# Patient Record
Sex: MALE | Race: OTHER | NOT HISPANIC OR LATINO
[De-identification: names, ages, dates, MRNs, and addresses within clinical notes are randomized per-mention and may not be internally consistent; named-entity substitution may affect disease eponyms.]

---

## 2017-11-20 ENCOUNTER — LABORATORY RESULT (OUTPATIENT)
Age: 62
End: 2017-11-20

## 2017-11-20 ENCOUNTER — APPOINTMENT (OUTPATIENT)
Dept: NEPHROLOGY | Facility: CLINIC | Age: 62
End: 2017-11-20
Payer: COMMERCIAL

## 2017-11-20 VITALS
HEIGHT: 64 IN | HEART RATE: 95 BPM | DIASTOLIC BLOOD PRESSURE: 85 MMHG | WEIGHT: 206.13 LBS | SYSTOLIC BLOOD PRESSURE: 137 MMHG | OXYGEN SATURATION: 96 % | BODY MASS INDEX: 35.19 KG/M2

## 2017-11-20 DIAGNOSIS — Z82.49 FAMILY HISTORY OF ISCHEMIC HEART DISEASE AND OTHER DISEASES OF THE CIRCULATORY SYSTEM: ICD-10-CM

## 2017-11-20 DIAGNOSIS — G47.30 SLEEP APNEA, UNSPECIFIED: ICD-10-CM

## 2017-11-20 DIAGNOSIS — Z83.3 FAMILY HISTORY OF DIABETES MELLITUS: ICD-10-CM

## 2017-11-20 DIAGNOSIS — Z82.2 FAMILY HISTORY OF DEAFNESS AND HEARING LOSS: ICD-10-CM

## 2017-11-20 DIAGNOSIS — Z86.19 PERSONAL HISTORY OF OTHER INFECTIOUS AND PARASITIC DISEASES: ICD-10-CM

## 2017-11-20 DIAGNOSIS — Z87.01 PERSONAL HISTORY OF PNEUMONIA (RECURRENT): ICD-10-CM

## 2017-11-20 DIAGNOSIS — R60.0 LOCALIZED EDEMA: ICD-10-CM

## 2017-11-20 PROCEDURE — 99203 OFFICE O/P NEW LOW 30 MIN: CPT

## 2017-12-13 ENCOUNTER — RESULT REVIEW (OUTPATIENT)
Age: 62
End: 2017-12-13

## 2017-12-13 ENCOUNTER — OUTPATIENT (OUTPATIENT)
Dept: OUTPATIENT SERVICES | Facility: HOSPITAL | Age: 62
LOS: 1 days | End: 2017-12-13
Payer: COMMERCIAL

## 2017-12-13 ENCOUNTER — APPOINTMENT (OUTPATIENT)
Dept: ULTRASOUND IMAGING | Facility: HOSPITAL | Age: 62
End: 2017-12-13
Payer: COMMERCIAL

## 2017-12-13 DIAGNOSIS — R80.9 PROTEINURIA, UNSPECIFIED: ICD-10-CM

## 2017-12-13 DIAGNOSIS — Z00.00 ENCOUNTER FOR GENERAL ADULT MEDICAL EXAMINATION WITHOUT ABNORMAL FINDINGS: ICD-10-CM

## 2017-12-13 DIAGNOSIS — D41.00 NEOPLASM OF UNCERTAIN BEHAVIOR OF UNSPECIFIED KIDNEY: ICD-10-CM

## 2017-12-13 PROCEDURE — 88313 SPECIAL STAINS GROUP 2: CPT | Mod: 26

## 2017-12-13 PROCEDURE — 50200 RENAL BIOPSY PERQ: CPT | Mod: LT

## 2017-12-13 PROCEDURE — 88305 TISSUE EXAM BY PATHOLOGIST: CPT

## 2017-12-13 PROCEDURE — 76942 ECHO GUIDE FOR BIOPSY: CPT

## 2017-12-13 PROCEDURE — 88350 IMFLUOR EA ADDL 1ANTB STN PX: CPT | Mod: 26

## 2017-12-13 PROCEDURE — 88305 TISSUE EXAM BY PATHOLOGIST: CPT | Mod: 26

## 2017-12-13 PROCEDURE — 88348 ELECTRON MICROSCOPY DX: CPT

## 2017-12-13 PROCEDURE — 88350 IMFLUOR EA ADDL 1ANTB STN PX: CPT

## 2017-12-13 PROCEDURE — 50200 RENAL BIOPSY PERQ: CPT

## 2017-12-13 PROCEDURE — 88348 ELECTRON MICROSCOPY DX: CPT | Mod: 26

## 2017-12-13 PROCEDURE — 76942 ECHO GUIDE FOR BIOPSY: CPT | Mod: 26

## 2017-12-13 PROCEDURE — 88312 SPECIAL STAINS GROUP 1: CPT

## 2017-12-13 PROCEDURE — 88312 SPECIAL STAINS GROUP 1: CPT | Mod: 26

## 2017-12-13 PROCEDURE — 88346 IMFLUOR 1ST 1ANTB STAIN PX: CPT | Mod: 26

## 2017-12-13 PROCEDURE — 88346 IMFLUOR 1ST 1ANTB STAIN PX: CPT

## 2017-12-13 PROCEDURE — 88313 SPECIAL STAINS GROUP 2: CPT

## 2017-12-15 LAB — SURGICAL PATHOLOGY STUDY: SIGNIFICANT CHANGE UP

## 2018-01-09 LAB
ALBUMIN SERPL ELPH-MCNC: 3.7 G/DL
ANA PAT FLD IF-IMP: NORMAL
ANA SER IF-ACNC: ABNORMAL
ANION GAP SERPL CALC-SCNC: 18 MMOL/L
APPEARANCE: CLEAR
BACTERIA: NEGATIVE
BASOPHILS # BLD AUTO: 0.04 K/UL
BASOPHILS # BLD AUTO: 0.05 K/UL
BASOPHILS NFR BLD AUTO: 0.5 %
BASOPHILS NFR BLD AUTO: 0.6 %
BILIRUBIN URINE: NEGATIVE
BLOOD URINE: ABNORMAL
BUN SERPL-MCNC: 17 MG/DL
C3 SERPL-MCNC: 160 MG/DL
CALCIUM SERPL-MCNC: 9.7 MG/DL
CHLORIDE SERPL-SCNC: 102 MMOL/L
CO2 SERPL-SCNC: 21 MMOL/L
COLOR: YELLOW
CREAT SERPL-MCNC: 0.99 MG/DL
CREAT SPEC-SCNC: 73 MG/DL
CREAT/PROT UR: 9.9 RATIO
DEPRECATED KAPPA LC FREE/LAMBDA SER: 1.44 RATIO
DSDNA AB SER-ACNC: 59 IU/ML
EOSINOPHIL # BLD AUTO: 0.21 K/UL
EOSINOPHIL # BLD AUTO: 0.26 K/UL
EOSINOPHIL NFR BLD AUTO: 2.5
EOSINOPHIL NFR BLD AUTO: 2.9 %
GLUCOSE QUALITATIVE U: NEGATIVE MG/DL
GLUCOSE SERPL-MCNC: 99 MG/DL
GRANULAR CASTS: 4 /LPF
HBV SURFACE AB SER QL: REACTIVE
HBV SURFACE AG SER QL: NONREACTIVE
HCT VFR BLD CALC: 37.4 %
HCT VFR BLD CALC: 39.6 %
HCV AB SER QL: NONREACTIVE
HCV S/CO RATIO: 0.21 S/CO
HGB BLD-MCNC: 12.8 G/DL
HGB BLD-MCNC: 13.9 G/DL
HYALINE CASTS: 13 /LPF
IGA 24H UR QL IFE: NORMAL
IGA SER QL IEP: 486 MG/DL
IGG SER QL IEP: 899 MG/DL
IGM SER QL IEP: 98 MG/DL
IMM GRANULOCYTES NFR BLD AUTO: 1.4 %
IMM GRANULOCYTES NFR BLD AUTO: 1.7 %
INR PPP: 0.94 RATIO
KAPPA LC CSF-MCNC: 2.49 MG/DL
KAPPA LC SERPL-MCNC: 3.58 MG/DL
KETONES URINE: NEGATIVE
LEUKOCYTE ESTERASE URINE: NEGATIVE
LYMPHOCYTES # BLD AUTO: 2.09 K/UL
LYMPHOCYTES # BLD AUTO: 2.49 K/UL
LYMPHOCYTES NFR BLD AUTO: 24.9 %
LYMPHOCYTES NFR BLD AUTO: 28.1 %
M PROTEIN SPEC IFE-MCNC: NORMAL
MAN DIFF?: NORMAL
MAN DIFF?: NORMAL
MCHC RBC-ENTMCNC: 32.1 PG
MCHC RBC-ENTMCNC: 32.4 PG
MCHC RBC-ENTMCNC: 34.2 GM/DL
MCHC RBC-ENTMCNC: 35.1 GM/DL
MCV RBC AUTO: 91.5 FL
MCV RBC AUTO: 94.7 FL
MICROSCOPIC-UA: NORMAL
MONOCYTES # BLD AUTO: 0.52 K/UL
MONOCYTES # BLD AUTO: 0.58 K/UL
MONOCYTES NFR BLD AUTO: 6.2 %
MONOCYTES NFR BLD AUTO: 6.5 %
MPO AB + PR3 PNL SER: NORMAL
NEUTROPHILS # BLD AUTO: 5.38 K/UL
NEUTROPHILS # BLD AUTO: 5.39 K/UL
NEUTROPHILS NFR BLD AUTO: 60.6 %
NEUTROPHILS NFR BLD AUTO: 64.1 %
NITRITE URINE: NEGATIVE
PH URINE: 5
PHOSPHATE SERPL-MCNC: 4.4 MG/DL
PHOSPHOLIPASE A2 RECEPTOR ELISA: 2 RU/ML
PHOSPHOLIPASE A2 RECEPTOR IFA: NEGATIVE
PLATELET # BLD AUTO: 254 K/UL
PLATELET # BLD AUTO: 275 K/UL
POTASSIUM SERPL-SCNC: 4.4 MMOL/L
PROT UR-MCNC: 725 MG/DL
PROTEIN URINE: 300 MG/DL
PT BLD: 10.6 SEC
RBC # BLD: 3.95 M/UL
RBC # BLD: 4.33 M/UL
RBC # FLD: 12.4 %
RBC # FLD: 12.7 %
RED BLOOD CELLS URINE: 11 /HPF
SODIUM SERPL-SCNC: 141 MMOL/L
SPECIFIC GRAVITY URINE: 1.02
SQUAMOUS EPITHELIAL CELLS: 3 /HPF
UROBILINOGEN URINE: NEGATIVE MG/DL
WBC # FLD AUTO: 8.4 K/UL
WBC # FLD AUTO: 8.87 K/UL
WHITE BLOOD CELLS URINE: 2 /HPF

## 2018-01-11 ENCOUNTER — APPOINTMENT (OUTPATIENT)
Dept: NEPHROLOGY | Facility: CLINIC | Age: 63
End: 2018-01-11
Payer: COMMERCIAL

## 2018-01-11 VITALS
BODY MASS INDEX: 35.53 KG/M2 | SYSTOLIC BLOOD PRESSURE: 126 MMHG | HEIGHT: 64 IN | DIASTOLIC BLOOD PRESSURE: 73 MMHG | HEART RATE: 88 BPM | OXYGEN SATURATION: 95 % | WEIGHT: 208.11 LBS

## 2018-01-11 PROCEDURE — 99214 OFFICE O/P EST MOD 30 MIN: CPT

## 2018-01-21 ENCOUNTER — EMERGENCY (EMERGENCY)
Facility: HOSPITAL | Age: 63
LOS: 1 days | Discharge: HOME | End: 2018-01-21
Attending: EMERGENCY MEDICINE | Admitting: EMERGENCY MEDICINE
Payer: COMMERCIAL

## 2018-01-21 VITALS
DIASTOLIC BLOOD PRESSURE: 82 MMHG | TEMPERATURE: 98 F | SYSTOLIC BLOOD PRESSURE: 128 MMHG | RESPIRATION RATE: 18 BRPM | HEART RATE: 64 BPM | OXYGEN SATURATION: 97 %

## 2018-01-21 VITALS
OXYGEN SATURATION: 97 % | RESPIRATION RATE: 18 BRPM | DIASTOLIC BLOOD PRESSURE: 89 MMHG | SYSTOLIC BLOOD PRESSURE: 141 MMHG | HEART RATE: 91 BPM

## 2018-01-21 PROCEDURE — 99284 EMERGENCY DEPT VISIT MOD MDM: CPT

## 2018-01-21 PROCEDURE — 99284 EMERGENCY DEPT VISIT MOD MDM: CPT | Mod: 25

## 2018-01-21 PROCEDURE — 93971 EXTREMITY STUDY: CPT

## 2018-01-21 PROCEDURE — 93971 EXTREMITY STUDY: CPT | Mod: 26

## 2018-01-21 NOTE — ED PROVIDER NOTE - OBJECTIVE STATEMENT
62M Nurse h/o HTN, HLD, DM, Membranous nephropathy, presents with left popliteal area leg pain which wakes him up at night. His father had a DVT so he is concerned he may have as well. Denies trauma to the area, fevers, chest pain, SOB, N/V, travel. 62M Nurse h/o HTN, HLD, DM, Membranous nephropathy, presents with left medial distal thigh pain which wakes him up at night. His father  2 months ago so he is concerned he may have as well. Denies trauma to the area, fevers, chest pain, SOB, N/V, travel. 62M Nurse h/o HTN, HLD, DM, Membranous nephropathy, presents with left medial distal thigh pain which wakes him up at night. The pain has been occurring for 6 months, worsening. Patient stopped his prednisone abruptly 1 month ago. His father  2 months ago so he is concerned he may have as well. Denies trauma to the area, fevers, chest pain, SOB, N/V, travel. Patient also admits to lateral thigh numbness, notes that has lumbar disc herniations.

## 2018-01-21 NOTE — ED PROVIDER NOTE - LOWER EXTREMITY EXAM, LEFT
Pain on full extension on knee at 170deg. No erythema, no swelling. Minor bruising around the area, tender to palpation./normal

## 2018-01-21 NOTE — ED PROVIDER NOTE - CARE PLAN
Principal Discharge DX:	Leg pain, medial, left  Assessment and plan of treatment:	Call your primary care doctor today or tomorrow to schedule follow up appointment for within the next 3-5 days.  Continue taking your medications as prescribed.  Return to this Emergency Department if you experience worsening condition or for any other emergency.

## 2018-01-21 NOTE — ED PROVIDER NOTE - MEDICAL DECISION MAKING DETAILS
62M medial leg pain. Possible tendonitis, muscle rupture. No trauma. Will get US to eval for DVT, unlikely, no risk factors.

## 2018-01-21 NOTE — ED PROVIDER NOTE - ATTENDING CONTRIBUTION TO CARE
****ATTENDING**** 63yo male DM, HTN, HLD, recent diagnosis of membranous nephropathy w steroids that was stopped 1 mo ago presents with L leg pain x 6 mos. States he has had pain in the area that is dull and achy radiates on the medial aspect of the leg but worse since last night. No trauma.  No chest pain, sob, palp. No recent travel. States he wanted to make sure its not a DVT. Denies any back pain, weakness.   On exam, Patient is awake,alert,oriented x 3. Patient is well appearing and in no acute distress. Patient's chest is clear to ausculation, +s1s2. Abdomen is soft nd/nt +BS. Extremity with no swelling or calf tenderness. L leg medial aspect tender to touch superior to the knee. no rash or erythema, nv intact.  Pt declined pain meds. Check US to eval for DVT. L knee full rom no swelling or erythema.

## 2018-01-21 NOTE — ED PROVIDER NOTE - PROGRESS NOTE DETAILS
Patient is reassessed, states he does not want anything for pain and just wanted to make sure its not a DVT. Pt to follow up with rheumatologist and orthopedics. RGUJINDIA

## 2018-01-21 NOTE — ED ADULT NURSE NOTE - OBJECTIVE STATEMENT
62 year olf male presents to ED via wheel chair through waiting room complaining of bilateral leg pain 62 year olf male presents to ED via wheel chair through waiting room complaining of bilateral leg pain, left worse than right. Has chronic back pain from lumbar and cervical disc herniations, leg pain started last night. legs soft, no swelling or redness noted. Localizes the worst pain at interior knee. mild numbness to posterior left thigh. Denies any recent illness, travel, trauma or falls. Denies HA, CP, SOB, abd pain, NVD. Patient undressed and placed into gown, call bell in hand and side rails up with bed in lowest position for safety. blanket provided. Comfort and safety provided.

## 2018-03-08 ENCOUNTER — EMERGENCY (EMERGENCY)
Facility: HOSPITAL | Age: 63
LOS: 0 days | Discharge: ANOTHER HOSPITAL | End: 2018-03-08
Attending: EMERGENCY MEDICINE
Payer: COMMERCIAL

## 2018-03-08 ENCOUNTER — EMERGENCY (EMERGENCY)
Facility: HOSPITAL | Age: 63
LOS: 1 days | Discharge: HOME | End: 2018-03-08
Admitting: EMERGENCY MEDICINE
Payer: COMMERCIAL

## 2018-03-08 VITALS
OXYGEN SATURATION: 96 % | HEART RATE: 100 BPM | SYSTOLIC BLOOD PRESSURE: 124 MMHG | TEMPERATURE: 98 F | DIASTOLIC BLOOD PRESSURE: 83 MMHG | RESPIRATION RATE: 16 BRPM

## 2018-03-08 VITALS
TEMPERATURE: 98 F | DIASTOLIC BLOOD PRESSURE: 82 MMHG | RESPIRATION RATE: 18 BRPM | OXYGEN SATURATION: 98 % | SYSTOLIC BLOOD PRESSURE: 138 MMHG | HEART RATE: 95 BPM

## 2018-03-08 VITALS
HEART RATE: 117 BPM | DIASTOLIC BLOOD PRESSURE: 82 MMHG | RESPIRATION RATE: 16 BRPM | SYSTOLIC BLOOD PRESSURE: 124 MMHG | OXYGEN SATURATION: 100 % | HEIGHT: 64 IN | TEMPERATURE: 100 F | WEIGHT: 205.03 LBS

## 2018-03-08 LAB
ALBUMIN SERPL ELPH-MCNC: 2.8 G/DL — LOW (ref 3.3–5)
ALP SERPL-CCNC: 54 U/L — SIGNIFICANT CHANGE UP (ref 40–120)
ALT FLD-CCNC: 36 U/L — SIGNIFICANT CHANGE UP (ref 12–78)
ANION GAP SERPL CALC-SCNC: 10 MMOL/L — SIGNIFICANT CHANGE UP (ref 5–17)
APPEARANCE UR: CLEAR — SIGNIFICANT CHANGE UP
APTT BLD: 28.9 SEC — SIGNIFICANT CHANGE UP (ref 27.5–37.4)
AST SERPL-CCNC: 34 U/L — SIGNIFICANT CHANGE UP (ref 15–37)
BACTERIA # UR AUTO: ABNORMAL
BASOPHILS # BLD AUTO: 0.05 K/UL — SIGNIFICANT CHANGE UP (ref 0–0.2)
BASOPHILS NFR BLD AUTO: 0.3 % — SIGNIFICANT CHANGE UP (ref 0–2)
BILIRUB SERPL-MCNC: 1 MG/DL — SIGNIFICANT CHANGE UP (ref 0.2–1.2)
BILIRUB UR-MCNC: NEGATIVE — SIGNIFICANT CHANGE UP
BUN SERPL-MCNC: 16 MG/DL — SIGNIFICANT CHANGE UP (ref 7–23)
CALCIUM SERPL-MCNC: 8.5 MG/DL — SIGNIFICANT CHANGE UP (ref 8.5–10.1)
CHLORIDE SERPL-SCNC: 104 MMOL/L — SIGNIFICANT CHANGE UP (ref 96–108)
CO2 SERPL-SCNC: 27 MMOL/L — SIGNIFICANT CHANGE UP (ref 22–31)
COLOR SPEC: YELLOW — SIGNIFICANT CHANGE UP
CREAT SERPL-MCNC: 1.19 MG/DL — SIGNIFICANT CHANGE UP (ref 0.5–1.3)
DIFF PNL FLD: ABNORMAL
EOSINOPHIL # BLD AUTO: 0.03 K/UL — SIGNIFICANT CHANGE UP (ref 0–0.5)
EOSINOPHIL NFR BLD AUTO: 0.2 % — SIGNIFICANT CHANGE UP (ref 0–6)
EPI CELLS # UR: SIGNIFICANT CHANGE UP
GLUCOSE SERPL-MCNC: 135 MG/DL — HIGH (ref 70–99)
GLUCOSE UR QL: NEGATIVE MG/DL — SIGNIFICANT CHANGE UP
HCT VFR BLD CALC: 37.5 % — LOW (ref 39–50)
HGB BLD-MCNC: 13.3 G/DL — SIGNIFICANT CHANGE UP (ref 13–17)
HYALINE CASTS # UR AUTO: ABNORMAL /LPF
IMM GRANULOCYTES NFR BLD AUTO: 0.5 % — SIGNIFICANT CHANGE UP (ref 0–1.5)
INR BLD: 1.08 RATIO — SIGNIFICANT CHANGE UP (ref 0.88–1.16)
KETONES UR-MCNC: NEGATIVE — SIGNIFICANT CHANGE UP
LACTATE SERPL-SCNC: 2.3 MMOL/L — HIGH (ref 0.7–2)
LEUKOCYTE ESTERASE UR-ACNC: NEGATIVE — SIGNIFICANT CHANGE UP
LYMPHOCYTES # BLD AUTO: 1.34 K/UL — SIGNIFICANT CHANGE UP (ref 1–3.3)
LYMPHOCYTES # BLD AUTO: 7.6 % — LOW (ref 13–44)
MCHC RBC-ENTMCNC: 32.1 PG — SIGNIFICANT CHANGE UP (ref 27–34)
MCHC RBC-ENTMCNC: 35.5 GM/DL — SIGNIFICANT CHANGE UP (ref 32–36)
MCV RBC AUTO: 90.6 FL — SIGNIFICANT CHANGE UP (ref 80–100)
MONOCYTES # BLD AUTO: 1.29 K/UL — HIGH (ref 0–0.9)
MONOCYTES NFR BLD AUTO: 7.3 % — SIGNIFICANT CHANGE UP (ref 2–14)
NEUTROPHILS # BLD AUTO: 14.79 K/UL — HIGH (ref 1.8–7.4)
NEUTROPHILS NFR BLD AUTO: 84.1 % — HIGH (ref 43–77)
NITRITE UR-MCNC: NEGATIVE — SIGNIFICANT CHANGE UP
NRBC # BLD: 0 /100 WBCS — SIGNIFICANT CHANGE UP (ref 0–0)
PH UR: 6 — SIGNIFICANT CHANGE UP (ref 5–8)
PLATELET # BLD AUTO: 211 K/UL — SIGNIFICANT CHANGE UP (ref 150–400)
POTASSIUM SERPL-MCNC: 3.3 MMOL/L — LOW (ref 3.5–5.3)
POTASSIUM SERPL-SCNC: 3.3 MMOL/L — LOW (ref 3.5–5.3)
PROT SERPL-MCNC: 6.9 GM/DL — SIGNIFICANT CHANGE UP (ref 6–8.3)
PROT UR-MCNC: 500 MG/DL
PROTHROM AB SERPL-ACNC: 11.8 SEC — SIGNIFICANT CHANGE UP (ref 9.8–12.7)
RBC # BLD: 4.14 M/UL — LOW (ref 4.2–5.8)
RBC # FLD: 12.1 % — SIGNIFICANT CHANGE UP (ref 10.3–14.5)
RBC CASTS # UR COMP ASSIST: ABNORMAL /HPF (ref 0–4)
SODIUM SERPL-SCNC: 141 MMOL/L — SIGNIFICANT CHANGE UP (ref 135–145)
SP GR SPEC: 1.01 — SIGNIFICANT CHANGE UP (ref 1.01–1.02)
UROBILINOGEN FLD QL: NEGATIVE MG/DL — SIGNIFICANT CHANGE UP
WBC # BLD: 17.58 K/UL — HIGH (ref 3.8–10.5)
WBC # FLD AUTO: 17.58 K/UL — HIGH (ref 3.8–10.5)
WBC UR QL: SIGNIFICANT CHANGE UP

## 2018-03-08 PROCEDURE — 99285 EMERGENCY DEPT VISIT HI MDM: CPT | Mod: 25

## 2018-03-08 PROCEDURE — 99284 EMERGENCY DEPT VISIT MOD MDM: CPT

## 2018-03-08 PROCEDURE — 70491 CT SOFT TISSUE NECK W/DYE: CPT | Mod: 26

## 2018-03-08 RX ORDER — DEXAMETHASONE 0.5 MG/5ML
10 ELIXIR ORAL ONCE
Qty: 0 | Refills: 0 | Status: COMPLETED | OUTPATIENT
Start: 2018-03-08 | End: 2018-03-08

## 2018-03-08 RX ORDER — SODIUM CHLORIDE 9 MG/ML
500 INJECTION INTRAMUSCULAR; INTRAVENOUS; SUBCUTANEOUS ONCE
Qty: 0 | Refills: 0 | Status: COMPLETED | OUTPATIENT
Start: 2018-03-08 | End: 2018-03-08

## 2018-03-08 RX ORDER — VANCOMYCIN HCL 1 G
1000 VIAL (EA) INTRAVENOUS ONCE
Qty: 0 | Refills: 0 | Status: COMPLETED | OUTPATIENT
Start: 2018-03-08 | End: 2018-03-08

## 2018-03-08 RX ORDER — KETOROLAC TROMETHAMINE 30 MG/ML
30 SYRINGE (ML) INJECTION ONCE
Qty: 0 | Refills: 0 | Status: DISCONTINUED | OUTPATIENT
Start: 2018-03-08 | End: 2018-03-08

## 2018-03-08 RX ORDER — PIPERACILLIN AND TAZOBACTAM 4; .5 G/20ML; G/20ML
3.38 INJECTION, POWDER, LYOPHILIZED, FOR SOLUTION INTRAVENOUS ONCE
Qty: 0 | Refills: 0 | Status: COMPLETED | OUTPATIENT
Start: 2018-03-08 | End: 2018-03-08

## 2018-03-08 RX ORDER — SODIUM CHLORIDE 9 MG/ML
3 INJECTION INTRAMUSCULAR; INTRAVENOUS; SUBCUTANEOUS ONCE
Qty: 0 | Refills: 0 | Status: COMPLETED | OUTPATIENT
Start: 2018-03-08 | End: 2018-03-08

## 2018-03-08 RX ADMIN — PIPERACILLIN AND TAZOBACTAM 200 GRAM(S): 4; .5 INJECTION, POWDER, LYOPHILIZED, FOR SOLUTION INTRAVENOUS at 07:00

## 2018-03-08 RX ADMIN — Medication 10 MILLIGRAM(S): at 06:56

## 2018-03-08 RX ADMIN — Medication 30 MILLIGRAM(S): at 06:55

## 2018-03-08 RX ADMIN — SODIUM CHLORIDE 500 MILLILITER(S): 9 INJECTION INTRAMUSCULAR; INTRAVENOUS; SUBCUTANEOUS at 07:00

## 2018-03-08 RX ADMIN — Medication 250 MILLIGRAM(S): at 07:00

## 2018-03-08 RX ADMIN — SODIUM CHLORIDE 3 MILLILITER(S): 9 INJECTION INTRAMUSCULAR; INTRAVENOUS; SUBCUTANEOUS at 07:00

## 2018-03-08 RX ADMIN — Medication 100 MILLIGRAM(S): at 18:58

## 2018-03-08 RX ADMIN — Medication 30 MILLIGRAM(S): at 07:24

## 2018-03-08 NOTE — CONSULT NOTE ADULT - ASSESSMENT
62M with left tonsillar abscess. No PTA on exam or on CT.  -no acute ORL intervention at this time  -clindamycin x 7 days  -call with questions

## 2018-03-08 NOTE — ED PROVIDER NOTE - OBJECTIVE STATEMENT
Pertinent PMH/PSH/FHx/SHx and Review of Systems contained within:  61 yo m with PMH of DM, SLE, Nephrotic syndrome and HTN presents in ED c/o 3 day history of fever associated with progressive sore throat now associated with muffled voice and unable to tolerate own secretions. Patient reports seeing pmd yesterday and was prescribed augmentin of which he's has taken one dose. No cough. No aggravating or relieving factors, No chills, No photophobia/eye pain/changes in vision, No ear pain, No chest pain/palpitations, no SOB/cough/wheeze/stridor, No abdominal pain, No N/V/D, no dysuria/frequency/discharge, No neck/back pain, no rash, no changes in neurological status/function.

## 2018-03-08 NOTE — CONSULT NOTE ADULT - SUBJECTIVE AND OBJECTIVE BOX
Patient is a 62M with PMH of HTN, DM, lupus, lupus nephritis who presents with 3 day history of sore throat, left worse than right. HAs dysphagia and odynophagia. Has muffled voice. Subjective fever. No nausea, vomiting, difficulty breathing, trismus, otalgia. Was seen by outside ENT yesterday for tonsillitis and started on augmentin and a medrol dose stephania. Reports symptoms worsened. Has remote history of tonsillitis. Underwent CT neck at outside ER that showed left tonsillar abscess, no peritonsillar abscess. Given clindamycin, decadron and toradol in the ER with great improvement in symptoms.    Exam  NAD, awake and alert  breathing comfortably on room air  no stridor/stertor  PERRLA, EOMI  NC: clear anteriorly  OC/OP: tongue WNL, FOM soft/flat, right tonsil 1+ erythematous, left tonsil 4+ erythematous, uvula midline, soft palate symmetric and flat, OP clear  Neck: soft/flat, no LAD    CT neck reviewed which showed left tonsillar abscess

## 2018-03-08 NOTE — ED PROVIDER NOTE - MEDICAL DECISION MAKING DETAILS
Pending labs and CT. Abx, ivfs, toradol and decadron ordered. Patient care transitioned to incoming team.  All decisions regarding the progression of care will be made at their discretion. Pending labs and CT. Abx, ivfs, toradol and decadron ordered. Patient care transitioned to incoming team.  All decisions regarding the progression of care will be made at their discretion. patient has tonsillar abscess. have discussed case with ent georgiana who will accept transfer to [facility] for drainage. airway stable and patient with no signs of sepsis upon trasnfer.

## 2018-03-08 NOTE — ED ADULT NURSE NOTE - CHPI ED SYMPTOMS NEG
no numbness/no weakness/no change in level of consciousness/no loss of consciousness/no nausea/no chills/no syncope/no blurred vision/no vomiting

## 2018-03-08 NOTE — ED PROVIDER NOTE - OBJECTIVE STATEMENT
63 y/o male pmh dm, lupus, lupus nephritis c/o sore throat x3 days. Pt was transferred from [place] [facility] for ENT for left tonsillar abscess. Pt admits to having a sore throat x3 days. Admits to feer to 100.2 at home. pt states that the sore throat became progressively worse over 3 days. pt admits to pain with swallowing solids at first and then liquids. Pt went to his ENT yesterday who started pt on augmentin. Pt admits to waking up today and his voice had changes and he was unable to tolerate his secretions. pt was seen at [place], given decadron, vanc and zosyn, had CT which showed + left palatine fluid collection. Pt admits to improvement of symptoms after meds. Pt states that his voice is normalizing and can now tolerate secretions. Denies chest pain, sob, abd pain, n/v/d, dizziness, weakness, syncope or chills.

## 2018-03-08 NOTE — ED PROVIDER NOTE - THROAT FINDINGS
NO DROOLING/NO TONGUE ELEVATION/no exudate/HOARSE/MUFFLED VOICE/TONSILLAR SWELLING/NO STRIDOR/THROAT RED/uvula midline/NO VESICLES/ULCERS

## 2018-03-08 NOTE — ED ADULT NURSE REASSESSMENT NOTE - NS ED NURSE REASSESS COMMENT FT1
pt being transfer to [facility] aware of transfer  EMS arrived , transfer paper given.
pt back from CT. states he only has pain when he swallows. pt completed ABX. Fluids running.
Pt received at the bedside. Pt is A&Ox4. Pt has a 20G right AC, ABX running. wife at bedside. Pt states that his pain has subsided a bit.  Now feels his pain is a t a 7. Bed in lowest postion, safety maintained.

## 2018-03-08 NOTE — ED ADULT NURSE NOTE - PMH
Hepatitis B Carrier    Hypertension, unspecified type    Nephritic syndrome    Obstructive Sleep Apnea    Other systemic lupus erythematosus with endocarditis    Pneumonia    Sleep apnea, unspecified type    Type 2 diabetes mellitus with other neurologic complication, without long-term current use of insulin

## 2018-03-08 NOTE — ED PROVIDER NOTE - ENMT, MLM
Airway patent, Nasal mucosa clear. Mouth with normal mucosa. bilaterally edematous tonsils, mallampati III with limited visualization, anterior cervical lymphadenopathy, muffled voice

## 2018-03-09 DIAGNOSIS — R49.0 DYSPHONIA: ICD-10-CM

## 2018-03-09 DIAGNOSIS — I10 ESSENTIAL (PRIMARY) HYPERTENSION: ICD-10-CM

## 2018-03-09 DIAGNOSIS — G47.33 OBSTRUCTIVE SLEEP APNEA (ADULT) (PEDIATRIC): ICD-10-CM

## 2018-03-09 DIAGNOSIS — B18.1 CHRONIC VIRAL HEPATITIS B WITHOUT DELTA-AGENT: ICD-10-CM

## 2018-03-09 DIAGNOSIS — N05.9 UNSPECIFIED NEPHRITIC SYNDROME WITH UNSPECIFIED MORPHOLOGIC CHANGES: ICD-10-CM

## 2018-03-09 DIAGNOSIS — R50.9 FEVER, UNSPECIFIED: ICD-10-CM

## 2018-03-09 DIAGNOSIS — J36 PERITONSILLAR ABSCESS: ICD-10-CM

## 2018-03-09 DIAGNOSIS — J02.9 ACUTE PHARYNGITIS, UNSPECIFIED: ICD-10-CM

## 2018-03-13 LAB
CULTURE RESULTS: SIGNIFICANT CHANGE UP
CULTURE RESULTS: SIGNIFICANT CHANGE UP
SPECIMEN SOURCE: SIGNIFICANT CHANGE UP
SPECIMEN SOURCE: SIGNIFICANT CHANGE UP

## 2019-05-14 PROBLEM — E11.49 TYPE 2 DIABETES MELLITUS WITH OTHER DIABETIC NEUROLOGICAL COMPLICATION: Chronic | Status: ACTIVE | Noted: 2018-03-08

## 2019-05-14 PROBLEM — M32.11: Chronic | Status: ACTIVE | Noted: 2018-03-08

## 2019-05-14 PROBLEM — N05.9 UNSPECIFIED NEPHRITIC SYNDROME WITH UNSPECIFIED MORPHOLOGIC CHANGES: Chronic | Status: ACTIVE | Noted: 2018-03-08

## 2019-05-14 PROBLEM — G47.30 SLEEP APNEA, UNSPECIFIED: Chronic | Status: ACTIVE | Noted: 2018-03-08

## 2019-05-14 PROBLEM — I10 ESSENTIAL (PRIMARY) HYPERTENSION: Chronic | Status: ACTIVE | Noted: 2018-03-08

## 2019-05-20 ENCOUNTER — APPOINTMENT (OUTPATIENT)
Dept: NEPHROLOGY | Facility: CLINIC | Age: 64
End: 2019-05-20
Payer: COMMERCIAL

## 2019-05-20 VITALS
BODY MASS INDEX: 35.85 KG/M2 | DIASTOLIC BLOOD PRESSURE: 92 MMHG | HEART RATE: 93 BPM | OXYGEN SATURATION: 97 % | WEIGHT: 210 LBS | HEIGHT: 64 IN | SYSTOLIC BLOOD PRESSURE: 143 MMHG

## 2019-05-20 PROCEDURE — 99214 OFFICE O/P EST MOD 30 MIN: CPT

## 2019-05-20 RX ORDER — GEMFIBROZIL 600 MG/1
600 TABLET, FILM COATED ORAL
Refills: 0 | Status: DISCONTINUED | COMMUNITY
End: 2019-05-20

## 2019-05-20 RX ORDER — PREDNISONE 10 MG/1
10 TABLET ORAL
Refills: 0 | Status: DISCONTINUED | COMMUNITY
End: 2019-05-20

## 2019-05-20 RX ORDER — HYDROCHLOROTHIAZIDE 25 MG/1
25 TABLET ORAL
Refills: 0 | Status: DISCONTINUED | COMMUNITY
End: 2019-05-20

## 2019-05-20 NOTE — PHYSICAL EXAM
[General Appearance - Alert] : alert [General Appearance - In No Acute Distress] : in no acute distress [General Appearance - Well Nourished] : well nourished [Sclera] : the sclera and conjunctiva were normal [Outer Ear] : the ears and nose were normal in appearance [Neck Appearance] : the appearance of the neck was normal [Neck Cervical Mass (___cm)] : no neck mass was observed [] : no respiratory distress [Exaggerated Use Of Accessory Muscles For Inspiration] : no accessory muscle use [Apical Impulse] : the apical impulse was normal [Heart Sounds] : normal S1 and S2 [Pitting Edema] : pitting edema present [___ +] : bilateral [unfilled]+ pitting edema to the ankles [Bowel Sounds] : normal bowel sounds [Abdomen Tenderness] : non-tender [Cervical Lymph Nodes Enlarged Posterior Bilaterally] : posterior cervical [Cervical Lymph Nodes Enlarged Anterior Bilaterally] : anterior cervical [Supraclavicular Lymph Nodes Enlarged Bilaterally] : supraclavicular [No CVA Tenderness] : no ~M costovertebral angle tenderness [No Spinal Tenderness] : no spinal tenderness [Abnormal Walk] : normal gait [Nail Clubbing] : no clubbing  or cyanosis of the fingernails [Musculoskeletal - Swelling] : no joint swelling seen [Skin Color & Pigmentation] : normal skin color and pigmentation [Skin Turgor] : normal skin turgor [Oriented To Time, Place, And Person] : oriented to person, place, and time

## 2019-05-24 LAB
ALBUMIN SERPL ELPH-MCNC: 3 G/DL
ANION GAP SERPL CALC-SCNC: 10 MMOL/L
APPEARANCE: CLEAR
BACTERIA: ABNORMAL
BILIRUBIN URINE: NEGATIVE
BLOOD URINE: ABNORMAL
BUN SERPL-MCNC: 14 MG/DL
CALCIUM SERPL-MCNC: 9 MG/DL
CHLORIDE SERPL-SCNC: 107 MMOL/L
CO2 SERPL-SCNC: 23 MMOL/L
COLOR: YELLOW
CREAT SERPL-MCNC: 1.5 MG/DL
CREAT SPEC-SCNC: 106 MG/DL
CREAT/PROT UR: 14.8 RATIO
GLUCOSE QUALITATIVE U: ABNORMAL
GLUCOSE SERPL-MCNC: 143 MG/DL
HYALINE CASTS: 15 /LPF
KETONES URINE: NEGATIVE
LEUKOCYTE ESTERASE URINE: NEGATIVE
MICROSCOPIC-UA: NORMAL
NITRITE URINE: NEGATIVE
PH URINE: 6.5
PHOSPHATE SERPL-MCNC: 2.9 MG/DL
POTASSIUM SERPL-SCNC: 3.8 MMOL/L
PROT UR-MCNC: 1569 MG/DL
PROTEIN URINE: ABNORMAL
RED BLOOD CELLS URINE: 9 /HPF
SODIUM SERPL-SCNC: 140 MMOL/L
SPECIFIC GRAVITY URINE: 1.02
SQUAMOUS EPITHELIAL CELLS: 7 /HPF
URINE COMMENTS: NORMAL
UROBILINOGEN URINE: NORMAL
WHITE BLOOD CELLS URINE: 3 /HPF

## 2019-07-05 LAB
ALBUMIN SERPL ELPH-MCNC: 2.8 G/DL
ANION GAP SERPL CALC-SCNC: 12 MMOL/L
APPEARANCE: CLEAR
BACTERIA: NEGATIVE
BILIRUBIN URINE: NEGATIVE
BLOOD URINE: ABNORMAL
BUN SERPL-MCNC: 15 MG/DL
CALCIUM SERPL-MCNC: 9.3 MG/DL
CHLORIDE SERPL-SCNC: 105 MMOL/L
CO2 SERPL-SCNC: 23 MMOL/L
COLOR: YELLOW
CREAT SERPL-MCNC: 1.57 MG/DL
CREAT SPEC-SCNC: 151 MG/DL
CREAT/PROT UR: 8.9 RATIO
GLUCOSE QUALITATIVE U: ABNORMAL
GLUCOSE SERPL-MCNC: 154 MG/DL
HYALINE CASTS: 3 /LPF
KETONES URINE: NEGATIVE
LEUKOCYTE ESTERASE URINE: NEGATIVE
MICROSCOPIC-UA: NORMAL
NITRITE URINE: NEGATIVE
PH URINE: 6.5
PHOSPHATE SERPL-MCNC: 3.1 MG/DL
POTASSIUM SERPL-SCNC: 3.6 MMOL/L
PROT UR-MCNC: 1340 MG/DL
PROTEIN URINE: ABNORMAL
RED BLOOD CELLS URINE: 5 /HPF
SODIUM SERPL-SCNC: 140 MMOL/L
SPECIFIC GRAVITY URINE: 1.02
SQUAMOUS EPITHELIAL CELLS: 2 /HPF
UROBILINOGEN URINE: NORMAL
WHITE BLOOD CELLS URINE: 1 /HPF

## 2019-07-05 NOTE — CONSULT LETTER
[FreeTextEntry1] : A case of obesity with nephrotic syndrome, hyperlipidemia, proteinuria (16 gm), pedal edema, sleep apnea, ED +ve has been started on steroid on losartan and HCTZ without any  change. Renal sonogram did not reveal any kidney abnormality except enlarged prostate. Despite significant proteinuria his serum albumin 3.7 gm and only mild elevation of serum cholesterol . Most likely patient has obesity related nephrotic syndrome. Advised w/u and kidney biopsy.

## 2019-07-05 NOTE — REVIEW OF SYSTEMS
[Feeling Poorly] : feeling poorly [Feeling Tired] : feeling tired [Recent Weight Gain (___ Lbs)] : recent [unfilled] ~Ulb weight gain [Eyesight Problems] : eyesight problems [Loss Of Hearing] : hearing loss [Lower Ext Edema] : lower extremity edema [SOB on Exertion] : shortness of breath during exertion [Arthralgias] : arthralgias [Joint Pain] : joint pain [Recent Weight Loss (___ Lbs)] : no recent weight loss [Chest Pain] : no chest pain [Palpitations] : no palpitations [Constipation] : no constipation [Diarrhea] : no diarrhea [Heartburn] : no heartburn [Hesitancy] : no urinary hesitancy [Nocturia] : no nocturia [Itching] : no itching [Dry Skin] : no dry skin [Dizziness] : no dizziness [Fainting] : no fainting [Anxiety] : no anxiety [Muscle Weakness] : no muscle weakness [Depression] : no depression [Easy Bleeding] : no tendency for easy bleeding [Easy Bruising] : no tendency for easy bruising

## 2019-07-05 NOTE — HISTORY OF PRESENT ILLNESS
[Stage 2] : stage 2 [Good Compliance] : good compliance  [FreeTextEntry1] : A case of nephrotic syndrome with  lupus nephritis with membranous nephropathy (biopsy proven) with diabetes mellitus and hypertriglyceridemia and massive proteinuria.

## 2019-07-05 NOTE — ASSESSMENT
[FreeTextEntry1] : A case of CKD stage III with diabetes, obesity with nephrotic syndrome, hyperlipidemia, proteinuria, pedal edema, sleep apnea, lupus nephritis (membranous) being treated with cellcept 1500 mg PO BID.  Patients has massive proteinuria because is taking high protein diet. Advised to decrease protein in diet. Increase losartan 100 mg PO BID. Add calcitriol 0. 25 microgram PO daily. Advised renal function tests. Lab tests discussed with the patietn. Urine protein 15. 8 gm. Advised to repeat proteinuria after two weeks. If it does not improve, patient need to be started on Rituximab. Lab tests discussed with the patient. He recently suffered a stroke and recovering from that. He mentioned that he has hepatitis B in the past and afraid of reactivation of it with Rituximab.

## 2019-08-12 ENCOUNTER — APPOINTMENT (OUTPATIENT)
Dept: NEPHROLOGY | Facility: CLINIC | Age: 64
End: 2019-08-12
Payer: COMMERCIAL

## 2019-08-12 VITALS
OXYGEN SATURATION: 96 % | BODY MASS INDEX: 34.49 KG/M2 | DIASTOLIC BLOOD PRESSURE: 85 MMHG | HEIGHT: 64 IN | SYSTOLIC BLOOD PRESSURE: 130 MMHG | WEIGHT: 202 LBS | HEART RATE: 107 BPM

## 2019-08-12 PROCEDURE — 99214 OFFICE O/P EST MOD 30 MIN: CPT

## 2019-08-12 NOTE — PHYSICAL EXAM
[General Appearance - Alert] : alert [General Appearance - In No Acute Distress] : in no acute distress [General Appearance - Well Nourished] : well nourished [Sclera] : the sclera and conjunctiva were normal [Neck Appearance] : the appearance of the neck was normal [Outer Ear] : the ears and nose were normal in appearance [Neck Cervical Mass (___cm)] : no neck mass was observed [] : no respiratory distress [Apical Impulse] : the apical impulse was normal [Exaggerated Use Of Accessory Muscles For Inspiration] : no accessory muscle use [Heart Sounds] : normal S1 and S2 [Pitting Edema] : pitting edema present [___ +] : bilateral [unfilled]+ pitting edema to the ankles [Bowel Sounds] : normal bowel sounds [Abdomen Tenderness] : non-tender [Cervical Lymph Nodes Enlarged Posterior Bilaterally] : posterior cervical [Cervical Lymph Nodes Enlarged Anterior Bilaterally] : anterior cervical [Supraclavicular Lymph Nodes Enlarged Bilaterally] : supraclavicular [No CVA Tenderness] : no ~M costovertebral angle tenderness [No Spinal Tenderness] : no spinal tenderness [Abnormal Walk] : normal gait [Nail Clubbing] : no clubbing  or cyanosis of the fingernails [Skin Color & Pigmentation] : normal skin color and pigmentation [Musculoskeletal - Swelling] : no joint swelling seen [Skin Turgor] : normal skin turgor [Oriented To Time, Place, And Person] : oriented to person, place, and time

## 2019-08-13 LAB
ALBUMIN SERPL ELPH-MCNC: 2.9 G/DL
ALP BLD-CCNC: 81 U/L
ALT SERPL-CCNC: 31 U/L
ANION GAP SERPL CALC-SCNC: 16 MMOL/L
AST SERPL-CCNC: 40 U/L
BASOPHILS # BLD AUTO: 0.04 K/UL
BASOPHILS NFR BLD AUTO: 0.5 %
BILIRUB SERPL-MCNC: 0.3 MG/DL
BUN SERPL-MCNC: 15 MG/DL
CALCIUM SERPL-MCNC: 9.2 MG/DL
CHLORIDE SERPL-SCNC: 105 MMOL/L
CO2 SERPL-SCNC: 21 MMOL/L
CREAT SERPL-MCNC: 1.34 MG/DL
EOSINOPHIL # BLD AUTO: 0.59 K/UL
EOSINOPHIL NFR BLD AUTO: 8 %
ESTIMATED AVERAGE GLUCOSE: 154 MG/DL
GLUCOSE SERPL-MCNC: 219 MG/DL
HBA1C MFR BLD HPLC: 7 %
HBV CORE IGG+IGM SER QL: REACTIVE
HBV CORE IGM SER QL: NONREACTIVE
HBV SURFACE AB SER QL: REACTIVE
HBV SURFACE AG SER QL: NONREACTIVE
HCT VFR BLD CALC: 37.2 %
HGB BLD-MCNC: 13 G/DL
IMM GRANULOCYTES NFR BLD AUTO: 1.1 %
LYMPHOCYTES # BLD AUTO: 1.79 K/UL
LYMPHOCYTES NFR BLD AUTO: 24.2 %
MAN DIFF?: NORMAL
MCHC RBC-ENTMCNC: 32.5 PG
MCHC RBC-ENTMCNC: 34.9 GM/DL
MCV RBC AUTO: 93 FL
MONOCYTES # BLD AUTO: 0.4 K/UL
MONOCYTES NFR BLD AUTO: 5.4 %
NEUTROPHILS # BLD AUTO: 4.51 K/UL
NEUTROPHILS NFR BLD AUTO: 60.8 %
PLATELET # BLD AUTO: 212 K/UL
POTASSIUM SERPL-SCNC: 3.3 MMOL/L
PROT SERPL-MCNC: 5.5 G/DL
RBC # BLD: 4 M/UL
RBC # FLD: 12.6 %
SODIUM SERPL-SCNC: 142 MMOL/L
WBC # FLD AUTO: 7.41 K/UL

## 2019-08-13 NOTE — ASSESSMENT
[FreeTextEntry1] : A case of CKD stage III with diabetes, obesity with nephrotic syndrome, hyperlipidemia, proteinuria, pedal edema, sleep apnea, lupus nephritis (membranous)  and recent h/o mini stroke being treated with cellcept 1500 mg PO BID. \par Patient is being considered for Rituximab.  Advised renal function tests, hepatitis, TB tests. Lab tests discussed with the patient. Renal function stable. Advised to start cyclosporin 150 m g PO BID. Advised to follow cyclosporin level after one week.

## 2019-08-13 NOTE — REVIEW OF SYSTEMS
[Feeling Poorly] : feeling poorly [Feeling Tired] : feeling tired [Recent Weight Loss (___ Lbs)] : recent [unfilled] ~Ulb weight loss [Eyesight Problems] : eyesight problems [Loss Of Hearing] : hearing loss [Lower Ext Edema] : lower extremity edema [SOB on Exertion] : shortness of breath during exertion [Arthralgias] : arthralgias [Joint Pain] : joint pain [Recent Weight Gain (___ Lbs)] : no recent weight gain [Chest Pain] : no chest pain [Palpitations] : no palpitations [Constipation] : no constipation [Diarrhea] : no diarrhea [Nocturia] : no nocturia [Heartburn] : no heartburn [Hesitancy] : no urinary hesitancy [Itching] : no itching [Dry Skin] : no dry skin [Dizziness] : no dizziness [Fainting] : no fainting [Depression] : no depression [Anxiety] : no anxiety [Muscle Weakness] : no muscle weakness [Easy Bruising] : no tendency for easy bruising [Easy Bleeding] : no tendency for easy bleeding

## 2019-08-13 NOTE — REVIEW OF SYSTEMS
[Feeling Poorly] : feeling poorly [Feeling Tired] : feeling tired [Recent Weight Loss (___ Lbs)] : recent [unfilled] ~Ulb weight loss [Eyesight Problems] : eyesight problems [Loss Of Hearing] : hearing loss [Lower Ext Edema] : lower extremity edema [SOB on Exertion] : shortness of breath during exertion [Arthralgias] : arthralgias [Joint Pain] : joint pain [Recent Weight Gain (___ Lbs)] : no recent weight gain [Chest Pain] : no chest pain [Palpitations] : no palpitations [Constipation] : no constipation [Diarrhea] : no diarrhea [Nocturia] : no nocturia [Heartburn] : no heartburn [Hesitancy] : no urinary hesitancy [Itching] : no itching [Dizziness] : no dizziness [Dry Skin] : no dry skin [Fainting] : no fainting [Depression] : no depression [Anxiety] : no anxiety [Muscle Weakness] : no muscle weakness [Easy Bruising] : no tendency for easy bruising [Easy Bleeding] : no tendency for easy bleeding

## 2019-08-13 NOTE — HISTORY OF PRESENT ILLNESS
[Stage 2] : stage 2 [Good Compliance] : good compliance  [FreeTextEntry1] : A case of nephrotic syndrome with  lupus nephritis with membranous nephropathy (biopsy proven) with diabetes mellitus and hypertriglyceridemia and massive proteinuria had mini stroke in June, 2019. Patient is thinking of starting Rituximab.

## 2019-08-14 LAB
CREAT SPEC-SCNC: 144 MG/DL
CREAT/PROT UR: 16.4 RATIO
PROT UR-MCNC: 2356 MG/DL

## 2019-08-20 LAB
M TB IFN-G BLD-IMP: NEGATIVE
QUANTIFERON TB PLUS MITOGEN MINUS NIL: 2.16 IU/ML
QUANTIFERON TB PLUS NIL: 0.02 IU/ML
QUANTIFERON TB PLUS TB1 MINUS NIL: 0 IU/ML
QUANTIFERON TB PLUS TB2 MINUS NIL: 0.01 IU/ML

## 2019-08-22 LAB
ANION GAP SERPL CALC-SCNC: 12 MMOL/L
BUN SERPL-MCNC: 21 MG/DL
CALCIUM SERPL-MCNC: 8.9 MG/DL
CHLORIDE SERPL-SCNC: 107 MMOL/L
CO2 SERPL-SCNC: 21 MMOL/L
CREAT SERPL-MCNC: 1.43 MG/DL
CREAT SPEC-SCNC: 124 MG/DL
CREAT/PROT UR: 11.4 RATIO
CYCLOSPORINE SER-MCNC: 195 NG/ML
GLUCOSE SERPL-MCNC: 186 MG/DL
POTASSIUM SERPL-SCNC: 3.8 MMOL/L
PROT UR-MCNC: 1416 MG/DL
SODIUM SERPL-SCNC: 140 MMOL/L

## 2019-09-17 LAB
ALBUMIN SERPL ELPH-MCNC: 2.9 G/DL
ALP BLD-CCNC: 80 U/L
ALT SERPL-CCNC: 27 U/L
ANION GAP SERPL CALC-SCNC: 12 MMOL/L
AST SERPL-CCNC: 41 U/L
BILIRUB SERPL-MCNC: 0.6 MG/DL
BUN SERPL-MCNC: 28 MG/DL
CALCIUM SERPL-MCNC: 8.9 MG/DL
CHLORIDE SERPL-SCNC: 107 MMOL/L
CK SERPL-CCNC: 310 U/L
CO2 SERPL-SCNC: 21 MMOL/L
CREAT SERPL-MCNC: 1.6 MG/DL
CREAT SPEC-SCNC: 123 MG/DL
CREAT/PROT UR: 10.9 RATIO
CYCLOSPORINE SER-MCNC: 217 NG/ML
GLUCOSE SERPL-MCNC: 150 MG/DL
POTASSIUM SERPL-SCNC: 3.8 MMOL/L
PROT SERPL-MCNC: 5.3 G/DL
PROT UR-MCNC: 1345 MG/DL
SODIUM SERPL-SCNC: 140 MMOL/L

## 2019-12-09 NOTE — ED ADULT NURSE NOTE - FALL HARM RISK TYPE OF ASSESSMENT
Problem: Communication  Goal: The ability to communicate needs accurately and effectively will improve  Outcome: PROGRESSING AS EXPECTED     Problem: Bowel/Gastric:  Goal: Normal bowel function is maintained or improved  Outcome: PROGRESSING AS EXPECTED  Note:   LBM was on 12/3 pt hesitant to take stool softeners for fear of developing diarrhea and pain w/ his rectal wound as he has had in past. Education provided. Pt willing to take once per day as opposed to two, Dr. Mckenzie aware.      Problem: Fluid Volume:  Goal: Will maintain balanced intake and output  Outcome: PROGRESSING SLOWER THAN EXPECTED  Note:   BP low throughout shift, lowest readings 70s/40s pt asymptomatic, pulse unchanged and BP uneffected by 2L IVF and 1 unit of plts. New orders for hydrocort and midodrine.      Alicja Crowell     Daily Assessment

## 2020-08-03 ENCOUNTER — APPOINTMENT (OUTPATIENT)
Dept: NEPHROLOGY | Facility: CLINIC | Age: 65
End: 2020-08-03
Payer: COMMERCIAL

## 2020-08-03 VITALS
WEIGHT: 192.9 LBS | DIASTOLIC BLOOD PRESSURE: 89 MMHG | OXYGEN SATURATION: 97 % | SYSTOLIC BLOOD PRESSURE: 140 MMHG | HEART RATE: 84 BPM | BODY MASS INDEX: 33.11 KG/M2

## 2020-08-03 DIAGNOSIS — E11.42 TYPE 2 DIABETES MELLITUS WITH DIABETIC POLYNEUROPATHY: ICD-10-CM

## 2020-08-03 PROCEDURE — 99214 OFFICE O/P EST MOD 30 MIN: CPT

## 2020-08-03 RX ORDER — CYCLOSPORINE 50 MG/1
50 CAPSULE, LIQUID FILLED ORAL TWICE DAILY
Qty: 540 | Refills: 3 | Status: DISCONTINUED | COMMUNITY
Start: 2019-08-13 | End: 2020-08-03

## 2020-08-03 RX ORDER — GABAPENTIN 300 MG/1
300 CAPSULE ORAL
Qty: 9 | Refills: 0 | Status: ACTIVE | COMMUNITY
Start: 2020-08-03

## 2020-08-03 NOTE — REVIEW OF SYSTEMS
[Feeling Poorly] : feeling poorly [Feeling Tired] : feeling tired [Recent Weight Loss (___ Lbs)] : recent [unfilled] ~Ulb weight loss [Eyesight Problems] : eyesight problems [Loss Of Hearing] : hearing loss [Lower Ext Edema] : lower extremity edema [SOB on Exertion] : shortness of breath during exertion [Arthralgias] : arthralgias [Joint Pain] : joint pain [Recent Weight Gain (___ Lbs)] : no recent weight gain [Chest Pain] : no chest pain [Palpitations] : no palpitations [Constipation] : no constipation [Diarrhea] : no diarrhea [Heartburn] : no heartburn [Hesitancy] : no urinary hesitancy [Itching] : no itching [Nocturia] : no nocturia [Dizziness] : no dizziness [Dry Skin] : no dry skin [Fainting] : no fainting [Anxiety] : no anxiety [Depression] : no depression [Easy Bleeding] : no tendency for easy bleeding [Muscle Weakness] : no muscle weakness [Easy Bruising] : no tendency for easy bruising

## 2020-08-03 NOTE — ASSESSMENT
[FreeTextEntry1] : A case of nephrotic syndrome with  lupus nephritis with membranous nephropathy (biopsy proven, PLA2R  negative) with diabetes mellitus and hypertriglyceridemia and massive proteinuria had mini stroke in June, 2019. Serum creatinine has risen to 2.02 mg (GFR 34 ml) with proteinuria to 11 gm/day. Patient complains of lag pain and back ache. Patient has lost 10 lbs. Advised to increase calcitriol dose to 0.5 microgram.

## 2020-08-03 NOTE — HISTORY OF PRESENT ILLNESS
[Stage 2] : stage 2 [Good Compliance] : good compliance  [FreeTextEntry1] : A case of nephrotic syndrome with  lupus nephritis with membranous nephropathy (biopsy proven, PLA2R  negative) with diabetes mellitus and hypertriglyceridemia and massive proteinuria had mini stroke in June, 2019. Serum creatinine has risen to 2.02 mg (GFR 34 ml) with proteinuria to 11 gm/day. Patient complains of lag pain and back ache. Patient has lost 10 lbs.

## 2020-08-03 NOTE — PHYSICAL EXAM
[General Appearance - Alert] : alert [General Appearance - In No Acute Distress] : in no acute distress [General Appearance - Well Nourished] : well nourished [Sclera] : the sclera and conjunctiva were normal [Outer Ear] : the ears and nose were normal in appearance [Neck Appearance] : the appearance of the neck was normal [Neck Cervical Mass (___cm)] : no neck mass was observed [] : no respiratory distress [Exaggerated Use Of Accessory Muscles For Inspiration] : no accessory muscle use [Apical Impulse] : the apical impulse was normal [Heart Sounds] : normal S1 and S2 [Pitting Edema] : pitting edema present [___ +] : bilateral [unfilled]+ pitting edema to the ankles [Bowel Sounds] : normal bowel sounds [Abdomen Tenderness] : non-tender [Cervical Lymph Nodes Enlarged Posterior Bilaterally] : posterior cervical [No CVA Tenderness] : no ~M costovertebral angle tenderness [Cervical Lymph Nodes Enlarged Anterior Bilaterally] : anterior cervical [Supraclavicular Lymph Nodes Enlarged Bilaterally] : supraclavicular [No Spinal Tenderness] : no spinal tenderness [Abnormal Walk] : normal gait [Musculoskeletal - Swelling] : no joint swelling seen [Nail Clubbing] : no clubbing  or cyanosis of the fingernails [Skin Turgor] : normal skin turgor [Skin Color & Pigmentation] : normal skin color and pigmentation [Oriented To Time, Place, And Person] : oriented to person, place, and time

## 2020-09-10 ENCOUNTER — APPOINTMENT (OUTPATIENT)
Dept: NEPHROLOGY | Facility: CLINIC | Age: 65
End: 2020-09-10
Payer: COMMERCIAL

## 2020-09-10 VITALS
WEIGHT: 194.44 LBS | OXYGEN SATURATION: 99 % | BODY MASS INDEX: 35.78 KG/M2 | HEIGHT: 62 IN | HEART RATE: 79 BPM | SYSTOLIC BLOOD PRESSURE: 129 MMHG | DIASTOLIC BLOOD PRESSURE: 84 MMHG

## 2020-09-10 PROCEDURE — 99214 OFFICE O/P EST MOD 30 MIN: CPT

## 2020-09-10 RX ORDER — METFORMIN HYDROCHLORIDE 500 MG/1
500 TABLET, COATED ORAL
Refills: 0 | Status: DISCONTINUED | COMMUNITY
End: 2020-09-10

## 2020-09-10 RX ORDER — FENOFIBRATE 145 MG/1
145 TABLET, COATED ORAL DAILY
Qty: 30 | Refills: 0 | Status: DISCONTINUED | COMMUNITY
Start: 2019-05-20 | End: 2020-09-10

## 2020-09-10 NOTE — REVIEW OF SYSTEMS
[Feeling Poorly] : feeling poorly [Feeling Tired] : feeling tired [Eyesight Problems] : eyesight problems [Recent Weight Loss (___ Lbs)] : recent [unfilled] ~Ulb weight loss [Loss Of Hearing] : hearing loss [SOB on Exertion] : shortness of breath during exertion [Lower Ext Edema] : lower extremity edema [Arthralgias] : arthralgias [Joint Pain] : joint pain [Palpitations] : no palpitations [Recent Weight Gain (___ Lbs)] : no recent weight gain [Chest Pain] : no chest pain [Constipation] : no constipation [Diarrhea] : no diarrhea [Heartburn] : no heartburn [Hesitancy] : no urinary hesitancy [Itching] : no itching [Nocturia] : no nocturia [Dizziness] : no dizziness [Dry Skin] : no dry skin [Fainting] : no fainting [Muscle Weakness] : no muscle weakness [Depression] : no depression [Anxiety] : no anxiety [Easy Bleeding] : no tendency for easy bleeding [Easy Bruising] : no tendency for easy bruising

## 2020-09-10 NOTE — HISTORY OF PRESENT ILLNESS
[Good Compliance] : good compliance  [Stage 2] : stage 2 [FreeTextEntry1] : A case of nephrotic syndrome with  lupus nephritis with membranous nephropathy (biopsy proven, PLA2R  negative) with diabetes mellitus and hypertriglyceridemia and massive proteinuria (more than 13 gm). Patient has been on Cellcept for long time but did not respond. Kidney biopsy suggested membranous  nephropathy but was missing immunofluorescence because of limited sample. Patient was tried cyclosporin but he did not tolerated and developed rhabdo. He wants to try cyclophosphamide but it will be less toxic with alternating steroids.

## 2020-09-10 NOTE — ASSESSMENT
[FreeTextEntry1] : A case of nephrotic syndrome with  lupus nephritis with membranous nephropathy (biopsy proven, PLA2R  negative) with diabetes mellitus and hypertriglyceridemia and massive proteinuria (more than 13 gm). Patient has been on Cellcept for long time but did not respond. Kidney biopsy suggested membranous  nephropathy but was missing immunofluorescence because of limited sample. Patient was tried cyclosporin but he did not tolerated and developed rhabdo. He wants to try cyclophosphamide but it will be less toxic with alternating steroids. Patient serum calcium has gone up. Advised the dose of calcitriol to 0.25 microgram PO daily.

## 2020-09-10 NOTE — PHYSICAL EXAM
[General Appearance - Alert] : alert [General Appearance - In No Acute Distress] : in no acute distress [General Appearance - Well Nourished] : well nourished [Sclera] : the sclera and conjunctiva were normal [Neck Appearance] : the appearance of the neck was normal [Outer Ear] : the ears and nose were normal in appearance [Neck Cervical Mass (___cm)] : no neck mass was observed [Exaggerated Use Of Accessory Muscles For Inspiration] : no accessory muscle use [] : no respiratory distress [Heart Sounds] : normal S1 and S2 [Apical Impulse] : the apical impulse was normal [___ +] : bilateral [unfilled]+ pitting edema to the ankles [Pitting Edema] : pitting edema present [Bowel Sounds] : normal bowel sounds [Abdomen Tenderness] : non-tender [Cervical Lymph Nodes Enlarged Anterior Bilaterally] : anterior cervical [Cervical Lymph Nodes Enlarged Posterior Bilaterally] : posterior cervical [No Spinal Tenderness] : no spinal tenderness [Supraclavicular Lymph Nodes Enlarged Bilaterally] : supraclavicular [No CVA Tenderness] : no ~M costovertebral angle tenderness [Abnormal Walk] : normal gait [Nail Clubbing] : no clubbing  or cyanosis of the fingernails [Musculoskeletal - Swelling] : no joint swelling seen [Skin Color & Pigmentation] : normal skin color and pigmentation [Skin Turgor] : normal skin turgor [Oriented To Time, Place, And Person] : oriented to person, place, and time

## 2020-09-11 LAB
CREAT SPEC-SCNC: 75 MG/DL
CREAT/PROT UR: 13.5 RATIO
HBV SURFACE AG SER QL: NONREACTIVE
M TB IFN-G BLD-IMP: NEGATIVE
PROT UR-MCNC: 1015 MG/DL
QUANTIFERON TB PLUS MITOGEN MINUS NIL: 4.06 IU/ML
QUANTIFERON TB PLUS NIL: 0.03 IU/ML
QUANTIFERON TB PLUS TB1 MINUS NIL: 0.02 IU/ML
QUANTIFERON TB PLUS TB2 MINUS NIL: 0.01 IU/ML

## 2020-09-28 ENCOUNTER — RX RENEWAL (OUTPATIENT)
Age: 65
End: 2020-09-28

## 2020-10-26 LAB
ALBUMIN SERPL ELPH-MCNC: 3.1 G/DL
ANION GAP SERPL CALC-SCNC: 14 MMOL/L
APPEARANCE: CLEAR
BACTERIA: NEGATIVE
BASOPHILS # BLD AUTO: 0.03 K/UL
BASOPHILS NFR BLD AUTO: 0.3 %
BILIRUBIN URINE: NEGATIVE
BLOOD URINE: ABNORMAL
BUN SERPL-MCNC: 40 MG/DL
CALCIUM SERPL-MCNC: 8.9 MG/DL
CHLORIDE SERPL-SCNC: 112 MMOL/L
CO2 SERPL-SCNC: 21 MMOL/L
COLOR: NORMAL
CREAT SERPL-MCNC: 2.32 MG/DL
CREAT SPEC-SCNC: 66 MG/DL
CREAT/PROT UR: 10.7 RATIO
EOSINOPHIL # BLD AUTO: 0.06 K/UL
EOSINOPHIL NFR BLD AUTO: 0.6 %
ESTIMATED AVERAGE GLUCOSE: 123 MG/DL
GLUCOSE QUALITATIVE U: ABNORMAL
GLUCOSE SERPL-MCNC: 118 MG/DL
HBA1C MFR BLD HPLC: 5.9 %
HCT VFR BLD CALC: 27.5 %
HGB BLD-MCNC: 8.5 G/DL
HYALINE CASTS: 0 /LPF
IMM GRANULOCYTES NFR BLD AUTO: 3.6 %
KETONES URINE: NEGATIVE
LEUKOCYTE ESTERASE URINE: NEGATIVE
LYMPHOCYTES # BLD AUTO: 2.28 K/UL
LYMPHOCYTES NFR BLD AUTO: 22.3 %
MAN DIFF?: NORMAL
MCHC RBC-ENTMCNC: 30.9 GM/DL
MCHC RBC-ENTMCNC: 31.5 PG
MCV RBC AUTO: 101.9 FL
MICROSCOPIC-UA: NORMAL
MONOCYTES # BLD AUTO: 0.78 K/UL
MONOCYTES NFR BLD AUTO: 7.6 %
NEUTROPHILS # BLD AUTO: 6.71 K/UL
NEUTROPHILS NFR BLD AUTO: 65.6 %
NITRITE URINE: NEGATIVE
PH URINE: 6
PHOSPHATE SERPL-MCNC: 3.8 MG/DL
PLATELET # BLD AUTO: 172 K/UL
POTASSIUM SERPL-SCNC: 3.7 MMOL/L
PROT UR-MCNC: 711 MG/DL
PROTEIN URINE: ABNORMAL
RBC # BLD: 2.7 M/UL
RBC # FLD: 14.5 %
RED BLOOD CELLS URINE: 6 /HPF
SODIUM SERPL-SCNC: 147 MMOL/L
SPECIFIC GRAVITY URINE: 1.02
SQUAMOUS EPITHELIAL CELLS: 2 /HPF
UROBILINOGEN URINE: NORMAL
WBC # FLD AUTO: 10.23 K/UL
WHITE BLOOD CELLS URINE: 4 /HPF

## 2020-10-29 ENCOUNTER — TRANSCRIPTION ENCOUNTER (OUTPATIENT)
Age: 65
End: 2020-10-29

## 2020-10-29 ENCOUNTER — APPOINTMENT (OUTPATIENT)
Dept: NEPHROLOGY | Facility: CLINIC | Age: 65
End: 2020-10-29
Payer: COMMERCIAL

## 2020-10-29 ENCOUNTER — LABORATORY RESULT (OUTPATIENT)
Age: 65
End: 2020-10-29

## 2020-10-29 DIAGNOSIS — K92.2 GASTROINTESTINAL HEMORRHAGE, UNSPECIFIED: ICD-10-CM

## 2020-10-29 PROCEDURE — 99214 OFFICE O/P EST MOD 30 MIN: CPT | Mod: 95

## 2020-11-02 ENCOUNTER — LABORATORY RESULT (OUTPATIENT)
Age: 65
End: 2020-11-02

## 2020-11-03 PROBLEM — K92.2 GI BLEED: Status: ACTIVE | Noted: 2020-11-03

## 2020-11-03 LAB
ALBUMIN SERPL ELPH-MCNC: 3.1 G/DL
ANION GAP SERPL CALC-SCNC: 10 MMOL/L
BASOPHILS # BLD AUTO: 0.2 K/UL
BASOPHILS NFR BLD AUTO: 3.5 %
BUN SERPL-MCNC: 28 MG/DL
CALCIUM SERPL-MCNC: 8 MG/DL
CHLORIDE SERPL-SCNC: 109 MMOL/L
CK SERPL-CCNC: 90 U/L
CO2 SERPL-SCNC: 20 MMOL/L
CREAT SERPL-MCNC: 2.62 MG/DL
EOSINOPHIL # BLD AUTO: 0.05 K/UL
EOSINOPHIL NFR BLD AUTO: 0.8 %
FERRITIN SERPL-MCNC: 310 NG/ML
FOLATE SERPL-MCNC: 19.8 NG/ML
GLUCOSE SERPL-MCNC: 207 MG/DL
HCT VFR BLD CALC: 24.4 %
HGB BLD-MCNC: 7.7 G/DL
IRON SATN MFR SERPL: 36 %
IRON SERPL-MCNC: 96 UG/DL
LYMPHOCYTES # BLD AUTO: 1.09 K/UL
LYMPHOCYTES NFR BLD AUTO: 19.1 %
MAN DIFF?: NORMAL
MCHC RBC-ENTMCNC: 31.6 GM/DL
MCHC RBC-ENTMCNC: 32.4 PG
MCV RBC AUTO: 102.5 FL
MONOCYTES # BLD AUTO: 0.3 K/UL
MONOCYTES NFR BLD AUTO: 5.2 %
NEUTROPHILS # BLD AUTO: 3.86 K/UL
NEUTROPHILS NFR BLD AUTO: 67.8 %
PHOSPHATE SERPL-MCNC: 3.5 MG/DL
PLATELET # BLD AUTO: 223 K/UL
POTASSIUM SERPL-SCNC: 4.4 MMOL/L
RBC # BLD: 2.38 M/UL
RBC # FLD: 14.3 %
SODIUM SERPL-SCNC: 138 MMOL/L
TIBC SERPL-MCNC: 264 UG/DL
UIBC SERPL-MCNC: 168 UG/DL
VIT B12 SERPL-MCNC: 563 PG/ML
WBC # FLD AUTO: 5.69 K/UL

## 2020-11-03 NOTE — HISTORY OF PRESENT ILLNESS
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  He has completed one month course of steroids and has been started on cyclophosphamide.

## 2020-11-03 NOTE — ASSESSMENT
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  He has completed one month course of steroids and has been started on cyclophosphamide. Recent blood tests showed mild increase in serum creatinine  which may be related to Jardiance and recent start of Fenofibrate. Advised serum creatinine kinase and weekly WBC count. Advised to start ferrous sulphate 325 mg PO TID. Lab tests reviewed (November 1, 2020). Serum creatinine continue to go up and Hb is down. Advised to D/C  cyclophosphamide, losartan, and Jardiance. Advised repeat BMP and stool guiac and CBC three days later.

## 2020-11-03 NOTE — REASON FOR VISIT
[Home] : at home, [unfilled] , at the time of the visit. [Verbal consent obtained from patient] : the patient, [unfilled] [Follow-Up] : a follow-up visit [FreeTextEntry1] : Lupus nephritis

## 2020-11-09 LAB
ANION GAP SERPL CALC-SCNC: 11 MMOL/L
BASOPHILS # BLD AUTO: 0.03 K/UL
BASOPHILS NFR BLD AUTO: 0.5 %
BUN SERPL-MCNC: 23 MG/DL
CALCIUM SERPL-MCNC: 8.3 MG/DL
CHLORIDE SERPL-SCNC: 107 MMOL/L
CO2 SERPL-SCNC: 19 MMOL/L
CREAT SERPL-MCNC: 2.54 MG/DL
EOSINOPHIL # BLD AUTO: 0.16 K/UL
EOSINOPHIL NFR BLD AUTO: 2.7 %
GLUCOSE SERPL-MCNC: 223 MG/DL
HCT VFR BLD CALC: 25.7 %
HEMOCCULT STL QL: NEGATIVE
HGB BLD-MCNC: 8.1 G/DL
IMM GRANULOCYTES NFR BLD AUTO: 3.7 %
LYMPHOCYTES # BLD AUTO: 1.74 K/UL
LYMPHOCYTES NFR BLD AUTO: 29.5 %
MAN DIFF?: NORMAL
MCHC RBC-ENTMCNC: 31.5 GM/DL
MCHC RBC-ENTMCNC: 32.1 PG
MCV RBC AUTO: 102 FL
MONOCYTES # BLD AUTO: 0.49 K/UL
MONOCYTES NFR BLD AUTO: 8.3 %
NEUTROPHILS # BLD AUTO: 3.25 K/UL
NEUTROPHILS NFR BLD AUTO: 55.3 %
PLATELET # BLD AUTO: 226 K/UL
POTASSIUM SERPL-SCNC: 4.7 MMOL/L
RBC # BLD: 2.52 M/UL
RBC # FLD: 14.3 %
SODIUM SERPL-SCNC: 137 MMOL/L
WBC # FLD AUTO: 5.89 K/UL

## 2020-11-11 ENCOUNTER — LABORATORY RESULT (OUTPATIENT)
Age: 65
End: 2020-11-11

## 2020-11-12 ENCOUNTER — APPOINTMENT (OUTPATIENT)
Dept: NEPHROLOGY | Facility: CLINIC | Age: 65
End: 2020-11-12
Payer: COMMERCIAL

## 2020-11-12 PROCEDURE — 99214 OFFICE O/P EST MOD 30 MIN: CPT | Mod: 95

## 2020-11-13 LAB
ALBUMIN SERPL ELPH-MCNC: 3.3 G/DL
ANION GAP SERPL CALC-SCNC: 12 MMOL/L
BASOPHILS # BLD AUTO: 0 K/UL
BASOPHILS NFR BLD AUTO: 0 %
BUN SERPL-MCNC: 21 MG/DL
CALCIUM SERPL-MCNC: 9.1 MG/DL
CHLORIDE SERPL-SCNC: 107 MMOL/L
CO2 SERPL-SCNC: 20 MMOL/L
CREAT SERPL-MCNC: 2.51 MG/DL
EOSINOPHIL # BLD AUTO: 0.09 K/UL
EOSINOPHIL NFR BLD AUTO: 1.7 %
GLUCOSE SERPL-MCNC: 159 MG/DL
HCT VFR BLD CALC: 25.5 %
HGB BLD-MCNC: 8.1 G/DL
LYMPHOCYTES # BLD AUTO: 1.7 K/UL
LYMPHOCYTES NFR BLD AUTO: 33.9 %
MAN DIFF?: NORMAL
MCHC RBC-ENTMCNC: 31.8 GM/DL
MCHC RBC-ENTMCNC: 33.5 PG
MCV RBC AUTO: 105.4 FL
MONOCYTES # BLD AUTO: 0.31 K/UL
MONOCYTES NFR BLD AUTO: 6.1 %
NEUTROPHILS # BLD AUTO: 2.87 K/UL
NEUTROPHILS NFR BLD AUTO: 57.4 %
PHOSPHATE SERPL-MCNC: 3.2 MG/DL
PLATELET # BLD AUTO: 235 K/UL
POTASSIUM SERPL-SCNC: 4.2 MMOL/L
RBC # BLD: 2.42 M/UL
RBC # FLD: 15 %
SODIUM SERPL-SCNC: 139 MMOL/L
WBC # FLD AUTO: 5 K/UL

## 2020-11-19 NOTE — HISTORY OF PRESENT ILLNESS
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 Patient has received two weeks of cyclophosphamide. Patient weight is stable.

## 2020-11-19 NOTE — ASSESSMENT
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 Patient has received two weeks of cyclophosphamide. Patient weight is stable. BP is controlled. Rencent blood tests showed lokesh serum creatinine. High blood sugar. Low Hb 8.1 g.WBC count 5000. Advised to continue Jardiance. Start Aranesp 100 microgram every two weeks. Repeat CBC after one week.

## 2020-11-19 NOTE — REASON FOR VISIT
[Home] : at home, [unfilled] , at the time of the visit. [Verbal consent obtained from patient] : the patient, [unfilled] [Follow-Up] : a follow-up visit [FreeTextEntry1] : Chronic kidney disease

## 2020-11-20 ENCOUNTER — LABORATORY RESULT (OUTPATIENT)
Age: 65
End: 2020-11-20

## 2020-11-24 LAB
ALBUMIN SERPL ELPH-MCNC: 3.6 G/DL
ANION GAP SERPL CALC-SCNC: 11 MMOL/L
BASOPHILS # BLD AUTO: 0.04 K/UL
BASOPHILS NFR BLD AUTO: 0.8 %
BUN SERPL-MCNC: 23 MG/DL
CALCIUM SERPL-MCNC: 9.3 MG/DL
CHLORIDE SERPL-SCNC: 105 MMOL/L
CO2 SERPL-SCNC: 24 MMOL/L
CREAT SERPL-MCNC: 2.49 MG/DL
CREAT SPEC-SCNC: 104 MG/DL
CREAT/PROT UR: 9.6 RATIO
EOSINOPHIL # BLD AUTO: 0.17 K/UL
EOSINOPHIL NFR BLD AUTO: 3.2 %
ESTIMATED AVERAGE GLUCOSE: 97 MG/DL
GLUCOSE SERPL-MCNC: 227 MG/DL
HBA1C MFR BLD HPLC: 5 %
HCT VFR BLD CALC: 26.5 %
HGB BLD-MCNC: 8.3 G/DL
LYMPHOCYTES # BLD AUTO: 1.26 K/UL
LYMPHOCYTES NFR BLD AUTO: 24 %
MAN DIFF?: NORMAL
MCHC RBC-ENTMCNC: 31.3 GM/DL
MCHC RBC-ENTMCNC: 33.3 PG
MCV RBC AUTO: 106.4 FL
MONOCYTES # BLD AUTO: 0.55 K/UL
MONOCYTES NFR BLD AUTO: 10.4 %
NEUTROPHILS # BLD AUTO: 3.16 K/UL
NEUTROPHILS NFR BLD AUTO: 60 %
PHOSPHATE SERPL-MCNC: 3.8 MG/DL
PLATELET # BLD AUTO: 178 K/UL
POTASSIUM SERPL-SCNC: 3.8 MMOL/L
PROT UR-MCNC: 1001 MG/DL
RBC # BLD: 2.49 M/UL
RBC # FLD: 15.1 %
SODIUM SERPL-SCNC: 141 MMOL/L
WBC # FLD AUTO: 5.26 K/UL

## 2020-12-02 ENCOUNTER — LABORATORY RESULT (OUTPATIENT)
Age: 65
End: 2020-12-02

## 2020-12-03 ENCOUNTER — APPOINTMENT (OUTPATIENT)
Dept: NEPHROLOGY | Facility: CLINIC | Age: 65
End: 2020-12-03
Payer: COMMERCIAL

## 2020-12-03 LAB
ANION GAP SERPL CALC-SCNC: 11 MMOL/L
BASOPHILS # BLD AUTO: 0.05 K/UL
BASOPHILS NFR BLD AUTO: 0.8 %
BUN SERPL-MCNC: 19 MG/DL
CALCIUM SERPL-MCNC: 9.3 MG/DL
CHLORIDE SERPL-SCNC: 109 MMOL/L
CO2 SERPL-SCNC: 22 MMOL/L
CREAT SERPL-MCNC: 2.34 MG/DL
EOSINOPHIL # BLD AUTO: 1.21 K/UL
EOSINOPHIL NFR BLD AUTO: 19.7 %
GLUCOSE SERPL-MCNC: 154 MG/DL
HCT VFR BLD CALC: 28.6 %
HGB BLD-MCNC: 9.1 G/DL
IMM GRANULOCYTES NFR BLD AUTO: 1 %
LYMPHOCYTES # BLD AUTO: 1.07 K/UL
LYMPHOCYTES NFR BLD AUTO: 17.5 %
MAN DIFF?: NORMAL
MCHC RBC-ENTMCNC: 31.8 GM/DL
MCHC RBC-ENTMCNC: 34.5 PG
MCV RBC AUTO: 108.3 FL
MONOCYTES # BLD AUTO: 0.65 K/UL
MONOCYTES NFR BLD AUTO: 10.6 %
NEUTROPHILS # BLD AUTO: 3.09 K/UL
NEUTROPHILS NFR BLD AUTO: 50.4 %
PLATELET # BLD AUTO: 163 K/UL
POTASSIUM SERPL-SCNC: 4 MMOL/L
RBC # BLD: 2.64 M/UL
RBC # FLD: 15.2 %
SODIUM SERPL-SCNC: 143 MMOL/L
WBC # FLD AUTO: 6.13 K/UL

## 2020-12-03 PROCEDURE — 99202 OFFICE O/P NEW SF 15 MIN: CPT | Mod: 95

## 2020-12-03 RX ORDER — AMLODIPINE BESYLATE 5 MG/1
5 TABLET ORAL DAILY
Qty: 30 | Refills: 5 | Status: DISCONTINUED | COMMUNITY
Start: 2020-08-03 | End: 2020-12-03

## 2021-01-01 ENCOUNTER — NON-APPOINTMENT (OUTPATIENT)
Age: 66
End: 2021-01-01

## 2021-01-01 ENCOUNTER — APPOINTMENT (OUTPATIENT)
Dept: NEPHROLOGY | Facility: CLINIC | Age: 66
End: 2021-01-01
Payer: COMMERCIAL

## 2021-01-01 ENCOUNTER — APPOINTMENT (OUTPATIENT)
Dept: RHEUMATOLOGY | Facility: CLINIC | Age: 66
End: 2021-01-01

## 2021-01-01 ENCOUNTER — RX RENEWAL (OUTPATIENT)
Age: 66
End: 2021-01-01

## 2021-01-01 ENCOUNTER — LABORATORY RESULT (OUTPATIENT)
Age: 66
End: 2021-01-01

## 2021-01-01 ENCOUNTER — TRANSCRIPTION ENCOUNTER (OUTPATIENT)
Age: 66
End: 2021-01-01

## 2021-01-01 ENCOUNTER — APPOINTMENT (OUTPATIENT)
Dept: INTERNAL MEDICINE | Facility: CLINIC | Age: 66
End: 2021-01-01
Payer: COMMERCIAL

## 2021-01-01 VITALS
HEART RATE: 65 BPM | OXYGEN SATURATION: 98 % | DIASTOLIC BLOOD PRESSURE: 77 MMHG | WEIGHT: 200 LBS | BODY MASS INDEX: 36.8 KG/M2 | TEMPERATURE: 97.3 F | HEIGHT: 62 IN | SYSTOLIC BLOOD PRESSURE: 125 MMHG

## 2021-01-01 VITALS
DIASTOLIC BLOOD PRESSURE: 78 MMHG | BODY MASS INDEX: 35.88 KG/M2 | SYSTOLIC BLOOD PRESSURE: 128 MMHG | WEIGHT: 195 LBS | RESPIRATION RATE: 16 BRPM | OXYGEN SATURATION: 96 % | TEMPERATURE: 98.7 F | HEIGHT: 62 IN | HEART RATE: 81 BPM

## 2021-01-01 VITALS
HEART RATE: 78 BPM | WEIGHT: 203 LBS | DIASTOLIC BLOOD PRESSURE: 70 MMHG | HEIGHT: 62 IN | OXYGEN SATURATION: 95 % | SYSTOLIC BLOOD PRESSURE: 124 MMHG | TEMPERATURE: 97.7 F | BODY MASS INDEX: 37.36 KG/M2

## 2021-01-01 DIAGNOSIS — R59.1 GENERALIZED ENLARGED LYMPH NODES: ICD-10-CM

## 2021-01-01 DIAGNOSIS — N18.30 CHRONIC KIDNEY DISEASE, STAGE 3 UNSPECIFIED: ICD-10-CM

## 2021-01-01 DIAGNOSIS — Z23 ENCOUNTER FOR IMMUNIZATION: ICD-10-CM

## 2021-01-01 DIAGNOSIS — N04.9 NEPHROTIC SYNDROME WITH UNSPECIFIED MORPHOLOGIC CHANGES: ICD-10-CM

## 2021-01-01 DIAGNOSIS — Z00.00 ENCOUNTER FOR GENERAL ADULT MEDICAL EXAMINATION W/OUT ABNORMAL FINDINGS: ICD-10-CM

## 2021-01-01 DIAGNOSIS — E66.01 MORBID (SEVERE) OBESITY DUE TO EXCESS CALORIES: ICD-10-CM

## 2021-01-01 LAB
25(OH)D3 SERPL-MCNC: 18.2 NG/ML
ALBUMIN SERPL ELPH-MCNC: 4 G/DL
ALBUMIN SERPL ELPH-MCNC: 4.3 G/DL
ALBUMIN SERPL ELPH-MCNC: 4.3 G/DL
ALBUMIN SERPL ELPH-MCNC: 4.7 G/DL
ALP BLD-CCNC: 49 U/L
ALP BLD-CCNC: 53 U/L
ALT SERPL-CCNC: 21 U/L
ALT SERPL-CCNC: 34 U/L
ANION GAP SERPL CALC-SCNC: 13 MMOL/L
ANION GAP SERPL CALC-SCNC: 14 MMOL/L
ANION GAP SERPL CALC-SCNC: 16 MMOL/L
ANION GAP SERPL CALC-SCNC: 16 MMOL/L
APPEARANCE: ABNORMAL
APPEARANCE: CLEAR
APPEARANCE: CLEAR
AST SERPL-CCNC: 39 U/L
AST SERPL-CCNC: 49 U/L
BACTERIA THROAT CULT: NORMAL
BACTERIA: NEGATIVE
BACTERIA: NEGATIVE
BASOPHILS # BLD AUTO: 0.02 K/UL
BASOPHILS # BLD AUTO: 0.04 K/UL
BASOPHILS # BLD AUTO: 0.04 K/UL
BASOPHILS NFR BLD AUTO: 0.4 %
BASOPHILS NFR BLD AUTO: 0.7 %
BASOPHILS NFR BLD AUTO: 0.7 %
BILIRUB SERPL-MCNC: 0.4 MG/DL
BILIRUB SERPL-MCNC: 0.4 MG/DL
BILIRUBIN URINE: NEGATIVE
BLOOD URINE: ABNORMAL
BLOOD URINE: NEGATIVE
BLOOD URINE: NORMAL
BUN SERPL-MCNC: 21 MG/DL
BUN SERPL-MCNC: 31 MG/DL
BUN SERPL-MCNC: 31 MG/DL
BUN SERPL-MCNC: 35 MG/DL
CALCIUM SERPL-MCNC: 10 MG/DL
CALCIUM SERPL-MCNC: 9.5 MG/DL
CALCIUM SERPL-MCNC: 9.6 MG/DL
CALCIUM SERPL-MCNC: 9.8 MG/DL
CHLORIDE SERPL-SCNC: 106 MMOL/L
CHLORIDE SERPL-SCNC: 107 MMOL/L
CHLORIDE SERPL-SCNC: 107 MMOL/L
CHLORIDE SERPL-SCNC: 110 MMOL/L
CHOLEST SERPL-MCNC: 136 MG/DL
CK SERPL-CCNC: 83 U/L
CO2 SERPL-SCNC: 17 MMOL/L
CO2 SERPL-SCNC: 18 MMOL/L
CO2 SERPL-SCNC: 19 MMOL/L
CO2 SERPL-SCNC: 21 MMOL/L
COLOR: NORMAL
COLOR: NORMAL
COLOR: YELLOW
COVID-19 SPIKE DOMAIN ANTIBODY INTERPRETATION: NEGATIVE
CREAT SERPL-MCNC: 2.14 MG/DL
CREAT SERPL-MCNC: 2.47 MG/DL
CREAT SERPL-MCNC: 2.48 MG/DL
CREAT SERPL-MCNC: 2.7 MG/DL
CREAT SPEC-SCNC: 70 MG/DL
CREAT/PROT UR: 6.1 RATIO
EOSINOPHIL # BLD AUTO: 0.1 K/UL
EOSINOPHIL # BLD AUTO: 0.15 K/UL
EOSINOPHIL # BLD AUTO: 0.31 K/UL
EOSINOPHIL NFR BLD AUTO: 1.9 %
EOSINOPHIL NFR BLD AUTO: 2.8 %
EOSINOPHIL NFR BLD AUTO: 5.6 %
ESTIMATED AVERAGE GLUCOSE: 154 MG/DL
ESTIMATED AVERAGE GLUCOSE: 157 MG/DL
FOLATE SERPL-MCNC: 9.8 NG/ML
GLUCOSE QUALITATIVE U: ABNORMAL
GLUCOSE SERPL-MCNC: 108 MG/DL
GLUCOSE SERPL-MCNC: 122 MG/DL
GLUCOSE SERPL-MCNC: 122 MG/DL
GLUCOSE SERPL-MCNC: 169 MG/DL
GRANULAR CASTS: 1 /LPF
HBA1C MFR BLD HPLC: 7 %
HBA1C MFR BLD HPLC: 7.1 %
HCT VFR BLD CALC: 34.5 %
HCT VFR BLD CALC: 35.1 %
HCT VFR BLD CALC: 37.7 %
HDLC SERPL-MCNC: 15 MG/DL
HGB BLD-MCNC: 11.2 G/DL
HGB BLD-MCNC: 11.4 G/DL
HGB BLD-MCNC: 12 G/DL
HYALINE CASTS: 2 /LPF
HYALINE CASTS: 3 /LPF
IMM GRANULOCYTES NFR BLD AUTO: 0.5 %
IMM GRANULOCYTES NFR BLD AUTO: 1.1 %
IMM GRANULOCYTES NFR BLD AUTO: 1.5 %
KETONES URINE: NEGATIVE
LDLC SERPL CALC-MCNC: 76 MG/DL
LEUKOCYTE ESTERASE URINE: NEGATIVE
LYMPHOCYTES # BLD AUTO: 0.92 K/UL
LYMPHOCYTES # BLD AUTO: 1.21 K/UL
LYMPHOCYTES # BLD AUTO: 1.32 K/UL
LYMPHOCYTES NFR BLD AUTO: 17.3 %
LYMPHOCYTES NFR BLD AUTO: 22.2 %
LYMPHOCYTES NFR BLD AUTO: 23.7 %
MAN DIFF?: NORMAL
MCHC RBC-ENTMCNC: 30.6 PG
MCHC RBC-ENTMCNC: 30.9 PG
MCHC RBC-ENTMCNC: 31.1 PG
MCHC RBC-ENTMCNC: 31.8 GM/DL
MCHC RBC-ENTMCNC: 31.9 GM/DL
MCHC RBC-ENTMCNC: 33 GM/DL
MCV RBC AUTO: 94 FL
MCV RBC AUTO: 95.9 FL
MCV RBC AUTO: 97.2 FL
MICROSCOPIC-UA: NORMAL
MICROSCOPIC-UA: NORMAL
MONOCYTES # BLD AUTO: 0.46 K/UL
MONOCYTES # BLD AUTO: 0.49 K/UL
MONOCYTES # BLD AUTO: 0.58 K/UL
MONOCYTES NFR BLD AUTO: 10.4 %
MONOCYTES NFR BLD AUTO: 8.5 %
MONOCYTES NFR BLD AUTO: 9.2 %
NEUTROPHILS # BLD AUTO: 3.28 K/UL
NEUTROPHILS # BLD AUTO: 3.52 K/UL
NEUTROPHILS # BLD AUTO: 3.7 K/UL
NEUTROPHILS NFR BLD AUTO: 59.1 %
NEUTROPHILS NFR BLD AUTO: 64.7 %
NEUTROPHILS NFR BLD AUTO: 69.7 %
NITRITE URINE: NEGATIVE
NONHDLC SERPL-MCNC: 120 MG/DL
PH URINE: 5.5
PH URINE: 6
PH URINE: 6.5
PHOSPHATE SERPL-MCNC: 3.2 MG/DL
PHOSPHATE SERPL-MCNC: 4.2 MG/DL
PHOSPHATE SERPL-MCNC: 4.4 MG/DL
PLATELET # BLD AUTO: 186 K/UL
PLATELET # BLD AUTO: 201 K/UL
PLATELET # BLD AUTO: 208 K/UL
POTASSIUM SERPL-SCNC: 3.4 MMOL/L
POTASSIUM SERPL-SCNC: 4.4 MMOL/L
POTASSIUM SERPL-SCNC: 4.8 MMOL/L
POTASSIUM SERPL-SCNC: 5 MMOL/L
PROT SERPL-MCNC: 7.3 G/DL
PROT SERPL-MCNC: 7.8 G/DL
PROT UR-MCNC: 425 MG/DL
PROTEIN URINE: ABNORMAL
PSA FREE FLD-MCNC: 29 %
PSA FREE SERPL-MCNC: 0.71 NG/ML
PSA SERPL-MCNC: 2.42 NG/ML
RAPID RVP RESULT: DETECTED
RBC # BLD: 3.66 M/UL
RBC # BLD: 3.67 M/UL
RBC # BLD: 3.88 M/UL
RBC # FLD: 13 %
RBC # FLD: 13.7 %
RBC # FLD: 14 %
RED BLOOD CELLS URINE: 1 /HPF
RED BLOOD CELLS URINE: 4 /HPF
RV+EV RNA SPEC QL NAA+PROBE: DETECTED
SARS-COV-2 AB SERPL IA-ACNC: 0.54 U/ML
SARS-COV-2 RNA PNL RESP NAA+PROBE: DETECTED
SODIUM SERPL-SCNC: 139 MMOL/L
SODIUM SERPL-SCNC: 140 MMOL/L
SODIUM SERPL-SCNC: 142 MMOL/L
SODIUM SERPL-SCNC: 142 MMOL/L
SPECIFIC GRAVITY URINE: 1.02
SQUAMOUS EPITHELIAL CELLS: 1 /HPF
SQUAMOUS EPITHELIAL CELLS: 2 /HPF
TRIGL SERPL-MCNC: 220 MG/DL
TSH SERPL-ACNC: 2.65 UIU/ML
URINE COMMENTS: NORMAL
UROBILINOGEN URINE: NORMAL
VIT B12 SERPL-MCNC: 519 PG/ML
WBC # FLD AUTO: 5.31 K/UL
WBC # FLD AUTO: 5.44 K/UL
WBC # FLD AUTO: 5.56 K/UL
WHITE BLOOD CELLS URINE: 1 /HPF
WHITE BLOOD CELLS URINE: 2 /HPF

## 2021-01-01 PROCEDURE — 99212 OFFICE O/P EST SF 10 MIN: CPT

## 2021-01-01 PROCEDURE — 83036 HEMOGLOBIN GLYCOSYLATED A1C: CPT | Mod: QW

## 2021-01-01 PROCEDURE — 99397 PER PM REEVAL EST PAT 65+ YR: CPT | Mod: 25

## 2021-01-01 PROCEDURE — 90662 IIV NO PRSV INCREASED AG IM: CPT

## 2021-01-01 PROCEDURE — G0444 DEPRESSION SCREEN ANNUAL: CPT | Mod: 59

## 2021-01-01 PROCEDURE — 36416 COLLJ CAPILLARY BLOOD SPEC: CPT

## 2021-01-01 PROCEDURE — 93000 ELECTROCARDIOGRAM COMPLETE: CPT | Mod: 59

## 2021-01-01 PROCEDURE — G0008: CPT

## 2021-01-01 PROCEDURE — 99214 OFFICE O/P EST MOD 30 MIN: CPT | Mod: 25

## 2021-01-01 PROCEDURE — 99072 ADDL SUPL MATRL&STAF TM PHE: CPT

## 2021-01-01 PROCEDURE — 36415 COLL VENOUS BLD VENIPUNCTURE: CPT

## 2021-01-01 PROCEDURE — G0442 ANNUAL ALCOHOL SCREEN 15 MIN: CPT

## 2021-01-01 PROCEDURE — 82962 GLUCOSE BLOOD TEST: CPT

## 2021-01-01 RX ORDER — EMPAGLIFLOZIN 10 MG/1
10 TABLET, FILM COATED ORAL DAILY
Qty: 90 | Refills: 3 | Status: ACTIVE | COMMUNITY
Start: 2020-09-29 | End: 1900-01-01

## 2021-01-01 RX ORDER — CHLORHEXIDINE GLUCONATE 4 %
325 (65 FE) LIQUID (ML) TOPICAL 3 TIMES DAILY
Qty: 90 | Refills: 5 | Status: DISCONTINUED | COMMUNITY
Start: 2020-10-29 | End: 2021-01-01

## 2021-01-01 RX ORDER — SODIUM BICARBONATE 650 MG/1
650 TABLET ORAL
Qty: 90 | Refills: 5 | Status: DISCONTINUED | COMMUNITY
Start: 2021-03-19 | End: 2021-01-01

## 2021-01-01 RX ORDER — SEVELAMER CARBONATE 800 MG/1
800 TABLET, FILM COATED ORAL
Qty: 270 | Refills: 3 | Status: DISCONTINUED | COMMUNITY
Start: 2021-04-05 | End: 2021-01-01

## 2021-01-01 RX ORDER — CARVEDILOL 25 MG/1
25 TABLET, FILM COATED ORAL TWICE DAILY
Qty: 180 | Refills: 3 | Status: ACTIVE | COMMUNITY
Start: 2021-03-18 | End: 1900-01-01

## 2021-01-01 RX ORDER — SEVELAMER CARBONATE 800 MG/1
0.8 POWDER, FOR SUSPENSION ORAL
Qty: 90 | Refills: 3 | Status: DISCONTINUED | COMMUNITY
Start: 2021-03-19 | End: 2021-01-01

## 2021-01-01 RX ORDER — TAMSULOSIN HYDROCHLORIDE 0.4 MG/1
0.4 CAPSULE ORAL DAILY
Qty: 30 | Refills: 0 | Status: DISCONTINUED | COMMUNITY
Start: 2020-08-03 | End: 2021-01-01

## 2021-01-01 RX ORDER — GLIMEPIRIDE 2 MG/1
2 TABLET ORAL DAILY
Qty: 90 | Refills: 3 | Status: ACTIVE | COMMUNITY
Start: 2020-12-03 | End: 1900-01-01

## 2021-01-01 RX ORDER — MYCOPHENOLATE MOFETIL 250 MG/1
250 CAPSULE ORAL TWICE DAILY
Qty: 540 | Refills: 3 | Status: ACTIVE | COMMUNITY
Start: 2021-05-05 | End: 1900-01-01

## 2021-01-01 RX ORDER — CLOPIDOGREL BISULFATE 75 MG/1
75 TABLET, FILM COATED ORAL
Qty: 90 | Refills: 0 | Status: ACTIVE | COMMUNITY
Start: 2019-08-12 | End: 1900-01-01

## 2021-01-01 RX ORDER — CANDESARTAN CILEXETIL 16 MG/1
16 TABLET ORAL DAILY
Qty: 30 | Refills: 5 | Status: ACTIVE | COMMUNITY
Start: 2021-01-01

## 2021-01-01 RX ORDER — GLIMEPIRIDE 1 MG/1
1 TABLET ORAL DAILY
Qty: 90 | Refills: 3 | Status: ACTIVE | COMMUNITY
Start: 2021-01-01 | End: 1900-01-01

## 2021-01-05 RX ORDER — LOSARTAN POTASSIUM 100 MG/1
100 TABLET, FILM COATED ORAL
Qty: 180 | Refills: 0 | Status: DISCONTINUED | COMMUNITY
Start: 2020-09-28 | End: 2021-01-05

## 2021-01-05 RX ORDER — TORSEMIDE 20 MG/1
20 TABLET ORAL DAILY
Qty: 30 | Refills: 4 | Status: ACTIVE | COMMUNITY
Start: 2020-10-23 | End: 1900-01-01

## 2021-01-07 RX ORDER — FAMOTIDINE 20 MG/1
20 TABLET, FILM COATED ORAL
Qty: 30 | Refills: 6 | Status: ACTIVE | COMMUNITY
Start: 2020-09-29 | End: 1900-01-01

## 2021-02-08 ENCOUNTER — LABORATORY RESULT (OUTPATIENT)
Age: 66
End: 2021-02-08

## 2021-02-12 ENCOUNTER — APPOINTMENT (OUTPATIENT)
Dept: NEPHROLOGY | Facility: CLINIC | Age: 66
End: 2021-02-12
Payer: COMMERCIAL

## 2021-02-12 PROCEDURE — 99442: CPT

## 2021-02-23 ENCOUNTER — APPOINTMENT (OUTPATIENT)
Dept: NEPHROLOGY | Facility: CLINIC | Age: 66
End: 2021-02-23
Payer: COMMERCIAL

## 2021-02-23 VITALS
OXYGEN SATURATION: 94 % | SYSTOLIC BLOOD PRESSURE: 114 MMHG | WEIGHT: 210 LBS | HEART RATE: 94 BPM | TEMPERATURE: 97.3 F | DIASTOLIC BLOOD PRESSURE: 72 MMHG | HEIGHT: 62 IN | BODY MASS INDEX: 38.64 KG/M2

## 2021-02-23 PROCEDURE — 99072 ADDL SUPL MATRL&STAF TM PHE: CPT

## 2021-02-23 PROCEDURE — 99213 OFFICE O/P EST LOW 20 MIN: CPT

## 2021-02-23 RX ORDER — ICOSAPENT ETHYL 1 G/1
1 CAPSULE ORAL DAILY
Qty: 60 | Refills: 0 | Status: ACTIVE | COMMUNITY
Start: 2021-02-23

## 2021-02-23 RX ORDER — CARVEDILOL 6.25 MG/1
6.25 TABLET, FILM COATED ORAL TWICE DAILY
Qty: 30 | Refills: 0 | Status: DISCONTINUED | COMMUNITY
Start: 2020-08-03 | End: 2021-02-23

## 2021-02-23 RX ORDER — FUROSEMIDE 20 MG/1
20 TABLET ORAL DAILY
Qty: 30 | Refills: 0 | Status: DISCONTINUED | COMMUNITY
Start: 2019-05-20 | End: 2021-02-23

## 2021-02-23 RX ORDER — METFORMIN HYDROCHLORIDE 500 MG/1
500 TABLET, COATED ORAL TWICE DAILY
Qty: 180 | Refills: 3 | Status: DISCONTINUED | COMMUNITY
Start: 2020-08-03 | End: 2021-02-23

## 2021-02-23 NOTE — HISTORY OF PRESENT ILLNESS
[FreeTextEntry1] : A case lupus nephritis with membranous nephropathy and CKD, hypertension, hyperlipidemia, anemia has been on  modified steroid-cyclophosphamide regimen (second cycle started on Feb 5 and to finish on March 4). Patient has gained 9 lbs.  and complains of weakness and tiredness.

## 2021-02-23 NOTE — REASON FOR VISIT
[Home] : at home, [unfilled] , at the time of the visit. [Verbal consent obtained from patient] : the patient, [unfilled] [Follow-Up] : a follow-up visit [Medical Office: ([facility])___] : at the medical office located in  [FreeTextEntry1] : Nephrotic  syndrome

## 2021-02-23 NOTE — ASSESSMENT
[FreeTextEntry1] : A case lupus nephritis with membranous nephropathy and CKD, hypertension, hyperlipidemia, anemia has been on  modified steroid-cyclophosphamide regimen (second cycle started on Feb 5 and to finish on March 4). Patient has gained 9 lbs.  and complains of weakness and tiredness. Patient had blood tests on Feb 17th which showed WBC 4.3CC, Hb 8.5 g. Serum creatinine 2.7 (it was 2.2 on Feb 8). Advised to start metolazone 2.5 mg PO until weight is down by 10 lbs. Advised to start Aranesp 100 microgram every two weeks. Advised to have follow-up every week.

## 2021-02-23 NOTE — REVIEW OF SYSTEMS
[Recent Weight Gain (___ Lbs)] : recent [unfilled] ~Ulb weight gain [Palpitations] : palpitations [Lower Ext Edema] : lower extremity edema [Shortness Of Breath] : shortness of breath [SOB on Exertion] : shortness of breath during exertion [Constipation] : no constipation [Diarrhea] : no diarrhea [Nocturia] : nocturia [Arthralgias] : arthralgias [Joint Pain] : joint pain [Itching] : itching [Dizziness] : dizziness [Anxiety] : no anxiety [Depression] : no depression [Muscle Weakness] : muscle weakness [Easy Bleeding] : no tendency for easy bleeding [Easy Bruising] : no tendency for easy bruising [Negative] : ENT

## 2021-02-23 NOTE — ASSESSMENT
[FreeTextEntry1] : A case of nephrotic  syndrome with CKD stage IV in the setting of lupus membranous nephropathy and diabetes  is being treated with steroid-cytoxan cyclical therapy.He is on cyclophosphamide 100 mg daily since Feb 5th. Patient complains of weight gain (10 lbs) and fatigue. Patient is being followed for renal function. Lab tests (Feb 8 evaluated. Renal function stable. Hb 102. g. Urine protein is less (4.2 gm). Advised CBC next week. Patient is taking torsemide 20 mg PO BID. Advised to decrease fluid intake if not loosing weight.

## 2021-02-23 NOTE — HISTORY OF PRESENT ILLNESS
[FreeTextEntry1] : A case of nephrotic  syndrome with CKD stage IV in the setting of lupus membranous nephropathy is being treated with steroid-cytoxan cyclical therapy.He is on cyclophosphamide 100 mg daily since Feb 5th. Patient complains of weight gain (10 lbs) and fatigue. Patient is being followed for renal function.

## 2021-02-23 NOTE — PHYSICAL EXAM
[General Appearance - Alert] : alert [General Appearance - In No Acute Distress] : in no acute distress [Sclera] : the sclera and conjunctiva were normal [Outer Ear] : the ears and nose were normal in appearance [Neck Appearance] : the appearance of the neck was normal [] : no respiratory distress [Respiration, Rhythm And Depth] : normal respiratory rhythm and effort [Apical Impulse] : the apical impulse was normal [Heart Rate And Rhythm] : heart rate was normal and rhythm regular [Pitting Edema] : pitting edema present [___ +] : bilateral [unfilled]+ pitting edema to the ankles [Bowel Sounds] : normal bowel sounds [Abdomen Soft] : soft [Abdomen Tenderness] : non-tender [Cervical Lymph Nodes Enlarged Posterior Bilaterally] : posterior cervical [Cervical Lymph Nodes Enlarged Anterior Bilaterally] : anterior cervical [Supraclavicular Lymph Nodes Enlarged Bilaterally] : supraclavicular [No CVA Tenderness] : no ~M costovertebral angle tenderness [No Spinal Tenderness] : no spinal tenderness [Abnormal Walk] : normal gait [Musculoskeletal - Swelling] : no joint swelling seen [Skin Color & Pigmentation] : normal skin color and pigmentation [Skin Turgor] : normal skin turgor [Oriented To Time, Place, And Person] : oriented to person, place, and time

## 2021-03-01 ENCOUNTER — APPOINTMENT (OUTPATIENT)
Dept: NEPHROLOGY | Facility: CLINIC | Age: 66
End: 2021-03-01
Payer: COMMERCIAL

## 2021-03-01 VITALS
DIASTOLIC BLOOD PRESSURE: 68 MMHG | WEIGHT: 201.72 LBS | HEART RATE: 93 BPM | OXYGEN SATURATION: 95 % | TEMPERATURE: 97.2 F | SYSTOLIC BLOOD PRESSURE: 116 MMHG | BODY MASS INDEX: 36.9 KG/M2

## 2021-03-01 PROCEDURE — 99213 OFFICE O/P EST LOW 20 MIN: CPT

## 2021-03-01 PROCEDURE — 99072 ADDL SUPL MATRL&STAF TM PHE: CPT

## 2021-03-01 NOTE — HISTORY OF PRESENT ILLNESS
[FreeTextEntry1] : A case lupus nephritis with membranous nephropathy and CKD, hypertension, hyperlipidemia, anemia has been on  modified steroid-cyclophosphamide regimen (second cycle started on Feb 5 and to finish on March 4). Patient has lost 9 lbs. Patient feels comfortable

## 2021-03-01 NOTE — REVIEW OF SYSTEMS
[Recent Weight Gain (___ Lbs)] : no recent weight gain [Recent Weight Loss (___ Lbs)] : recent [unfilled] ~Ulb weight loss [Palpitations] : palpitations [Lower Ext Edema] : lower extremity edema [Shortness Of Breath] : shortness of breath [SOB on Exertion] : shortness of breath during exertion [Constipation] : no constipation [Diarrhea] : no diarrhea [Nocturia] : nocturia [Arthralgias] : arthralgias [Joint Pain] : joint pain [Itching] : itching [Dizziness] : dizziness [Anxiety] : no anxiety [Depression] : no depression [Muscle Weakness] : muscle weakness [Easy Bleeding] : no tendency for easy bleeding [Easy Bruising] : no tendency for easy bruising [Negative] : ENT

## 2021-03-01 NOTE — ASSESSMENT
[FreeTextEntry1] : A case lupus nephritis with membranous nephropathy and CKD, hypertension, hyperlipidemia, anemia has been on  modified steroid-cyclophosphamide regimen (second cycle started on Feb 5 and to finish on March 4). Patient has  lost 9 lbs after starting of metalozone. Advised to make metolazone every other day. Patient went to urgicenter on Feb 24 to [facility] and serum creatinine was 2.0 mg/dl with normal potassium. WBC 4.3. Patient has to be started on steroid cycel from March 5th onwards.

## 2021-03-17 ENCOUNTER — LABORATORY RESULT (OUTPATIENT)
Age: 66
End: 2021-03-17

## 2021-03-18 ENCOUNTER — APPOINTMENT (OUTPATIENT)
Dept: NEPHROLOGY | Facility: CLINIC | Age: 66
End: 2021-03-18
Payer: COMMERCIAL

## 2021-03-18 VITALS
OXYGEN SATURATION: 96 % | BODY MASS INDEX: 36.29 KG/M2 | TEMPERATURE: 97.5 F | WEIGHT: 198.41 LBS | DIASTOLIC BLOOD PRESSURE: 65 MMHG | SYSTOLIC BLOOD PRESSURE: 105 MMHG | HEART RATE: 86 BPM

## 2021-03-18 DIAGNOSIS — N17.9 ACUTE KIDNEY FAILURE, UNSPECIFIED: ICD-10-CM

## 2021-03-18 PROCEDURE — 99072 ADDL SUPL MATRL&STAF TM PHE: CPT

## 2021-03-18 PROCEDURE — 99213 OFFICE O/P EST LOW 20 MIN: CPT

## 2021-03-18 RX ORDER — CANDESARTAN CILEXETIL 32 MG/1
32 TABLET ORAL DAILY
Qty: 90 | Refills: 3 | Status: DISCONTINUED | COMMUNITY
Start: 2021-01-05 | End: 2021-03-18

## 2021-03-18 RX ORDER — METOLAZONE 2.5 MG/1
2.5 TABLET ORAL
Qty: 30 | Refills: 1 | Status: DISCONTINUED | COMMUNITY
Start: 2021-02-23 | End: 2021-03-18

## 2021-03-18 RX ORDER — CARVEDILOL 12.5 MG/1
12.5 TABLET, FILM COATED ORAL TWICE DAILY
Qty: 60 | Refills: 5 | Status: DISCONTINUED | COMMUNITY
Start: 2020-12-02 | End: 2021-03-18

## 2021-03-18 RX ORDER — TORSEMIDE 20 MG/1
20 TABLET ORAL
Qty: 60 | Refills: 3 | Status: DISCONTINUED | COMMUNITY
Start: 2021-02-23 | End: 2021-03-18

## 2021-03-18 RX ORDER — POTASSIUM CHLORIDE 1500 MG/1
20 TABLET, FILM COATED, EXTENDED RELEASE ORAL DAILY
Qty: 90 | Refills: 3 | Status: DISCONTINUED | COMMUNITY
Start: 2020-08-03 | End: 2021-03-18

## 2021-03-18 RX ORDER — CALCITRIOL 0.25 UG/1
0.25 CAPSULE, LIQUID FILLED ORAL
Qty: 30 | Refills: 3 | Status: DISCONTINUED | COMMUNITY
Start: 2019-05-20 | End: 2021-03-18

## 2021-03-18 NOTE — PHYSICAL EXAM
[General Appearance - Alert] : alert [General Appearance - In No Acute Distress] : in no acute distress [Sclera] : the sclera and conjunctiva were normal [Outer Ear] : the ears and nose were normal in appearance [Neck Appearance] : the appearance of the neck was normal [] : no respiratory distress [Apical Impulse] : the apical impulse was normal [Bowel Sounds] : normal bowel sounds [Abdomen Soft] : soft [Cervical Lymph Nodes Enlarged Posterior Bilaterally] : posterior cervical [Cervical Lymph Nodes Enlarged Anterior Bilaterally] : anterior cervical [No CVA Tenderness] : no ~M costovertebral angle tenderness [Abnormal Walk] : normal gait [Skin Color & Pigmentation] : normal skin color and pigmentation [Oriented To Time, Place, And Person] : oriented to person, place, and time

## 2021-03-19 NOTE — REVIEW OF SYSTEMS
[Recent Weight Loss (___ Lbs)] : recent [unfilled] ~Ulb weight loss [Eyesight Problems] : eyesight problems [Shortness Of Breath] : shortness of breath [SOB on Exertion] : shortness of breath during exertion [Heartburn] : heartburn [Nocturia] : nocturia [Arthralgias] : arthralgias [Dizziness] : dizziness [Anxiety] : anxiety [Muscle Weakness] : muscle weakness [Earache] : no earache [Loss Of Hearing] : no hearing loss [Chest Pain] : no chest pain [Palpitations] : no palpitations [Lower Ext Edema] : no extremity edema [Constipation] : no constipation [Diarrhea] : no diarrhea [Itching] : no itching [Easy Bleeding] : no tendency for easy bleeding [Easy Bruising] : no tendency for easy bruising [FreeTextEntry9] : mild tenderness right gluteal region.

## 2021-03-19 NOTE — ASSESSMENT
[FreeTextEntry1] : A case of CKD with nephrotic syndrome secondary to lupus nephritis being treated with steroid and cyclophosphamide (now on steroids). Three weeks ago he has pain in both thigh. Pain in left thigh has gone better. Last week patient had an episode of hypotension (BP 60 mm Hg), He has cut down BP meds and diuretics. Patient underwent blood tests yesterday and found to have a jump in serum creatinine, an increased in serum phosphorus 6.4 mg/dl and increase in blood sugar (192 mg/dl). WBC 15.9. Urine does not show any increase in WBC and RBC. An increase in serum creatinine seems to be related to PEPE secondary to low BP. Advised to D/C Candersartan and take only half dose of  Torsemide when BP is more than 120 mm. Advised BMP today. Advised X-RAy chest. Advised muscle enzymes. \par Lab tests discussed. Serum creatinine has gone to to 3.89 mg/dl and BUN has gone down little bit to 99 mg/dl. There is a decrease in serum sodium bicarbonate and an increase in serum phosphate. It appears that PEPE is persisting. Advised to start sodium bicarbonate 650 mg PO TID and Sevelemer cabonate 0.8 g powder with each meal. Advised monitor BP. Advised renal panel after 3 days.

## 2021-03-19 NOTE — HISTORY OF PRESENT ILLNESS
[FreeTextEntry1] : A case of CKD with nephrotic syndrome secondary to lupus nephritis being treated with steroid and cyclophosphamide (now on steroids). Three weeks ago he has pain in both thigh. Pain in left thigh has gone better. Last week patient had an episode of hypotension (BP 60 mm Hg), He has cut down BP meds and diuretics. Patient underwent blood tests yesterday and found to have a jump in serum creatinine, an increased in serum phosphorus 6.4 mg/dl and increase in blood sugar (192 mg/dl). WBC 15.9.

## 2021-03-24 LAB
ALBUMIN SERPL ELPH-MCNC: 4.4 G/DL
ALBUMIN SERPL ELPH-MCNC: 4.5 G/DL
ANION GAP SERPL CALC-SCNC: 17 MMOL/L
ANION GAP SERPL CALC-SCNC: 18 MMOL/L
APPEARANCE: CLEAR
APPEARANCE: CLEAR
AST SERPL-CCNC: 31 U/L
BACTERIA: NEGATIVE
BACTERIA: NEGATIVE
BASOPHILS # BLD AUTO: 0 K/UL
BASOPHILS # BLD AUTO: 0 K/UL
BASOPHILS NFR BLD AUTO: 0 %
BASOPHILS NFR BLD AUTO: 0 %
BILIRUBIN URINE: NEGATIVE
BILIRUBIN URINE: NEGATIVE
BLOOD URINE: ABNORMAL
BLOOD URINE: NORMAL
BUN SERPL-MCNC: 106 MG/DL
BUN SERPL-MCNC: 99 MG/DL
CALCIUM SERPL-MCNC: 10.2 MG/DL
CALCIUM SERPL-MCNC: 10.3 MG/DL
CHLORIDE SERPL-SCNC: 100 MMOL/L
CHLORIDE SERPL-SCNC: 103 MMOL/L
CK SERPL-CCNC: 35 U/L
CO2 SERPL-SCNC: 16 MMOL/L
CO2 SERPL-SCNC: 18 MMOL/L
COLOR: NORMAL
COLOR: NORMAL
CREAT SERPL-MCNC: 3.59 MG/DL
CREAT SERPL-MCNC: 3.89 MG/DL
CREAT SPEC-SCNC: 70 MG/DL
CREAT/PROT UR: 2.5 RATIO
EOSINOPHIL # BLD AUTO: 0 K/UL
EOSINOPHIL # BLD AUTO: 0 K/UL
EOSINOPHIL NFR BLD AUTO: 0 %
EOSINOPHIL NFR BLD AUTO: 0 %
GLUCOSE QUALITATIVE U: ABNORMAL
GLUCOSE QUALITATIVE U: ABNORMAL
GLUCOSE SERPL-MCNC: 108 MG/DL
GLUCOSE SERPL-MCNC: 192 MG/DL
HCT VFR BLD CALC: 29.6 %
HCT VFR BLD CALC: 31.2 %
HGB BLD-MCNC: 10.4 G/DL
HGB BLD-MCNC: 9.6 G/DL
HYALINE CASTS: 2 /LPF
HYALINE CASTS: 5 /LPF
KETONES URINE: NEGATIVE
KETONES URINE: NEGATIVE
LDH SERPL-CCNC: 234 IU/L
LDH1 CFR SERPL ELPH: 28 %
LDH2 CFR SERPL ELPH: 35 %
LDH3 CFR SERPL ELPH: 16 %
LDH4 CFR SERPL ELPH: 7 %
LDH5 CFR SERPL ELPH: 14 %
LEUKOCYTE ESTERASE URINE: NEGATIVE
LEUKOCYTE ESTERASE URINE: NEGATIVE
LYMPHOCYTES # BLD AUTO: 0.61 K/UL
LYMPHOCYTES # BLD AUTO: 0.7 K/UL
LYMPHOCYTES NFR BLD AUTO: 4.4 %
LYMPHOCYTES NFR BLD AUTO: 5.2 %
MACROCYTES BLD QL SMEAR: SLIGHT
MAN DIFF?: NORMAL
MAN DIFF?: NORMAL
MCHC RBC-ENTMCNC: 32.4 GM/DL
MCHC RBC-ENTMCNC: 33.3 GM/DL
MCHC RBC-ENTMCNC: 36 PG
MCHC RBC-ENTMCNC: 36.4 PG
MCV RBC AUTO: 109.1 FL
MCV RBC AUTO: 110.9 FL
MICROSCOPIC-UA: NORMAL
MICROSCOPIC-UA: NORMAL
MONOCYTES # BLD AUTO: 0.7 K/UL
MONOCYTES # BLD AUTO: 1.02 K/UL
MONOCYTES NFR BLD AUTO: 4.4 %
MONOCYTES NFR BLD AUTO: 8.7 %
MSMEAR: NORMAL
NEUTROPHILS # BLD AUTO: 13.56 K/UL
NEUTROPHILS # BLD AUTO: 9.99 K/UL
NEUTROPHILS NFR BLD AUTO: 84.2 %
NEUTROPHILS NFR BLD AUTO: 85.2 %
NITRITE URINE: NEGATIVE
NITRITE URINE: NEGATIVE
PH URINE: 6
PH URINE: 6
PHOSPHATE SERPL-MCNC: 6.1 MG/DL
PHOSPHATE SERPL-MCNC: 6.9 MG/DL
PLAT MORPH BLD: NORMAL
PLATELET # BLD AUTO: 131 K/UL
PLATELET # BLD AUTO: 186 K/UL
POLYCHROMASIA BLD QL SMEAR: SLIGHT
POTASSIUM SERPL-SCNC: 4.6 MMOL/L
POTASSIUM SERPL-SCNC: 5.1 MMOL/L
PROT UR-MCNC: 171 MG/DL
PROTEIN URINE: ABNORMAL
PROTEIN URINE: ABNORMAL
RBC # BLD: 2.67 M/UL
RBC # BLD: 2.86 M/UL
RBC # FLD: 14.6 %
RBC # FLD: 16 %
RBC BLD AUTO: ABNORMAL
RED BLOOD CELLS URINE: 4 /HPF
RED BLOOD CELLS URINE: 7 /HPF
SODIUM SERPL-SCNC: 136 MMOL/L
SODIUM SERPL-SCNC: 136 MMOL/L
SPECIFIC GRAVITY URINE: 1.01
SPECIFIC GRAVITY URINE: 1.02
SQUAMOUS EPITHELIAL CELLS: 0 /HPF
SQUAMOUS EPITHELIAL CELLS: 1 /HPF
UROBILINOGEN URINE: NORMAL
UROBILINOGEN URINE: NORMAL
VARIANT LYMPHS # BLD MANUAL: 0.9 %
WBC # FLD AUTO: 11.72 K/UL
WBC # FLD AUTO: 15.95 K/UL
WHITE BLOOD CELLS URINE: 1 /HPF
WHITE BLOOD CELLS URINE: 2 /HPF

## 2021-04-02 ENCOUNTER — LABORATORY RESULT (OUTPATIENT)
Age: 66
End: 2021-04-02

## 2021-04-05 ENCOUNTER — APPOINTMENT (OUTPATIENT)
Dept: NEPHROLOGY | Facility: CLINIC | Age: 66
End: 2021-04-05
Payer: COMMERCIAL

## 2021-04-05 VITALS
OXYGEN SATURATION: 93 % | SYSTOLIC BLOOD PRESSURE: 131 MMHG | HEIGHT: 62 IN | WEIGHT: 208 LBS | DIASTOLIC BLOOD PRESSURE: 71 MMHG | TEMPERATURE: 97.6 F | BODY MASS INDEX: 38.28 KG/M2 | HEART RATE: 54 BPM

## 2021-04-05 DIAGNOSIS — E83.39 OTHER DISORDERS OF PHOSPHORUS METABOLISM: ICD-10-CM

## 2021-04-05 PROCEDURE — 99212 OFFICE O/P EST SF 10 MIN: CPT

## 2021-04-05 PROCEDURE — 99072 ADDL SUPL MATRL&STAF TM PHE: CPT

## 2021-04-05 NOTE — PHYSICAL EXAM
[General Appearance - Alert] : alert [General Appearance - In No Acute Distress] : in no acute distress [Sclera] : the sclera and conjunctiva were normal [Outer Ear] : the ears and nose were normal in appearance [Neck Appearance] : the appearance of the neck was normal [] : no respiratory distress [Apical Impulse] : the apical impulse was normal [Pitting Edema] : pitting edema present [___ +] : bilateral [unfilled]+ pitting edema to the ankles [Bowel Sounds] : normal bowel sounds [Abdomen Soft] : soft [Cervical Lymph Nodes Enlarged Posterior Bilaterally] : posterior cervical [Cervical Lymph Nodes Enlarged Anterior Bilaterally] : anterior cervical [No CVA Tenderness] : no ~M costovertebral angle tenderness [Abnormal Walk] : normal gait [Skin Color & Pigmentation] : normal skin color and pigmentation [Oriented To Time, Place, And Person] : oriented to person, place, and time

## 2021-04-05 NOTE — REVIEW OF SYSTEMS
[Recent Weight Gain (___ Lbs)] : recent [unfilled] ~Ulb weight gain [Recent Weight Loss (___ Lbs)] : recent [unfilled] ~Ulb weight loss [Eyesight Problems] : eyesight problems [Earache] : no earache [Loss Of Hearing] : no hearing loss [Chest Pain] : no chest pain [Palpitations] : no palpitations [Lower Ext Edema] : lower extremity edema [Shortness Of Breath] : shortness of breath [SOB on Exertion] : shortness of breath during exertion [Constipation] : no constipation [Diarrhea] : no diarrhea [Heartburn] : heartburn [Nocturia] : nocturia [Arthralgias] : arthralgias [Itching] : no itching [Dizziness] : dizziness [Anxiety] : anxiety [Muscle Weakness] : muscle weakness [Easy Bleeding] : no tendency for easy bleeding [Easy Bruising] : no tendency for easy bruising [FreeTextEntry9] : mild tenderness right gluteal region.

## 2021-04-05 NOTE — HISTORY OF PRESENT ILLNESS
[FreeTextEntry1] : A case of CKD with nephrotic syndrome secondary to lupus nephritis being treated with steroid and cyclophosphamide (now on steroids). This is the last day of steroids. He has to start cyclophosphamide 100 mg PO daily. Patient is being followed for kidney function.

## 2021-04-05 NOTE — ASSESSMENT
[FreeTextEntry1] : A case of CKD with nephrotic syndrome secondary to lupus nephritis being treated with steroid and cyclophosphamide (now on steroids). Steroid cycle is completed and he has to start cyclophosphamide 100 mg PO daily. Patient has gained 6 lbs, has pedal edema and BP on higher side. Advised to increase Torsemide to 20 mg PO daily. Start Candesartan 16 mg PO daily. Patient had blood test two days ago. Serum creatinine has improved to 2.2 mg/dl. Hb improved to 10.3 g/dl. Urine protein 3.6 gm. Advised repeat CBC and blood tests after one week including lupus profile.

## 2021-04-06 LAB
ALBUMIN SERPL ELPH-MCNC: 3.9 G/DL
ANION GAP SERPL CALC-SCNC: 15 MMOL/L
APPEARANCE: CLEAR
BACTERIA: NEGATIVE
BASOPHILS # BLD AUTO: 0.06 K/UL
BASOPHILS NFR BLD AUTO: 0.9 %
BILIRUBIN URINE: NEGATIVE
BLOOD URINE: NORMAL
BUN SERPL-MCNC: 42 MG/DL
CALCIUM SERPL-MCNC: 10.2 MG/DL
CHLORIDE SERPL-SCNC: 105 MMOL/L
CO2 SERPL-SCNC: 23 MMOL/L
COLOR: NORMAL
CREAT SERPL-MCNC: 2.22 MG/DL
CREAT SPEC-SCNC: 98 MG/DL
CREAT/PROT UR: 3.7 RATIO
EOSINOPHIL # BLD AUTO: 0.24 K/UL
EOSINOPHIL NFR BLD AUTO: 3.4 %
GLUCOSE QUALITATIVE U: ABNORMAL
GLUCOSE SERPL-MCNC: 114 MG/DL
HCT VFR BLD CALC: 31.5 %
HGB BLD-MCNC: 10.3 G/DL
HYALINE CASTS: 1 /LPF
KETONES URINE: NEGATIVE
LEUKOCYTE ESTERASE URINE: NEGATIVE
LYMPHOCYTES # BLD AUTO: 1.28 K/UL
LYMPHOCYTES NFR BLD AUTO: 17.8 %
MAN DIFF?: NORMAL
MCHC RBC-ENTMCNC: 32.7 GM/DL
MCHC RBC-ENTMCNC: 35.5 PG
MCV RBC AUTO: 108.6 FL
MICROSCOPIC-UA: NORMAL
MONOCYTES # BLD AUTO: 0.3 K/UL
MONOCYTES NFR BLD AUTO: 4.2 %
NEUTROPHILS # BLD AUTO: 5.23 K/UL
NEUTROPHILS NFR BLD AUTO: 72 %
NITRITE URINE: NEGATIVE
PH URINE: 6.5
PHOSPHATE SERPL-MCNC: 5.6 MG/DL
PLATELET # BLD AUTO: 144 K/UL
POTASSIUM SERPL-SCNC: 3.6 MMOL/L
PROT UR-MCNC: 361 MG/DL
PROTEIN URINE: ABNORMAL
RBC # BLD: 2.9 M/UL
RBC # FLD: 13.6 %
RED BLOOD CELLS URINE: 4 /HPF
SODIUM SERPL-SCNC: 143 MMOL/L
SPECIFIC GRAVITY URINE: 1.02
SQUAMOUS EPITHELIAL CELLS: 1 /HPF
UROBILINOGEN URINE: NORMAL
WBC # FLD AUTO: 7.17 K/UL
WHITE BLOOD CELLS URINE: 1 /HPF

## 2021-04-12 LAB
ALBUMIN SERPL ELPH-MCNC: 4 G/DL
ANA SER IF-ACNC: NEGATIVE
ANION GAP SERPL CALC-SCNC: 12 MMOL/L
BASOPHILS # BLD AUTO: 0.01 K/UL
BASOPHILS NFR BLD AUTO: 0.2 %
BUN SERPL-MCNC: 20 MG/DL
C3 SERPL-MCNC: 114 MG/DL
CALCIUM SERPL-MCNC: 9.2 MG/DL
CHLORIDE SERPL-SCNC: 104 MMOL/L
CO2 SERPL-SCNC: 25 MMOL/L
CREAT SERPL-MCNC: 2.04 MG/DL
EOSINOPHIL # BLD AUTO: 0.06 K/UL
EOSINOPHIL NFR BLD AUTO: 1.3 %
GLUCOSE SERPL-MCNC: 131 MG/DL
HCT VFR BLD CALC: 31.9 %
HGB BLD-MCNC: 10.8 G/DL
IMM GRANULOCYTES NFR BLD AUTO: 2.8 %
LYMPHOCYTES # BLD AUTO: 1.18 K/UL
LYMPHOCYTES NFR BLD AUTO: 25.2 %
MAN DIFF?: NORMAL
MCHC RBC-ENTMCNC: 33.9 GM/DL
MCHC RBC-ENTMCNC: 35.5 PG
MCV RBC AUTO: 104.9 FL
MONOCYTES # BLD AUTO: 0.57 K/UL
MONOCYTES NFR BLD AUTO: 12.2 %
NEUTROPHILS # BLD AUTO: 2.73 K/UL
NEUTROPHILS NFR BLD AUTO: 58.3 %
PHOSPHATE SERPL-MCNC: 2.8 MG/DL
PLATELET # BLD AUTO: 134 K/UL
POTASSIUM SERPL-SCNC: 3.3 MMOL/L
RBC # BLD: 3.04 M/UL
RBC # FLD: 12.9 %
SODIUM SERPL-SCNC: 141 MMOL/L
WBC # FLD AUTO: 4.68 K/UL

## 2021-05-03 ENCOUNTER — APPOINTMENT (OUTPATIENT)
Dept: NEPHROLOGY | Facility: CLINIC | Age: 66
End: 2021-05-03
Payer: COMMERCIAL

## 2021-05-03 VITALS
DIASTOLIC BLOOD PRESSURE: 81 MMHG | TEMPERATURE: 97.5 F | HEART RATE: 87 BPM | BODY MASS INDEX: 38.1 KG/M2 | SYSTOLIC BLOOD PRESSURE: 137 MMHG | WEIGHT: 208.34 LBS | OXYGEN SATURATION: 98 %

## 2021-05-03 PROCEDURE — 99072 ADDL SUPL MATRL&STAF TM PHE: CPT

## 2021-05-03 PROCEDURE — 99212 OFFICE O/P EST SF 10 MIN: CPT

## 2021-05-05 LAB
ALBUMIN SERPL ELPH-MCNC: 4.2 G/DL
ANION GAP SERPL CALC-SCNC: 15 MMOL/L
APPEARANCE: CLEAR
BACTERIA: NEGATIVE
BASOPHILS # BLD AUTO: 0.04 K/UL
BASOPHILS NFR BLD AUTO: 1.1 %
BILIRUBIN URINE: NEGATIVE
BLOOD URINE: NORMAL
BUN SERPL-MCNC: 26 MG/DL
CALCIUM SERPL-MCNC: 9.4 MG/DL
CHLORIDE SERPL-SCNC: 105 MMOL/L
CHOLEST SERPL-MCNC: 133 MG/DL
CO2 SERPL-SCNC: 21 MMOL/L
COLOR: NORMAL
CREAT SERPL-MCNC: 2.15 MG/DL
CREAT SPEC-SCNC: 40 MG/DL
CREAT/PROT UR: 4.4 RATIO
EOSINOPHIL # BLD AUTO: 0.11 K/UL
EOSINOPHIL NFR BLD AUTO: 3.1 %
ESTIMATED AVERAGE GLUCOSE: 103 MG/DL
GLUCOSE QUALITATIVE U: ABNORMAL
GLUCOSE SERPL-MCNC: 161 MG/DL
HBA1C MFR BLD HPLC: 5.2 %
HCT VFR BLD CALC: 32.3 %
HDLC SERPL-MCNC: 18 MG/DL
HGB BLD-MCNC: 10.7 G/DL
HYALINE CASTS: 0 /LPF
IMM GRANULOCYTES NFR BLD AUTO: 1.1 %
KETONES URINE: NEGATIVE
LDLC SERPL CALC-MCNC: NORMAL MG/DL
LEUKOCYTE ESTERASE URINE: NEGATIVE
LYMPHOCYTES # BLD AUTO: 0.98 K/UL
LYMPHOCYTES NFR BLD AUTO: 27.2 %
MAN DIFF?: NORMAL
MCHC RBC-ENTMCNC: 33.1 GM/DL
MCHC RBC-ENTMCNC: 33.9 PG
MCV RBC AUTO: 102.2 FL
MICROSCOPIC-UA: NORMAL
MONOCYTES # BLD AUTO: 0.64 K/UL
MONOCYTES NFR BLD AUTO: 17.8 %
NEUTROPHILS # BLD AUTO: 1.79 K/UL
NEUTROPHILS NFR BLD AUTO: 49.7 %
NITRITE URINE: NEGATIVE
NONHDLC SERPL-MCNC: 115 MG/DL
PH URINE: 6.5
PHOSPHATE SERPL-MCNC: 2.8 MG/DL
PLATELET # BLD AUTO: 182 K/UL
POTASSIUM SERPL-SCNC: 3.7 MMOL/L
PROT UR-MCNC: 176 MG/DL
PROTEIN URINE: ABNORMAL
RBC # BLD: 3.16 M/UL
RBC # FLD: 14.2 %
RED BLOOD CELLS URINE: 1 /HPF
SODIUM SERPL-SCNC: 141 MMOL/L
SPECIFIC GRAVITY URINE: 1.01
SQUAMOUS EPITHELIAL CELLS: 0 /HPF
TRIGL SERPL-MCNC: 580 MG/DL
UROBILINOGEN URINE: NORMAL
WBC # FLD AUTO: 3.6 K/UL
WHITE BLOOD CELLS URINE: 0 /HPF

## 2021-05-05 RX ORDER — MYCOPHENOLATE MOFETIL 500 MG/1
500 TABLET ORAL
Qty: 180 | Refills: 0 | Status: DISCONTINUED | COMMUNITY
Start: 2019-05-20 | End: 2021-05-05

## 2021-05-05 RX ORDER — LOSARTAN POTASSIUM 100 MG/1
100 TABLET, FILM COATED ORAL
Qty: 180 | Refills: 3 | Status: DISCONTINUED | COMMUNITY
Start: 2020-08-03 | End: 2021-05-05

## 2021-05-05 NOTE — HISTORY OF PRESENT ILLNESS
[FreeTextEntry1] : A case of CKD with nephrotic syndrome secondary to lupus nephritis being treated with steroid and cyclophosphamide (now on steroids). This is the last  cycle of cyclophosphamide. Patient has gained significant weight. Patient complains pain in thigh and both arms. Patient has little foam in the urine.

## 2021-05-05 NOTE — ASSESSMENT
[FreeTextEntry1] :  A case of CKD with nephrotic syndrome secondary to lupus nephritis being treated with steroid and cyclophosphamide (now on steroids). This is the last  cycle of cyclophosphamide. Patient has gained significant weight. Patient complains pain in thigh and both arms. Patient has little foam in the urine. Advised renal function tests, urinary protein and lipid profile. \par Lab tests discussed with the patient. Serum creatinine 2.15 mg/dl with  GFR 31 ml. Urine protein 4.2 g with srum albumin 4.2 gm/dl. Urine protein is high because of patient is not taking candesartan regularly. Advised to start mycophenalate 750 mg PO BID. Advised follow-up after one month.

## 2021-05-05 NOTE — REVIEW OF SYSTEMS
[Recent Weight Gain (___ Lbs)] : recent [unfilled] ~Ulb weight gain [Eyesight Problems] : eyesight problems [Lower Ext Edema] : lower extremity edema [Shortness Of Breath] : shortness of breath [SOB on Exertion] : shortness of breath during exertion [Heartburn] : heartburn [Nocturia] : nocturia [Arthralgias] : arthralgias [Dizziness] : dizziness [Anxiety] : anxiety [Muscle Weakness] : muscle weakness [Recent Weight Loss (___ Lbs)] : no recent weight loss [Earache] : no earache [Loss Of Hearing] : no hearing loss [Chest Pain] : no chest pain [Palpitations] : no palpitations [Constipation] : no constipation [Diarrhea] : no diarrhea [Itching] : no itching [Easy Bleeding] : no tendency for easy bleeding [Easy Bruising] : no tendency for easy bruising [FreeTextEntry9] : mild tenderness right gluteal region.

## 2021-05-06 ENCOUNTER — RX RENEWAL (OUTPATIENT)
Age: 66
End: 2021-05-06

## 2021-05-26 RX ORDER — PREDNISONE 20 MG/1
20 TABLET ORAL DAILY
Qty: 60 | Refills: 2 | Status: DISCONTINUED | COMMUNITY
Start: 2020-09-29 | End: 2021-05-26

## 2021-05-26 RX ORDER — PREDNISONE 20 MG/1
20 TABLET ORAL DAILY
Qty: 54 | Refills: 0 | Status: DISCONTINUED | COMMUNITY
Start: 2020-12-03 | End: 2021-05-26

## 2021-05-26 RX ORDER — DAPSONE 100 MG/1
100 TABLET ORAL DAILY
Qty: 30 | Refills: 6 | Status: DISCONTINUED | COMMUNITY
Start: 2020-09-29 | End: 2021-05-26

## 2021-05-26 RX ORDER — PREDNISONE 20 MG/1
20 TABLET ORAL DAILY
Qty: 15 | Refills: 0 | Status: DISCONTINUED | COMMUNITY
Start: 2021-03-01 | End: 2021-05-26

## 2021-05-26 RX ORDER — CYCLOPHOSPHAMIDE 50 MG/1
50 CAPSULE ORAL
Qty: 60 | Refills: 0 | Status: DISCONTINUED | COMMUNITY
Start: 2020-10-23 | End: 2021-05-26

## 2021-05-26 RX ORDER — DARBEPOETIN ALFA 100 UG/.5ML
100 INJECTION, SOLUTION INTRAVENOUS; SUBCUTANEOUS
Qty: 4 | Refills: 0 | Status: DISCONTINUED | COMMUNITY
Start: 2020-11-12 | End: 2021-05-26

## 2021-05-26 RX ORDER — PREDNISONE 20 MG/1
20 TABLET ORAL DAILY
Qty: 60 | Refills: 0 | Status: DISCONTINUED | COMMUNITY
Start: 2021-01-05 | End: 2021-05-26

## 2021-05-27 ENCOUNTER — APPOINTMENT (OUTPATIENT)
Dept: RHEUMATOLOGY | Facility: CLINIC | Age: 66
End: 2021-05-27
Payer: COMMERCIAL

## 2021-05-27 ENCOUNTER — LABORATORY RESULT (OUTPATIENT)
Age: 66
End: 2021-05-27

## 2021-05-27 VITALS
HEIGHT: 62 IN | TEMPERATURE: 97.6 F | HEART RATE: 94 BPM | SYSTOLIC BLOOD PRESSURE: 140 MMHG | DIASTOLIC BLOOD PRESSURE: 80 MMHG | WEIGHT: 203 LBS | RESPIRATION RATE: 17 BRPM | BODY MASS INDEX: 37.36 KG/M2 | OXYGEN SATURATION: 97 %

## 2021-05-27 DIAGNOSIS — M25.511 PAIN IN RIGHT SHOULDER: ICD-10-CM

## 2021-05-27 DIAGNOSIS — G89.29 PAIN IN RIGHT SHOULDER: ICD-10-CM

## 2021-05-27 PROCEDURE — 99205 OFFICE O/P NEW HI 60 MIN: CPT

## 2021-05-27 PROCEDURE — 99072 ADDL SUPL MATRL&STAF TM PHE: CPT

## 2021-05-28 LAB
ALBUMIN SERPL ELPH-MCNC: 4.1 G/DL
ALP BLD-CCNC: 56 U/L
ALT SERPL-CCNC: 27 U/L
ANION GAP SERPL CALC-SCNC: 14 MMOL/L
APPEARANCE: CLEAR
APTT BLD: 32 SEC
AST SERPL-CCNC: 48 U/L
BACTERIA: NEGATIVE
BASOPHILS # BLD AUTO: 0.04 K/UL
BASOPHILS NFR BLD AUTO: 0.7 %
BILIRUB SERPL-MCNC: 0.3 MG/DL
BILIRUBIN URINE: NEGATIVE
BLOOD URINE: ABNORMAL
BUN SERPL-MCNC: 20 MG/DL
C3 SERPL-MCNC: 149 MG/DL
C4 SERPL-MCNC: 34 MG/DL
CALCIUM SERPL-MCNC: 9.6 MG/DL
CHLORIDE SERPL-SCNC: 107 MMOL/L
CK SERPL-CCNC: 136 U/L
CO2 SERPL-SCNC: 19 MMOL/L
COLOR: NORMAL
CONFIRM: 31.8 SEC
COVID-19 SPIKE DOMAIN ANTIBODY INTERPRETATION: NEGATIVE
CREAT SERPL-MCNC: 2.21 MG/DL
CREAT SPEC-SCNC: 82 MG/DL
CREAT/PROT UR: 6.7 RATIO
DEPRECATED KAPPA LC FREE/LAMBDA SER: 1.22 RATIO
DIRECT COOMBS: NORMAL
DRVVT IMM 1:2 NP PPP: NORMAL
DRVVT SCREEN TO CONFIRM RATIO: 0.89 RATIO
ENA RNP AB SER IA-ACNC: <0.2 AL
ENA SM AB SER IA-ACNC: <0.2 AL
ENA SS-A AB SER IA-ACNC: <0.2 AL
ENA SS-B AB SER IA-ACNC: <0.2 AL
EOSINOPHIL # BLD AUTO: 0.46 K/UL
EOSINOPHIL NFR BLD AUTO: 8.5 %
GLUCOSE QUALITATIVE U: ABNORMAL
HCT VFR BLD CALC: 33.7 %
HGB BLD-MCNC: 11.1 G/DL
HYALINE CASTS: 1 /LPF
IGA SER QL IEP: 447 MG/DL
IGG SER QL IEP: 694 MG/DL
IGM SER QL IEP: 54 MG/DL
IMM GRANULOCYTES NFR BLD AUTO: 0.9 %
INR PPP: 0.95 RATIO
KAPPA LC CSF-MCNC: 6.38 MG/DL
KAPPA LC SERPL-MCNC: 7.81 MG/DL
KETONES URINE: NEGATIVE
LEUKOCYTE ESTERASE URINE: NEGATIVE
LYMPHOCYTES # BLD AUTO: 1.18 K/UL
LYMPHOCYTES NFR BLD AUTO: 21.7 %
MAN DIFF?: NORMAL
MCHC RBC-ENTMCNC: 32.9 GM/DL
MCHC RBC-ENTMCNC: 33.3 PG
MCV RBC AUTO: 101.2 FL
MICROSCOPIC-UA: NORMAL
MONOCYTES # BLD AUTO: 0.76 K/UL
MONOCYTES NFR BLD AUTO: 14 %
NEUTROPHILS # BLD AUTO: 2.95 K/UL
NEUTROPHILS NFR BLD AUTO: 54.2 %
NITRITE URINE: NEGATIVE
PH URINE: 6
PLATELET # BLD AUTO: 174 K/UL
POTASSIUM SERPL-SCNC: 4 MMOL/L
PROT SERPL-MCNC: 6.2 G/DL
PROT UR-MCNC: 546 MG/DL
PROTEIN URINE: ABNORMAL
PT BLD: 11.2 SEC
RBC # BLD: 3.33 M/UL
RBC # FLD: 13.6 %
RED BLOOD CELLS URINE: 10 /HPF
RHEUMATOID FACT SER QL: <10 IU/ML
SARS-COV-2 AB SERPL IA-ACNC: 0.4 U/ML
SCREEN DRVVT: 38 SEC
SILICA CLOTTING TIME INTERPRETATION: NORMAL
SILICA CLOTTING TIME S/C: 1.01 RATIO
SODIUM SERPL-SCNC: 140 MMOL/L
SPECIFIC GRAVITY URINE: 1.02
SQUAMOUS EPITHELIAL CELLS: 0 /HPF
UROBILINOGEN URINE: NORMAL
WBC # FLD AUTO: 5.44 K/UL
WHITE BLOOD CELLS URINE: 5 /HPF

## 2021-05-30 LAB
CARDIOLIPIN IGM SER-MCNC: 5 MPL
CARDIOLIPIN IGM SER-MCNC: <5 GPL
DEPRECATED CARDIOLIPIN IGA SER: 25.7 APL

## 2021-05-31 LAB
B2 GLYCOPROT1 IGA SERPL IA-ACNC: 47 SAU
B2 GLYCOPROT1 IGG SER-ACNC: <5 SGU
B2 GLYCOPROT1 IGM SER-ACNC: <5 SMU

## 2021-06-01 LAB
CRYOCRIT SER SPUN WESTERGREN: 0 MM
DSDNA AB SER-ACNC: 37 IU/ML

## 2021-06-02 LAB
MYELOPEROXIDASE AB SER QL IA: <5 UNITS
MYELOPEROXIDASE CELLS FLD QL: NEGATIVE
PROTEINASE3 AB SER IA-ACNC: <5 UNITS
PROTEINASE3 AB SER-ACNC: NEGATIVE

## 2021-06-07 ENCOUNTER — APPOINTMENT (OUTPATIENT)
Dept: NEPHROLOGY | Facility: CLINIC | Age: 66
End: 2021-06-07

## 2021-06-07 DIAGNOSIS — N39.0 URINARY TRACT INFECTION, SITE NOT SPECIFIED: ICD-10-CM

## 2021-06-07 DIAGNOSIS — A49.9 URINARY TRACT INFECTION, SITE NOT SPECIFIED: ICD-10-CM

## 2021-06-08 LAB
ALBUMIN SERPL ELPH-MCNC: 3.7 G/DL
ANION GAP SERPL CALC-SCNC: 14 MMOL/L
APPEARANCE: CLEAR
BACTERIA UR CULT: NORMAL
BACTERIA: NEGATIVE
BASOPHILS # BLD AUTO: 0.03 K/UL
BASOPHILS NFR BLD AUTO: 0.4 %
BILIRUBIN URINE: NEGATIVE
BLOOD URINE: ABNORMAL
BUN SERPL-MCNC: 17 MG/DL
CALCIUM SERPL-MCNC: 9.6 MG/DL
CHLORIDE SERPL-SCNC: 108 MMOL/L
CO2 SERPL-SCNC: 20 MMOL/L
COLOR: NORMAL
CREAT SERPL-MCNC: 2.07 MG/DL
EOSINOPHIL # BLD AUTO: 0.28 K/UL
EOSINOPHIL NFR BLD AUTO: 4.1 %
GLUCOSE QUALITATIVE U: ABNORMAL
GLUCOSE SERPL-MCNC: 175 MG/DL
HCT VFR BLD CALC: 32.7 %
HGB BLD-MCNC: 11 G/DL
HYALINE CASTS: 3 /LPF
IMM GRANULOCYTES NFR BLD AUTO: 0.7 %
KETONES URINE: NEGATIVE
LEUKOCYTE ESTERASE URINE: NEGATIVE
LYMPHOCYTES # BLD AUTO: 1.07 K/UL
LYMPHOCYTES NFR BLD AUTO: 15.6 %
MAN DIFF?: NORMAL
MCHC RBC-ENTMCNC: 32.3 PG
MCHC RBC-ENTMCNC: 33.6 GM/DL
MCV RBC AUTO: 95.9 FL
MICROSCOPIC-UA: NORMAL
MONOCYTES # BLD AUTO: 0.78 K/UL
MONOCYTES NFR BLD AUTO: 11.4 %
NEUTROPHILS # BLD AUTO: 4.65 K/UL
NEUTROPHILS NFR BLD AUTO: 67.8 %
NITRITE URINE: NEGATIVE
PH URINE: 6.5
PHOSPHATE SERPL-MCNC: 2.6 MG/DL
PLATELET # BLD AUTO: 196 K/UL
POTASSIUM SERPL-SCNC: 3.1 MMOL/L
PROTEIN URINE: ABNORMAL
RBC # BLD: 3.41 M/UL
RBC # FLD: 13.1 %
RED BLOOD CELLS URINE: 4 /HPF
SODIUM SERPL-SCNC: 141 MMOL/L
SPECIFIC GRAVITY URINE: 1.02
SQUAMOUS EPITHELIAL CELLS: 1 /HPF
UROBILINOGEN URINE: NORMAL
WBC # FLD AUTO: 6.86 K/UL
WHITE BLOOD CELLS URINE: 1 /HPF

## 2021-06-11 LAB
ANA PAT FLD IF-IMP: NORMAL
ANA SER IF-ACNC: ABNORMAL

## 2021-06-15 LAB
ANION GAP SERPL CALC-SCNC: 11 MMOL/L
BUN SERPL-MCNC: 16 MG/DL
CALCIUM SERPL-MCNC: 9 MG/DL
CHLORIDE SERPL-SCNC: 109 MMOL/L
CO2 SERPL-SCNC: 22 MMOL/L
CREAT SERPL-MCNC: 2.03 MG/DL
GLUCOSE SERPL-MCNC: 161 MG/DL
POTASSIUM SERPL-SCNC: 3.3 MMOL/L
SODIUM SERPL-SCNC: 142 MMOL/L

## 2021-07-15 NOTE — PHYSICAL EXAM
[General Appearance - Alert] : alert [General Appearance - In No Acute Distress] : in no acute distress [Sclera] : the sclera and conjunctiva were normal [Outer Ear] : the ears and nose were normal in appearance [Neck Appearance] : the appearance of the neck was normal [] : no respiratory distress [Apical Impulse] : the apical impulse was normal [Edema] : there was no peripheral edema [Bowel Sounds] : normal bowel sounds [Abdomen Soft] : soft [Cervical Lymph Nodes Enlarged Posterior Bilaterally] : posterior cervical [Cervical Lymph Nodes Enlarged Anterior Bilaterally] : anterior cervical [No CVA Tenderness] : no ~M costovertebral angle tenderness [Abnormal Walk] : normal gait [Skin Color & Pigmentation] : normal skin color and pigmentation [Oriented To Time, Place, And Person] : oriented to person, place, and time

## 2021-07-16 NOTE — HISTORY OF PRESENT ILLNESS
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 Patient has developed pain in right shoulder and undergoing rehab.

## 2021-07-16 NOTE — ASSESSMENT
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 Patient has developed pain in right shoulder and undergoing rehab. Patient has lost 3 lbs. BP is controlled. Blood sugar is controlled. Advised renal function tests. Urine analysis and A1C. \par Lab tests evaluated. Mild increase in serum creatinine to 2.14 mg/dl. BP is controlled. HbA1C is 7.0%. Advised better control of blood sugar.

## 2021-07-16 NOTE — REVIEW OF SYSTEMS
[Recent Weight Loss (___ Lbs)] : recent [unfilled] ~Ulb weight loss [Eyesight Problems] : eyesight problems [Lower Ext Edema] : lower extremity edema [Shortness Of Breath] : shortness of breath [SOB on Exertion] : shortness of breath during exertion [Heartburn] : heartburn [Nocturia] : nocturia [Arthralgias] : arthralgias [Dizziness] : dizziness [Anxiety] : anxiety [Muscle Weakness] : muscle weakness [Recent Weight Gain (___ Lbs)] : no recent weight gain [Earache] : no earache [Loss Of Hearing] : no hearing loss [Chest Pain] : no chest pain [Palpitations] : no palpitations [Constipation] : no constipation [Diarrhea] : no diarrhea [Itching] : no itching [Easy Bleeding] : no tendency for easy bleeding [Easy Bruising] : no tendency for easy bruising [FreeTextEntry9] : mild tenderness right gluteal region.

## 2021-08-25 NOTE — ED PROVIDER NOTE - RESPIRATORY NEGATIVE STATEMENT, MLM
Called to confirm pt is taking his plavix since we got multiple requests from the pharmacy. Pt states he has been taking them and got the refill.   no chest pain, no cough, and no shortness of breath.

## 2021-08-27 NOTE — ASSESSMENT
[FreeTextEntry1] : \par The patient is a 66 year old  male referred for screening for the ACV01 COVID -19 booster vaccine for autoimmune patients.\par \par # SLE, LN class V\par SLE Serologies ED 1:320, elevated dsDNA, -ve KRISTY, -ve Sjogren labs and -ve APLS serologies. \par SLE clinical manifestations: Arthritis and Nephritis. \par Renal bx was in 2017, showed membranous nephropathy. Maximum UPCR in the computer records is 16.4 on 8/12/19.  \par On today’s visit, aside from fatigue and mild ankle pain, no other signs or symptoms to suggest SLE flare on hx or exam.\par Currently on  mg BID. \par Last protein/creatinine ratio on 7/15/21was 6.1; range between 2/21 to 7/15/21 is 2.5 - 6.1.\par On Candesartan 16 mg daily. \par Following now with Dr. Malagon since 5/2021. \par \par # COVID-19 vaccine booster study. \par The patient presented for the screening visit today. The patient was given time to read the ICF. The purpose, procedures, potential risks and benefits of the study were thoroughly explained to the patient and discussed. All questions were answered prior to signing the ICF and the patient was given a copy of the signed ICF. No study-related procedures were done prior to signing. \par Screening labs were drawn, results are awaited.\par We will contact the patient with results and communicate with him regarding eligibility for the study. \par Potential targeted vaccine date will be 9/8/21.\par

## 2021-08-27 NOTE — HISTORY OF PRESENT ILLNESS
[FreeTextEntry1] : Reason for Visit:\par Isma Sol is 66 year old male being seen for ACV01 Screening. \par  \par HPI:\par The patient is a 66 year old  male referred for screening for the ACV01 COVID -19 booster vaccine for autoimmune patients. He has a history of:\par \par SLE and Lupus Nephritis: Diagnosed in 2014, had arthritis and proteinuria and positive serologies, was under the care of Dr. Swapnil Castro who started MMF. Persistent proteinuria prompted renal biopsy in 2017 that showed class V LN, CellCept was continued and Cyclosporine was added briefly but was stopped due to elevated CK. Therapy was switched to PO Cyclophosphamide as serum creatinine increased and urine protein/creatine ratio was as high as 13. Serum creatinine remained elevated (above 2) but proteinuria reduced. In 5/2021 was switched again to MMF (1500 mg daily), since conversion, his protein/creatinine ratio has remained within ranges of 4-6, serum creatinine has been stable around 2. In late May his care was switched to Dr. Shivam Malagon who is continuing the MMF. Serologies: ED 1:320, elevated dsDNA, -ve KRISTY, -ve Sjogren labs and -ve APLS serologies. \par \par Osteoarthritis: Mostly in DIP joints, symptoms well controlled, does not take any analgesics.  \par \par HTN and HLD: Diagnosed on 2014, currently on Coreg, Candesartan.\par \par DM: Diagnosed in 2014, currently on Jardiance and Glimeperide. \par \par Diabetic Neuropathy: Diagnosed on 2018 via EMG, on nightly Gabapentin. \par \par TIA: Episode on 2020, no residual neurological symptoms, taking Plavix, Fenofibrate and Coreg.\par \par BPH: Well controlled symptoms, sees Urology, on Flomax. \par \par Obstructive Sleep Apnea: On CPAP at night. \par \par Adhesive capsulitis of right shoulder: Getting PT, symptoms improving. \par \par Class 1 obesity: Since the last 10 years, currently trying to lose weight. \par \par COVID-19: no infections, s/p Pfizer vaccine 12/17/20, 1/7/21 (33 weeks from Screening Visit)\par \par Current Hx:\par Says he feels well overall. Has fatigue and mild joint pains in ankles which are chronic. No joint swellings or morning stiffness. No chest pain, shortness of breath, cough, abdominal pain, nausea, diarrhea, vomiting, oral ulcers, fevers, chills, rashes, swollen glands, easy bruising in the last 14 days. \par \par Past Medical Hx:\par SLE, LN\par BPH\par DM and diabetic neuropathy\par HTN, HLD\par HUNG\par OA\par CKD\par Past Surgical Hx:\par Renal biopsy in 2017\par \par Allergies: \par None\par \par Medications: \par  mg PO BID\par Jardiance 10 mg PO QD\par Glimeperide 1 mg PO QD\par Fenofibrate 200 mg PO QD\par Coreg 25 mg PO BID\par Candesartan 16 mg PO QD\par Plavix 75 mg PO QD\par Flomax 0.4 mg PO QD\par Neurontin 300 mg PO QD\par Famotidine 20 mg PO QD\par Sodium Bicarbonate 650 mg PO TID\par Vascepa 2 gms PO BID\par \par \par Social Hx: \par Non smoker, occasional alcohol use, no use illicit drugs.\par \par \par Exam: \par Vitals (will be in all scripts vitals tab)\par General: Awake, alert and comfortable, able to answer all questions. \par CVS: RRR, no murmurs, rubs or gallops.\par Respiratory: Clear to auscultation bilaterally.\par Abdomen: Soft, non tender. \par MSK: No signs of synovitis or deformity in any joint. Right shoulder ROM limited (active and passive) in all planes. B/l ankle tenderness but no synovitis, warmth or limited ROM. \par Skin: No rashes. \par

## 2021-09-16 NOTE — ASSESSMENT
[FreeTextEntry1] : \par The patient is a 66 year old  male referred initially for screening for the ACV01 COVID -19 booster vaccine for autoimmune patients but is ineligible due to Hep B serology. He would like to participate in the observational study of response to primary COVID vaccine or booster. He has signed the ICF (see below). Plan:\par \par - unfortunately no- one was available in the lab today to process the research bloods so they were not drawn\par - patient advised to schedule his booster shot and then to stop his Cellecept for 3 days prior to vaccination and re-start 10 days later\par - advised to schedule a follow up with Dr. Malagon approx 3 weeks after the booster (he is due for a visit end of September according to the patient). He should ask Dr. Malagon to check his anti-Clement titer at that time\par - if he still would like to participate in the observational study he may call us to schedule a time for the blood draw prior to the booster\par \par \par Consent Note: [facility] Consent\par The patient was given time to read the ICF. The purpose, procedures, potential risks and benefits of the study were explained to the patient and discussed. In particular, that the "Bank" ICF allows specimen collection for multiple studies but he will specifically be providing samples for the observational vaccine study. All questions were answered prior to signing the ICF and the patient was given a copy of the signed ICF. No study-related procedures were done prior to signing. \par \par RITESH Sb\par [phone]

## 2021-09-16 NOTE — HISTORY OF PRESENT ILLNESS
[FreeTextEntry1] : Reason for Visit:\par Isma Sol is 66 year old male seen for ACV01 Screening 8/27/21 and here today to discuss consent for the ACV02 observational study of response to COVID vaccination in autoimmune disease.. \par  \par HPI:\par The patient is a 66 year old  male referred for screening for the ACV01 COVID -19 booster vaccine for autoimmune patients. He has a history of:\par \par SLE and Lupus Nephritis: Diagnosed in 2014, had arthritis and proteinuria and positive serologies, was under the care of Dr. Swapnil Castro who started MMF. Persistent proteinuria prompted renal biopsy in 2017 that showed class V LN, CellCept was continued and Cyclosporine was added briefly but was stopped due to elevated CK. Therapy was switched to PO Cyclophosphamide as serum creatinine increased and urine protein/creatine ratio was as high as 13. Serum creatinine remained elevated (above 2) but proteinuria reduced. In 5/2021 was switched again to MMF (1500 mg daily), since conversion, his protein/creatinine ratio has remained within ranges of 4-6, serum creatinine has been stable around 2. In late May his care was switched to Dr. Shivam Malagon who is continuing the MMF. Serologies: ED 1:320, elevated dsDNA, -ve KRISTY, -ve Sjogren labs and -ve APLS serologies. \par \par Osteoarthritis: Mostly in DIP joints, symptoms well controlled, does not take any analgesics.  \par \par HTN and HLD: Diagnosed on 2014, currently on Coreg, Candesartan.\par \par DM: Diagnosed in 2014, currently on Jardiance and Glimeperide. \par \par Diabetic Neuropathy: Diagnosed on 2018 via EMG, on nightly Gabapentin. \par \par TIA: Episode on 2020, no residual neurological symptoms, taking Plavix, Fenofibrate and Coreg.\par \par BPH: Well controlled symptoms, sees Urology, on Flomax. \par \par Obstructive Sleep Apnea: On CPAP at night. \par \par Adhesive capsulitis of right shoulder: Getting PT, symptoms improving. \par \par Class 1 obesity: Since the last 10 years, currently trying to lose weight. \par \par COVID-19: no infections, s/p Pfizer vaccine 12/17/20, 1/7/21 (33 weeks from Screening Visit)\par \par Current Hx:\par Seen on 8/27/21 for ACV01 screening but is not able to continue in the trial due to a positive test for Hep B Core ab. He has a history of Hep B IgGtotal+ and Hep B IgM negative, consistent with past history of Hep B but this was not apparent at screening until labs were obtained. He plans to get the booster shot anyway and would like to be part of the observational study. Says he continues to feel well overall with fatigue and mild joint pains in ankles which are chronic. No changes from 8/27/21. No chest pain, shortness of breath, cough, abdominal pain, nausea, diarrhea, vomiting, oral ulcers, fevers, chills, rashes, swollen glands, easy bruising. \par \par Past Medical Hx:\par SLE, LN\par BPH\par DM and diabetic neuropathy\par HTN, HLD\par HUNG\par OA\par CKD\par Past Surgical Hx:\par Renal biopsy in 2017\par \par Allergies: \par None\par \par Medications: \par  mg PO BID\par Jardiance 10 mg PO QD\par Glimeperide 1 mg PO QD\par Fenofibrate 200 mg PO QD\par Coreg 25 mg PO BID\par Candesartan 16 mg PO QD\par Plavix 75 mg PO QD\par Flomax 0.4 mg PO QD\par Neurontin 300 mg PO QD\par Famotidine 20 mg PO QD\par Sodium Bicarbonate 650 mg PO TID\par Vascepa 2 gms PO BID\par \par \par Social Hx: \par Non smoker, occasional alcohol use, no use illicit drugs.\par \par \par Exam: \par General: Awake, alert and comfortable, able to answer all questions. \par CVS: RRR, no murmurs, rubs or gallops.\par Respiratory: Clear to auscultation bilaterally.\par Abdomen: Soft, non tender. \par MSK: No signs of synovitis or deformity in any joint. Right shoulder ROM limited (active and passive) in all planes. B/l ankle tenderness but no synovitis, warmth or limited ROM. \par Skin: No rashes. \par

## 2021-10-25 PROBLEM — N04.9 NEPHROTIC SYNDROME: Status: ACTIVE | Noted: 2017-11-20

## 2021-11-02 NOTE — ASSESSMENT
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 He has completed the course. Patient has developed pain in right shoulder and undergoing rehab. Patient has come for follow-up. Recent blood tests showed mild increase in serum creatinine. He has come for follow-up. Recent blood tests also showed a decrease in serum bicarbonate. Advised to increase the dose of sodium bicarbonate one tab PO TID. Advised COVID 19 spike antibody. \par Lab tests discussed with the patient. Renal function stable. A1C 7.1%. COVID antibody negative. Advised to consult ID for the fourth dose of different vaccine.

## 2021-11-02 NOTE — HISTORY OF PRESENT ILLNESS
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 He has completed the course. Patient has developed pain in right shoulder and undergoing rehab. Patient has come for follow-up. Recent blood tests showed mild increase in serum creatinine. He has come for follow-up.

## 2021-12-18 PROBLEM — E66.01 MORBID OBESITY: Status: ACTIVE | Noted: 2017-11-20

## 2021-12-18 PROBLEM — Z00.00 ENCOUNTER FOR PREVENTIVE HEALTH EXAMINATION: Status: ACTIVE | Noted: 2017-10-10

## 2021-12-18 PROBLEM — Z23 ENCOUNTER FOR IMMUNIZATION: Status: ACTIVE | Noted: 2021-01-01

## 2021-12-18 PROBLEM — N18.30 CHRONIC KIDNEY DISEASE (CKD), STAGE III (MODERATE): Status: ACTIVE | Noted: 2019-05-20

## 2021-12-18 PROBLEM — R59.1 LYMPHADENOPATHY: Status: ACTIVE | Noted: 2021-01-01

## 2021-12-18 NOTE — ASSESSMENT
[FreeTextEntry1] : Annual physical routine preventative care and immunizations discussed with the patient healthy lifestyle diet and exercises screening colonoscopy PSA.  EKG discussed with the patient blood work checked.  Chronic kidney disease stage III check labs follow-up nephrologist\par Hyperkalemia check labs low potassium diet.\par Hyperlipidemia continue current regiment check lipids.\par Hypertension well-controlled continue current regimen.\par Lupus nephritis follow-up rheumatology continue current medications.\par Lupus follow-up rheumatology.\par Morbid obesity weight loss discussed with patient.\par Diabetes hemoglobin A1c 6.4 well-controlled continue current regimen annual ophthalmology podiatry.  Low-carb diet and weight loss discussed with patient\par Throat pain rapid throat culture inconclusive check throat culture.  Rule out Covid.  For now gargle with salt and water monitor for symptoms advised to reach out to GI to discuss symptoms possibly related to recent endoscopy.  Patient verbalized understanding.\par Follow-up pending labs/3 months

## 2021-12-18 NOTE — HISTORY OF PRESENT ILLNESS
[de-identified] : Patient with complex medical history presents for annual physical.\par Patient history of lupus nephritis/CKD  patient is on suppressive therapy.  Sees rheumatologist and nephrologist regularly consults reviewed.\par Hypertension well controlled.\par Diabetes well controlled denies any hypoglycemia.\par Patient complaining of throat pain and jaw pain on both sides for 1 day radiation into ears.  Status post endoscopy yesterday.  Denies cough denies nasal congestion denies chest pain shortness of breath denies fever

## 2021-12-18 NOTE — HEALTH RISK ASSESSMENT
[Yes] : Yes [1 or 2 (0 pts)] : 1 or 2 (0 points) [Never (0 pts)] : Never (0 points) [0] : 2) Feeling down, depressed, or hopeless: Not at all (0) [PHQ-2 Negative - No further assessment needed] : PHQ-2 Negative - No further assessment needed [Audit-CScore] : 0 [EFD7Condd] : 0

## 2022-01-01 ENCOUNTER — LABORATORY RESULT (OUTPATIENT)
Age: 67
End: 2022-01-01

## 2022-01-01 ENCOUNTER — APPOINTMENT (OUTPATIENT)
Dept: NEPHROLOGY | Facility: CLINIC | Age: 67
End: 2022-01-01
Payer: COMMERCIAL

## 2022-01-01 ENCOUNTER — APPOINTMENT (OUTPATIENT)
Dept: NEPHROLOGY | Facility: CLINIC | Age: 67
End: 2022-01-01

## 2022-01-01 ENCOUNTER — APPOINTMENT (OUTPATIENT)
Dept: INTERNAL MEDICINE | Facility: CLINIC | Age: 67
End: 2022-01-01
Payer: COMMERCIAL

## 2022-01-01 ENCOUNTER — NON-APPOINTMENT (OUTPATIENT)
Age: 67
End: 2022-01-01

## 2022-01-01 ENCOUNTER — RESULT REVIEW (OUTPATIENT)
Age: 67
End: 2022-01-01

## 2022-01-01 ENCOUNTER — INPATIENT (INPATIENT)
Facility: HOSPITAL | Age: 67
LOS: 22 days | DRG: 853 | End: 2022-06-22
Attending: HOSPITALIST | Admitting: STUDENT IN AN ORGANIZED HEALTH CARE EDUCATION/TRAINING PROGRAM
Payer: COMMERCIAL

## 2022-01-01 VITALS — OXYGEN SATURATION: 81 %

## 2022-01-01 VITALS
HEART RATE: 116 BPM | RESPIRATION RATE: 33 BRPM | HEIGHT: 64 IN | DIASTOLIC BLOOD PRESSURE: 80 MMHG | SYSTOLIC BLOOD PRESSURE: 140 MMHG | WEIGHT: 199.74 LBS | OXYGEN SATURATION: 100 % | TEMPERATURE: 101 F

## 2022-01-01 VITALS
BODY MASS INDEX: 31.83 KG/M2 | OXYGEN SATURATION: 98 % | HEIGHT: 62 IN | DIASTOLIC BLOOD PRESSURE: 66 MMHG | TEMPERATURE: 96.9 F | RESPIRATION RATE: 17 BRPM | HEART RATE: 115 BPM | SYSTOLIC BLOOD PRESSURE: 122 MMHG | WEIGHT: 173 LBS

## 2022-01-01 VITALS
OXYGEN SATURATION: 96 % | WEIGHT: 186 LBS | BODY MASS INDEX: 34.23 KG/M2 | HEART RATE: 81 BPM | SYSTOLIC BLOOD PRESSURE: 133 MMHG | TEMPERATURE: 97.2 F | HEIGHT: 62 IN | DIASTOLIC BLOOD PRESSURE: 77 MMHG

## 2022-01-01 DIAGNOSIS — A04.72 ENTEROCOLITIS DUE TO CLOSTRIDIUM DIFFICILE, NOT SPECIFIED AS RECURRENT: ICD-10-CM

## 2022-01-01 DIAGNOSIS — E87.5 HYPERKALEMIA: ICD-10-CM

## 2022-01-01 DIAGNOSIS — M32.14 GLOMERULAR DISEASE IN SYSTEMIC LUPUS ERYTHEMATOSUS: ICD-10-CM

## 2022-01-01 DIAGNOSIS — E11.9 TYPE 2 DIABETES MELLITUS W/OUT COMPLICATIONS: ICD-10-CM

## 2022-01-01 DIAGNOSIS — D76.1 HEMOPHAGOCYTIC LYMPHOHISTIOCYTOSIS: ICD-10-CM

## 2022-01-01 DIAGNOSIS — R50.9 FEVER, UNSPECIFIED: ICD-10-CM

## 2022-01-01 DIAGNOSIS — M32.9 SYSTEMIC LUPUS ERYTHEMATOSUS, UNSPECIFIED: ICD-10-CM

## 2022-01-01 DIAGNOSIS — B99.9 UNSPECIFIED INFECTIOUS DISEASE: ICD-10-CM

## 2022-01-01 DIAGNOSIS — I10 ESSENTIAL (PRIMARY) HYPERTENSION: ICD-10-CM

## 2022-01-01 DIAGNOSIS — H05.20 UNSPECIFIED EXOPHTHALMOS: ICD-10-CM

## 2022-01-01 DIAGNOSIS — E87.2 ACIDOSIS: ICD-10-CM

## 2022-01-01 DIAGNOSIS — Z29.9 ENCOUNTER FOR PROPHYLACTIC MEASURES, UNSPECIFIED: ICD-10-CM

## 2022-01-01 DIAGNOSIS — R19.7 DIARRHEA, UNSPECIFIED: ICD-10-CM

## 2022-01-01 DIAGNOSIS — D47.9 NEOPLASM OF UNCERTAIN BEHAVIOR OF LYMPHOID, HEMATOPOIETIC AND RELATED TISSUE, UNSPECIFIED: ICD-10-CM

## 2022-01-01 DIAGNOSIS — K85.90 ACUTE PANCREATITIS WITHOUT NECROSIS OR INFECTION, UNSPECIFIED: ICD-10-CM

## 2022-01-01 DIAGNOSIS — I48.0 PAROXYSMAL ATRIAL FIBRILLATION: ICD-10-CM

## 2022-01-01 DIAGNOSIS — E87.70 FLUID OVERLOAD, UNSPECIFIED: ICD-10-CM

## 2022-01-01 DIAGNOSIS — D63.1 CHRONIC KIDNEY DISEASE, UNSPECIFIED: ICD-10-CM

## 2022-01-01 DIAGNOSIS — N17.9 ACUTE KIDNEY FAILURE, UNSPECIFIED: ICD-10-CM

## 2022-01-01 DIAGNOSIS — N18.4 CHRONIC KIDNEY DISEASE, STAGE 4 (SEVERE): ICD-10-CM

## 2022-01-01 DIAGNOSIS — I82.409 ACUTE EMBOLISM AND THROMBOSIS OF UNSPECIFIED DEEP VEINS OF UNSPECIFIED LOWER EXTREMITY: ICD-10-CM

## 2022-01-01 DIAGNOSIS — K74.60 UNSPECIFIED CIRRHOSIS OF LIVER: ICD-10-CM

## 2022-01-01 DIAGNOSIS — R06.1 STRIDOR: ICD-10-CM

## 2022-01-01 DIAGNOSIS — E78.5 HYPERLIPIDEMIA, UNSPECIFIED: ICD-10-CM

## 2022-01-01 DIAGNOSIS — R16.0 HEPATOMEGALY, NOT ELSEWHERE CLASSIFIED: ICD-10-CM

## 2022-01-01 DIAGNOSIS — I26.99 OTHER PULMONARY EMBOLISM WITHOUT ACUTE COR PULMONALE: ICD-10-CM

## 2022-01-01 DIAGNOSIS — R18.8 UNSPECIFIED CIRRHOSIS OF LIVER: ICD-10-CM

## 2022-01-01 DIAGNOSIS — N18.9 CHRONIC KIDNEY DISEASE, UNSPECIFIED: ICD-10-CM

## 2022-01-01 DIAGNOSIS — D63.8 ANEMIA IN OTHER CHRONIC DISEASES CLASSIFIED ELSEWHERE: ICD-10-CM

## 2022-01-01 LAB
% ALBUMIN: 39 % — SIGNIFICANT CHANGE UP
% ALPHA 1: 8.2 % — SIGNIFICANT CHANGE UP
% ALPHA 2: 10.3 % — SIGNIFICANT CHANGE UP
% BETA: 11.8 % — SIGNIFICANT CHANGE UP
% GAMMA: 30.7 % — SIGNIFICANT CHANGE UP
% M SPIKE: SIGNIFICANT CHANGE UP
25(OH)D3 SERPL-MCNC: 5.3 NG/ML
A1C WITH ESTIMATED AVERAGE GLUCOSE RESULT: 5 % — SIGNIFICANT CHANGE UP (ref 4–5.6)
A1C WITH ESTIMATED AVERAGE GLUCOSE RESULT: 5.2 % — SIGNIFICANT CHANGE UP (ref 4–5.6)
ADENOSINE DEAMINASE, PERIT FLUID RESULT: 11 U/L — SIGNIFICANT CHANGE UP (ref 0–40)
ALBUMIN FLD-MCNC: 0.5 G/DL — SIGNIFICANT CHANGE UP
ALBUMIN SERPL ELPH-MCNC: 1.8 G/DL — LOW (ref 3.3–5)
ALBUMIN SERPL ELPH-MCNC: 1.9 G/DL — LOW (ref 3.3–5)
ALBUMIN SERPL ELPH-MCNC: 1.9 G/DL — LOW (ref 3.3–5)
ALBUMIN SERPL ELPH-MCNC: 2 G/DL — LOW (ref 3.3–5)
ALBUMIN SERPL ELPH-MCNC: 2 G/DL — LOW (ref 3.3–5)
ALBUMIN SERPL ELPH-MCNC: 2.1 G/DL — LOW (ref 3.3–5)
ALBUMIN SERPL ELPH-MCNC: 2.3 G/DL — LOW (ref 3.3–5)
ALBUMIN SERPL ELPH-MCNC: 2.4 G/DL — LOW (ref 3.3–5)
ALBUMIN SERPL ELPH-MCNC: 2.4 G/DL — LOW (ref 3.6–5.5)
ALBUMIN SERPL ELPH-MCNC: 2.5 G/DL — LOW (ref 3.3–5)
ALBUMIN SERPL ELPH-MCNC: 2.6 G/DL — LOW (ref 3.3–5)
ALBUMIN SERPL ELPH-MCNC: 2.7 G/DL — LOW (ref 3.3–5)
ALBUMIN SERPL ELPH-MCNC: 2.8 G/DL
ALBUMIN SERPL ELPH-MCNC: 2.8 G/DL — LOW (ref 3.3–5)
ALBUMIN SERPL ELPH-MCNC: 2.9 G/DL — LOW (ref 3.3–5)
ALBUMIN SERPL ELPH-MCNC: 3 G/DL — LOW (ref 3.3–5)
ALBUMIN SERPL ELPH-MCNC: 3 G/DL — LOW (ref 3.3–5)
ALBUMIN SERPL ELPH-MCNC: 3.1 G/DL — LOW (ref 3.3–5)
ALBUMIN SERPL ELPH-MCNC: 3.2 G/DL — LOW (ref 3.3–5)
ALBUMIN SERPL ELPH-MCNC: 3.3 G/DL — SIGNIFICANT CHANGE UP (ref 3.3–5)
ALBUMIN SERPL ELPH-MCNC: 3.3 G/DL — SIGNIFICANT CHANGE UP (ref 3.3–5)
ALBUMIN SERPL ELPH-MCNC: 3.6 G/DL — SIGNIFICANT CHANGE UP (ref 3.3–5)
ALBUMIN SERPL ELPH-MCNC: 4.4 G/DL
ALBUMIN SERPL ELPH-MCNC: 4.5 G/DL
ALBUMIN SERPL ELPH-MCNC: 4.6 G/DL
ALBUMIN/GLOB SERPL ELPH: 0.6 RATIO — SIGNIFICANT CHANGE UP
ALP BLD-CCNC: 360 U/L
ALP SERPL-CCNC: 175 U/L — HIGH (ref 40–120)
ALP SERPL-CCNC: 182 U/L — HIGH (ref 40–120)
ALP SERPL-CCNC: 182 U/L — HIGH (ref 40–120)
ALP SERPL-CCNC: 190 U/L — HIGH (ref 40–120)
ALP SERPL-CCNC: 192 U/L — HIGH (ref 40–120)
ALP SERPL-CCNC: 202 U/L — HIGH (ref 40–120)
ALP SERPL-CCNC: 218 U/L — HIGH (ref 40–120)
ALP SERPL-CCNC: 223 U/L — HIGH (ref 40–120)
ALP SERPL-CCNC: 232 U/L — HIGH (ref 40–120)
ALP SERPL-CCNC: 244 U/L — HIGH (ref 40–120)
ALP SERPL-CCNC: 246 U/L — HIGH (ref 40–120)
ALP SERPL-CCNC: 249 U/L — HIGH (ref 40–120)
ALP SERPL-CCNC: 250 U/L — HIGH (ref 40–120)
ALP SERPL-CCNC: 251 U/L — HIGH (ref 40–120)
ALP SERPL-CCNC: 253 U/L — HIGH (ref 40–120)
ALP SERPL-CCNC: 258 U/L — HIGH (ref 40–120)
ALP SERPL-CCNC: 261 U/L — HIGH (ref 40–120)
ALP SERPL-CCNC: 262 U/L — HIGH (ref 40–120)
ALP SERPL-CCNC: 263 U/L — HIGH (ref 40–120)
ALP SERPL-CCNC: 288 U/L — HIGH (ref 40–120)
ALP SERPL-CCNC: 308 U/L — HIGH (ref 40–120)
ALP SERPL-CCNC: 314 U/L — HIGH (ref 40–120)
ALP SERPL-CCNC: 327 U/L — HIGH (ref 40–120)
ALP SERPL-CCNC: 340 U/L — HIGH (ref 40–120)
ALP SERPL-CCNC: 388 U/L — HIGH (ref 40–120)
ALP SERPL-CCNC: 395 U/L — HIGH (ref 40–120)
ALP SERPL-CCNC: 401 U/L — HIGH (ref 40–120)
ALP SERPL-CCNC: 412 U/L — HIGH (ref 40–120)
ALP SERPL-CCNC: 418 U/L — HIGH (ref 40–120)
ALP SERPL-CCNC: 425 U/L — HIGH (ref 40–120)
ALP SERPL-CCNC: 431 U/L — HIGH (ref 40–120)
ALP SERPL-CCNC: 455 U/L — HIGH (ref 40–120)
ALP SERPL-CCNC: 469 U/L — HIGH (ref 40–120)
ALP SERPL-CCNC: 473 U/L — HIGH (ref 40–120)
ALP SERPL-CCNC: 483 U/L — HIGH (ref 40–120)
ALP SERPL-CCNC: 485 U/L — HIGH (ref 40–120)
ALP SERPL-CCNC: 490 U/L — HIGH (ref 40–120)
ALP SERPL-CCNC: 498 U/L — HIGH (ref 40–120)
ALP SERPL-CCNC: 522 U/L — HIGH (ref 40–120)
ALP SERPL-CCNC: 525 U/L — HIGH (ref 40–120)
ALP SERPL-CCNC: 528 U/L — HIGH (ref 40–120)
ALPHA1 GLOB SERPL ELPH-MCNC: 0.5 G/DL — HIGH (ref 0.1–0.4)
ALPHA2 GLOB SERPL ELPH-MCNC: 0.6 G/DL — SIGNIFICANT CHANGE UP (ref 0.5–1)
ALT FLD-CCNC: 10 U/L — SIGNIFICANT CHANGE UP (ref 10–45)
ALT FLD-CCNC: 108 U/L — HIGH (ref 10–45)
ALT FLD-CCNC: 12 U/L — SIGNIFICANT CHANGE UP (ref 10–45)
ALT FLD-CCNC: 13 U/L — SIGNIFICANT CHANGE UP (ref 10–45)
ALT FLD-CCNC: 13 U/L — SIGNIFICANT CHANGE UP (ref 10–45)
ALT FLD-CCNC: 14 U/L — SIGNIFICANT CHANGE UP (ref 10–45)
ALT FLD-CCNC: 14 U/L — SIGNIFICANT CHANGE UP (ref 10–45)
ALT FLD-CCNC: 15 U/L — SIGNIFICANT CHANGE UP (ref 10–45)
ALT FLD-CCNC: 18 U/L — SIGNIFICANT CHANGE UP (ref 10–45)
ALT FLD-CCNC: 20 U/L — SIGNIFICANT CHANGE UP (ref 10–45)
ALT FLD-CCNC: 218 U/L — HIGH (ref 10–45)
ALT FLD-CCNC: 22 U/L — SIGNIFICANT CHANGE UP (ref 10–45)
ALT FLD-CCNC: 28 U/L — SIGNIFICANT CHANGE UP (ref 10–45)
ALT FLD-CCNC: 6 U/L — LOW (ref 10–45)
ALT FLD-CCNC: 7 U/L — LOW (ref 10–45)
ALT FLD-CCNC: 86 U/L — HIGH (ref 10–45)
ALT FLD-CCNC: 89 U/L — HIGH (ref 10–45)
ALT FLD-CCNC: 9 U/L — LOW (ref 10–45)
ALT SERPL-CCNC: 31 U/L
AMMONIA BLD-MCNC: 31 UMOL/L — SIGNIFICANT CHANGE UP (ref 11–55)
AMMONIA BLD-MCNC: 33 UMOL/L — SIGNIFICANT CHANGE UP (ref 11–55)
AMMONIA BLD-MCNC: 34 UMOL/L — SIGNIFICANT CHANGE UP (ref 11–55)
AMMONIA BLD-MCNC: 36 UMOL/L — SIGNIFICANT CHANGE UP (ref 11–55)
AMMONIA BLD-MCNC: 36 UMOL/L — SIGNIFICANT CHANGE UP (ref 11–55)
AMMONIA BLD-MCNC: 41 UMOL/L — SIGNIFICANT CHANGE UP (ref 11–55)
AMMONIA BLD-MCNC: 43 UMOL/L — SIGNIFICANT CHANGE UP (ref 11–55)
AMMONIA BLD-MCNC: 43 UMOL/L — SIGNIFICANT CHANGE UP (ref 11–55)
AMMONIA BLD-MCNC: 51 UMOL/L — SIGNIFICANT CHANGE UP (ref 11–55)
AMYLASE/CREAT SERPL: 774 U/L
ANA PAT FLD IF-IMP: ABNORMAL
ANA PAT FLD IF-IMP: NORMAL
ANA SER IF-ACNC: ABNORMAL
ANA TITR SER: ABNORMAL
ANACR T: ABNORMAL
ANION GAP SERPL CALC-SCNC: 11 MMOL/L
ANION GAP SERPL CALC-SCNC: 11 MMOL/L — SIGNIFICANT CHANGE UP (ref 5–17)
ANION GAP SERPL CALC-SCNC: 12 MMOL/L — SIGNIFICANT CHANGE UP (ref 5–17)
ANION GAP SERPL CALC-SCNC: 13 MMOL/L
ANION GAP SERPL CALC-SCNC: 13 MMOL/L — SIGNIFICANT CHANGE UP (ref 5–17)
ANION GAP SERPL CALC-SCNC: 14 MMOL/L
ANION GAP SERPL CALC-SCNC: 14 MMOL/L — SIGNIFICANT CHANGE UP (ref 5–17)
ANION GAP SERPL CALC-SCNC: 15 MMOL/L
ANION GAP SERPL CALC-SCNC: 15 MMOL/L — SIGNIFICANT CHANGE UP (ref 5–17)
ANION GAP SERPL CALC-SCNC: 16 MMOL/L — SIGNIFICANT CHANGE UP (ref 5–17)
ANION GAP SERPL CALC-SCNC: 17 MMOL/L — SIGNIFICANT CHANGE UP (ref 5–17)
ANION GAP SERPL CALC-SCNC: 19 MMOL/L — HIGH (ref 5–17)
ANION GAP SERPL CALC-SCNC: 19 MMOL/L — HIGH (ref 5–17)
ANION GAP SERPL CALC-SCNC: 24 MMOL/L — HIGH (ref 5–17)
ANION GAP SERPL CALC-SCNC: 25 MMOL/L — HIGH (ref 5–17)
ANION GAP SERPL CALC-SCNC: 29 MMOL/L — HIGH (ref 5–17)
ANION GAP SERPL CALC-SCNC: 36 MMOL/L — HIGH (ref 5–17)
ANISOCYTOSIS BLD QL: SIGNIFICANT CHANGE UP
ANISOCYTOSIS BLD QL: SLIGHT — SIGNIFICANT CHANGE UP
ANTI-RIBONUCLEAR PROTEIN: <0.2 AI — SIGNIFICANT CHANGE UP
APPEARANCE UR: ABNORMAL
APPEARANCE UR: CLEAR — SIGNIFICANT CHANGE UP
APPEARANCE: ABNORMAL
APTT BLD: 113.7 SEC — HIGH (ref 27.5–35.5)
APTT BLD: 122 SEC — CRITICAL HIGH (ref 27.5–35.5)
APTT BLD: 131.3 SEC — CRITICAL HIGH (ref 27.5–35.5)
APTT BLD: 149.8 SEC — CRITICAL HIGH (ref 27.5–35.5)
APTT BLD: 154.7 SEC — CRITICAL HIGH (ref 27.5–35.5)
APTT BLD: 169.1 SEC — CRITICAL HIGH (ref 27.5–35.5)
APTT BLD: 185.8 SEC — CRITICAL HIGH (ref 27.5–35.5)
APTT BLD: 30.4 SEC — SIGNIFICANT CHANGE UP (ref 27.5–35.5)
APTT BLD: 30.5 SEC — SIGNIFICANT CHANGE UP (ref 27.5–35.5)
APTT BLD: 30.6 SEC — SIGNIFICANT CHANGE UP (ref 27.5–35.5)
APTT BLD: 32.6 SEC — SIGNIFICANT CHANGE UP (ref 27.5–35.5)
APTT BLD: 32.8 SEC — SIGNIFICANT CHANGE UP (ref 27.5–35.5)
APTT BLD: 34.7 SEC — SIGNIFICANT CHANGE UP (ref 27.5–35.5)
APTT BLD: 35 SEC — SIGNIFICANT CHANGE UP (ref 27.5–35.5)
APTT BLD: 39.7 SEC — HIGH (ref 27.5–35.5)
APTT BLD: 49.4 SEC — HIGH (ref 27.5–35.5)
APTT BLD: 50 SEC — HIGH (ref 27.5–35.5)
APTT BLD: 56.4 SEC — HIGH (ref 27.5–35.5)
APTT BLD: 57.3 SEC — HIGH (ref 27.5–35.5)
APTT BLD: 65.6 SEC — HIGH (ref 27.5–35.5)
APTT BLD: 66.4 SEC — HIGH (ref 27.5–35.5)
APTT BLD: 67.8 SEC — HIGH (ref 27.5–35.5)
APTT BLD: 69.5 SEC — HIGH (ref 27.5–35.5)
APTT BLD: 71 SEC — HIGH (ref 27.5–35.5)
APTT BLD: 72 SEC — HIGH (ref 27.5–35.5)
APTT BLD: 72.4 SEC — HIGH (ref 27.5–35.5)
APTT BLD: 75.1 SEC — HIGH (ref 27.5–35.5)
APTT BLD: 76 SEC — HIGH (ref 27.5–35.5)
APTT BLD: 80.4 SEC — HIGH (ref 27.5–35.5)
APTT BLD: 86.4 SEC — HIGH (ref 27.5–35.5)
APTT BLD: 89.2 SEC — HIGH (ref 27.5–35.5)
APTT BLD: 91.7 SEC — HIGH (ref 27.5–35.5)
APTT BLD: 93.3 SEC — HIGH (ref 27.5–35.5)
APTT BLD: 93.9 SEC — HIGH (ref 27.5–35.5)
APTT BLD: 96.3 SEC — HIGH (ref 27.5–35.5)
APTT BLD: >200 SEC — CRITICAL HIGH (ref 27.5–35.5)
APTT BLD: >200 SEC — CRITICAL HIGH (ref 27.5–35.5)
ASO AB SER QL: <20 IU/ML — SIGNIFICANT CHANGE UP (ref 0–199)
AST SERPL-CCNC: 134 U/L — HIGH (ref 10–40)
AST SERPL-CCNC: 145 U/L — HIGH (ref 10–40)
AST SERPL-CCNC: 21 U/L — SIGNIFICANT CHANGE UP (ref 10–40)
AST SERPL-CCNC: 21 U/L — SIGNIFICANT CHANGE UP (ref 10–40)
AST SERPL-CCNC: 22 U/L — SIGNIFICANT CHANGE UP (ref 10–40)
AST SERPL-CCNC: 229 U/L — HIGH (ref 10–40)
AST SERPL-CCNC: 23 U/L — SIGNIFICANT CHANGE UP (ref 10–40)
AST SERPL-CCNC: 23 U/L — SIGNIFICANT CHANGE UP (ref 10–40)
AST SERPL-CCNC: 24 U/L — SIGNIFICANT CHANGE UP (ref 10–40)
AST SERPL-CCNC: 25 U/L — SIGNIFICANT CHANGE UP (ref 10–40)
AST SERPL-CCNC: 25 U/L — SIGNIFICANT CHANGE UP (ref 10–40)
AST SERPL-CCNC: 26 U/L — SIGNIFICANT CHANGE UP (ref 10–40)
AST SERPL-CCNC: 26 U/L — SIGNIFICANT CHANGE UP (ref 10–40)
AST SERPL-CCNC: 27 U/L — SIGNIFICANT CHANGE UP (ref 10–40)
AST SERPL-CCNC: 29 U/L — SIGNIFICANT CHANGE UP (ref 10–40)
AST SERPL-CCNC: 29 U/L — SIGNIFICANT CHANGE UP (ref 10–40)
AST SERPL-CCNC: 30 U/L — SIGNIFICANT CHANGE UP (ref 10–40)
AST SERPL-CCNC: 31 U/L — SIGNIFICANT CHANGE UP (ref 10–40)
AST SERPL-CCNC: 33 U/L — SIGNIFICANT CHANGE UP (ref 10–40)
AST SERPL-CCNC: 34 U/L — SIGNIFICANT CHANGE UP (ref 10–40)
AST SERPL-CCNC: 37 U/L — SIGNIFICANT CHANGE UP (ref 10–40)
AST SERPL-CCNC: 37 U/L — SIGNIFICANT CHANGE UP (ref 10–40)
AST SERPL-CCNC: 38 U/L — SIGNIFICANT CHANGE UP (ref 10–40)
AST SERPL-CCNC: 39 U/L — SIGNIFICANT CHANGE UP (ref 10–40)
AST SERPL-CCNC: 44 U/L
AST SERPL-CCNC: 45 U/L — HIGH (ref 10–40)
AST SERPL-CCNC: 53 U/L — HIGH (ref 10–40)
AST SERPL-CCNC: 539 U/L — HIGH (ref 10–40)
AT III ACT/NOR PPP CHRO: 42 % — LOW (ref 85–135)
AT III AG PPP IA-MCNC: 13 MG/DL — LOW (ref 19–31)
AUTO DIFF PNL BLD: ABNORMAL
AUTO DIFF PNL BLD: ABNORMAL
B BURGDOR C6 AB SER-ACNC: NEGATIVE — SIGNIFICANT CHANGE UP
B BURGDOR IGG+IGM SER-ACNC: 0.07 INDEX — SIGNIFICANT CHANGE UP (ref 0.01–0.89)
B PERT IGG+IGM PNL SER: ABNORMAL
B PERT IGG+IGM PNL SER: ABNORMAL
B-GLOBULIN SERPL ELPH-MCNC: 0.7 G/DL — SIGNIFICANT CHANGE UP (ref 0.5–1)
B2 GLYCOPROT1 AB SER QL: NEGATIVE — SIGNIFICANT CHANGE UP
BACTERIA # UR AUTO: ABNORMAL
BACTERIA # UR AUTO: ABNORMAL
BACTERIA # UR AUTO: NEGATIVE — SIGNIFICANT CHANGE UP
BACTERIA # UR AUTO: NEGATIVE — SIGNIFICANT CHANGE UP
BASE EXCESS BLDV CALC-SCNC: -1.9 MMOL/L — SIGNIFICANT CHANGE UP (ref -2–2)
BASE EXCESS BLDV CALC-SCNC: -15.7 MMOL/L — LOW (ref -2–2)
BASE EXCESS BLDV CALC-SCNC: -24.7 MMOL/L — LOW (ref -2–2)
BASE EXCESS BLDV CALC-SCNC: -4.4 MMOL/L — LOW (ref -2–2)
BASE EXCESS BLDV CALC-SCNC: -4.9 MMOL/L — LOW (ref -2–2)
BASE EXCESS BLDV CALC-SCNC: -5 MMOL/L — LOW (ref -2–2)
BASE EXCESS BLDV CALC-SCNC: -6.1 MMOL/L — LOW (ref -2–2)
BASE EXCESS BLDV CALC-SCNC: -9.1 MMOL/L — LOW (ref -2–2)
BASOPHILS # BLD AUTO: 0 K/UL — SIGNIFICANT CHANGE UP (ref 0–0.2)
BASOPHILS # BLD AUTO: 0.02 K/UL
BASOPHILS # BLD AUTO: 0.11 K/UL
BASOPHILS NFR BLD AUTO: 0 % — SIGNIFICANT CHANGE UP (ref 0–2)
BASOPHILS NFR BLD AUTO: 0.4 %
BASOPHILS NFR BLD AUTO: 1.1 %
BCR/ABL BY RT - PCR QUANTITATIVE: SIGNIFICANT CHANGE UP
BILIRUB SERPL-MCNC: 0.7 MG/DL — SIGNIFICANT CHANGE UP (ref 0.2–1.2)
BILIRUB SERPL-MCNC: 0.8 MG/DL
BILIRUB SERPL-MCNC: 0.8 MG/DL — SIGNIFICANT CHANGE UP (ref 0.2–1.2)
BILIRUB SERPL-MCNC: 0.9 MG/DL — SIGNIFICANT CHANGE UP (ref 0.2–1.2)
BILIRUB SERPL-MCNC: 1 MG/DL — SIGNIFICANT CHANGE UP (ref 0.2–1.2)
BILIRUB SERPL-MCNC: 1.1 MG/DL — SIGNIFICANT CHANGE UP (ref 0.2–1.2)
BILIRUB SERPL-MCNC: 1.2 MG/DL — SIGNIFICANT CHANGE UP (ref 0.2–1.2)
BILIRUB SERPL-MCNC: 1.3 MG/DL — HIGH (ref 0.2–1.2)
BILIRUB SERPL-MCNC: 1.4 MG/DL — HIGH (ref 0.2–1.2)
BILIRUB SERPL-MCNC: 1.5 MG/DL — HIGH (ref 0.2–1.2)
BILIRUB SERPL-MCNC: 1.6 MG/DL — HIGH (ref 0.2–1.2)
BILIRUB SERPL-MCNC: 2 MG/DL — HIGH (ref 0.2–1.2)
BILIRUB SERPL-MCNC: 2.3 MG/DL — HIGH (ref 0.2–1.2)
BILIRUB SERPL-MCNC: 2.3 MG/DL — HIGH (ref 0.2–1.2)
BILIRUB SERPL-MCNC: 2.7 MG/DL — HIGH (ref 0.2–1.2)
BILIRUB SERPL-MCNC: 2.8 MG/DL — HIGH (ref 0.2–1.2)
BILIRUB UR-MCNC: NEGATIVE — SIGNIFICANT CHANGE UP
BILIRUBIN URINE: ABNORMAL
BLD GP AB SCN SERPL QL: NEGATIVE — SIGNIFICANT CHANGE UP
BLOOD GAS VENOUS - CREATININE: SIGNIFICANT CHANGE UP MG/DL (ref 0.5–1.3)
BLOOD GAS VENOUS - CREATININE: SIGNIFICANT CHANGE UP MG/DL (ref 0.5–1.3)
BLOOD URINE: ABNORMAL
BUN SERPL-MCNC: 13 MG/DL
BUN SERPL-MCNC: 20 MG/DL — SIGNIFICANT CHANGE UP (ref 7–23)
BUN SERPL-MCNC: 21 MG/DL
BUN SERPL-MCNC: 21 MG/DL — SIGNIFICANT CHANGE UP (ref 7–23)
BUN SERPL-MCNC: 22 MG/DL — SIGNIFICANT CHANGE UP (ref 7–23)
BUN SERPL-MCNC: 23 MG/DL — SIGNIFICANT CHANGE UP (ref 7–23)
BUN SERPL-MCNC: 24 MG/DL
BUN SERPL-MCNC: 24 MG/DL — HIGH (ref 7–23)
BUN SERPL-MCNC: 25 MG/DL — HIGH (ref 7–23)
BUN SERPL-MCNC: 26 MG/DL — HIGH (ref 7–23)
BUN SERPL-MCNC: 27 MG/DL — HIGH (ref 7–23)
BUN SERPL-MCNC: 29 MG/DL — HIGH (ref 7–23)
BUN SERPL-MCNC: 30 MG/DL — HIGH (ref 7–23)
BUN SERPL-MCNC: 30 MG/DL — HIGH (ref 7–23)
BUN SERPL-MCNC: 32 MG/DL — HIGH (ref 7–23)
BUN SERPL-MCNC: 34 MG/DL — HIGH (ref 7–23)
BUN SERPL-MCNC: 36 MG/DL — HIGH (ref 7–23)
BUN SERPL-MCNC: 38 MG/DL — HIGH (ref 7–23)
BUN SERPL-MCNC: 40 MG/DL — HIGH (ref 7–23)
BUN SERPL-MCNC: 40 MG/DL — HIGH (ref 7–23)
BUN SERPL-MCNC: 43 MG/DL — HIGH (ref 7–23)
BUN SERPL-MCNC: 45 MG/DL
BUN SERPL-MCNC: 47 MG/DL — HIGH (ref 7–23)
BUN SERPL-MCNC: 48 MG/DL — HIGH (ref 7–23)
BUN SERPL-MCNC: 49 MG/DL — HIGH (ref 7–23)
BUN SERPL-MCNC: 50 MG/DL — HIGH (ref 7–23)
BUN SERPL-MCNC: 52 MG/DL — HIGH (ref 7–23)
BUN SERPL-MCNC: 53 MG/DL — HIGH (ref 7–23)
BUN SERPL-MCNC: 54 MG/DL — HIGH (ref 7–23)
BUN SERPL-MCNC: 55 MG/DL — HIGH (ref 7–23)
C DIFF GDH STL QL: NEGATIVE — SIGNIFICANT CHANGE UP
C DIFF GDH STL QL: NEGATIVE — SIGNIFICANT CHANGE UP
C DIFF GDH STL QL: SIGNIFICANT CHANGE UP
C DIFF GDH STL QL: SIGNIFICANT CHANGE UP
C-ANCA SER-ACNC: NEGATIVE — SIGNIFICANT CHANGE UP
C-ANCA SER-ACNC: NEGATIVE — SIGNIFICANT CHANGE UP
C3 SERPL-MCNC: 116 MG/DL — SIGNIFICANT CHANGE UP (ref 81–157)
C3 SERPL-MCNC: 125 MG/DL — SIGNIFICANT CHANGE UP (ref 81–157)
C3 SERPL-MCNC: 129 MG/DL
C4 SERPL-MCNC: 30 MG/DL — SIGNIFICANT CHANGE UP (ref 13–39)
C4 SERPL-MCNC: 36 MG/DL — SIGNIFICANT CHANGE UP (ref 13–39)
CA-I SERPL-SCNC: 1.04 MMOL/L — LOW (ref 1.15–1.33)
CA-I SERPL-SCNC: 1.09 MMOL/L — LOW (ref 1.15–1.33)
CA-I SERPL-SCNC: 1.17 MMOL/L — SIGNIFICANT CHANGE UP (ref 1.15–1.33)
CA-I SERPL-SCNC: 1.22 MMOL/L — SIGNIFICANT CHANGE UP (ref 1.15–1.33)
CA-I SERPL-SCNC: 1.22 MMOL/L — SIGNIFICANT CHANGE UP (ref 1.15–1.33)
CA-I SERPL-SCNC: 1.23 MMOL/L — SIGNIFICANT CHANGE UP (ref 1.15–1.33)
CA-I SERPL-SCNC: 1.25 MMOL/L — SIGNIFICANT CHANGE UP (ref 1.15–1.33)
CA-I SERPL-SCNC: 1.28 MMOL/L — SIGNIFICANT CHANGE UP (ref 1.15–1.33)
CALCIUM SERPL-MCNC: 10.1 MG/DL
CALCIUM SERPL-MCNC: 7.3 MG/DL — LOW (ref 8.4–10.5)
CALCIUM SERPL-MCNC: 7.4 MG/DL — LOW (ref 8.4–10.5)
CALCIUM SERPL-MCNC: 7.5 MG/DL — LOW (ref 8.4–10.5)
CALCIUM SERPL-MCNC: 7.6 MG/DL — LOW (ref 8.4–10.5)
CALCIUM SERPL-MCNC: 7.6 MG/DL — LOW (ref 8.4–10.5)
CALCIUM SERPL-MCNC: 7.7 MG/DL — LOW (ref 8.4–10.5)
CALCIUM SERPL-MCNC: 7.9 MG/DL — LOW (ref 8.4–10.5)
CALCIUM SERPL-MCNC: 8 MG/DL — LOW (ref 8.4–10.5)
CALCIUM SERPL-MCNC: 8.1 MG/DL — LOW (ref 8.4–10.5)
CALCIUM SERPL-MCNC: 8.2 MG/DL — LOW (ref 8.4–10.5)
CALCIUM SERPL-MCNC: 8.3 MG/DL — LOW (ref 8.4–10.5)
CALCIUM SERPL-MCNC: 8.4 MG/DL — SIGNIFICANT CHANGE UP (ref 8.4–10.5)
CALCIUM SERPL-MCNC: 8.5 MG/DL — SIGNIFICANT CHANGE UP (ref 8.4–10.5)
CALCIUM SERPL-MCNC: 8.6 MG/DL — SIGNIFICANT CHANGE UP (ref 8.4–10.5)
CALCIUM SERPL-MCNC: 9.2 MG/DL
CALCIUM SERPL-MCNC: 9.4 MG/DL
CALCIUM SERPL-MCNC: 9.8 MG/DL
CALRETICULIN INTERPRETATION: SIGNIFICANT CHANGE UP
CANCER AG19-9 SERPL-ACNC: 75 U/ML — HIGH
CANCER AG19-9 SERPL-ACNC: 97 U/ML
CARDIOLIPIN AB SER-ACNC: POSITIVE
CD3-/CD16+CD56+(%): 2 % — LOW (ref 4–25)
CD3-CD16+CD56+(ABS): 35 CELLS/UL — LOW (ref 70–760)
CEA SERPL-MCNC: 1.9 NG/ML
CHLORIDE BLDV-SCNC: 101 MMOL/L — SIGNIFICANT CHANGE UP (ref 96–108)
CHLORIDE BLDV-SCNC: 102 MMOL/L — SIGNIFICANT CHANGE UP (ref 96–108)
CHLORIDE BLDV-SCNC: 104 MMOL/L — SIGNIFICANT CHANGE UP (ref 96–108)
CHLORIDE BLDV-SCNC: 105 MMOL/L — SIGNIFICANT CHANGE UP (ref 96–108)
CHLORIDE BLDV-SCNC: 106 MMOL/L — SIGNIFICANT CHANGE UP (ref 96–108)
CHLORIDE BLDV-SCNC: 108 MMOL/L — SIGNIFICANT CHANGE UP (ref 96–108)
CHLORIDE BLDV-SCNC: 109 MMOL/L — HIGH (ref 96–108)
CHLORIDE BLDV-SCNC: 109 MMOL/L — HIGH (ref 96–108)
CHLORIDE SERPL-SCNC: 100 MMOL/L — SIGNIFICANT CHANGE UP (ref 96–108)
CHLORIDE SERPL-SCNC: 101 MMOL/L — SIGNIFICANT CHANGE UP (ref 96–108)
CHLORIDE SERPL-SCNC: 101 MMOL/L — SIGNIFICANT CHANGE UP (ref 96–108)
CHLORIDE SERPL-SCNC: 102 MMOL/L — SIGNIFICANT CHANGE UP (ref 96–108)
CHLORIDE SERPL-SCNC: 103 MMOL/L — SIGNIFICANT CHANGE UP (ref 96–108)
CHLORIDE SERPL-SCNC: 104 MMOL/L — SIGNIFICANT CHANGE UP (ref 96–108)
CHLORIDE SERPL-SCNC: 105 MMOL/L
CHLORIDE SERPL-SCNC: 105 MMOL/L — SIGNIFICANT CHANGE UP (ref 96–108)
CHLORIDE SERPL-SCNC: 106 MMOL/L — SIGNIFICANT CHANGE UP (ref 96–108)
CHLORIDE SERPL-SCNC: 107 MMOL/L
CHLORIDE SERPL-SCNC: 107 MMOL/L
CHLORIDE SERPL-SCNC: 107 MMOL/L — SIGNIFICANT CHANGE UP (ref 96–108)
CHLORIDE SERPL-SCNC: 108 MMOL/L
CHLORIDE SERPL-SCNC: 108 MMOL/L — SIGNIFICANT CHANGE UP (ref 96–108)
CHLORIDE SERPL-SCNC: 109 MMOL/L — HIGH (ref 96–108)
CHLORIDE SERPL-SCNC: 96 MMOL/L — SIGNIFICANT CHANGE UP (ref 96–108)
CHLORIDE SERPL-SCNC: 98 MMOL/L — SIGNIFICANT CHANGE UP (ref 96–108)
CHLORIDE SERPL-SCNC: 99 MMOL/L — SIGNIFICANT CHANGE UP (ref 96–108)
CHLORIDE UR-SCNC: 65 MMOL/L — SIGNIFICANT CHANGE UP
CHOLEST SERPL-MCNC: 103 MG/DL — SIGNIFICANT CHANGE UP
CHOLEST SERPL-MCNC: 154 MG/DL
CK MB CFR SERPL CALC: 1 NG/ML — SIGNIFICANT CHANGE UP (ref 0–6.7)
CK MB CFR SERPL CALC: 1 NG/ML — SIGNIFICANT CHANGE UP (ref 0–6.7)
CK SERPL-CCNC: 11 U/L — LOW (ref 30–200)
CK SERPL-CCNC: 13 U/L — LOW (ref 30–200)
CK SERPL-CCNC: 20 U/L — LOW (ref 30–200)
CK SERPL-CCNC: 23 U/L — LOW (ref 30–200)
CK SERPL-CCNC: 25 U/L — LOW (ref 30–200)
CMV DNA CSF QL NAA+PROBE: 50 — SIGNIFICANT CHANGE UP
CMV DNA SPEC NAA+PROBE-LOG#: 1.7 LOG10IU/ML — HIGH
CO2 BLDV-SCNC: 10 MMOL/L — LOW (ref 22–26)
CO2 BLDV-SCNC: 10 MMOL/L — LOW (ref 22–26)
CO2 BLDV-SCNC: 18 MMOL/L — LOW (ref 22–26)
CO2 BLDV-SCNC: 19 MMOL/L — LOW (ref 22–26)
CO2 BLDV-SCNC: 20 MMOL/L — LOW (ref 22–26)
CO2 BLDV-SCNC: 21 MMOL/L — LOW (ref 22–26)
CO2 BLDV-SCNC: 22 MMOL/L — SIGNIFICANT CHANGE UP (ref 22–26)
CO2 BLDV-SCNC: 25 MMOL/L — SIGNIFICANT CHANGE UP (ref 22–26)
CO2 SERPL-SCNC: 10 MMOL/L — CRITICAL LOW (ref 22–31)
CO2 SERPL-SCNC: 12 MMOL/L — LOW (ref 22–31)
CO2 SERPL-SCNC: 14 MMOL/L — LOW (ref 22–31)
CO2 SERPL-SCNC: 15 MMOL/L — LOW (ref 22–31)
CO2 SERPL-SCNC: 16 MMOL/L
CO2 SERPL-SCNC: 16 MMOL/L
CO2 SERPL-SCNC: 16 MMOL/L — LOW (ref 22–31)
CO2 SERPL-SCNC: 17 MMOL/L — LOW (ref 22–31)
CO2 SERPL-SCNC: 18 MMOL/L — LOW (ref 22–31)
CO2 SERPL-SCNC: 19 MMOL/L
CO2 SERPL-SCNC: 19 MMOL/L — LOW (ref 22–31)
CO2 SERPL-SCNC: 20 MMOL/L
CO2 SERPL-SCNC: 20 MMOL/L — LOW (ref 22–31)
CO2 SERPL-SCNC: 21 MMOL/L — LOW (ref 22–31)
CO2 SERPL-SCNC: 22 MMOL/L — SIGNIFICANT CHANGE UP (ref 22–31)
CO2 SERPL-SCNC: 22 MMOL/L — SIGNIFICANT CHANGE UP (ref 22–31)
CO2 SERPL-SCNC: 23 MMOL/L — SIGNIFICANT CHANGE UP (ref 22–31)
CO2 SERPL-SCNC: 24 MMOL/L — SIGNIFICANT CHANGE UP (ref 22–31)
CO2 SERPL-SCNC: <10 MMOL/L — CRITICAL LOW (ref 22–31)
CO2 SERPL-SCNC: <10 MMOL/L — CRITICAL LOW (ref 22–31)
COLOR FLD: SIGNIFICANT CHANGE UP
COLOR FLD: YELLOW — SIGNIFICANT CHANGE UP
COLOR SPEC: SIGNIFICANT CHANGE UP
COLOR SPEC: YELLOW — SIGNIFICANT CHANGE UP
COLOR: NORMAL
COMMENT - FLUIDS: SIGNIFICANT CHANGE UP
COMMENT - URINE: SIGNIFICANT CHANGE UP
CORTICOSTEROID BINDING GLOBULIN RESULT: 1.9 MG/DL — SIGNIFICANT CHANGE UP
CORTIS F/TOTAL MFR SERPL: 43 % — SIGNIFICANT CHANGE UP
CORTIS SERPL-MCNC: 22 UG/DL — HIGH
CORTISOL, FREE RESULT: 9.4 UG/DL — HIGH
COVID-19 SPIKE DOMAIN ANTIBODY INTERPRETATION: POSITIVE
CREAT ?TM UR-MCNC: 102 MG/DL — SIGNIFICANT CHANGE UP
CREAT ?TM UR-MCNC: 49 MG/DL — SIGNIFICANT CHANGE UP
CREAT ?TM UR-MCNC: 75 MG/DL — SIGNIFICANT CHANGE UP
CREAT SERPL-MCNC: 1.16 MG/DL — SIGNIFICANT CHANGE UP (ref 0.5–1.3)
CREAT SERPL-MCNC: 1.17 MG/DL — SIGNIFICANT CHANGE UP (ref 0.5–1.3)
CREAT SERPL-MCNC: 1.18 MG/DL — SIGNIFICANT CHANGE UP (ref 0.5–1.3)
CREAT SERPL-MCNC: 1.19 MG/DL — SIGNIFICANT CHANGE UP (ref 0.5–1.3)
CREAT SERPL-MCNC: 1.2 MG/DL — SIGNIFICANT CHANGE UP (ref 0.5–1.3)
CREAT SERPL-MCNC: 1.31 MG/DL — HIGH (ref 0.5–1.3)
CREAT SERPL-MCNC: 1.35 MG/DL — HIGH (ref 0.5–1.3)
CREAT SERPL-MCNC: 1.36 MG/DL — HIGH (ref 0.5–1.3)
CREAT SERPL-MCNC: 1.42 MG/DL — HIGH (ref 0.5–1.3)
CREAT SERPL-MCNC: 1.43 MG/DL — HIGH (ref 0.5–1.3)
CREAT SERPL-MCNC: 1.46 MG/DL — HIGH (ref 0.5–1.3)
CREAT SERPL-MCNC: 1.47 MG/DL
CREAT SERPL-MCNC: 1.48 MG/DL — HIGH (ref 0.5–1.3)
CREAT SERPL-MCNC: 1.51 MG/DL — HIGH (ref 0.5–1.3)
CREAT SERPL-MCNC: 1.59 MG/DL — HIGH (ref 0.5–1.3)
CREAT SERPL-MCNC: 1.6 MG/DL — HIGH (ref 0.5–1.3)
CREAT SERPL-MCNC: 1.62 MG/DL — HIGH (ref 0.5–1.3)
CREAT SERPL-MCNC: 1.62 MG/DL — HIGH (ref 0.5–1.3)
CREAT SERPL-MCNC: 1.65 MG/DL — HIGH (ref 0.5–1.3)
CREAT SERPL-MCNC: 1.66 MG/DL — HIGH (ref 0.5–1.3)
CREAT SERPL-MCNC: 1.8 MG/DL — HIGH (ref 0.5–1.3)
CREAT SERPL-MCNC: 1.81 MG/DL — HIGH (ref 0.5–1.3)
CREAT SERPL-MCNC: 1.81 MG/DL — HIGH (ref 0.5–1.3)
CREAT SERPL-MCNC: 1.82 MG/DL — HIGH (ref 0.5–1.3)
CREAT SERPL-MCNC: 1.88 MG/DL — HIGH (ref 0.5–1.3)
CREAT SERPL-MCNC: 1.88 MG/DL — HIGH (ref 0.5–1.3)
CREAT SERPL-MCNC: 1.89 MG/DL — HIGH (ref 0.5–1.3)
CREAT SERPL-MCNC: 1.9 MG/DL — HIGH (ref 0.5–1.3)
CREAT SERPL-MCNC: 1.9 MG/DL — HIGH (ref 0.5–1.3)
CREAT SERPL-MCNC: 1.91 MG/DL — HIGH (ref 0.5–1.3)
CREAT SERPL-MCNC: 1.92 MG/DL — HIGH (ref 0.5–1.3)
CREAT SERPL-MCNC: 1.92 MG/DL — HIGH (ref 0.5–1.3)
CREAT SERPL-MCNC: 1.95 MG/DL — HIGH (ref 0.5–1.3)
CREAT SERPL-MCNC: 1.95 MG/DL — HIGH (ref 0.5–1.3)
CREAT SERPL-MCNC: 1.96 MG/DL — HIGH (ref 0.5–1.3)
CREAT SERPL-MCNC: 1.98 MG/DL — HIGH (ref 0.5–1.3)
CREAT SERPL-MCNC: 1.98 MG/DL — HIGH (ref 0.5–1.3)
CREAT SERPL-MCNC: 2.23 MG/DL
CREAT SERPL-MCNC: 2.48 MG/DL
CREAT SERPL-MCNC: 3.24 MG/DL
CREAT SPEC-SCNC: 105 MG/DL
CREAT SPEC-SCNC: 110 MG/DL
CREAT SPEC-SCNC: 56 MG/DL
CREAT/PROT UR: 1.1 RATIO
CREAT/PROT UR: 1.7 RATIO
CREAT/PROT UR: 2.5 RATIO
CRP SERPL-MCNC: 120 MG/L — HIGH (ref 0–4)
CRP SERPL-MCNC: 18 MG/L
CRP SERPL-MCNC: 77 MG/L — HIGH (ref 0–4)
CRP SERPL-MCNC: 90 MG/L — HIGH (ref 0–4)
CULTURE RESULTS: NO GROWTH — SIGNIFICANT CHANGE UP
CULTURE RESULTS: SIGNIFICANT CHANGE UP
D DIMER BLD IA.RAPID-MCNC: 1125 NG/ML DDU — HIGH
DACRYOCYTES BLD QL SMEAR: SLIGHT — SIGNIFICANT CHANGE UP
DENV IGG+IGM PNL SER IA: 7.46 ISR — HIGH
DENV IGM SER-ACNC: 1 ISR — SIGNIFICANT CHANGE UP
DIFF PNL FLD: ABNORMAL
DNA PLOIDY SPEC FC-IMP: SIGNIFICANT CHANGE UP
DNA PLOIDY SPEC FC-IMP: SIGNIFICANT CHANGE UP
DRVVT SCREEN TO CONFIRM RATIO: SIGNIFICANT CHANGE UP
DRVVT SCREEN TO CONFIRM RATIO: SIGNIFICANT CHANGE UP
DSDNA AB SER-ACNC: 17 IU/ML — SIGNIFICANT CHANGE UP
DSDNA AB SER-ACNC: 21 IU/ML — SIGNIFICANT CHANGE UP
DSDNA AB SER-ACNC: 31 IU/ML — HIGH
DSDNA AB SER-ACNC: 34 IU/ML — HIGH
EBV DNA SERPL NAA+PROBE-ACNC: HIGH IU/ML
EBV DNA SERPL NAA+PROBE-ACNC: HIGH IU/ML
EBV EA AB SER IA-ACNC: 117 U/ML — HIGH
EBV EA AB TITR SER IF: POSITIVE
EBV EA IGG SER-ACNC: POSITIVE
EBV NA IGG SER IA-ACNC: >600 U/ML — HIGH
EBV PATRN SPEC IB-IMP: SIGNIFICANT CHANGE UP
EBV VCA IGG AVIDITY SER QL IA: POSITIVE
EBV VCA IGM SER IA-ACNC: <10 U/ML — SIGNIFICANT CHANGE UP
EBV VCA IGM SER IA-ACNC: >750 U/ML — HIGH
EBV VCA IGM TITR FLD: NEGATIVE — SIGNIFICANT CHANGE UP
EBVPCR LOG: 4.8 LOG10IU/ML — HIGH
EGFR: 37 ML/MIN/1.73M2 — LOW
EGFR: 38 ML/MIN/1.73M2 — LOW
EGFR: 39 ML/MIN/1.73M2 — LOW
EGFR: 41 ML/MIN/1.73M2 — LOW
EGFR: 45 ML/MIN/1.73M2 — LOW
EGFR: 46 ML/MIN/1.73M2 — LOW
EGFR: 47 ML/MIN/1.73M2 — LOW
EGFR: 47 ML/MIN/1.73M2 — LOW
EGFR: 48 ML/MIN/1.73M2 — LOW
EGFR: 51 ML/MIN/1.73M2 — LOW
EGFR: 52 ML/MIN/1.73M2 — LOW
EGFR: 53 ML/MIN/1.73M2
EGFR: 53 ML/MIN/1.73M2 — LOW
EGFR: 54 ML/MIN/1.73M2 — LOW
EGFR: 55 ML/MIN/1.73M2 — LOW
EGFR: 58 ML/MIN/1.73M2 — LOW
EGFR: 58 ML/MIN/1.73M2 — LOW
EGFR: 60 ML/MIN/1.73M2 — SIGNIFICANT CHANGE UP
EGFR: 67 ML/MIN/1.73M2 — SIGNIFICANT CHANGE UP
EGFR: 68 ML/MIN/1.73M2 — SIGNIFICANT CHANGE UP
EGFR: 68 ML/MIN/1.73M2 — SIGNIFICANT CHANGE UP
EGFR: 69 ML/MIN/1.73M2 — SIGNIFICANT CHANGE UP
EGFR: 70 ML/MIN/1.73M2 — SIGNIFICANT CHANGE UP
ENA SM AB FLD QL: <0.2 AI — SIGNIFICANT CHANGE UP
EOSINOPHIL # BLD AUTO: 0 K/UL — SIGNIFICANT CHANGE UP (ref 0–0.5)
EOSINOPHIL # BLD AUTO: 0.07 K/UL
EOSINOPHIL # BLD AUTO: 0.07 K/UL
EOSINOPHIL # BLD AUTO: 0.14 K/UL
EOSINOPHIL # BLD AUTO: 0.15 K/UL
EOSINOPHIL # BLD AUTO: 0.31 K/UL — SIGNIFICANT CHANGE UP (ref 0–0.5)
EOSINOPHIL # BLD AUTO: 0.37 K/UL — SIGNIFICANT CHANGE UP (ref 0–0.5)
EOSINOPHIL # FLD: 3 % — SIGNIFICANT CHANGE UP
EOSINOPHIL NFR BLD AUTO: 0 % — SIGNIFICANT CHANGE UP (ref 0–6)
EOSINOPHIL NFR BLD AUTO: 0.9 % — SIGNIFICANT CHANGE UP (ref 0–6)
EOSINOPHIL NFR BLD AUTO: 0.9 % — SIGNIFICANT CHANGE UP (ref 0–6)
EOSINOPHIL NFR BLD AUTO: 1.4 %
EOSINOPHIL NFR BLD AUTO: 1.4 %
EOSINOPHIL NFR BLD AUTO: 1.5 %
EOSINOPHIL NFR BLD AUTO: 2.7 %
EPI CELLS # UR: 1 /HPF — SIGNIFICANT CHANGE UP
EPI CELLS # UR: 3 /HPF — SIGNIFICANT CHANGE UP
EPI CELLS # UR: 3 — SIGNIFICANT CHANGE UP
EPI CELLS # UR: 8 /HPF — HIGH
ERYTHROCYTE [SEDIMENTATION RATE] IN BLOOD BY WESTERGREN METHOD: 95 MM/HR
ERYTHROCYTE [SEDIMENTATION RATE] IN BLOOD: 22 MM/HR — HIGH (ref 0–20)
ERYTHROCYTE [SEDIMENTATION RATE] IN BLOOD: 44 MM/HR — HIGH (ref 0–20)
ERYTHROCYTE [SEDIMENTATION RATE] IN BLOOD: 56 MM/HR — HIGH (ref 0–20)
ESTIMATED AVERAGE GLUCOSE: 103 MG/DL — SIGNIFICANT CHANGE UP (ref 68–114)
ESTIMATED AVERAGE GLUCOSE: 128 MG/DL
ESTIMATED AVERAGE GLUCOSE: 134 MG/DL
ESTIMATED AVERAGE GLUCOSE: 140 MG/DL
ESTIMATED AVERAGE GLUCOSE: 97 MG/DL — SIGNIFICANT CHANGE UP (ref 68–114)
FACT V ACT/NOR PPP: 150 % — SIGNIFICANT CHANGE UP (ref 50–150)
FERRITIN SERPL-MCNC: 1195 NG/ML — HIGH (ref 30–400)
FERRITIN SERPL-MCNC: 511 NG/ML — HIGH (ref 30–400)
FERRITIN SERPL-MCNC: 6279 NG/ML — HIGH (ref 30–400)
FERRITIN SERPL-MCNC: 841 NG/ML — HIGH (ref 30–400)
FERRITIN SERPL-MCNC: 909 NG/ML — HIGH (ref 30–400)
FERRITIN SERPL-MCNC: 972 NG/ML — HIGH (ref 30–400)
FIBRINOGEN PPP-MCNC: 158 MG/DL — LOW (ref 330–520)
FIBRINOGEN PPP-MCNC: 191 MG/DL — LOW (ref 330–520)
FIBRINOGEN PPP-MCNC: 218 MG/DL — LOW (ref 330–520)
FIBRINOGEN PPP-MCNC: 225 MG/DL — LOW (ref 330–520)
FIBRINOGEN PPP-MCNC: 235 MG/DL — LOW (ref 330–520)
FIBRINOGEN PPP-MCNC: 246 MG/DL — LOW (ref 330–520)
FIBRINOGEN PPP-MCNC: 274 MG/DL — LOW (ref 330–520)
FIBRINOGEN PPP-MCNC: 338 MG/DL — SIGNIFICANT CHANGE UP (ref 330–520)
FIBRINOGEN PPP-MCNC: 384 MG/DL — SIGNIFICANT CHANGE UP (ref 330–520)
FIBRINOGEN PPP-MCNC: 444 MG/DL — SIGNIFICANT CHANGE UP (ref 330–520)
FIBRINOGEN PPP-MCNC: 463 MG/DL — SIGNIFICANT CHANGE UP (ref 330–520)
FIBRINOGEN PPP-MCNC: 597 MG/DL — HIGH (ref 330–520)
FLUID INTAKE SUBSTANCE CLASS: SIGNIFICANT CHANGE UP
FLUID INTAKE SUBSTANCE CLASS: SIGNIFICANT CHANGE UP
FOLATE SERPL-MCNC: >20 NG/ML — SIGNIFICANT CHANGE UP
FUNGITELL: <31 PG/ML — SIGNIFICANT CHANGE UP
FUNGITELL: SIGNIFICANT CHANGE UP PG/ML
GALACTOMANNAN AG SERPL-ACNC: 0.07 INDEX — SIGNIFICANT CHANGE UP (ref 0–0.49)
GAMMA GLOBULIN: 1.9 G/DL — HIGH (ref 0.6–1.6)
GAMMA INTERFERON BACKGROUND BLD IA-ACNC: 0.05 IU/ML — SIGNIFICANT CHANGE UP
GAMMA INTERFERON BACKGROUND BLD IA-ACNC: 0.06 IU/ML — SIGNIFICANT CHANGE UP
GAMMA INTERFERON BACKGROUND BLD IA-ACNC: 0.08 IU/ML — SIGNIFICANT CHANGE UP
GAS PNL BLDA: SIGNIFICANT CHANGE UP
GAS PNL BLDV: 132 MMOL/L — LOW (ref 136–145)
GAS PNL BLDV: 133 MMOL/L — LOW (ref 136–145)
GAS PNL BLDV: 134 MMOL/L — LOW (ref 136–145)
GAS PNL BLDV: 135 MMOL/L — LOW (ref 136–145)
GAS PNL BLDV: 135 MMOL/L — LOW (ref 136–145)
GAS PNL BLDV: SIGNIFICANT CHANGE UP
GGT SERPL-CCNC: 508 U/L
GIANT PLATELETS BLD QL SMEAR: PRESENT — SIGNIFICANT CHANGE UP
GIANT PLATELETS BLD QL SMEAR: PRESENT — SIGNIFICANT CHANGE UP
GLUCOSE BLDC GLUCOMTR-MCNC: 100 MG/DL — HIGH (ref 70–99)
GLUCOSE BLDC GLUCOMTR-MCNC: 102 MG/DL — HIGH (ref 70–99)
GLUCOSE BLDC GLUCOMTR-MCNC: 102 MG/DL — HIGH (ref 70–99)
GLUCOSE BLDC GLUCOMTR-MCNC: 103 MG/DL — HIGH (ref 70–99)
GLUCOSE BLDC GLUCOMTR-MCNC: 104 MG/DL — HIGH (ref 70–99)
GLUCOSE BLDC GLUCOMTR-MCNC: 106 MG/DL — HIGH (ref 70–99)
GLUCOSE BLDC GLUCOMTR-MCNC: 106 MG/DL — HIGH (ref 70–99)
GLUCOSE BLDC GLUCOMTR-MCNC: 107 MG/DL — HIGH (ref 70–99)
GLUCOSE BLDC GLUCOMTR-MCNC: 108 MG/DL — HIGH (ref 70–99)
GLUCOSE BLDC GLUCOMTR-MCNC: 108 MG/DL — HIGH (ref 70–99)
GLUCOSE BLDC GLUCOMTR-MCNC: 109 MG/DL — HIGH (ref 70–99)
GLUCOSE BLDC GLUCOMTR-MCNC: 110 MG/DL — HIGH (ref 70–99)
GLUCOSE BLDC GLUCOMTR-MCNC: 111 MG/DL — HIGH (ref 70–99)
GLUCOSE BLDC GLUCOMTR-MCNC: 112 MG/DL — HIGH (ref 70–99)
GLUCOSE BLDC GLUCOMTR-MCNC: 113 MG/DL — HIGH (ref 70–99)
GLUCOSE BLDC GLUCOMTR-MCNC: 114 MG/DL — HIGH (ref 70–99)
GLUCOSE BLDC GLUCOMTR-MCNC: 115 MG/DL — HIGH (ref 70–99)
GLUCOSE BLDC GLUCOMTR-MCNC: 116 MG/DL — HIGH (ref 70–99)
GLUCOSE BLDC GLUCOMTR-MCNC: 117 MG/DL — HIGH (ref 70–99)
GLUCOSE BLDC GLUCOMTR-MCNC: 118 MG/DL — HIGH (ref 70–99)
GLUCOSE BLDC GLUCOMTR-MCNC: 121 MG/DL — HIGH (ref 70–99)
GLUCOSE BLDC GLUCOMTR-MCNC: 122 MG/DL — HIGH (ref 70–99)
GLUCOSE BLDC GLUCOMTR-MCNC: 122 MG/DL — HIGH (ref 70–99)
GLUCOSE BLDC GLUCOMTR-MCNC: 123 MG/DL — HIGH (ref 70–99)
GLUCOSE BLDC GLUCOMTR-MCNC: 124 MG/DL — HIGH (ref 70–99)
GLUCOSE BLDC GLUCOMTR-MCNC: 125 MG/DL — HIGH (ref 70–99)
GLUCOSE BLDC GLUCOMTR-MCNC: 126 MG/DL — HIGH (ref 70–99)
GLUCOSE BLDC GLUCOMTR-MCNC: 128 MG/DL — HIGH (ref 70–99)
GLUCOSE BLDC GLUCOMTR-MCNC: 129 MG/DL — HIGH (ref 70–99)
GLUCOSE BLDC GLUCOMTR-MCNC: 129 MG/DL — HIGH (ref 70–99)
GLUCOSE BLDC GLUCOMTR-MCNC: 130 MG/DL — HIGH (ref 70–99)
GLUCOSE BLDC GLUCOMTR-MCNC: 132 MG/DL — HIGH (ref 70–99)
GLUCOSE BLDC GLUCOMTR-MCNC: 132 MG/DL — HIGH (ref 70–99)
GLUCOSE BLDC GLUCOMTR-MCNC: 133 MG/DL — HIGH (ref 70–99)
GLUCOSE BLDC GLUCOMTR-MCNC: 134 MG/DL — HIGH (ref 70–99)
GLUCOSE BLDC GLUCOMTR-MCNC: 135 MG/DL — HIGH (ref 70–99)
GLUCOSE BLDC GLUCOMTR-MCNC: 136 MG/DL — HIGH (ref 70–99)
GLUCOSE BLDC GLUCOMTR-MCNC: 136 MG/DL — HIGH (ref 70–99)
GLUCOSE BLDC GLUCOMTR-MCNC: 141 MG/DL — HIGH (ref 70–99)
GLUCOSE BLDC GLUCOMTR-MCNC: 142 MG/DL — HIGH (ref 70–99)
GLUCOSE BLDC GLUCOMTR-MCNC: 143 MG/DL — HIGH (ref 70–99)
GLUCOSE BLDC GLUCOMTR-MCNC: 146 MG/DL — HIGH (ref 70–99)
GLUCOSE BLDC GLUCOMTR-MCNC: 146 MG/DL — HIGH (ref 70–99)
GLUCOSE BLDC GLUCOMTR-MCNC: 147 MG/DL — HIGH (ref 70–99)
GLUCOSE BLDC GLUCOMTR-MCNC: 150 MG/DL — HIGH (ref 70–99)
GLUCOSE BLDC GLUCOMTR-MCNC: 155 MG/DL — HIGH (ref 70–99)
GLUCOSE BLDC GLUCOMTR-MCNC: 157 MG/DL — HIGH (ref 70–99)
GLUCOSE BLDC GLUCOMTR-MCNC: 159 MG/DL — HIGH (ref 70–99)
GLUCOSE BLDC GLUCOMTR-MCNC: 164 MG/DL — HIGH (ref 70–99)
GLUCOSE BLDC GLUCOMTR-MCNC: 180 MG/DL — HIGH (ref 70–99)
GLUCOSE BLDC GLUCOMTR-MCNC: 201 MG/DL — HIGH (ref 70–99)
GLUCOSE BLDC GLUCOMTR-MCNC: 60 MG/DL — LOW (ref 70–99)
GLUCOSE BLDC GLUCOMTR-MCNC: 64 MG/DL — LOW (ref 70–99)
GLUCOSE BLDC GLUCOMTR-MCNC: 67 MG/DL — LOW (ref 70–99)
GLUCOSE BLDC GLUCOMTR-MCNC: 71 MG/DL — SIGNIFICANT CHANGE UP (ref 70–99)
GLUCOSE BLDC GLUCOMTR-MCNC: 71 MG/DL — SIGNIFICANT CHANGE UP (ref 70–99)
GLUCOSE BLDC GLUCOMTR-MCNC: 72 MG/DL — SIGNIFICANT CHANGE UP (ref 70–99)
GLUCOSE BLDC GLUCOMTR-MCNC: 75 MG/DL — SIGNIFICANT CHANGE UP (ref 70–99)
GLUCOSE BLDC GLUCOMTR-MCNC: 75 MG/DL — SIGNIFICANT CHANGE UP (ref 70–99)
GLUCOSE BLDC GLUCOMTR-MCNC: 76 MG/DL — SIGNIFICANT CHANGE UP (ref 70–99)
GLUCOSE BLDC GLUCOMTR-MCNC: 76 MG/DL — SIGNIFICANT CHANGE UP (ref 70–99)
GLUCOSE BLDC GLUCOMTR-MCNC: 77 MG/DL — SIGNIFICANT CHANGE UP (ref 70–99)
GLUCOSE BLDC GLUCOMTR-MCNC: 79 MG/DL — SIGNIFICANT CHANGE UP (ref 70–99)
GLUCOSE BLDC GLUCOMTR-MCNC: 82 MG/DL — SIGNIFICANT CHANGE UP (ref 70–99)
GLUCOSE BLDC GLUCOMTR-MCNC: 84 MG/DL — SIGNIFICANT CHANGE UP (ref 70–99)
GLUCOSE BLDC GLUCOMTR-MCNC: 85 MG/DL — SIGNIFICANT CHANGE UP (ref 70–99)
GLUCOSE BLDC GLUCOMTR-MCNC: 86 MG/DL — SIGNIFICANT CHANGE UP (ref 70–99)
GLUCOSE BLDC GLUCOMTR-MCNC: 88 MG/DL — SIGNIFICANT CHANGE UP (ref 70–99)
GLUCOSE BLDC GLUCOMTR-MCNC: 89 MG/DL — SIGNIFICANT CHANGE UP (ref 70–99)
GLUCOSE BLDC GLUCOMTR-MCNC: 91 MG/DL — SIGNIFICANT CHANGE UP (ref 70–99)
GLUCOSE BLDC GLUCOMTR-MCNC: 94 MG/DL — SIGNIFICANT CHANGE UP (ref 70–99)
GLUCOSE BLDC GLUCOMTR-MCNC: 95 MG/DL — SIGNIFICANT CHANGE UP (ref 70–99)
GLUCOSE BLDC GLUCOMTR-MCNC: 96 MG/DL — SIGNIFICANT CHANGE UP (ref 70–99)
GLUCOSE BLDC GLUCOMTR-MCNC: 98 MG/DL — SIGNIFICANT CHANGE UP (ref 70–99)
GLUCOSE BLDC GLUCOMTR-MCNC: 99 MG/DL — SIGNIFICANT CHANGE UP (ref 70–99)
GLUCOSE BLDV-MCNC: 47 MG/DL — CRITICAL LOW (ref 70–99)
GLUCOSE BLDV-MCNC: 68 MG/DL — LOW (ref 70–99)
GLUCOSE BLDV-MCNC: 74 MG/DL — SIGNIFICANT CHANGE UP (ref 70–99)
GLUCOSE BLDV-MCNC: 81 MG/DL — SIGNIFICANT CHANGE UP (ref 70–99)
GLUCOSE BLDV-MCNC: 83 MG/DL — SIGNIFICANT CHANGE UP (ref 70–99)
GLUCOSE BLDV-MCNC: 84 MG/DL — SIGNIFICANT CHANGE UP (ref 70–99)
GLUCOSE BLDV-MCNC: 91 MG/DL — SIGNIFICANT CHANGE UP (ref 70–99)
GLUCOSE BLDV-MCNC: 92 MG/DL — SIGNIFICANT CHANGE UP (ref 70–99)
GLUCOSE FLD-MCNC: 72 MG/DL — SIGNIFICANT CHANGE UP
GLUCOSE QUALITATIVE U: ABNORMAL
GLUCOSE SERPL-MCNC: 100 MG/DL — HIGH (ref 70–99)
GLUCOSE SERPL-MCNC: 107 MG/DL — HIGH (ref 70–99)
GLUCOSE SERPL-MCNC: 111 MG/DL — HIGH (ref 70–99)
GLUCOSE SERPL-MCNC: 116 MG/DL — HIGH (ref 70–99)
GLUCOSE SERPL-MCNC: 119 MG/DL
GLUCOSE SERPL-MCNC: 119 MG/DL — HIGH (ref 70–99)
GLUCOSE SERPL-MCNC: 120 MG/DL
GLUCOSE SERPL-MCNC: 123 MG/DL — HIGH (ref 70–99)
GLUCOSE SERPL-MCNC: 125 MG/DL — HIGH (ref 70–99)
GLUCOSE SERPL-MCNC: 126 MG/DL — HIGH (ref 70–99)
GLUCOSE SERPL-MCNC: 127 MG/DL
GLUCOSE SERPL-MCNC: 128 MG/DL — HIGH (ref 70–99)
GLUCOSE SERPL-MCNC: 129 MG/DL — HIGH (ref 70–99)
GLUCOSE SERPL-MCNC: 129 MG/DL — HIGH (ref 70–99)
GLUCOSE SERPL-MCNC: 136 MG/DL — HIGH (ref 70–99)
GLUCOSE SERPL-MCNC: 138 MG/DL — HIGH (ref 70–99)
GLUCOSE SERPL-MCNC: 141 MG/DL — HIGH (ref 70–99)
GLUCOSE SERPL-MCNC: 147 MG/DL — HIGH (ref 70–99)
GLUCOSE SERPL-MCNC: 148 MG/DL — HIGH (ref 70–99)
GLUCOSE SERPL-MCNC: 154 MG/DL — HIGH (ref 70–99)
GLUCOSE SERPL-MCNC: 159 MG/DL — HIGH (ref 70–99)
GLUCOSE SERPL-MCNC: 175 MG/DL — HIGH (ref 70–99)
GLUCOSE SERPL-MCNC: 185 MG/DL
GLUCOSE SERPL-MCNC: 309 MG/DL — HIGH (ref 70–99)
GLUCOSE SERPL-MCNC: 56 MG/DL — LOW (ref 70–99)
GLUCOSE SERPL-MCNC: 70 MG/DL — SIGNIFICANT CHANGE UP (ref 70–99)
GLUCOSE SERPL-MCNC: 72 MG/DL — SIGNIFICANT CHANGE UP (ref 70–99)
GLUCOSE SERPL-MCNC: 76 MG/DL — SIGNIFICANT CHANGE UP (ref 70–99)
GLUCOSE SERPL-MCNC: 80 MG/DL — SIGNIFICANT CHANGE UP (ref 70–99)
GLUCOSE SERPL-MCNC: 80 MG/DL — SIGNIFICANT CHANGE UP (ref 70–99)
GLUCOSE SERPL-MCNC: 82 MG/DL — SIGNIFICANT CHANGE UP (ref 70–99)
GLUCOSE SERPL-MCNC: 82 MG/DL — SIGNIFICANT CHANGE UP (ref 70–99)
GLUCOSE SERPL-MCNC: 83 MG/DL — SIGNIFICANT CHANGE UP (ref 70–99)
GLUCOSE SERPL-MCNC: 84 MG/DL — SIGNIFICANT CHANGE UP (ref 70–99)
GLUCOSE SERPL-MCNC: 85 MG/DL — SIGNIFICANT CHANGE UP (ref 70–99)
GLUCOSE SERPL-MCNC: 86 MG/DL — SIGNIFICANT CHANGE UP (ref 70–99)
GLUCOSE SERPL-MCNC: 88 MG/DL — SIGNIFICANT CHANGE UP (ref 70–99)
GLUCOSE SERPL-MCNC: 89 MG/DL — SIGNIFICANT CHANGE UP (ref 70–99)
GLUCOSE SERPL-MCNC: 90 MG/DL — SIGNIFICANT CHANGE UP (ref 70–99)
GLUCOSE SERPL-MCNC: 90 MG/DL — SIGNIFICANT CHANGE UP (ref 70–99)
GLUCOSE SERPL-MCNC: 91 MG/DL — SIGNIFICANT CHANGE UP (ref 70–99)
GLUCOSE SERPL-MCNC: 92 MG/DL — SIGNIFICANT CHANGE UP (ref 70–99)
GLUCOSE SERPL-MCNC: 93 MG/DL — SIGNIFICANT CHANGE UP (ref 70–99)
GLUCOSE SERPL-MCNC: 95 MG/DL — SIGNIFICANT CHANGE UP (ref 70–99)
GLUCOSE SERPL-MCNC: 99 MG/DL — SIGNIFICANT CHANGE UP (ref 70–99)
GLUCOSE UR QL: NEGATIVE — SIGNIFICANT CHANGE UP
GRAM STN FLD: SIGNIFICANT CHANGE UP
GRAN CASTS # UR COMP ASSIST: SIGNIFICANT CHANGE UP /LPF
HAPTOGLOB SERPL-MCNC: 118 MG/DL — SIGNIFICANT CHANGE UP (ref 34–200)
HAPTOGLOB SERPL-MCNC: 61 MG/DL — SIGNIFICANT CHANGE UP (ref 34–200)
HAPTOGLOB SERPL-MCNC: 70 MG/DL — SIGNIFICANT CHANGE UP (ref 34–200)
HAPTOGLOB SERPL-MCNC: 77 MG/DL — SIGNIFICANT CHANGE UP (ref 34–200)
HAPTOGLOB SERPL-MCNC: 88 MG/DL — SIGNIFICANT CHANGE UP (ref 34–200)
HAPTOGLOB SERPL-MCNC: <20 MG/DL — LOW (ref 34–200)
HAPTOGLOB SERPL-MCNC: <20 MG/DL — LOW (ref 34–200)
HAV IGM SER-ACNC: SIGNIFICANT CHANGE UP
HBA1C MFR BLD HPLC: 6.1 %
HBA1C MFR BLD HPLC: 6.3 %
HBA1C MFR BLD HPLC: 6.5 %
HBV CORE IGM SER-ACNC: SIGNIFICANT CHANGE UP
HBV SURFACE AB SER QL: REACTIVE
HBV SURFACE AG SER-ACNC: SIGNIFICANT CHANGE UP
HCO3 BLDV-SCNC: 10 MMOL/L — CRITICAL LOW (ref 22–29)
HCO3 BLDV-SCNC: 17 MMOL/L — LOW (ref 22–29)
HCO3 BLDV-SCNC: 18 MMOL/L — LOW (ref 22–29)
HCO3 BLDV-SCNC: 19 MMOL/L — LOW (ref 22–29)
HCO3 BLDV-SCNC: 20 MMOL/L — LOW (ref 22–29)
HCO3 BLDV-SCNC: 21 MMOL/L — LOW (ref 22–29)
HCO3 BLDV-SCNC: 24 MMOL/L — SIGNIFICANT CHANGE UP (ref 22–29)
HCO3 BLDV-SCNC: 8 MMOL/L — CRITICAL LOW (ref 22–29)
HCT VFR BLD CALC: 16.3 % — CRITICAL LOW (ref 39–50)
HCT VFR BLD CALC: 17.7 % — CRITICAL LOW (ref 39–50)
HCT VFR BLD CALC: 20.4 % — CRITICAL LOW (ref 39–50)
HCT VFR BLD CALC: 20.4 % — CRITICAL LOW (ref 39–50)
HCT VFR BLD CALC: 21.4 % — LOW (ref 39–50)
HCT VFR BLD CALC: 21.8 % — LOW (ref 39–50)
HCT VFR BLD CALC: 21.9 % — LOW (ref 39–50)
HCT VFR BLD CALC: 22.1 % — LOW (ref 39–50)
HCT VFR BLD CALC: 22.2 % — LOW (ref 39–50)
HCT VFR BLD CALC: 22.4 % — LOW (ref 39–50)
HCT VFR BLD CALC: 22.6 % — LOW (ref 39–50)
HCT VFR BLD CALC: 22.9 % — LOW (ref 39–50)
HCT VFR BLD CALC: 22.9 % — LOW (ref 39–50)
HCT VFR BLD CALC: 23.4 % — LOW (ref 39–50)
HCT VFR BLD CALC: 23.8 % — LOW (ref 39–50)
HCT VFR BLD CALC: 24 % — LOW (ref 39–50)
HCT VFR BLD CALC: 24.3 % — LOW (ref 39–50)
HCT VFR BLD CALC: 24.3 % — LOW (ref 39–50)
HCT VFR BLD CALC: 24.6 % — LOW (ref 39–50)
HCT VFR BLD CALC: 24.7 % — LOW (ref 39–50)
HCT VFR BLD CALC: 25.1 % — LOW (ref 39–50)
HCT VFR BLD CALC: 25.3 % — LOW (ref 39–50)
HCT VFR BLD CALC: 25.4 % — LOW (ref 39–50)
HCT VFR BLD CALC: 25.5 % — LOW (ref 39–50)
HCT VFR BLD CALC: 25.9 % — LOW (ref 39–50)
HCT VFR BLD CALC: 26.3 % — LOW (ref 39–50)
HCT VFR BLD CALC: 26.5 % — LOW (ref 39–50)
HCT VFR BLD CALC: 26.8 % — LOW (ref 39–50)
HCT VFR BLD CALC: 27.1 % — LOW (ref 39–50)
HCT VFR BLD CALC: 27.1 % — LOW (ref 39–50)
HCT VFR BLD CALC: 27.5 % — LOW (ref 39–50)
HCT VFR BLD CALC: 27.8 % — LOW (ref 39–50)
HCT VFR BLD CALC: 27.8 % — LOW (ref 39–50)
HCT VFR BLD CALC: 28.2 %
HCT VFR BLD CALC: 28.3 % — LOW (ref 39–50)
HCT VFR BLD CALC: 28.3 % — LOW (ref 39–50)
HCT VFR BLD CALC: 28.6 % — LOW (ref 39–50)
HCT VFR BLD CALC: 28.8 % — LOW (ref 39–50)
HCT VFR BLD CALC: 29.4 % — LOW (ref 39–50)
HCT VFR BLD CALC: 29.6 % — LOW (ref 39–50)
HCT VFR BLD CALC: 29.9 % — LOW (ref 39–50)
HCT VFR BLD CALC: 32.6 %
HCT VFR BLD CALC: 35.3 %
HCT VFR BLD CALC: 36.4 %
HCT VFR BLDA CALC: 23 % — LOW (ref 39–51)
HCT VFR BLDA CALC: 23 % — LOW (ref 39–51)
HCT VFR BLDA CALC: 26 % — LOW (ref 39–51)
HCT VFR BLDA CALC: 27 % — LOW (ref 39–51)
HCT VFR BLDA CALC: 30 % — LOW (ref 39–51)
HCV AB S/CO SERPL IA: 0.31 S/CO — SIGNIFICANT CHANGE UP (ref 0–0.99)
HCV AB S/CO SERPL IA: 0.32 S/CO — SIGNIFICANT CHANGE UP (ref 0–0.99)
HCV AB SER QL: NONREACTIVE
HCV AB SERPL-IMP: SIGNIFICANT CHANGE UP
HCV AB SERPL-IMP: SIGNIFICANT CHANGE UP
HCV S/CO RATIO: 0.19 S/CO
HCYS SERPL-MCNC: 5.7 UMOL/L — SIGNIFICANT CHANGE UP
HDLC SERPL-MCNC: 10 MG/DL
HDLC SERPL-MCNC: 12 MG/DL — LOW
HEPARIN-PF4 AB RESULT: SIGNIFICANT CHANGE UP (ref 0–0.9)
HGB BLD CALC-MCNC: 10 G/DL — LOW (ref 12.6–17.4)
HGB BLD CALC-MCNC: 7.6 G/DL — LOW (ref 12.6–17.4)
HGB BLD CALC-MCNC: 7.8 G/DL — LOW (ref 12.6–17.4)
HGB BLD CALC-MCNC: 8.6 G/DL — LOW (ref 12.6–17.4)
HGB BLD CALC-MCNC: 8.6 G/DL — LOW (ref 12.6–17.4)
HGB BLD CALC-MCNC: 8.7 G/DL — LOW (ref 12.6–17.4)
HGB BLD CALC-MCNC: 8.7 G/DL — LOW (ref 12.6–17.4)
HGB BLD CALC-MCNC: 9.1 G/DL — LOW (ref 12.6–17.4)
HGB BLD-MCNC: 10.7 G/DL
HGB BLD-MCNC: 11.5 G/DL
HGB BLD-MCNC: 11.6 G/DL
HGB BLD-MCNC: 5 G/DL — CRITICAL LOW (ref 13–17)
HGB BLD-MCNC: 5.4 G/DL — CRITICAL LOW (ref 13–17)
HGB BLD-MCNC: 6.5 G/DL — CRITICAL LOW (ref 13–17)
HGB BLD-MCNC: 6.7 G/DL — CRITICAL LOW (ref 13–17)
HGB BLD-MCNC: 6.7 G/DL — CRITICAL LOW (ref 13–17)
HGB BLD-MCNC: 6.8 G/DL — CRITICAL LOW (ref 13–17)
HGB BLD-MCNC: 7.1 G/DL — LOW (ref 13–17)
HGB BLD-MCNC: 7.3 G/DL — LOW (ref 13–17)
HGB BLD-MCNC: 7.3 G/DL — LOW (ref 13–17)
HGB BLD-MCNC: 7.4 G/DL — LOW (ref 13–17)
HGB BLD-MCNC: 7.5 G/DL — LOW (ref 13–17)
HGB BLD-MCNC: 7.5 G/DL — LOW (ref 13–17)
HGB BLD-MCNC: 7.6 G/DL — LOW (ref 13–17)
HGB BLD-MCNC: 7.6 G/DL — LOW (ref 13–17)
HGB BLD-MCNC: 7.7 G/DL — LOW (ref 13–17)
HGB BLD-MCNC: 7.8 G/DL — LOW (ref 13–17)
HGB BLD-MCNC: 7.8 G/DL — LOW (ref 13–17)
HGB BLD-MCNC: 7.9 G/DL — LOW (ref 13–17)
HGB BLD-MCNC: 8 G/DL — LOW (ref 13–17)
HGB BLD-MCNC: 8.2 G/DL — LOW (ref 13–17)
HGB BLD-MCNC: 8.3 G/DL — LOW (ref 13–17)
HGB BLD-MCNC: 8.4 G/DL — LOW (ref 13–17)
HGB BLD-MCNC: 8.4 G/DL — LOW (ref 13–17)
HGB BLD-MCNC: 8.5 G/DL — LOW (ref 13–17)
HGB BLD-MCNC: 8.6 G/DL — LOW (ref 13–17)
HGB BLD-MCNC: 8.7 G/DL — LOW (ref 13–17)
HGB BLD-MCNC: 8.8 G/DL — LOW (ref 13–17)
HGB BLD-MCNC: 8.9 G/DL — LOW (ref 13–17)
HGB BLD-MCNC: 8.9 G/DL — LOW (ref 13–17)
HGB BLD-MCNC: 9.1 G/DL
HGB BLD-MCNC: 9.1 G/DL — LOW (ref 13–17)
HGB BLD-MCNC: 9.1 G/DL — LOW (ref 13–17)
HGB BLD-MCNC: 9.3 G/DL — LOW (ref 13–17)
HGB BLD-MCNC: 9.6 G/DL — LOW (ref 13–17)
HIV 1 & 2 AB SERPL IA.RAPID: SIGNIFICANT CHANGE UP
HIV 1+2 AB+HIV1 P24 AG SERPL QL IA: SIGNIFICANT CHANGE UP
HOROWITZ INDEX BLDV+IHG-RTO: 28 — SIGNIFICANT CHANGE UP
HOROWITZ INDEX BLDV+IHG-RTO: 28 — SIGNIFICANT CHANGE UP
HSV DNA1: SIGNIFICANT CHANGE UP
HSV DNA2: SIGNIFICANT CHANGE UP
HSV+VZV DNA SPEC QL NAA+PROBE: ABNORMAL
HSV1 DNA BLD QL NAA+PROBE: SIGNIFICANT CHANGE UP
HSV1 IGG SER-ACNC: 9.26 INDEX — HIGH
HSV1 IGG SERPL QL IA: POSITIVE
HSV2 DNA BLD QL NAA+PROBE: SIGNIFICANT CHANGE UP
HSV2 IGG FLD-ACNC: 0.06 INDEX — SIGNIFICANT CHANGE UP
HSV2 IGG SERPL QL IA: NEGATIVE — SIGNIFICANT CHANGE UP
HYALINE CASTS # UR AUTO: 2 /LPF — SIGNIFICANT CHANGE UP (ref 0–7)
HYALINE CASTS # UR AUTO: 32 /LPF — HIGH (ref 0–2)
HYALINE CASTS # UR AUTO: 4 /LPF — SIGNIFICANT CHANGE UP (ref 0–7)
HYALINE CASTS # UR AUTO: 5 /LPF — HIGH (ref 0–2)
IGA FLD-MCNC: 899 MG/DL — HIGH (ref 84–499)
IGG FLD-MCNC: 1394 MG/DL — SIGNIFICANT CHANGE UP (ref 610–1660)
IGG SERPL-MCNC: 1319 MG/DL — SIGNIFICANT CHANGE UP (ref 603–1613)
IGG1 SER-MCNC: 850 MG/DL — HIGH (ref 248–810)
IGG2 SER-MCNC: 248 MG/DL — SIGNIFICANT CHANGE UP (ref 130–555)
IGG3 SER-MCNC: 90 MG/DL — SIGNIFICANT CHANGE UP (ref 15–102)
IGG4 SER-MCNC: 23 MG/DL — SIGNIFICANT CHANGE UP (ref 2–96)
IGG4 SER-MCNC: 23 MG/DL — SIGNIFICANT CHANGE UP (ref 2–96)
IGG4 SER-MCNC: 27 MG/DL — SIGNIFICANT CHANGE UP (ref 2–96)
IGM SERPL-MCNC: 56 MG/DL — SIGNIFICANT CHANGE UP (ref 35–242)
IL2 SERPL-MCNC: HIGH PG/ML (ref 175.3–858.2)
IL2 SERPL-MCNC: HIGH PG/ML (ref 175.3–858.2)
IMM GRANULOCYTES NFR BLD AUTO: 0.9 %
IMM GRANULOCYTES NFR BLD AUTO: 1.1 %
IMM GRANULOCYTES NFR BLD AUTO: 1.8 %
IMM GRANULOCYTES NFR BLD AUTO: 1.8 %
INR BLD: 1.26 RATIO — HIGH (ref 0.88–1.16)
INR BLD: 1.26 RATIO — HIGH (ref 0.88–1.16)
INR BLD: 1.3 RATIO — HIGH (ref 0.88–1.16)
INR BLD: 1.36 RATIO — HIGH (ref 0.88–1.16)
INR BLD: 1.38 RATIO — HIGH (ref 0.88–1.16)
INR BLD: 1.42 RATIO — HIGH (ref 0.88–1.16)
INR BLD: 1.45 RATIO — HIGH (ref 0.88–1.16)
INR BLD: 1.53 RATIO — HIGH (ref 0.88–1.16)
INR BLD: 1.76 RATIO — HIGH (ref 0.88–1.16)
INR BLD: 1.8 RATIO — HIGH (ref 0.88–1.16)
INR BLD: ABNORMAL RATIO (ref 0.88–1.16)
INTERPRETATION SERPL IFE-IMP: SIGNIFICANT CHANGE UP
IRON SATN MFR SERPL: 34 % — SIGNIFICANT CHANGE UP (ref 16–55)
IRON SATN MFR SERPL: 43 UG/DL — LOW (ref 45–165)
JAK2 P.V617F BLD/T QL: SIGNIFICANT CHANGE UP
KAPPA LC SER QL IFE: 14.57 MG/DL — HIGH (ref 0.33–1.94)
KAPPA/LAMBDA FREE LIGHT CHAIN RATIO, SERUM: 0.63 RATIO — SIGNIFICANT CHANGE UP (ref 0.26–1.65)
KETONES UR-MCNC: NEGATIVE — SIGNIFICANT CHANGE UP
KETONES URINE: ABNORMAL
LA NT DPL PPP QL: 31.5 SEC — SIGNIFICANT CHANGE UP
LA NT DPL PPP QL: 36.3 SEC — SIGNIFICANT CHANGE UP
LACTATE BLDV-MCNC: 1.3 MMOL/L — SIGNIFICANT CHANGE UP (ref 0.7–2)
LACTATE BLDV-MCNC: 1.5 MMOL/L — SIGNIFICANT CHANGE UP (ref 0.7–2)
LACTATE BLDV-MCNC: 1.7 MMOL/L — SIGNIFICANT CHANGE UP (ref 0.7–2)
LACTATE BLDV-MCNC: 12 MMOL/L — CRITICAL HIGH (ref 0.7–2)
LACTATE BLDV-MCNC: 2.2 MMOL/L — HIGH (ref 0.7–2)
LACTATE BLDV-MCNC: 2.4 MMOL/L — HIGH (ref 0.7–2)
LACTATE BLDV-MCNC: 2.9 MMOL/L — HIGH (ref 0.7–2)
LACTATE BLDV-MCNC: >16 MMOL/L — CRITICAL HIGH (ref 0.7–2)
LACTATE SERPL-SCNC: 13.1 MMOL/L — CRITICAL HIGH (ref 0.7–2)
LAMBDA LC SER QL IFE: 23.09 MG/DL — HIGH (ref 0.57–2.63)
LDH SERPL L TO P-CCNC: 103 U/L — SIGNIFICANT CHANGE UP
LDH SERPL L TO P-CCNC: 1034 U/L — HIGH (ref 50–242)
LDH SERPL L TO P-CCNC: 1976 U/L — HIGH (ref 50–242)
LDH SERPL L TO P-CCNC: 253 U/L — HIGH (ref 50–242)
LDH SERPL L TO P-CCNC: 311 U/L — HIGH (ref 50–242)
LDH SERPL L TO P-CCNC: 318 U/L — HIGH (ref 50–242)
LDH SERPL L TO P-CCNC: 347 U/L — HIGH (ref 50–242)
LDH SERPL L TO P-CCNC: 359 U/L — HIGH (ref 50–242)
LDH SERPL L TO P-CCNC: 395 U/L — HIGH (ref 50–242)
LDH SERPL L TO P-CCNC: 455 U/L — HIGH (ref 50–242)
LDH SERPL L TO P-CCNC: 577 U/L — HIGH (ref 50–242)
LDH SERPL L TO P-CCNC: 817 U/L — HIGH (ref 50–242)
LDLC SERPL CALC-MCNC: 64 MG/DL
LEGIONELLA AG UR QL: NEGATIVE — SIGNIFICANT CHANGE UP
LEUKOCYTE ESTERASE UR-ACNC: NEGATIVE — SIGNIFICANT CHANGE UP
LEUKOCYTE ESTERASE URINE: ABNORMAL
LIDOCAIN IGE QN: 149 U/L — HIGH (ref 7–60)
LIDOCAIN IGE QN: 158 U/L — HIGH (ref 7–60)
LIDOCAIN IGE QN: 740 U/L — HIGH (ref 7–60)
LIPID PNL WITH DIRECT LDL SERPL: SIGNIFICANT CHANGE UP MG/DL
LPL SERPL-CCNC: 468 U/L
LYMPHOCYTES # BLD AUTO: 0.28 K/UL — LOW (ref 1–3.3)
LYMPHOCYTES # BLD AUTO: 0.45 K/UL — LOW (ref 1–3.3)
LYMPHOCYTES # BLD AUTO: 0.52 K/UL — LOW (ref 1–3.3)
LYMPHOCYTES # BLD AUTO: 0.9 K/UL
LYMPHOCYTES # BLD AUTO: 0.92 K/UL — LOW (ref 1–3.3)
LYMPHOCYTES # BLD AUTO: 1.02 K/UL
LYMPHOCYTES # BLD AUTO: 1.08 K/UL
LYMPHOCYTES # BLD AUTO: 1.21 K/UL — SIGNIFICANT CHANGE UP (ref 1–3.3)
LYMPHOCYTES # BLD AUTO: 1.54 K/UL
LYMPHOCYTES # BLD AUTO: 1.59 K/UL — SIGNIFICANT CHANGE UP (ref 1–3.3)
LYMPHOCYTES # BLD AUTO: 1.79 K/UL — SIGNIFICANT CHANGE UP (ref 1–3.3)
LYMPHOCYTES # BLD AUTO: 11 % — LOW (ref 13–44)
LYMPHOCYTES # BLD AUTO: 11.5 % — LOW (ref 13–44)
LYMPHOCYTES # BLD AUTO: 12 % — LOW (ref 13–44)
LYMPHOCYTES # BLD AUTO: 15.6 % — SIGNIFICANT CHANGE UP (ref 13–44)
LYMPHOCYTES # BLD AUTO: 2.6 % — LOW (ref 13–44)
LYMPHOCYTES # BLD AUTO: 3.67 K/UL — HIGH (ref 1–3.3)
LYMPHOCYTES # BLD AUTO: 4.4 % — LOW (ref 13–44)
LYMPHOCYTES # BLD AUTO: 4.6 % — LOW (ref 13–44)
LYMPHOCYTES # BLD AUTO: 5.3 % — LOW (ref 13–44)
LYMPHOCYTES # BLD AUTO: 5.4 % — LOW (ref 13–44)
LYMPHOCYTES # BLD AUTO: 7.23 K/UL — HIGH (ref 1–3.3)
LYMPHOCYTES # FLD: 21 % — SIGNIFICANT CHANGE UP
LYMPHOCYTES # FLD: 8 % — SIGNIFICANT CHANGE UP
LYMPHOCYTES # SPEC AUTO: 0.9 % — HIGH (ref 0–0)
LYMPHOCYTES NFR BLD AUTO: 15.7 %
LYMPHOCYTES NFR BLD AUTO: 18.3 %
LYMPHOCYTES NFR BLD AUTO: 19.8 %
LYMPHOCYTES NFR BLD AUTO: 21.2 %
LYMPHOCYTES, ABSOLUTE: 1613 CELLS/UL — SIGNIFICANT CHANGE UP (ref 850–3900)
M TB IFN-G BLD-IMP: ABNORMAL
M TB IFN-G CD4+ BCKGRND COR BLD-ACNC: 0 IU/ML — SIGNIFICANT CHANGE UP
M TB IFN-G CD4+ BCKGRND COR BLD-ACNC: 0 IU/ML — SIGNIFICANT CHANGE UP
M TB IFN-G CD4+ BCKGRND COR BLD-ACNC: 0.02 IU/ML — SIGNIFICANT CHANGE UP
M TB IFN-G CD4+CD8+ BCKGRND COR BLD-ACNC: -0.02 IU/ML — SIGNIFICANT CHANGE UP
M TB IFN-G CD4+CD8+ BCKGRND COR BLD-ACNC: 0 IU/ML — SIGNIFICANT CHANGE UP
M TB IFN-G CD4+CD8+ BCKGRND COR BLD-ACNC: 0.02 IU/ML — SIGNIFICANT CHANGE UP
M-SPIKE: SIGNIFICANT CHANGE UP (ref 0–0)
MACROCYTES BLD QL: SLIGHT — SIGNIFICANT CHANGE UP
MACROCYTES BLD QL: SLIGHT — SIGNIFICANT CHANGE UP
MAGNESIUM SERPL-MCNC: 1.6 MG/DL — SIGNIFICANT CHANGE UP (ref 1.6–2.6)
MAGNESIUM SERPL-MCNC: 1.6 MG/DL — SIGNIFICANT CHANGE UP (ref 1.6–2.6)
MAGNESIUM SERPL-MCNC: 1.7 MG/DL — SIGNIFICANT CHANGE UP (ref 1.6–2.6)
MAGNESIUM SERPL-MCNC: 1.8 MG/DL — SIGNIFICANT CHANGE UP (ref 1.6–2.6)
MAGNESIUM SERPL-MCNC: 1.9 MG/DL — SIGNIFICANT CHANGE UP (ref 1.6–2.6)
MAGNESIUM SERPL-MCNC: 2 MG/DL — SIGNIFICANT CHANGE UP (ref 1.6–2.6)
MAGNESIUM SERPL-MCNC: 2.1 MG/DL — SIGNIFICANT CHANGE UP (ref 1.6–2.6)
MAGNESIUM SERPL-MCNC: 2.2 MG/DL — SIGNIFICANT CHANGE UP (ref 1.6–2.6)
MAGNESIUM SERPL-MCNC: 2.2 MG/DL — SIGNIFICANT CHANGE UP (ref 1.6–2.6)
MAGNESIUM SERPL-MCNC: 2.3 MG/DL — SIGNIFICANT CHANGE UP (ref 1.6–2.6)
MAGNESIUM SERPL-MCNC: 2.4 MG/DL — SIGNIFICANT CHANGE UP (ref 1.6–2.6)
MAGNESIUM SERPL-MCNC: 2.4 MG/DL — SIGNIFICANT CHANGE UP (ref 1.6–2.6)
MAGNESIUM SERPL-MCNC: 2.5 MG/DL — SIGNIFICANT CHANGE UP (ref 1.6–2.6)
MAN DIFF?: NORMAL
MANUAL SMEAR VERIFICATION: SIGNIFICANT CHANGE UP
MCHC RBC-ENTMCNC: 29.5 PG — SIGNIFICANT CHANGE UP (ref 27–34)
MCHC RBC-ENTMCNC: 29.7 PG — SIGNIFICANT CHANGE UP (ref 27–34)
MCHC RBC-ENTMCNC: 29.8 GM/DL — LOW (ref 32–36)
MCHC RBC-ENTMCNC: 29.8 GM/DL — LOW (ref 32–36)
MCHC RBC-ENTMCNC: 29.8 PG
MCHC RBC-ENTMCNC: 29.8 PG — SIGNIFICANT CHANGE UP (ref 27–34)
MCHC RBC-ENTMCNC: 29.9 PG — SIGNIFICANT CHANGE UP (ref 27–34)
MCHC RBC-ENTMCNC: 30 GM/DL — LOW (ref 32–36)
MCHC RBC-ENTMCNC: 30 PG — SIGNIFICANT CHANGE UP (ref 27–34)
MCHC RBC-ENTMCNC: 30 PG — SIGNIFICANT CHANGE UP (ref 27–34)
MCHC RBC-ENTMCNC: 30.1 PG — SIGNIFICANT CHANGE UP (ref 27–34)
MCHC RBC-ENTMCNC: 30.1 PG — SIGNIFICANT CHANGE UP (ref 27–34)
MCHC RBC-ENTMCNC: 30.2 GM/DL — LOW (ref 32–36)
MCHC RBC-ENTMCNC: 30.2 PG — SIGNIFICANT CHANGE UP (ref 27–34)
MCHC RBC-ENTMCNC: 30.2 PG — SIGNIFICANT CHANGE UP (ref 27–34)
MCHC RBC-ENTMCNC: 30.3 GM/DL — LOW (ref 32–36)
MCHC RBC-ENTMCNC: 30.3 PG — SIGNIFICANT CHANGE UP (ref 27–34)
MCHC RBC-ENTMCNC: 30.3 PG — SIGNIFICANT CHANGE UP (ref 27–34)
MCHC RBC-ENTMCNC: 30.4 GM/DL — LOW (ref 32–36)
MCHC RBC-ENTMCNC: 30.4 GM/DL — LOW (ref 32–36)
MCHC RBC-ENTMCNC: 30.4 PG — SIGNIFICANT CHANGE UP (ref 27–34)
MCHC RBC-ENTMCNC: 30.5 GM/DL — LOW (ref 32–36)
MCHC RBC-ENTMCNC: 30.5 GM/DL — LOW (ref 32–36)
MCHC RBC-ENTMCNC: 30.6 GM/DL — LOW (ref 32–36)
MCHC RBC-ENTMCNC: 30.6 PG — SIGNIFICANT CHANGE UP (ref 27–34)
MCHC RBC-ENTMCNC: 30.7 GM/DL — LOW (ref 32–36)
MCHC RBC-ENTMCNC: 30.7 PG — SIGNIFICANT CHANGE UP (ref 27–34)
MCHC RBC-ENTMCNC: 30.8 GM/DL — LOW (ref 32–36)
MCHC RBC-ENTMCNC: 30.8 PG
MCHC RBC-ENTMCNC: 30.8 PG — SIGNIFICANT CHANGE UP (ref 27–34)
MCHC RBC-ENTMCNC: 30.8 PG — SIGNIFICANT CHANGE UP (ref 27–34)
MCHC RBC-ENTMCNC: 30.9 PG — SIGNIFICANT CHANGE UP (ref 27–34)
MCHC RBC-ENTMCNC: 30.9 PG — SIGNIFICANT CHANGE UP (ref 27–34)
MCHC RBC-ENTMCNC: 31 PG
MCHC RBC-ENTMCNC: 31 PG
MCHC RBC-ENTMCNC: 31 PG — SIGNIFICANT CHANGE UP (ref 27–34)
MCHC RBC-ENTMCNC: 31.1 GM/DL — LOW (ref 32–36)
MCHC RBC-ENTMCNC: 31.1 PG — SIGNIFICANT CHANGE UP (ref 27–34)
MCHC RBC-ENTMCNC: 31.2 PG — SIGNIFICANT CHANGE UP (ref 27–34)
MCHC RBC-ENTMCNC: 31.3 GM/DL — LOW (ref 32–36)
MCHC RBC-ENTMCNC: 31.3 GM/DL — LOW (ref 32–36)
MCHC RBC-ENTMCNC: 31.3 PG — SIGNIFICANT CHANGE UP (ref 27–34)
MCHC RBC-ENTMCNC: 31.4 GM/DL — LOW (ref 32–36)
MCHC RBC-ENTMCNC: 31.4 GM/DL — LOW (ref 32–36)
MCHC RBC-ENTMCNC: 31.4 PG — SIGNIFICANT CHANGE UP (ref 27–34)
MCHC RBC-ENTMCNC: 31.5 PG — SIGNIFICANT CHANGE UP (ref 27–34)
MCHC RBC-ENTMCNC: 31.5 PG — SIGNIFICANT CHANGE UP (ref 27–34)
MCHC RBC-ENTMCNC: 31.6 GM/DL — LOW (ref 32–36)
MCHC RBC-ENTMCNC: 31.7 GM/DL — LOW (ref 32–36)
MCHC RBC-ENTMCNC: 31.9 GM/DL
MCHC RBC-ENTMCNC: 32 GM/DL — SIGNIFICANT CHANGE UP (ref 32–36)
MCHC RBC-ENTMCNC: 32.1 GM/DL — SIGNIFICANT CHANGE UP (ref 32–36)
MCHC RBC-ENTMCNC: 32.3 GM/DL
MCHC RBC-ENTMCNC: 32.3 GM/DL — SIGNIFICANT CHANGE UP (ref 32–36)
MCHC RBC-ENTMCNC: 32.3 GM/DL — SIGNIFICANT CHANGE UP (ref 32–36)
MCHC RBC-ENTMCNC: 32.4 GM/DL — SIGNIFICANT CHANGE UP (ref 32–36)
MCHC RBC-ENTMCNC: 32.4 GM/DL — SIGNIFICANT CHANGE UP (ref 32–36)
MCHC RBC-ENTMCNC: 32.5 GM/DL — SIGNIFICANT CHANGE UP (ref 32–36)
MCHC RBC-ENTMCNC: 32.5 GM/DL — SIGNIFICANT CHANGE UP (ref 32–36)
MCHC RBC-ENTMCNC: 32.6 GM/DL
MCHC RBC-ENTMCNC: 32.7 GM/DL — SIGNIFICANT CHANGE UP (ref 32–36)
MCHC RBC-ENTMCNC: 32.8 GM/DL
MCHC RBC-ENTMCNC: 32.8 GM/DL — SIGNIFICANT CHANGE UP (ref 32–36)
MCHC RBC-ENTMCNC: 33.3 GM/DL — SIGNIFICANT CHANGE UP (ref 32–36)
MCHC RBC-ENTMCNC: 33.3 GM/DL — SIGNIFICANT CHANGE UP (ref 32–36)
MCV RBC AUTO: 100 FL — SIGNIFICANT CHANGE UP (ref 80–100)
MCV RBC AUTO: 100.4 FL — HIGH (ref 80–100)
MCV RBC AUTO: 101.4 FL — HIGH (ref 80–100)
MCV RBC AUTO: 101.8 FL — HIGH (ref 80–100)
MCV RBC AUTO: 102.5 FL — HIGH (ref 80–100)
MCV RBC AUTO: 105.7 FL — HIGH (ref 80–100)
MCV RBC AUTO: 92.5 FL
MCV RBC AUTO: 93.7 FL — SIGNIFICANT CHANGE UP (ref 80–100)
MCV RBC AUTO: 93.9 FL
MCV RBC AUTO: 94 FL — SIGNIFICANT CHANGE UP (ref 80–100)
MCV RBC AUTO: 94.1 FL — SIGNIFICANT CHANGE UP (ref 80–100)
MCV RBC AUTO: 94.2 FL — SIGNIFICANT CHANGE UP (ref 80–100)
MCV RBC AUTO: 94.4 FL — SIGNIFICANT CHANGE UP (ref 80–100)
MCV RBC AUTO: 94.6 FL — SIGNIFICANT CHANGE UP (ref 80–100)
MCV RBC AUTO: 94.7 FL — SIGNIFICANT CHANGE UP (ref 80–100)
MCV RBC AUTO: 94.8 FL — SIGNIFICANT CHANGE UP (ref 80–100)
MCV RBC AUTO: 94.8 FL — SIGNIFICANT CHANGE UP (ref 80–100)
MCV RBC AUTO: 95 FL — SIGNIFICANT CHANGE UP (ref 80–100)
MCV RBC AUTO: 95.1 FL
MCV RBC AUTO: 95.2 FL — SIGNIFICANT CHANGE UP (ref 80–100)
MCV RBC AUTO: 95.2 FL — SIGNIFICANT CHANGE UP (ref 80–100)
MCV RBC AUTO: 95.6 FL — SIGNIFICANT CHANGE UP (ref 80–100)
MCV RBC AUTO: 95.7 FL — SIGNIFICANT CHANGE UP (ref 80–100)
MCV RBC AUTO: 95.8 FL — SIGNIFICANT CHANGE UP (ref 80–100)
MCV RBC AUTO: 95.8 FL — SIGNIFICANT CHANGE UP (ref 80–100)
MCV RBC AUTO: 96.1 FL — SIGNIFICANT CHANGE UP (ref 80–100)
MCV RBC AUTO: 96.1 FL — SIGNIFICANT CHANGE UP (ref 80–100)
MCV RBC AUTO: 96.2 FL — SIGNIFICANT CHANGE UP (ref 80–100)
MCV RBC AUTO: 96.2 FL — SIGNIFICANT CHANGE UP (ref 80–100)
MCV RBC AUTO: 96.6 FL — SIGNIFICANT CHANGE UP (ref 80–100)
MCV RBC AUTO: 97 FL — SIGNIFICANT CHANGE UP (ref 80–100)
MCV RBC AUTO: 97.3 FL
MCV RBC AUTO: 97.3 FL — SIGNIFICANT CHANGE UP (ref 80–100)
MCV RBC AUTO: 97.6 FL — SIGNIFICANT CHANGE UP (ref 80–100)
MCV RBC AUTO: 97.8 FL — SIGNIFICANT CHANGE UP (ref 80–100)
MCV RBC AUTO: 98.3 FL — SIGNIFICANT CHANGE UP (ref 80–100)
MCV RBC AUTO: 99 FL — SIGNIFICANT CHANGE UP (ref 80–100)
MCV RBC AUTO: 99.1 FL — SIGNIFICANT CHANGE UP (ref 80–100)
MCV RBC AUTO: 99.2 FL — SIGNIFICANT CHANGE UP (ref 80–100)
MCV RBC AUTO: 99.3 FL — SIGNIFICANT CHANGE UP (ref 80–100)
MCV RBC AUTO: 99.6 FL — SIGNIFICANT CHANGE UP (ref 80–100)
MESOTHL CELL # FLD: 2 % — SIGNIFICANT CHANGE UP
METAMYELOCYTES # FLD: 0.9 % — HIGH (ref 0–0)
METAMYELOCYTES # FLD: 1.8 % — HIGH (ref 0–0)
METAMYELOCYTES # FLD: 1.8 % — HIGH (ref 0–0)
METAMYELOCYTES # FLD: 2.8 % — HIGH (ref 0–0)
METAMYELOCYTES # FLD: 3.5 % — HIGH (ref 0–0)
METAMYELOCYTES # FLD: 3.5 % — HIGH (ref 0–0)
METAMYELOCYTES # FLD: 4.3 % — HIGH (ref 0–0)
MICROCYTES BLD QL: SLIGHT — SIGNIFICANT CHANGE UP
MONOCYTES # BLD AUTO: 0.05 K/UL — SIGNIFICANT CHANGE UP (ref 0–0.9)
MONOCYTES # BLD AUTO: 0.19 K/UL — SIGNIFICANT CHANGE UP (ref 0–0.9)
MONOCYTES # BLD AUTO: 0.23 K/UL — SIGNIFICANT CHANGE UP (ref 0–0.9)
MONOCYTES # BLD AUTO: 0.26 K/UL — SIGNIFICANT CHANGE UP (ref 0–0.9)
MONOCYTES # BLD AUTO: 0.33 K/UL — SIGNIFICANT CHANGE UP (ref 0–0.9)
MONOCYTES # BLD AUTO: 0.41 K/UL
MONOCYTES # BLD AUTO: 0.42 K/UL
MONOCYTES # BLD AUTO: 0.45 K/UL — SIGNIFICANT CHANGE UP (ref 0–0.9)
MONOCYTES # BLD AUTO: 0.69 K/UL
MONOCYTES # BLD AUTO: 1.12 K/UL
MONOCYTES # BLD AUTO: 1.79 K/UL — HIGH (ref 0–0.9)
MONOCYTES # BLD AUTO: 1.93 K/UL — HIGH (ref 0–0.9)
MONOCYTES # BLD AUTO: 2.83 K/UL — HIGH (ref 0–0.9)
MONOCYTES NFR BLD AUTO: 0.9 % — LOW (ref 2–14)
MONOCYTES NFR BLD AUTO: 1 % — LOW (ref 2–14)
MONOCYTES NFR BLD AUTO: 1.8 % — LOW (ref 2–14)
MONOCYTES NFR BLD AUTO: 11.5 %
MONOCYTES NFR BLD AUTO: 12.4 %
MONOCYTES NFR BLD AUTO: 2.6 % — SIGNIFICANT CHANGE UP (ref 2–14)
MONOCYTES NFR BLD AUTO: 2.7 % — SIGNIFICANT CHANGE UP (ref 2–14)
MONOCYTES NFR BLD AUTO: 3 % — SIGNIFICANT CHANGE UP (ref 2–14)
MONOCYTES NFR BLD AUTO: 4.4 % — SIGNIFICANT CHANGE UP (ref 2–14)
MONOCYTES NFR BLD AUTO: 5.6 % — SIGNIFICANT CHANGE UP (ref 2–14)
MONOCYTES NFR BLD AUTO: 6.1 % — SIGNIFICANT CHANGE UP (ref 2–14)
MONOCYTES NFR BLD AUTO: 8 %
MONOCYTES NFR BLD AUTO: 9.2 %
MONOS+MACROS # FLD: 5 % — SIGNIFICANT CHANGE UP
MONOS+MACROS # FLD: 9 % — SIGNIFICANT CHANGE UP
MPO AB + PR3 PNL SER: SIGNIFICANT CHANGE UP
MPO AB + PR3 PNL SER: SIGNIFICANT CHANGE UP
MRSA PCR RESULT.: SIGNIFICANT CHANGE UP
MRSA PCR RESULT.: SIGNIFICANT CHANGE UP
MYELOCYTES NFR BLD: 1.7 % — HIGH (ref 0–0)
MYELOCYTES NFR BLD: 12.2 % — HIGH (ref 0–0)
MYELOCYTES NFR BLD: 2.8 % — HIGH (ref 0–0)
MYELOCYTES NFR BLD: 3 % — HIGH (ref 0–0)
MYELOCYTES NFR BLD: 6 % — HIGH (ref 0–0)
NEUTROPHILS # BLD AUTO: 12.9 K/UL — HIGH (ref 1.8–7.4)
NEUTROPHILS # BLD AUTO: 26.36 K/UL — HIGH (ref 1.8–7.4)
NEUTROPHILS # BLD AUTO: 27.83 K/UL — HIGH (ref 1.8–7.4)
NEUTROPHILS # BLD AUTO: 3.1 K/UL
NEUTROPHILS # BLD AUTO: 3.43 K/UL
NEUTROPHILS # BLD AUTO: 3.58 K/UL
NEUTROPHILS # BLD AUTO: 34.67 K/UL — HIGH (ref 1.8–7.4)
NEUTROPHILS # BLD AUTO: 34.68 K/UL — HIGH (ref 1.8–7.4)
NEUTROPHILS # BLD AUTO: 4.86 K/UL — SIGNIFICANT CHANGE UP (ref 1.8–7.4)
NEUTROPHILS # BLD AUTO: 6.29 K/UL — SIGNIFICANT CHANGE UP (ref 1.8–7.4)
NEUTROPHILS # BLD AUTO: 6.76 K/UL
NEUTROPHILS # BLD AUTO: 8.34 K/UL — HIGH (ref 1.8–7.4)
NEUTROPHILS # BLD AUTO: 8.69 K/UL — HIGH (ref 1.8–7.4)
NEUTROPHILS NFR BLD AUTO: 64.4 %
NEUTROPHILS NFR BLD AUTO: 67.2 %
NEUTROPHILS NFR BLD AUTO: 67.8 % — SIGNIFICANT CHANGE UP (ref 43–77)
NEUTROPHILS NFR BLD AUTO: 68.2 %
NEUTROPHILS NFR BLD AUTO: 68.7 % — SIGNIFICANT CHANGE UP (ref 43–77)
NEUTROPHILS NFR BLD AUTO: 69.2 %
NEUTROPHILS NFR BLD AUTO: 71 % — SIGNIFICANT CHANGE UP (ref 43–77)
NEUTROPHILS NFR BLD AUTO: 71.3 % — SIGNIFICANT CHANGE UP (ref 43–77)
NEUTROPHILS NFR BLD AUTO: 73.7 % — SIGNIFICANT CHANGE UP (ref 43–77)
NEUTROPHILS NFR BLD AUTO: 75.2 % — SIGNIFICANT CHANGE UP (ref 43–77)
NEUTROPHILS NFR BLD AUTO: 79 % — HIGH (ref 43–77)
NEUTROPHILS NFR BLD AUTO: 80.2 % — HIGH (ref 43–77)
NEUTROPHILS NFR BLD AUTO: 89.5 % — HIGH (ref 43–77)
NEUTROPHILS-BODY FLUID: 64 % — SIGNIFICANT CHANGE UP
NEUTROPHILS-BODY FLUID: 82 % — SIGNIFICANT CHANGE UP
NEUTS BAND # BLD: 1.7 % — SIGNIFICANT CHANGE UP (ref 0–8)
NEUTS BAND # BLD: 11.4 % — HIGH (ref 0–8)
NEUTS BAND # BLD: 3 % — SIGNIFICANT CHANGE UP (ref 0–8)
NEUTS BAND # BLD: 4.4 % — SIGNIFICANT CHANGE UP (ref 0–8)
NEUTS BAND # BLD: 5.2 % — SIGNIFICANT CHANGE UP (ref 0–8)
NEUTS BAND # BLD: 7 % — SIGNIFICANT CHANGE UP (ref 0–8)
NEUTS BAND # BLD: 8 % — SIGNIFICANT CHANGE UP (ref 0–8)
NEUTS BAND # BLD: 9.3 % — HIGH (ref 0–8)
NEUTS BAND # BLD: 9.9 % — HIGH (ref 0–8)
NIGHT BLUE STAIN TISS: SIGNIFICANT CHANGE UP
NITRITE UR-MCNC: NEGATIVE — SIGNIFICANT CHANGE UP
NITRITE URINE: ABNORMAL
NON HDL CHOLESTEROL: 91 MG/DL — SIGNIFICANT CHANGE UP
NON-GYNECOLOGICAL CYTOLOGY STUDY: SIGNIFICANT CHANGE UP
NONHDLC SERPL-MCNC: 143 MG/DL
NORMALIZED SCT PPP-RTO: 0.59 RATIO — SIGNIFICANT CHANGE UP (ref 0–1.16)
NORMALIZED SCT PPP-RTO: 0.69 RATIO — SIGNIFICANT CHANGE UP (ref 0–1.16)
NORMALIZED SCT PPP-RTO: SIGNIFICANT CHANGE UP
NORMALIZED SCT PPP-RTO: SIGNIFICANT CHANGE UP
NRBC # BLD: 0 /100 WBCS — SIGNIFICANT CHANGE UP (ref 0–0)
NRBC # BLD: 0 /100 — SIGNIFICANT CHANGE UP (ref 0–0)
NRBC # BLD: 1 /100 — HIGH (ref 0–0)
NRBC # BLD: 10 /100 WBCS — HIGH (ref 0–0)
NRBC # BLD: 2 /100 — HIGH (ref 0–0)
NRBC # BLD: 5 /100 — HIGH (ref 0–0)
NRBC # BLD: 7 /100 WBCS — HIGH (ref 0–0)
OSMOLALITY SERPL: 283 MOSMOL/KG — SIGNIFICANT CHANGE UP (ref 280–301)
OSMOLALITY UR: 240 MOS/KG — LOW (ref 300–900)
OSMOLALITY UR: 415 MOS/KG — SIGNIFICANT CHANGE UP (ref 300–900)
OTHER CELLS FLD MANUAL: 2 % — SIGNIFICANT CHANGE UP
OTHER CELLS FLD MANUAL: 4 % — SIGNIFICANT CHANGE UP
OVALOCYTES BLD QL SMEAR: SLIGHT — SIGNIFICANT CHANGE UP
P-ANCA SER-ACNC: NEGATIVE — SIGNIFICANT CHANGE UP
P-ANCA SER-ACNC: NEGATIVE — SIGNIFICANT CHANGE UP
PCO2 BLDV: 21 MMHG — LOW (ref 42–55)
PCO2 BLDV: 31 MMHG — LOW (ref 42–55)
PCO2 BLDV: 31 MMHG — LOW (ref 42–55)
PCO2 BLDV: 33 MMHG — LOW (ref 42–55)
PCO2 BLDV: 36 MMHG — LOW (ref 42–55)
PCO2 BLDV: 39 MMHG — LOW (ref 42–55)
PCO2 BLDV: 43 MMHG — SIGNIFICANT CHANGE UP (ref 42–55)
PCO2 BLDV: 49 MMHG — SIGNIFICANT CHANGE UP (ref 42–55)
PF4 HEPARIN CMPLX AB SER-ACNC: NEGATIVE — SIGNIFICANT CHANGE UP
PH BLDV: 7.27 — LOW (ref 7.32–7.43)
PH BLDV: 7.28 — LOW (ref 7.32–7.43)
PH BLDV: 7.33 — SIGNIFICANT CHANGE UP (ref 7.32–7.43)
PH BLDV: 7.35 — SIGNIFICANT CHANGE UP (ref 7.32–7.43)
PH BLDV: 7.38 — SIGNIFICANT CHANGE UP (ref 7.32–7.43)
PH BLDV: 7.39 — SIGNIFICANT CHANGE UP (ref 7.32–7.43)
PH BLDV: 7.4 — SIGNIFICANT CHANGE UP (ref 7.32–7.43)
PH BLDV: <6.9 — CRITICAL LOW (ref 7.32–7.43)
PH UR: 5.5 — SIGNIFICANT CHANGE UP (ref 5–8)
PH UR: 6 — SIGNIFICANT CHANGE UP (ref 5–8)
PH URINE: ABNORMAL
PHOSPHATE 24H UR-MCNC: 41.7 MG/DL — SIGNIFICANT CHANGE UP
PHOSPHATE SERPL-MCNC: 2.8 MG/DL — SIGNIFICANT CHANGE UP (ref 2.5–4.5)
PHOSPHATE SERPL-MCNC: 2.9 MG/DL — SIGNIFICANT CHANGE UP (ref 2.5–4.5)
PHOSPHATE SERPL-MCNC: 3 MG/DL — SIGNIFICANT CHANGE UP (ref 2.5–4.5)
PHOSPHATE SERPL-MCNC: 3.3 MG/DL
PHOSPHATE SERPL-MCNC: 3.4 MG/DL — SIGNIFICANT CHANGE UP (ref 2.5–4.5)
PHOSPHATE SERPL-MCNC: 3.5 MG/DL — SIGNIFICANT CHANGE UP (ref 2.5–4.5)
PHOSPHATE SERPL-MCNC: 3.5 MG/DL — SIGNIFICANT CHANGE UP (ref 2.5–4.5)
PHOSPHATE SERPL-MCNC: 3.6 MG/DL — SIGNIFICANT CHANGE UP (ref 2.5–4.5)
PHOSPHATE SERPL-MCNC: 3.7 MG/DL — SIGNIFICANT CHANGE UP (ref 2.5–4.5)
PHOSPHATE SERPL-MCNC: 3.8 MG/DL
PHOSPHATE SERPL-MCNC: 3.8 MG/DL — SIGNIFICANT CHANGE UP (ref 2.5–4.5)
PHOSPHATE SERPL-MCNC: 3.9 MG/DL — SIGNIFICANT CHANGE UP (ref 2.5–4.5)
PHOSPHATE SERPL-MCNC: 4 MG/DL — SIGNIFICANT CHANGE UP (ref 2.5–4.5)
PHOSPHATE SERPL-MCNC: 4.1 MG/DL — SIGNIFICANT CHANGE UP (ref 2.5–4.5)
PHOSPHATE SERPL-MCNC: 4.2 MG/DL
PHOSPHATE SERPL-MCNC: 4.2 MG/DL — SIGNIFICANT CHANGE UP (ref 2.5–4.5)
PHOSPHATE SERPL-MCNC: 4.3 MG/DL — SIGNIFICANT CHANGE UP (ref 2.5–4.5)
PHOSPHATE SERPL-MCNC: 4.4 MG/DL — SIGNIFICANT CHANGE UP (ref 2.5–4.5)
PHOSPHATE SERPL-MCNC: 4.5 MG/DL — SIGNIFICANT CHANGE UP (ref 2.5–4.5)
PHOSPHATE SERPL-MCNC: 4.6 MG/DL — HIGH (ref 2.5–4.5)
PHOSPHATE SERPL-MCNC: 4.9 MG/DL — HIGH (ref 2.5–4.5)
PHOSPHATE SERPL-MCNC: 5 MG/DL — HIGH (ref 2.5–4.5)
PHOSPHATE SERPL-MCNC: 5.1 MG/DL — HIGH (ref 2.5–4.5)
PHOSPHATE SERPL-MCNC: 5.1 MG/DL — HIGH (ref 2.5–4.5)
PHOSPHATE SERPL-MCNC: 5.3 MG/DL — HIGH (ref 2.5–4.5)
PHOSPHATE SERPL-MCNC: 5.4 MG/DL — HIGH (ref 2.5–4.5)
PHOSPHATE SERPL-MCNC: 5.6 MG/DL — HIGH (ref 2.5–4.5)
PHOSPHATE SERPL-MCNC: 5.7 MG/DL — HIGH (ref 2.5–4.5)
PHOSPHATE SERPL-MCNC: 6.3 MG/DL — HIGH (ref 2.5–4.5)
PHOSPHATE SERPL-MCNC: 6.4 MG/DL — HIGH (ref 2.5–4.5)
PHOSPHATE SERPL-MCNC: 6.6 MG/DL — HIGH (ref 2.5–4.5)
PHOSPHATE SERPL-MCNC: 6.8 MG/DL — HIGH (ref 2.5–4.5)
PHOSPHATE SERPL-MCNC: 7.4 MG/DL — HIGH (ref 2.5–4.5)
PHOSPHATE SERPL-MCNC: 9.8 MG/DL — HIGH (ref 2.5–4.5)
PLAT MORPH BLD: NORMAL — SIGNIFICANT CHANGE UP
PLATELET # BLD AUTO: 102 K/UL — LOW (ref 150–400)
PLATELET # BLD AUTO: 107 K/UL — LOW (ref 150–400)
PLATELET # BLD AUTO: 108 K/UL — LOW (ref 150–400)
PLATELET # BLD AUTO: 109 K/UL — LOW (ref 150–400)
PLATELET # BLD AUTO: 111 K/UL — LOW (ref 150–400)
PLATELET # BLD AUTO: 114 K/UL — LOW (ref 150–400)
PLATELET # BLD AUTO: 115 K/UL — LOW (ref 150–400)
PLATELET # BLD AUTO: 117 K/UL — LOW (ref 150–400)
PLATELET # BLD AUTO: 118 K/UL — LOW (ref 150–400)
PLATELET # BLD AUTO: 119 K/UL — LOW (ref 150–400)
PLATELET # BLD AUTO: 120 K/UL — LOW (ref 150–400)
PLATELET # BLD AUTO: 124 K/UL — LOW (ref 150–400)
PLATELET # BLD AUTO: 126 K/UL — LOW (ref 150–400)
PLATELET # BLD AUTO: 127 K/UL — LOW (ref 150–400)
PLATELET # BLD AUTO: 130 K/UL — LOW (ref 150–400)
PLATELET # BLD AUTO: 135 K/UL — LOW (ref 150–400)
PLATELET # BLD AUTO: 136 K/UL — LOW (ref 150–400)
PLATELET # BLD AUTO: 137 K/UL — LOW (ref 150–400)
PLATELET # BLD AUTO: 138 K/UL — LOW (ref 150–400)
PLATELET # BLD AUTO: 138 K/UL — LOW (ref 150–400)
PLATELET # BLD AUTO: 143 K/UL — LOW (ref 150–400)
PLATELET # BLD AUTO: 148 K/UL
PLATELET # BLD AUTO: 152 K/UL — SIGNIFICANT CHANGE UP (ref 150–400)
PLATELET # BLD AUTO: 171 K/UL
PLATELET # BLD AUTO: 173 K/UL
PLATELET # BLD AUTO: 34 K/UL — LOW (ref 150–400)
PLATELET # BLD AUTO: 45 K/UL — LOW (ref 150–400)
PLATELET # BLD AUTO: 49 K/UL — LOW (ref 150–400)
PLATELET # BLD AUTO: 50 K/UL — LOW (ref 150–400)
PLATELET # BLD AUTO: 51 K/UL — LOW (ref 150–400)
PLATELET # BLD AUTO: 51 K/UL — LOW (ref 150–400)
PLATELET # BLD AUTO: 52 K/UL — LOW (ref 150–400)
PLATELET # BLD AUTO: 54 K/UL — LOW (ref 150–400)
PLATELET # BLD AUTO: 55 K/UL — LOW (ref 150–400)
PLATELET # BLD AUTO: 58 K/UL — LOW (ref 150–400)
PLATELET # BLD AUTO: 61 K/UL — LOW (ref 150–400)
PLATELET # BLD AUTO: 65 K/UL — LOW (ref 150–400)
PLATELET # BLD AUTO: 70 K/UL — LOW (ref 150–400)
PLATELET # BLD AUTO: 75 K/UL — LOW (ref 150–400)
PLATELET # BLD AUTO: 78 K/UL
PLATELET # BLD AUTO: 78 K/UL — LOW (ref 150–400)
PLATELET # BLD AUTO: 81 K/UL — LOW (ref 150–400)
PLATELET # BLD AUTO: 87 K/UL — LOW (ref 150–400)
PLATELET # BLD AUTO: 92 K/UL — LOW (ref 150–400)
PLATELET # BLD AUTO: 93 K/UL — LOW (ref 150–400)
PLATELET COUNT - ESTIMATE: ABNORMAL
PO2 BLDV: 42 MMHG — SIGNIFICANT CHANGE UP (ref 25–45)
PO2 BLDV: 42 MMHG — SIGNIFICANT CHANGE UP (ref 25–45)
PO2 BLDV: 44 MMHG — SIGNIFICANT CHANGE UP (ref 25–45)
PO2 BLDV: 45 MMHG — SIGNIFICANT CHANGE UP (ref 25–45)
PO2 BLDV: 49 MMHG — HIGH (ref 25–45)
PO2 BLDV: 50 MMHG — HIGH (ref 25–45)
PO2 BLDV: 61 MMHG — HIGH (ref 25–45)
PO2 BLDV: 68 MMHG — HIGH (ref 25–45)
POIKILOCYTOSIS BLD QL AUTO: SLIGHT — SIGNIFICANT CHANGE UP
POLYCHROMASIA BLD QL SMEAR: SLIGHT — SIGNIFICANT CHANGE UP
POTASSIUM BLDV-SCNC: 3.8 MMOL/L — SIGNIFICANT CHANGE UP (ref 3.5–5.1)
POTASSIUM BLDV-SCNC: 3.8 MMOL/L — SIGNIFICANT CHANGE UP (ref 3.5–5.1)
POTASSIUM BLDV-SCNC: 3.9 MMOL/L — SIGNIFICANT CHANGE UP (ref 3.5–5.1)
POTASSIUM BLDV-SCNC: 4.1 MMOL/L — SIGNIFICANT CHANGE UP (ref 3.5–5.1)
POTASSIUM BLDV-SCNC: 4.1 MMOL/L — SIGNIFICANT CHANGE UP (ref 3.5–5.1)
POTASSIUM BLDV-SCNC: 4.2 MMOL/L — SIGNIFICANT CHANGE UP (ref 3.5–5.1)
POTASSIUM BLDV-SCNC: 4.4 MMOL/L — SIGNIFICANT CHANGE UP (ref 3.5–5.1)
POTASSIUM BLDV-SCNC: 4.8 MMOL/L — SIGNIFICANT CHANGE UP (ref 3.5–5.1)
POTASSIUM SERPL-MCNC: 3.3 MMOL/L — LOW (ref 3.5–5.3)
POTASSIUM SERPL-MCNC: 3.4 MMOL/L — LOW (ref 3.5–5.3)
POTASSIUM SERPL-MCNC: 3.5 MMOL/L — SIGNIFICANT CHANGE UP (ref 3.5–5.3)
POTASSIUM SERPL-MCNC: 3.5 MMOL/L — SIGNIFICANT CHANGE UP (ref 3.5–5.3)
POTASSIUM SERPL-MCNC: 3.6 MMOL/L — SIGNIFICANT CHANGE UP (ref 3.5–5.3)
POTASSIUM SERPL-MCNC: 3.7 MMOL/L — SIGNIFICANT CHANGE UP (ref 3.5–5.3)
POTASSIUM SERPL-MCNC: 3.8 MMOL/L — SIGNIFICANT CHANGE UP (ref 3.5–5.3)
POTASSIUM SERPL-MCNC: 3.9 MMOL/L — SIGNIFICANT CHANGE UP (ref 3.5–5.3)
POTASSIUM SERPL-MCNC: 4 MMOL/L — SIGNIFICANT CHANGE UP (ref 3.5–5.3)
POTASSIUM SERPL-MCNC: 4.1 MMOL/L — SIGNIFICANT CHANGE UP (ref 3.5–5.3)
POTASSIUM SERPL-MCNC: 4.2 MMOL/L — SIGNIFICANT CHANGE UP (ref 3.5–5.3)
POTASSIUM SERPL-MCNC: 4.3 MMOL/L — SIGNIFICANT CHANGE UP (ref 3.5–5.3)
POTASSIUM SERPL-MCNC: 4.4 MMOL/L — SIGNIFICANT CHANGE UP (ref 3.5–5.3)
POTASSIUM SERPL-MCNC: 4.4 MMOL/L — SIGNIFICANT CHANGE UP (ref 3.5–5.3)
POTASSIUM SERPL-MCNC: 4.5 MMOL/L — SIGNIFICANT CHANGE UP (ref 3.5–5.3)
POTASSIUM SERPL-MCNC: 4.8 MMOL/L — SIGNIFICANT CHANGE UP (ref 3.5–5.3)
POTASSIUM SERPL-MCNC: 5.6 MMOL/L — HIGH (ref 3.5–5.3)
POTASSIUM SERPL-SCNC: 3.3 MMOL/L — LOW (ref 3.5–5.3)
POTASSIUM SERPL-SCNC: 3.4 MMOL/L — LOW (ref 3.5–5.3)
POTASSIUM SERPL-SCNC: 3.5 MMOL/L — SIGNIFICANT CHANGE UP (ref 3.5–5.3)
POTASSIUM SERPL-SCNC: 3.5 MMOL/L — SIGNIFICANT CHANGE UP (ref 3.5–5.3)
POTASSIUM SERPL-SCNC: 3.6 MMOL/L — SIGNIFICANT CHANGE UP (ref 3.5–5.3)
POTASSIUM SERPL-SCNC: 3.7 MMOL/L — SIGNIFICANT CHANGE UP (ref 3.5–5.3)
POTASSIUM SERPL-SCNC: 3.8 MMOL/L — SIGNIFICANT CHANGE UP (ref 3.5–5.3)
POTASSIUM SERPL-SCNC: 3.9 MMOL/L — SIGNIFICANT CHANGE UP (ref 3.5–5.3)
POTASSIUM SERPL-SCNC: 4 MMOL/L
POTASSIUM SERPL-SCNC: 4 MMOL/L — SIGNIFICANT CHANGE UP (ref 3.5–5.3)
POTASSIUM SERPL-SCNC: 4.1 MMOL/L — SIGNIFICANT CHANGE UP (ref 3.5–5.3)
POTASSIUM SERPL-SCNC: 4.2 MMOL/L — SIGNIFICANT CHANGE UP (ref 3.5–5.3)
POTASSIUM SERPL-SCNC: 4.3 MMOL/L
POTASSIUM SERPL-SCNC: 4.3 MMOL/L — SIGNIFICANT CHANGE UP (ref 3.5–5.3)
POTASSIUM SERPL-SCNC: 4.4 MMOL/L
POTASSIUM SERPL-SCNC: 4.4 MMOL/L — SIGNIFICANT CHANGE UP (ref 3.5–5.3)
POTASSIUM SERPL-SCNC: 4.4 MMOL/L — SIGNIFICANT CHANGE UP (ref 3.5–5.3)
POTASSIUM SERPL-SCNC: 4.5 MMOL/L — SIGNIFICANT CHANGE UP (ref 3.5–5.3)
POTASSIUM SERPL-SCNC: 4.8 MMOL/L — SIGNIFICANT CHANGE UP (ref 3.5–5.3)
POTASSIUM SERPL-SCNC: 5.6 MMOL/L
POTASSIUM SERPL-SCNC: 5.6 MMOL/L — HIGH (ref 3.5–5.3)
POTASSIUM UR-SCNC: 36 MMOL/L — SIGNIFICANT CHANGE UP
POTASSIUM UR-SCNC: 40 MMOL/L — SIGNIFICANT CHANGE UP
PROCALCITONIN SERPL-MCNC: 2.02 NG/ML — HIGH (ref 0.02–0.1)
PROCALCITONIN SERPL-MCNC: 2.16 NG/ML — HIGH (ref 0.02–0.1)
PROMYELOCYTES # FLD: 0.9 % — HIGH (ref 0–0)
PROMYELOCYTES # FLD: 0.9 % — HIGH (ref 0–0)
PROT ?TM UR-MCNC: 146 MG/DL — HIGH (ref 0–12)
PROT ?TM UR-MCNC: 88 MG/DL — HIGH (ref 0–12)
PROT C ACT/NOR PPP: 57 % — LOW (ref 74–150)
PROT C AG PPP-MCNC: 40 % — LOW (ref 80–184)
PROT FLD-MCNC: 1.9 G/DL — SIGNIFICANT CHANGE UP
PROT PATTERN SERPL ELPH-IMP: SIGNIFICANT CHANGE UP
PROT S FREE AG PPP IA-ACNC: 48 % — LOW (ref 67–141)
PROT SERPL-MCNC: 4.5 G/DL — LOW (ref 6–8.3)
PROT SERPL-MCNC: 4.9 G/DL — LOW (ref 6–8.3)
PROT SERPL-MCNC: 5 G/DL — LOW (ref 6–8.3)
PROT SERPL-MCNC: 5.2 G/DL — LOW (ref 6–8.3)
PROT SERPL-MCNC: 5.4 G/DL — LOW (ref 6–8.3)
PROT SERPL-MCNC: 5.5 G/DL — LOW (ref 6–8.3)
PROT SERPL-MCNC: 5.5 G/DL — LOW (ref 6–8.3)
PROT SERPL-MCNC: 5.6 G/DL — LOW (ref 6–8.3)
PROT SERPL-MCNC: 5.7 G/DL — LOW (ref 6–8.3)
PROT SERPL-MCNC: 5.8 G/DL — LOW (ref 6–8.3)
PROT SERPL-MCNC: 5.9 G/DL — LOW (ref 6–8.3)
PROT SERPL-MCNC: 6 G/DL — SIGNIFICANT CHANGE UP (ref 6–8.3)
PROT SERPL-MCNC: 6.1 G/DL — SIGNIFICANT CHANGE UP (ref 6–8.3)
PROT SERPL-MCNC: 6.2 G/DL — SIGNIFICANT CHANGE UP (ref 6–8.3)
PROT SERPL-MCNC: 6.3 G/DL
PROT SERPL-MCNC: 6.3 G/DL — SIGNIFICANT CHANGE UP (ref 6–8.3)
PROT SERPL-MCNC: 6.6 G/DL — SIGNIFICANT CHANGE UP (ref 6–8.3)
PROT UR-MCNC: 100 — SIGNIFICANT CHANGE UP
PROT UR-MCNC: 111 MG/DL
PROT UR-MCNC: 142 MG/DL
PROT UR-MCNC: 191 MG/DL
PROT UR-MCNC: ABNORMAL
PROT/CREAT UR-RTO: 1.8 RATIO — HIGH (ref 0–0.2)
PROTEIN URINE: ABNORMAL
PROTHROM AB SERPL-ACNC: 14.5 SEC — HIGH (ref 10.5–13.4)
PROTHROM AB SERPL-ACNC: 14.6 SEC — HIGH (ref 10.5–13.4)
PROTHROM AB SERPL-ACNC: 15.1 SEC — HIGH (ref 10.5–13.4)
PROTHROM AB SERPL-ACNC: 15.7 SEC — HIGH (ref 10.5–13.4)
PROTHROM AB SERPL-ACNC: 16.1 SEC — HIGH (ref 10.5–13.4)
PROTHROM AB SERPL-ACNC: 16.5 SEC — HIGH (ref 10.5–13.4)
PROTHROM AB SERPL-ACNC: 16.7 SEC — HIGH (ref 10.5–13.4)
PROTHROM AB SERPL-ACNC: 17.7 SEC — HIGH (ref 10.5–13.4)
PROTHROM AB SERPL-ACNC: 20.5 SEC — HIGH (ref 10.5–13.4)
PROTHROM AB SERPL-ACNC: 20.8 SEC — HIGH (ref 10.5–13.4)
PROTHROM AB SERPL-ACNC: 89.7 SEC — HIGH (ref 10.5–13.4)
PS IGA SER-ACNC: 11 — SIGNIFICANT CHANGE UP (ref 0–19)
PS IGG SER-ACNC: 9 UNITS — SIGNIFICANT CHANGE UP (ref 0–30)
PS IGM SER-ACNC: <10 UNITS — SIGNIFICANT CHANGE UP (ref 0–30)
PSA SERPL-MCNC: 1.64 NG/ML
QUANT TB PLUS MITOGEN MINUS NIL: 0.07 IU/ML — SIGNIFICANT CHANGE UP
QUANT TB PLUS MITOGEN MINUS NIL: 0.21 IU/ML — SIGNIFICANT CHANGE UP
QUANT TB PLUS MITOGEN MINUS NIL: 0.3 IU/ML — SIGNIFICANT CHANGE UP
RAPID RVP RESULT: SIGNIFICANT CHANGE UP
RAPID RVP RESULT: SIGNIFICANT CHANGE UP
RBC # BLD: 1.59 M/UL — LOW (ref 4.2–5.8)
RBC # BLD: 1.81 M/UL — LOW (ref 4.2–5.8)
RBC # BLD: 2.13 M/UL — LOW (ref 4.2–5.8)
RBC # BLD: 2.15 M/UL — LOW (ref 4.2–5.8)
RBC # BLD: 2.16 M/UL — LOW (ref 4.2–5.8)
RBC # BLD: 2.17 M/UL — LOW (ref 4.2–5.8)
RBC # BLD: 2.29 M/UL — LOW (ref 4.2–5.8)
RBC # BLD: 2.34 M/UL — LOW (ref 4.2–5.8)
RBC # BLD: 2.34 M/UL — LOW (ref 4.2–5.8)
RBC # BLD: 2.35 M/UL — LOW (ref 4.2–5.8)
RBC # BLD: 2.36 M/UL — LOW (ref 4.2–5.8)
RBC # BLD: 2.38 M/UL — LOW (ref 4.2–5.8)
RBC # BLD: 2.42 M/UL — LOW (ref 4.2–5.8)
RBC # BLD: 2.42 M/UL — LOW (ref 4.2–5.8)
RBC # BLD: 2.43 M/UL — LOW (ref 4.2–5.8)
RBC # BLD: 2.45 M/UL — LOW (ref 4.2–5.8)
RBC # BLD: 2.46 M/UL — LOW (ref 4.2–5.8)
RBC # BLD: 2.47 M/UL — LOW (ref 4.2–5.8)
RBC # BLD: 2.47 M/UL — LOW (ref 4.2–5.8)
RBC # BLD: 2.53 M/UL — LOW (ref 4.2–5.8)
RBC # BLD: 2.54 M/UL — LOW (ref 4.2–5.8)
RBC # BLD: 2.62 M/UL — LOW (ref 4.2–5.8)
RBC # BLD: 2.63 M/UL — LOW (ref 4.2–5.8)
RBC # BLD: 2.63 M/UL — LOW (ref 4.2–5.8)
RBC # BLD: 2.67 M/UL — LOW (ref 4.2–5.8)
RBC # BLD: 2.7 M/UL — LOW (ref 4.2–5.8)
RBC # BLD: 2.73 M/UL — LOW (ref 4.2–5.8)
RBC # BLD: 2.74 M/UL — LOW (ref 4.2–5.8)
RBC # BLD: 2.74 M/UL — LOW (ref 4.2–5.8)
RBC # BLD: 2.78 M/UL — LOW (ref 4.2–5.8)
RBC # BLD: 2.79 M/UL — LOW (ref 4.2–5.8)
RBC # BLD: 2.83 M/UL — LOW (ref 4.2–5.8)
RBC # BLD: 2.83 M/UL — LOW (ref 4.2–5.8)
RBC # BLD: 2.86 M/UL — LOW (ref 4.2–5.8)
RBC # BLD: 2.9 M/UL — LOW (ref 4.2–5.8)
RBC # BLD: 2.92 M/UL — LOW (ref 4.2–5.8)
RBC # BLD: 2.94 M/UL — LOW (ref 4.2–5.8)
RBC # BLD: 2.95 M/UL — LOW (ref 4.2–5.8)
RBC # BLD: 2.99 M/UL — LOW (ref 4.2–5.8)
RBC # BLD: 3.05 M/UL
RBC # BLD: 3.1 M/UL — LOW (ref 4.2–5.8)
RBC # BLD: 3.11 M/UL — LOW (ref 4.2–5.8)
RBC # BLD: 3.47 M/UL
RBC # BLD: 3.71 M/UL
RBC # BLD: 3.74 M/UL
RBC # FLD: 13.1 %
RBC # FLD: 13.6 %
RBC # FLD: 14.1 %
RBC # FLD: 15.5 %
RBC # FLD: 17.2 % — HIGH (ref 10.3–14.5)
RBC # FLD: 17.3 % — HIGH (ref 10.3–14.5)
RBC # FLD: 17.5 % — HIGH (ref 10.3–14.5)
RBC # FLD: 17.7 % — HIGH (ref 10.3–14.5)
RBC # FLD: 17.8 % — HIGH (ref 10.3–14.5)
RBC # FLD: 17.9 % — HIGH (ref 10.3–14.5)
RBC # FLD: 18.1 % — HIGH (ref 10.3–14.5)
RBC # FLD: 18.2 % — HIGH (ref 10.3–14.5)
RBC # FLD: 18.2 % — HIGH (ref 10.3–14.5)
RBC # FLD: 18.3 % — HIGH (ref 10.3–14.5)
RBC # FLD: 18.3 % — HIGH (ref 10.3–14.5)
RBC # FLD: 18.4 % — HIGH (ref 10.3–14.5)
RBC # FLD: 18.5 % — HIGH (ref 10.3–14.5)
RBC # FLD: 18.5 % — HIGH (ref 10.3–14.5)
RBC # FLD: 18.6 % — HIGH (ref 10.3–14.5)
RBC # FLD: 18.7 % — HIGH (ref 10.3–14.5)
RBC # FLD: 18.8 % — HIGH (ref 10.3–14.5)
RBC # FLD: 18.9 % — HIGH (ref 10.3–14.5)
RBC # FLD: 19 % — HIGH (ref 10.3–14.5)
RBC # FLD: 19 % — HIGH (ref 10.3–14.5)
RBC # FLD: 19.2 % — HIGH (ref 10.3–14.5)
RBC BLD AUTO: ABNORMAL
RBC BLD AUTO: SIGNIFICANT CHANGE UP
RBC CASTS # UR COMP ASSIST: 4 /HPF — SIGNIFICANT CHANGE UP (ref 0–4)
RBC CASTS # UR COMP ASSIST: 67 /HPF — HIGH (ref 0–4)
RBC CASTS # UR COMP ASSIST: 8 /HPF — HIGH (ref 0–4)
RBC CASTS # UR COMP ASSIST: SIGNIFICANT CHANGE UP /HPF (ref 0–4)
RCV VOL RI: 230 /UL — HIGH (ref 0–0)
RCV VOL RI: 8000 /UL — HIGH (ref 0–0)
RH IG SCN BLD-IMP: POSITIVE — SIGNIFICANT CHANGE UP
RHEUMATOID FACT SERPL-ACNC: <10 IU/ML — SIGNIFICANT CHANGE UP (ref 0–13)
S AUREUS DNA NOSE QL NAA+PROBE: SIGNIFICANT CHANGE UP
S AUREUS DNA NOSE QL NAA+PROBE: SIGNIFICANT CHANGE UP
SAO2 % BLDV: 55.4 % — LOW (ref 67–88)
SAO2 % BLDV: 63.1 % — LOW (ref 67–88)
SAO2 % BLDV: 66 % — LOW (ref 67–88)
SAO2 % BLDV: 73.6 % — SIGNIFICANT CHANGE UP (ref 67–88)
SAO2 % BLDV: 77.6 % — SIGNIFICANT CHANGE UP (ref 67–88)
SAO2 % BLDV: 78 % — SIGNIFICANT CHANGE UP (ref 67–88)
SAO2 % BLDV: 91.4 % — HIGH (ref 67–88)
SAO2 % BLDV: 95.8 % — HIGH (ref 67–88)
SARS-COV-2 AB SERPL IA-ACNC: >250 U/ML
SARS-COV-2 RNA SPEC QL NAA+PROBE: SIGNIFICANT CHANGE UP
SCHISTOCYTES BLD QL AUTO: SLIGHT — SIGNIFICANT CHANGE UP
SICKLE CELLS BLD QL SMEAR: SLIGHT — SIGNIFICANT CHANGE UP
SMOOTH MUSCLE AB SER-ACNC: SIGNIFICANT CHANGE UP
SMUDGE CELLS # BLD: PRESENT — SIGNIFICANT CHANGE UP
SODIUM SERPL-SCNC: 132 MMOL/L
SODIUM SERPL-SCNC: 132 MMOL/L — LOW (ref 135–145)
SODIUM SERPL-SCNC: 133 MMOL/L — LOW (ref 135–145)
SODIUM SERPL-SCNC: 134 MMOL/L — LOW (ref 135–145)
SODIUM SERPL-SCNC: 135 MMOL/L — SIGNIFICANT CHANGE UP (ref 135–145)
SODIUM SERPL-SCNC: 136 MMOL/L — SIGNIFICANT CHANGE UP (ref 135–145)
SODIUM SERPL-SCNC: 137 MMOL/L
SODIUM SERPL-SCNC: 137 MMOL/L — SIGNIFICANT CHANGE UP (ref 135–145)
SODIUM SERPL-SCNC: 138 MMOL/L — SIGNIFICANT CHANGE UP (ref 135–145)
SODIUM SERPL-SCNC: 138 MMOL/L — SIGNIFICANT CHANGE UP (ref 135–145)
SODIUM SERPL-SCNC: 139 MMOL/L — SIGNIFICANT CHANGE UP (ref 135–145)
SODIUM SERPL-SCNC: 140 MMOL/L — SIGNIFICANT CHANGE UP (ref 135–145)
SODIUM SERPL-SCNC: 140 MMOL/L — SIGNIFICANT CHANGE UP (ref 135–145)
SODIUM SERPL-SCNC: 141 MMOL/L
SODIUM SERPL-SCNC: 141 MMOL/L — SIGNIFICANT CHANGE UP (ref 135–145)
SODIUM SERPL-SCNC: 142 MMOL/L
SODIUM SERPL-SCNC: 142 MMOL/L — SIGNIFICANT CHANGE UP (ref 135–145)
SODIUM SERPL-SCNC: 144 MMOL/L — SIGNIFICANT CHANGE UP (ref 135–145)
SODIUM SERPL-SCNC: 145 MMOL/L — SIGNIFICANT CHANGE UP (ref 135–145)
SODIUM SERPL-SCNC: 145 MMOL/L — SIGNIFICANT CHANGE UP (ref 135–145)
SODIUM UR-SCNC: 21 MMOL/L — SIGNIFICANT CHANGE UP
SODIUM UR-SCNC: 66 MMOL/L — SIGNIFICANT CHANGE UP
SP GR SPEC: 1.01 — SIGNIFICANT CHANGE UP (ref 1.01–1.02)
SP GR SPEC: 1.02 — SIGNIFICANT CHANGE UP (ref 1.01–1.02)
SPECIFIC GRAVITY URINE: ABNORMAL
SPECIMEN SOURCE: SIGNIFICANT CHANGE UP
T4 FREE SERPL-MCNC: 0.8 NG/DL — LOW (ref 0.9–1.8)
TIBC SERPL-MCNC: 128 UG/DL — LOW (ref 220–430)
TM INTERPRETATION: SIGNIFICANT CHANGE UP
TM INTERPRETATION: SIGNIFICANT CHANGE UP
TOTAL NUCLEATED CELL COUNT, BODY FLUID: 1162 /UL — SIGNIFICANT CHANGE UP
TOTAL NUCLEATED CELL COUNT, BODY FLUID: 3518 /UL — SIGNIFICANT CHANGE UP
TRIGL SERPL-MCNC: 394 MG/DL
TRIGL SERPL-MCNC: 412 MG/DL — HIGH
TRIGL SERPL-MCNC: 430 MG/DL — HIGH
TRIGL SERPL-MCNC: 543 MG/DL — HIGH
TRIGL SERPL-MCNC: 585 MG/DL — HIGH
TRIGL SERPL-MCNC: 597 MG/DL — HIGH
TROPONIN T, HIGH SENSITIVITY RESULT: 14 NG/L — SIGNIFICANT CHANGE UP (ref 0–51)
TROPONIN T, HIGH SENSITIVITY RESULT: 15 NG/L — SIGNIFICANT CHANGE UP (ref 0–51)
TSH SERPL-ACNC: 2.1 UIU/ML
TSH SERPL-MCNC: 3.52 UIU/ML — SIGNIFICANT CHANGE UP (ref 0.27–4.2)
TUBE TYPE: SIGNIFICANT CHANGE UP
TUBE TYPE: SIGNIFICANT CHANGE UP
UIBC SERPL-MCNC: 85 UG/DL — LOW (ref 110–370)
URATE CRY FLD QL MICRO: ABNORMAL
URATE CRY FLD QL MICRO: ABNORMAL
URATE SERPL-MCNC: 11.9 MG/DL — HIGH (ref 3.4–8.8)
URATE SERPL-MCNC: 12 MG/DL — HIGH (ref 3.4–8.8)
URATE UR-MCNC: 59.5 MG/DL — SIGNIFICANT CHANGE UP
UROBILINOGEN FLD QL: NEGATIVE — SIGNIFICANT CHANGE UP
UROBILINOGEN URINE: ABNORMAL
UUN UR-MCNC: 181 MG/DL — SIGNIFICANT CHANGE UP
VANCOMYCIN FLD-MCNC: 19 UG/ML — SIGNIFICANT CHANGE UP
VANCOMYCIN FLD-MCNC: 28.4 UG/ML
VANCOMYCIN TROUGH SERPL-MCNC: 13.6 UG/ML — SIGNIFICANT CHANGE UP (ref 10–20)
VARIANT LYMPHS # BLD: 0.9 % — SIGNIFICANT CHANGE UP (ref 0–6)
VARIANT LYMPHS # BLD: 1.7 % — SIGNIFICANT CHANGE UP (ref 0–6)
VARIANT LYMPHS # BLD: 3 % — SIGNIFICANT CHANGE UP (ref 0–6)
VARIANT LYMPHS # BLD: 3.5 % — SIGNIFICANT CHANGE UP (ref 0–6)
VARIANT LYMPHS # BLD: 5.3 % — SIGNIFICANT CHANGE UP (ref 0–6)
VIT B12 SERPL-MCNC: >2000 PG/ML — HIGH (ref 232–1245)
VZV DNA, PCR RESULT: NEGATIVE — SIGNIFICANT CHANGE UP
VZV IGG FLD QL IA: 1413 INDEX — SIGNIFICANT CHANGE UP
VZV IGG FLD QL IA: POSITIVE — SIGNIFICANT CHANGE UP
VZV IGM SER-ACNC: <0.91 INDEX — SIGNIFICANT CHANGE UP (ref 0–0.9)
WBC # BLD: 10.48 K/UL — SIGNIFICANT CHANGE UP (ref 3.8–10.5)
WBC # BLD: 11.28 K/UL — HIGH (ref 3.8–10.5)
WBC # BLD: 14.16 K/UL — HIGH (ref 3.8–10.5)
WBC # BLD: 14.5 K/UL — HIGH (ref 3.8–10.5)
WBC # BLD: 15.21 K/UL — HIGH (ref 3.8–10.5)
WBC # BLD: 17.46 K/UL — HIGH (ref 3.8–10.5)
WBC # BLD: 17.7 K/UL — HIGH (ref 3.8–10.5)
WBC # BLD: 18.03 K/UL — HIGH (ref 3.8–10.5)
WBC # BLD: 20.52 K/UL — HIGH (ref 3.8–10.5)
WBC # BLD: 21.02 K/UL — HIGH (ref 3.8–10.5)
WBC # BLD: 24.53 K/UL — HIGH (ref 3.8–10.5)
WBC # BLD: 26.92 K/UL — HIGH (ref 3.8–10.5)
WBC # BLD: 29.83 K/UL — HIGH (ref 3.8–10.5)
WBC # BLD: 30.01 K/UL — HIGH (ref 3.8–10.5)
WBC # BLD: 30.55 K/UL — HIGH (ref 3.8–10.5)
WBC # BLD: 31.71 K/UL — HIGH (ref 3.8–10.5)
WBC # BLD: 31.77 K/UL — HIGH (ref 3.8–10.5)
WBC # BLD: 33.11 K/UL — HIGH (ref 3.8–10.5)
WBC # BLD: 33.27 K/UL — HIGH (ref 3.8–10.5)
WBC # BLD: 33.37 K/UL — HIGH (ref 3.8–10.5)
WBC # BLD: 33.9 K/UL — HIGH (ref 3.8–10.5)
WBC # BLD: 34.36 K/UL — HIGH (ref 3.8–10.5)
WBC # BLD: 34.53 K/UL — HIGH (ref 3.8–10.5)
WBC # BLD: 34.83 K/UL — HIGH (ref 3.8–10.5)
WBC # BLD: 35.19 K/UL — HIGH (ref 3.8–10.5)
WBC # BLD: 35.2 K/UL — HIGH (ref 3.8–10.5)
WBC # BLD: 36.71 K/UL — HIGH (ref 3.8–10.5)
WBC # BLD: 37.09 K/UL — HIGH (ref 3.8–10.5)
WBC # BLD: 37.54 K/UL — HIGH (ref 3.8–10.5)
WBC # BLD: 38.72 K/UL — HIGH (ref 3.8–10.5)
WBC # BLD: 40.15 K/UL — CRITICAL HIGH (ref 3.8–10.5)
WBC # BLD: 40.25 K/UL — CRITICAL HIGH (ref 3.8–10.5)
WBC # BLD: 40.74 K/UL — CRITICAL HIGH (ref 3.8–10.5)
WBC # BLD: 43.38 K/UL — CRITICAL HIGH (ref 3.8–10.5)
WBC # BLD: 46.37 K/UL — CRITICAL HIGH (ref 3.8–10.5)
WBC # BLD: 5.33 K/UL — SIGNIFICANT CHANGE UP (ref 3.8–10.5)
WBC # BLD: 7.25 K/UL — SIGNIFICANT CHANGE UP (ref 3.8–10.5)
WBC # BLD: 7.43 K/UL — SIGNIFICANT CHANGE UP (ref 3.8–10.5)
WBC # BLD: 7.67 K/UL — SIGNIFICANT CHANGE UP (ref 3.8–10.5)
WBC # BLD: 7.84 K/UL — SIGNIFICANT CHANGE UP (ref 3.8–10.5)
WBC # BLD: 8.17 K/UL — SIGNIFICANT CHANGE UP (ref 3.8–10.5)
WBC # BLD: 8.6 K/UL — SIGNIFICANT CHANGE UP (ref 3.8–10.5)
WBC # BLD: 9.65 K/UL — SIGNIFICANT CHANGE UP (ref 3.8–10.5)
WBC # FLD AUTO: 10.48 K/UL — SIGNIFICANT CHANGE UP (ref 3.8–10.5)
WBC # FLD AUTO: 11.28 K/UL — HIGH (ref 3.8–10.5)
WBC # FLD AUTO: 14.16 K/UL — HIGH (ref 3.8–10.5)
WBC # FLD AUTO: 14.5 K/UL — HIGH (ref 3.8–10.5)
WBC # FLD AUTO: 15.21 K/UL — HIGH (ref 3.8–10.5)
WBC # FLD AUTO: 17.46 K/UL — HIGH (ref 3.8–10.5)
WBC # FLD AUTO: 17.7 K/UL — HIGH (ref 3.8–10.5)
WBC # FLD AUTO: 18.03 K/UL — HIGH (ref 3.8–10.5)
WBC # FLD AUTO: 20.52 K/UL — HIGH (ref 3.8–10.5)
WBC # FLD AUTO: 21.02 K/UL — HIGH (ref 3.8–10.5)
WBC # FLD AUTO: 24.53 K/UL — HIGH (ref 3.8–10.5)
WBC # FLD AUTO: 26.92 K/UL — HIGH (ref 3.8–10.5)
WBC # FLD AUTO: 29.83 K/UL — HIGH (ref 3.8–10.5)
WBC # FLD AUTO: 30.01 K/UL — HIGH (ref 3.8–10.5)
WBC # FLD AUTO: 30.55 K/UL — HIGH (ref 3.8–10.5)
WBC # FLD AUTO: 31.71 K/UL — HIGH (ref 3.8–10.5)
WBC # FLD AUTO: 31.77 K/UL — HIGH (ref 3.8–10.5)
WBC # FLD AUTO: 33.11 K/UL — HIGH (ref 3.8–10.5)
WBC # FLD AUTO: 33.27 K/UL — HIGH (ref 3.8–10.5)
WBC # FLD AUTO: 33.37 K/UL — HIGH (ref 3.8–10.5)
WBC # FLD AUTO: 33.9 K/UL — HIGH (ref 3.8–10.5)
WBC # FLD AUTO: 34.36 K/UL — HIGH (ref 3.8–10.5)
WBC # FLD AUTO: 34.53 K/UL — HIGH (ref 3.8–10.5)
WBC # FLD AUTO: 34.83 K/UL — HIGH (ref 3.8–10.5)
WBC # FLD AUTO: 35.19 K/UL — HIGH (ref 3.8–10.5)
WBC # FLD AUTO: 35.2 K/UL — HIGH (ref 3.8–10.5)
WBC # FLD AUTO: 36.71 K/UL — HIGH (ref 3.8–10.5)
WBC # FLD AUTO: 37.09 K/UL — HIGH (ref 3.8–10.5)
WBC # FLD AUTO: 37.54 K/UL — HIGH (ref 3.8–10.5)
WBC # FLD AUTO: 38.72 K/UL — HIGH (ref 3.8–10.5)
WBC # FLD AUTO: 4.55 K/UL
WBC # FLD AUTO: 40.15 K/UL — CRITICAL HIGH (ref 3.8–10.5)
WBC # FLD AUTO: 40.25 K/UL — CRITICAL HIGH (ref 3.8–10.5)
WBC # FLD AUTO: 40.74 K/UL — CRITICAL HIGH (ref 3.8–10.5)
WBC # FLD AUTO: 43.38 K/UL — CRITICAL HIGH (ref 3.8–10.5)
WBC # FLD AUTO: 46.37 K/UL — CRITICAL HIGH (ref 3.8–10.5)
WBC # FLD AUTO: 5.1 K/UL
WBC # FLD AUTO: 5.33 K/UL — SIGNIFICANT CHANGE UP (ref 3.8–10.5)
WBC # FLD AUTO: 5.56 K/UL
WBC # FLD AUTO: 7.25 K/UL — SIGNIFICANT CHANGE UP (ref 3.8–10.5)
WBC # FLD AUTO: 7.43 K/UL — SIGNIFICANT CHANGE UP (ref 3.8–10.5)
WBC # FLD AUTO: 7.67 K/UL — SIGNIFICANT CHANGE UP (ref 3.8–10.5)
WBC # FLD AUTO: 7.84 K/UL — SIGNIFICANT CHANGE UP (ref 3.8–10.5)
WBC # FLD AUTO: 8.17 K/UL — SIGNIFICANT CHANGE UP (ref 3.8–10.5)
WBC # FLD AUTO: 8.6 K/UL — SIGNIFICANT CHANGE UP (ref 3.8–10.5)
WBC # FLD AUTO: 9.65 K/UL — SIGNIFICANT CHANGE UP (ref 3.8–10.5)
WBC # FLD AUTO: 9.78 K/UL
WBC UR QL: 3 /HPF — SIGNIFICANT CHANGE UP (ref 0–5)
WBC UR QL: 4 /HPF — SIGNIFICANT CHANGE UP (ref 0–5)
WBC UR QL: 5 /HPF — SIGNIFICANT CHANGE UP (ref 0–5)
WBC UR QL: 6 /HPF — HIGH (ref 0–5)

## 2022-01-01 PROCEDURE — 71260 CT THORAX DX C+: CPT

## 2022-01-01 PROCEDURE — 97166 OT EVAL MOD COMPLEX 45 MIN: CPT

## 2022-01-01 PROCEDURE — 87798 DETECT AGENT NOS DNA AMP: CPT

## 2022-01-01 PROCEDURE — 99233 SBSQ HOSP IP/OBS HIGH 50: CPT | Mod: GC

## 2022-01-01 PROCEDURE — 86664 EPSTEIN-BARR NUCLEAR ANTIGEN: CPT

## 2022-01-01 PROCEDURE — 99292 CRITICAL CARE ADDL 30 MIN: CPT | Mod: GC

## 2022-01-01 PROCEDURE — 99232 SBSQ HOSP IP/OBS MODERATE 35: CPT

## 2022-01-01 PROCEDURE — 83036 HEMOGLOBIN GLYCOSYLATED A1C: CPT

## 2022-01-01 PROCEDURE — 99232 SBSQ HOSP IP/OBS MODERATE 35: CPT | Mod: GC

## 2022-01-01 PROCEDURE — 70450 CT HEAD/BRAIN W/O DYE: CPT | Mod: 26

## 2022-01-01 PROCEDURE — 99291 CRITICAL CARE FIRST HOUR: CPT | Mod: GC,25

## 2022-01-01 PROCEDURE — 86900 BLOOD TYPING SEROLOGIC ABO: CPT

## 2022-01-01 PROCEDURE — 88185 FLOWCYTOMETRY/TC ADD-ON: CPT

## 2022-01-01 PROCEDURE — 74174 CTA ABD&PLVS W/CONTRAST: CPT

## 2022-01-01 PROCEDURE — 84484 ASSAY OF TROPONIN QUANT: CPT

## 2022-01-01 PROCEDURE — 85027 COMPLETE CBC AUTOMATED: CPT

## 2022-01-01 PROCEDURE — 87206 SMEAR FLUORESCENT/ACID STAI: CPT

## 2022-01-01 PROCEDURE — 83935 ASSAY OF URINE OSMOLALITY: CPT

## 2022-01-01 PROCEDURE — 83540 ASSAY OF IRON: CPT

## 2022-01-01 PROCEDURE — 82042 OTHER SOURCE ALBUMIN QUAN EA: CPT

## 2022-01-01 PROCEDURE — 85018 HEMOGLOBIN: CPT

## 2022-01-01 PROCEDURE — 70491 CT SOFT TISSUE NECK W/DYE: CPT

## 2022-01-01 PROCEDURE — 85379 FIBRIN DEGRADATION QUANT: CPT

## 2022-01-01 PROCEDURE — 84300 ASSAY OF URINE SODIUM: CPT

## 2022-01-01 PROCEDURE — 93306 TTE W/DOPPLER COMPLETE: CPT

## 2022-01-01 PROCEDURE — 84550 ASSAY OF BLOOD/URIC ACID: CPT

## 2022-01-01 PROCEDURE — 83605 ASSAY OF LACTIC ACID: CPT

## 2022-01-01 PROCEDURE — G0452: CPT | Mod: 26

## 2022-01-01 PROCEDURE — 84105 ASSAY OF URINE PHOSPHORUS: CPT

## 2022-01-01 PROCEDURE — 92610 EVALUATE SWALLOWING FUNCTION: CPT

## 2022-01-01 PROCEDURE — 85300 ANTITHROMBIN III ACTIVITY: CPT

## 2022-01-01 PROCEDURE — 74177 CT ABD & PELVIS W/CONTRAST: CPT

## 2022-01-01 PROCEDURE — 87070 CULTURE OTHR SPECIMN AEROBIC: CPT

## 2022-01-01 PROCEDURE — 85014 HEMATOCRIT: CPT

## 2022-01-01 PROCEDURE — 99233 SBSQ HOSP IP/OBS HIGH 50: CPT

## 2022-01-01 PROCEDURE — 87324 CLOSTRIDIUM AG IA: CPT

## 2022-01-01 PROCEDURE — 71045 X-RAY EXAM CHEST 1 VIEW: CPT | Mod: 26

## 2022-01-01 PROCEDURE — C1729: CPT

## 2022-01-01 PROCEDURE — 80202 ASSAY OF VANCOMYCIN: CPT

## 2022-01-01 PROCEDURE — 86036 ANCA SCREEN EACH ANTIBODY: CPT

## 2022-01-01 PROCEDURE — 93970 EXTREMITY STUDY: CPT | Mod: 26

## 2022-01-01 PROCEDURE — 99291 CRITICAL CARE FIRST HOUR: CPT

## 2022-01-01 PROCEDURE — 86140 C-REACTIVE PROTEIN: CPT

## 2022-01-01 PROCEDURE — 0225U NFCT DS DNA&RNA 21 SARSCOV2: CPT

## 2022-01-01 PROCEDURE — 86147 CARDIOLIPIN ANTIBODY EA IG: CPT

## 2022-01-01 PROCEDURE — 86618 LYME DISEASE ANTIBODY: CPT

## 2022-01-01 PROCEDURE — 88342 IMHCHEM/IMCYTCHM 1ST ANTB: CPT

## 2022-01-01 PROCEDURE — 36569 INSJ PICC 5 YR+ W/O IMAGING: CPT

## 2022-01-01 PROCEDURE — 82746 ASSAY OF FOLIC ACID SERUM: CPT

## 2022-01-01 PROCEDURE — P9016: CPT

## 2022-01-01 PROCEDURE — 86334 IMMUNOFIX E-PHORESIS SERUM: CPT

## 2022-01-01 PROCEDURE — 81207 BCR/ABL1 GENE MINOR BP: CPT

## 2022-01-01 PROCEDURE — 86160 COMPLEMENT ANTIGEN: CPT

## 2022-01-01 PROCEDURE — 82784 ASSAY IGA/IGD/IGG/IGM EACH: CPT

## 2022-01-01 PROCEDURE — 85302 CLOT INHIBIT PROT C ANTIGEN: CPT

## 2022-01-01 PROCEDURE — 84311 SPECTROPHOTOMETRY: CPT

## 2022-01-01 PROCEDURE — 87641 MR-STAPH DNA AMP PROBE: CPT

## 2022-01-01 PROCEDURE — 88305 TISSUE EXAM BY PATHOLOGIST: CPT

## 2022-01-01 PROCEDURE — 93005 ELECTROCARDIOGRAM TRACING: CPT

## 2022-01-01 PROCEDURE — 87529 HSV DNA AMP PROBE: CPT

## 2022-01-01 PROCEDURE — 93010 ELECTROCARDIOGRAM REPORT: CPT

## 2022-01-01 PROCEDURE — 84439 ASSAY OF FREE THYROXINE: CPT

## 2022-01-01 PROCEDURE — 82947 ASSAY GLUCOSE BLOOD QUANT: CPT

## 2022-01-01 PROCEDURE — 99223 1ST HOSP IP/OBS HIGH 75: CPT | Mod: GC

## 2022-01-01 PROCEDURE — 49083 ABD PARACENTESIS W/IMAGING: CPT

## 2022-01-01 PROCEDURE — 86225 DNA ANTIBODY NATIVE: CPT

## 2022-01-01 PROCEDURE — 84478 ASSAY OF TRIGLYCERIDES: CPT

## 2022-01-01 PROCEDURE — 99496 TRANSJ CARE MGMT HIGH F2F 7D: CPT

## 2022-01-01 PROCEDURE — 99291 CRITICAL CARE FIRST HOUR: CPT | Mod: GC

## 2022-01-01 PROCEDURE — 81270 JAK2 GENE: CPT

## 2022-01-01 PROCEDURE — 88305 TISSUE EXAM BY PATHOLOGIST: CPT | Mod: 26

## 2022-01-01 PROCEDURE — 76942 ECHO GUIDE FOR BIOPSY: CPT | Mod: 26,59

## 2022-01-01 PROCEDURE — 86060 ANTISTREPTOLYSIN O TITER: CPT

## 2022-01-01 PROCEDURE — 94664 DEMO&/EVAL PT USE INHALER: CPT

## 2022-01-01 PROCEDURE — 87799 DETECT AGENT NOS DNA QUANT: CPT

## 2022-01-01 PROCEDURE — 87640 STAPH A DNA AMP PROBE: CPT

## 2022-01-01 PROCEDURE — 94003 VENT MGMT INPAT SUBQ DAY: CPT

## 2022-01-01 PROCEDURE — 82962 GLUCOSE BLOOD TEST: CPT

## 2022-01-01 PROCEDURE — 85730 THROMBOPLASTIN TIME PARTIAL: CPT

## 2022-01-01 PROCEDURE — 88112 CYTOPATH CELL ENHANCE TECH: CPT | Mod: 26

## 2022-01-01 PROCEDURE — 81001 URINALYSIS AUTO W/SCOPE: CPT

## 2022-01-01 PROCEDURE — 88108 CYTOPATH CONCENTRATE TECH: CPT | Mod: 26

## 2022-01-01 PROCEDURE — 80074 ACUTE HEPATITIS PANEL: CPT

## 2022-01-01 PROCEDURE — 87205 SMEAR GRAM STAIN: CPT

## 2022-01-01 PROCEDURE — 71250 CT THORAX DX C-: CPT | Mod: 26

## 2022-01-01 PROCEDURE — 74176 CT ABD & PELVIS W/O CONTRAST: CPT

## 2022-01-01 PROCEDURE — 84560 ASSAY OF URINE/URIC ACID: CPT

## 2022-01-01 PROCEDURE — 83520 IMMUNOASSAY QUANT NOS NONAB: CPT

## 2022-01-01 PROCEDURE — 76604 US EXAM CHEST: CPT | Mod: 26

## 2022-01-01 PROCEDURE — C1751: CPT

## 2022-01-01 PROCEDURE — 70450 CT HEAD/BRAIN W/O DYE: CPT

## 2022-01-01 PROCEDURE — 88112 CYTOPATH CELL ENHANCE TECH: CPT | Mod: 26,59

## 2022-01-01 PROCEDURE — 84145 PROCALCITONIN (PCT): CPT

## 2022-01-01 PROCEDURE — 86703 HIV-1/HIV-2 1 RESULT ANTBDY: CPT

## 2022-01-01 PROCEDURE — 82435 ASSAY OF BLOOD CHLORIDE: CPT

## 2022-01-01 PROCEDURE — 31500 INSERT EMERGENCY AIRWAY: CPT | Mod: GC

## 2022-01-01 PROCEDURE — 83615 LACTATE (LD) (LDH) ENZYME: CPT

## 2022-01-01 PROCEDURE — 86038 ANTINUCLEAR ANTIBODIES: CPT

## 2022-01-01 PROCEDURE — 94002 VENT MGMT INPAT INIT DAY: CPT

## 2022-01-01 PROCEDURE — 82787 IGG 1 2 3 OR 4 EACH: CPT

## 2022-01-01 PROCEDURE — 86665 EPSTEIN-BARR CAPSID VCA: CPT

## 2022-01-01 PROCEDURE — 82607 VITAMIN B-12: CPT

## 2022-01-01 PROCEDURE — 38505 NEEDLE BIOPSY LYMPH NODES: CPT

## 2022-01-01 PROCEDURE — 71045 X-RAY EXAM CHEST 1 VIEW: CPT

## 2022-01-01 PROCEDURE — 83930 ASSAY OF BLOOD OSMOLALITY: CPT

## 2022-01-01 PROCEDURE — 82595 ASSAY OF CRYOGLOBULIN: CPT

## 2022-01-01 PROCEDURE — 74177 CT ABD & PELVIS W/CONTRAST: CPT | Mod: 26

## 2022-01-01 PROCEDURE — 88341 IMHCHEM/IMCYTCHM EA ADD ANTB: CPT | Mod: 26,59

## 2022-01-01 PROCEDURE — 82436 ASSAY OF URINE CHLORIDE: CPT

## 2022-01-01 PROCEDURE — 87177 OVA AND PARASITES SMEARS: CPT

## 2022-01-01 PROCEDURE — 83010 ASSAY OF HAPTOGLOBIN QUANT: CPT

## 2022-01-01 PROCEDURE — 84133 ASSAY OF URINE POTASSIUM: CPT

## 2022-01-01 PROCEDURE — 85652 RBC SED RATE AUTOMATED: CPT

## 2022-01-01 PROCEDURE — 76775 US EXAM ABDO BACK WALL LIM: CPT

## 2022-01-01 PROCEDURE — 31624 DX BRONCHOSCOPE/LAVAGE: CPT

## 2022-01-01 PROCEDURE — 88365 INSITU HYBRIDIZATION (FISH): CPT

## 2022-01-01 PROCEDURE — 82550 ASSAY OF CK (CPK): CPT

## 2022-01-01 PROCEDURE — 82140 ASSAY OF AMMONIA: CPT

## 2022-01-01 PROCEDURE — 87075 CULTR BACTERIA EXCEPT BLOOD: CPT

## 2022-01-01 PROCEDURE — 99222 1ST HOSP IP/OBS MODERATE 55: CPT

## 2022-01-01 PROCEDURE — 86480 TB TEST CELL IMMUN MEASURE: CPT

## 2022-01-01 PROCEDURE — 86255 FLUORESCENT ANTIBODY SCREEN: CPT

## 2022-01-01 PROCEDURE — 86803 HEPATITIS C AB TEST: CPT

## 2022-01-01 PROCEDURE — 85301 ANTITHROMBIN III ANTIGEN: CPT

## 2022-01-01 PROCEDURE — 97110 THERAPEUTIC EXERCISES: CPT

## 2022-01-01 PROCEDURE — 99255 IP/OBS CONSLTJ NEW/EST HI 80: CPT | Mod: GC

## 2022-01-01 PROCEDURE — 86022 PLATELET ANTIBODIES: CPT

## 2022-01-01 PROCEDURE — 87015 SPECIMEN INFECT AGNT CONCNTJ: CPT

## 2022-01-01 PROCEDURE — 36600 WITHDRAWAL OF ARTERIAL BLOOD: CPT

## 2022-01-01 PROCEDURE — 81206 BCR/ABL1 GENE MAJOR BP: CPT

## 2022-01-01 PROCEDURE — 87102 FUNGUS ISOLATION CULTURE: CPT

## 2022-01-01 PROCEDURE — 86923 COMPATIBILITY TEST ELECTRIC: CPT

## 2022-01-01 PROCEDURE — 81261 IGH GENE REARRANGE AMP METH: CPT

## 2022-01-01 PROCEDURE — 85220 BLOOC CLOT FACTOR V TEST: CPT

## 2022-01-01 PROCEDURE — 83690 ASSAY OF LIPASE: CPT

## 2022-01-01 PROCEDURE — 83090 ASSAY OF HOMOCYSTEINE: CPT

## 2022-01-01 PROCEDURE — 87389 HIV-1 AG W/HIV-1&-2 AB AG IA: CPT

## 2022-01-01 PROCEDURE — 87045 FECES CULTURE AEROBIC BACT: CPT

## 2022-01-01 PROCEDURE — 84165 PROTEIN E-PHORESIS SERUM: CPT

## 2022-01-01 PROCEDURE — 84100 ASSAY OF PHOSPHORUS: CPT

## 2022-01-01 PROCEDURE — 85384 FIBRINOGEN ACTIVITY: CPT

## 2022-01-01 PROCEDURE — 86235 NUCLEAR ANTIGEN ANTIBODY: CPT

## 2022-01-01 PROCEDURE — 36415 COLL VENOUS BLD VENIPUNCTURE: CPT

## 2022-01-01 PROCEDURE — 86301 IMMUNOASSAY TUMOR CA 19-9: CPT

## 2022-01-01 PROCEDURE — 84132 ASSAY OF SERUM POTASSIUM: CPT

## 2022-01-01 PROCEDURE — 99255 IP/OBS CONSLTJ NEW/EST HI 80: CPT

## 2022-01-01 PROCEDURE — 76882 US LMTD JT/FCL EVL NVASC XTR: CPT | Mod: 26,RT

## 2022-01-01 PROCEDURE — 82570 ASSAY OF URINE CREATININE: CPT

## 2022-01-01 PROCEDURE — 82533 TOTAL CORTISOL: CPT

## 2022-01-01 PROCEDURE — 88189 FLOWCYTOMETRY/READ 16 & >: CPT

## 2022-01-01 PROCEDURE — 76942 ECHO GUIDE FOR BIOPSY: CPT | Mod: 59

## 2022-01-01 PROCEDURE — 87040 BLOOD CULTURE FOR BACTERIA: CPT

## 2022-01-01 PROCEDURE — 93970 EXTREMITY STUDY: CPT

## 2022-01-01 PROCEDURE — 88360 TUMOR IMMUNOHISTOCHEM/MANUAL: CPT

## 2022-01-01 PROCEDURE — 83550 IRON BINDING TEST: CPT

## 2022-01-01 PROCEDURE — U0005: CPT

## 2022-01-01 PROCEDURE — 85610 PROTHROMBIN TIME: CPT

## 2022-01-01 PROCEDURE — 36430 TRANSFUSION BLD/BLD COMPNT: CPT

## 2022-01-01 PROCEDURE — 86695 HERPES SIMPLEX TYPE 1 TEST: CPT

## 2022-01-01 PROCEDURE — 84156 ASSAY OF PROTEIN URINE: CPT

## 2022-01-01 PROCEDURE — 74174 CTA ABD&PLVS W/CONTRAST: CPT | Mod: 26

## 2022-01-01 PROCEDURE — 87116 MYCOBACTERIA CULTURE: CPT

## 2022-01-01 PROCEDURE — 99231 SBSQ HOSP IP/OBS SF/LOW 25: CPT

## 2022-01-01 PROCEDURE — 97163 PT EVAL HIGH COMPLEX 45 MIN: CPT

## 2022-01-01 PROCEDURE — 89051 BODY FLUID CELL COUNT: CPT

## 2022-01-01 PROCEDURE — 86431 RHEUMATOID FACTOR QUANT: CPT

## 2022-01-01 PROCEDURE — 87207 SMEAR SPECIAL STAIN: CPT

## 2022-01-01 PROCEDURE — 88360 TUMOR IMMUNOHISTOCHEM/MANUAL: CPT | Mod: 26

## 2022-01-01 PROCEDURE — 87305 ASPERGILLUS AG IA: CPT

## 2022-01-01 PROCEDURE — 86663 EPSTEIN-BARR ANTIBODY: CPT

## 2022-01-01 PROCEDURE — 88341 IMHCHEM/IMCYTCHM EA ADD ANTB: CPT

## 2022-01-01 PROCEDURE — 36620 INSERTION CATHETER ARTERY: CPT

## 2022-01-01 PROCEDURE — 99212 OFFICE O/P EST SF 10 MIN: CPT

## 2022-01-01 PROCEDURE — 85025 COMPLETE CBC W/AUTO DIFF WBC: CPT

## 2022-01-01 PROCEDURE — 86146 BETA-2 GLYCOPROTEIN ANTIBODY: CPT

## 2022-01-01 PROCEDURE — 71260 CT THORAX DX C+: CPT | Mod: 26

## 2022-01-01 PROCEDURE — 76882 US LMTD JT/FCL EVL NVASC XTR: CPT

## 2022-01-01 PROCEDURE — 87046 STOOL CULTR AEROBIC BACT EA: CPT

## 2022-01-01 PROCEDURE — 80061 LIPID PANEL: CPT

## 2022-01-01 PROCEDURE — 88172 CYTP DX EVAL FNA 1ST EA SITE: CPT

## 2022-01-01 PROCEDURE — 99215 OFFICE O/P EST HI 40 MIN: CPT | Mod: 25

## 2022-01-01 PROCEDURE — 87103 BLOOD FUNGUS CULTURE: CPT

## 2022-01-01 PROCEDURE — 94640 AIRWAY INHALATION TREATMENT: CPT

## 2022-01-01 PROCEDURE — U0003: CPT

## 2022-01-01 PROCEDURE — 82330 ASSAY OF CALCIUM: CPT

## 2022-01-01 PROCEDURE — 84443 ASSAY THYROID STIM HORMONE: CPT

## 2022-01-01 PROCEDURE — 88364 INSITU HYBRIDIZATION (FISH): CPT | Mod: 26

## 2022-01-01 PROCEDURE — 74176 CT ABD & PELVIS W/O CONTRAST: CPT | Mod: 26

## 2022-01-01 PROCEDURE — 81342 TRG GENE REARRANGEMENT ANAL: CPT

## 2022-01-01 PROCEDURE — 86787 VARICELLA-ZOSTER ANTIBODY: CPT

## 2022-01-01 PROCEDURE — 88112 CYTOPATH CELL ENHANCE TECH: CPT

## 2022-01-01 PROCEDURE — 88173 CYTOPATH EVAL FNA REPORT: CPT | Mod: 26

## 2022-01-01 PROCEDURE — 84157 ASSAY OF PROTEIN OTHER: CPT

## 2022-01-01 PROCEDURE — 85303 CLOT INHIBIT PROT C ACTIVITY: CPT

## 2022-01-01 PROCEDURE — 86850 RBC ANTIBODY SCREEN: CPT

## 2022-01-01 PROCEDURE — 82803 BLOOD GASES ANY COMBINATION: CPT

## 2022-01-01 PROCEDURE — P9047: CPT

## 2022-01-01 PROCEDURE — 86790 VIRUS ANTIBODY NOS: CPT

## 2022-01-01 PROCEDURE — 81264 IGK REARRANGEABN CLONAL POP: CPT

## 2022-01-01 PROCEDURE — 86357 NK CELLS TOTAL COUNT: CPT

## 2022-01-01 PROCEDURE — 82945 GLUCOSE OTHER FLUID: CPT

## 2022-01-01 PROCEDURE — 82565 ASSAY OF CREATININE: CPT

## 2022-01-01 PROCEDURE — 81340 TRB@ GENE REARRANGE AMPLIFY: CPT

## 2022-01-01 PROCEDURE — 86696 HERPES SIMPLEX TYPE 2 TEST: CPT

## 2022-01-01 PROCEDURE — 94660 CPAP INITIATION&MGMT: CPT

## 2022-01-01 PROCEDURE — 31624 DX BRONCHOSCOPE/LAVAGE: CPT | Mod: GC

## 2022-01-01 PROCEDURE — 99222 1ST HOSP IP/OBS MODERATE 55: CPT | Mod: GC

## 2022-01-01 PROCEDURE — 76775 US EXAM ABDO BACK WALL LIM: CPT | Mod: 26

## 2022-01-01 PROCEDURE — 93306 TTE W/DOPPLER COMPLETE: CPT | Mod: 26

## 2022-01-01 PROCEDURE — 84295 ASSAY OF SERUM SODIUM: CPT

## 2022-01-01 PROCEDURE — 82955 ASSAY OF G6PD ENZYME: CPT

## 2022-01-01 PROCEDURE — 36556 INSERT NON-TUNNEL CV CATH: CPT

## 2022-01-01 PROCEDURE — 82728 ASSAY OF FERRITIN: CPT

## 2022-01-01 PROCEDURE — 84155 ASSAY OF PROTEIN SERUM: CPT

## 2022-01-01 PROCEDURE — 88342 IMHCHEM/IMCYTCHM 1ST ANTB: CPT | Mod: 26,59

## 2022-01-01 PROCEDURE — 88305 TISSUE EXAM BY PATHOLOGIST: CPT | Mod: 26,59

## 2022-01-01 PROCEDURE — 87507 IADNA-DNA/RNA PROBE TQ 12-25: CPT

## 2022-01-01 PROCEDURE — 70491 CT SOFT TISSUE NECK W/DYE: CPT | Mod: 26

## 2022-01-01 PROCEDURE — 85306 CLOT INHIBIT PROT S FREE: CPT

## 2022-01-01 PROCEDURE — 88365 INSITU HYBRIDIZATION (FISH): CPT | Mod: 26

## 2022-01-01 PROCEDURE — 36556 INSERT NON-TUNNEL CV CATH: CPT | Mod: GC

## 2022-01-01 PROCEDURE — 87449 NOS EACH ORGANISM AG IA: CPT

## 2022-01-01 PROCEDURE — 84540 ASSAY OF URINE/UREA-N: CPT

## 2022-01-01 PROCEDURE — 97530 THERAPEUTIC ACTIVITIES: CPT

## 2022-01-01 PROCEDURE — 82553 CREATINE MB FRACTION: CPT

## 2022-01-01 PROCEDURE — 81219 CALR GENE COM VARIANTS: CPT

## 2022-01-01 PROCEDURE — 83735 ASSAY OF MAGNESIUM: CPT

## 2022-01-01 PROCEDURE — 92950 HEART/LUNG RESUSCITATION CPR: CPT

## 2022-01-01 PROCEDURE — P9040: CPT

## 2022-01-01 PROCEDURE — 80053 COMPREHEN METABOLIC PANEL: CPT

## 2022-01-01 PROCEDURE — 88173 CYTOPATH EVAL FNA REPORT: CPT

## 2022-01-01 PROCEDURE — 71250 CT THORAX DX C-: CPT

## 2022-01-01 PROCEDURE — 86148 ANTI-PHOSPHOLIPID ANTIBODY: CPT

## 2022-01-01 PROCEDURE — 86901 BLOOD TYPING SEROLOGIC RH(D): CPT

## 2022-01-01 RX ORDER — INSULIN LISPRO 100/ML
VIAL (ML) SUBCUTANEOUS
Refills: 0 | Status: DISCONTINUED | OUTPATIENT
Start: 2022-01-01 | End: 2022-01-01

## 2022-01-01 RX ORDER — TRAMADOL HYDROCHLORIDE 50 MG/1
25 TABLET ORAL ONCE
Refills: 0 | Status: DISCONTINUED | OUTPATIENT
Start: 2022-01-01 | End: 2022-01-01

## 2022-01-01 RX ORDER — SODIUM CHLORIDE 9 MG/ML
1000 INJECTION, SOLUTION INTRAVENOUS
Refills: 0 | Status: DISCONTINUED | OUTPATIENT
Start: 2022-01-01 | End: 2022-01-01

## 2022-01-01 RX ORDER — FENTANYL CITRATE 50 UG/ML
25 INJECTION INTRAVENOUS
Refills: 0 | Status: DISCONTINUED | OUTPATIENT
Start: 2022-01-01 | End: 2022-01-01

## 2022-01-01 RX ORDER — HEPARIN SODIUM 5000 [USP'U]/ML
1900 INJECTION INTRAVENOUS; SUBCUTANEOUS
Qty: 25000 | Refills: 0 | Status: DISCONTINUED | OUTPATIENT
Start: 2022-01-01 | End: 2022-01-01

## 2022-01-01 RX ORDER — DEXMEDETOMIDINE HYDROCHLORIDE IN 0.9% SODIUM CHLORIDE 4 UG/ML
0.2 INJECTION INTRAVENOUS
Qty: 200 | Refills: 0 | Status: DISCONTINUED | OUTPATIENT
Start: 2022-01-01 | End: 2022-01-01

## 2022-01-01 RX ORDER — DEXTROSE 50 % IN WATER 50 %
12.5 SYRINGE (ML) INTRAVENOUS ONCE
Refills: 0 | Status: DISCONTINUED | OUTPATIENT
Start: 2022-01-01 | End: 2022-01-01

## 2022-01-01 RX ORDER — FLUCONAZOLE 150 MG/1
400 TABLET ORAL EVERY 24 HOURS
Refills: 0 | Status: DISCONTINUED | OUTPATIENT
Start: 2022-01-01 | End: 2022-01-01

## 2022-01-01 RX ORDER — VANCOMYCIN HCL 1 G
1250 VIAL (EA) INTRAVENOUS ONCE
Refills: 0 | Status: COMPLETED | OUTPATIENT
Start: 2022-01-01 | End: 2022-01-01

## 2022-01-01 RX ORDER — ACETAMINOPHEN 500 MG
1000 TABLET ORAL ONCE
Refills: 0 | Status: DISCONTINUED | OUTPATIENT
Start: 2022-01-01 | End: 2022-01-01

## 2022-01-01 RX ORDER — METOPROLOL TARTRATE 50 MG
12.5 TABLET ORAL EVERY 6 HOURS
Refills: 0 | Status: DISCONTINUED | OUTPATIENT
Start: 2022-01-01 | End: 2022-01-01

## 2022-01-01 RX ORDER — HEPARIN SODIUM 5000 [USP'U]/ML
2100 INJECTION INTRAVENOUS; SUBCUTANEOUS
Qty: 25000 | Refills: 0 | Status: DISCONTINUED | OUTPATIENT
Start: 2022-01-01 | End: 2022-01-01

## 2022-01-01 RX ORDER — FENTANYL CITRATE 50 UG/ML
1 INJECTION INTRAVENOUS
Qty: 5000 | Refills: 0 | Status: DISCONTINUED | OUTPATIENT
Start: 2022-01-01 | End: 2022-01-01

## 2022-01-01 RX ORDER — FUROSEMIDE 40 MG
40 TABLET ORAL ONCE
Refills: 0 | Status: COMPLETED | OUTPATIENT
Start: 2022-01-01 | End: 2022-01-01

## 2022-01-01 RX ORDER — DEXAMETHASONE 0.5 MG/5ML
20 ELIXIR ORAL DAILY
Refills: 0 | Status: DISCONTINUED | OUTPATIENT
Start: 2022-01-01 | End: 2022-01-01

## 2022-01-01 RX ORDER — VANCOMYCIN HYDROCHLORIDE 250 MG/1
250 CAPSULE ORAL TWICE DAILY
Qty: 30 | Refills: 1 | Status: ACTIVE | COMMUNITY
Start: 2022-01-01 | End: 1900-01-01

## 2022-01-01 RX ORDER — HYDROMORPHONE HYDROCHLORIDE 2 MG/ML
0.5 INJECTION INTRAMUSCULAR; INTRAVENOUS; SUBCUTANEOUS ONCE
Refills: 0 | Status: DISCONTINUED | OUTPATIENT
Start: 2022-01-01 | End: 2022-01-01

## 2022-01-01 RX ORDER — FENTANYL CITRATE 50 UG/ML
3 INJECTION INTRAVENOUS
Qty: 5000 | Refills: 0 | Status: DISCONTINUED | OUTPATIENT
Start: 2022-01-01 | End: 2022-01-01

## 2022-01-01 RX ORDER — ONDANSETRON 8 MG/1
4 TABLET, FILM COATED ORAL ONCE
Refills: 0 | Status: COMPLETED | OUTPATIENT
Start: 2022-01-01 | End: 2022-01-01

## 2022-01-01 RX ORDER — MIDAZOLAM HYDROCHLORIDE 1 MG/ML
2 INJECTION, SOLUTION INTRAMUSCULAR; INTRAVENOUS ONCE
Refills: 0 | Status: DISCONTINUED | OUTPATIENT
Start: 2022-01-01 | End: 2022-01-01

## 2022-01-01 RX ORDER — GABAPENTIN 400 MG/1
1 CAPSULE ORAL
Qty: 0 | Refills: 0 | DISCHARGE

## 2022-01-01 RX ORDER — CISATRACURIUM BESYLATE 2 MG/ML
20 INJECTION INTRAVENOUS ONCE
Refills: 0 | Status: COMPLETED | OUTPATIENT
Start: 2022-01-01 | End: 2022-01-01

## 2022-01-01 RX ORDER — ACETAMINOPHEN 500 MG
650 TABLET ORAL ONCE
Refills: 0 | Status: COMPLETED | OUTPATIENT
Start: 2022-01-01 | End: 2022-01-01

## 2022-01-01 RX ORDER — PROPOFOL 10 MG/ML
39.92 INJECTION, EMULSION INTRAVENOUS
Qty: 1000 | Refills: 0 | Status: DISCONTINUED | OUTPATIENT
Start: 2022-01-01 | End: 2022-01-01

## 2022-01-01 RX ORDER — ACETAMINOPHEN 500 MG
650 TABLET ORAL EVERY 6 HOURS
Refills: 0 | Status: DISCONTINUED | OUTPATIENT
Start: 2022-01-01 | End: 2022-01-01

## 2022-01-01 RX ORDER — FUROSEMIDE 40 MG
40 TABLET ORAL
Refills: 0 | Status: DISCONTINUED | OUTPATIENT
Start: 2022-01-01 | End: 2022-01-01

## 2022-01-01 RX ORDER — CHLORHEXIDINE GLUCONATE 213 G/1000ML
1 SOLUTION TOPICAL
Refills: 0 | Status: DISCONTINUED | OUTPATIENT
Start: 2022-01-01 | End: 2022-01-01

## 2022-01-01 RX ORDER — METOPROLOL TARTRATE 50 MG
12.5 TABLET ORAL
Refills: 0 | Status: DISCONTINUED | OUTPATIENT
Start: 2022-01-01 | End: 2022-01-01

## 2022-01-01 RX ORDER — LIDOCAINE 4 G/100G
1 CREAM TOPICAL EVERY 24 HOURS
Refills: 0 | Status: COMPLETED | OUTPATIENT
Start: 2022-01-01 | End: 2022-01-01

## 2022-01-01 RX ORDER — DEXTROSE 50 % IN WATER 50 %
25 SYRINGE (ML) INTRAVENOUS ONCE
Refills: 0 | Status: DISCONTINUED | OUTPATIENT
Start: 2022-01-01 | End: 2022-01-01

## 2022-01-01 RX ORDER — DIPHENHYDRAMINE HYDROCHLORIDE AND LIDOCAINE HYDROCHLORIDE AND ALUMINUM HYDROXIDE AND MAGNESIUM HYDRO
15 KIT
Refills: 0 | Status: COMPLETED | OUTPATIENT
Start: 2022-01-01 | End: 2022-01-01

## 2022-01-01 RX ORDER — HYDROMORPHONE HYDROCHLORIDE 2 MG/ML
0.5 INJECTION INTRAMUSCULAR; INTRAVENOUS; SUBCUTANEOUS ONCE
Refills: 0 | Status: COMPLETED | OUTPATIENT
Start: 2022-01-01 | End: 2022-01-01

## 2022-01-01 RX ORDER — HEPARIN SODIUM 5000 [USP'U]/ML
7500 INJECTION INTRAVENOUS; SUBCUTANEOUS ONCE
Refills: 0 | Status: DISCONTINUED | OUTPATIENT
Start: 2022-01-01 | End: 2022-01-01

## 2022-01-01 RX ORDER — LIDOCAINE 4 G/100G
1 CREAM TOPICAL
Refills: 0 | Status: DISCONTINUED | OUTPATIENT
Start: 2022-01-01 | End: 2022-01-01

## 2022-01-01 RX ORDER — HYDROMORPHONE HYDROCHLORIDE 2 MG/ML
0.25 INJECTION INTRAMUSCULAR; INTRAVENOUS; SUBCUTANEOUS ONCE
Refills: 0 | Status: DISCONTINUED | OUTPATIENT
Start: 2022-01-01 | End: 2022-01-01

## 2022-01-01 RX ORDER — SODIUM CHLORIDE 9 MG/ML
1000 INJECTION, SOLUTION INTRAVENOUS ONCE
Refills: 0 | Status: COMPLETED | OUTPATIENT
Start: 2022-01-01 | End: 2022-01-01

## 2022-01-01 RX ORDER — SODIUM BICARBONATE 1 MEQ/ML
50 SYRINGE (ML) INTRAVENOUS
Refills: 0 | Status: COMPLETED | OUTPATIENT
Start: 2022-01-01 | End: 2022-01-01

## 2022-01-01 RX ORDER — DEXTROSE 10 % IN WATER 10 %
1000 INTRAVENOUS SOLUTION INTRAVENOUS
Refills: 0 | Status: DISCONTINUED | OUTPATIENT
Start: 2022-01-01 | End: 2022-01-01

## 2022-01-01 RX ORDER — MYCOPHENOLATE MOFETIL 250 MG/1
3 CAPSULE ORAL
Qty: 0 | Refills: 0 | DISCHARGE

## 2022-01-01 RX ORDER — METOPROLOL TARTRATE 50 MG
2.5 TABLET ORAL EVERY 8 HOURS
Refills: 0 | Status: DISCONTINUED | OUTPATIENT
Start: 2022-01-01 | End: 2022-01-01

## 2022-01-01 RX ORDER — TAMSULOSIN HYDROCHLORIDE 0.4 MG/1
0.4 CAPSULE ORAL AT BEDTIME
Refills: 0 | Status: DISCONTINUED | OUTPATIENT
Start: 2022-01-01 | End: 2022-01-01

## 2022-01-01 RX ORDER — FUROSEMIDE 40 MG
20 TABLET ORAL ONCE
Refills: 0 | Status: COMPLETED | OUTPATIENT
Start: 2022-01-01 | End: 2022-01-01

## 2022-01-01 RX ORDER — PROPOFOL 10 MG/ML
40 INJECTION, EMULSION INTRAVENOUS
Qty: 1000 | Refills: 0 | Status: DISCONTINUED | OUTPATIENT
Start: 2022-01-01 | End: 2022-01-01

## 2022-01-01 RX ORDER — SODIUM CHLORIDE 9 MG/ML
500 INJECTION, SOLUTION INTRAVENOUS ONCE
Refills: 0 | Status: COMPLETED | OUTPATIENT
Start: 2022-01-01 | End: 2022-01-01

## 2022-01-01 RX ORDER — LANOLIN ALCOHOL/MO/W.PET/CERES
5 CREAM (GRAM) TOPICAL AT BEDTIME
Refills: 0 | Status: DISCONTINUED | OUTPATIENT
Start: 2022-01-01 | End: 2022-01-01

## 2022-01-01 RX ORDER — FAMOTIDINE 10 MG/ML
20 INJECTION INTRAVENOUS
Refills: 0 | Status: DISCONTINUED | OUTPATIENT
Start: 2022-01-01 | End: 2022-01-01

## 2022-01-01 RX ORDER — PHENYLEPHRINE HYDROCHLORIDE 10 MG/ML
0.5 INJECTION INTRAVENOUS
Qty: 40 | Refills: 0 | Status: DISCONTINUED | OUTPATIENT
Start: 2022-01-01 | End: 2022-01-01

## 2022-01-01 RX ORDER — DEXTROSE 50 % IN WATER 50 %
25 SYRINGE (ML) INTRAVENOUS ONCE
Refills: 0 | Status: COMPLETED | OUTPATIENT
Start: 2022-01-01 | End: 2022-01-01

## 2022-01-01 RX ORDER — CEFEPIME 1 G/1
1000 INJECTION, POWDER, FOR SOLUTION INTRAMUSCULAR; INTRAVENOUS ONCE
Refills: 0 | Status: COMPLETED | OUTPATIENT
Start: 2022-01-01 | End: 2022-01-01

## 2022-01-01 RX ORDER — THIAMINE MONONITRATE (VIT B1) 100 MG
100 TABLET ORAL ONCE
Refills: 0 | Status: COMPLETED | OUTPATIENT
Start: 2022-01-01 | End: 2022-01-01

## 2022-01-01 RX ORDER — PROPOFOL 10 MG/ML
10 INJECTION, EMULSION INTRAVENOUS
Qty: 1000 | Refills: 0 | Status: DISCONTINUED | OUTPATIENT
Start: 2022-01-01 | End: 2022-01-01

## 2022-01-01 RX ORDER — PHENYLEPHRINE HYDROCHLORIDE 10 MG/ML
4 INJECTION INTRAVENOUS
Qty: 40 | Refills: 0 | Status: DISCONTINUED | OUTPATIENT
Start: 2022-01-01 | End: 2022-01-01

## 2022-01-01 RX ORDER — LANOLIN ALCOHOL/MO/W.PET/CERES
5 CREAM (GRAM) TOPICAL ONCE
Refills: 0 | Status: COMPLETED | OUTPATIENT
Start: 2022-01-01 | End: 2022-01-01

## 2022-01-01 RX ORDER — ICOSAPENT ETHYL 500 MG/1
2 CAPSULE, LIQUID FILLED ORAL
Qty: 0 | Refills: 0 | DISCHARGE

## 2022-01-01 RX ORDER — CANDESARTAN CILEXETIL 8 MG/1
1 TABLET ORAL
Qty: 0 | Refills: 0 | DISCHARGE

## 2022-01-01 RX ORDER — ALBUMIN HUMAN 25 %
100 VIAL (ML) INTRAVENOUS EVERY 6 HOURS
Refills: 0 | Status: DISCONTINUED | OUTPATIENT
Start: 2022-01-01 | End: 2022-01-01

## 2022-01-01 RX ORDER — DEXAMETHASONE 0.5 MG/5ML
15 ELIXIR ORAL DAILY
Refills: 0 | Status: DISCONTINUED | OUTPATIENT
Start: 2022-01-01 | End: 2022-01-01

## 2022-01-01 RX ORDER — PANTOPRAZOLE SODIUM 20 MG/1
40 TABLET, DELAYED RELEASE ORAL
Refills: 0 | Status: DISCONTINUED | OUTPATIENT
Start: 2022-01-01 | End: 2022-01-01

## 2022-01-01 RX ORDER — DEXTROSE 50 % IN WATER 50 %
15 SYRINGE (ML) INTRAVENOUS ONCE
Refills: 0 | Status: DISCONTINUED | OUTPATIENT
Start: 2022-01-01 | End: 2022-01-01

## 2022-01-01 RX ORDER — HEPARIN SODIUM 5000 [USP'U]/ML
2300 INJECTION INTRAVENOUS; SUBCUTANEOUS
Qty: 25000 | Refills: 0 | Status: DISCONTINUED | OUTPATIENT
Start: 2022-01-01 | End: 2022-01-01

## 2022-01-01 RX ORDER — CASPOFUNGIN ACETATE 7 MG/ML
50 INJECTION, POWDER, LYOPHILIZED, FOR SOLUTION INTRAVENOUS EVERY 24 HOURS
Refills: 0 | Status: DISCONTINUED | OUTPATIENT
Start: 2022-01-01 | End: 2022-01-01

## 2022-01-01 RX ORDER — MIDAZOLAM HYDROCHLORIDE 1 MG/ML
4 INJECTION, SOLUTION INTRAMUSCULAR; INTRAVENOUS ONCE
Refills: 0 | Status: DISCONTINUED | OUTPATIENT
Start: 2022-01-01 | End: 2022-01-01

## 2022-01-01 RX ORDER — MEROPENEM 1 G/30ML
INJECTION INTRAVENOUS
Refills: 0 | Status: DISCONTINUED | OUTPATIENT
Start: 2022-01-01 | End: 2022-01-01

## 2022-01-01 RX ORDER — MIDAZOLAM HYDROCHLORIDE 1 MG/ML
6 INJECTION, SOLUTION INTRAMUSCULAR; INTRAVENOUS ONCE
Refills: 0 | Status: DISCONTINUED | OUTPATIENT
Start: 2022-01-01 | End: 2022-01-01

## 2022-01-01 RX ORDER — HEPARIN SODIUM 5000 [USP'U]/ML
3500 INJECTION INTRAVENOUS; SUBCUTANEOUS EVERY 6 HOURS
Refills: 0 | Status: DISCONTINUED | OUTPATIENT
Start: 2022-01-01 | End: 2022-01-01

## 2022-01-01 RX ORDER — ALBUMIN HUMAN 25 %
50 VIAL (ML) INTRAVENOUS EVERY 6 HOURS
Refills: 0 | Status: COMPLETED | OUTPATIENT
Start: 2022-01-01 | End: 2022-01-01

## 2022-01-01 RX ORDER — FOLIC ACID 0.8 MG
1 TABLET ORAL DAILY
Refills: 0 | Status: DISCONTINUED | OUTPATIENT
Start: 2022-01-01 | End: 2022-01-01

## 2022-01-01 RX ORDER — CEFEPIME 1 G/1
1000 INJECTION, POWDER, FOR SOLUTION INTRAMUSCULAR; INTRAVENOUS EVERY 8 HOURS
Refills: 0 | Status: DISCONTINUED | OUTPATIENT
Start: 2022-01-01 | End: 2022-01-01

## 2022-01-01 RX ORDER — BENZOCAINE AND MENTHOL 5; 1 G/100ML; G/100ML
1 LIQUID ORAL
Refills: 0 | Status: COMPLETED | OUTPATIENT
Start: 2022-01-01 | End: 2022-01-01

## 2022-01-01 RX ORDER — HEPARIN SODIUM 5000 [USP'U]/ML
INJECTION INTRAVENOUS; SUBCUTANEOUS
Qty: 25000 | Refills: 0 | Status: DISCONTINUED | OUTPATIENT
Start: 2022-01-01 | End: 2022-01-01

## 2022-01-01 RX ORDER — VANCOMYCIN HCL 1 G
1250 VIAL (EA) INTRAVENOUS EVERY 24 HOURS
Refills: 0 | Status: COMPLETED | OUTPATIENT
Start: 2022-01-01 | End: 2022-01-01

## 2022-01-01 RX ORDER — CARVEDILOL PHOSPHATE 80 MG/1
1 CAPSULE, EXTENDED RELEASE ORAL
Qty: 0 | Refills: 0 | DISCHARGE

## 2022-01-01 RX ORDER — PANTOPRAZOLE SODIUM 20 MG/1
40 TABLET, DELAYED RELEASE ORAL DAILY
Refills: 0 | Status: DISCONTINUED | OUTPATIENT
Start: 2022-01-01 | End: 2022-01-01

## 2022-01-01 RX ORDER — GLUCAGON INJECTION, SOLUTION 0.5 MG/.1ML
1 INJECTION, SOLUTION SUBCUTANEOUS ONCE
Refills: 0 | Status: DISCONTINUED | OUTPATIENT
Start: 2022-01-01 | End: 2022-01-01

## 2022-01-01 RX ORDER — VALACYCLOVIR 500 MG/1
1000 TABLET, FILM COATED ORAL
Refills: 0 | Status: DISCONTINUED | OUTPATIENT
Start: 2022-01-01 | End: 2022-01-01

## 2022-01-01 RX ORDER — POTASSIUM CHLORIDE 20 MEQ
10 PACKET (EA) ORAL
Refills: 0 | Status: COMPLETED | OUTPATIENT
Start: 2022-01-01 | End: 2022-01-01

## 2022-01-01 RX ORDER — HEPARIN SODIUM 5000 [USP'U]/ML
7500 INJECTION INTRAVENOUS; SUBCUTANEOUS EVERY 6 HOURS
Refills: 0 | Status: DISCONTINUED | OUTPATIENT
Start: 2022-01-01 | End: 2022-01-01

## 2022-01-01 RX ORDER — ACETAMINOPHEN 500 MG
500 TABLET ORAL ONCE
Refills: 0 | Status: COMPLETED | OUTPATIENT
Start: 2022-01-01 | End: 2022-01-01

## 2022-01-01 RX ORDER — LOPERAMIDE HCL 2 MG
2 TABLET ORAL ONCE
Refills: 0 | Status: COMPLETED | OUTPATIENT
Start: 2022-01-01 | End: 2022-01-01

## 2022-01-01 RX ORDER — CARVEDILOL PHOSPHATE 80 MG/1
25 CAPSULE, EXTENDED RELEASE ORAL EVERY 12 HOURS
Refills: 0 | Status: DISCONTINUED | OUTPATIENT
Start: 2022-01-01 | End: 2022-01-01

## 2022-01-01 RX ORDER — EPINEPHRINE 11.25MG/ML
0.5 SOLUTION, NON-ORAL INHALATION ONCE
Refills: 0 | Status: COMPLETED | OUTPATIENT
Start: 2022-01-01 | End: 2022-01-01

## 2022-01-01 RX ORDER — METOPROLOL TARTRATE 50 MG
5 TABLET ORAL ONCE
Refills: 0 | Status: COMPLETED | OUTPATIENT
Start: 2022-01-01 | End: 2022-01-01

## 2022-01-01 RX ORDER — DEXAMETHASONE 0.5 MG/5ML
10 ELIXIR ORAL DAILY
Refills: 0 | Status: DISCONTINUED | OUTPATIENT
Start: 2022-01-01 | End: 2022-01-01

## 2022-01-01 RX ORDER — FENTANYL CITRATE 50 UG/ML
50 INJECTION INTRAVENOUS ONCE
Refills: 0 | Status: DISCONTINUED | OUTPATIENT
Start: 2022-01-01 | End: 2022-01-01

## 2022-01-01 RX ORDER — MAGNESIUM SULFATE 500 MG/ML
1 VIAL (ML) INJECTION ONCE
Refills: 0 | Status: COMPLETED | OUTPATIENT
Start: 2022-01-01 | End: 2022-01-01

## 2022-01-01 RX ORDER — LEVALBUTEROL 1.25 MG/.5ML
0.63 SOLUTION, CONCENTRATE RESPIRATORY (INHALATION) EVERY 8 HOURS
Refills: 0 | Status: DISCONTINUED | OUTPATIENT
Start: 2022-01-01 | End: 2022-01-01

## 2022-01-01 RX ORDER — MAGNESIUM SULFATE 500 MG/ML
2 VIAL (ML) INJECTION ONCE
Refills: 0 | Status: COMPLETED | OUTPATIENT
Start: 2022-01-01 | End: 2022-01-01

## 2022-01-01 RX ORDER — GABAPENTIN 400 MG/1
300 CAPSULE ORAL DAILY
Refills: 0 | Status: DISCONTINUED | OUTPATIENT
Start: 2022-01-01 | End: 2022-01-01

## 2022-01-01 RX ORDER — FAMOTIDINE 10 MG/ML
20 INJECTION INTRAVENOUS DAILY
Refills: 0 | Status: DISCONTINUED | OUTPATIENT
Start: 2022-01-01 | End: 2022-01-01

## 2022-01-01 RX ORDER — SODIUM BICARBONATE 1 MEQ/ML
0.17 SYRINGE (ML) INTRAVENOUS
Qty: 150 | Refills: 0 | Status: DISCONTINUED | OUTPATIENT
Start: 2022-01-01 | End: 2022-01-01

## 2022-01-01 RX ORDER — SODIUM CHLORIDE 9 MG/ML
1000 INJECTION INTRAMUSCULAR; INTRAVENOUS; SUBCUTANEOUS
Refills: 0 | Status: DISCONTINUED | OUTPATIENT
Start: 2022-01-01 | End: 2022-01-01

## 2022-01-01 RX ORDER — ACETAMINOPHEN 500 MG
500 TABLET ORAL EVERY 6 HOURS
Refills: 0 | Status: DISCONTINUED | OUTPATIENT
Start: 2022-01-01 | End: 2022-01-01

## 2022-01-01 RX ORDER — MIDODRINE HYDROCHLORIDE 2.5 MG/1
5 TABLET ORAL EVERY 8 HOURS
Refills: 0 | Status: DISCONTINUED | OUTPATIENT
Start: 2022-01-01 | End: 2022-01-01

## 2022-01-01 RX ORDER — ACETAMINOPHEN 500 MG
1000 TABLET ORAL ONCE
Refills: 0 | Status: COMPLETED | OUTPATIENT
Start: 2022-01-01 | End: 2022-01-01

## 2022-01-01 RX ORDER — CHLORHEXIDINE GLUCONATE 213 G/1000ML
15 SOLUTION TOPICAL EVERY 12 HOURS
Refills: 0 | Status: DISCONTINUED | OUTPATIENT
Start: 2022-01-01 | End: 2022-01-01

## 2022-01-01 RX ORDER — ALBUMIN HUMAN 25 %
100 VIAL (ML) INTRAVENOUS EVERY 6 HOURS
Refills: 0 | Status: COMPLETED | OUTPATIENT
Start: 2022-01-01 | End: 2022-01-01

## 2022-01-01 RX ORDER — INSULIN LISPRO 100/ML
VIAL (ML) SUBCUTANEOUS EVERY 6 HOURS
Refills: 0 | Status: DISCONTINUED | OUTPATIENT
Start: 2022-01-01 | End: 2022-01-01

## 2022-01-01 RX ORDER — METOPROLOL TARTRATE 50 MG
2.5 TABLET ORAL EVERY 6 HOURS
Refills: 0 | Status: DISCONTINUED | OUTPATIENT
Start: 2022-01-01 | End: 2022-01-01

## 2022-01-01 RX ORDER — SODIUM BICARBONATE 1 MEQ/ML
0.25 SYRINGE (ML) INTRAVENOUS
Qty: 150 | Refills: 0 | Status: DISCONTINUED | OUTPATIENT
Start: 2022-01-01 | End: 2022-01-01

## 2022-01-01 RX ORDER — METOPROLOL TARTRATE 50 MG
5 TABLET ORAL EVERY 6 HOURS
Refills: 0 | Status: DISCONTINUED | OUTPATIENT
Start: 2022-01-01 | End: 2022-01-01

## 2022-01-01 RX ORDER — ASPIRIN/CALCIUM CARB/MAGNESIUM 324 MG
1 TABLET ORAL
Qty: 0 | Refills: 0 | DISCHARGE

## 2022-01-01 RX ORDER — LOSARTAN POTASSIUM 100 MG/1
1 TABLET, FILM COATED ORAL
Qty: 0 | Refills: 0 | DISCHARGE

## 2022-01-01 RX ORDER — IPRATROPIUM/ALBUTEROL SULFATE 18-103MCG
3 AEROSOL WITH ADAPTER (GRAM) INHALATION EVERY 6 HOURS
Refills: 0 | Status: ACTIVE | OUTPATIENT
Start: 2022-01-01 | End: 2023-04-29

## 2022-01-01 RX ORDER — NOREPINEPHRINE BITARTRATE/D5W 8 MG/250ML
0.4 PLASTIC BAG, INJECTION (ML) INTRAVENOUS
Qty: 8 | Refills: 0 | Status: DISCONTINUED | OUTPATIENT
Start: 2022-01-01 | End: 2022-01-01

## 2022-01-01 RX ORDER — VANCOMYCIN HCL 1 G
1000 VIAL (EA) INTRAVENOUS EVERY 24 HOURS
Refills: 0 | Status: COMPLETED | OUTPATIENT
Start: 2022-01-01 | End: 2022-01-01

## 2022-01-01 RX ORDER — RASBURICASE 7.5 MG
6 KIT INTRAVENOUS ONCE
Refills: 0 | Status: DISCONTINUED | OUTPATIENT
Start: 2022-01-01 | End: 2022-01-01

## 2022-01-01 RX ORDER — GLIMEPIRIDE 1 MG
1 TABLET ORAL
Qty: 0 | Refills: 0 | DISCHARGE

## 2022-01-01 RX ORDER — INSULIN LISPRO 100/ML
VIAL (ML) SUBCUTANEOUS AT BEDTIME
Refills: 0 | Status: DISCONTINUED | OUTPATIENT
Start: 2022-01-01 | End: 2022-01-01

## 2022-01-01 RX ORDER — MULTIVIT-MIN/FERROUS GLUCONATE 9 MG/15 ML
1 LIQUID (ML) ORAL DAILY
Refills: 0 | Status: DISCONTINUED | OUTPATIENT
Start: 2022-01-01 | End: 2022-01-01

## 2022-01-01 RX ORDER — TAMSULOSIN HYDROCHLORIDE 0.4 MG/1
1 CAPSULE ORAL
Qty: 0 | Refills: 0 | DISCHARGE

## 2022-01-01 RX ORDER — HYDRALAZINE HCL 50 MG
5 TABLET ORAL ONCE
Refills: 0 | Status: COMPLETED | OUTPATIENT
Start: 2022-01-01 | End: 2022-01-01

## 2022-01-01 RX ORDER — MEROPENEM 1 G/30ML
1000 INJECTION INTRAVENOUS EVERY 12 HOURS
Refills: 0 | Status: DISCONTINUED | OUTPATIENT
Start: 2022-01-01 | End: 2022-01-01

## 2022-01-01 RX ORDER — METOCLOPRAMIDE HCL 10 MG
5 TABLET ORAL EVERY 12 HOURS
Refills: 0 | Status: DISCONTINUED | OUTPATIENT
Start: 2022-01-01 | End: 2022-01-01

## 2022-01-01 RX ORDER — FLUCONAZOLE 150 MG/1
100 TABLET ORAL DAILY
Refills: 0 | Status: DISCONTINUED | OUTPATIENT
Start: 2022-01-01 | End: 2022-01-01

## 2022-01-01 RX ORDER — EMPAGLIFLOZIN 10 MG/1
1 TABLET, FILM COATED ORAL
Qty: 0 | Refills: 0 | DISCHARGE

## 2022-01-01 RX ORDER — FENTANYL CITRATE 50 UG/ML
100 INJECTION INTRAVENOUS ONCE
Refills: 0 | Status: DISCONTINUED | OUTPATIENT
Start: 2022-01-01 | End: 2022-01-01

## 2022-01-01 RX ORDER — SODIUM CHLORIDE 9 MG/ML
10 INJECTION INTRAMUSCULAR; INTRAVENOUS; SUBCUTANEOUS
Refills: 0 | Status: DISCONTINUED | OUTPATIENT
Start: 2022-01-01 | End: 2022-01-01

## 2022-01-01 RX ORDER — MYCOPHENOLATE MOFETIL 250 MG/1
1 CAPSULE ORAL
Qty: 0 | Refills: 0 | DISCHARGE

## 2022-01-01 RX ORDER — MEROPENEM 1 G/30ML
1000 INJECTION INTRAVENOUS ONCE
Refills: 0 | Status: COMPLETED | OUTPATIENT
Start: 2022-01-01 | End: 2022-01-01

## 2022-01-01 RX ORDER — METOPROLOL TARTRATE 50 MG
5 TABLET ORAL EVERY 12 HOURS
Refills: 0 | Status: DISCONTINUED | OUTPATIENT
Start: 2022-01-01 | End: 2022-01-01

## 2022-01-01 RX ORDER — METFORMIN HYDROCHLORIDE 850 MG/1
1 TABLET ORAL
Qty: 0 | Refills: 0 | DISCHARGE

## 2022-01-01 RX ORDER — LOPERAMIDE HCL 2 MG
2 TABLET ORAL DAILY
Refills: 0 | Status: DISCONTINUED | OUTPATIENT
Start: 2022-01-01 | End: 2022-01-01

## 2022-01-01 RX ORDER — IPRATROPIUM BROMIDE 0.2 MG/ML
500 SOLUTION, NON-ORAL INHALATION ONCE
Refills: 0 | Status: COMPLETED | OUTPATIENT
Start: 2022-01-01 | End: 2022-01-01

## 2022-01-01 RX ORDER — LIDOCAINE HCL 20 MG/ML
10 VIAL (ML) INJECTION ONCE
Refills: 0 | Status: COMPLETED | OUTPATIENT
Start: 2022-01-01 | End: 2022-01-01

## 2022-01-01 RX ORDER — MIDODRINE HYDROCHLORIDE 2.5 MG/1
5 TABLET ORAL ONCE
Refills: 0 | Status: COMPLETED | OUTPATIENT
Start: 2022-01-01 | End: 2022-01-01

## 2022-01-01 RX ORDER — POTASSIUM CHLORIDE 20 MEQ
40 PACKET (EA) ORAL ONCE
Refills: 0 | Status: COMPLETED | OUTPATIENT
Start: 2022-01-01 | End: 2022-01-01

## 2022-01-01 RX ORDER — IPRATROPIUM/ALBUTEROL SULFATE 18-103MCG
3 AEROSOL WITH ADAPTER (GRAM) INHALATION EVERY 6 HOURS
Refills: 0 | Status: DISCONTINUED | OUTPATIENT
Start: 2022-01-01 | End: 2022-01-01

## 2022-01-01 RX ORDER — LIDOCAINE 4 G/100G
1 CREAM TOPICAL DAILY
Refills: 0 | Status: DISCONTINUED | OUTPATIENT
Start: 2022-01-01 | End: 2022-01-01

## 2022-01-01 RX ORDER — HEPARIN SODIUM 5000 [USP'U]/ML
5000 INJECTION INTRAVENOUS; SUBCUTANEOUS EVERY 8 HOURS
Refills: 0 | Status: DISCONTINUED | OUTPATIENT
Start: 2022-01-01 | End: 2022-01-01

## 2022-01-01 RX ORDER — FUROSEMIDE 40 MG
40 TABLET ORAL DAILY
Refills: 0 | Status: DISCONTINUED | OUTPATIENT
Start: 2022-01-01 | End: 2022-01-01

## 2022-01-01 RX ORDER — CLOPIDOGREL BISULFATE 75 MG/1
1 TABLET, FILM COATED ORAL
Qty: 0 | Refills: 0 | DISCHARGE

## 2022-01-01 RX ORDER — HYDRALAZINE HCL 50 MG
10 TABLET ORAL EVERY 8 HOURS
Refills: 0 | Status: DISCONTINUED | OUTPATIENT
Start: 2022-01-01 | End: 2022-01-01

## 2022-01-01 RX ORDER — SODIUM CHLORIDE 9 MG/ML
500 INJECTION, SOLUTION INTRAVENOUS
Refills: 0 | Status: DISCONTINUED | OUTPATIENT
Start: 2022-01-01 | End: 2022-01-01

## 2022-01-01 RX ORDER — SODIUM CHLORIDE 9 MG/ML
500 INJECTION INTRAMUSCULAR; INTRAVENOUS; SUBCUTANEOUS ONCE
Refills: 0 | Status: COMPLETED | OUTPATIENT
Start: 2022-01-01 | End: 2022-01-01

## 2022-01-01 RX ORDER — FENOFIBRATE,MICRONIZED 130 MG
1 CAPSULE ORAL
Qty: 0 | Refills: 0 | DISCHARGE

## 2022-01-01 RX ORDER — CITRIC ACID/SODIUM CITRATE 300-500 MG
30 SOLUTION, ORAL ORAL EVERY 6 HOURS
Refills: 0 | Status: DISCONTINUED | OUTPATIENT
Start: 2022-01-01 | End: 2022-01-01

## 2022-01-01 RX ORDER — FAMOTIDINE 10 MG/ML
1 INJECTION INTRAVENOUS
Qty: 0 | Refills: 0 | DISCHARGE

## 2022-01-01 RX ORDER — IBUPROFEN 200 MG
600 TABLET ORAL ONCE
Refills: 0 | Status: COMPLETED | OUTPATIENT
Start: 2022-01-01 | End: 2022-01-01

## 2022-01-01 RX ORDER — POTASSIUM CHLORIDE 20 MEQ
20 PACKET (EA) ORAL ONCE
Refills: 0 | Status: COMPLETED | OUTPATIENT
Start: 2022-01-01 | End: 2022-01-01

## 2022-01-01 RX ORDER — CEFEPIME 1 G/1
INJECTION, POWDER, FOR SOLUTION INTRAMUSCULAR; INTRAVENOUS
Refills: 0 | Status: DISCONTINUED | OUTPATIENT
Start: 2022-01-01 | End: 2022-01-01

## 2022-01-01 RX ORDER — ACETAMINOPHEN 500 MG
650 TABLET ORAL ONCE
Refills: 0 | Status: DISCONTINUED | OUTPATIENT
Start: 2022-01-01 | End: 2022-01-01

## 2022-01-01 RX ORDER — VASOPRESSIN 20 [USP'U]/ML
0.04 INJECTION INTRAVENOUS
Qty: 50 | Refills: 0 | Status: DISCONTINUED | OUTPATIENT
Start: 2022-01-01 | End: 2022-01-01

## 2022-01-01 RX ADMIN — Medication 104 MILLIGRAM(S): at 21:46

## 2022-01-01 RX ADMIN — MIDAZOLAM HYDROCHLORIDE 6 MILLIGRAM(S): 1 INJECTION, SOLUTION INTRAMUSCULAR; INTRAVENOUS at 13:01

## 2022-01-01 RX ADMIN — PHENYLEPHRINE HYDROCHLORIDE 17 MICROGRAM(S)/KG/MIN: 10 INJECTION INTRAVENOUS at 19:50

## 2022-01-01 RX ADMIN — MEROPENEM 100 MILLIGRAM(S): 1 INJECTION INTRAVENOUS at 05:01

## 2022-01-01 RX ADMIN — HEPARIN SODIUM 2300 UNIT(S)/HR: 5000 INJECTION INTRAVENOUS; SUBCUTANEOUS at 21:20

## 2022-01-01 RX ADMIN — LIDOCAINE 1 PATCH: 4 CREAM TOPICAL at 12:14

## 2022-01-01 RX ADMIN — Medication 50 MILLILITER(S): at 19:49

## 2022-01-01 RX ADMIN — Medication 25 MILLILITER(S): at 11:29

## 2022-01-01 RX ADMIN — Medication 107.5 MILLIGRAM(S): at 05:58

## 2022-01-01 RX ADMIN — Medication 10 MILLIGRAM(S): at 23:43

## 2022-01-01 RX ADMIN — CARVEDILOL PHOSPHATE 25 MILLIGRAM(S): 80 CAPSULE, EXTENDED RELEASE ORAL at 06:44

## 2022-01-01 RX ADMIN — Medication 104 MILLIGRAM(S): at 21:21

## 2022-01-01 RX ADMIN — Medication 30 MILLILITER(S): at 23:14

## 2022-01-01 RX ADMIN — Medication 600 MILLIGRAM(S): at 10:12

## 2022-01-01 RX ADMIN — Medication 500 MILLIGRAM(S): at 17:24

## 2022-01-01 RX ADMIN — Medication 500 MILLIGRAM(S): at 04:45

## 2022-01-01 RX ADMIN — Medication 166.67 MILLIGRAM(S): at 10:17

## 2022-01-01 RX ADMIN — LIDOCAINE 1 PATCH: 4 CREAM TOPICAL at 00:00

## 2022-01-01 RX ADMIN — LIDOCAINE 1 PATCH: 4 CREAM TOPICAL at 18:59

## 2022-01-01 RX ADMIN — Medication 20 MILLIGRAM(S): at 09:55

## 2022-01-01 RX ADMIN — HEPARIN SODIUM 1700 UNIT(S)/HR: 5000 INJECTION INTRAVENOUS; SUBCUTANEOUS at 12:11

## 2022-01-01 RX ADMIN — Medication 40 MILLIGRAM(S): at 17:33

## 2022-01-01 RX ADMIN — Medication 2.5 MILLIGRAM(S): at 11:40

## 2022-01-01 RX ADMIN — Medication 2.5 MILLIGRAM(S): at 18:08

## 2022-01-01 RX ADMIN — HEPARIN SODIUM 2000 UNIT(S)/HR: 5000 INJECTION INTRAVENOUS; SUBCUTANEOUS at 07:42

## 2022-01-01 RX ADMIN — Medication 10 MILLIGRAM(S): at 13:51

## 2022-01-01 RX ADMIN — FLUCONAZOLE 100 MILLIGRAM(S): 150 TABLET ORAL at 13:15

## 2022-01-01 RX ADMIN — LIDOCAINE 1 PATCH: 4 CREAM TOPICAL at 11:25

## 2022-01-01 RX ADMIN — FENTANYL CITRATE 13.6 MICROGRAM(S)/KG/HR: 50 INJECTION INTRAVENOUS at 19:50

## 2022-01-01 RX ADMIN — Medication 200 MILLIGRAM(S): at 13:41

## 2022-01-01 RX ADMIN — TAMSULOSIN HYDROCHLORIDE 0.4 MILLIGRAM(S): 0.4 CAPSULE ORAL at 21:12

## 2022-01-01 RX ADMIN — Medication 107.5 MILLIGRAM(S): at 14:04

## 2022-01-01 RX ADMIN — Medication 100 MEQ/KG/HR: at 20:23

## 2022-01-01 RX ADMIN — Medication 30 MILLILITER(S): at 13:02

## 2022-01-01 RX ADMIN — LIDOCAINE 1 PATCH: 4 CREAM TOPICAL at 13:44

## 2022-01-01 RX ADMIN — Medication 50 MILLILITER(S): at 02:31

## 2022-01-01 RX ADMIN — Medication 3 MILLILITER(S): at 23:14

## 2022-01-01 RX ADMIN — GABAPENTIN 300 MILLIGRAM(S): 400 CAPSULE ORAL at 13:02

## 2022-01-01 RX ADMIN — Medication 10 MILLIGRAM(S): at 21:21

## 2022-01-01 RX ADMIN — MEROPENEM 100 MILLIGRAM(S): 1 INJECTION INTRAVENOUS at 05:56

## 2022-01-01 RX ADMIN — FLUCONAZOLE 100 MILLIGRAM(S): 150 TABLET ORAL at 13:51

## 2022-01-01 RX ADMIN — Medication 50 MILLILITER(S): at 05:00

## 2022-01-01 RX ADMIN — PANTOPRAZOLE SODIUM 40 MILLIGRAM(S): 20 TABLET, DELAYED RELEASE ORAL at 06:44

## 2022-01-01 RX ADMIN — Medication 40 MILLILITER(S): at 04:45

## 2022-01-01 RX ADMIN — Medication 25 MILLILITER(S): at 17:05

## 2022-01-01 RX ADMIN — CHLORHEXIDINE GLUCONATE 1 APPLICATION(S): 213 SOLUTION TOPICAL at 05:31

## 2022-01-01 RX ADMIN — DEXMEDETOMIDINE HYDROCHLORIDE IN 0.9% SODIUM CHLORIDE 4.53 MICROGRAM(S)/KG/HR: 4 INJECTION INTRAVENOUS at 12:41

## 2022-01-01 RX ADMIN — LIDOCAINE 1 PATCH: 4 CREAM TOPICAL at 02:00

## 2022-01-01 RX ADMIN — MEROPENEM 100 MILLIGRAM(S): 1 INJECTION INTRAVENOUS at 17:04

## 2022-01-01 RX ADMIN — Medication 40 MILLIGRAM(S): at 05:54

## 2022-01-01 RX ADMIN — LIDOCAINE 1 PATCH: 4 CREAM TOPICAL at 19:00

## 2022-01-01 RX ADMIN — LIDOCAINE 1 PATCH: 4 CREAM TOPICAL at 11:17

## 2022-01-01 RX ADMIN — CARVEDILOL PHOSPHATE 25 MILLIGRAM(S): 80 CAPSULE, EXTENDED RELEASE ORAL at 17:08

## 2022-01-01 RX ADMIN — Medication 5 MILLIGRAM(S): at 23:43

## 2022-01-01 RX ADMIN — CARVEDILOL PHOSPHATE 25 MILLIGRAM(S): 80 CAPSULE, EXTENDED RELEASE ORAL at 06:16

## 2022-01-01 RX ADMIN — CHLORHEXIDINE GLUCONATE 1 APPLICATION(S): 213 SOLUTION TOPICAL at 17:17

## 2022-01-01 RX ADMIN — HEPARIN SODIUM 1900 UNIT(S)/HR: 5000 INJECTION INTRAVENOUS; SUBCUTANEOUS at 01:43

## 2022-01-01 RX ADMIN — Medication 100 MILLIEQUIVALENT(S): at 20:41

## 2022-01-01 RX ADMIN — FLUCONAZOLE 100 MILLIGRAM(S): 150 TABLET ORAL at 18:22

## 2022-01-01 RX ADMIN — Medication 100 MILLIGRAM(S): at 21:02

## 2022-01-01 RX ADMIN — LIDOCAINE 1 PATCH: 4 CREAM TOPICAL at 12:06

## 2022-01-01 RX ADMIN — Medication 1 TABLET(S): at 11:27

## 2022-01-01 RX ADMIN — Medication 12.5 MILLIGRAM(S): at 17:16

## 2022-01-01 RX ADMIN — Medication 500 MILLIGRAM(S): at 10:13

## 2022-01-01 RX ADMIN — Medication 2.5 MILLIGRAM(S): at 00:17

## 2022-01-01 RX ADMIN — LIDOCAINE 1 PATCH: 4 CREAM TOPICAL at 01:02

## 2022-01-01 RX ADMIN — Medication 1 MILLIGRAM(S): at 11:28

## 2022-01-01 RX ADMIN — MEROPENEM 100 MILLIGRAM(S): 1 INJECTION INTRAVENOUS at 08:58

## 2022-01-01 RX ADMIN — Medication 50 MILLILITER(S): at 18:21

## 2022-01-01 RX ADMIN — CHLORHEXIDINE GLUCONATE 1 APPLICATION(S): 213 SOLUTION TOPICAL at 06:53

## 2022-01-01 RX ADMIN — CHLORHEXIDINE GLUCONATE 1 APPLICATION(S): 213 SOLUTION TOPICAL at 05:30

## 2022-01-01 RX ADMIN — TAMSULOSIN HYDROCHLORIDE 0.4 MILLIGRAM(S): 0.4 CAPSULE ORAL at 21:08

## 2022-01-01 RX ADMIN — Medication 12.5 MILLIGRAM(S): at 18:38

## 2022-01-01 RX ADMIN — Medication 20 MILLIGRAM(S): at 17:37

## 2022-01-01 RX ADMIN — MEROPENEM 100 MILLIGRAM(S): 1 INJECTION INTRAVENOUS at 17:08

## 2022-01-01 RX ADMIN — Medication 30 MILLILITER(S): at 23:59

## 2022-01-01 RX ADMIN — Medication 1 TABLET(S): at 13:15

## 2022-01-01 RX ADMIN — Medication 50 MILLILITER(S): at 11:29

## 2022-01-01 RX ADMIN — GABAPENTIN 300 MILLIGRAM(S): 400 CAPSULE ORAL at 13:51

## 2022-01-01 RX ADMIN — Medication 40 MILLIGRAM(S): at 06:15

## 2022-01-01 RX ADMIN — Medication 1 TABLET(S): at 21:02

## 2022-01-01 RX ADMIN — Medication 30 MILLILITER(S): at 00:36

## 2022-01-01 RX ADMIN — HEPARIN SODIUM 1700 UNIT(S)/HR: 5000 INJECTION INTRAVENOUS; SUBCUTANEOUS at 19:23

## 2022-01-01 RX ADMIN — MEROPENEM 100 MILLIGRAM(S): 1 INJECTION INTRAVENOUS at 05:24

## 2022-01-01 RX ADMIN — MIDODRINE HYDROCHLORIDE 5 MILLIGRAM(S): 2.5 TABLET ORAL at 18:22

## 2022-01-01 RX ADMIN — VALACYCLOVIR 1000 MILLIGRAM(S): 500 TABLET, FILM COATED ORAL at 21:15

## 2022-01-01 RX ADMIN — LIDOCAINE 1 PATCH: 4 CREAM TOPICAL at 19:42

## 2022-01-01 RX ADMIN — TAMSULOSIN HYDROCHLORIDE 0.4 MILLIGRAM(S): 0.4 CAPSULE ORAL at 22:22

## 2022-01-01 RX ADMIN — Medication 104 MILLIGRAM(S): at 21:18

## 2022-01-01 RX ADMIN — CARVEDILOL PHOSPHATE 25 MILLIGRAM(S): 80 CAPSULE, EXTENDED RELEASE ORAL at 06:06

## 2022-01-01 RX ADMIN — Medication 1 MILLIGRAM(S): at 13:15

## 2022-01-01 RX ADMIN — Medication 30 MILLILITER(S): at 05:04

## 2022-01-01 RX ADMIN — Medication 10 MILLIGRAM(S): at 05:24

## 2022-01-01 RX ADMIN — HEPARIN SODIUM 1700 UNIT(S)/HR: 5000 INJECTION INTRAVENOUS; SUBCUTANEOUS at 15:58

## 2022-01-01 RX ADMIN — BENZOCAINE AND MENTHOL 1 LOZENGE: 5; 1 LIQUID ORAL at 06:15

## 2022-01-01 RX ADMIN — Medication 500 MILLIGRAM(S): at 11:00

## 2022-01-01 RX ADMIN — SODIUM CHLORIDE 75 MILLILITER(S): 9 INJECTION INTRAMUSCULAR; INTRAVENOUS; SUBCUTANEOUS at 02:31

## 2022-01-01 RX ADMIN — CEFEPIME 100 MILLIGRAM(S): 1 INJECTION, POWDER, FOR SOLUTION INTRAMUSCULAR; INTRAVENOUS at 21:01

## 2022-01-01 RX ADMIN — Medication 40 MILLIGRAM(S): at 05:39

## 2022-01-01 RX ADMIN — Medication 50 MILLILITER(S): at 08:06

## 2022-01-01 RX ADMIN — Medication 30 MILLILITER(S): at 02:59

## 2022-01-01 RX ADMIN — MIDODRINE HYDROCHLORIDE 5 MILLIGRAM(S): 2.5 TABLET ORAL at 05:02

## 2022-01-01 RX ADMIN — Medication 50 MILLIEQUIVALENT(S): at 04:15

## 2022-01-01 RX ADMIN — CHLORHEXIDINE GLUCONATE 1 APPLICATION(S): 213 SOLUTION TOPICAL at 06:12

## 2022-01-01 RX ADMIN — Medication 12.5 MILLIGRAM(S): at 06:16

## 2022-01-01 RX ADMIN — MEROPENEM 100 MILLIGRAM(S): 1 INJECTION INTRAVENOUS at 18:09

## 2022-01-01 RX ADMIN — SODIUM CHLORIDE 125 MILLILITER(S): 9 INJECTION, SOLUTION INTRAVENOUS at 05:00

## 2022-01-01 RX ADMIN — HEPARIN SODIUM 2000 UNIT(S)/HR: 5000 INJECTION INTRAVENOUS; SUBCUTANEOUS at 03:27

## 2022-01-01 RX ADMIN — Medication 3 MILLILITER(S): at 11:36

## 2022-01-01 RX ADMIN — CHLORHEXIDINE GLUCONATE 1 APPLICATION(S): 213 SOLUTION TOPICAL at 05:04

## 2022-01-01 RX ADMIN — LIDOCAINE 1 PATCH: 4 CREAM TOPICAL at 19:55

## 2022-01-01 RX ADMIN — Medication 25 MILLILITER(S): at 23:12

## 2022-01-01 RX ADMIN — Medication 500 MILLIGRAM(S): at 10:43

## 2022-01-01 RX ADMIN — FENTANYL CITRATE 13.6 MICROGRAM(S)/KG/HR: 50 INJECTION INTRAVENOUS at 20:21

## 2022-01-01 RX ADMIN — Medication 12.5 MILLIGRAM(S): at 05:28

## 2022-01-01 RX ADMIN — CARVEDILOL PHOSPHATE 25 MILLIGRAM(S): 80 CAPSULE, EXTENDED RELEASE ORAL at 06:37

## 2022-01-01 RX ADMIN — HEPARIN SODIUM 2100 UNIT(S)/HR: 5000 INJECTION INTRAVENOUS; SUBCUTANEOUS at 06:07

## 2022-01-01 RX ADMIN — CISATRACURIUM BESYLATE 20 MILLIGRAM(S): 2 INJECTION INTRAVENOUS at 16:25

## 2022-01-01 RX ADMIN — Medication 1000 MILLIGRAM(S): at 06:59

## 2022-01-01 RX ADMIN — TAMSULOSIN HYDROCHLORIDE 0.4 MILLIGRAM(S): 0.4 CAPSULE ORAL at 22:50

## 2022-01-01 RX ADMIN — GABAPENTIN 300 MILLIGRAM(S): 400 CAPSULE ORAL at 13:25

## 2022-01-01 RX ADMIN — Medication 650 MILLIGRAM(S): at 15:18

## 2022-01-01 RX ADMIN — HEPARIN SODIUM 0 UNIT(S)/HR: 5000 INJECTION INTRAVENOUS; SUBCUTANEOUS at 01:08

## 2022-01-01 RX ADMIN — Medication 1 TABLET(S): at 11:16

## 2022-01-01 RX ADMIN — Medication 1 MILLIGRAM(S): at 14:47

## 2022-01-01 RX ADMIN — HEPARIN SODIUM 1900 UNIT(S)/HR: 5000 INJECTION INTRAVENOUS; SUBCUTANEOUS at 14:37

## 2022-01-01 RX ADMIN — LIDOCAINE 1 PATCH: 4 CREAM TOPICAL at 17:26

## 2022-01-01 RX ADMIN — Medication 107.5 MILLIGRAM(S): at 08:57

## 2022-01-01 RX ADMIN — FENTANYL CITRATE 13.6 MICROGRAM(S)/KG/HR: 50 INJECTION INTRAVENOUS at 20:57

## 2022-01-01 RX ADMIN — Medication 30 MILLILITER(S): at 18:37

## 2022-01-01 RX ADMIN — Medication 1 TABLET(S): at 13:52

## 2022-01-01 RX ADMIN — FENTANYL CITRATE 100 MICROGRAM(S): 50 INJECTION INTRAVENOUS at 16:20

## 2022-01-01 RX ADMIN — FENTANYL CITRATE 50 MICROGRAM(S): 50 INJECTION INTRAVENOUS at 20:23

## 2022-01-01 RX ADMIN — Medication 25 MILLILITER(S): at 19:17

## 2022-01-01 RX ADMIN — PANTOPRAZOLE SODIUM 40 MILLIGRAM(S): 20 TABLET, DELAYED RELEASE ORAL at 12:05

## 2022-01-01 RX ADMIN — LIDOCAINE 1 PATCH: 4 CREAM TOPICAL at 00:44

## 2022-01-01 RX ADMIN — Medication 3 MILLILITER(S): at 14:19

## 2022-01-01 RX ADMIN — Medication 50 MILLILITER(S): at 03:10

## 2022-01-01 RX ADMIN — HYDROMORPHONE HYDROCHLORIDE 0.5 MILLIGRAM(S): 2 INJECTION INTRAMUSCULAR; INTRAVENOUS; SUBCUTANEOUS at 04:53

## 2022-01-01 RX ADMIN — CHLORHEXIDINE GLUCONATE 15 MILLILITER(S): 213 SOLUTION TOPICAL at 05:55

## 2022-01-01 RX ADMIN — MEROPENEM 100 MILLIGRAM(S): 1 INJECTION INTRAVENOUS at 17:18

## 2022-01-01 RX ADMIN — PANTOPRAZOLE SODIUM 40 MILLIGRAM(S): 20 TABLET, DELAYED RELEASE ORAL at 11:51

## 2022-01-01 RX ADMIN — HEPARIN SODIUM 1900 UNIT(S)/HR: 5000 INJECTION INTRAVENOUS; SUBCUTANEOUS at 19:21

## 2022-01-01 RX ADMIN — Medication 10 MILLIGRAM(S): at 13:49

## 2022-01-01 RX ADMIN — Medication 1 MILLIGRAM(S): at 12:51

## 2022-01-01 RX ADMIN — Medication 1 TABLET(S): at 13:49

## 2022-01-01 RX ADMIN — FENTANYL CITRATE 25 MICROGRAM(S): 50 INJECTION INTRAVENOUS at 15:33

## 2022-01-01 RX ADMIN — Medication 25 MILLILITER(S): at 13:05

## 2022-01-01 RX ADMIN — GABAPENTIN 300 MILLIGRAM(S): 400 CAPSULE ORAL at 12:11

## 2022-01-01 RX ADMIN — Medication 5 MILLIGRAM(S): at 17:17

## 2022-01-01 RX ADMIN — Medication 20 MILLIGRAM(S): at 18:38

## 2022-01-01 RX ADMIN — LIDOCAINE 1 PATCH: 4 CREAM TOPICAL at 14:48

## 2022-01-01 RX ADMIN — PANTOPRAZOLE SODIUM 40 MILLIGRAM(S): 20 TABLET, DELAYED RELEASE ORAL at 12:37

## 2022-01-01 RX ADMIN — CHLORHEXIDINE GLUCONATE 1 APPLICATION(S): 213 SOLUTION TOPICAL at 05:02

## 2022-01-01 RX ADMIN — PROPOFOL 21.7 MICROGRAM(S)/KG/MIN: 10 INJECTION, EMULSION INTRAVENOUS at 11:29

## 2022-01-01 RX ADMIN — LIDOCAINE 1 PATCH: 4 CREAM TOPICAL at 18:05

## 2022-01-01 RX ADMIN — LIDOCAINE 1 PATCH: 4 CREAM TOPICAL at 13:54

## 2022-01-01 RX ADMIN — CHLORHEXIDINE GLUCONATE 15 MILLILITER(S): 213 SOLUTION TOPICAL at 05:05

## 2022-01-01 RX ADMIN — Medication 30 MILLILITER(S): at 05:02

## 2022-01-01 RX ADMIN — Medication 650 MILLIGRAM(S): at 21:46

## 2022-01-01 RX ADMIN — LIDOCAINE 1 PATCH: 4 CREAM TOPICAL at 01:00

## 2022-01-01 RX ADMIN — SODIUM CHLORIDE 100 MILLILITER(S): 9 INJECTION, SOLUTION INTRAVENOUS at 19:50

## 2022-01-01 RX ADMIN — Medication 10 MILLIGRAM(S): at 05:20

## 2022-01-01 RX ADMIN — Medication 30 MILLILITER(S): at 17:54

## 2022-01-01 RX ADMIN — Medication 40 MILLIGRAM(S): at 08:57

## 2022-01-01 RX ADMIN — PANTOPRAZOLE SODIUM 40 MILLIGRAM(S): 20 TABLET, DELAYED RELEASE ORAL at 13:44

## 2022-01-01 RX ADMIN — Medication 1 TABLET(S): at 12:51

## 2022-01-01 RX ADMIN — SODIUM CHLORIDE 2000 MILLILITER(S): 9 INJECTION, SOLUTION INTRAVENOUS at 15:55

## 2022-01-01 RX ADMIN — FENTANYL CITRATE 100 MICROGRAM(S): 50 INJECTION INTRAVENOUS at 15:34

## 2022-01-01 RX ADMIN — Medication 400 MILLIGRAM(S): at 11:27

## 2022-01-01 RX ADMIN — TAMSULOSIN HYDROCHLORIDE 0.4 MILLIGRAM(S): 0.4 CAPSULE ORAL at 21:16

## 2022-01-01 RX ADMIN — Medication 500 MILLIGRAM(S): at 14:00

## 2022-01-01 RX ADMIN — Medication 650 MILLIGRAM(S): at 18:38

## 2022-01-01 RX ADMIN — MEROPENEM 100 MILLIGRAM(S): 1 INJECTION INTRAVENOUS at 22:01

## 2022-01-01 RX ADMIN — Medication 50 MILLILITER(S): at 18:05

## 2022-01-01 RX ADMIN — FAMOTIDINE 20 MILLIGRAM(S): 10 INJECTION INTRAVENOUS at 12:19

## 2022-01-01 RX ADMIN — Medication 25 MILLILITER(S): at 03:38

## 2022-01-01 RX ADMIN — Medication 400 MILLIGRAM(S): at 20:42

## 2022-01-01 RX ADMIN — Medication 25 MILLILITER(S): at 00:35

## 2022-01-01 RX ADMIN — Medication 40 MILLIEQUIVALENT(S): at 02:27

## 2022-01-01 RX ADMIN — HEPARIN SODIUM 2100 UNIT(S)/HR: 5000 INJECTION INTRAVENOUS; SUBCUTANEOUS at 03:37

## 2022-01-01 RX ADMIN — FLUCONAZOLE 100 MILLIGRAM(S): 150 TABLET ORAL at 09:45

## 2022-01-01 RX ADMIN — GABAPENTIN 300 MILLIGRAM(S): 400 CAPSULE ORAL at 14:48

## 2022-01-01 RX ADMIN — Medication 50 MILLILITER(S): at 23:43

## 2022-01-01 RX ADMIN — MEROPENEM 100 MILLIGRAM(S): 1 INJECTION INTRAVENOUS at 05:06

## 2022-01-01 RX ADMIN — Medication 1000 MILLIGRAM(S): at 12:03

## 2022-01-01 RX ADMIN — PROPOFOL 21.7 MICROGRAM(S)/KG/MIN: 10 INJECTION, EMULSION INTRAVENOUS at 14:10

## 2022-01-01 RX ADMIN — LIDOCAINE 1 PATCH: 4 CREAM TOPICAL at 13:21

## 2022-01-01 RX ADMIN — CHLORHEXIDINE GLUCONATE 1 APPLICATION(S): 213 SOLUTION TOPICAL at 06:11

## 2022-01-01 RX ADMIN — HYDROMORPHONE HYDROCHLORIDE 0.5 MILLIGRAM(S): 2 INJECTION INTRAMUSCULAR; INTRAVENOUS; SUBCUTANEOUS at 09:23

## 2022-01-01 RX ADMIN — Medication 5 MILLIGRAM(S): at 21:21

## 2022-01-01 RX ADMIN — DIPHENHYDRAMINE HYDROCHLORIDE AND LIDOCAINE HYDROCHLORIDE AND ALUMINUM HYDROXIDE AND MAGNESIUM HYDRO 15 MILLILITER(S): KIT at 18:08

## 2022-01-01 RX ADMIN — PANTOPRAZOLE SODIUM 40 MILLIGRAM(S): 20 TABLET, DELAYED RELEASE ORAL at 06:06

## 2022-01-01 RX ADMIN — Medication 400 MILLIGRAM(S): at 20:57

## 2022-01-01 RX ADMIN — Medication 650 MILLIGRAM(S): at 15:13

## 2022-01-01 RX ADMIN — HEPARIN SODIUM 7500 UNIT(S): 5000 INJECTION INTRAVENOUS; SUBCUTANEOUS at 03:30

## 2022-01-01 RX ADMIN — Medication 50 MILLILITER(S): at 23:57

## 2022-01-01 RX ADMIN — TAMSULOSIN HYDROCHLORIDE 0.4 MILLIGRAM(S): 0.4 CAPSULE ORAL at 21:27

## 2022-01-01 RX ADMIN — VALACYCLOVIR 1000 MILLIGRAM(S): 500 TABLET, FILM COATED ORAL at 06:44

## 2022-01-01 RX ADMIN — Medication 250 MILLIGRAM(S): at 22:36

## 2022-01-01 RX ADMIN — Medication 104 MILLIGRAM(S): at 21:01

## 2022-01-01 RX ADMIN — FAMOTIDINE 20 MILLIGRAM(S): 10 INJECTION INTRAVENOUS at 09:21

## 2022-01-01 RX ADMIN — HEPARIN SODIUM 0 UNIT(S)/HR: 5000 INJECTION INTRAVENOUS; SUBCUTANEOUS at 07:30

## 2022-01-01 RX ADMIN — Medication 500 MILLIGRAM(S): at 23:26

## 2022-01-01 RX ADMIN — VALACYCLOVIR 1000 MILLIGRAM(S): 500 TABLET, FILM COATED ORAL at 13:37

## 2022-01-01 RX ADMIN — CEFEPIME 100 MILLIGRAM(S): 1 INJECTION, POWDER, FOR SOLUTION INTRAMUSCULAR; INTRAVENOUS at 16:17

## 2022-01-01 RX ADMIN — Medication 5 MILLIGRAM(S): at 21:08

## 2022-01-01 RX ADMIN — CHLORHEXIDINE GLUCONATE 1 APPLICATION(S): 213 SOLUTION TOPICAL at 05:55

## 2022-01-01 RX ADMIN — LIDOCAINE 1 PATCH: 4 CREAM TOPICAL at 19:29

## 2022-01-01 RX ADMIN — FENTANYL CITRATE 100 MICROGRAM(S): 50 INJECTION INTRAVENOUS at 18:06

## 2022-01-01 RX ADMIN — Medication 2.5 MILLIGRAM(S): at 12:18

## 2022-01-01 RX ADMIN — HEPARIN SODIUM 2300 UNIT(S)/HR: 5000 INJECTION INTRAVENOUS; SUBCUTANEOUS at 01:34

## 2022-01-01 RX ADMIN — FENTANYL CITRATE 100 MICROGRAM(S): 50 INJECTION INTRAVENOUS at 23:15

## 2022-01-01 RX ADMIN — GABAPENTIN 300 MILLIGRAM(S): 400 CAPSULE ORAL at 13:44

## 2022-01-01 RX ADMIN — Medication 500 MILLIGRAM(S): at 11:24

## 2022-01-01 RX ADMIN — Medication 1 TABLET(S): at 11:50

## 2022-01-01 RX ADMIN — FENTANYL CITRATE 25 MICROGRAM(S): 50 INJECTION INTRAVENOUS at 14:09

## 2022-01-01 RX ADMIN — Medication 1000 MILLIGRAM(S): at 23:50

## 2022-01-01 RX ADMIN — TAMSULOSIN HYDROCHLORIDE 0.4 MILLIGRAM(S): 0.4 CAPSULE ORAL at 21:45

## 2022-01-01 RX ADMIN — SODIUM CHLORIDE 75 MILLILITER(S): 9 INJECTION, SOLUTION INTRAVENOUS at 04:45

## 2022-01-01 RX ADMIN — LIDOCAINE 1 PATCH: 4 CREAM TOPICAL at 03:34

## 2022-01-01 RX ADMIN — LIDOCAINE 1 PATCH: 4 CREAM TOPICAL at 19:28

## 2022-01-01 RX ADMIN — Medication 50 MILLILITER(S): at 05:02

## 2022-01-01 RX ADMIN — Medication 1 TABLET(S): at 12:39

## 2022-01-01 RX ADMIN — LIDOCAINE 1 PATCH: 4 CREAM TOPICAL at 19:25

## 2022-01-01 RX ADMIN — Medication 30 MILLILITER(S): at 17:17

## 2022-01-01 RX ADMIN — Medication 500 MILLIGRAM(S): at 00:00

## 2022-01-01 RX ADMIN — BENZOCAINE AND MENTHOL 1 LOZENGE: 5; 1 LIQUID ORAL at 17:30

## 2022-01-01 RX ADMIN — Medication 10 MILLIGRAM(S): at 13:43

## 2022-01-01 RX ADMIN — Medication 1 MILLIGRAM(S): at 12:39

## 2022-01-01 RX ADMIN — VALACYCLOVIR 1000 MILLIGRAM(S): 500 TABLET, FILM COATED ORAL at 18:17

## 2022-01-01 RX ADMIN — Medication 100 MILLIEQUIVALENT(S): at 23:19

## 2022-01-01 RX ADMIN — MIDAZOLAM HYDROCHLORIDE 4 MILLIGRAM(S): 1 INJECTION, SOLUTION INTRAMUSCULAR; INTRAVENOUS at 15:57

## 2022-01-01 RX ADMIN — Medication 0: at 05:24

## 2022-01-01 RX ADMIN — Medication 1 MILLIGRAM(S): at 12:04

## 2022-01-01 RX ADMIN — TAMSULOSIN HYDROCHLORIDE 0.4 MILLIGRAM(S): 0.4 CAPSULE ORAL at 21:48

## 2022-01-01 RX ADMIN — Medication 12.5 MILLIGRAM(S): at 05:24

## 2022-01-01 RX ADMIN — Medication 650 MILLIGRAM(S): at 13:52

## 2022-01-01 RX ADMIN — Medication 1 MILLIGRAM(S): at 17:30

## 2022-01-01 RX ADMIN — HEPARIN SODIUM 0 UNIT(S)/HR: 5000 INJECTION INTRAVENOUS; SUBCUTANEOUS at 09:55

## 2022-01-01 RX ADMIN — TAMSULOSIN HYDROCHLORIDE 0.4 MILLIGRAM(S): 0.4 CAPSULE ORAL at 21:02

## 2022-01-01 RX ADMIN — LIDOCAINE 1 PATCH: 4 CREAM TOPICAL at 23:27

## 2022-01-01 RX ADMIN — Medication 166.67 MILLIGRAM(S): at 18:19

## 2022-01-01 RX ADMIN — CARVEDILOL PHOSPHATE 25 MILLIGRAM(S): 80 CAPSULE, EXTENDED RELEASE ORAL at 17:34

## 2022-01-01 RX ADMIN — HEPARIN SODIUM 1200 UNIT(S)/HR: 5000 INJECTION INTRAVENOUS; SUBCUTANEOUS at 12:10

## 2022-01-01 RX ADMIN — Medication 1 MILLIGRAM(S): at 13:03

## 2022-01-01 RX ADMIN — HEPARIN SODIUM 1700 UNIT(S)/HR: 5000 INJECTION INTRAVENOUS; SUBCUTANEOUS at 19:22

## 2022-01-01 RX ADMIN — PANTOPRAZOLE SODIUM 40 MILLIGRAM(S): 20 TABLET, DELAYED RELEASE ORAL at 17:33

## 2022-01-01 RX ADMIN — Medication 650 MILLIGRAM(S): at 16:00

## 2022-01-01 RX ADMIN — Medication 200 MILLIGRAM(S): at 16:30

## 2022-01-01 RX ADMIN — CHLORHEXIDINE GLUCONATE 1 APPLICATION(S): 213 SOLUTION TOPICAL at 06:43

## 2022-01-01 RX ADMIN — CHLORHEXIDINE GLUCONATE 1 APPLICATION(S): 213 SOLUTION TOPICAL at 05:28

## 2022-01-01 RX ADMIN — TRAMADOL HYDROCHLORIDE 25 MILLIGRAM(S): 50 TABLET ORAL at 23:44

## 2022-01-01 RX ADMIN — Medication 1 MILLIGRAM(S): at 13:21

## 2022-01-01 RX ADMIN — Medication 500 MILLIGRAM(S): at 04:15

## 2022-01-01 RX ADMIN — GABAPENTIN 300 MILLIGRAM(S): 400 CAPSULE ORAL at 11:50

## 2022-01-01 RX ADMIN — Medication 500 MILLIGRAM(S): at 10:27

## 2022-01-01 RX ADMIN — LIDOCAINE 1 PATCH: 4 CREAM TOPICAL at 06:00

## 2022-01-01 RX ADMIN — LIDOCAINE 1 PATCH: 4 CREAM TOPICAL at 00:02

## 2022-01-01 RX ADMIN — Medication 30 MILLILITER(S): at 08:52

## 2022-01-01 RX ADMIN — HEPARIN SODIUM 2100 UNIT(S)/HR: 5000 INJECTION INTRAVENOUS; SUBCUTANEOUS at 20:56

## 2022-01-01 RX ADMIN — Medication 200 MILLIGRAM(S): at 05:02

## 2022-01-01 RX ADMIN — Medication 200 MILLIGRAM(S): at 16:46

## 2022-01-01 RX ADMIN — Medication 5 MILLIGRAM(S): at 18:30

## 2022-01-01 RX ADMIN — BENZOCAINE AND MENTHOL 1 LOZENGE: 5; 1 LIQUID ORAL at 12:38

## 2022-01-01 RX ADMIN — Medication 50 MILLILITER(S): at 19:53

## 2022-01-01 RX ADMIN — PANTOPRAZOLE SODIUM 40 MILLIGRAM(S): 20 TABLET, DELAYED RELEASE ORAL at 05:24

## 2022-01-01 RX ADMIN — SODIUM CHLORIDE 3000 MILLILITER(S): 9 INJECTION INTRAMUSCULAR; INTRAVENOUS; SUBCUTANEOUS at 03:10

## 2022-01-01 RX ADMIN — Medication 12.5 MILLIGRAM(S): at 05:19

## 2022-01-01 RX ADMIN — Medication 5 MILLIGRAM(S): at 12:31

## 2022-01-01 RX ADMIN — PANTOPRAZOLE SODIUM 40 MILLIGRAM(S): 20 TABLET, DELAYED RELEASE ORAL at 06:36

## 2022-01-01 RX ADMIN — GABAPENTIN 300 MILLIGRAM(S): 400 CAPSULE ORAL at 13:50

## 2022-01-01 RX ADMIN — HEPARIN SODIUM 1600 UNIT(S)/HR: 5000 INJECTION INTRAVENOUS; SUBCUTANEOUS at 19:57

## 2022-01-01 RX ADMIN — Medication 25 GRAM(S): at 20:45

## 2022-01-01 RX ADMIN — Medication 1 MILLIGRAM(S): at 12:49

## 2022-01-01 RX ADMIN — SODIUM CHLORIDE 1000 MILLILITER(S): 9 INJECTION, SOLUTION INTRAVENOUS at 20:58

## 2022-01-01 RX ADMIN — PANTOPRAZOLE SODIUM 40 MILLIGRAM(S): 20 TABLET, DELAYED RELEASE ORAL at 05:48

## 2022-01-01 RX ADMIN — LIDOCAINE 1 PATCH: 4 CREAM TOPICAL at 13:03

## 2022-01-01 RX ADMIN — HEPARIN SODIUM 0 UNIT(S)/HR: 5000 INJECTION INTRAVENOUS; SUBCUTANEOUS at 16:33

## 2022-01-01 RX ADMIN — Medication 50 MILLILITER(S): at 13:29

## 2022-01-01 RX ADMIN — VALACYCLOVIR 1000 MILLIGRAM(S): 500 TABLET, FILM COATED ORAL at 05:53

## 2022-01-01 RX ADMIN — Medication 104 MILLIGRAM(S): at 11:07

## 2022-01-01 RX ADMIN — FAMOTIDINE 20 MILLIGRAM(S): 10 INJECTION INTRAVENOUS at 11:24

## 2022-01-01 RX ADMIN — MIDODRINE HYDROCHLORIDE 5 MILLIGRAM(S): 2.5 TABLET ORAL at 21:12

## 2022-01-01 RX ADMIN — HEPARIN SODIUM 1900 UNIT(S)/HR: 5000 INJECTION INTRAVENOUS; SUBCUTANEOUS at 01:36

## 2022-01-01 RX ADMIN — PANTOPRAZOLE SODIUM 40 MILLIGRAM(S): 20 TABLET, DELAYED RELEASE ORAL at 06:16

## 2022-01-01 RX ADMIN — Medication 12.5 MILLIGRAM(S): at 05:06

## 2022-01-01 RX ADMIN — CARVEDILOL PHOSPHATE 25 MILLIGRAM(S): 80 CAPSULE, EXTENDED RELEASE ORAL at 18:17

## 2022-01-01 RX ADMIN — CARVEDILOL PHOSPHATE 25 MILLIGRAM(S): 80 CAPSULE, EXTENDED RELEASE ORAL at 05:48

## 2022-01-01 RX ADMIN — PANTOPRAZOLE SODIUM 40 MILLIGRAM(S): 20 TABLET, DELAYED RELEASE ORAL at 12:49

## 2022-01-01 RX ADMIN — Medication 10 MILLIGRAM(S): at 21:09

## 2022-01-01 RX ADMIN — HYDROMORPHONE HYDROCHLORIDE 0.5 MILLIGRAM(S): 2 INJECTION INTRAMUSCULAR; INTRAVENOUS; SUBCUTANEOUS at 05:10

## 2022-01-01 RX ADMIN — Medication 30 MILLILITER(S): at 05:56

## 2022-01-01 RX ADMIN — Medication 1 TABLET(S): at 12:15

## 2022-01-01 RX ADMIN — ONDANSETRON 4 MILLIGRAM(S): 8 TABLET, FILM COATED ORAL at 14:43

## 2022-01-01 RX ADMIN — Medication 102 MILLIGRAM(S): at 06:37

## 2022-01-01 RX ADMIN — DIPHENHYDRAMINE HYDROCHLORIDE AND LIDOCAINE HYDROCHLORIDE AND ALUMINUM HYDROXIDE AND MAGNESIUM HYDRO 15 MILLILITER(S): KIT at 21:15

## 2022-01-01 RX ADMIN — HEPARIN SODIUM 0 UNIT(S)/HR: 5000 INJECTION INTRAVENOUS; SUBCUTANEOUS at 00:32

## 2022-01-01 RX ADMIN — Medication 500 MILLIGRAM(S): at 16:27

## 2022-01-01 RX ADMIN — LIDOCAINE 1 PATCH: 4 CREAM TOPICAL at 18:18

## 2022-01-01 RX ADMIN — Medication 500 MILLIGRAM(S): at 20:54

## 2022-01-01 RX ADMIN — SODIUM CHLORIDE 2000 MILLILITER(S): 9 INJECTION, SOLUTION INTRAVENOUS at 10:42

## 2022-01-01 RX ADMIN — Medication 50 MILLIEQUIVALENT(S): at 06:35

## 2022-01-01 RX ADMIN — Medication 10 MILLIGRAM(S): at 14:51

## 2022-01-01 RX ADMIN — FENTANYL CITRATE 50 MICROGRAM(S): 50 INJECTION INTRAVENOUS at 13:53

## 2022-01-01 RX ADMIN — HEPARIN SODIUM 1900 UNIT(S)/HR: 5000 INJECTION INTRAVENOUS; SUBCUTANEOUS at 07:33

## 2022-01-01 RX ADMIN — Medication 30 MILLILITER(S): at 11:15

## 2022-01-01 RX ADMIN — HEPARIN SODIUM 2300 UNIT(S)/HR: 5000 INJECTION INTRAVENOUS; SUBCUTANEOUS at 07:23

## 2022-01-01 RX ADMIN — Medication 100 GRAM(S): at 00:34

## 2022-01-01 RX ADMIN — Medication 107.5 MILLIGRAM(S): at 05:49

## 2022-01-01 RX ADMIN — LIDOCAINE 1 PATCH: 4 CREAM TOPICAL at 19:30

## 2022-01-01 RX ADMIN — Medication 200 MILLIGRAM(S): at 22:41

## 2022-01-01 RX ADMIN — CHLORHEXIDINE GLUCONATE 1 APPLICATION(S): 213 SOLUTION TOPICAL at 05:49

## 2022-01-01 RX ADMIN — CHLORHEXIDINE GLUCONATE 15 MILLILITER(S): 213 SOLUTION TOPICAL at 05:08

## 2022-01-01 RX ADMIN — Medication 104 MILLIGRAM(S): at 21:15

## 2022-01-01 RX ADMIN — MIDAZOLAM HYDROCHLORIDE 2 MILLIGRAM(S): 1 INJECTION, SOLUTION INTRAMUSCULAR; INTRAVENOUS at 14:45

## 2022-01-01 RX ADMIN — LIDOCAINE 1 PATCH: 4 CREAM TOPICAL at 12:27

## 2022-01-01 RX ADMIN — Medication 1 TABLET(S): at 12:04

## 2022-01-01 RX ADMIN — FENTANYL CITRATE 100 MICROGRAM(S): 50 INJECTION INTRAVENOUS at 14:30

## 2022-01-01 RX ADMIN — VALACYCLOVIR 1000 MILLIGRAM(S): 500 TABLET, FILM COATED ORAL at 17:08

## 2022-01-01 RX ADMIN — Medication 0.5 MILLILITER(S): at 12:29

## 2022-01-01 RX ADMIN — HEPARIN SODIUM 2500 UNIT(S)/HR: 5000 INJECTION INTRAVENOUS; SUBCUTANEOUS at 17:06

## 2022-01-01 RX ADMIN — Medication 1 TABLET(S): at 12:35

## 2022-01-01 RX ADMIN — Medication 1 MILLIGRAM(S): at 13:27

## 2022-01-01 RX ADMIN — BENZOCAINE AND MENTHOL 1 LOZENGE: 5; 1 LIQUID ORAL at 18:08

## 2022-01-01 RX ADMIN — Medication 30 MILLILITER(S): at 05:01

## 2022-01-01 RX ADMIN — Medication 1 MILLIGRAM(S): at 17:14

## 2022-01-01 RX ADMIN — CHLORHEXIDINE GLUCONATE 15 MILLILITER(S): 213 SOLUTION TOPICAL at 17:41

## 2022-01-01 RX ADMIN — Medication 10 MILLIGRAM(S): at 21:45

## 2022-01-01 RX ADMIN — Medication 30 MILLILITER(S): at 23:30

## 2022-01-01 RX ADMIN — CHLORHEXIDINE GLUCONATE 1 APPLICATION(S): 213 SOLUTION TOPICAL at 05:10

## 2022-01-01 RX ADMIN — PANTOPRAZOLE SODIUM 40 MILLIGRAM(S): 20 TABLET, DELAYED RELEASE ORAL at 05:20

## 2022-01-01 RX ADMIN — CHLORHEXIDINE GLUCONATE 15 MILLILITER(S): 213 SOLUTION TOPICAL at 05:04

## 2022-01-01 RX ADMIN — LIDOCAINE 1 PATCH: 4 CREAM TOPICAL at 19:56

## 2022-01-01 RX ADMIN — Medication 30 MILLILITER(S): at 12:05

## 2022-01-01 RX ADMIN — PHENYLEPHRINE HYDROCHLORIDE 17 MICROGRAM(S)/KG/MIN: 10 INJECTION INTRAVENOUS at 03:38

## 2022-01-01 RX ADMIN — Medication 1 MILLIGRAM(S): at 12:35

## 2022-01-01 RX ADMIN — Medication 500 MILLIGRAM(S): at 16:45

## 2022-01-01 RX ADMIN — Medication 650 MILLIGRAM(S): at 20:19

## 2022-01-01 RX ADMIN — LIDOCAINE 1 APPLICATION(S): 4 CREAM TOPICAL at 09:56

## 2022-01-01 RX ADMIN — HEPARIN SODIUM 2100 UNIT(S)/HR: 5000 INJECTION INTRAVENOUS; SUBCUTANEOUS at 03:50

## 2022-01-01 RX ADMIN — Medication 104 MILLIGRAM(S): at 22:05

## 2022-01-01 RX ADMIN — CEFEPIME 100 MILLIGRAM(S): 1 INJECTION, POWDER, FOR SOLUTION INTRAMUSCULAR; INTRAVENOUS at 05:00

## 2022-01-01 RX ADMIN — SODIUM CHLORIDE 1000 MILLILITER(S): 9 INJECTION, SOLUTION INTRAVENOUS at 19:33

## 2022-01-01 RX ADMIN — CARVEDILOL PHOSPHATE 25 MILLIGRAM(S): 80 CAPSULE, EXTENDED RELEASE ORAL at 05:53

## 2022-01-01 RX ADMIN — Medication 10 MILLIGRAM(S): at 14:32

## 2022-01-01 RX ADMIN — PANTOPRAZOLE SODIUM 40 MILLIGRAM(S): 20 TABLET, DELAYED RELEASE ORAL at 11:16

## 2022-01-01 RX ADMIN — Medication 10 MILLIGRAM(S): at 05:55

## 2022-01-01 RX ADMIN — HEPARIN SODIUM 1900 UNIT(S)/HR: 5000 INJECTION INTRAVENOUS; SUBCUTANEOUS at 15:58

## 2022-01-01 RX ADMIN — Medication 100 MILLIEQUIVALENT(S): at 01:53

## 2022-01-01 RX ADMIN — CHLORHEXIDINE GLUCONATE 1 APPLICATION(S): 213 SOLUTION TOPICAL at 05:05

## 2022-01-01 RX ADMIN — BENZOCAINE AND MENTHOL 1 LOZENGE: 5; 1 LIQUID ORAL at 17:34

## 2022-01-01 RX ADMIN — CHLORHEXIDINE GLUCONATE 15 MILLILITER(S): 213 SOLUTION TOPICAL at 18:09

## 2022-01-01 RX ADMIN — Medication 2 MILLIGRAM(S): at 13:11

## 2022-01-01 RX ADMIN — Medication 30 MILLILITER(S): at 13:44

## 2022-01-01 RX ADMIN — Medication 1 MILLIGRAM(S): at 13:49

## 2022-01-01 RX ADMIN — CHLORHEXIDINE GLUCONATE 1 APPLICATION(S): 213 SOLUTION TOPICAL at 05:00

## 2022-01-01 RX ADMIN — Medication 40 MILLIGRAM(S): at 12:38

## 2022-01-01 RX ADMIN — Medication 25 MILLILITER(S): at 06:09

## 2022-01-01 RX ADMIN — LIDOCAINE 1 PATCH: 4 CREAM TOPICAL at 12:33

## 2022-01-01 RX ADMIN — Medication 104 MILLIGRAM(S): at 21:07

## 2022-01-01 RX ADMIN — Medication 12.5 MILLIGRAM(S): at 17:53

## 2022-01-01 RX ADMIN — LIDOCAINE 1 PATCH: 4 CREAM TOPICAL at 00:46

## 2022-01-01 RX ADMIN — Medication 30 MILLILITER(S): at 11:27

## 2022-01-01 RX ADMIN — MEROPENEM 100 MILLIGRAM(S): 1 INJECTION INTRAVENOUS at 05:02

## 2022-01-01 RX ADMIN — Medication 650 MILLIGRAM(S): at 01:05

## 2022-01-01 RX ADMIN — Medication 500 MILLIGRAM(S): at 17:50

## 2022-01-01 RX ADMIN — GABAPENTIN 300 MILLIGRAM(S): 400 CAPSULE ORAL at 11:13

## 2022-01-01 RX ADMIN — FENTANYL CITRATE 13.6 MICROGRAM(S)/KG/HR: 50 INJECTION INTRAVENOUS at 06:10

## 2022-01-01 RX ADMIN — Medication 500 MILLIGRAM(S): at 05:00

## 2022-01-01 RX ADMIN — Medication 10 MILLIGRAM(S): at 06:27

## 2022-01-01 RX ADMIN — Medication 500 MILLIGRAM(S): at 06:00

## 2022-01-01 RX ADMIN — CEFEPIME 100 MILLIGRAM(S): 1 INJECTION, POWDER, FOR SOLUTION INTRAMUSCULAR; INTRAVENOUS at 05:37

## 2022-01-01 RX ADMIN — GABAPENTIN 300 MILLIGRAM(S): 400 CAPSULE ORAL at 11:25

## 2022-01-01 RX ADMIN — SODIUM CHLORIDE 100 MILLILITER(S): 9 INJECTION, SOLUTION INTRAVENOUS at 23:36

## 2022-01-01 RX ADMIN — HEPARIN SODIUM 1900 UNIT(S)/HR: 5000 INJECTION INTRAVENOUS; SUBCUTANEOUS at 09:57

## 2022-01-01 RX ADMIN — Medication 650 MILLIGRAM(S): at 02:34

## 2022-01-01 RX ADMIN — Medication 5 MILLIGRAM(S): at 06:14

## 2022-01-01 RX ADMIN — HEPARIN SODIUM 1900 UNIT(S)/HR: 5000 INJECTION INTRAVENOUS; SUBCUTANEOUS at 01:28

## 2022-01-01 RX ADMIN — Medication 1000 MILLIGRAM(S): at 00:28

## 2022-01-01 RX ADMIN — Medication 500 MILLIGRAM(S): at 16:57

## 2022-01-01 RX ADMIN — Medication 650 MILLIGRAM(S): at 13:50

## 2022-01-01 RX ADMIN — Medication 500 MILLIGRAM(S): at 22:08

## 2022-01-01 RX ADMIN — Medication 40 MILLIGRAM(S): at 12:54

## 2022-01-01 RX ADMIN — PANTOPRAZOLE SODIUM 40 MILLIGRAM(S): 20 TABLET, DELAYED RELEASE ORAL at 18:39

## 2022-01-01 RX ADMIN — Medication 650 MILLIGRAM(S): at 16:15

## 2022-01-01 RX ADMIN — Medication 1 MILLIGRAM(S): at 12:15

## 2022-01-01 RX ADMIN — CHLORHEXIDINE GLUCONATE 1 APPLICATION(S): 213 SOLUTION TOPICAL at 06:15

## 2022-01-01 RX ADMIN — TAMSULOSIN HYDROCHLORIDE 0.4 MILLIGRAM(S): 0.4 CAPSULE ORAL at 21:18

## 2022-01-01 RX ADMIN — Medication 0.5 MILLIGRAM(S): at 09:30

## 2022-01-01 RX ADMIN — FAMOTIDINE 20 MILLIGRAM(S): 10 INJECTION INTRAVENOUS at 11:30

## 2022-01-01 RX ADMIN — CARVEDILOL PHOSPHATE 25 MILLIGRAM(S): 80 CAPSULE, EXTENDED RELEASE ORAL at 18:24

## 2022-01-01 RX ADMIN — HEPARIN SODIUM 1900 UNIT(S)/HR: 5000 INJECTION INTRAVENOUS; SUBCUTANEOUS at 19:49

## 2022-01-01 RX ADMIN — HEPARIN SODIUM 2100 UNIT(S)/HR: 5000 INJECTION INTRAVENOUS; SUBCUTANEOUS at 13:53

## 2022-01-01 RX ADMIN — LIDOCAINE 1 PATCH: 4 CREAM TOPICAL at 00:18

## 2022-01-01 RX ADMIN — LIDOCAINE 1 PATCH: 4 CREAM TOPICAL at 21:18

## 2022-01-01 RX ADMIN — CARVEDILOL PHOSPHATE 25 MILLIGRAM(S): 80 CAPSULE, EXTENDED RELEASE ORAL at 05:40

## 2022-01-01 RX ADMIN — CHLORHEXIDINE GLUCONATE 1 APPLICATION(S): 213 SOLUTION TOPICAL at 17:54

## 2022-01-01 RX ADMIN — CARVEDILOL PHOSPHATE 25 MILLIGRAM(S): 80 CAPSULE, EXTENDED RELEASE ORAL at 18:08

## 2022-01-01 RX ADMIN — Medication 1 TABLET(S): at 12:26

## 2022-01-01 RX ADMIN — PROPOFOL 21.7 MICROGRAM(S)/KG/MIN: 10 INJECTION, EMULSION INTRAVENOUS at 19:16

## 2022-01-01 RX ADMIN — Medication 400 MILLIGRAM(S): at 07:39

## 2022-01-01 RX ADMIN — BENZOCAINE AND MENTHOL 1 LOZENGE: 5; 1 LIQUID ORAL at 06:36

## 2022-01-01 RX ADMIN — FENTANYL CITRATE 4.53 MICROGRAM(S)/KG/HR: 50 INJECTION INTRAVENOUS at 14:10

## 2022-01-01 RX ADMIN — Medication 2 MILLIGRAM(S): at 11:27

## 2022-01-01 RX ADMIN — FLUCONAZOLE 100 MILLIGRAM(S): 150 TABLET ORAL at 12:16

## 2022-01-01 RX ADMIN — PHENYLEPHRINE HYDROCHLORIDE 17 MICROGRAM(S)/KG/MIN: 10 INJECTION INTRAVENOUS at 11:29

## 2022-01-01 RX ADMIN — HEPARIN SODIUM 1400 UNIT(S)/HR: 5000 INJECTION INTRAVENOUS; SUBCUTANEOUS at 03:43

## 2022-01-01 RX ADMIN — VALACYCLOVIR 1000 MILLIGRAM(S): 500 TABLET, FILM COATED ORAL at 18:07

## 2022-01-01 RX ADMIN — HYDROMORPHONE HYDROCHLORIDE 0.25 MILLIGRAM(S): 2 INJECTION INTRAMUSCULAR; INTRAVENOUS; SUBCUTANEOUS at 12:32

## 2022-01-01 RX ADMIN — CHLORHEXIDINE GLUCONATE 1 APPLICATION(S): 213 SOLUTION TOPICAL at 08:00

## 2022-01-01 RX ADMIN — Medication 104 MILLIGRAM(S): at 23:34

## 2022-01-01 RX ADMIN — TAMSULOSIN HYDROCHLORIDE 0.4 MILLIGRAM(S): 0.4 CAPSULE ORAL at 22:07

## 2022-01-01 RX ADMIN — Medication 30 MILLILITER(S): at 18:09

## 2022-01-01 RX ADMIN — Medication 650 MILLIGRAM(S): at 22:07

## 2022-01-01 RX ADMIN — CASPOFUNGIN ACETATE 260 MILLIGRAM(S): 7 INJECTION, POWDER, LYOPHILIZED, FOR SOLUTION INTRAVENOUS at 05:50

## 2022-01-01 RX ADMIN — GABAPENTIN 300 MILLIGRAM(S): 400 CAPSULE ORAL at 12:37

## 2022-01-01 RX ADMIN — LIDOCAINE 1 PATCH: 4 CREAM TOPICAL at 19:22

## 2022-01-01 RX ADMIN — Medication 1 MILLIGRAM(S): at 11:25

## 2022-01-01 RX ADMIN — Medication 5 MILLIGRAM(S): at 05:06

## 2022-01-01 RX ADMIN — Medication 107.5 MILLIGRAM(S): at 04:15

## 2022-01-01 RX ADMIN — Medication 0.5 MILLIGRAM(S): at 14:05

## 2022-01-01 RX ADMIN — Medication 10 MILLIGRAM(S): at 22:50

## 2022-01-01 RX ADMIN — Medication 1 TABLET(S): at 13:43

## 2022-01-01 RX ADMIN — Medication 1 MILLIGRAM(S): at 13:43

## 2022-01-01 RX ADMIN — Medication 650 MILLIGRAM(S): at 01:01

## 2022-01-01 RX ADMIN — Medication 1 TABLET(S): at 13:11

## 2022-01-01 RX ADMIN — CHLORHEXIDINE GLUCONATE 1 APPLICATION(S): 213 SOLUTION TOPICAL at 04:08

## 2022-01-01 RX ADMIN — Medication 200 MILLIGRAM(S): at 05:47

## 2022-01-01 RX ADMIN — Medication 104 MILLIGRAM(S): at 00:41

## 2022-01-01 RX ADMIN — Medication 5 MILLIGRAM(S): at 00:45

## 2022-01-01 RX ADMIN — Medication 500 MILLIGRAM(S): at 07:48

## 2022-01-01 RX ADMIN — Medication 650 MILLIGRAM(S): at 12:24

## 2022-01-01 RX ADMIN — Medication 50 MILLILITER(S): at 09:10

## 2022-01-01 RX ADMIN — PANTOPRAZOLE SODIUM 40 MILLIGRAM(S): 20 TABLET, DELAYED RELEASE ORAL at 05:53

## 2022-01-01 RX ADMIN — Medication 500 MICROGRAM(S): at 12:27

## 2022-01-01 RX ADMIN — FENTANYL CITRATE 100 MICROGRAM(S): 50 INJECTION INTRAVENOUS at 23:30

## 2022-01-01 RX ADMIN — HEPARIN SODIUM 1600 UNIT(S)/HR: 5000 INJECTION INTRAVENOUS; SUBCUTANEOUS at 12:56

## 2022-01-01 RX ADMIN — Medication 500 MILLIGRAM(S): at 12:20

## 2022-01-01 RX ADMIN — Medication 40 MILLIGRAM(S): at 12:17

## 2022-01-01 RX ADMIN — Medication 650 MILLIGRAM(S): at 13:26

## 2022-01-01 RX ADMIN — TAMSULOSIN HYDROCHLORIDE 0.4 MILLIGRAM(S): 0.4 CAPSULE ORAL at 22:12

## 2022-01-01 RX ADMIN — Medication 2.5 MILLIGRAM(S): at 06:08

## 2022-01-01 RX ADMIN — HYDROMORPHONE HYDROCHLORIDE 0.25 MILLIGRAM(S): 2 INJECTION INTRAMUSCULAR; INTRAVENOUS; SUBCUTANEOUS at 12:07

## 2022-01-01 RX ADMIN — HEPARIN SODIUM 1600 UNIT(S)/HR: 5000 INJECTION INTRAVENOUS; SUBCUTANEOUS at 18:37

## 2022-01-01 RX ADMIN — DIPHENHYDRAMINE HYDROCHLORIDE AND LIDOCAINE HYDROCHLORIDE AND ALUMINUM HYDROXIDE AND MAGNESIUM HYDRO 15 MILLILITER(S): KIT at 09:46

## 2022-01-01 RX ADMIN — Medication 500 MILLIGRAM(S): at 09:07

## 2022-01-01 RX ADMIN — FENTANYL CITRATE 25 MICROGRAM(S): 50 INJECTION INTRAVENOUS at 15:00

## 2022-01-01 RX ADMIN — Medication 12.5 MILLIGRAM(S): at 18:24

## 2022-01-01 RX ADMIN — HYDROMORPHONE HYDROCHLORIDE 0.5 MILLIGRAM(S): 2 INJECTION INTRAMUSCULAR; INTRAVENOUS; SUBCUTANEOUS at 20:49

## 2022-01-01 RX ADMIN — LIDOCAINE 1 PATCH: 4 CREAM TOPICAL at 19:27

## 2022-01-01 RX ADMIN — LEVALBUTEROL 0.63 MILLIGRAM(S): 1.25 SOLUTION, CONCENTRATE RESPIRATORY (INHALATION) at 11:54

## 2022-01-01 RX ADMIN — Medication 500 MILLIGRAM(S): at 17:44

## 2022-01-01 RX ADMIN — PROPOFOL 21.7 MICROGRAM(S)/KG/MIN: 10 INJECTION, EMULSION INTRAVENOUS at 20:57

## 2022-01-01 RX ADMIN — VALACYCLOVIR 1000 MILLIGRAM(S): 500 TABLET, FILM COATED ORAL at 17:23

## 2022-01-01 RX ADMIN — Medication 650 MILLIGRAM(S): at 12:56

## 2022-01-01 RX ADMIN — PHENYLEPHRINE HYDROCHLORIDE 17 MICROGRAM(S)/KG/MIN: 10 INJECTION INTRAVENOUS at 23:12

## 2022-01-01 RX ADMIN — Medication 12.5 MILLIGRAM(S): at 18:37

## 2022-01-01 RX ADMIN — Medication 1 TABLET(S): at 14:48

## 2022-01-01 RX ADMIN — PROPOFOL 21.7 MICROGRAM(S)/KG/MIN: 10 INJECTION, EMULSION INTRAVENOUS at 02:32

## 2022-01-01 RX ADMIN — Medication 1 TABLET(S): at 14:47

## 2022-01-01 RX ADMIN — Medication 600 MILLIGRAM(S): at 11:00

## 2022-01-01 RX ADMIN — VALACYCLOVIR 1000 MILLIGRAM(S): 500 TABLET, FILM COATED ORAL at 05:48

## 2022-01-01 RX ADMIN — DIPHENHYDRAMINE HYDROCHLORIDE AND LIDOCAINE HYDROCHLORIDE AND ALUMINUM HYDROXIDE AND MAGNESIUM HYDRO 15 MILLILITER(S): KIT at 13:38

## 2022-01-01 RX ADMIN — CHLORHEXIDINE GLUCONATE 1 APPLICATION(S): 213 SOLUTION TOPICAL at 05:09

## 2022-01-01 RX ADMIN — PROPOFOL 21.7 MICROGRAM(S)/KG/MIN: 10 INJECTION, EMULSION INTRAVENOUS at 03:38

## 2022-01-01 RX ADMIN — CHLORHEXIDINE GLUCONATE 1 APPLICATION(S): 213 SOLUTION TOPICAL at 05:25

## 2022-01-01 RX ADMIN — Medication 40 MILLIGRAM(S): at 15:18

## 2022-01-01 RX ADMIN — Medication 1 MILLIGRAM(S): at 12:26

## 2022-01-01 RX ADMIN — Medication 1 TABLET(S): at 12:27

## 2022-01-01 RX ADMIN — Medication 400 MILLIGRAM(S): at 23:26

## 2022-01-01 RX ADMIN — ONDANSETRON 4 MILLIGRAM(S): 8 TABLET, FILM COATED ORAL at 21:54

## 2022-01-01 RX ADMIN — HEPARIN SODIUM 1900 UNIT(S)/HR: 5000 INJECTION INTRAVENOUS; SUBCUTANEOUS at 06:34

## 2022-01-01 RX ADMIN — CHLORHEXIDINE GLUCONATE 15 MILLILITER(S): 213 SOLUTION TOPICAL at 17:35

## 2022-01-01 RX ADMIN — LIDOCAINE 1 PATCH: 4 CREAM TOPICAL at 12:38

## 2022-01-01 RX ADMIN — TAMSULOSIN HYDROCHLORIDE 0.4 MILLIGRAM(S): 0.4 CAPSULE ORAL at 21:32

## 2022-01-01 RX ADMIN — Medication 5 MILLIGRAM(S): at 22:50

## 2022-01-01 RX ADMIN — FENTANYL CITRATE 50 MICROGRAM(S): 50 INJECTION INTRAVENOUS at 00:27

## 2022-01-01 RX ADMIN — GABAPENTIN 300 MILLIGRAM(S): 400 CAPSULE ORAL at 12:04

## 2022-01-01 RX ADMIN — HYDROMORPHONE HYDROCHLORIDE 0.5 MILLIGRAM(S): 2 INJECTION INTRAMUSCULAR; INTRAVENOUS; SUBCUTANEOUS at 10:00

## 2022-01-01 RX ADMIN — Medication 400 MILLIGRAM(S): at 05:36

## 2022-01-01 RX ADMIN — Medication 25 MILLILITER(S): at 04:35

## 2022-01-01 RX ADMIN — Medication 500 MILLIGRAM(S): at 05:30

## 2022-01-01 RX ADMIN — Medication 650 MILLIGRAM(S): at 20:26

## 2022-01-01 RX ADMIN — Medication 30 MILLILITER(S): at 05:27

## 2022-01-01 RX ADMIN — Medication 650 MILLIGRAM(S): at 17:29

## 2022-01-01 RX ADMIN — PANTOPRAZOLE SODIUM 40 MILLIGRAM(S): 20 TABLET, DELAYED RELEASE ORAL at 05:40

## 2022-01-01 RX ADMIN — Medication 40 MILLIGRAM(S): at 06:06

## 2022-01-01 RX ADMIN — Medication 40 MILLIGRAM(S): at 05:48

## 2022-01-01 RX ADMIN — GABAPENTIN 300 MILLIGRAM(S): 400 CAPSULE ORAL at 12:16

## 2022-01-01 RX ADMIN — HEPARIN SODIUM 2100 UNIT(S)/HR: 5000 INJECTION INTRAVENOUS; SUBCUTANEOUS at 19:16

## 2022-01-01 RX ADMIN — LIDOCAINE 1 PATCH: 4 CREAM TOPICAL at 12:39

## 2022-01-01 RX ADMIN — GABAPENTIN 300 MILLIGRAM(S): 400 CAPSULE ORAL at 13:20

## 2022-01-01 RX ADMIN — Medication 3 MILLILITER(S): at 06:08

## 2022-01-01 RX ADMIN — Medication 1 MILLIGRAM(S): at 11:50

## 2022-01-01 RX ADMIN — Medication 12.5 MILLIGRAM(S): at 17:38

## 2022-01-01 RX ADMIN — LIDOCAINE 1 PATCH: 4 CREAM TOPICAL at 18:45

## 2022-01-01 RX ADMIN — Medication 5 MILLIGRAM(S): at 15:00

## 2022-01-01 RX ADMIN — Medication 650 MILLIGRAM(S): at 23:22

## 2022-01-01 RX ADMIN — FENTANYL CITRATE 25 MICROGRAM(S): 50 INJECTION INTRAVENOUS at 14:45

## 2022-01-01 RX ADMIN — TAMSULOSIN HYDROCHLORIDE 0.4 MILLIGRAM(S): 0.4 CAPSULE ORAL at 21:01

## 2022-01-01 RX ADMIN — CHLORHEXIDINE GLUCONATE 1 APPLICATION(S): 213 SOLUTION TOPICAL at 11:51

## 2022-01-01 RX ADMIN — PROPOFOL 21.7 MICROGRAM(S)/KG/MIN: 10 INJECTION, EMULSION INTRAVENOUS at 19:52

## 2022-01-01 RX ADMIN — Medication 2 MILLIGRAM(S): at 20:42

## 2022-01-01 RX ADMIN — Medication 1 TABLET(S): at 13:21

## 2022-01-01 RX ADMIN — Medication 1 TABLET(S): at 13:33

## 2022-01-01 RX ADMIN — VALACYCLOVIR 1000 MILLIGRAM(S): 500 TABLET, FILM COATED ORAL at 18:22

## 2022-01-01 RX ADMIN — Medication 1 TABLET(S): at 13:50

## 2022-01-01 RX ADMIN — VALACYCLOVIR 1000 MILLIGRAM(S): 500 TABLET, FILM COATED ORAL at 05:40

## 2022-01-01 RX ADMIN — Medication 650 MILLIGRAM(S): at 14:04

## 2022-01-01 RX ADMIN — LIDOCAINE 1 PATCH: 4 CREAM TOPICAL at 16:18

## 2022-01-01 RX ADMIN — GABAPENTIN 300 MILLIGRAM(S): 400 CAPSULE ORAL at 12:35

## 2022-01-01 RX ADMIN — Medication 2.5 MILLIGRAM(S): at 00:28

## 2022-01-01 RX ADMIN — LIDOCAINE 1 PATCH: 4 CREAM TOPICAL at 00:52

## 2022-01-01 RX ADMIN — SODIUM CHLORIDE 250 MILLILITER(S): 9 INJECTION, SOLUTION INTRAVENOUS at 13:15

## 2022-01-01 RX ADMIN — GABAPENTIN 300 MILLIGRAM(S): 400 CAPSULE ORAL at 12:19

## 2022-01-01 RX ADMIN — SODIUM CHLORIDE 100 MILLILITER(S): 9 INJECTION, SOLUTION INTRAVENOUS at 12:07

## 2022-01-01 RX ADMIN — LIDOCAINE 1 PATCH: 4 CREAM TOPICAL at 11:28

## 2022-01-01 RX ADMIN — TAMSULOSIN HYDROCHLORIDE 0.4 MILLIGRAM(S): 0.4 CAPSULE ORAL at 21:07

## 2022-01-01 RX ADMIN — Medication 30 MILLILITER(S): at 17:38

## 2022-01-01 RX ADMIN — BENZOCAINE AND MENTHOL 1 LOZENGE: 5; 1 LIQUID ORAL at 05:40

## 2022-01-01 RX ADMIN — GABAPENTIN 300 MILLIGRAM(S): 400 CAPSULE ORAL at 12:39

## 2022-01-01 RX ADMIN — HEPARIN SODIUM 1900 UNIT(S)/HR: 5000 INJECTION INTRAVENOUS; SUBCUTANEOUS at 01:58

## 2022-01-01 RX ADMIN — Medication 12.5 MILLIGRAM(S): at 17:33

## 2022-01-01 RX ADMIN — FLUCONAZOLE 100 MILLIGRAM(S): 150 TABLET ORAL at 09:26

## 2022-01-01 RX ADMIN — Medication 3 MILLILITER(S): at 17:26

## 2022-01-01 RX ADMIN — CARVEDILOL PHOSPHATE 25 MILLIGRAM(S): 80 CAPSULE, EXTENDED RELEASE ORAL at 18:07

## 2022-01-01 RX ADMIN — VALACYCLOVIR 1000 MILLIGRAM(S): 500 TABLET, FILM COATED ORAL at 06:36

## 2022-01-01 RX ADMIN — HEPARIN SODIUM 0 UNIT(S)/HR: 5000 INJECTION INTRAVENOUS; SUBCUTANEOUS at 14:44

## 2022-01-01 RX ADMIN — CARVEDILOL PHOSPHATE 25 MILLIGRAM(S): 80 CAPSULE, EXTENDED RELEASE ORAL at 17:29

## 2022-01-01 RX ADMIN — GABAPENTIN 300 MILLIGRAM(S): 400 CAPSULE ORAL at 12:26

## 2022-01-01 RX ADMIN — Medication 2.5 MILLIGRAM(S): at 05:02

## 2022-01-01 RX ADMIN — Medication 12.5 MILLIGRAM(S): at 17:51

## 2022-01-01 RX ADMIN — GABAPENTIN 300 MILLIGRAM(S): 400 CAPSULE ORAL at 11:30

## 2022-01-01 RX ADMIN — CHLORHEXIDINE GLUCONATE 1 APPLICATION(S): 213 SOLUTION TOPICAL at 06:35

## 2022-01-01 RX ADMIN — PANTOPRAZOLE SODIUM 40 MILLIGRAM(S): 20 TABLET, DELAYED RELEASE ORAL at 13:04

## 2022-01-01 RX ADMIN — MEROPENEM 100 MILLIGRAM(S): 1 INJECTION INTRAVENOUS at 17:53

## 2022-01-01 RX ADMIN — Medication 5 MILLILITER(S): at 16:18

## 2022-01-01 RX ADMIN — Medication 500 MILLIGRAM(S): at 10:57

## 2022-01-01 RX ADMIN — HEPARIN SODIUM 2300 UNIT(S)/HR: 5000 INJECTION INTRAVENOUS; SUBCUTANEOUS at 05:00

## 2022-01-01 RX ADMIN — Medication 1 MILLIGRAM(S): at 13:51

## 2022-01-01 RX ADMIN — HEPARIN SODIUM 1900 UNIT(S)/HR: 5000 INJECTION INTRAVENOUS; SUBCUTANEOUS at 00:33

## 2022-01-01 RX ADMIN — DIPHENHYDRAMINE HYDROCHLORIDE AND LIDOCAINE HYDROCHLORIDE AND ALUMINUM HYDROXIDE AND MAGNESIUM HYDRO 15 MILLILITER(S): KIT at 06:45

## 2022-01-01 RX ADMIN — HYDROMORPHONE HYDROCHLORIDE 0.5 MILLIGRAM(S): 2 INJECTION INTRAMUSCULAR; INTRAVENOUS; SUBCUTANEOUS at 21:10

## 2022-01-01 RX ADMIN — TAMSULOSIN HYDROCHLORIDE 0.4 MILLIGRAM(S): 0.4 CAPSULE ORAL at 21:20

## 2022-01-01 RX ADMIN — HEPARIN SODIUM 2300 UNIT(S)/HR: 5000 INJECTION INTRAVENOUS; SUBCUTANEOUS at 14:42

## 2022-01-01 RX ADMIN — MEROPENEM 100 MILLIGRAM(S): 1 INJECTION INTRAVENOUS at 17:19

## 2022-01-01 RX ADMIN — LIDOCAINE 1 PATCH: 4 CREAM TOPICAL at 01:01

## 2022-01-01 RX ADMIN — PROPOFOL 21.7 MICROGRAM(S)/KG/MIN: 10 INJECTION, EMULSION INTRAVENOUS at 19:50

## 2022-01-01 RX ADMIN — Medication 500 MILLIGRAM(S): at 21:24

## 2022-01-01 RX ADMIN — HEPARIN SODIUM 1900 UNIT(S)/HR: 5000 INJECTION INTRAVENOUS; SUBCUTANEOUS at 17:12

## 2022-01-01 RX ADMIN — Medication 2 MILLIGRAM(S): at 22:30

## 2022-01-01 RX ADMIN — LIDOCAINE 1 PATCH: 4 CREAM TOPICAL at 12:57

## 2022-01-01 RX ADMIN — Medication 12.5 MILLIGRAM(S): at 05:55

## 2022-01-01 RX ADMIN — LIDOCAINE 1 PATCH: 4 CREAM TOPICAL at 00:30

## 2022-01-01 RX ADMIN — HEPARIN SODIUM 0 UNIT(S)/HR: 5000 INJECTION INTRAVENOUS; SUBCUTANEOUS at 02:41

## 2022-01-01 RX ADMIN — CHLORHEXIDINE GLUCONATE 1 APPLICATION(S): 213 SOLUTION TOPICAL at 06:46

## 2022-01-01 RX ADMIN — PROPOFOL 21.7 MICROGRAM(S)/KG/MIN: 10 INJECTION, EMULSION INTRAVENOUS at 06:10

## 2022-01-01 RX ADMIN — CHLORHEXIDINE GLUCONATE 15 MILLILITER(S): 213 SOLUTION TOPICAL at 17:55

## 2022-01-01 RX ADMIN — GABAPENTIN 300 MILLIGRAM(S): 400 CAPSULE ORAL at 12:51

## 2022-01-01 RX ADMIN — CASPOFUNGIN ACETATE 260 MILLIGRAM(S): 7 INJECTION, POWDER, LYOPHILIZED, FOR SOLUTION INTRAVENOUS at 06:45

## 2022-01-01 RX ADMIN — LIDOCAINE 1 PATCH: 4 CREAM TOPICAL at 00:07

## 2022-01-01 RX ADMIN — Medication 10 MILLIGRAM(S): at 06:16

## 2022-01-01 RX ADMIN — LIDOCAINE 1 PATCH: 4 CREAM TOPICAL at 12:19

## 2022-01-01 RX ADMIN — HEPARIN SODIUM 2100 UNIT(S)/HR: 5000 INJECTION INTRAVENOUS; SUBCUTANEOUS at 11:30

## 2022-01-01 RX ADMIN — Medication 650 MILLIGRAM(S): at 00:03

## 2022-01-01 RX ADMIN — LIDOCAINE 1 PATCH: 4 CREAM TOPICAL at 13:25

## 2022-01-01 RX ADMIN — GABAPENTIN 300 MILLIGRAM(S): 400 CAPSULE ORAL at 13:16

## 2022-01-01 RX ADMIN — Medication 100 MILLIEQUIVALENT(S): at 21:47

## 2022-01-01 RX ADMIN — LIDOCAINE 1 PATCH: 4 CREAM TOPICAL at 11:12

## 2022-01-01 RX ADMIN — SODIUM CHLORIDE 100 MILLILITER(S): 9 INJECTION, SOLUTION INTRAVENOUS at 11:47

## 2022-01-01 RX ADMIN — LIDOCAINE 1 PATCH: 4 CREAM TOPICAL at 18:16

## 2022-01-01 RX ADMIN — TAMSULOSIN HYDROCHLORIDE 0.4 MILLIGRAM(S): 0.4 CAPSULE ORAL at 23:44

## 2022-01-01 RX ADMIN — Medication 30 MILLILITER(S): at 23:16

## 2022-01-01 RX ADMIN — Medication 50 MILLILITER(S): at 13:26

## 2022-01-01 RX ADMIN — Medication 30 MILLILITER(S): at 00:32

## 2022-01-01 RX ADMIN — SODIUM CHLORIDE 75 MILLILITER(S): 9 INJECTION, SOLUTION INTRAVENOUS at 09:43

## 2022-01-01 RX ADMIN — PANTOPRAZOLE SODIUM 40 MILLIGRAM(S): 20 TABLET, DELAYED RELEASE ORAL at 13:50

## 2022-01-01 RX ADMIN — CISATRACURIUM BESYLATE 20 MILLIGRAM(S): 2 INJECTION INTRAVENOUS at 15:57

## 2022-01-01 RX ADMIN — FAMOTIDINE 20 MILLIGRAM(S): 10 INJECTION INTRAVENOUS at 13:15

## 2022-01-01 RX ADMIN — Medication 12.5 MILLIGRAM(S): at 06:27

## 2022-01-01 RX ADMIN — Medication 650 MILLIGRAM(S): at 01:06

## 2022-01-01 RX ADMIN — Medication 104 MILLIGRAM(S): at 21:26

## 2022-01-01 RX ADMIN — GABAPENTIN 300 MILLIGRAM(S): 400 CAPSULE ORAL at 11:16

## 2022-01-01 RX ADMIN — Medication 102 MILLIGRAM(S): at 18:18

## 2022-02-07 PROBLEM — E87.2 ACIDOSIS, METABOLIC: Status: ACTIVE | Noted: 2021-03-19

## 2022-02-07 PROBLEM — E87.5 HYPERKALEMIA: Status: ACTIVE | Noted: 2021-06-08

## 2022-02-08 NOTE — HISTORY OF PRESENT ILLNESS
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 He developed COVID infection recently and was give monoclonal antibodies. He lost more than 10 lbs and serum creatinine went high. Patient also developed H Pyelori and was treated with antibiotics. Patient has come for follow-up.

## 2022-02-08 NOTE — ASSESSMENT
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 He developed COVID infection recently and was give monoclonal antibodies. He lost more than 10 lbs and serum creatinine went high. Patient also developed H Pyelori and was treated with antibiotics. Patient has come for follow-up. \par Patient feels better now. BP is controlled. Blood tests carried out three weeks ago showed a mild decrease i serum creatinine and serum potassium within acceptable range. Advised repeat BMP, ED, COVID antiboy, and C3.\par Lab tests evaluated. There is a mild decrease in serum creatinine to 2.24 mg/dl. Hb is improved 11.1 gm/dl. Covid antibody within acceptable range. Advised RTC 3 months.

## 2022-04-01 NOTE — ED ADULT NURSE NOTE - RESPIRATORY WDL
Requested Prescriptions     Name from pharmacy: TRIAMCINOLONE 0.1% CREAM         Will file in chart as: triamcinolone (ARISTOCORT) 0.1 % cream    Sig: APPLY TOPICALLY TWO TIMES A DAY, USE FOR 10 DAYS AS NEEDED WITH RASH FLARE    Disp:  30 g    Refills:  1         Last seen: 2/9/22  Next appointment: 8/9/22  Last fill sent: 2/9/22 #30 g, 1 refill       Breathing spontaneous and unlabored. Breath sounds clear and equal bilaterally with regular rhythm.

## 2022-04-29 PROBLEM — E78.5 HYPERLIPEMIA: Status: ACTIVE | Noted: 2017-11-20

## 2022-04-29 PROBLEM — M32.14 LUPUS NEPHRITIS: Status: ACTIVE | Noted: 2019-05-20

## 2022-04-29 PROBLEM — I10 HYPERTENSION, ESSENTIAL: Status: ACTIVE | Noted: 2017-11-20

## 2022-04-29 PROBLEM — N18.4 CHRONIC KIDNEY DISEASE (CKD), STAGE IV (SEVERE): Status: ACTIVE | Noted: 2020-11-19

## 2022-05-01 PROBLEM — E11.9 DIABETES MELLITUS: Status: ACTIVE | Noted: 2017-11-20

## 2022-05-01 PROBLEM — M32.9 SLE (SYSTEMIC LUPUS ERYTHEMATOSUS): Status: ACTIVE | Noted: 2021-05-27

## 2022-05-01 PROBLEM — R16.0 HEPATOMEGALY: Status: ACTIVE | Noted: 2022-01-01

## 2022-05-01 PROBLEM — A04.72 PSEUDOMEMBRANOUS COLITIS: Status: ACTIVE | Noted: 2022-01-01

## 2022-05-01 PROBLEM — K74.60 CIRRHOSIS OF LIVER WITH ASCITES, UNSPECIFIED HEPATIC CIRRHOSIS TYPE: Status: ACTIVE | Noted: 2022-01-01

## 2022-05-01 PROBLEM — K85.90 SUBACUTE PANCREATITIS: Status: ACTIVE | Noted: 2022-01-01

## 2022-05-01 PROBLEM — H05.20 EXOPHTHALMOS: Status: ACTIVE | Noted: 2022-01-01

## 2022-05-01 PROBLEM — N18.9 ANEMIA ASSOCIATED WITH CHRONIC RENAL FAILURE: Status: ACTIVE | Noted: 2020-10-24

## 2022-05-01 NOTE — HISTORY OF PRESENT ILLNESS
[Admitted on: ___] : The patient was admitted on [unfilled] [Discharged on ___] : discharged on [unfilled] [Pertinent Labs] : pertinent labs [Radiology Findings] : radiology findings [Post-hospitalization from ___ Hospital] : Post-hospitalization from [unfilled] Hospital [Discharge Summary] : discharge summary [FreeTextEntry2] : Pt was in [facility] March 27-April 20th\par Arrived, was noting diarrhea on day 2- green in color, went to hospital in [place]--> amylase/lipase was noted as being high, Dx with Pancreatitis, pt was NPO--> Ampicillin Abx (IV then PO) which caused C Diff\par Came home started on Vanco- for CDiff s/p Pancreatitis. Had fever 102.6 (went to [place])\par Dx with Cirrhosis of the liver - hx of Lupus Nephritis- CKD, hx of Hep B 1985, hx of diabetes\par \par \par \par

## 2022-05-01 NOTE — PHYSICAL EXAM
[No Acute Distress] : no acute distress [Well Nourished] : well nourished [Well Developed] : well developed [Normal Sclera/Conjunctiva] : normal sclera/conjunctiva [PERRL] : pupils equal round and reactive to light [EOMI] : extraocular movements intact [Normal Outer Ear/Nose] : the outer ears and nose were normal in appearance [Normal Oropharynx] : the oropharynx was normal [No JVD] : no jugular venous distention [No Lymphadenopathy] : no lymphadenopathy [Supple] : supple [Thyroid Normal, No Nodules] : the thyroid was normal and there were no nodules present [No Respiratory Distress] : no respiratory distress  [No Accessory Muscle Use] : no accessory muscle use [Clear to Auscultation] : lungs were clear to auscultation bilaterally [Normal Rate] : normal rate  [Regular Rhythm] : with a regular rhythm [Normal S1, S2] : normal S1 and S2 [No Murmur] : no murmur heard [No Carotid Bruits] : no carotid bruits [No Abdominal Bruit] : a ~M bruit was not heard ~T in the abdomen [No Varicosities] : no varicosities [Pedal Pulses Present] : the pedal pulses are present [No Edema] : there was no peripheral edema [No Palpable Aorta] : no palpable aorta [No Extremity Clubbing/Cyanosis] : no extremity clubbing/cyanosis [Soft] : abdomen soft [Non Tender] : non-tender [No Masses] : no abdominal mass palpated [Normal Bowel Sounds] : normal bowel sounds [Normal Posterior Cervical Nodes] : no posterior cervical lymphadenopathy [Normal Anterior Cervical Nodes] : no anterior cervical lymphadenopathy [No CVA Tenderness] : no CVA  tenderness [No Spinal Tenderness] : no spinal tenderness [No Joint Swelling] : no joint swelling [Grossly Normal Strength/Tone] : grossly normal strength/tone [No Rash] : no rash [Coordination Grossly Intact] : coordination grossly intact [No Focal Deficits] : no focal deficits [Normal Gait] : normal gait [Deep Tendon Reflexes (DTR)] : deep tendon reflexes were 2+ and symmetric [Normal Affect] : the affect was normal [Normal Insight/Judgement] : insight and judgment were intact [Declined Rectal Exam] : declined rectal exam [Normal] : affect was normal and insight and judgment were intact [86779 - High Complexity requires an extensive number of possible diagnoses and/or the management options, extensive complexity of the medical data (tests, etc.) to be reviewed, and a high risk of significant complications, morbidity, and/or mortality as w] : High Complexity  [de-identified] : Appears weak and pale [de-identified] : distended, hepatomegaly noted.  Ascites [de-identified] : exophthalmos  [de-identified] : Weakness [de-identified] : Pale

## 2022-05-01 NOTE — HEALTH RISK ASSESSMENT
[Never] : Never [No] : No [No falls in past year] : Patient reported no falls in the past year [0] : 2) Feeling down, depressed, or hopeless: Not at all (0) [PHQ-2 Negative - No further assessment needed] : PHQ-2 Negative - No further assessment needed [None] : None [Employed] : employed [With Significant Other] : lives with significant other [College] : College [] :  [# Of Children ___] : has [unfilled] children [Feels Safe at Home] : Feels safe at home [Fully functional (bathing, dressing, toileting, transferring, walking, feeding)] : Fully functional (bathing, dressing, toileting, transferring, walking, feeding) [Fully functional (using the telephone, shopping, preparing meals, housekeeping, doing laundry, using] : Fully functional and needs no help or supervision to perform IADLs (using the telephone, shopping, preparing meals, housekeeping, doing laundry, using transportation, managing medications and managing finances) [Reports normal functional visual acuity (ie: able to read med bottle)] : Reports normal functional visual acuity [Smoke Detector] : smoke detector [Carbon Monoxide Detector] : carbon monoxide detector [Safety elements used in home] : safety elements used in home [Seat Belt] :  uses seat belt [Sunscreen] : uses sunscreen [Reviewed no changes] : Reviewed, no changes [Relationship: ___] : Relationship: [unfilled] [Aggressive treatment] : aggressive treatment [I will adhere to the patient's wishes.] : I will adhere to the patient's wishes. [Change in mental status noted] : No change in mental status noted [Language] : denies difficulty with language [Behavior] : denies difficulty with behavior [Learning/Retaining New Information] : denies difficulty learning/retaining new information [Handling Complex Tasks] : denies difficulty handling complex tasks [Reasoning] : denies difficulty with reasoning [Sexually Active] : not sexually active [Spatial Ability and Orientation] : denies difficulty with spatial ability and orientation [Reports changes in hearing] : Reports no changes in hearing [Reports changes in vision] : Reports no changes in vision [Reports changes in dental health] : Reports no changes in dental health [Travel to Developing Areas] : does not  travel to developing areas [TB Exposure] : is not being exposed to tuberculosis [Caregiver Concerns] : does not have caregiver concerns [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

## 2022-05-01 NOTE — COUNSELING
[Fall prevention counseling provided] : Fall prevention counseling provided [Adequate lighting] : Adequate lighting [No throw rugs] : No throw rugs [Use proper foot wear] : Use proper foot wear [Use recommended devices] : Use recommended devices [Behavioral health counseling provided] : Behavioral health counseling provided [Sleep ___ hours/day] : Sleep [unfilled] hours/day [Engage in a relaxing activity] : Engage in a relaxing activity [Plan in advance] : Plan in advance [Potential consequences of obesity discussed] : Potential consequences of obesity discussed [Benefits of weight loss discussed] : Benefits of weight loss discussed [Structured Weight Management Program suggested:] : Structured weight management program suggested [Encouraged to maintain food diary] : Encouraged to maintain food diary [Encouraged to increase physical activity] : Encouraged to increase physical activity [Encouraged to use exercise tracking device] : Encouraged to use exercise tracking device [Target Wt Loss Goal ___] : Weight Loss Goals: Target weight loss goal [unfilled] lbs [Weigh Self Weekly] : weigh self weekly [Decrease Portions] : decrease portions [Keep Food Diary] : keep food diary [None] : None [Good understanding] : Patient has a good understanding of lifestyle changes and steps needed to achieve self management goal [de-identified] : Medical Annual wellness visit completed:\par HRA completed and reviewed with patient\par Medical, family, surgical history reviewed with patient and updated\par List of current providers r/w patient and updated\par Vitals, BMI reviewed and discussed along with healthy BMI goals. Dietary counseling x 15 minutes provided\par Depression PHQ 9 completed and reviewed \par Annual safety assessment reviewed\par discussed advanced directives\par smoking cessation counseling provided\par Established routine screening and immunization schedules\par Medical Annual wellness visit completed:\par HRA completed and reviewed with patient\par Medical, family, surgical history reviewed with patient and updated\par List of current providers r/w patient and updated\par Vitals, BMI reviewed and discussed along with healthy BMI goals. Dietary counseling x 15 minutes provided\par Depression PHQ 9 completed and reviewed \par Annual safety assessment reviewed\par discussed advanced directives\par smoking cessation counseling provided\par Established routine screening and immunization schedules\par \par VACCINATION & OTHER TX RECOMMENDATIONS\par \par ASA preventative therapy\par Calcium/Vitamin D supplementation\par  \par Dietary counseling, nutrition referral\par risks vs. benefits d/w patient. routine vaccination and vaccination schedules and recommendation d/w patient\par \par Vaccines recommended: \par * pneumovax (once after 65) & prevnar\par * annual Influenza vaccine\par * Hep B vaccines\par * zostavax\par * Tdap\par \par Colorectal screening recommended; screening colonoscopy q10yr, flex sig q5yr, annual fecal occult testing\par BMD recommended biennially for osteoporosis screening\par Glaucoma screening recommended, annual optho evals\par Cardiovascular screening and blood tests recommended and discussed w/ patient, cholesterol screening and dietary counseling\par AAA recommended x 1\par DIABETIC recommendations:\par med nutrition counseling, nutrition referral \par annual podiatry evaluations and follow up\par annual optho eval/retinal exams\par routine blood tets, including FBS, a1c% and cholesterol panels\par \par Symptomatic patients : Test for influenza, if positive, treat for influenza and do not continue below. \par 1. Fever plus cough or shortness of breath : Test for RVP and COVID-19.\par 2.Indirect, circumstantial or unclear exposure to COVID-19, or other concerning cases not meeting above criteria: Please call [facility] to discuss testing. \par +++ All above cases must be reported to the [facility] registry. +++\par \par Asymptomatic patients: \par 1. Known first-degree direct-contact exposure to positive COVID-19 patient but asymptomatic: No testing PLUS 14 day self-quarantine. Pt to call if symptoms develop. Report to [facility] Registry.\par 2. No known exposure and asymptomatic, referred from outside healthcare organization: Please call [facility] to discuss testing. \par 3.All other asymptomatic patients with no known exposures: no testing, no exceptions.\par \par

## 2022-05-01 NOTE — REVIEW OF SYSTEMS
[Negative] : Heme/Lymph [Fatigue] : fatigue [Diarrhea] : diarrhea [Muscle Weakness] : muscle weakness [FreeTextEntry3] : Pale [FreeTextEntry7] : Greenish diarrhea, poor appetite  [de-identified] : Pale

## 2022-05-01 NOTE — ADDENDUM
[FreeTextEntry1] : Blood work includes= very low vitamin D at 5.3 CA 19-9 elevated at 97, amylase 774, lipase 468 triglycerides elevated at 394 4, sodium 132, au=743,co2-16, glucose 127 creatinine 1.47, gamma , hemoglobin 9.1 hematocrit 28.2 sed rate 95, A1c 6.5=== patient was called on 5/1/2022 given results.\par \par Transitional care= patient was examined medications reviewed hospital records including blood tests medications and CAT scan reviewed

## 2022-05-04 PROBLEM — A04.72 C. DIFFICILE COLITIS: Status: ACTIVE | Noted: 2022-01-01

## 2022-05-30 NOTE — SWALLOW BEDSIDE ASSESSMENT ADULT - SWALLOW EVAL: PATIENT/FAMILY GOALS STATEMENT
Pt wants to get better; denies history of difficulty chewing/ swallowing, though does report poor appetite/ PO intake at OSH PTA 2/2 dislike of hospital food; pt expressed preference for food from home.

## 2022-05-30 NOTE — SWALLOW BEDSIDE ASSESSMENT ADULT - SWALLOW EVAL: DIAGNOSIS
Pt presents with an overtly functional oropharyngeal swallow sequence at the bedside. No signs of laryngeal penetration/ aspiration. No indication for diet modification at this time.

## 2022-05-30 NOTE — SWALLOW BEDSIDE ASSESSMENT ADULT - ASR SWALLOW RECOMMEND DIAG
Instrumental exam not indicated at this time; would only pursue MBS if MD/team concerned for silent aspiration at this cannot be ruled out at bedside.

## 2022-05-30 NOTE — PROGRESS NOTE ADULT - ASSESSMENT
65 yo M with a PMH HTN, HLD, SLE on Cellcept (MMF), class V lupus nephritis, CKD stage 2, DM2, diabetic neuropathy, TIA (2019) on plavix, BPH, HUNG, hospitalization in [facility] 3/27-4/20 tx for pancreatitis and C diff?, recent hospitalization at [place] 4/22-4/26 for acute pancreatitis and C diff colitis and another hospitalization 5/5 at [place] for septic shock (source buttock abscess) treated with vanc, cefepime (5/5-5/10), meropenem (5/11-5/18) and micafungin. Zosyn added 5/26. Course complicated by recurrent fevers despite being on antibiotics, and leukocytosis up to 30k. Blood cx neg, UA unremarkable. MRI abdomen w/ contrast performed showing extensive abd lymphadenopathy (reactive to c diff vs lymphoma?). IR stated no window for bx. Course also complicated by a L brachial vein DVT and superficial DVT in arms. Patient noted to have had a positive PPD but has possibly gotten the BCG vaccine. Quant gold performed at [place] still pending.     Neuro  - AAOx3 but slow  - check ammonia level given hepatosplenomegaly with possible cirrhosis  #diabetic neuropathy  -start on home gabapentin    Pulm  #large left posterior consolidation seen on ultrasoud, possible PNA?  -on 2L NC, satting 98%  -cover with empiric abx for HAP  #upper airway stridor, wheezing  -AM consult ENT eval for upper airway stridor  -f/u CT neck/chest, looking for lymphadenopathy and possibly options for lymph node bx  -check RVP, urine legionella  -sputum culture  -isabell mota      Cardio  #HTN  - hold off on antihypertensive meds for now as patient could be septic  #HLD, hypertriglyceridemia  -f/u lipid profile  -check official TTE      Renal  #CKD stage 2, hx of lupus nephritis  -cont to trend Cr  -monitor I/Os  -cont to monitor electrolytes, replete as necessary    GI  #chronic inflammatory diarrhea  #possible Crohn's vs colitis vs infectious  -calprotectin elevated at [place] 532  -check stool culture, stool PCR, ova and parasites, c diff  -consult ID and GI  #Diet  -NPO, patient has been receiving PPN at [facility] since 5/15  -S&S eval  -TPN consult, start on D10 for now  #hx of pancreatitis, elevated lipase  -recheck lipase  #hepatosplenomegaly shown on MRI at [place]  -check hepatitis panel      #hx of BPH  -start on flomax    Endocrine  #DM2  -last HbA1c 4/29 6.5%  -start on ISS    ID/rheum  #hx of SLE  #recurrent fevers and leukocytosis of unknown etiology  diff dx: infectious, malignancy, IgG4 disease, HLH  -elevated WBC, elevated IgA at [place]  -check quant gold  -resend cultures (blood, stool), UA, check procal, fungitell, galactomannan, MRSA swab  -check HIV, EBV, CMV  -possible yeast? unknown source at [place]  -start on caspofungin  -consider doxycycline?  #r/o HLH, IgG4  -has fevers, splenomegaly, cytopenia  -check HLH labs: soluble IL-2 levels, ferritin, triglyceride  -check IgG4    Heme  #Anemia  -outside hospital reported to be consistent with AOCD  -CT ab pending, r/o bleed  -transfuse if Hb<7, maintain active type and screen  -DVT ppx: full AC    Lines: RITITA placed 5/27 65 yo M with a PMH HTN, HLD, SLE on Cellcept (MMF), class V lupus nephritis, CKD stage 2, DM2, diabetic neuropathy, TIA (2019) on plavix, BPH, HUNG, hospitalization in [facility] 3/27-4/20 tx for pancreatitis and C diff?, recent hospitalization at [place] 4/22-4/26 for acute pancreatitis and C diff colitis and another hospitalization 5/5 at [place] for septic shock (source buttock abscess) treated with vanc, cefepime (5/5-5/10), meropenem (5/11-5/18) and micafungin. Zosyn added 5/26. Course complicated by recurrent fevers despite being on antibiotics, and leukocytosis up to 30k. Blood cx neg, UA unremarkable. MRI abdomen w/ contrast performed showing extensive abd lymphadenopathy (reactive to c diff vs lymphoma?). IR stated no window for bx. Course also complicated by a L brachial vein DVT and superficial DVT in arms. Patient noted to have had a positive PPD but has possibly gotten the BCG vaccine. Quant gold performed at [place] still pending.     Neuro  - AAOx3 but slow  - check ammonia level given hepatosplenomegaly with possible cirrhosis  #diabetic neuropathy  -start on home gabapentin    Pulm  #large left posterior consolidation seen on ultrasoud, possible PNA?  -on 2L NC, satting 98%  -cover with empiric abx for HAP  #upper airway stridor, wheezing  -AM consult ENT eval for upper airway stridor  -f/u CT neck/chest, looking for lymphadenopathy and possibly options for lymph node bx  -check RVP, urine legionella  -sputum culture  -accapelabrunoonebs    #RLL PE  -heparin gtt  -no further eliquis pending possible LN biopsy       Cardio  #HTN  - hold off on antihypertensive meds for now as patient could be septic  #HLD, hypertriglyceridemia  -f/u lipid profile  -check official TTE      Renal  #CKD stage 2, hx of lupus nephritis  -cont to trend Cr  -monitor I/Os  -cont to monitor electrolytes, replete as necessary    GI  #chronic inflammatory diarrhea  #possible Crohn's vs colitis vs infectious  -calprotectin elevated at [place] 532  -check stool culture, stool PCR, ova and parasites, c diff  -consult ID and GI  #Diet  -NPO, patient has been receiving PPN at [facility] since 5/15  -S&S eval  -TPN consult, start on D10 for now  #hx of pancreatitis, elevated lipase  -recheck lipase  #hepatosplenomegaly shown on MRI at [place]  -check hepatitis panel      #hx of BPH  -start on flomax    Endocrine  #DM2  -last HbA1c 4/29 6.5%  -start on ISS    ID/rheum  #hx of SLE  #recurrent fevers and leukocytosis of unknown etiology  diff dx: infectious, malignancy, IgG4 disease, HLH  -elevated WBC, elevated IgA at [place]  -check quant gold  -resend cultures (blood, stool), UA, check procal, fungitell, galactomannan, MRSA swab  -check HIV, EBV, CMV  -possible yeast? unknown source at [place]  -start on caspofungin  -consider doxycycline?  #r/o HLH, IgG4  -has fevers, splenomegaly, cytopenia  -check HLH labs: soluble IL-2 levels, ferritin, triglyceride  -check IgG4    Heme  #Anemia  -outside hospital reported to be consistent with AOCD  -CT ab pending, r/o bleed  -transfuse if Hb<7, maintain active type and screen  -DVT ppx: full AC    Lines: RITITA placed 5/27

## 2022-05-30 NOTE — H&P ADULT - NSHPLABSRESULTS_GEN_ALL_CORE
Hemoglobin: 9.6 (05-30-22 @ 03:48)  Creatinine, Serum: 1.16 (05-30-22 @ 03:47)  Creatinine, Serum: 1.19 (03-08-18 @ 06:23)  Hemoglobin: 13.3 (03-08-18 @ 06:23)  Creatinine, Serum: 0.92 (08-04-09 @ 07:09)  Creatinine, Serum: 0.82 (08-03-09 @ 12:24)  Creatinine, Serum: 0.80 (08-03-09 @ 03:26)  Creatinine, Serum: 0.84 (08-02-09 @ 05:04)  Creatinine, Serum: 0.78 (08-01-09 @ 07:15)  Creatinine, Serum: 0.72 (07-31-09 @ 07:15)      Personally reviewed labs, imaging, EKG                        9.6    34.53 )-----------( 115      ( 30 May 2022 03:48 )             29.4     05-30    135  |  99  |  30<H>  ----------------------------<  82  3.9   |  22  |  1.16    Ca    8.5      30 May 2022 03:47  Phos  3.9     05-30  Mg     1.9     05-30    TPro  6.3  /  Alb  1.9<L>  /  TBili  1.4<H>  /  DBili  x   /  AST  38  /  ALT  20  /  AlkPhos  498<H>  05-30    PTT - ( 30 May 2022 03:46 )  PTT:32.8 sec  CARDIAC MARKERS ( 30 May 2022 03:47 )  x     / x     / 11 U/L / x     / 1.0 ng/mL      Troponin T, High Sensitivity Result: 15 ng/L (05-30 @ 03:47)        03:39 - VBG - pH: 7.35  | pCO2: 43    | pO2: 42    | Lactate: 2.4            Chest X Ray 05-30-22 @ 04:39 (Personal Read):  ECG 05-30-22 @ 04:39 (Personal Read):    12 Lead ECG:   Ventricular Rate 104 BPM    Atrial Rate 104 BPM    P-R Interval 148 ms    QRS Duration 86 ms     ms    QTc 410 ms    P Axis 46 degrees    R Axis -3 degrees    T Axis 34 degrees    Diagnosis Line *** Age and gender specific ECG analysis ***  Sinus tachycardia  Otherwise normal ECG (07-29-09 @ 23:05)

## 2022-05-30 NOTE — CONSULT NOTE ADULT - ASSESSMENT
James Hodge MD   Infectious Disease   Available on TEAMS. After 5PM and on weekends please page fellow on call or call [phone] DM, Obesity, Lupus nephritis.   Diarrhea for about two months followed by fevers, diffuse adenopathy, leukocytosis.   Hospitalized in the [facility], then [facility], transferred 5/30.   Suspect malignancy - bone marrow biopsy at [facility], pending.   Doubt C diff - no prior response to PO Vanc and EIA here is negative.   Doubt typical bacterial infection. Had a small gluteal skin abscess, now drained and clean. No response to broad spectrum antibiotics.   Asked about MTB. Possible in the setting of SLE and reportedly positive IGRA but lower on my list. No clear concerning lung lesions.     Suggest  -f/u bone marrow biopsy   -stop Cefepime and Caspofungin   -f/u blood, sputum and stool cultures, GI PCR   -check three sputum AFB   -check stool AFB   -check TTE      66M DM, Obesity, Lupus nephritis.   Diarrhea for about two months followed by fevers, diffuse adenopathy, leukocytosis.   Hospitalized in the [facility], then [facility], transferred 5/30.   Suspect malignancy - bone marrow biopsy at [facility], pending.   Doubt C diff - no prior response to PO Vanc and EIA here is negative.   Doubt typical bacterial infection. Had a small gluteal skin abscess, now drained and clean. No response to broad spectrum antibiotics.   Asked about MTB. Possible, somewhat immunocompromised with SLE, reportedly positive IGRA, but lower on my list. No obvious lung lesions.   HIV negative.     Suggest  -f/u bone marrow biopsy from [facility]   -stop Cefepime and Caspofungin   -f/u blood, sputum and stool cultures, GI PCR  -f/u Quantiferon   -check three sputum AFB   -check three stool AFB - unclear yield but can try, PCR is probably more sensitive but I don't think it's approved  -check TTE (splenic infarct)   -if no answer, the cervical lymph nodes may be another site to biopsy     James Hodge MD   Infectious Disease   Available on TEAMS. After 5PM and on weekends please page fellow on call or call [phone]

## 2022-05-30 NOTE — SWALLOW BEDSIDE ASSESSMENT ADULT - SLP GENERAL OBSERVATIONS
Pt encountered in bed, awake, on 2L NC. Airborne precautions maintained. HR ~105, intermittently to 120s (RN aware). Pt A&Ox3. Fully cooperative with assessment. Vocal quality WFL.

## 2022-05-30 NOTE — PROGRESS NOTE ADULT - SUBJECTIVE AND OBJECTIVE BOX
Venkat Steel MD  Internal Medicine  Pager #[phone]    CHIEF COMPLAINT:Patient is a 66y old  Male who presents with a chief complaint of recurrent fevers, unknown source (30 May 2022 04:01)        Interval Events:    REVIEW OF SYSTEMS:      OBJECTIVE:  ICU Vital Signs Last 24 Hrs  T(C): 38.9 (30 May 2022 06:00), Max: 38.9 (30 May 2022 06:00)  T(F): 102 (30 May 2022 06:00), Max: 102 (30 May 2022 06:00)  HR: 107 (30 May 2022 06:16) (107 - 124)  BP: 140/81 (30 May 2022 06:00) (140/80 - 156/101)  BP(mean): 103 (30 May 2022 06:00) (98 - 118)  ABP: --  ABP(mean): --  RR: 25 (30 May 2022 06:00) (25 - 33)  SpO2: 99% (30 May 2022 06:16) (97% - 100%)        -29 @ 07:01  -  30 @ 07:00  --------------------------------------------------------  IN: 150 mL / OUT: 480 mL / NET: -330 mL      CAPILLARY BLOOD GLUCOSE          PHYSICAL EXAM:      LINES:    HOSPITAL MEDICATIONS:  Standing Meds:  albuterol/ipratropium for Nebulization 3 milliLiter(s) Nebulizer every 6 hours  caspofungin IVPB 50 milliGRAM(s) IV Intermittent every 24 hours  cefepime   IVPB      cefepime   IVPB 1000 milliGRAM(s) IV Intermittent every 8 hours  chlorhexidine 4% Liquid 1 Application(s) Topical <User Schedule>  chlorhexidine 4% Liquid 1 Application(s) Topical <User Schedule>  dextrose 5%. 1000 milliLiter(s) IV Continuous <Continuous>  dextrose 5%. 1000 milliLiter(s) IV Continuous <Continuous>  dextrose 50% Injectable 25 Gram(s) IV Push once  dextrose 50% Injectable 12.5 Gram(s) IV Push once  dextrose 50% Injectable 25 Gram(s) IV Push once  gabapentin 300 milliGRAM(s) Oral daily  glucagon  Injectable 1 milliGRAM(s) IntraMuscular once  insulin lispro (ADMELOG) corrective regimen sliding scale   SubCutaneous every 6 hours  tamsulosin 0.4 milliGRAM(s) Oral at bedtime      PRN Meds:  dextrose Oral Gel 15 Gram(s) Oral once PRN  sodium chloride 0.9% lock flush 10 milliLiter(s) IV Push every 1 hour PRN      LABS:                        8.7    31.77 )-----------( 102      ( 30 May 2022 06:05 )             27.5     Hgb Trend: 8.7<--, 9.6<--  05    135  |  99  |  30<H>  ----------------------------<  82  3.9   |  22  |  1.16    Ca    8.5      30 May 2022 03:47  Phos  3.9       Mg     1.9         TPro  6.3  /  Alb  1.9<L>  /  TBili  1.4<H>  /  DBili  x   /  AST  38  /  ALT  20  /  AlkPhos  498<H>      Creatinine Trend: 1.16<--  PTT - ( 30 May 2022 03:46 )  PTT:32.8 sec  Urinalysis Basic - ( 30 May 2022 03:44 )    Color: Yellow / Appearance: Clear / S.016 / pH: x  Gluc: x / Ketone: Negative  / Bili: Negative / Urobili: Negative   Blood: x / Protein: 100 / Nitrite: Negative   Leuk Esterase: Negative / RBC: 8 /hpf / WBC 3 /HPF   Sq Epi: x / Non Sq Epi: 1 /hpf / Bacteria: Negative      Arterial Blood Gas:   @ 04:55  7.40/34/146/21/99.3/-3.5  ABG lactate: --    Venous Blood Gas:   @ 03:39  7.35/43/42/24/66.0  VBG Lactate: 2.4      MICROBIOLOGY:     RADIOLOGY:  [ ] Reviewed and interpreted by me    EKG:     Venkat Steel MD  Internal Medicine  Pager #[phone]    CHIEF COMPLAINT:Patient is a 66y old  Male who presents with a chief complaint of recurrent fevers, unknown source (30 May 2022 04:01)        Interval Events:    Found to have CT findings concerning for RLL PE initiated on heparin gtt    States placed on eliquis last dose likely before arrival and DC'd while at [facility]. Previously diagnosed Hep B has been told has cirrhosis on imaging early findings.     REVIEW OF SYSTEMS:    Const Fevers   CV no chest pain or palpitation  Pulm +SOB +VINCENT chronic past month   Abd no abdominal pain, ongoing diarrhea decreasing in quantity      OBJECTIVE:  ICU Vital Signs Last 24 Hrs  T(C): 38.9 (30 May 2022 06:00), Max: 38.9 (30 May 2022 06:00)  T(F): 102 (30 May 2022 06:00), Max: 102 (30 May 2022 06:00)  HR: 107 (30 May 2022 06:16) (107 - 124)  BP: 140/81 (30 May 2022 06:00) (140/80 - 156/101)  BP(mean): 103 (30 May 2022 06:00) (98 - 118)  ABP: --  ABP(mean): --  RR: 25 (30 May 2022 06:00) (25 - 33)  SpO2: 99% (30 May 2022 06:16) (97% - 100%)        - @ 07:01  -   @ 07:00  --------------------------------------------------------  IN: 150 mL / OUT: 480 mL / NET: -330 mL      CAPILLARY BLOOD GLUCOSE          PHYSICAL EXAM:    GENERAL: appears fatigued, weak  HEAD:  Atraumatic, Normocephalic  EYES: EOMI, PERRLA, conjunctiva and sclera clear  ENT: Moist mucous membranes  NECK: Supple, No JVD  CHEST/LUNG: no wheezing or increased WOB  HEART: tachycardic, reg rhythm; No murmurs, rubs, or gallops  ABDOMEN: Abdomen feels full, Bowel sounds present; Soft, Nontender  EXTREMITIES:  2+ Peripheral Pulses, brisk capillary refill. No clubbing, cyanosis, or edema  NERVOUS SYSTEM:  Alert & Oriented X3, speech clear. No deficits   MSK: FROM all 4 extremities, full and equal strength  SKIN: No rashes or lesions      LINES:    HOSPITAL MEDICATIONS:  Standing Meds:  albuterol/ipratropium for Nebulization 3 milliLiter(s) Nebulizer every 6 hours  caspofungin IVPB 50 milliGRAM(s) IV Intermittent every 24 hours  cefepime   IVPB      cefepime   IVPB 1000 milliGRAM(s) IV Intermittent every 8 hours  chlorhexidine 4% Liquid 1 Application(s) Topical <User Schedule>  chlorhexidine 4% Liquid 1 Application(s) Topical <User Schedule>  dextrose 5%. 1000 milliLiter(s) IV Continuous <Continuous>  dextrose 5%. 1000 milliLiter(s) IV Continuous <Continuous>  dextrose 50% Injectable 25 Gram(s) IV Push once  dextrose 50% Injectable 12.5 Gram(s) IV Push once  dextrose 50% Injectable 25 Gram(s) IV Push once  gabapentin 300 milliGRAM(s) Oral daily  glucagon  Injectable 1 milliGRAM(s) IntraMuscular once  insulin lispro (ADMELOG) corrective regimen sliding scale   SubCutaneous every 6 hours  tamsulosin 0.4 milliGRAM(s) Oral at bedtime      PRN Meds:  dextrose Oral Gel 15 Gram(s) Oral once PRN  sodium chloride 0.9% lock flush 10 milliLiter(s) IV Push every 1 hour PRN      LABS:                        8.7    31.77 )-----------( 102      ( 30 May 2022 06:05 )             27.5     Hgb Trend: 8.7<--, 9.6<--  0530    135  |  99  |  30<H>  ----------------------------<  82  3.9   |  22  |  1.16    Ca    8.5      30 May 2022 03:47  Phos  3.9     -  Mg     1.9         TPro  6.3  /  Alb  1.9<L>  /  TBili  1.4<H>  /  DBili  x   /  AST  38  /  ALT  20  /  AlkPhos  498<H>  30    Creatinine Trend: 1.16<--  PTT - ( 30 May 2022 03:46 )  PTT:32.8 sec  Urinalysis Basic - ( 30 May 2022 03:44 )    Color: Yellow / Appearance: Clear / S.016 / pH: x  Gluc: x / Ketone: Negative  / Bili: Negative / Urobili: Negative   Blood: x / Protein: 100 / Nitrite: Negative   Leuk Esterase: Negative / RBC: 8 /hpf / WBC 3 /HPF   Sq Epi: x / Non Sq Epi: 1 /hpf / Bacteria: Negative      Arterial Blood Gas:   @ 04:55  7.40/34/146/21/99.3/-3.5  ABG lactate: --    Venous Blood Gas:   @ 03:39  7.35/43/42/24/66.0  VBG Lactate: 2.4      MICROBIOLOGY:     RADIOLOGY:  [ ] Reviewed and interpreted by me    EKG:

## 2022-05-30 NOTE — H&P ADULT - ATTENDING COMMENTS
Otc Regimen: Strict sun precautions\\nTinted moisturizer such as EltaMD
Initiate Treatment: TriLuma QHS to dark spots x 8 weeks only\\nAfter stopping TriLuma, start Tretinoin 0.05% cream QHS as tolerated
Discontinue Regimen: Finacea due to reported reaction
Render In Strict Bullet Format?: No
Plan: Discussed benefits vs risks of laser, IPL, peels again (risk of hyperpigmentation) - suggested VI peel
Detail Level: Simple
65 yo M with a PMH HTN, HLD, SLE on Cellcept (MMF), class V lupus nephritis, CKD stage 2, DM2, diabetic neuropathy, TIA (2019) on plavix, BPH, HUNG, hospitalization in [facility] 3/27-4/20 tx for pancreatitis and C diff?, recent hospitalization at [place] 4/22-4/26 for acute pancreatitis and C diff colitis(per report) and another hospitalization 5/5 at [place] for septic shock (source buttock abscess) treated with vanc, cefepime (5/5-5/10), meropenem (5/11-5/18) and micafungin. Zosyn added 5/26. Course complicated by recurrent fevers despite being on antibiotics, and leukocytosis up to 30k. He had a culture somewhere source not cited with yeast? otherwise negative.  Per family and face sheet positive quant?!?!?! but not sent to us, his last one was in 2020 and negative.  This would be concerning for GI involvemtn of TB especially in this immunocompromised patient.  He can also theoretically have a tremendous amount of infectious subacute diarrheal illnesses, including parasites fungui and atypical bacteria, at this point he almos certainly does not have C-diff any more with 8 neagtive tests, and likely matos s not have typical bacterial/toxin diarreha.  His calpactin was quite elevated confirming this is inflammatory . And with his History of autoimmune diease we are looking a very broad differential, with elevated lipases, and inflammatory bowel dieases included as well as systemic Illness such as HLH..  However first things first a complete r/o of infectious etiology    - Will consult ID to ensure we obtain appropriate studies, would deescalate to just caspo and cefepime given 3 weeks of vanc, and neg c-diff, and ??? source of yeast cultures  - NEED to Clarify TB status, AFBS negative from what I see reported but new positive would be concerning.   - CT neck, chest and Abdomen with contrast (stridor on exam with LAD)  - GI consult for possible sigmoid for inspection/diagnosis, and input regarding further evaluation.   - Consider para for diagnosis though window is poor.   - DVT with hx of APLs antibodies now with clot would treat with full a/c if h/h is stable.   - Rheumatology evaluation  - Full code.   - Will continue TPN for now though unclear why it was started? unclear why he is NPO...

## 2022-05-30 NOTE — SWALLOW BEDSIDE ASSESSMENT ADULT - COMMENTS
Course also complicated by a L brachial vein DVT and superficial DVT in arms. Patient noted to have had a positive PPD but has possibly gotten the BCG vaccine. Quant gold performed at [facility] still pending. Admitted to [facility]U for further management.   Pulm  #large left posterior consolidation seen on ultrasoud, possible PNA?  -on 2L NC, satting 98%  -cover with empiric abx for HAP  #upper airway stridor, wheezing  -AM consult ENT eval for upper airway stridor  -f/u CT neck/chest, looking for lymphadenopathy and possibly options for lymph node bx  -check RVP, urine legionella  -sputum culture  -accapebruno ptarickoneomi  Diet  -NPO, patient has been receiving PPN at [facility] since 5/15  -S&S eval  -TPN consult, start on D10 for now    Swallow history: Pt is new to this service

## 2022-05-30 NOTE — H&P ADULT - ASSESSMENT
Neuro  - AAOx3 but slow  - check ammonia level given hepatosplenomegaly with possible cirrhosis    Pulm  #large left posterior consolidation seen on ultrasoud, possible PNA?  -on 2L NC, satting 98%  -cover with empiric abx for HAP  #upper airway stridor  -consider ENT eval for upper airway stridor  -f/u CT neck/chest  -check RVP  -accapela, duonebs    Cardio  -no active issues  -check official TTE    Renal  #CKD stage 3, hx of lupus nephritis  -cont to trend Cr  -monitor I/Os  -cont to monitor electrolytes, replete as necessary    GI  #chronic inflammatory diarrhea  #possible Crohn's vs colitis vs infectious  -calprotectin elevated at [place] [address]  -check stool culture, stool PCR, ova and parasites, c diff  -consult ID and GI  #TPN  #hx of pancreatitis, elevated lipase  -recheck lipase      -no active issues    Endocrine  #DM2  -last HbA1c 4/29 6.5%  -start on ISS    ID/rheum  #recurrent fevers and leukocytosis of unknown etiology  diff dx: infectious, malignancy, IgG4 disease, HLH  -elevated WBC, elevated IgA at [place]  -check quant gold  -resend cultures, check procal, fungitell, galactomannan  -check HIV, EBV, CMV  -possible yeast? unknown source at [place]  -start on caspofungin  -check HLH labs: soluble IL-2 levels, ferritin, triglyceride  -check IgG subsets    Heme  -DVT ppx: full AC Neuro  - AAOx3 but slow  - check ammonia level given hepatosplenomegaly with possible cirrhosis    Pulm  #large left posterior consolidation seen on ultrasoud, possible PNA?  -on 2L NC, satting 98%  -cover with empiric abx for HAP  #upper airway stridor, wheezing  -consider ENT eval for upper airway stridor  -f/u CT neck/chest  -check RVP, urine legionella  -accapela, duonebs    Cardio  -no active issues  -check official TTE    Renal  #CKD stage 3, hx of lupus nephritis  -cont to trend Cr  -monitor I/Os  -cont to monitor electrolytes, replete as necessary    GI  #chronic inflammatory diarrhea  #possible Crohn's vs colitis vs infectious  -calprotectin elevated at [place] [address]  -check stool culture, stool PCR, ova and parasites, c diff  -consult ID and GI  #TPN  -NPO  #hx of pancreatitis, elevated lipase  -recheck lipase  #hepatosplenomegaly  -check hepatitis panel      -no active issues    Endocrine  #DM2  -last HbA1c 4/29 6.5%  -start on ISS    ID/rheum  #hx of SLE  #recurrent fevers and leukocytosis of unknown etiology  diff dx: infectious, malignancy, IgG4 disease, HLH  -elevated WBC, elevated IgA at [place]  -check quant gold  -resend cultures (blood, stool), UA, check procal, fungitell, galactomannan, MRSA swab  -check HIV, EBV, CMV  -possible yeast? unknown source at [place]  -start on caspofungin  -check HLH labs: soluble IL-2 levels, ferritin, triglyceride  -check IgG4    Heme  -DVT ppx: full AC    Lines: RIJ placed 5/27 Neuro  - AAOx3 but slow  - check ammonia level given hepatosplenomegaly with possible cirrhosis    Pulm  #large left posterior consolidation seen on ultrasoud, possible PNA?  -on 2L NC, satting 98%  -cover with empiric abx for HAP  #upper airway stridor, wheezing  -consider ENT eval for upper airway stridor  -f/u CT neck/chest  -check RVP, urine legionella  -sputum culture  -accapela, duonebs    Cardio  -no active issues  -check official TTE    Renal  #CKD stage 3, hx of lupus nephritis  -cont to trend Cr  -monitor I/Os  -cont to monitor electrolytes, replete as necessary    GI  #chronic inflammatory diarrhea  #possible Crohn's vs colitis vs infectious  -calprotectin elevated at [place] [address]  -check stool culture, stool PCR, ova and parasites, c diff  -consult ID and GI  #TPN  -NPO  #hx of pancreatitis, elevated lipase  -recheck lipase  #hepatosplenomegaly  -check hepatitis panel      -no active issues    Endocrine  #DM2  -last HbA1c 4/29 6.5%  -start on ISS    ID/rheum  #hx of SLE  #recurrent fevers and leukocytosis of unknown etiology  diff dx: infectious, malignancy, IgG4 disease, HLH  -elevated WBC, elevated IgA at [place]  -check quant gold  -resend cultures (blood, stool), UA, check procal, fungitell, galactomannan, MRSA swab  -check HIV, EBV, CMV  -possible yeast? unknown source at [place]  -start on caspofungin  -check HLH labs: soluble IL-2 levels, ferritin, triglyceride  -check IgG4    Heme  -DVT ppx: full AC    Lines: TOBY placed 5/27 Neuro  - AAOx3 but slow  - check ammonia level given hepatosplenomegaly with possible cirrhosis    Pulm  #large left posterior consolidation seen on ultrasoud, possible PNA?  -on 2L NC, satting 98%  -cover with empiric abx for HAP  #upper airway stridor, wheezing  -consider ENT eval for upper airway stridor  -f/u CT neck/chest  -check RVP, urine legionella  -sputum culture  -accapela, duonebs    Cardio  -no active issues  -check official TTE    Renal  #CKD stage 3, hx of lupus nephritis  -cont to trend Cr  -monitor I/Os  -cont to monitor electrolytes, replete as necessary    GI  #chronic inflammatory diarrhea  #possible Crohn's vs colitis vs infectious  -calprotectin elevated at [place] [address]  -check stool culture, stool PCR, ova and parasites, c diff  -consult ID and GI  #TPN  -NPO  #hx of pancreatitis, elevated lipase  -recheck lipase  #hepatosplenomegaly shown on MRI at [place]  -check hepatitis panel      -no active issues    Endocrine  #DM2  -last HbA1c 4/29 6.5%  -start on ISS    ID/rheum  #hx of SLE  #recurrent fevers and leukocytosis of unknown etiology  diff dx: infectious, malignancy, IgG4 disease, HLH  -elevated WBC, elevated IgA at [place]  -check quant gold  -resend cultures (blood, stool), UA, check procal, fungitell, galactomannan, MRSA swab  -check HIV, EBV, CMV  -possible yeast? unknown source at [place]  -start on caspofungin  #r/o HLH, IgG4  -has fevers, splenomegaly, cytopenia  -check HLH labs: soluble IL-2 levels, ferritin, triglyceride  -check IgG4    Heme  -DVT ppx: full AC    Lines: RIJ placed 5/27 Neuro  - AAOx3 but slow  - check ammonia level given hepatosplenomegaly with possible cirrhosis    Pulm  #large left posterior consolidation seen on ultrasoud, possible PNA?  -on 2L NC, satting 98%  -cover with empiric abx for HAP  #upper airway stridor, wheezing  -consider ENT eval for upper airway stridor  -f/u CT neck/chest  -check RVP, urine legionella  -sputum culture  -accapela, duonebs    Cardio  -no active issues  -check official TTE    Renal  #CKD stage 2, hx of lupus nephritis  -cont to trend Cr  -monitor I/Os  -cont to monitor electrolytes, replete as necessary    GI  #chronic inflammatory diarrhea  #possible Crohn's vs colitis vs infectious  -calprotectin elevated at [place] [address]  -check stool culture, stool PCR, ova and parasites, c diff  -consult ID and GI  #TPN  -NPO  #hx of pancreatitis, elevated lipase  -recheck lipase  #hepatosplenomegaly shown on MRI at [place]  -check hepatitis panel      -no active issues    Endocrine  #DM2  -last HbA1c 4/29 6.5%  -start on ISS    ID/rheum  #hx of SLE  #recurrent fevers and leukocytosis of unknown etiology  diff dx: infectious, malignancy, IgG4 disease, HLH  -elevated WBC, elevated IgA at [place]  -check quant gold  -resend cultures (blood, stool), UA, check procal, fungitell, galactomannan, MRSA swab  -check HIV, EBV, CMV  -possible yeast? unknown source at [place]  -start on caspofungin  #r/o HLH, IgG4  -has fevers, splenomegaly, cytopenia  -check HLH labs: soluble IL-2 levels, ferritin, triglyceride  -check IgG4    Heme  -DVT ppx: full AC    Lines: RIJ placed 5/27 67 yo M with a PMH HTN, HLD, SLE on Cellcept (MMF), class V lupus nephritis, CKD stage 2, DM2, diabetic neuropathy, TIA (2019) on plavix, BPH, HUNG, hospitalization in [facility] 3/27-4/20 tx for pancreatitis and C diff?, recent hospitalization at [place] 4/22-4/26 for acute pancreatitis and C diff colitis and another hospitalization 5/5 at [place] for septic shock (source buttock abscess) treated with vanc, cefepime (5/5-5/10), meropenem (5/11-5/18) and micafungin. Zosyn added 5/26. Course complicated by recurrent fevers despite being on antibiotics, and leukocytosis up to 30k. Blood cx neg, UA unremarkable. MRI abdomen w/ contrast performed showing extensive abd lymphadenopathy (reactive to c diff vs lymphoma?). IR stated no window for bx. Course also complicated by a L brachial vein DVT and superficial DVT in arms. Patient noted to have had a positive PPD but has possibly gotten the BCG vaccine. Quant gold performed at [place] still pending.     Neuro  - AAOx3 but slow  - check ammonia level given hepatosplenomegaly with possible cirrhosis  #diabetic neuropathy  -start on home gabapentin    Pulm  #large left posterior consolidation seen on ultrasoud, possible PNA?  -on 2L NC, satting 98%  -cover with empiric abx for HAP  #upper airway stridor, wheezing  -consider ENT eval for upper airway stridor  -f/u CT neck/chest  -check RVP, urine legionella  -sputum culture  -isabell mota      Cardio  #HTN  - hold off on antihypertensive meds for now as patient could be septic  #HLD, hypertriglyceridemia  -f/u lipid profile  -check official TTE      Renal  #CKD stage 2, hx of lupus nephritis  -cont to trend Cr  -monitor I/Os  -cont to monitor electrolytes, replete as necessary    GI  #chronic inflammatory diarrhea  #possible Crohn's vs colitis vs infectious  -calprotectin elevated at [place] [address]  -check stool culture, stool PCR, ova and parasites, c diff  -consult ID and GI  #TPN  -NPO  -TPN consult, start on D10 for now  #hx of pancreatitis, elevated lipase  -recheck lipase  #hepatosplenomegaly shown on MRI at [place]  -check hepatitis panel      #hx of BPH  -start on flomax    Endocrine  #DM2  -last HbA1c 4/29 6.5%  -start on ISS    ID/rheum  #hx of SLE  #recurrent fevers and leukocytosis of unknown etiology  diff dx: infectious, malignancy, IgG4 disease, HLH  -elevated WBC, elevated IgA at [place]  -check quant gold  -resend cultures (blood, stool), UA, check procal, fungitell, galactomannan, MRSA swab  -check HIV, EBV, CMV  -possible yeast? unknown source at [place]  -[place] on caspofungin  #r/o HLH, IgG4  -has fevers, splenomegaly, cytopenia  -check HLH labs: soluble IL-2 levels, ferritin, triglyceride  -check IgG4    Heme  -DVT ppx: full AC    Lines: TOBY placed 5/27 67 yo M with a PMH HTN, HLD, SLE on Cellcept (MMF), class V lupus nephritis, CKD stage 2, DM2, diabetic neuropathy, TIA (2019) on plavix, BPH, HUNG, hospitalization in [facility] 3/27-4/20 tx for pancreatitis and C diff?, recent hospitalization at [place] 4/22-4/26 for acute pancreatitis and C diff colitis and another hospitalization 5/5 at [place] for septic shock (source buttock abscess) treated with vanc, cefepime (5/5-5/10), meropenem (5/11-5/18) and micafungin. Zosyn added 5/26. Course complicated by recurrent fevers despite being on antibiotics, and leukocytosis up to 30k. Blood cx neg, UA unremarkable. MRI abdomen w/ contrast performed showing extensive abd lymphadenopathy (reactive to c diff vs lymphoma?). IR stated no window for bx. Course also complicated by a L brachial vein DVT and superficial DVT in arms. Patient noted to have had a positive PPD but has possibly gotten the BCG vaccine. Quant gold performed at [place] still pending.     Neuro  - AAOx3 but slow  - check ammonia level given hepatosplenomegaly with possible cirrhosis  #diabetic neuropathy  -start on home gabapentin    Pulm  #large left posterior consolidation seen on ultrasoud, possible PNA?  -on 2L NC, satting 98%  -cover with empiric abx for HAP  #upper airway stridor, wheezing  -consider ENT eval for upper airway stridor  -f/u CT neck/chest  -check RVP, urine legionella  -sputum culture  -isabell mota      Cardio  #HTN  - hold off on antihypertensive meds for now as patient could be septic  #HLD, hypertriglyceridemia  -f/u lipid profile  -check official TTE      Renal  #CKD stage 2, hx of lupus nephritis  -cont to trend Cr  -monitor I/Os  -cont to monitor electrolytes, replete as necessary    GI  #chronic inflammatory diarrhea  #possible Crohn's vs colitis vs infectious  -calprotectin elevated at [place] [address]  -check stool culture, stool PCR, ova and parasites, c diff  -consult ID and GI  #Diet  -NPO, patient has been receiving PPN at [facility] since 5/15  -S&S eval  -TPN consult, start on D10 for now  #hx of pancreatitis, elevated lipase  -recheck lipase  #hepatosplenomegaly shown on MRI at [place]  -check hepatitis panel      #hx of BPH  -start on flomax    Endocrine  #DM2  -last HbA1c 4/29 6.5%  -start on ISS    ID/rheum  #hx of SLE  #recurrent fevers and leukocytosis of unknown etiology  diff dx: infectious, malignancy, IgG4 disease, HLH  -elevated WBC, elevated IgA at [place]  -check quant gold  -resend cultures (blood, stool), UA, check procal, fungitell, galactomannan, MRSA swab  -check HIV, EBV, CMV  -possible yeast? unknown source at [place]  -start on caspofungin  #r/o HLH, IgG4  -has fevers, splenomegaly, cytopenia  -check HLH labs: soluble IL-2 levels, ferritin, triglyceride  -check IgG4    Heme  -DVT ppx: full AC    Lines: TOYB placed 5/27 67 yo M with a PMH HTN, HLD, SLE on Cellcept (MMF), class V lupus nephritis, CKD stage 2, DM2, diabetic neuropathy, TIA (2019) on plavix, BPH, HUNG, hospitalization in [facility] 3/27-4/20 tx for pancreatitis and C diff?, recent hospitalization at [place] 4/22-4/26 for acute pancreatitis and C diff colitis and another hospitalization 5/5 at [place] for septic shock (source buttock abscess) treated with vanc, cefepime (5/5-5/10), meropenem (5/11-5/18) and micafungin. Zosyn added 5/26. Course complicated by recurrent fevers despite being on antibiotics, and leukocytosis up to 30k. Blood cx neg, UA unremarkable. MRI abdomen w/ contrast performed showing extensive abd lymphadenopathy (reactive to c diff vs lymphoma?). IR stated no window for bx. Course also complicated by a L brachial vein DVT and superficial DVT in arms. Patient noted to have had a positive PPD but has possibly gotten the BCG vaccine. Quant gold performed at [place] still pending.     Neuro  - AAOx3 but slow  - check ammonia level given hepatosplenomegaly with possible cirrhosis  #diabetic neuropathy  -start on home gabapentin    Pulm  #large left posterior consolidation seen on ultrasoud, possible PNA?  -on 2L NC, satting 98%  -cover with empiric abx for HAP  #upper airway stridor, wheezing  -consider ENT eval for upper airway stridor  -f/u CT neck/chest, looking for lymphadenopathy and possibly options for lymph node bx  -check RVP, urine legionella  -sputum culture  -isabell mota      Cardio  #HTN  - hold off on antihypertensive meds for now as patient could be septic  #HLD, hypertriglyceridemia  -f/u lipid profile  -check official TTE      Renal  #CKD stage 2, hx of lupus nephritis  -cont to trend Cr  -monitor I/Os  -cont to monitor electrolytes, replete as necessary    GI  #chronic inflammatory diarrhea  #possible Crohn's vs colitis vs infectious  -calprotectin elevated at [place] [address]  -check stool culture, stool PCR, ova and parasites, c diff  -consult ID and GI  #Diet  -NPO, patient has been receiving PPN at [facility] since 5/15  -S&S eval  -TPN consult, start on D10 for now  #hx of pancreatitis, elevated lipase  -recheck lipase  #hepatosplenomegaly shown on MRI at [place]  -check hepatitis panel      #hx of BPH  -start on flomax    Endocrine  #DM2  -last HbA1c 4/29 6.5%  -start on ISS    ID/rheum  #hx of SLE  #recurrent fevers and leukocytosis of unknown etiology  diff dx: infectious, malignancy, IgG4 disease, HLH  -elevated WBC, elevated IgA at [place]  -check quant gold  -resend cultures (blood, stool), UA, check procal, fungitell, galactomannan, MRSA swab  -check HIV, EBV, CMV  -possible yeast? unknown source at [place]  -start on caspofungin  #r/o HLH, IgG4  -has fevers, splenomegaly, cytopenia  -check HLH labs: soluble IL-2 levels, ferritin, triglyceride  -check IgG4    Heme  #Anemia  -outside hospital reported to be consistent with AOCD  -CT ab pending, r/o bleed  -transfuse if Hb<7, maintain active type and screen  -DVT ppx: full AC    Lines: RIJ placed 5/27 67 yo M with a PMH HTN, HLD, SLE on Cellcept (MMF), class V lupus nephritis, CKD stage 2, DM2, diabetic neuropathy, TIA (2019) on plavix, BPH, HUNG, hospitalization in [facility] 3/27-4/20 tx for pancreatitis and C diff?, recent hospitalization at [place] 4/22-4/26 for acute pancreatitis and C diff colitis and another hospitalization 5/5 at [place] for septic shock (source buttock abscess) treated with vanc, cefepime (5/5-5/10), meropenem (5/11-5/18) and micafungin. Zosyn added 5/26. Course complicated by recurrent fevers despite being on antibiotics, and leukocytosis up to 30k. Blood cx neg, UA unremarkable. MRI abdomen w/ contrast performed showing extensive abd lymphadenopathy (reactive to c diff vs lymphoma?). IR stated no window for bx. Course also complicated by a L brachial vein DVT and superficial DVT in arms. Patient noted to have had a positive PPD but has possibly gotten the BCG vaccine. Quant gold performed at [place] still pending.     Neuro  - AAOx3 but slow  - check ammonia level given hepatosplenomegaly with possible cirrhosis  #diabetic neuropathy  -start on home gabapentin    Pulm  #large left posterior consolidation seen on ultrasoud, possible PNA?  -on 2L NC, satting 98%  -cover with empiric abx for HAP  #upper airway stridor, wheezing  -consider ENT eval for upper airway stridor  -f/u CT neck/chest, looking for lymphadenopathy and possibly options for lymph node bx  -check RVP, urine legionella  -sputum culture  -isabell mota      Cardio  #HTN  - hold off on antihypertensive meds for now as patient could be septic  #HLD, hypertriglyceridemia  -f/u lipid profile  -check official TTE      Renal  #CKD stage 2, hx of lupus nephritis  -cont to trend Cr  -monitor I/Os  -cont to monitor electrolytes, replete as necessary    GI  #chronic inflammatory diarrhea  #possible Crohn's vs colitis vs infectious  -calprotectin elevated at [place] [address]  -check stool culture, stool PCR, ova and parasites, c diff  -consult ID and GI  #Diet  -NPO, patient has been receiving PPN at [facility] since 5/15  -S&S eval  -TPN consult, start on D10 for now  #hx of pancreatitis, elevated lipase  -recheck lipase  #hepatosplenomegaly shown on MRI at [place]  -check hepatitis panel      #hx of BPH  -start on flomax    Endocrine  #DM2  -last HbA1c 4/29 6.5%  -start on ISS    ID/rheum  #hx of SLE  #recurrent fevers and leukocytosis of unknown etiology  diff dx: infectious, malignancy, IgG4 disease, HLH  -elevated WBC, elevated IgA at [place]  -check quant gold  -resend cultures (blood, stool), UA, check procal, fungitell, galactomannan, MRSA swab  -check HIV, EBV, CMV  -possible yeast? unknown source at [place]  -start on caspofungin  -consider doxycycline?  #r/o HLH, IgG4  -has fevers, splenomegaly, cytopenia  -check HLH labs: soluble IL-2 levels, ferritin, triglyceride  -check IgG4    Heme  #Anemia  -outside hospital reported to be consistent with AOCD  -CT ab pending, r/o bleed  -transfuse if Hb<7, maintain active type and screen  -DVT ppx: full AC    Lines: RITITA placed 5/27 67 yo M with a PMH HTN, HLD, SLE on Cellcept (MMF), class V lupus nephritis, CKD stage 2, DM2, diabetic neuropathy, TIA (2019) on plavix, BPH, HUNG, hospitalization in [facility] 3/27-4/20 tx for pancreatitis and C diff?, recent hospitalization at [place] 4/22-4/26 for acute pancreatitis and C diff colitis and another hospitalization 5/5 at [place] for septic shock (source buttock abscess) treated with vanc, cefepime (5/5-5/10), meropenem (5/11-5/18) and micafungin. Zosyn added 5/26. Course complicated by recurrent fevers despite being on antibiotics, and leukocytosis up to 30k. Blood cx neg, UA unremarkable. MRI abdomen w/ contrast performed showing extensive abd lymphadenopathy (reactive to c diff vs lymphoma?). IR stated no window for bx. Course also complicated by a L brachial vein DVT and superficial DVT in arms. Patient noted to have had a positive PPD but has possibly gotten the BCG vaccine. Quant gold performed at [place] still pending.     Neuro  - AAOx3 but slow  - check ammonia level given hepatosplenomegaly with possible cirrhosis  #diabetic neuropathy  -start on home gabapentin    Pulm  #large left posterior consolidation seen on ultrasoud, possible PNA?  -on 2L NC, satting 98%  -cover with empiric abx for HAP  #upper airway stridor, wheezing  -AM consult ENT eval for upper airway stridor  -f/u CT neck/chest, looking for lymphadenopathy and possibly options for lymph node bx  -check RVP, urine legionella  -sputum culture  -isabell mota      Cardio  #HTN  - hold off on antihypertensive meds for now as patient could be septic  #HLD, hypertriglyceridemia  -f/u lipid profile  -check official TTE      Renal  #CKD stage 2, hx of lupus nephritis  -cont to trend Cr  -monitor I/Os  -cont to monitor electrolytes, replete as necessary    GI  #chronic inflammatory diarrhea  #possible Crohn's vs colitis vs infectious  -calprotectin elevated at [place] [address]  -check stool culture, stool PCR, ova and parasites, c diff  -consult ID and GI  #Diet  -NPO, patient has been receiving PPN at [facility] since 5/15  -S&S eval  -TPN consult, start on D10 for now  #hx of pancreatitis, elevated lipase  -recheck lipase  #hepatosplenomegaly shown on MRI at [place]  -check hepatitis panel      #hx of BPH  -start on flomax    Endocrine  #DM2  -last HbA1c 4/29 6.5%  -start on ISS    ID/rheum  #hx of SLE  #recurrent fevers and leukocytosis of unknown etiology  diff dx: infectious, malignancy, IgG4 disease, HLH  -elevated WBC, elevated IgA at [place]  -check quant gold  -resend cultures (blood, stool), UA, check procal, fungitell, galactomannan, MRSA swab  -check HIV, EBV, CMV  -possible yeast? unknown source at [place]  -start on caspofungin  -consider doxycycline?  #r/o HLH, IgG4  -has fevers, splenomegaly, cytopenia  -check HLH labs: soluble IL-2 levels, ferritin, triglyceride  -check IgG4    Heme  #Anemia  -outside hospital reported to be consistent with AOCD  -CT ab pending, r/o bleed  -transfuse if Hb<7, maintain active type and screen  -DVT ppx: full AC    Lines: RITITA placed 5/27

## 2022-05-30 NOTE — SWALLOW BEDSIDE ASSESSMENT ADULT - ADDITIONAL RECOMMENDATIONS
Maintain good oral care.    GOAL: Pt will tolerate diet without overt signs of laryngeal penetration/aspiration.

## 2022-05-30 NOTE — H&P ADULT - HISTORY OF PRESENT ILLNESS
67 yo M with a PMH HTN, HLD, SLE on Cellcept (MMF), class V lupus nephritis, CKD stage 3, DM2, diabetic neuropathy, TIA (2019) on plavix, BPH, HUNG, hospitalization in [facility] 3/27-4/20 tx for pancreatitis and C diff?, recent hospitalization at [place] 4/22-4/26 for acute pancreatitis and C diff colitis and another hospitalization 5/5 at [place] for septic shock (source buttock abscess) treated with vanc, cefepime (5/5-5/10), meropenem (5/11-5/18) and micafungin. Zosyn added 5/26. Course complicated by recurrent fevers despite being on antibiotics, and leukocytosis up to 30k. Blood cx neg, UA unremarkable. MRI abdomen w/ contrast performed showing extensive abd lymphadenopathy (reactive to c diff vs lymphoma?). IR stated no window for bx. Course also complicated by a L brachial vein DVT and superficial DVT in arms. 65 yo M with a PMH HTN, HLD, SLE on Cellcept (MMF), class V lupus nephritis, CKD stage 3, DM2, diabetic neuropathy, TIA (2019) on plavix, BPH, HUNG, hospitalization in [facility] 3/27-4/20 tx for pancreatitis and C diff?, recent hospitalization at [place] 4/22-4/26 for acute pancreatitis and C diff colitis and another hospitalization 5/5 at [place] for septic shock (source buttock abscess) treated with vanc, cefepime (5/5-5/10), meropenem (5/11-5/18) and micafungin. Zosyn added 5/26. Course complicated by recurrent fevers despite being on antibiotics, and leukocytosis up to 30k. Blood cx neg, UA unremarkable. MRI abdomen w/ contrast performed showing extensive abd lymphadenopathy (reactive to c diff vs lymphoma?). IR stated no window for bx. Course also complicated by a L brachial vein DVT and superficial DVT in arms. Patient noted to have had a positive PPD but has possibly gotten the BCG vaccine. Quant gold performed at [place] still pending 65 yo M with a PMH HTN, HLD, SLE on Cellcept (MMF), class V lupus nephritis, CKD stage 3, DM2, diabetic neuropathy, TIA (2019) on plavix, BPH, HUNG, hospitalization in [facility] 3/27-4/20 tx for pancreatitis and C diff?, recent hospitalization at [place] 4/22-4/26 for acute pancreatitis and C diff colitis and another hospitalization 5/5 at [place] for septic shock (source buttock abscess) treated with vanc, cefepime (5/5-5/10), meropenem (5/11-5/18) and micafungin. Zosyn added 5/26. Course complicated by recurrent fevers despite being on antibiotics, and leukocytosis up to 30k. Blood cx neg, UA unremarkable. MRI abdomen w/ contrast performed showing extensive abd lymphadenopathy (reactive to c diff vs lymphoma?). IR stated no window for bx. Course also complicated by a L brachial vein DVT and superficial DVT in arms. Patient noted to have had a positive PPD but has possibly gotten the BCG vaccine. Quant gold performed at [place] still pending.  65 yo M with a PMH HTN, HLD, SLE on Cellcept (MMF), class V lupus nephritis, CKD stage 2, DM2, diabetic neuropathy, TIA (2019) on plavix, BPH, HUNG, hospitalization in [facility] 3/27-4/20 tx for pancreatitis and C diff?, recent hospitalization at [place] 4/22-4/26 for acute pancreatitis and C diff colitis and another hospitalization 5/5 at [place] for septic shock (source buttock abscess) treated with vanc, cefepime (5/5-5/10), meropenem (5/11-5/18) and micafungin. Zosyn added 5/26. Course complicated by recurrent fevers despite being on antibiotics, and leukocytosis up to 30k. Blood cx neg, UA unremarkable. MRI abdomen w/ contrast performed showing extensive abd lymphadenopathy (reactive to c diff vs lymphoma?). IR stated no window for bx. Course also complicated by a L brachial vein DVT and superficial DVT in arms. Patient noted to have had a positive PPD but has possibly gotten the BCG vaccine. Quant gold performed at [place] still pending.     Admitted to [facility]U for further management. Vital signs on arrival: temp 38.6, /77, , RR 28, O2 sat 97% on 2L NC.  Labs: WBC 34.53, Hb 9.6, lipase 740, procal 2.02, tBili 1.4, alk phos 498, lactate 2.4.

## 2022-05-30 NOTE — CONSULT NOTE ADULT - SUBJECTIVE AND OBJECTIVE BOX
Vascular & Interventional Radiology Brief Consult Note    Evaluate for Procedure: LN biopsy    HPI: 66y Male with PMH HTN, HLD, SLE on Cellcept (MMF), tx from OSH for septic shock but persistent fever and diarrhea despite abx and antifungal tx. W/u showed significant abd LAD. IR consulted for LN biopsy.      Allergies:   Medications (Abx/Cardiac/Anticoagulation/Blood Products)  caspofungin IVPB: 260 mL/Hr IV Intermittent (05-30 @ 06:45)  cefepime   IVPB: 100 mL/Hr IV Intermittent (05-30 @ 05:37)  cefepime   IVPB: 100 mL/Hr IV Intermittent (05-30 @ 16:17)  heparin  Infusion.: 1700 Unit(s)/Hr IV Continuous (05-30 @ 11:13)    Data:  162.6  90.6  T(C): 38.3  HR: 123  BP: 110/69  RR: 25  SpO2: 97%    -WBC 30.01 / HgB 8.9 / Hct 28.6 / Plt 93  -Na 134 / Cl 101 / BUN 27 / Glucose 86  -K 3.9 / CO2 21 / Cr 1.17  -ALT 20 / Alk Phos 490 / T.Bili 1.3    Imaging: Reviewed

## 2022-05-30 NOTE — H&P ADULT - NSHPREVIEWOFSYSTEMS_GEN_ALL_CORE
REVIEW OF SYSTEMS:    CONSTITUTIONAL: fevers,   EYES/ENT: No visual changes;  No vertigo or throat pain   NECK: No pain or stiffness  RESPIRATORY: No cough, wheezing, hemoptysis; No shortness of breath  CARDIOVASCULAR: No chest pain or palpitations  GASTROINTESTINAL: No abdominal or epigastric pain. No nausea, vomiting, or hematemesis; No diarrhea or constipation. No melena or hematochezia.  GENITOURINARY: No dysuria, frequency or hematuria  NEUROLOGICAL: No numbness or weakness  SKIN: No itching, rashes REVIEW OF SYSTEMS:    CONSTITUTIONAL: fevers, weakness, chills, sweats  EYES/ENT: No visual changes  NECK: No pain or stiffness  RESPIRATORY: Cough and wheezing. +SOB, No hemoptysis  CARDIOVASCULAR: tachycardic, No chest pain  GASTROINTESTINAL: (+) green diarrhea, No abdominal or epigastric pain. No nausea, vomiting, or hematemesis. No melena or hematochezia.  GENITOURINARY: No dysuria, frequency or hematuria  NEUROLOGICAL: No numbness or weakness  SKIN: No itching, rashes

## 2022-05-30 NOTE — SWALLOW BEDSIDE ASSESSMENT ADULT - SLP PERTINENT HISTORY OF CURRENT PROBLEM
65 yo M with a PMH HTN, HLD, SLE on Cellcept (MMF), class V lupus nephritis, CKD stage 2, DM2, diabetic neuropathy, TIA (2019) on plavix, BPH, HUNG, hospitalization in [facility] 3/27-4/20 tx for pancreatitis and C diff?, recent hospitalization at [place] 4/22-4/26 for acute pancreatitis and C diff colitis and another hospitalization 5/5 at [place] for septic shock (source buttock abscess) treated with vanc, cefepime (5/5-5/10), meropenem (5/11-5/18) and micafungin. Zosyn added 5/26. Course complicated by recurrent fevers despite being on antibiotics, and leukocytosis up to 30k. Blood cx neg, UA unremarkable. MRI abdomen w/ contrast performed showing extensive abd lymphadenopathy (reactive to c diff vs lymphoma?). IR stated no window for bx.

## 2022-05-30 NOTE — PROGRESS NOTE ADULT - ATTENDING COMMENTS
65 yo M with a PMH HTN, HLD, SLE on Cellcept (MMF), tx from OSH for septic shock (source buttock abscess)  but persistent fever and diarrhea despite abx and antifungal tx. W/u showed significant abd LAD unable to bx at OSH.  Cont to monitor neuro status  unclear cause of diarrhea- f/u stool cx, afb stool cx, O+P but if infectious w/u negative would also be concerned for lymphoma as well  also f/u w/u HLH   f/u GI reccs  IR biopsy of LAD may be most feasible next step r/o TB, lymphoma  will cont monitor map, maintain map>65  cont monitor volume status  comfortable on ra  check sputum AFB and f/u quant gold  monitor UO and lytes  attempt po diet and hold ppn  f/u ID reccs 65 yo M with a PMH HTN, HLD, SLE on Cellcept (MMF), tx from OSH for septic shock (source buttock abscess)  but persistent fever and diarrhea despite abx and antifungal tx. W/u showed significant abd LAD unable to bx at OSH.  Cont to monitor neuro status  unclear cause of diarrhea- f/u stool cx, afb stool cx, O+P but if infectious w/u negative would also be concerned for lymphoma as well  also f/u w/u HLH   f/u GI reccs  IR biopsy of LAD may be most feasible next step r/o TB, lymphoma  will cont monitor map, maintain map>65  cont monitor volume status  comfortable on ra  check sputum AFB and f/u quant gold  monitor UO and lytes  attempt po diet and hold ppn  f/u ID reccs, cont abx  hx of positive reported APS serologies and dvt from OSH- cont hep gtt  PE on CT today appears artifactual but when stable and to avoid contrast load can get dedicated CTA

## 2022-05-30 NOTE — H&P ADULT - NSHPPHYSICALEXAM_GEN_ALL_CORE
VITALS:     GENERAL: NAD, lying in bed comfortably  HEAD:  Atraumatic, Normocephalic  EYES: EOMI, PERRLA, conjunctiva and sclera clear  ENT: Moist mucous membranes  NECK: Supple, No JVD  CHEST/LUNG: expiratory wheezing, Clear to auscultation bilaterally; No rales, rhonchi, wheezing, or rubs. Unlabored respirations  HEART: Regular rate and rhythm; No murmurs, rubs, or gallops  ABDOMEN: Bowel sounds present; Soft, Nontender, Nondistended. No hepatomegally  EXTREMITIES:  2+ Peripheral Pulses, brisk capillary refill. No clubbing, cyanosis, or edema  NERVOUS SYSTEM:  Alert & Oriented X3, speech clear. No deficits   MSK: FROM all 4 extremities, full and equal strength  SKIN: No rashes or lesions VITALS:     GENERAL: appears extremely fatigued, weak  HEAD:  Atraumatic, Normocephalic  EYES: EOMI, PERRLA, conjunctiva and sclera clear  ENT: Moist mucous membranes  NECK: Supple, No JVD  CHEST/LUNG: expiratory wheezing, No rales, rhonchi, or rubs. Increased work of breathing  HEART: tachycardic, reg rhythm; No murmurs, rubs, or gallops  ABDOMEN: Abdomen feels full, Bowel sounds present; Soft, Nontender  EXTREMITIES:  2+ Peripheral Pulses, brisk capillary refill. No clubbing, cyanosis, or edema  NERVOUS SYSTEM:  Alert & Oriented X3, speech clear. No deficits   MSK: FROM all 4 extremities, full and equal strength  SKIN: No rashes or lesions

## 2022-05-30 NOTE — CHART NOTE - NSCHARTNOTEFT_GEN_A_CORE
: Janine VAZQUEZ NP     INDICATION: Admission to ICU / SOB PNA     PROCEDURE:  [ x] LIMITED ECHO  [ x] LIMITED CHEST  [ ] LIMITED RETROPERITONEAL  [x ] LIMITED ABDOMINAL  [ ] LIMITED DVT  [ ] NEEDLE GUIDANCE VASCULAR  [ ] NEEDLE GUIDANCE THORACENTESIS  [ ] NEEDLE GUIDANCE PARACENTESIS  [ ] NEEDLE GUIDANCE PERICARDIOCENTESIS  [ ] OTHER    FINDINGS:  Echo   RV smaller than LV   LV appears to have normal systolic function   IVC unable to be seen due to abd distention     Chest   Bilateral anterior chest wall with A line predominance  Right Pleural base with small consolidation and focal b lines   Left Pleural Base with consolidation large and dense with associated pleural effusion     Abdomen       INTERPRETATION:  Normal Cardiac size and Function   Unable to assess IVC Right Pleural base with small consolidation and focal b lines,  Left Pleural Base with consolidation large and dense with associated pleural effusion   small pockets of ascites   Bladder is distended with anechoic fluid : Janine VAZQUEZ NP     INDICATION: Admission to ICU / SOB PNA     PROCEDURE:  [ x] LIMITED ECHO  [ x] LIMITED CHEST  [ ] LIMITED RETROPERITONEAL  [x ] LIMITED ABDOMINAL  [ ] LIMITED DVT  [ ] NEEDLE GUIDANCE VASCULAR  [ ] NEEDLE GUIDANCE THORACENTESIS  [ ] NEEDLE GUIDANCE PARACENTESIS  [ ] NEEDLE GUIDANCE PERICARDIOCENTESIS  [ ] OTHER    FINDINGS:  Echo   RV smaller than LV   LV appears to have normal systolic function   IVC unable to be seen due to abd distention     Chest   Bilateral anterior chest wall with A line predominance  Right Pleural base with small consolidation and focal b lines   Left Pleural Base with consolidation large and dense with associated pleural effusion     Abdomen   Limited eval of the spleen noted incudental splenomegaly with wedge shaped hypoechoic regions.     INTERPRETATION:  Normal Cardiac size and Function   Unable to assess IVC Right Pleural base with small consolidation and focal b lines,  Left Pleural Base with consolidation large and dense with associated pleural effusion   small pockets of ascites   Bladder is distended with anechoic fluid  Screening US of spleen concerning for Splenomegaly and ? infarcts?

## 2022-05-30 NOTE — H&P ADULT - NSICDXPASTMEDICALHX_GEN_ALL_CORE_FT
PAST MEDICAL HISTORY:  Hepatitis B Carrier     Hypertension, unspecified type     Nephritic syndrome     Obstructive Sleep Apnea     Other systemic lupus erythematosus with endocarditis     Pneumonia     Sleep apnea, unspecified type     Type 2 diabetes mellitus with other neurologic complication, without long-term current use of insulin

## 2022-05-30 NOTE — CONSULT NOTE ADULT - ASSESSMENT
Assessment/Plan:   66y Male with PMH HTN, HLD, SLE on Cellcept (MMF), tx from OSH for septic shock but persistent fever and diarrhea despite abx and antifungal tx. W/u showed significant abd LAD. IR consulted for LN biopsy. Imaging reviewed, RP LNs are small and mesenteric LNs with no safe window for percutaneous biopsy. There are left supraclavicular and cervical LNs which may be amenable to biopsy.  - Can plan for supraclavicular/cervical LN biopsy Thursday 6/2  - Place IR procedure order under Dr. Jimenez  - No need to NPO, local anesthesia only  - Repeat AM labs chem, cbc, coags Thurs  - COVID pcr within 5 day of procedure     Please call IR with any questions or concerns.

## 2022-05-30 NOTE — CONSULT NOTE ADULT - SUBJECTIVE AND OBJECTIVE BOX
HPI:  66M DM, Lupus nephritis, BPH,   hospitalized in [facility] 3/27- for pancreatitis and C diff?,   [place] - for acute pancreatitis and C diff colitis    at [place] for septic shock (source buttock abscess) treated with vanc, cefepime (-5/10), meropenem (-) and micafungin. Zosyn added .   recurrent fevers despite being on antibiotics, and leukocytosis up to 30k.   Blood cx neg, UA unremarkable. MRI abdomen w/ contrast performed showing extensive abd lymphadenopathy (reactive to c diff vs lymphoma?).   IR stated no window for bx.   Course also complicated by a L brachial vein DVT and superficial DVT in arms.   positive PPD but has possibly gotten the BCG vaccine.   Quant gold performed at [place] still pending.   Admitted to [facility]U for further management.       PAST MEDICAL & SURGICAL HISTORY:  Obstructive Sleep Apnea      Hepatitis B Carrier      Pneumonia      Other systemic lupus erythematosus with endocarditis      Type 2 diabetes mellitus with other neurologic complication, without long-term current use of insulin      Sleep apnea, unspecified type      Hypertension, unspecified type      Nephritic syndrome      No significant past surgical history          Allergies    No Known Allergies    Intolerances        ANTIMICROBIALS:  caspofungin IVPB 50 every 24 hours  cefepime   IVPB    cefepime   IVPB 1000 every 8 hours      OTHER MEDS:  albuterol/ipratropium for Nebulization 3 milliLiter(s) Nebulizer every 6 hours  chlorhexidine 4% Liquid 1 Application(s) Topical <User Schedule>  chlorhexidine 4% Liquid 1 Application(s) Topical <User Schedule>  dextrose 5%. 1000 milliLiter(s) IV Continuous <Continuous>  dextrose 5%. 1000 milliLiter(s) IV Continuous <Continuous>  dextrose 50% Injectable 25 Gram(s) IV Push once  dextrose 50% Injectable 12.5 Gram(s) IV Push once  dextrose 50% Injectable 25 Gram(s) IV Push once  dextrose Oral Gel 15 Gram(s) Oral once PRN  gabapentin 300 milliGRAM(s) Oral daily  glucagon  Injectable 1 milliGRAM(s) IntraMuscular once  heparin  Infusion.  Unit(s)/Hr IV Continuous <Continuous>  insulin lispro (ADMELOG) corrective regimen sliding scale   SubCutaneous every 6 hours  lidocaine   4% Patch 1 Patch Transdermal daily  lidocaine 2% Jelly 10 milliLiter(s) IntraUrethral once  sodium chloride 0.9% lock flush 10 milliLiter(s) IV Push every 1 hour PRN  tamsulosin 0.4 milliGRAM(s) Oral at bedtime      SOCIAL HISTORY:    FAMILY HISTORY:  FH: type 2 diabetes        ROS:  Unobtainable because:   All other systems negative   Constitutional: no fever, no chills  Eye: no vision changes  ENT: no sore throat, no rhinorrhea  Cardiovascular: no chest pain, no palpitation  Respiratory: no SOB, no cough  GI:  no abd pain, no vomiting, no diarrhea  urinary: no dysuria, no hematuria, no flank pain  musculoskeletal: no joint pain, no joint swelling  skin: no rash  neurology: no headache, no change in mental status  psych: no anxiety    Physical Exam:  General: awake, alert, non toxic  Head: atraumatic, normocephalic  Eyes: normal sclera and conjunctiva  ENT: no oropharyngeal lesions, no LAD, neck supple  Cardio: regular rate and rhythm   Respiratory: nonlabored on room air, clear bilaterally, no wheezing  abd: soft, bowel sounds present, not tender  : no suprapubic tenderness, no canales  Musculoskeletal: no joint swelling, no edema  Skin: no rash  vascular: no phlebitis  Neurologic: no focal deficits  psych: normal affect       Drug Dosing Weight  Height (cm): 162.6 (30 May 2022 02:30)  Weight (kg): 90.6 (30 May 2022 02:30)  BMI (kg/m2): 34.3 (30 May 2022 02:30)  BSA (m2): 1.96 (30 May 2022 02:30)    Vital Signs Last 24 Hrs  T(F): 100.9 (22 @ 15:00), Max: 102 (22 @ 06:00)    Vital Signs Last 24 Hrs  HR: 115 (22 @ 15:00) (104 - 124)  BP: 115/65 (22 @ 15:00) (97/55 - 156/101)  RR: 22 (22 @ 15:00)  SpO2: 97% (22 @ 15:00) (96% - 100%)  Wt(kg): --                          8.9    30.01 )-----------( 93       ( 30 May 2022 10:58 )             28.6           134<L>  |  101  |  27<H>  ----------------------------<  86  3.9   |  21<L>  |  1.17    Ca    8.2<L>      30 May 2022 10:58  Phos  4.0       Mg     1.9         TPro  6.3  /  Alb  1.8<L>  /  TBili  1.3<H>  /  DBili  x   /  AST  37  /  ALT  20  /  AlkPhos  490<H>        Urinalysis Basic - ( 30 May 2022 03:44 )    Color: Yellow / Appearance: Clear / S.016 / pH: x  Gluc: x / Ketone: Negative  / Bili: Negative / Urobili: Negative   Blood: x / Protein: 100 / Nitrite: Negative   Leuk Esterase: Negative / RBC: 8 /hpf / WBC 3 /HPF   Sq Epi: x / Non Sq Epi: 1 /hpf / Bacteria: Negative        MICROBIOLOGY:  Vancomycin Level, Random: 28.4 ug/mL (22 @ 03:46)  v    CMVPCR Lo.70 Tmr30MX/mL ( @ 04:25)  Rapid RVP Result: NotDetec ( @ 04:14)    Clostridium difficile GDH Toxins A&amp;B, EIA:   Negative (22 @ 11:09)  Clostridium difficile GDH Interpretation: Negative for toxigenic C. Difficile.  This specimen is negative for C.  Difficile glutamate dehydrogenase (GDH) antigen and negative for C.  Difficile Toxins A & B, by EIA.  GDH is a highly sensitive screening  marker for C. Difficile that is produced in large amounts by all C.  Difficile strains, both toxigenic and nontoxigenic.  This assay has not  been validated as a test of cure.  Repeat testing during the same episode  of diarrhea is of limited value and is discouraged.  The results of this  assay should always be interpreted in conjunction with pateint's clinical  history. (22 @ 11:09)      RADIOLOGY:  Images below reviewed personally HPI:   66M DM, Obesity, Lupus nephritis.   Hospitalized in his home country [facility] for diarrhea in April.   Reportedly tested positive for C diff but was nonresponsive to PO Vancomycin.   Also had elevated lipase in the thousands for unclear reasons.   Returned to the [place], was hospitalized at [facility] for continued diarrhea.   While there developed recurring fevers and extensive adenopathy with marked leukocytosis.   Due to the amount of diarrhea, he developed a gluteal boil that became an abscess. Aside from this, no clear infection.   Per HPI, multiple courses of antibiotics had no effect: Vanc, Cefepime (-5/10), Meropenem (-) and Micafungin. Zosyn added .  Bone marrow biopsy was done at [facility], results pending.   Course complicated by a L brachial DVT.   Transferred to [facility]  for further care.   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 Has a cough.   Son and brother at bedside.     PAST MEDICAL & SURGICAL HISTORY:  Obstructive Sleep Apnea      Hepatitis B Carrier      Pneumonia      Other systemic lupus erythematosus with endocarditis      Type 2 diabetes mellitus with other neurologic complication, without long-term current use of insulin      Sleep apnea, unspecified type      Hypertension, unspecified type      Nephritic syndrome      No significant past surgical history          Allergies    No Known Allergies    Intolerances        ANTIMICROBIALS:  caspofungin IVPB 50 every 24 hours  cefepime   IVPB    cefepime   IVPB 1000 every 8 hours      OTHER MEDS:  albuterol/ipratropium for Nebulization 3 milliLiter(s) Nebulizer every 6 hours  chlorhexidine 4% Liquid 1 Application(s) Topical <User Schedule>  chlorhexidine 4% Liquid 1 Application(s) Topical <User Schedule>  dextrose 5%. 1000 milliLiter(s) IV Continuous <Continuous>  dextrose 5%. 1000 milliLiter(s) IV Continuous <Continuous>  dextrose 50% Injectable 25 Gram(s) IV Push once  dextrose 50% Injectable 12.5 Gram(s) IV Push once  dextrose 50% Injectable 25 Gram(s) IV Push once  dextrose Oral Gel 15 Gram(s) Oral once PRN  gabapentin 300 milliGRAM(s) Oral daily  glucagon  Injectable 1 milliGRAM(s) IntraMuscular once  heparin  Infusion.  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     FAMILY HISTORY:  FH: type 2 diabetes        ROS:  All other systems negative   Constitutional: fever, chills, malaise   Eye: no vision changes  ENT: no sore throat, no rhinorrhea  Cardiovascular: no chest pain, no palpitation  Respiratory: intermittent cough, causes him to wheeze   GI:  abd distension. diarrhea  urinary: canales is uncomfortable   musculoskeletal: no joint pain  skin: no rash  neurology: no headache  psych: no anxiety    Physical Exam:  General: awake, alert, non toxic, deconditioned   Head: atraumatic, normocephalic  Eyes: normal sclera and conjunctiva  ENT: no gross oropharyngeal lesions, no LAD, neck supple  Cardio: tachycardic   Respiratory: nonlabored on nasal cannula, grossly clear bilaterally, no wheezing  abd: very distended, soft, bowel sounds present, not tender  : canales  Musculoskeletal: anasarca   Skin: left gluteal fold open ulcer/wound about few cm in length, no pus, not inflamed, nontender, clean base and edges  vascular: no phlebitis  Neurologic: no focal deficits  psych: normal affect       Drug Dosing Weight  Height (cm): 162.6 (30 May 2022 02:30)  Weight (kg): 90.6 (30 May 2022 02:30)  BMI (kg/m2): 34.3 (30 May 2022 02:30)  BSA (m2): 1.96 (30 May 2022 02:30)    Vital Signs Last 24 Hrs  T(F): 100.9 (22 @ 15:00), Max: 102 (22 @ 06:00)    Vital Signs Last 24 Hrs  HR: 115 (22 @ 15:00) (104 - 124)  BP: 115/65 (22 @ 15:00) (97/55 - 156/101)  RR: 22 (22 @ 15:00)  SpO2: 97% (22 @ 15:00) (96% - 100%)  Wt(kg): --                          8.9    30.01 )-----------( 93       ( 30 May 2022 10:58 )             28.6           134<L>  |  101  |  27<H>  ----------------------------<  86  3.9   |  21<L>  |  1.17    Ca    8.2<L>      30 May 2022 10:58  Phos  4.0       Mg     1.9         TPro  6.3  /  Alb  1.8<L>  /  TBili  1.3<H>  /  DBili  x   /  AST  37  /  ALT  20  /  AlkPhos  490<H>        Urinalysis Basic - ( 30 May 2022 03:44 )    Color: Yellow / Appearance: Clear / S.016 / pH: x  Gluc: x / Ketone: Negative  / Bili: Negative / Urobili: Negative   Blood: x / Protein: 100 / Nitrite: Negative   Leuk Esterase: Negative / RBC: 8 /hpf / WBC 3 /HPF   Sq Epi: x / Non Sq Epi: 1 /hpf / Bacteria: Negative        MICROBIOLOGY:  Vancomycin Level, Random: 28.4 ug/mL (22 @ 03:46)    CMVPCR Lo.70 Sbr02UF/mL ( @ 04:25)    Rapid RVP Result: NotDetec ( @ 04:14)    Clostridium difficile GDH Toxins A&amp;B, EIA:   Negative (22 @ 11:09)  Clostridium difficile GDH Interpretation: Negative for toxigenic C. Difficile.  This specimen is negative for C.  Difficile glutamate dehydrogenase (GDH) antigen and negative for C.  Difficile Toxins A & B, by EIA.  GDH is a highly sensitive screening  marker for C. Difficile that is produced in large amounts by all C.  Difficile strains, both toxigenic and nontoxigenic.  This assay has not  been validated as a test of cure.  Repeat testing during the same episode  of diarrhea is of limited value and is discouraged.  The results of this  assay should always be interpreted in conjunction with pateint's clinical  history. (22 @ 11:09)    RADIOLOGY:  Images below reviewed personally  CT Neck Soft Tissue w/ IV Cont (22 @ 09:03)   Right level 1 lymph nodeand left level 5 lymph node mass   suspicious for neoplasm, new since 3/8/2018.    CT Chest Abdomen and Pelvis w/ IV Cont (22 @ 08:59)   1.  Questionable filling defect in RIGHT lower lobe medial pulmonary   artery branches may be artifactual. If there is a question regarding   possible pulmonary embolus. Dedicated CTPA is recommended.  2.  Bilateral lower lobe atelectasis with bilateral pleural effusions.  3.  Enlarged spleen with splenic infarct.  4.  Markedly enlarged mesenteric lymph nodes/lymphadenopathy out of   proportion of retroperitoneal lymphadenopathy. Although lymphoma/leukemia   can have this appearance, consider possibility of gastrointestinal   tuberculosis as well as other infectious etiologies.  5.  Hepatosplenomegaly with portal hypertension and possible underlying   cirrhosis.  6.  Moderate ascites.  7.  Small bilateral pleural effusions.  8.  Anasarca.

## 2022-05-30 NOTE — PATIENT PROFILE ADULT - FALL HARM RISK - RISK INTERVENTIONS
Other:/Bed in lowest position, wheels locked, appropriate side rails in place/Call bell, personal items and telephone in reach/Instruct patient to call for assistance before getting out of bed or chair/Non-slip footwear when patient is out of bed/[place] to call system/Physically safe environment - no spills, clutter or unnecessary equipment/Purposeful Proactive Rounding

## 2022-05-31 NOTE — DIETITIAN INITIAL EVALUATION ADULT - ORAL INTAKE PTA/DIET HISTORY
Unable to assess diet history PTA at this time. Per chart, pt received PPN at OSH since 5/15.  Per S&S Evaluation (5/30) pt "denies history of difficulty chewing/ swallowing, though does report poor appetite/ PO intake at OSH PTA secondary to dislike of hospital food; pt expressed preference for food from home."  Allergies: NKFA

## 2022-05-31 NOTE — CONSULT NOTE ADULT - SUBJECTIVE AND OBJECTIVE BOX
CC: Patient is a 66y old  Male who presents with a chief complaint of recurrent fevers, unknown source    HPI:  66M with a PMH HTN, HLD, SLE on Cellcept (MMF), class V lupus nephritis, CKD stage 2, DM2, diabetic neuropathy, TIA (2019) on plavix, BPH, HUNG, hospitalization in [facility] 3/27- tx for pancreatitis and possible C diff.  He was recently hospitalized at [place] - for acute pancreatitis and C diff colitis and another hospitalization  at [place] for septic shock (source buttock abscess) treated with vanc, cefepime (-5/10), meropenem (-) and micafungin as well as Incision and drainage. Zosyn added . Course complicated by recurrent fevers despite being on antibiotics, and leukocytosis up to 30k. Blood cx neg, UA unremarkable. MRI abdomen w/ contrast performed showing extensive abd lymphadenopathy (reactive to c diff vs lymphoma?).  He was transfered to MICU at [facility] from [facility] for further management as well as IR eval for lymph node bx.  Currently infectious w/u has been negative however further work up is still pending.  Heme/Onc on board for malignancy w/u.  He has had recurring fevers as well as persistent leukocytosis and wound to RIGHT buttock after his I&D at [facility] and Surgery consulted to r/o necrotizing soft tissue infection.  Patient denies buttock pain, admits to fevers/chills.      PMH  Obstructive Sleep Apnea    Hepatitis B Carrier    Pneumonia    Other systemic lupus erythematosus with endocarditis    Type 2 diabetes mellitus with other neurologic complication, without long-term current use of insulin    Sleep apnea, unspecified type    Hypertension, unspecified type    Nephritic syndrome      PSH  No significant past surgical history      MEDS    Allergies    No Known Allergies    Intolerances        Social        Physical Exam  T(C): 25 (22 @ 14:00), Max: 39.7 (22 @ 22:00)  HR: 116 (22 @ 14:00) (104 - 157)  BP: 154/80 (22 @ 14:00) (104/71 - 154/80)  RR: 22 (22 @ 14:00) (14 - 29)  SpO2: 98% (22 @ 14:00) (91% - 100%)  Wt(kg): --  Tmax: T(C): , Max: 39.7 (22 @ 22:00)  Wt(kg): --    Gen: NAD, resting in bed  CV: Tachycardic, BP WNL off pressors  Pulm: B/L chest rise, satting well on rm air  Abd: Soft, ND, NT  Buttock: Approx 3cm linear open incision present to posterior RIGHT buttock, no purulent drainage, no surrounding erythema or crepitus.  Area is not tender.  No tracking identified.      22  -  22  --------------------------------------------------------  IN:    Albumin 25%  -  50 mL: 100 mL    Heparin Infusion: 228 mL    Heparin Infusion: 136 mL    IV PiggyBack: 400 mL    Oral Fluid: 660 mL  Total IN: 1524 mL    OUT:    Incontinent per Condom Catheter (mL): 725 mL    Indwelling Catheter - Urethral (mL): 805 mL  Total OUT: 1530 mL    Total NET: -6 mL      22  -  22  --------------------------------------------------------  IN:    Albumin 25%  -  50 mL: 50 mL    Heparin Infusion: 57 mL    Oral Fluid: 480 mL  Total IN: 587 mL    OUT:    Incontinent per Condom Catheter (mL): 425 mL  Total OUT: 425 mL    Total NET: 162 mL          Labs:                        8.4    31.71 )-----------( 109      ( 31 May 2022 00:32 )             26.5         136  |  102  |  23  ----------------------------<  90  3.7   |  21<L>  |  1.18    Ca    8.2<L>      31 May 2022 13:50  Phos  4.1       Mg     1.9         TPro  6.2  /  Alb  2.4<L>  /  TBili  1.3<H>  /  DBili  x   /  AST  24  /  ALT  15  /  AlkPhos  485<H>      PT/INR - ( 31 May 2022 01:55 )   PT: 16.7 sec;   INR: 1.45 ratio         PTT - ( 31 May 2022 13:50 )  PTT:30.5 sec  Urinalysis Basic - ( 30 May 2022 03:44 )    Color: Yellow / Appearance: Clear / S.016 / pH: x  Gluc: x / Ketone: Negative  / Bili: Negative / Urobili: Negative   Blood: x / Protein: 100 / Nitrite: Negative   Leuk Esterase: Negative / RBC: 8 /hpf / WBC 3 /HPF   Sq Epi: x / Non Sq Epi: 1 /hpf / Bacteria: Negative      ABG - ( 31 May 2022 00:00 )  pH, Arterial: 7.47  pH, Blood: x     /  pCO2: 33    /  pO2: 91    / HCO3: 24    / Base Excess: 0.6   /  SaO2: 98.2        Imaging:  < from: CT Abdomen and Pelvis w/ IV Cont (22 @ 08:59) >  FINDINGS:  CHEST:  LUNGS AND LARGE AIRWAYS: Patent central airways. Bilateral lower lobe   dependent atelectasis.  PLEURA: Small bilateral pleural effusions with adjacent passive   atelectasis.  VESSELS: Right IJ central venous catheter terminates in the SVC.   Examination not tailored for detection of pulmonary emboli. Questionable   low density focus within the medial RIGHT lower lobe pulmonary artery   blank branches may be artifactual. Aortic calcifications.  HEART: Heart size is normal. Trace pericardial effusion. Coronary artery   calcifications.  MEDIASTINUM AND DAVID: No lymphadenopathy.  CHEST WALL AND LOWER NECK: Mild bilateral gynecomastia.    ABDOMEN AND PELVIS:  LIVER: Hypertrophied LEFT lobe of the liver. Question nodular contour   representing cirrhosis.  BILE DUCTS: Normal caliber.  GALLBLADDER: Sludge and/or small stones dependently.  SPLEEN: Large geographic wedge-shaped area of hypoattenuation in lower   pole, compatible with splenic infarct. Additional upper pole hypodensity  PANCREAS: Within normal limits. No peripancreatic collection.  ADRENALS: Within normal limits.  KIDNEYS/URETERS: Symmetric enhancement. No hydronephrosis. Bilateral   cortical atrophy. Right hepatic cyst and additional bilateral   hypodensities too small to characterize.    BLADDER: Completely collapsed. Thick wall. Jackson catheter in place.  REPRODUCTIVE ORGANS: Prostate within normal limits.    BOWEL: No bowel obstruction. Mild thickening of the colon may represent   residual colitis. Diverticulosis without acute diverticulitis.  PERITONEUM: Moderate ascites  VESSELS: Enlarged portal vein consistent with portal hypertension.   Numerous varices. Enlarged splenic vein.  RETROPERITONEUM/LYMPH NODES: Extensive enlarged mesenteric   lymphadenopathy. Multiple borderline-enlarged retroperitoneal lymph nodes   which are abnormal in number.  ABDOMINAL WALL: Anasarca. Large bilateral fat and fluid containing   inguinal hernias.  BONES: Degenerative changes of the spine.    IMPRESSION:  1.  Questionable filling defect in RIGHT lower lobe medial pulmonary   artery branches may be artifactual. If there is a question regarding   possible pulmonary embolus. Dedicated CTPA is recommended.  2.  Bilateral lower lobe atelectasis with bilateral pleural effusions.  3.  Enlarged spleen with splenic infarct.  4.  Markedly enlarged mesenteric lymph nodes/lymphadenopathy out of   proportion of retroperitoneal lymphadenopathy. Although lymphoma/leukemia   can have this appearance, consider possibility of gastrointestinal   tuberculosis as well as other infectious etiologies.  5.  Hepatosplenomegaly with portal hypertension and possible underlying   cirrhosis.  6.  Moderate ascites.  7.  Small bilateral pleural effusions.  8.  Anasarca.       CC: Patient is a 66y old  Male who presents with a chief complaint of recurrent fevers, unknown source    HPI:  66M with a PMH HTN, HLD, SLE on Cellcept (MMF), class V lupus nephritis, CKD stage 2, DM2, diabetic neuropathy, TIA (2019) on plavix, BPH, HUNG, hospitalization in [facility] 3/27- tx for pancreatitis and possible C diff.  He was recently hospitalized at [place] - for acute pancreatitis and C diff colitis and another hospitalization  at [place] for septic shock (source buttock abscess) treated with vanc, cefepime (-5/10), meropenem (-) and micafungin as well as Incision and drainage. Zosyn added . Course complicated by recurrent fevers despite being on antibiotics, and leukocytosis up to 30k. Blood cx neg, UA unremarkable. MRI abdomen w/ contrast performed showing extensive abd lymphadenopathy (reactive to c diff vs lymphoma?).  He was transfered to MICU at [facility] from [facility] for further management as well as IR eval for lymph node bx.  Currently infectious w/u has been negative however further work up is still pending.  Heme/Onc on board for malignancy w/u.  He has had recurring fevers as well as persistent leukocytosis and wound to LEFT buttock after his I&D at [facility] and Surgery consulted to r/o necrotizing soft tissue infection.  Patient denies buttock pain, admits to fevers/chills.      PMH  Obstructive Sleep Apnea    Hepatitis B Carrier    Pneumonia    Other systemic lupus erythematosus with endocarditis    Type 2 diabetes mellitus with other neurologic complication, without long-term current use of insulin    Sleep apnea, unspecified type    Hypertension, unspecified type    Nephritic syndrome      PSH  No significant past surgical history      MEDS    Allergies    No Known Allergies    Intolerances        Social        Physical Exam  T(C): 25 (22 @ 14:00), Max: 39.7 (22 @ 22:00)  HR: 116 (22 @ 14:00) (104 - 157)  BP: 154/80 (22 @ 14:00) (104/71 - 154/80)  RR: 22 (22 @ 14:00) (14 - 29)  SpO2: 98% (22 @ 14:00) (91% - 100%)  Wt(kg): --  Tmax: T(C): , Max: 39.7 (22 @ 22:00)  Wt(kg): --    Gen: NAD, resting in bed  CV: Tachycardic, BP WNL off pressors  Pulm: B/L chest rise, satting well on rm air  Abd: Soft, ND, NT  Buttock: Approx 3cm linear open incision present to posterior LEFT buttock, no purulent drainage, no surrounding erythema or crepitus.  Area is not tender.  No tracking identified.      22  -  22  --------------------------------------------------------  IN:    Albumin 25%  -  50 mL: 100 mL    Heparin Infusion: 228 mL    Heparin Infusion: 136 mL    IV PiggyBack: 400 mL    Oral Fluid: 660 mL  Total IN: 1524 mL    OUT:    Incontinent per Condom Catheter (mL): 725 mL    Indwelling Catheter - Urethral (mL): 805 mL  Total OUT: 1530 mL    Total NET: -6 mL      22  -  22  --------------------------------------------------------  IN:    Albumin 25%  -  50 mL: 50 mL    Heparin Infusion: 57 mL    Oral Fluid: 480 mL  Total IN: 587 mL    OUT:    Incontinent per Condom Catheter (mL): 425 mL  Total OUT: 425 mL    Total NET: 162 mL          Labs:                        8.4    31.71 )-----------( 109      ( 31 May 2022 00:32 )             26.5         136  |  102  |  23  ----------------------------<  90  3.7   |  21<L>  |  1.18    Ca    8.2<L>      31 May 2022 13:50  Phos  4.1       Mg     1.9         TPro  6.2  /  Alb  2.4<L>  /  TBili  1.3<H>  /  DBili  x   /  AST  24  /  ALT  15  /  AlkPhos  485<H>      PT/INR - ( 31 May 2022 01:55 )   PT: 16.7 sec;   INR: 1.45 ratio         PTT - ( 31 May 2022 13:50 )  PTT:30.5 sec  Urinalysis Basic - ( 30 May 2022 03:44 )    Color: Yellow / Appearance: Clear / S.016 / pH: x  Gluc: x / Ketone: Negative  / Bili: Negative / Urobili: Negative   Blood: x / Protein: 100 / Nitrite: Negative   Leuk Esterase: Negative / RBC: 8 /hpf / WBC 3 /HPF   Sq Epi: x / Non Sq Epi: 1 /hpf / Bacteria: Negative      ABG - ( 31 May 2022 00:00 )  pH, Arterial: 7.47  pH, Blood: x     /  pCO2: 33    /  pO2: 91    / HCO3: 24    / Base Excess: 0.6   /  SaO2: 98.2        Imaging:  < from: CT Abdomen and Pelvis w/ IV Cont (22 @ 08:59) >  FINDINGS:  CHEST:  LUNGS AND LARGE AIRWAYS: Patent central airways. Bilateral lower lobe   dependent atelectasis.  PLEURA: Small bilateral pleural effusions with adjacent passive   atelectasis.  VESSELS: Right IJ central venous catheter terminates in the SVC.   Examination not tailored for detection of pulmonary emboli. Questionable   low density focus within the medial RIGHT lower lobe pulmonary artery   blank branches may be artifactual. Aortic calcifications.  HEART: Heart size is normal. Trace pericardial effusion. Coronary artery   calcifications.  MEDIASTINUM AND DAVID: No lymphadenopathy.  CHEST WALL AND LOWER NECK: Mild bilateral gynecomastia.    ABDOMEN AND PELVIS:  LIVER: Hypertrophied LEFT lobe of the liver. Question nodular contour   representing cirrhosis.  BILE DUCTS: Normal caliber.  GALLBLADDER: Sludge and/or small stones dependently.  SPLEEN: Large geographic wedge-shaped area of hypoattenuation in lower   pole, compatible with splenic infarct. Additional upper pole hypodensity  PANCREAS: Within normal limits. No peripancreatic collection.  ADRENALS: Within normal limits.  KIDNEYS/URETERS: Symmetric enhancement. No hydronephrosis. Bilateral   cortical atrophy. Right hepatic cyst and additional bilateral   hypodensities too small to characterize.    BLADDER: Completely collapsed. Thick wall. Jackson catheter in place.  REPRODUCTIVE ORGANS: Prostate within normal limits.    BOWEL: No bowel obstruction. Mild thickening of the colon may represent   residual colitis. Diverticulosis without acute diverticulitis.  PERITONEUM: Moderate ascites  VESSELS: Enlarged portal vein consistent with portal hypertension.   Numerous varices. Enlarged splenic vein.  RETROPERITONEUM/LYMPH NODES: Extensive enlarged mesenteric   lymphadenopathy. Multiple borderline-enlarged retroperitoneal lymph nodes   which are abnormal in number.  ABDOMINAL WALL: Anasarca. Large bilateral fat and fluid containing   inguinal hernias.  BONES: Degenerative changes of the spine.    IMPRESSION:  1.  Questionable filling defect in RIGHT lower lobe medial pulmonary   artery branches may be artifactual. If there is a question regarding   possible pulmonary embolus. Dedicated CTPA is recommended.  2.  Bilateral lower lobe atelectasis with bilateral pleural effusions.  3.  Enlarged spleen with splenic infarct.  4.  Markedly enlarged mesenteric lymph nodes/lymphadenopathy out of   proportion of retroperitoneal lymphadenopathy. Although lymphoma/leukemia   can have this appearance, consider possibility of gastrointestinal   tuberculosis as well as other infectious etiologies.  5.  Hepatosplenomegaly with portal hypertension and possible underlying   cirrhosis.  6.  Moderate ascites.  7.  Small bilateral pleural effusions.  8.  Anasarca.

## 2022-05-31 NOTE — DIETITIAN INITIAL EVALUATION ADULT - REASON INDICATOR FOR ASSESSMENT
Nutrition Consult for Nutrition Support Team/TPN received and appreciated.   Information obtained from: medical record, communication with team.   Per chart, pt was on PPN at OSH since 5/15/22 in setting of poor intake; no current plan to resume PN in-house in setting of diet advancement and recurrent fevers.  Nutrition Consult for Nutrition Support Team/TPN received and appreciated.   Information obtained from: medical record, communication with team. Pt asleep at time of visit.  Per chart, pt was on PPN at OSH since 5/15/22 in setting of poor intake; no current plan to resume PN in-house in setting of diet advancement and recurrent fevers.

## 2022-05-31 NOTE — DIETITIAN INITIAL EVALUATION ADULT - PERTINENT MEDS FT
MEDICATIONS  (STANDING):  albumin human 25% IVPB 50 milliLiter(s) IV Intermittent every 6 hours  albuterol/ipratropium for Nebulization 3 milliLiter(s) Nebulizer every 6 hours  chlorhexidine 4% Liquid 1 Application(s) Topical <User Schedule>  chlorhexidine 4% Liquid 1 Application(s) Topical <User Schedule>  dextrose 5%. 1000 milliLiter(s) (100 mL/Hr) IV Continuous <Continuous>  dextrose 5%. 1000 milliLiter(s) (50 mL/Hr) IV Continuous <Continuous>  dextrose 50% Injectable 25 Gram(s) IV Push once  dextrose 50% Injectable 12.5 Gram(s) IV Push once  dextrose 50% Injectable 25 Gram(s) IV Push once  folic acid Injectable 1 milliGRAM(s) IV Push daily  gabapentin 300 milliGRAM(s) Oral daily  glucagon  Injectable 1 milliGRAM(s) IntraMuscular once  heparin  Infusion. 1900 Unit(s)/Hr (19 mL/Hr) IV Continuous <Continuous>  insulin lispro (ADMELOG) corrective regimen sliding scale   SubCutaneous every 6 hours  lidocaine   4% Patch 1 Patch Transdermal daily  multivitamin/minerals 1 Tablet(s) Oral daily  tamsulosin 0.4 milliGRAM(s) Oral at bedtime    MEDICATIONS  (PRN):  dextrose Oral Gel 15 Gram(s) Oral once PRN Blood Glucose LESS THAN 70 milliGRAM(s)/deciliter  sodium chloride 0.9% lock flush 10 milliLiter(s) IV Push every 1 hour PRN Pre/post blood products, medications, blood draw, and to maintain line patency

## 2022-05-31 NOTE — CONSULT NOTE ADULT - ASSESSMENT
65 yo M with a PMH HTN, HLD, SLE on Cellcept (MMF), class V lupus nephritis, CKD stage 2, DM2, diabetic neuropathy, TIA (2019) on plavix, BPH, HUNG, hospitalization in [facility] 3/27-4/20 tx for pancreatitis and C diff?, recent hospitalization at [place] 4/22-4/26 for acute pancreatitis and C diff colitis and another hospitalization 5/5 at [place] for septic shock (source buttock abscess) treated with vanc, cefepime (5/5-5/10), meropenem (5/11-5/18) and micafungin. Zosyn added 5/26. Course complicated by recurrent fevers despite being on antibiotics, and leukocytosis up to 30k. Blood cx neg, UA unremarkable. MRI abdomen w/ contrast performed showing extensive abd lymphadenopathy (reactive to c diff vs lymphoma?). IR stated no window for bx. Course also complicated by a L brachial vein DVT and superficial DVT in arms. Patient noted to have had a positive PPD but has possibly gotten the BCG vaccine. Quant gold performed at [place] still pending.     Admitted to [facility] MICU for further management. Vital signs on arrival: temp 38.6, /77, , RR 28, O2 sat 97% on 2L NC.  Labs: WBC 34.53, Hb 9.6, lipase 740, procal 2.02, tBili 1.4, alk phos 498, lactate 2.4. CT obtain with cirrhotic appearing liver w/ portal HTN and varices, colitis, and ascites   Hepatology called for further recs.    #Cirrhotic morphology  #Portal HTN  #Ascites  #Varices on CT scan  Patient reporting never having any previous liver issues. Used to drink but last drink many years ago. Does reported having Hep B in the past, Hep B S ag and Hep B C IgM negative. Hep C negative. EBV negative    #Chronic diarrhea  #Hx of C diff  Stool PCR and C diff negative.     #Lymphadenopathy  Pending IR biopsy on 6/2    Recommendations:  -Obtain Strongyloides ab  - 67 yo M with a PMH HTN, HLD, SLE on Cellcept (MMF), class V lupus nephritis, CKD stage 2, DM2, diabetic neuropathy, TIA (2019) on plavix, BPH, HUNG, hospitalization in [facility] 3/27-4/20 tx for pancreatitis and C diff?, recent hospitalization at [place] 4/22-4/26 for acute pancreatitis and C diff colitis and another hospitalization 5/5 at [place] for septic shock (source buttock abscess) treated with vanc, cefepime (5/5-5/10), meropenem (5/11-5/18) and micafungin. Zosyn added 5/26. Course complicated by recurrent fevers despite being on antibiotics, and leukocytosis up to 30k. Blood cx neg, UA unremarkable. MRI abdomen w/ contrast performed showing extensive abd lymphadenopathy (reactive to c diff vs lymphoma?). IR stated no window for bx. Course also complicated by a L brachial vein DVT and superficial DVT in arms. Patient noted to have had a positive PPD but has possibly gotten the BCG vaccine. Quant gold performed at [place] still pending.     Admitted to [facility] MICU for further management. Vital signs on arrival: temp 38.6, /77, , RR 28, O2 sat 97% on 2L NC.  Labs: WBC 34.53, Hb 9.6, lipase 740, procal 2.02, tBili 1.4, alk phos 498, lactate 2.4. CT obtain with cirrhotic appearing liver w/ portal HTN and varices, colitis, and ascites   Hepatology called for further recs.    #Cirrhotic morphology  #Portal HTN  #Ascites  #Varices on CT scan  Patient reporting never having any previous liver issues. Used to drink but last drink many years ago. Does reported having Hep B in the past, Hep B S ag and Hep B C IgM negative. Hep C negative. EBV negative. Currently being rule out for Tb and Lymphoma, both which could present as noted above. Alternative dx includes IgG4 disease.     #Chronic diarrhea  #Hx of C diff  Stool PCR and C diff negative.     #Lymphadenopathy  Pending IR biopsy on 6/2    Recommendations:  -Agree with lymph node biopsy  -Obtain ED, anti-smooth,  IgG level and subtype (IgG4)  -Obtain diagnostic paracentesis (glucose, LDH, Cytology, albumin, protein, and cell diff)  -Consider imodium + fiber for diarrhea given infectious work up negative  -IV abx per primary team    Hepatology will continue to follow    Recommendations preliminary until signed by attending.     Rojas Ratliff MD  Gastroenterology/Hepatology Fellow  1st option: [phone] (text or call), ONLY available from 7:00 am to 5:00 pm.   **Contact on-call GI fellow via answering service ([phone]) from 5pm-7am AND on weekends/holidays**  2nd option: Available via [employer] Teams  3rd option: Pager: [phone]

## 2022-05-31 NOTE — DIETITIAN INITIAL EVALUATION ADULT - REASON FOR ADMISSION
Infectious disease    Per chart: "65 yo M with a PMH HTN, HLD, SLE on Cellcept (MMF), class V lupus nephritis, CKD stage 2, DM2, diabetic neuropathy, TIA (2019) on plavix, BPH, HUNG, hospitalization in [facility] 3/27-4/20 tx for pancreatitis and C diff?, recent hospitalization at [place] 4/22-4/26 for acute pancreatitis and C diff colitis and another hospitalization 5/5 at [place] for septic shock (source buttock abscess) treated with vanc, cefepime (5/5-5/10), meropenem (5/11-5/18) and micafungin. Zosyn added 5/26 course complicated by recurrent fever now with suspected R PE and imaging with peritoneal lymphadenopathy lymphoma versus TB pending IR biopsy monitoring off abx."

## 2022-05-31 NOTE — CONSULT NOTE ADULT - SUBJECTIVE AND OBJECTIVE BOX
Reason for consult:    HPI:  65 yo M with a PMH HTN, HLD, SLE on Cellcept (MMF), class V lupus nephritis, CKD stage 2, DM2, diabetic neuropathy, TIA (2019) on plavix, BPH, HUNG, hospitalization in [facility] 3/27-4/20 tx for pancreatitis and C diff?, recent hospitalization at [place] 4/22-4/26 for acute pancreatitis and C diff colitis and another hospitalization 5/5 at [place] for septic shock (source buttock abscess) treated with vanc, cefepime (5/5-5/10), meropenem (5/11-5/18) and micafungin. Zosyn added 5/26. Course complicated by recurrent fevers despite being on antibiotics, and leukocytosis up to 30k. Blood cx neg, UA unremarkable. MRI abdomen w/ contrast performed showing extensive abd lymphadenopathy (reactive to c diff vs lymphoma?). IR stated no window for bx. Course also complicated by a L brachial vein DVT and superficial DVT in arms. Patient noted to have had a positive PPD but has possibly gotten the BCG vaccine. Quant gold performed at [place] still pending.     Admitted to [facility] MICU for further management. Vital signs on arrival: temp 38.6, /77, , RR 28, O2 sat 97% on 2L NC.  Labs: WBC 34.53, Hb 9.6, lipase 740, procal 2.02, tBili 1.4, alk phos 498, lactate 2.4. (30 May 2022 04:01)    Hematology/Oncology consulted d/t patient's history of anemia. [facility].      PAST MEDICAL & SURGICAL HISTORY:  Obstructive Sleep Apnea      Hepatitis B Carrier      Pneumonia      Other systemic lupus erythematosus with endocarditis      Type 2 diabetes mellitus with other neurologic complication, without long-term current use of insulin      Sleep apnea, unspecified type      Hypertension, unspecified type      Nephritic syndrome      No significant past surgical history          FAMILY HISTORY:  FH: type 2 diabetes        Alochol: Denied  Smoking: Nonsmoker  Drug Use: Denied  Marital Status:         Allergies    No Known Allergies    Intolerances        MEDICATIONS  (STANDING):  albumin human 25% IVPB 50 milliLiter(s) IV Intermittent every 6 hours  albuterol/ipratropium for Nebulization 3 milliLiter(s) Nebulizer every 6 hours  chlorhexidine 4% Liquid 1 Application(s) Topical <User Schedule>  chlorhexidine 4% Liquid 1 Application(s) Topical <User Schedule>  dextrose 5%. 1000 milliLiter(s) (100 mL/Hr) IV Continuous <Continuous>  dextrose 5%. 1000 milliLiter(s) (50 mL/Hr) IV Continuous <Continuous>  dextrose 50% Injectable 25 Gram(s) IV Push once  dextrose 50% Injectable 12.5 Gram(s) IV Push once  dextrose 50% Injectable 25 Gram(s) IV Push once  folic acid Injectable 1 milliGRAM(s) IV Push daily  gabapentin 300 milliGRAM(s) Oral daily  glucagon  Injectable 1 milliGRAM(s) IntraMuscular once  heparin  Infusion. 1900 Unit(s)/Hr (19 mL/Hr) IV Continuous <Continuous>  insulin lispro (ADMELOG) corrective regimen sliding scale   SubCutaneous every 6 hours  lidocaine   4% Patch 1 Patch Transdermal daily  multivitamin/minerals 1 Tablet(s) Oral daily  tamsulosin 0.4 milliGRAM(s) Oral at bedtime    MEDICATIONS  (PRN):  dextrose Oral Gel 15 Gram(s) Oral once PRN Blood Glucose LESS THAN 70 milliGRAM(s)/deciliter  sodium chloride 0.9% lock flush 10 milliLiter(s) IV Push every 1 hour PRN Pre/post blood products, medications, blood draw, and to maintain line patency      ROS  No fever, sweats, chills  No epistaxis, HA, sore throat  No CP, SOB, cough, sputum  No n/v/d, abd pain, melena, hematochezia  No edema  No rash  No anxiety  No back pain, joint pain  No bleeding, bruising  No dysuria, hematuria    T(C): 38.4 (05-31-22 @ 09:00), Max: 39.7 (05-30-22 @ 22:00)  HR: 108 (05-31-22 @ 09:00) (104 - 123)  BP: 137/77 (05-31-22 @ 09:00) (104/71 - 144/67)  RR: 14 (05-31-22 @ 09:00) (14 - 25)  SpO2: 100% (05-31-22 @ 09:00) (93% - 100%)  Wt(kg): --    PE  NAD  Awake, alert  Anicteric, MMM  RRR  CTAB  Abd soft, NT, ND  No c/c/e  No rash grossly  FROM                          8.4    31.71 )-----------( 109      ( 31 May 2022 00:32 )             26.5       05-31    136  |  103  |  26<H>  ----------------------------<  88  4.1   |  20<L>  |  1.19    Ca    8.2<L>      31 May 2022 00:32  Phos  4.2     05-31  Mg     1.9     05-31    TPro  6.2  /  Alb  1.9<L>  /  TBili  1.1  /  DBili  x   /  AST  38  /  ALT  18  /  AlkPhos  528<H>  05-31   Reason for consult:    HPI:  65 yo M with a PMH HTN, HLD, SLE on Cellcept (MMF), class V lupus nephritis, CKD stage 2, DM2, diabetic neuropathy, TIA (2019) on plavix, BPH, HUNG, hospitalization in [facility] 3/27-4/20 tx for pancreatitis and C diff?, recent hospitalization at [place] 4/22-4/26 for acute pancreatitis and C diff colitis and another hospitalization 5/5 at [place] for septic shock (source buttock abscess) treated with vanc, cefepime (5/5-5/10), meropenem (5/11-5/18) and micafungin. Zosyn added 5/26. Course complicated by recurrent fevers despite being on antibiotics, and leukocytosis up to 30k. Blood cx neg, UA unremarkable. MRI abdomen w/ contrast performed showing extensive abd lymphadenopathy (reactive to c diff vs lymphoma?). IR stated no window for bx. Course also complicated by a L brachial vein DVT and superficial DVT in arms. Patient noted to have had a positive PPD but has possibly gotten the BCG vaccine. Quant gold performed at [place] still pending.     Admitted to [facility] MICU for further management. Vital signs on arrival: temp 38.6, /77, , RR 28, O2 sat 97% on 2L NC.  Labs: WBC 34.53, Hb 9.6, lipase 740, procal 2.02, tBili 1.4, alk phos 498, lactate 2.4. (30 May 2022 04:01)    Hematology/Oncology consulted d/t patient's history of anemia. [facility]      PAST MEDICAL & SURGICAL HISTORY:  Obstructive Sleep Apnea      Hepatitis B Carrier      Pneumonia      Other systemic lupus erythematosus with endocarditis      Type 2 diabetes mellitus with other neurologic complication, without long-term current use of insulin      Sleep apnea, unspecified type      Hypertension, unspecified type      Nephritic syndrome      No significant past surgical history          FAMILY HISTORY:  FH: type 2 diabetes        Alochol: Denied  Smoking: Nonsmoker  Drug Use: Denied  Marital Status:         Allergies    No Known Allergies    Intolerances        MEDICATIONS  (STANDING):  albumin human 25% IVPB 50 milliLiter(s) IV Intermittent every 6 hours  albuterol/ipratropium for Nebulization 3 milliLiter(s) Nebulizer every 6 hours  chlorhexidine 4% Liquid 1 Application(s) Topical <User Schedule>  chlorhexidine 4% Liquid 1 Application(s) Topical <User Schedule>  dextrose 5%. 1000 milliLiter(s) (100 mL/Hr) IV Continuous <Continuous>  dextrose 5%. 1000 milliLiter(s) (50 mL/Hr) IV Continuous <Continuous>  dextrose 50% Injectable 25 Gram(s) IV Push once  dextrose 50% Injectable 12.5 Gram(s) IV Push once  dextrose 50% Injectable 25 Gram(s) IV Push once  folic acid Injectable 1 milliGRAM(s) IV Push daily  gabapentin 300 milliGRAM(s) Oral daily  glucagon  Injectable 1 milliGRAM(s) IntraMuscular once  heparin  Infusion. 1900 Unit(s)/Hr (19 mL/Hr) IV Continuous <Continuous>  insulin lispro (ADMELOG) corrective regimen sliding scale   SubCutaneous every 6 hours  lidocaine   4% Patch 1 Patch Transdermal daily  multivitamin/minerals 1 Tablet(s) Oral daily  tamsulosin 0.4 milliGRAM(s) Oral at bedtime    MEDICATIONS  (PRN):  dextrose Oral Gel 15 Gram(s) Oral once PRN Blood Glucose LESS THAN 70 milliGRAM(s)/deciliter  sodium chloride 0.9% lock flush 10 milliLiter(s) IV Push every 1 hour PRN Pre/post blood products, medications, blood draw, and to maintain line patency      ROS  No fever, sweats, chills  No epistaxis, HA, sore throat  No CP, SOB, cough, sputum  No n/v/d, abd pain, melena, hematochezia  No edema  No rash  No anxiety  No back pain, joint pain  No bleeding, bruising  No dysuria, hematuria    T(C): 38.4 (05-31-22 @ 09:00), Max: 39.7 (05-30-22 @ 22:00)  HR: 108 (05-31-22 @ 09:00) (104 - 123)  BP: 137/77 (05-31-22 @ 09:00) (104/71 - 144/67)  RR: 14 (05-31-22 @ 09:00) (14 - 25)  SpO2: 100% (05-31-22 @ 09:00) (93% - 100%)  Wt(kg): --    PE  NAD  Awake, alert  Anicteric, MMM  RRR  CTAB  Abd soft, NT, ND  No c/c/e  No rash grossly  FROM                          8.4    31.71 )-----------( 109      ( 31 May 2022 00:32 )             26.5       05-31    136  |  103  |  26<H>  ----------------------------<  88  4.1   |  20<L>  |  1.19    Ca    8.2<L>      31 May 2022 00:32  Phos  4.2     05-31  Mg     1.9     05-31    TPro  6.2  /  Alb  1.9<L>  /  TBili  1.1  /  DBili  x   /  AST  38  /  ALT  18  /  AlkPhos  528<H>  05-31    CC: [id number] EXAM:  CT ABDOMEN AND PELVIS IC                        ACC: [id number] EXAM:  CT CHEST IC                          PROCEDURE DATE:  05/30/2022          INTERPRETATION:  CLINICAL INFORMATION: Fever, dyspnea. Rule out   infection. Recent hospitalization at [facility] for acute pancreatitis, C.   Difficile colitis    COMPARISON: CT chest from 9/23/2009    CONTRAST/COMPLICATIONS:  IV Contrast: IV contrast documented in associated exam (accession   [id number]), Omnipaque 350 (accession [id number])  85 cc administered   5 cc   discarded  Oral Contrast: NONE  Complications: None reported at time of study completion    PROCEDURE:  CT of the Chest, Abdomen and Pelvis was performed.  Sagittal and coronal reformats were performed.    FINDINGS:  CHEST:  LUNGS AND LARGE AIRWAYS: Patent central airways. Bilateral lower lobe   dependent atelectasis.  PLEURA: Small bilateral pleural effusions with adjacent passive   atelectasis.  VESSELS: Right IJ central venous catheter terminates in the SVC.   Examination not tailored for detection of pulmonary emboli. Questionable   low density focus within the medial RIGHT lower lobe pulmonary artery   blank branches may be artifactual. Aortic calcifications.  HEART: Heart size is normal. Trace pericardial effusion. Coronary artery   calcifications.  MEDIASTINUM AND DAVID: No lymphadenopathy.  CHEST WALL AND LOWER NECK: Mild bilateral gynecomastia.    ABDOMEN AND PELVIS:  LIVER: Hypertrophied LEFT lobe of the liver. Question nodular contour   representing cirrhosis.  BILE DUCTS: Normal caliber.  GALLBLADDER: Sludge and/or small stones dependently.  SPLEEN: Large geographic wedge-shaped area of hypoattenuation in lower   pole, compatible with splenic infarct. Additional upper pole hypodensity  PANCREAS: Within normal limits. No peripancreatic collection.  ADRENALS: Within normal limits.  KIDNEYS/URETERS: Symmetric enhancement. No hydronephrosis. Bilateral   cortical atrophy. Right hepatic cyst and additional bilateral   hypodensities too small to characterize.    BLADDER: Completely collapsed. Thick wall. Jackson catheter in place.  REPRODUCTIVE ORGANS: Prostate within normal limits.    BOWEL: No bowel obstruction. Mild thickening of the colon may represent   residual colitis. Diverticulosis without acute diverticulitis.  PERITONEUM: Moderate ascites  VESSELS: Enlarged portal vein consistent with portal hypertension.   Numerous varices. Enlarged splenic vein.  RETROPERITONEUM/LYMPH NODES: Extensive enlarged mesenteric   lymphadenopathy. Multiple borderline-enlarged retroperitoneal lymph nodes   which are abnormal in number.  ABDOMINAL WALL: Anasarca. Large bilateral fat and fluid containing   inguinal hernias.  BONES: Degenerative changes of the spine.    IMPRESSION:  1.  Questionable filling defect in RIGHT lower lobe medial pulmonary   artery branches may be artifactual. If there is a question regarding   possible pulmonary embolus. Dedicated CTPA is recommended.  2.  Bilateral lower lobe atelectasis with bilateral pleural effusions.  3.  Enlarged spleen with splenic infarct.  4.  Markedly enlarged mesenteric lymph nodes/lymphadenopathy out of   proportion of retroperitoneal lymphadenopathy. Although lymphoma/leukemia   can have this appearance, consider possibility of gastrointestinal   tuberculosis as well as other infectious etiologies.  5.  Hepatosplenomegaly with portal hypertension and possible underlying   cirrhosis.  6.  Moderate ascites.  7.  Small bilateral pleural effusions.  8.  Anasarca.    These findings were discussed with JOSE C Nichols by Dr. Moyer at 9:44 AM   on 5/30/2022, with read back confirmation.    Slight change in findings from original resident report were discussed   with Dr. Steel by Dr. Brenner at 1:00 PM on 5/30/2022, with read back   confirmation.    --- End of Report ---

## 2022-05-31 NOTE — DIETITIAN INITIAL EVALUATION ADULT - PERTINENT LABORATORY DATA
05-31    136  |  103  |  26<H>  ----------------------------<  88  4.1   |  20<L>  |  1.19    Ca    8.2<L>      31 May 2022 00:32  Phos  4.2     05-31  Mg     1.9     05-31    TPro  6.2  /  Alb  1.9<L>  /  TBili  1.1  /  DBili  x   /  AST  38  /  ALT  18  /  AlkPhos  528<H>  05-31  POCT Blood Glucose.: 94 mg/dL (05-31-22 @ 05:44)  A1C with Estimated Average Glucose Result: 5.0 % (05-30-22 @ 04:25)

## 2022-05-31 NOTE — PROGRESS NOTE ADULT - ASSESSMENT
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   Diarrhea for two months followed by fevers, adenopathy, leukocytosis.   Hospitalized in the [facility], then [facility], transferred to [facility] 5/30.   Suspect malignancy. Bone marrow biopsy at [facility], result pending. Lymph node planned for 6/2.   Diarrhea looks noninfectious - C diff EIA, GI PCR negative.   No response to broad spectrum antibiotics and only had a small gluteal skin abscess, clean s/p I&D.   MTB is possible but less likely, no obvious lung lesions.   HIV negative.     Suggest  -monitor off antibiotics   -f/u bone marrow biopsy from [facility]   -f/u blood and sputum cultures   -f/u stool O/P   -f/u AFB sputum and stool   -f/u Quantiferon   -please send for AFB, bacterial and fungal cultures when he undergoes lymph node biopsy and paracentesis   -check TTE (splenic infarct)     Discussed with MICU  I will be rotating to [facility]. ID service will follow.     James Hodge MD   Infectious Disease   Available on TEAMS. After 5PM and on weekends please page fellow on call or call [phone] 03-Nov-2021 13:38

## 2022-05-31 NOTE — DIETITIAN INITIAL EVALUATION ADULT - ADD RECOMMEND
Continue Supplementation: folic acid, multivitamin/minerals. Consider adding 500mg vit C daily to promote wound healing. 1) Continue Supplementation: folic acid, multivitamin/minerals. Consider adding 500mg vit C daily to promote wound healing.  2) Obtain pt food preferences, and oral supplement preferences as able.

## 2022-05-31 NOTE — CONSULT NOTE ADULT - ASSESSMENT
HPI:  67 yo M with a PMH HTN, HLD, SLE on Cellcept (MMF), class V lupus nephritis, CKD stage 2, DM2, diabetic neuropathy, TIA (2019) on plavix, BPH, HUNG, hospitalization in [facility] 3/27-4/20 tx for pancreatitis and C diff?, recent hospitalization at [place] 4/22-4/26 for acute pancreatitis and C diff colitis and another hospitalization 5/5 at [place] for septic shock (source buttock abscess) treated with vanc, cefepime (5/5-5/10), meropenem (5/11-5/18) and micafungin. Zosyn added 5/26. Course complicated by recurrent fevers despite being on antibiotics, and leukocytosis up to 30k. Blood cx neg, UA unremarkable. MRI abdomen w/ contrast performed showing extensive abd lymphadenopathy (reactive to c diff vs lymphoma?). IR stated no window for bx. Course also complicated by a L brachial vein DVT and superficial DVT in arms. Patient noted to have had a positive PPD but has possibly gotten the BCG vaccine. Quant gold performed at [place] still pending.     Admitted to [facility] MICU for further management. Vital signs on arrival: temp 38.6, /77, , RR 28, O2 sat 97% on 2L NC.  Labs: WBC 34.53, Hb 9.6, lipase 740, procal 2.02, tBili 1.4, alk phos 498, lactate 2.4. (30 May 2022 04:01)    Hematology/Oncology consulted d/t patient's history of anemia. [facility]    Anemia  --multifactoral  --will check: ferritin, folate, B12, LDH, haptoglobin  --continue with folic acid  --please transfuse for Hgb <7    Lymphadenopathy   -- infectious vs malignancy  --supraclavicular/cervical IR biopsy scheduled 6/2  --will check flow cytometry, SPEP, immunoglobulins, immunofixation   HPI:  65 yo M with a PMH HTN, HLD, SLE on Cellcept (MMF), class V lupus nephritis, CKD stage 2, DM2, diabetic neuropathy, TIA (2019) on plavix, BPH, HUNG, hospitalization in [facility] 3/27-4/20 tx for pancreatitis and C diff?, recent hospitalization at [place] 4/22-4/26 for acute pancreatitis and C diff colitis and another hospitalization 5/5 at [place] for septic shock (source buttock abscess) treated with vanc, cefepime (5/5-5/10), meropenem (5/11-5/18) and micafungin. Zosyn added 5/26. Course complicated by recurrent fevers despite being on antibiotics, and leukocytosis up to 30k. Blood cx neg, UA unremarkable. MRI abdomen w/ contrast performed showing extensive abd lymphadenopathy (reactive to c diff vs lymphoma?). IR stated no window for bx. Course also complicated by a L brachial vein DVT and superficial DVT in arms. Patient noted to have had a positive PPD but has possibly gotten the BCG vaccine. Quant gold performed at [place] still pending.     Admitted to [facility] MICU for further management. Vital signs on arrival: temp 38.6, /77, , RR 28, O2 sat 97% on 2L NC.  Labs: WBC 34.53, Hb 9.6, lipase 740, procal 2.02, tBili 1.4, alk phos 498, lactate 2.4. (30 May 2022 04:01)    Hematology/Oncology consulted d/t patient's history of anemia. [facility]    Anemia  --multifactoral  --Ferritin (511)  --will check: folate, B12, LDH, haptoglobin, TIBC  --continue with folic acid  --please transfuse for Hgb <7    Lymphadenopathy   -- infectious vs malignancy  --supraclavicular/cervical IR biopsy scheduled 6/2  --will check flow cytometry, SPEP, immunoglobulins, immunofixation   HPI:  65 yo M with a PMH HTN, HLD, SLE on Cellcept (MMF), class V lupus nephritis, CKD stage 2, DM2, diabetic neuropathy, TIA (2019) on plavix, BPH, HUNG, hospitalization in [facility] 3/27-4/20 tx for pancreatitis and C diff?, recent hospitalization at [place] 4/22-4/26 for acute pancreatitis and C diff colitis and another hospitalization 5/5 at [place] for septic shock (source buttock abscess) treated with vanc, cefepime (5/5-5/10), meropenem (5/11-5/18) and micafungin. Zosyn added 5/26. Course complicated by recurrent fevers despite being on antibiotics, and leukocytosis up to 30k. Blood cx neg, UA unremarkable. MRI abdomen w/ contrast performed showing extensive abd lymphadenopathy (reactive to c diff vs lymphoma?). IR stated no window for bx. Course also complicated by a L brachial vein DVT and superficial DVT in arms. Patient noted to have had a positive PPD but has possibly gotten the BCG vaccine. Quant gold performed at [place] still pending.     Admitted to [facility] MICU for further management. Vital signs on arrival: temp 38.6, /77, , RR 28, O2 sat 97% on 2L NC.  Labs: WBC 34.53, Hb 9.6, lipase 740, procal 2.02, tBili 1.4, alk phos 498, lactate 2.4. (30 May 2022 04:01)    Hematology/Oncology consulted d/t patient's history of anemia. [facility]    Anemia  --multifactorial  --Ferritin (511)  --will check: folate, B12, LDH, haptoglobin, TIBC  --continue with folic acid  --please transfuse for Hgb <7    Lymphadenopathy   --infectious vs malignancy  --CT C/A/P done 5/31  ·	Markedly enlarged mesenteric lymph nodes/lymphadenopathy out of proportion of retroperitoneal lymphadenopathy. Although lymphoma/leukemia can have this appearance, consider possibility of gastrointestinal tuberculosis as well as other infectious etiologies  --supraclavicular/cervical IR biopsy scheduled 6/2  --will check flow cytometry, SPEP, immunoglobulins, immunofixation      Thank you for the opportunity to participate in Mr Sol's care  Mecca Oliveira NP  Hematology/ Oncology  [facility]  [phone] (office)  [phone] (alt office)  Evenings and weekends please call MD on call or office    HPI:  67 yo M with a PMH HTN, HLD, SLE on Cellcept (MMF), class V lupus nephritis, CKD stage 2, DM2, diabetic neuropathy, TIA (2019) on plavix, BPH, HUNG, hospitalization in [facility] 3/27-4/20 tx for pancreatitis and C diff?, recent hospitalization at [place] 4/22-4/26 for acute pancreatitis and C diff colitis and another hospitalization 5/5 at [place] for septic shock (source buttock abscess) treated with vanc, cefepime (5/5-5/10), meropenem (5/11-5/18) and micafungin. Zosyn added 5/26. Course complicated by recurrent fevers despite being on antibiotics, and leukocytosis up to 30k. Blood cx neg, UA unremarkable. MRI abdomen w/ contrast performed showing extensive abd lymphadenopathy (reactive to c diff vs lymphoma?). IR stated no window for bx. Course also complicated by a L brachial vein DVT and superficial DVT in arms. Patient noted to have had a positive PPD but has possibly gotten the BCG vaccine. Quant gold performed at [place] still pending.     Admitted to [facility] MICU for further management. Vital signs on arrival: temp 38.6, /77, , RR 28, O2 sat 97% on 2L NC.  Labs: WBC 34.53, Hb 9.6, lipase 740, procal 2.02, tBili 1.4, alk phos 498, lactate 2.4. (30 May 2022 04:01)    Hematology/Oncology consulted d/t patient's history of anemia. [facility]    Anemia  --multifactorial  --Ferritin (511)  --will check: folate, B12, LDH, haptoglobin, TIBC  --continue with folic acid  --keep iron % >20  --transfuse for Hgb <7    Lymphadenopathy   --infectious vs malignancy  --CT C/A/P done 5/31  ·	Markedly enlarged mesenteric lymph nodes/lymphadenopathy out of proportion of retroperitoneal lymphadenopathy. Although lymphoma/leukemia can have this appearance, consider possibility of gastrointestinal tuberculosis as well as other infectious etiologies  --supraclavicular/cervical IR biopsy scheduled 6/2  --will check flow cytometry, SPEP, immunoglobulins, immunofixation    --Jak2, CALR, BCR/ABL ordered      Thank you for the opportunity to participate in Mr Sol's care  Mecca Oliveira NP  Hematology/ Oncology  [facility]  [phone] (office)  [phone] (alt office)  Evenings and weekends please call MD on call or office

## 2022-05-31 NOTE — PROGRESS NOTE ADULT - SUBJECTIVE AND OBJECTIVE BOX
Venkat Steel MD  Internal Medicine  Pager #[phone]    CHIEF COMPLAINT:Patient is a 66y old  Male who presents with a chief complaint of recurrent fevers, unknown source (30 May 2022 17:28)        Interval Events:    Continuing to fever 38.5 at bedside with gel cooling overlay in placed. With known cirrhosis limited role for tylenol.     REVIEW OF SYSTEMS:  CONSTITUTIONAL: +chills  RESPIRATORY: No cough, wheezing, hemoptysis; No shortness of breath  CARDIOVASCULAR: No chest pain or palpitations  GASTROINTESTINAL: No abdominal or epigastric pain. No nausea, vomiting, or hematemesis, diarrhea   GENITOURINARY: No dysuria, frequency or hematuria  NEUROLOGICAL: No numbness or weakness    OBJECTIVE:  ICU Vital Signs Last 24 Hrs  T(C): 38.5 (31 May 2022 07:00), Max: 39.7 (30 May 2022 22:00)  T(F): 101.3 (31 May 2022 07:00), Max: 103.5 (30 May 2022 22:00)  HR: 104 (31 May 2022 07:00) (104 - 123)  BP: 133/71 (31 May 2022 07:00) (104/71 - 144/67)  BP(mean): 94 (31 May 2022 07:00) (81 - 105)  ABP: --  ABP(mean): --  RR: 22 (31 May 2022 07:00) (15 - 25)  SpO2: 100% (31 May 2022 07:00) (93% - 100%)         @ : @ 07:00  --------------------------------------------------------  IN: 1524 mL / OUT: 1530 mL / NET: -6 mL     @ 07:  -   @ 08:06  --------------------------------------------------------  IN: 0 mL / OUT: 125 mL / NET: -125 mL      CAPILLARY BLOOD GLUCOSE      POCT Blood Glucose.: 94 mg/dL (31 May 2022 05:44)      PHYSICAL EXAM:    GENERAL: appears fatigued, weak  ENT: Moist mucous membranes  CHEST/LUNG: no wheezing or increased WOB  HEART: tachycardic, reg rhythm; No murmurs, rubs, or gallops  ABDOMEN: Abdomen feels full, Bowel sounds present; Soft, Nontender  EXTREMITIES:  2+ Peripheral Pulses, brisk capillary refill. No clubbing, cyanosis, or edema  NERVOUS SYSTEM:  Alert & Oriented X3, speech clear. No deficits   MSK: FROM all 4 extremities, full and equal strength  SKIN: No rashes or lesions    LINES:    HOSPITAL MEDICATIONS:  Standing Meds:  albumin human 25% IVPB 50 milliLiter(s) IV Intermittent every 6 hours  albuterol/ipratropium for Nebulization 3 milliLiter(s) Nebulizer every 6 hours  chlorhexidine 4% Liquid 1 Application(s) Topical <User Schedule>  chlorhexidine 4% Liquid 1 Application(s) Topical <User Schedule>  dextrose 5%. 1000 milliLiter(s) IV Continuous <Continuous>  dextrose 5%. 1000 milliLiter(s) IV Continuous <Continuous>  dextrose 50% Injectable 25 Gram(s) IV Push once  dextrose 50% Injectable 12.5 Gram(s) IV Push once  dextrose 50% Injectable 25 Gram(s) IV Push once  folic acid Injectable 1 milliGRAM(s) IV Push daily  gabapentin 300 milliGRAM(s) Oral daily  glucagon  Injectable 1 milliGRAM(s) IntraMuscular once  heparin  Infusion. 1900 Unit(s)/Hr IV Continuous <Continuous>  insulin lispro (ADMELOG) corrective regimen sliding scale   SubCutaneous every 6 hours  lidocaine   4% Patch 1 Patch Transdermal daily  multivitamin/minerals 1 Tablet(s) Oral daily  tamsulosin 0.4 milliGRAM(s) Oral at bedtime      PRN Meds:  dextrose Oral Gel 15 Gram(s) Oral once PRN  sodium chloride 0.9% lock flush 10 milliLiter(s) IV Push every 1 hour PRN      LABS:                        8.4    31.71 )-----------( 109      ( 31 May 2022 00:32 )             26.5     Hgb Trend: 8.4<--, 8.7<--, 8.9<--, 8.7<--, 9.6<--      136  |  103  |  26<H>  ----------------------------<  88  4.1   |  20<L>  |  1.19    Ca    8.2<L>      31 May 2022 00:32  Phos  4.2       Mg     1.9         TPro  6.2  /  Alb  1.9<L>  /  TBili  1.1  /  DBili  x   /  AST  38  /  ALT  18  /  AlkPhos  528<H>      Creatinine Trend: 1.19<--, 1.17<--, 1.16<--  PT/INR - ( 31 May 2022 01:55 )   PT: 16.7 sec;   INR: 1.45 ratio         PTT - ( 31 May 2022 01:55 )  PTT:65.6 sec  Urinalysis Basic - ( 30 May 2022 03:44 )    Color: Yellow / Appearance: Clear / S.016 / pH: x  Gluc: x / Ketone: Negative  / Bili: Negative / Urobili: Negative   Blood: x / Protein: 100 / Nitrite: Negative   Leuk Esterase: Negative / RBC: 8 /hpf / WBC 3 /HPF   Sq Epi: x / Non Sq Epi: 1 /hpf / Bacteria: Negative      Arterial Blood Gas:   @ 00:00  7.47/33/91/24/98.2/0.6  ABG lactate: --  Arterial Blood Gas:   @ 04:55  7.40/34/146/21/99.3/-3.5  ABG lactate: --    Venous Blood Gas:   @ 03:39  7.35/43/42/24/66.0  VBG Lactate: 2.4      MICROBIOLOGY:     Culture - Sputum (collected 30 May 2022 13:12)  Source: .Sputum Sputum  Gram Stain (30 May 2022 21:07):    No polymorphonuclear leukocytes per low power field    No Squamous epithelial cells per low power field    No organisms seen per oil power field    GI PCR Panel, Stool (collected 30 May 2022 09:55)  Source: .Stool Feces sarthak cisse  Final Report (30 May 2022 19:15):    GI PCR Results: NOT detected    *******Please Note:*******    GI panel PCR evaluates for:    Campylobacter, Plesiomonas shigelloides, Salmonella,    Vibrio, Yersinia enterocolitica, Enteroaggregative    Escherichia coli (EAEC), Enteropathogenic E.coli (EPEC),    Enterotoxigenic E. coli (ETEC) lt/st, Shiga-like    toxin-producing E. coli (STEC) stx1/stx2,    Shigella/ Enteroinvasive E. coli (EIEC), Cryptosporidium,    Cyclospora cayetanensis, Entamoeba histolytica,    Giardia lamblia, Adenovirus F 40/41, Astrovirus,    Norovirus GI/GII, Rotavirus A, Sapovirus      RADIOLOGY:  [ ] Reviewed and interpreted by me    EKG:     Venkat Steel MD  Internal Medicine  Pager #[phone]    CHIEF COMPLAINT:Patient is a 66y old  Male who presents with a chief complaint of recurrent fevers, unknown source (30 May 2022 17:28)        Interval Events:    Continuing to fever 38.5 at bedside with gel cooling overlay in placed. With known cirrhosis limited role for tylenol.     remains on heparin gtt    ON added folic acid, multivitamin, ensure. Albumin 25% q6h for 24h total ordered.     REVIEW OF SYSTEMS:  CONSTITUTIONAL: +chills  RESPIRATORY: No cough, wheezing, hemoptysis; No shortness of breath  CARDIOVASCULAR: No chest pain or palpitations  GASTROINTESTINAL: No abdominal or epigastric pain. No nausea, vomiting, or hematemesis, diarrhea   GENITOURINARY: No dysuria, frequency or hematuria  NEUROLOGICAL: No numbness or weakness    OBJECTIVE:  ICU Vital Signs Last 24 Hrs  T(C): 38.5 (31 May 2022 07:00), Max: 39.7 (30 May 2022 22:00)  T(F): 101.3 (31 May 2022 07:00), Max: 103.5 (30 May 2022 22:00)  HR: 104 (31 May 2022 07:00) (104 - 123)  BP: 133/71 (31 May 2022 07:00) (104/71 - 144/67)  BP(mean): 94 (31 May 2022 07:00) (81 - 105)  ABP: --  ABP(mean): --  RR: 22 (31 May 2022 07:00) (15 - 25)  SpO2: 100% (31 May 2022 07:00) (93% - 100%)         @ :  -   @ 07:00  --------------------------------------------------------  IN: 1524 mL / OUT: 1530 mL / NET: -6 mL     @ 07:  -   @ 08:06  --------------------------------------------------------  IN: 0 mL / OUT: 125 mL / NET: -125 mL      CAPILLARY BLOOD GLUCOSE      POCT Blood Glucose.: 94 mg/dL (31 May 2022 05:44)      PHYSICAL EXAM:    GENERAL: appears fatigued, weak  ENT: Moist mucous membranes  CHEST/LUNG: no wheezing or increased WOB  HEART: tachycardic, reg rhythm; No murmurs, rubs, or gallops  ABDOMEN: Abdomen feels full, Bowel sounds present; Soft, Nontender  EXTREMITIES:  2+ Peripheral Pulses, brisk capillary refill. No clubbing, cyanosis, or edema  NERVOUS SYSTEM:  Alert & Oriented X3, speech clear. No deficits   MSK: FROM all 4 extremities, full and equal strength  SKIN: No rashes or lesions    LINES:    HOSPITAL MEDICATIONS:  Standing Meds:  albumin human 25% IVPB 50 milliLiter(s) IV Intermittent every 6 hours  albuterol/ipratropium for Nebulization 3 milliLiter(s) Nebulizer every 6 hours  chlorhexidine 4% Liquid 1 Application(s) Topical <User Schedule>  chlorhexidine 4% Liquid 1 Application(s) Topical <User Schedule>  dextrose 5%. 1000 milliLiter(s) IV Continuous <Continuous>  dextrose 5%. 1000 milliLiter(s) IV Continuous <Continuous>  dextrose 50% Injectable 25 Gram(s) IV Push once  dextrose 50% Injectable 12.5 Gram(s) IV Push once  dextrose 50% Injectable 25 Gram(s) IV Push once  folic acid Injectable 1 milliGRAM(s) IV Push daily  gabapentin 300 milliGRAM(s) Oral daily  glucagon  Injectable 1 milliGRAM(s) IntraMuscular once  heparin  Infusion. 1900 Unit(s)/Hr IV Continuous <Continuous>  insulin lispro (ADMELOG) corrective regimen sliding scale   SubCutaneous every 6 hours  lidocaine   4% Patch 1 Patch Transdermal daily  multivitamin/minerals 1 Tablet(s) Oral daily  tamsulosin 0.4 milliGRAM(s) Oral at bedtime      PRN Meds:  dextrose Oral Gel 15 Gram(s) Oral once PRN  sodium chloride 0.9% lock flush 10 milliLiter(s) IV Push every 1 hour PRN      LABS:                        8.4    31.71 )-----------( 109      ( 31 May 2022 00:32 )             26.5     Hgb Trend: 8.4<--, 8.7<--, 8.9<--, 8.7<--, 9.6<--      136  |  103  |  26<H>  ----------------------------<  88  4.1   |  20<L>  |  1.19    Ca    8.2<L>      31 May 2022 00:32  Phos  4.2       Mg     1.9         TPro  6.2  /  Alb  1.9<L>  /  TBili  1.1  /  DBili  x   /  AST  38  /  ALT  18  /  AlkPhos  528<H>      Creatinine Trend: 1.19<--, 1.17<--, 1.16<--  PT/INR - ( 31 May 2022 01:55 )   PT: 16.7 sec;   INR: 1.45 ratio         PTT - ( 31 May 2022 01:55 )  PTT:65.6 sec  Urinalysis Basic - ( 30 May 2022 03:44 )    Color: Yellow / Appearance: Clear / S.016 / pH: x  Gluc: x / Ketone: Negative  / Bili: Negative / Urobili: Negative   Blood: x / Protein: 100 / Nitrite: Negative   Leuk Esterase: Negative / RBC: 8 /hpf / WBC 3 /HPF   Sq Epi: x / Non Sq Epi: 1 /hpf / Bacteria: Negative      Arterial Blood Gas:   @ 00:00  7.47/33/91/24/98.2/0.6  ABG lactate: --  Arterial Blood Gas:   @ 04:55  7.40/34/146/21/99.3/-3.5  ABG lactate: --    Venous Blood Gas:   @ 03:39  7.35/43/42/24/66.0  VBG Lactate: 2.4      MICROBIOLOGY:     Culture - Sputum (collected 30 May 2022 13:12)  Source: .Sputum Sputum  Gram Stain (30 May 2022 21:07):    No polymorphonuclear leukocytes per low power field    No Squamous epithelial cells per low power field    No organisms seen per oil power field    GI PCR Panel, Stool (collected 30 May 2022 09:55)  Source: .Stool Feces sarthak cisse  Final Report (30 May 2022 19:15):    GI PCR Results: NOT detected    *******Please Note:*******    GI panel PCR evaluates for:    Campylobacter, Plesiomonas shigelloides, Salmonella,    Vibrio, Yersinia enterocolitica, Enteroaggregative    Escherichia coli (EAEC), Enteropathogenic E.coli (EPEC),    Enterotoxigenic E. coli (ETEC) lt/st, Shiga-like    toxin-producing E. coli (STEC) stx1/stx2,    Shigella/ Enteroinvasive E. coli (EIEC), Cryptosporidium,    Cyclospora cayetanensis, Entamoeba histolytica,    Giardia lamblia, Adenovirus F 40/41, Astrovirus,    Norovirus GI/GII, Rotavirus A, Sapovirus      RADIOLOGY:  [ ] Reviewed and interpreted by me    EKG:

## 2022-05-31 NOTE — CONSULT NOTE ADULT - SUBJECTIVE AND OBJECTIVE BOX
HPI:  65 yo M with a PMH HTN, HLD, SLE on Cellcept (MMF), class V lupus nephritis, CKD stage 2, DM2, diabetic neuropathy, TIA (2019) on plavix, BPH, HUNG, hospitalization in [facility] 3/27- tx for pancreatitis and C diff?, recent hospitalization at [facility] - for acute pancreatitis and C diff colitis and another hospitalization  at [facility] for septic shock (source buttock abscess) treated with vanc, cefepime (-5/10), meropenem (-) and micafungin. Zosyn added . Course complicated by recurrent fevers despite being on antibiotics, and leukocytosis up to 30k. Blood cx neg, UA unremarkable. MRI abdomen w/ contrast performed showing extensive abd lymphadenopathy (reactive to c diff vs lymphoma?). IR stated no window for bx. Course also complicated by a L brachial vein DVT and superficial DVT in arms. Patient noted to have had a positive PPD but has possibly gotten the BCG vaccine. Quant gold performed at [facility] still pending.     Admitted to [facility] for further management. Vital signs on arrival: temp 38.6, /77, , RR 28, O2 sat 97% on 2L NC.  Labs: WBC 34.53, Hb 9.6, lipase 740, procal 2.02, tBili 1.4, alk phos 498, lactate 2.4. CT obtain with cirrhotic appearing liver w/ portal HTN and varices, colitis, and ascites   Hepatology called for further recs.    Allergies:  No Known Allergies        Hospital Medications:  acetaminophen    Suspension .. 500 milliGRAM(s) Oral every 6 hours PRN  albumin human 25% IVPB 50 milliLiter(s) IV Intermittent every 6 hours  albuterol/ipratropium for Nebulization 3 milliLiter(s) Nebulizer every 6 hours  chlorhexidine 4% Liquid 1 Application(s) Topical <User Schedule>  chlorhexidine 4% Liquid 1 Application(s) Topical <User Schedule>  dextrose 5%. 1000 milliLiter(s) IV Continuous <Continuous>  dextrose 5%. 1000 milliLiter(s) IV Continuous <Continuous>  dextrose 50% Injectable 25 Gram(s) IV Push once  dextrose 50% Injectable 12.5 Gram(s) IV Push once  dextrose 50% Injectable 25 Gram(s) IV Push once  dextrose Oral Gel 15 Gram(s) Oral once PRN  folic acid Injectable 1 milliGRAM(s) IV Push daily  gabapentin 300 milliGRAM(s) Oral daily  glucagon  Injectable 1 milliGRAM(s) IntraMuscular once  heparin  Infusion. 1900 Unit(s)/Hr IV Continuous <Continuous>  insulin lispro (ADMELOG) corrective regimen sliding scale   SubCutaneous every 6 hours  lidocaine   4% Patch 1 Patch Transdermal daily  multivitamin/minerals 1 Tablet(s) Oral daily  sodium chloride 0.9% lock flush 10 milliLiter(s) IV Push every 1 hour PRN  tamsulosin 0.4 milliGRAM(s) Oral at bedtime      PMHX/PSHX:  Obstructive Sleep Apnea    Hepatitis B Carrier    Pneumonia    Other systemic lupus erythematosus with endocarditis    Type 2 diabetes mellitus with other neurologic complication, without long-term current use of insulin    Sleep apnea, unspecified type    Hypertension, unspecified type    Nephritic syndrome    No significant past surgical history        Family history:  No pertinent family history in first degree relatives    FH: type 2 diabetes        Social History: no smoking    ROS:   all negative except as mentioned above      PHYSICAL EXAM:   GENERAL:  NAD, Appears stated age  HEENT:  NC/AT,  conjunctivae clear and pink, sclera -anicteric  CHEST:  CTA B/L, Normal effort  HEART:  RRR S1/S2,  ABDOMEN:  Soft, distended  EXTREMITIES:  No cyanosis or Edema  SKIN:  Warm & Dry. No rash or erythema  NEURO:  Alert, oriented, no focal deficit    Vital Signs:  Vital Signs Last 24 Hrs  T(C): 38.4 (31 May 2022 09:00), Max: 39.7 (30 May 2022 22:00)  T(F): 101.1 (31 May 2022 09:00), Max: 103.5 (30 May 2022 22:00)  HR: 114 (31 May 2022 10:00) (104 - 123)  BP: 133/79 (31 May 2022 10:00) (104/71 - 144/67)  BP(mean): 98 (31 May 2022 10:00) (81 - 103)  RR: 28 (31 May 2022 10:00) (14 - 28)  SpO2: 95% (31 May 2022 10:00) (93% - 100%)  Daily Height in cm: 162.56 (31 May 2022 08:04)    Daily     LABS:                        8.4    31.71 )-----------( 109      ( 31 May 2022 00:32 )             26.5     Mean Cell Volume: 95.0 fl (- @ 00:32)        136  |  103  |  26<H>  ----------------------------<  88  4.1   |  20<L>  |  1.19    Ca    8.2<L>      31 May 2022 00:32  Phos  4.2       Mg     1.9         TPro  6.2  /  Alb  1.9<L>  /  TBili  1.1  /  DBili  x   /  AST  38  /  ALT  18  /  AlkPhos  528<H>      LIVER FUNCTIONS - ( 31 May 2022 00:32 )  Alb: 1.9 g/dL / Pro: 6.2 g/dL / ALK PHOS: 528 U/L / ALT: 18 U/L / AST: 38 U/L / GGT: x           PT/INR - ( 31 May 2022 01:55 )   PT: 16.7 sec;   INR: 1.45 ratio         PTT - ( 31 May 2022 08:39 )  PTT:49.4 sec  Urinalysis Basic - ( 30 May 2022 03:44 )    Color: Yellow / Appearance: Clear / S.016 / pH: x  Gluc: x / Ketone: Negative  / Bili: Negative / Urobili: Negative   Blood: x / Protein: 100 / Nitrite: Negative   Leuk Esterase: Negative / RBC: 8 /hpf / WBC 3 /HPF   Sq Epi: x / Non Sq Epi: 1 /hpf / Bacteria: Negative      Amylase Serum--      Lipase serum--       [id number]                          8.4    31.71 )-----------( 109      ( 31 May 2022 00:32 )             26.5                         8.7    33.27 )-----------( 108      ( 30 May 2022 18:32 )             28.8                         8.9    30.01 )-----------( 93       ( 30 May 2022 10:58 )             28.6                         8.7    31.77 )-----------( 102      ( 30 May 2022 06:05 )             27.5                         9.6    34.53 )-----------( 115      ( 30 May 2022 03:48 )             29.4     Imaging:  There is a right-sided level level 1 lymph node in the right   submandibular region measuring 1.6 cm in AP diameter by 1.3 cm   transversely by 1.7 cm in craniocaudal diameter. A left level 5 node is   identified which measures 2.2 cm in AP diameter by 2.3 cm transversely by   2.7 cm in craniocaudal diameter which appears to represent a   conglomeration of nodes. No other lymphadenopathy is identified.     The uvula is normal. The epiglottis is normal. The trueand false cords   are normal. The vascular structures enhance normally. The osseous   structures demonstrate moderate spinal stenosis C4-5 through C6-7 due to   ossification posterior longitudinal ligament.      < from: CT Neck Soft Tissue w/ IV Cont (22 @ 09:03) >  IMPRESSION: Right level 1 lymph nodeand left level 5 lymph node mass   suspicious for neoplasm, new since 3/8/2018.    < end of copied text >    < from: CT Abdomen and Pelvis w/ IV Cont (22 @ 08:59) >  LIVER: Hypertrophied LEFT lobe of the liver. Question nodular contour   representing cirrhosis.  BILE DUCTS: Normal caliber.  GALLBLADDER: Sludge and/or small stones dependently.  SPLEEN: Large geographic wedge-shaped area of hypoattenuation in lower   pole, compatible with splenic infarct. Additional upper pole hypodensity  PANCREAS: Within normal limits. No peripancreatic collection.  ADRENALS: Within normal limits.  KIDNEYS/URETERS: Symmetric enhancement. No hydronephrosis. Bilateral   cortical atrophy. Right hepatic cyst and additional bilateral   hypodensities too small to characterize.    BLADDER: Completely collapsed. Thick wall. Jackson catheter in place.  REPRODUCTIVE ORGANS: Prostate within normal limits.    BOWEL: No bowel obstruction. Mild thickening of the colon may represent   residual colitis. Diverticulosis without acute diverticulitis.  PERITONEUM: Moderate ascites  VESSELS: Enlarged portal vein consistent with portal hypertension.   Numerous varices. Enlarged splenic vein.  RETROPERITONEUM/LYMPH NODES: Extensive enlarged mesenteric   lymphadenopathy. Multiple borderline-enlarged retroperitoneal lymph nodes   which are abnormal in number.  ABDOMINAL WALL: Anasarca. Large bilateral fat and fluid containing   inguinal hernias.  BONES: Degenerative changes of the spine.    IMPRESSION:  1.  Questionable filling defect in RIGHT lower lobe medial pulmonary   artery branches may be artifactual. If there is a question regarding   possible pulmonary embolus. Dedicated CTPA is recommended.  2.  Bilateral lower lobe atelectasis with bilateral pleural effusions.  3.  Enlarged spleen with splenic infarct.  4.  Markedly enlarged mesenteric lymph nodes/lymphadenopathy out of   proportion of retroperitoneal lymphadenopathy. Although lymphoma/leukemia   can have this appearance, consider possibility of gastrointestinal   tuberculosis as well as other infectious etiologies.  5.  Hepatosplenomegaly with portal hypertension and possible underlying   cirrhosis.  6.  Moderate ascites.  7.  Small bilateral pleural effusions.  8.  Anasarca.    < end of copied text >

## 2022-05-31 NOTE — DIETITIAN INITIAL EVALUATION ADULT - OTHER INFO
Nutrition Status:  - Per chart, PN on hold. Pt noted with diet advancement and recurrent fevers; TPN not appropriate at this time.  - Per chart, "check ammonia level given hepatosplenomegaly with possible cirrhosis"  - Per chart, CKD stage 2, hx of lupus nephritis  - Per chart: chronic inflammatory diarrhea, possible Crohn's vs colitis vs infectious; "check stool culture, stool PCR, ova and parasites, c diff"  - Per chart: hx of pancreatitis, elevated lipase  - Hyperglycemia addressed with SSI q6 hrs  - Supplementation: folic acid, multivitamin/minerals  - Wound Care consulted for suspected DTI buttocks

## 2022-05-31 NOTE — DIETITIAN INITIAL EVALUATION ADULT - NSFNSGIIOFT_GEN_A_CORE
Jacob Johnson is a 30year old female. Patient presents with: Tonsil Problem: per pt she had 2 courses of antibiotic      HISTORY OF PRESENT ILLNESS  12/16/2019  Patient prevents for evaluation of sore throats. This is recurrent.   She started Frequent skin infections, pigment change and rash. Hema/Lymph Negative Easy bleeding and easy bruising.            PHYSICAL EXAM    BP (!) 158/114   Temp 97.8 °F (36.6 °C) (Tympanic)   Ht 5' 3\" (1.6 m)   Wt 164 lb (74.4 kg)   LMP 12/08/2019 (Exact Date) (Patient not taking: Reported on 12/16/2019 ), Disp: 100 mL, Rfl: 0  •  azithromycin (ZITHROMAX Z-AURELIANO) 250 MG Oral Tab, Take 1 tablet (250 mg total) by mouth daily for 9 days. Take 1 tablet daily for 9 more days.  (Patient not taking: Reported on 12/16/2019 Last BM: 5/30 (x4), 5/31 (x1 thus far)  Chronic inflammatory diarrhea noted  Bowel Regimen: none

## 2022-05-31 NOTE — PROGRESS NOTE ADULT - ASSESSMENT
67 yo M with a PMH HTN, HLD, SLE on Cellcept (MMF), class V lupus nephritis, CKD stage 2, DM2, diabetic neuropathy, TIA (2019) on plavix, BPH, HUNG, hospitalization in [facility] 3/27-4/20 tx for pancreatitis and C diff?, recent hospitalization at [place] 4/22-4/26 for acute pancreatitis and C diff colitis and another hospitalization 5/5 at [place] [facility] for septic shock (source buttock abscess) treated with vanc, cefepime (5/5-5/10), meropenem (5/11-5/18) and micafungin. Zosyn added 5/26 course complicated by recurrent fever now with suspected R PE and imaging with peritoneal lymphadenopathy lymphoma versus TB pending IR biopsy monitoring off abx.     Neuro  - AAOx3 but slow  - check ammonia level given hepatosplenomegaly with possible cirrhosis  #diabetic neuropathy  -start on home gabapentin    Pulm  #large left posterior consolidation seen on ultrasoud, possible PNA?  -on 2L NC, satting 98%  -cover with empiric abx for HAP  #upper airway stridor, wheezing  -AM consult ENT eval for upper airway stridor  -f/u CT neck/chest, looking for lymphadenopathy and possibly options for lymph node bx  -check RVP, urine legionella  -sputum culture  -gladys motabs    #RLL PE  -heparin gtt  -no further eliquis pending possible LN biopsy       Cardio  #HTN  - hold off on antihypertensive meds for now as patient could be septic  #HLD, hypertriglyceridemia  -f/u lipid profile  -check official TTE      Renal  #CKD stage 2, hx of lupus nephritis  -cont to trend Cr  -monitor I/Os  -cont to monitor electrolytes, replete as necessary    GI  #chronic inflammatory diarrhea  #possible Crohn's vs colitis vs infectious  -calprotectin elevated at [facility] [address]  -check stool culture, stool PCR, ova and parasites, c diff  -consult ID and GI  #Diet  -NPO, patient has been receiving PPN at [facility] since 5/15  -S&S eval  -TPN consult, start on D10 for now  #hx of pancreatitis, elevated lipase  -recheck lipase  #hepatosplenomegaly shown on MRI at [facility]  -check hepatitis panel      #hx of BPH  -start on flomax    Endocrine  #DM2  -last HbA1c 4/29 6.5%  -start on ISS    ID/rheum  #hx of SLE  #recurrent fevers and leukocytosis of unknown etiology  diff dx: infectious, malignancy, IgG4 disease, HLH  -elevated WBC, elevated IgA at [facility]  -check quant gold  -resend cultures (blood, stool), UA, check procal, fungitell, galactomannan, MRSA swab  -check HIV, EBV, CMV  -possible yeast? unknown source at [facility]  -start on caspofungin  -consider doxycycline?  #r/o HLH, IgG4  -has fevers, splenomegaly, cytopenia  -check HLH labs: soluble IL-2 levels, ferritin, triglyceride  -check IgG4    Heme  #Anemia  -outside hospital reported to be consistent with AOCD  -CT ab pending, r/o bleed  -transfuse if Hb<7, maintain active type and screen  -DVT ppx: full AC    Lines: TOBY placed 5/27

## 2022-05-31 NOTE — DIETITIAN INITIAL EVALUATION ADULT - NS FNS DIET ORDER
Diet, Regular:   Supplement Feeding Modality:  Oral  Ensure Clear Cans or Servings Per Day:  2       Frequency:  Three Times a day (05-30-22 @ 19:14)

## 2022-05-31 NOTE — CONSULT NOTE ADULT - ASSESSMENT
66M with persistent leukocytosis and recurring fevers with RIGHT buttock wound s/p I&D at outside hospital.      Exam not consistent with NSTI  Leukocytosis and fevers unlikely related to buttock wound  Continue infectious and malignancy w/u per ID and Heme/Onc  No acute surgical intervention currently indicated  Discussed with Dr. Jones    Acute Care Surgery  [id number]

## 2022-05-31 NOTE — PROGRESS NOTE ADULT - SUBJECTIVE AND OBJECTIVE BOX
Follow Up: Fevers, adenopathy     Interval History/ROS: Fevers continue. Wife at bedside. Feels the same. Continued diarrhea. Some cough and dyspnea. No pain.     Allergies  No Known Allergies        ANTIMICROBIALS:      OTHER MEDS:  acetaminophen    Suspension .. 500 milliGRAM(s) Oral every 6 hours PRN  acetaminophen   IVPB .. 500 milliGRAM(s) IV Intermittent once  albumin human 25% IVPB 50 milliLiter(s) IV Intermittent every 6 hours  albuterol/ipratropium for Nebulization 3 milliLiter(s) Nebulizer every 6 hours PRN  chlorhexidine 4% Liquid 1 Application(s) Topical <User Schedule>  chlorhexidine 4% Liquid 1 Application(s) Topical <User Schedule>  dextrose 5%. 1000 milliLiter(s) IV Continuous <Continuous>  dextrose 5%. 1000 milliLiter(s) IV Continuous <Continuous>  dextrose 50% Injectable 25 Gram(s) IV Push once  dextrose 50% Injectable 12.5 Gram(s) IV Push once  dextrose 50% Injectable 25 Gram(s) IV Push once  dextrose Oral Gel 15 Gram(s) Oral once PRN  folic acid Injectable 1 milliGRAM(s) IV Push daily  gabapentin 300 milliGRAM(s) Oral daily  glucagon  Injectable 1 milliGRAM(s) IntraMuscular once  heparin  Infusion. 1900 Unit(s)/Hr IV Continuous <Continuous>  insulin lispro (ADMELOG) corrective regimen sliding scale   SubCutaneous every 6 hours  lidocaine   4% Patch 1 Patch Transdermal daily  metoprolol tartrate 12.5 milliGRAM(s) Oral two times a day  multivitamin/minerals 1 Tablet(s) Oral daily  sodium chloride 0.9% lock flush 10 milliLiter(s) IV Push every 1 hour PRN  tamsulosin 0.4 milliGRAM(s) Oral at bedtime      Vital Signs Last 24 Hrs  T(C): 19.7 (31 May 2022 15:00), Max: 39.7 (30 May 2022 22:00)  T(F): 101.8 (31 May 2022 15:00), Max: 103.5 (30 May 2022 22:00)  HR: 126 (31 May 2022 15:00) (104 - 157)  BP: 160/84 (31 May 2022 15:00) (107/66 - 160/84)  BP(mean): 113 (31 May 2022 15:00) (79 - 113)  RR: 25 (31 May 2022 15:00) (14 - 29)  SpO2: 99% (31 May 2022 15:00) (91% - 100%)    Physical Exam:  General: deconditioned but non toxic  Cardio: tachycardic   Respiratory: nonlabored, grossly clear   abd: distended, nontender   Musculoskeletal: anasarca   vascular: right IJ CVC, no phlebitis   Skin: no rash                          8.4    31.71 )-----------( 109      ( 31 May 2022 00:32 )             26.5           136  |  102  |  23  ----------------------------<  90  3.7   |  21<L>  |  1.18    Ca    8.2<L>      31 May 2022 13:50  Phos  4.1       Mg     1.9         TPro  6.2  /  Alb  2.4<L>  /  TBili  1.3<H>  /  DBili  x   /  AST  24  /  ALT  15  /  AlkPhos  485<H>        Urinalysis Basic - ( 30 May 2022 03:44 )    Color: Yellow / Appearance: Clear / S.016 / pH: x  Gluc: x / Ketone: Negative  / Bili: Negative / Urobili: Negative   Blood: x / Protein: 100 / Nitrite: Negative   Leuk Esterase: Negative / RBC: 8 /hpf / WBC 3 /HPF   Sq Epi: x / Non Sq Epi: 1 /hpf / Bacteria: Negative        MICROBIOLOGY:  Culture - Sputum (collected 22 @ 13:12)  Source: .Sputum Sputum  Gram Stain (22 @ 21:07):    No polymorphonuclear leukocytes per low power field    No Squamous epithelial cells per low power field    No organisms seen per oil power field    Culture - Stool (collected 22 @ 11:49)  Source: .Stool sarthak cisse  Preliminary Report (22 @ 15:22):    No enteric pathogens to date: Final culture pending    GI PCR Panel, Stool (collected 22 @ 09:55)  Source: .Stool Feces sarthak cisse  Final Report (22 @ 19:15):    GI PCR Results: NOT detected    *******Please Note:*******    GI panel PCR evaluates for:    Campylobacter, Plesiomonas shigelloides, Salmonella,    Vibrio, Yersinia enterocolitica, Enteroaggregative    Escherichia coli (EAEC), Enteropathogenic E.coli (EPEC),    Enterotoxigenic E. coli (ETEC) lt/st, Shiga-like    toxin-producing E. coli (STEC) stx1/stx2,    Shigella/ Enteroinvasive E. coli (EIEC), Cryptosporidium,    Cyclospora cayetanensis, Entamoeba histolytica,    Giardia lamblia, Adenovirus F 40/41, Astrovirus,    Norovirus GI/GII, Rotavirus A, Sapovirus    Culture - Blood (collected 22 @ 05:08)  Source: .Blood Blood  Preliminary Report (22 @ 12:01):    No growth to date.    Culture - Fungal, Blood (collected 22 @ 05:06)  Source: .Blood Blood  Preliminary Report (22 @ 10:59):    Testing in progress    Culture - Blood (collected 22 @ 04:55)  Source: .Blood Blood  Preliminary Report (22 @ 12:01):    No growth to date.    CMVPCR Lo.70 Feo46QL/mL ( @ 04:25)  Rapid RVP Result: NotDetec ( @ 04:14)    Clostridium difficile GDH Toxins A&amp;B, EIA:   Negative (22 @ 11:09)  Clostridium difficile GDH Interpretation: Negative for toxigenic C. Difficile.  This specimen is negative for C.  Difficile glutamate dehydrogenase (GDH) antigen and negative for C.  Difficile Toxins A & B, by EIA.  GDH is a highly sensitive screening  marker for C. Difficile that is produced in large amounts by all C.  Difficile strains, both toxigenic and nontoxigenic.  This assay has not  been validated as a test of cure.  Repeat testing during the same episode  of diarrhea is of limited value and is discouraged.  The results of this  assay should always be interpreted in conjunction with pateint's clinical  history. (22 @ 11:09)    RADIOLOGY:  Images below reviewed personally  CT Neck Soft Tissue w/ IV Cont (22 @ 09:03)   Right level 1 lymph nodeand left level 5 lymph node mass   suspicious for neoplasm, new since 3/8/2018.    CT Chest Abdomen and Pelvis w/ IV Cont (22 @ 08:59)   1.  Questionable filling defect in RIGHT lower lobe medial pulmonary   artery branches may be artifactual. If there is a question regarding   possible pulmonary embolus. Dedicated CTPA is recommended.  2.  Bilateral lower lobe atelectasis with bilateral pleural effusions.  3.  Enlarged spleen with splenic infarct.  4.  Markedly enlarged mesenteric lymph nodes/lymphadenopathy out of   proportion of retroperitoneal lymphadenopathy. Although lymphoma/leukemia   can have this appearance, consider possibility of gastrointestinal   tuberculosis as well as other infectious etiologies.  5.  Hepatosplenomegaly with portal hypertension and possible underlying   cirrhosis.  6.  Moderate ascites.  7.  Small bilateral pleural effusions.  8.  Anasarca.

## 2022-05-31 NOTE — ADVANCED PRACTICE NURSE CONSULT - RECOMMEDATIONS
Will recommend the followin. Left buttocks: Cavilon to periwound skin, Aquacel rope to the wound- cover with foam- change daily and prn for drainage/soiling  2. B/l buttocks: continue to monitor for changes, routine pericare with Tyesha  3. Continue with T&P  4, Complete CAir boots  5. Seat cushion when OOB to chair  6. Consider a Surgical consult for further evaluation of the b/l buttocks  7. Nutrition support as pt condition allows  Tx plan discussed with RN

## 2022-05-31 NOTE — DIETITIAN INITIAL EVALUATION ADULT - NS FNS WEIGHT CHANGE REASON
Weight history per HIE:  2017: 90.7kg  2018: 93kg  2019: 91.6kg  2020: 93.4--> 88 kg  2021: 92.1 (5/27/21), 88.5 kg (12/18/21)  2022: 84.4kg (2/7/22), 78.5kg (4/29/22)  Current wt 90.6kg is within 5 year range/unintentional

## 2022-05-31 NOTE — PROGRESS NOTE ADULT - ATTENDING COMMENTS
Patient seen and examined. Patient critically ill requiring frequent bedside visits with therapy change. Patient is a 66M with an extensive history of SLE and lupus nephritis, DM2, HTN, HLD, CKD2-3, TIA (2019) on Plavix, and HUNG who recently traveled to the [facility]. While there, he was hospitalized for nearly 1 month (3/27-04/20) with pancreatitis and c. diff colitis.     He then returned to [place] where he was hospitalized at [facility] from 4/22-4/26 and then again on 5/5 until present. He was reportedly in septic shock (from a sacral abscess) treated with extended antibiotics. He was found to have extensive adenopathy on imaging and transferred to [facility] for further evaluation. He also had a L brachial DVT while at [place]. 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 He was transferred to [facility] under airborne isolation.    - He may have had a bone marrow biopsy at [place] - these results are not yet available.  - More history and background is needed.  - Patient is a retired nurse from [employer]  - He has had unremitting fevers over the last 24 hours. He was seen by ID and taken off antibiotics.    1. Infectious Disease - patient has had multiple courses of antibiotics. Followup cultures. Currently off antibiotics as per d/w ID.  - Wound care evaluation - sacral wound was thought to be his initial source  - Patient is being arranged for a supraclavicular LN biopsy with IR  - Plan for diagnostic paracentesis today to evaluate for SBP and check cyto/balta  - low threshold to restart antibiotics  - Followup Quant Gold and sputum and stool AFB's  - continue airborne isolation for now  2. Pulmonary - patient saturating well on minimal supplemental O2  - mild tachypnea related to abdominal distension and fevers  - check ABG if change in clinical status  3. Cardiovascular - currently not in shock and not requiring vasopressors  - given splenic infarct will continue tele monitoring to see if any Afib  - 2D echo with bubble study  4. Hem/Onc   - L brachial DVT and possible RLL PE on CT chest (not CTPA) - continue Heparin drip  - Patient seen by Dr. Umanzor of [facility] as outpatient - will need followup pending LN biopsy  - Will need BMBx results - if done either as outpatient or at [place]'s  5. Gastro   - patient with evidence of cirrhosis and splenic infarct  - paracentesis if able  - ? Hepatitis as a possible cause - will need to ensure all infectious serologies negative  - c. diff negative - diarrhea persists  - Hepatology followup  6. DVT proph - on Hep gtt (to be held today for paracentesis).  7. Neuro - patient awake and nonfocal  - more encephalopathic this AM but reportedly in setting of fevers - was not encephalopathic when afebrile per nurse  - if persists will consider repeat CTH  - Ammonia    Prognosis guarded.  Need more history to better understand what has been going on. patient was reportedly normal and healthy prior this his trip to the [facility]. Patient seen and examined. Patient critically ill requiring frequent bedside visits with therapy change. Patient is a 66M with an extensive history of SLE and lupus nephritis, DM2, HTN, HLD, CKD2-3, TIA (2019) on Plavix, and HUNG who recently traveled to the [facility]. While there, he was hospitalized for nearly 1 month (3/27-04/20) with pancreatitis and c. diff colitis.     He then returned to [place] where he was hospitalized at [facility] from 4/22-4/26 and then again on 5/5 until present. He was reportedly in septic shock (from a sacral abscess) treated with extended antibiotics. He was found to have extensive adenopathy on imaging and transferred to [facility] for further evaluation. He also had a L brachial DVT while at [place]. 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 He was transferred to [facility] under airborne isolation.    - He may have had a bone marrow biopsy at [place] - these results are not yet available.  - More history and background is needed.  - Patient is a retired nurse from [employer]  - He has had unremitting fevers over the last 24 hours. He was seen by ID and taken off antibiotics.    1. Infectious Disease - patient has had multiple courses of antibiotics. Followup cultures. Currently off antibiotics as per d/w ID.  - Wound care evaluation - sacral wound was thought to be his initial source  - Patient is being arranged for a supraclavicular LN biopsy with IR  - Plan for diagnostic paracentesis today to evaluate for SBP and check cyto/balta if able to find a safe pocket for para  - low threshold to restart antibiotics  - Followup Quant Gold and sputum and stool AFB's  - continue airborne isolation for now  - HIV negative - but if no other cause may need to consider checking HIV viral load  - HLH workup being sent  2. Pulmonary - patient saturating well on minimal supplemental O2  - mild tachypnea related to abdominal distension and fevers  - check ABG if change in clinical status  3. Cardiovascular - currently not in shock and not requiring vasopressors  - given splenic infarct will continue tele monitoring to see if any Afib  - 2D echo with bubble study  4. Hem/Onc   - L brachial DVT and possible RLL PE on CT chest (not CTPA) - continue Heparin drip  - Patient seen by Dr. Umanzor of [facility] as outpatient - will need followup pending LN biopsy though may benefit from a surgical excision biopsy if lymphoma is thought to be more likely than infectious etiology. Hem/Onc followup  - Will need BMBx results - if done either as outpatient or at [facility]  5. Gastro   - patient with evidence of cirrhosis and splenic infarct as well as significant hepatosplenomegaly  - paracentesis if able  - ? Hepatitis as a possible cause - will need to ensure all infectious serologies negative  - c. diff negative - diarrhea persists  - Hepatology followup  6. DVT proph - on Hep gtt (to be held today for paracentesis).  7. Neuro - patient awake and nonfocal  - more encephalopathic this AM but reportedly in setting of fevers - was not encephalopathic when afebrile per nurse  - if persists will consider repeat CTH  - Ammonia    Prognosis guarded.  Need more history to better understand what has been going on. patient was reportedly normal and healthy prior this his trip to the [facility].

## 2022-06-01 NOTE — OCCUPATIONAL THERAPY INITIAL EVALUATION ADULT - BALANCE TRAINING, PT EVAL
GOAL: Patient will increase static/dynamic sitting/standing balance by 1/2 grade to facilitate increased ability to perform ADLs and functional mobility seizures/Mononeuritis

## 2022-06-01 NOTE — CHART NOTE - NSCHARTNOTEFT_GEN_A_CORE
Nutrition Follow Up Note  Patient seen for: Calorie Count posted 22    Chart reviewed, events noted. "65 yo M with a PMH HTN, HLD, SLE on Cellcept (MMF), class V lupus nephritis, CKD stage 2, DM2, diabetic neuropathy, TIA (2019) on plavix, BPH, HUNG, hospitalization in [facility] 3/27- tx for pancreatitis and C diff?, recent hospitalization at [place] - for acute pancreatitis and C diff colitis and another hospitalization  at [place] for septic shock (source buttock abscess) treated with vanc, cefepime (-5/10), meropenem (-) and micafungin. Zosyn added  course complicated by recurrent fever now with suspected R PE and imaging with peritoneal lymphadenopathy lymphoma versus TB pending IR biopsy monitoring off abx."    Source: [] Patient       [x] EMR        [] RN        [] Family at bedside       [] Other:    -If unable to interview patient: [] Trach/Vent/BiPAP  [] Disoriented/confused/inappropriate to interview    Diet Order:   Diet, Regular:   Supplement Feeding Modality:  Oral  Ensure Clear Cans or Servings Per Day:  2       Frequency:  Three Times a day (22)    Is current diet order appropriate/adequate? [X] Yes  []  No:     PO intake :   [] >75%  Adequate    [] 50-75%  Fair       [] <50%  Poor    Nutrition-related concerns:  - Pt with h/o PPN at OSH in setting of poor po intake. Pt passed S&S in-house. Pt noted with diet advancement and recurrent fevers; TPN not appropriate at this time. Calorie Count ordered .  - Hyperglycemia addressed with SSI q6 hrs  - Supplementation: folic acid, multivitamin/minerals    GI:  Last BM ___.   Bowel Regimen? [] Yes   [] No    Weights:   Daily Weight in k.3 ()  DOSING Weight (kg): 90.6  IBW: 58.9 kg    MEDICATIONS  (STANDING):  dextrose 5%.  dextrose 5%.  dextrose 50% Injectable  dextrose 50% Injectable  dextrose 50% Injectable  folic acid Injectable  glucagon  Injectable  insulin lispro (ADMELOG) corrective regimen sliding scale  metoprolol tartrate Injectable  multivitamin/minerals  tamsulosin    Pertinent Labs:  @ 01:16: Na 136, BUN 21, Cr 1.20, BG 90, K+ 3.4<L>, Phos 3.6, Mg 2.0, Alk Phos 469<H>, ALT/SGPT 13, AST/SGOT 30,    @ 13:50: Na 136, BUN 23, Cr 1.18, BG 90, K+ 3.7, Phos 4.1, Mg 1.9, Alk Phos 485<H>, ALT/SGPT 15, AST/SGOT 24,    A1C with Estimated Average Glucose Result: 5.2 % (22 @ 00:32)  A1C with Estimated Average Glucose Result: 5.0 % (22 @ 04:25)    Finger Sticks:  POCT Blood Glucose.: 98 mg/dL ( @ 06:07)  POCT Blood Glucose.: 108 mg/dL ( @ 23:23)  POCT Blood Glucose.: 115 mg/dL ( @ 18:04)  POCT Blood Glucose.: 100 mg/dL ( @ 13:24)    Triglycerides, Serum: 412 mg/dL (22 @ 03:48)    Skin per nursing documentation:   Edema:     Estimated Nutrition Needs: based on dosing wt xx kg, with consideration for   Energy Needs:  Protein Needs:  Fluid Needs:   Paul State Equation:    Previous Nutrition Diagnosis:   Nutrition Diagnosis is: [] ongoing  [] resolved [] not applicable     New Nutrition Diagnosis: [] Not applicable    Nutrition Care Plan:  [] In Progress  [] Achieved  [] Not applicable    Nutrition Interventions:     Education Provided:       [] Yes:  [] No:        Recommendations:         [] Continue current diet order            [] Add oral nutrition supplement:     [] Discontinue current diet order. Recommend:      [] Add micronutrient supplementation:      [] Continue current micronutrient supplementation:      [] Other:     Monitoring and Evaluation:   Continue to monitor nutritional intake, tolerance to diet prescription, weights, labs, skin integrity      RD remains available upon request and will follow up per protocol  Suyapa Argueta, MS RD CDN [facility] (Pager #[phone]) Nutrition Follow Up Note  Patient seen for: Calorie Count posted 22    Chart reviewed, events noted. "67 yo M with a PMH HTN, HLD, SLE on Cellcept (MMF), class V lupus nephritis, CKD stage 2, DM2, diabetic neuropathy, TIA (2019) on plavix, BPH, HUNG, hospitalization in [facility] 3/27- tx for pancreatitis and C diff?, recent hospitalization at [place] - for acute pancreatitis and C diff colitis and another hospitalization  at [place] for septic shock (source buttock abscess) treated with vanc, cefepime (-5/10), meropenem (-) and micafungin. Zosyn added  course complicated by recurrent fever now with suspected R PE and imaging with peritoneal lymphadenopathy lymphoma versus TB pending IR biopsy monitoring off abx."    Source: [] Patient       [x] EMR        [X] RN        [] Family at bedside       [X] Other: Pt noted with high fever, lethargy      Diet Order:   Diet, Regular:   Supplement Feeding Modality:  Oral  Ensure Clear Cans or Servings Per Day:  2       Frequency:  Three Times a day (22)    Is current diet order appropriate/adequate? [X] Yes  []  No:     PO intake :   [] >75%  Adequate    [] 50-75%  Fair       [X] <50%  Poor    Per RN, pt consumed soup and water yesterday (), and a few bites of yogurt for dinner. Patient's wife is providing feeding assistance.    Nutrition-related concerns:  - Pt with h/o PPN at OSH in setting of poor po intake. Pt passed S&S in-house. Pt noted with diet advancement and recurrent fevers; TPN not appropriate at this time. Calorie Count ordered .  - Hyperglycemia addressed with SSI q6 hrs  - Supplementation: folic acid, multivitamin/minerals    GI:  Last BM  (x2).   Bowel Regimen? [] Yes   [X] No    Weights:   Daily Weight in k.3 (-); weight discrepancy noted; RD will continue to monitor  DOSING Weight (kg): 90.6 (-30)  IBW: 58.9 kg    MEDICATIONS  (STANDING):  dextrose 5%.  dextrose 5%.  dextrose 50% Injectable  dextrose 50% Injectable  dextrose 50% Injectable  folic acid Injectable  glucagon  Injectable  insulin lispro (ADMELOG) corrective regimen sliding scale  metoprolol tartrate Injectable  multivitamin/minerals  tamsulosin    Pertinent Labs:  @ 01:16: Na 136, BUN 21, Cr 1.20, BG 90, K+ 3.4<L>, Phos 3.6, Mg 2.0, Alk Phos 469<H>, ALT/SGPT 13, AST/SGOT 30,    @ 13:50: Na 136, BUN 23, Cr 1.18, BG 90, K+ 3.7, Phos 4.1, Mg 1.9, Alk Phos 485<H>, ALT/SGPT 15, AST/SGOT 24,    A1C with Estimated Average Glucose Result: 5.2 % (22 @ 00:32)  A1C with Estimated Average Glucose Result: 5.0 % (22 @ 04:25)    Finger Sticks:  POCT Blood Glucose.: 98 mg/dL ( @ 06:07)  POCT Blood Glucose.: 108 mg/dL ( @ 23:23)  POCT Blood Glucose.: 115 mg/dL ( @ 18:04)  POCT Blood Glucose.: 100 mg/dL ( @ 13:24)    Triglycerides, Serum: 412 mg/dL (22 @ 03:48)    Skin per nursing documentation: suspected DTI buttocks; Wound Care consulted  Edema: 2+ left/right leg, arm    Estimated Nutrition Needs: with consideration for BMI>30  Energy Needs: 2457-5212 kcal (20-25 kcal/kg, based on dosing wt 90.6kg)  Protein Needs: 82-94 grams (1.4-1.6 g/kg, based on IBW 58.9kg)  Fluid Needs: defer to team    Previous Nutrition Diagnosis: Increased Nutrient Needs  Nutrition Diagnosis is: [X] ongoing; addressed with diet advancement, oral supplements, Calorie Count    New Nutrition Diagnosis: [X] Not applicable    Nutrition Care Plan:  [X] In Progress  [] Achieved  [] Not applicable    Nutrition Interventions:     Education Provided:       [] Yes:  [X] No: n/a       Recommendations:      1. Continue current diet order: Regular diet with Ensure Clear tid  2. Continue current micronutrient supplementation: multivitamin, folic acid  3. Obtain pt food preferences, and oral supplement preferences as able.  4. Monitor results of Calorie Count    Monitoring and Evaluation:   Continue to monitor nutritional intake, tolerance to diet prescription, weights, labs, skin integrity      RD remains available upon request and will follow up per protocol  Suyapa Argueta MS RD CDN [facility] (Pager #[phone])

## 2022-06-01 NOTE — PHYSICAL THERAPY INITIAL EVALUATION ADULT - GAIT DEVIATIONS NOTED, PT EVAL
decreased irma/decreased step length Methotrexate Pregnancy And Lactation Text: This medication is Pregnancy Category X and is known to cause fetal harm. This medication is excreted in breast milk.

## 2022-06-01 NOTE — PROGRESS NOTE ADULT - ASSESSMENT
HPI:  67 yo M with a PMH HTN, HLD, SLE on Cellcept (MMF), class V lupus nephritis, CKD stage 2, DM2, diabetic neuropathy, TIA (2019) on plavix, BPH, HUNG, hospitalization in [facility] 3/27-4/20 tx for pancreatitis and C diff?, recent hospitalization at [place] 4/22-4/26 for acute pancreatitis and C diff colitis and another hospitalization 5/5 at [place] for septic shock (source buttock abscess) treated with vanc, cefepime (5/5-5/10), meropenem (5/11-5/18) and micafungin. Zosyn added 5/26. Course complicated by recurrent fevers despite being on antibiotics, and leukocytosis up to 30k. Blood cx neg, UA unremarkable. MRI abdomen w/ contrast performed showing extensive abd lymphadenopathy (reactive to c diff vs lymphoma?). IR stated no window for bx. Course also complicated by a L brachial vein DVT and superficial DVT in arms. Patient noted to have had a positive PPD but has possibly gotten the BCG vaccine. Quant gold performed at [place] still pending.     Admitted to [facility] MICU for further management. Vital signs on arrival: temp 38.6, /77, , RR 28, O2 sat 97% on 2L NC.  Labs: WBC 34.53, Hb 9.6, lipase 740, procal 2.02, tBili 1.4, alk phos 498, lactate 2.4. (30 May 2022 04:01)    Hematology/Oncology consulted d/t patient's history of anemia. [facility]    Anemia  --multifactorial  --Ferritin (511), folate (>20), B12 (>2000), LDH (311), haptoglobin (118), iron sat %(34)   --continue with folic acid  --keep iron % >20  --transfuse for Hgb <7    Lymphadenopathy   --infectious vs malignancy  --CT C/A/P done 5/31  ·	Markedly enlarged mesenteric lymph nodes/lymphadenopathy out of proportion of retroperitoneal lymphadenopathy. Although lymphoma/leukemia can have this appearance, consider possibility of gastrointestinal tuberculosis as well as other infectious etiologies  --supraclavicular/cervical IR biopsy scheduled 6/2  -- flow cytometry, SPEP, immunoglobulins, immunofixation pending  --Jak2, CALR, BCR/ABL pending      Thank you for the opportunity to participate in Mr Sol's care  Mecca Oliveira NP  Hematology/ Oncology  [facility]  [phone] (office)  [phone] (alt office)  Evenings and weekends please call MD on call or office    HPI:  67 yo M with a PMH HTN, HLD, SLE on Cellcept (MMF), class V lupus nephritis, CKD stage 2, DM2, diabetic neuropathy, TIA (2019) on plavix, BPH, HUNG, hospitalization in [facility] 3/27-4/20 tx for pancreatitis and C diff?, recent hospitalization at [place] 4/22-4/26 for acute pancreatitis and C diff colitis and another hospitalization 5/5 at [place] for septic shock (source buttock abscess) treated with vanc, cefepime (5/5-5/10), meropenem (5/11-5/18) and micafungin. Zosyn added 5/26. Course complicated by recurrent fevers despite being on antibiotics, and leukocytosis up to 30k. Blood cx neg, UA unremarkable. MRI abdomen w/ contrast performed showing extensive abd lymphadenopathy (reactive to c diff vs lymphoma?). IR stated no window for bx. Course also complicated by a L brachial vein DVT and superficial DVT in arms. Patient noted to have had a positive PPD but has possibly gotten the BCG vaccine. Quant gold performed at [place] still pending.     Admitted to [facility] MICU for further management. Vital signs on arrival: temp 38.6, /77, , RR 28, O2 sat 97% on 2L NC.  Labs: WBC 34.53, Hb 9.6, lipase 740, procal 2.02, tBili 1.4, alk phos 498, lactate 2.4. (30 May 2022 04:01)    Hematology/Oncology consulted d/t patient's history of anemia. [facility]    Anemia  --multifactorial  --Ferritin (511), folate (>20), B12 (>2000), LDH (311), haptoglobin (118), iron sat %(34)   --continue with folic acid  --keep iron % >20  --transfuse for Hgb <7    Lymphadenopathy   --infectious vs malignancy  --CT C/A/P done 5/31  ·	Markedly enlarged mesenteric lymph nodes/lymphadenopathy out of proportion of retroperitoneal lymphadenopathy. Although lymphoma/leukemia can have this appearance, consider possibility of gastrointestinal tuberculosis as well as other infectious etiologies  --supraclavicular/cervical IR biopsy scheduled 6/2  -- flow cytometry, SPEP, immunoglobulins, immunofixation pending  --Jak2, CALR, BCR/ABL pending      Questionable PE with splenic infarct  - on heparin GGT   - consider JORGE       Thank you for the opportunity to participate in Mr Sol's care  Mecca Oliveira NP  Hematology/ Oncology  [facility]  [phone] (office)  [phone] (alt office)  Evenings and weekends please call MD on call or office

## 2022-06-01 NOTE — PHYSICAL THERAPY INITIAL EVALUATION ADULT - ADDITIONAL COMMENTS
Prior to initial hospitalization pt lived with his spouse and children in a ph, 12 fadi +HR, additional 5 steps to bedroom +hand rail  +tub in bathroom +hand rail. Pt reports that he was independent in all adl's and functional mobility without DME.  Prior to transfer here from OSH pt was ambulating with a rolling walker.

## 2022-06-01 NOTE — PROGRESS NOTE ADULT - SUBJECTIVE AND OBJECTIVE BOX
CHIEF COMPLAINT:Patient is a 66y old  Male who presents with a chief complaint of recurrent fevers, unknown source (31 May 2022 17:01)        Interval Events:    REVIEW OF SYSTEMS:    OBJECTIVE:  ICU Vital Signs Last 24 Hrs  T(C): 20 (01 Jun 2022 07:00), Max: 39.7 (31 May 2022 16:00)  T(F): 100 (01 Jun 2022 07:00), Max: 103.5 (31 May 2022 16:00)  HR: 95 (01 Jun 2022 07:00) (94 - 157)  BP: 104/61 (01 Jun 2022 07:00) (104/61 - 160/84)  BP(mean): 76 (01 Jun 2022 07:00) (76 - 113)  ABP: --  ABP(mean): --  RR: 21 (01 Jun 2022 07:00) (14 - 30)  SpO2: 97% (01 Jun 2022 07:00) (91% - 100%)        05-31 @ 07:01  -  06-01 @ 07:00  --------------------------------------------------------  IN: 1391 mL / OUT: 1190 mL / NET: 201 mL      CAPILLARY BLOOD GLUCOSE      POCT Blood Glucose.: 98 mg/dL (01 Jun 2022 06:07)      PHYSICAL EXAM:      LINES:    HOSPITAL MEDICATIONS:  Standing Meds:  chlorhexidine 4% Liquid 1 Application(s) Topical <User Schedule>  chlorhexidine 4% Liquid 1 Application(s) Topical <User Schedule>  dextrose 5%. 1000 milliLiter(s) IV Continuous <Continuous>  dextrose 5%. 1000 milliLiter(s) IV Continuous <Continuous>  dextrose 50% Injectable 25 Gram(s) IV Push once  dextrose 50% Injectable 12.5 Gram(s) IV Push once  dextrose 50% Injectable 25 Gram(s) IV Push once  folic acid Injectable 1 milliGRAM(s) IV Push daily  gabapentin 300 milliGRAM(s) Oral daily  glucagon  Injectable 1 milliGRAM(s) IntraMuscular once  heparin  Infusion. 1900 Unit(s)/Hr IV Continuous <Continuous>  insulin lispro (ADMELOG) corrective regimen sliding scale   SubCutaneous every 6 hours  lidocaine   4% Patch 1 Patch Transdermal daily  metoprolol tartrate Injectable 2.5 milliGRAM(s) IV Push every 8 hours  multivitamin/minerals 1 Tablet(s) Oral daily  tamsulosin 0.4 milliGRAM(s) Oral at bedtime      PRN Meds:  acetaminophen    Suspension .. 500 milliGRAM(s) Oral every 6 hours PRN  albuterol/ipratropium for Nebulization 3 milliLiter(s) Nebulizer every 6 hours PRN  dextrose Oral Gel 15 Gram(s) Oral once PRN  sodium chloride 0.9% lock flush 10 milliLiter(s) IV Push every 1 hour PRN      LABS:                        7.8    33.90 )-----------( 120      ( 01 Jun 2022 01:16 )             25.5     Hgb Trend: 7.8<--, 8.4<--, 8.7<--, 8.9<--, 8.7<--  06-01    136  |  102  |  21  ----------------------------<  90  3.4<L>   |  23  |  1.20    Ca    8.3<L>      01 Jun 2022 01:16  Phos  3.6     06-01  Mg     2.0     06-01    TPro  6.0  /  Alb  2.3<L>  /  TBili  1.1  /  DBili  x   /  AST  30  /  ALT  13  /  AlkPhos  469<H>  06-01    Creatinine Trend: 1.20<--, 1.18<--, 1.19<--, 1.17<--, 1.16<--  PT/INR - ( 01 Jun 2022 01:16 )   PT: 16.1 sec;   INR: 1.38 ratio         PTT - ( 01 Jun 2022 04:47 )  PTT:76.0 sec    Arterial Blood Gas:  05-31 @ 00:00  7.47/33/91/24/98.2/0.6  ABG lactate: --        MICROBIOLOGY:     Culture - Acid Fast - Stool w/Smear (collected 31 May 2022 14:22)  Source: .Stool Stool    Culture - Acid Fast - Sputum w/Smear (collected 31 May 2022 08:54)  Source: .Sputum Sputum    Culture - Sputum (collected 30 May 2022 13:12)  Source: .Sputum Sputum  Gram Stain (30 May 2022 21:07):    No polymorphonuclear leukocytes per low power field    No Squamous epithelial cells per low power field    No organisms seen per oil power field  Preliminary Report (31 May 2022 17:54):    Normal Respiratory Wen present    Culture - Stool (collected 30 May 2022 11:49)  Source: .Stool sarthak betito  Preliminary Report (31 May 2022 15:22):    No enteric pathogens to date: Final culture pending    GI PCR Panel, Stool (collected 30 May 2022 09:55)  Source: .Stool Feces sarthak betito  Final Report (30 May 2022 19:15):    GI PCR Results: NOT detected    *******Please Note:*******    GI panel PCR evaluates for:    Campylobacter, Plesiomonas shigelloides, Salmonella,    Vibrio, Yersinia enterocolitica, Enteroaggregative    Escherichia coli (EAEC), Enteropathogenic E.coli (EPEC),    Enterotoxigenic E. coli (ETEC) lt/st, Shiga-like    toxin-producing E. coli (STEC) stx1/stx2,    Shigella/ Enteroinvasive E. coli (EIEC), Cryptosporidium,    Cyclospora cayetanensis, Entamoeba histolytica,    Giardia lamblia, Adenovirus F 40/41, Astrovirus,    Norovirus GI/GII, Rotavirus A, Sapovirus    Culture - Blood (collected 30 May 2022 05:08)  Source: .Blood Blood  Preliminary Report (31 May 2022 12:01):    No growth to date.    Culture - Fungal, Blood (collected 30 May 2022 05:06)  Source: .Blood Blood  Preliminary Report (31 May 2022 10:59):    Testing in progress    Culture - Blood (collected 30 May 2022 04:55)  Source: .Blood Blood  Preliminary Report (31 May 2022 12:01):    No growth to date.      RADIOLOGY:  [ ] Reviewed and interpreted by me    EKG:       CHIEF COMPLAINT:Patient is a 66y old  Male who presents with a chief complaint of recurrent fevers, unknown source (31 May 2022 17:01)        Interval Events:  intermittent fevers requiring tylenol    persistent tachycardia being managed with lopressor    pending LN biopsy     REVIEW OF SYSTEMS:    Const fevers  Resp no sob  CV no chest pain or palpitation  Abd abdominal distension no pain     OBJECTIVE:  ICU Vital Signs Last 24 Hrs  T(C): 20 (01 Jun 2022 07:00), Max: 39.7 (31 May 2022 16:00)  T(F): 100 (01 Jun 2022 07:00), Max: 103.5 (31 May 2022 16:00)  HR: 95 (01 Jun 2022 07:00) (94 - 157)  BP: 104/61 (01 Jun 2022 07:00) (104/61 - 160/84)  BP(mean): 76 (01 Jun 2022 07:00) (76 - 113)  ABP: --  ABP(mean): --  RR: 21 (01 Jun 2022 07:00) (14 - 30)  SpO2: 97% (01 Jun 2022 07:00) (91% - 100%)        05-31 @ 07:01  -  06-01 @ 07:00  --------------------------------------------------------  IN: 1391 mL / OUT: 1190 mL / NET: 201 mL      CAPILLARY BLOOD GLUCOSE      POCT Blood Glucose.: 98 mg/dL (01 Jun 2022 06:07)      PHYSICAL EXAM:  GENERAL: appears fatigued, weak  ENT: Moist mucous membranes  CHEST/LUNG: no wheezing or increased WOB  HEART: tachycardic, reg rhythm; No murmurs, rubs, or gallops  ABDOMEN: Abdomen feels full, Bowel sounds present; Soft, Nontender  EXTREMITIES:  2+ Peripheral Pulses, brisk capillary refill. No clubbing, cyanosis, or edema  NERVOUS SYSTEM:  Alert & Oriented X3  MSK: FROM all 4 extremities, full and equal strength  SKIN: No rashes or lesions      LINES:    HOSPITAL MEDICATIONS:  Standing Meds:  chlorhexidine 4% Liquid 1 Application(s) Topical <User Schedule>  chlorhexidine 4% Liquid 1 Application(s) Topical <User Schedule>  dextrose 5%. 1000 milliLiter(s) IV Continuous <Continuous>  dextrose 5%. 1000 milliLiter(s) IV Continuous <Continuous>  dextrose 50% Injectable 25 Gram(s) IV Push once  dextrose 50% Injectable 12.5 Gram(s) IV Push once  dextrose 50% Injectable 25 Gram(s) IV Push once  folic acid Injectable 1 milliGRAM(s) IV Push daily  gabapentin 300 milliGRAM(s) Oral daily  glucagon  Injectable 1 milliGRAM(s) IntraMuscular once  heparin  Infusion. 1900 Unit(s)/Hr IV Continuous <Continuous>  insulin lispro (ADMELOG) corrective regimen sliding scale   SubCutaneous every 6 hours  lidocaine   4% Patch 1 Patch Transdermal daily  metoprolol tartrate Injectable 2.5 milliGRAM(s) IV Push every 8 hours  multivitamin/minerals 1 Tablet(s) Oral daily  tamsulosin 0.4 milliGRAM(s) Oral at bedtime      PRN Meds:  acetaminophen    Suspension .. 500 milliGRAM(s) Oral every 6 hours PRN  albuterol/ipratropium for Nebulization 3 milliLiter(s) Nebulizer every 6 hours PRN  dextrose Oral Gel 15 Gram(s) Oral once PRN  sodium chloride 0.9% lock flush 10 milliLiter(s) IV Push every 1 hour PRN      LABS:                        7.8    33.90 )-----------( 120      ( 01 Jun 2022 01:16 )             25.5     Hgb Trend: 7.8<--, 8.4<--, 8.7<--, 8.9<--, 8.7<--  06-01    136  |  102  |  21  ----------------------------<  90  3.4<L>   |  23  |  1.20    Ca    8.3<L>      01 Jun 2022 01:16  Phos  3.6     06-01  Mg     2.0     06-01    TPro  6.0  /  Alb  2.3<L>  /  TBili  1.1  /  DBili  x   /  AST  30  /  ALT  13  /  AlkPhos  469<H>  06-01    Creatinine Trend: 1.20<--, 1.18<--, 1.19<--, 1.17<--, 1.16<--  PT/INR - ( 01 Jun 2022 01:16 )   PT: 16.1 sec;   INR: 1.38 ratio         PTT - ( 01 Jun 2022 04:47 )  PTT:76.0 sec    Arterial Blood Gas:  05-31 @ 00:00  7.47/33/91/24/98.2/0.6  ABG lactate: --        MICROBIOLOGY:     Culture - Acid Fast - Stool w/Smear (collected 31 May 2022 14:22)  Source: .Stool Stool    Culture - Acid Fast - Sputum w/Smear (collected 31 May 2022 08:54)  Source: .Sputum Sputum    Culture - Sputum (collected 30 May 2022 13:12)  Source: .Sputum Sputum  Gram Stain (30 May 2022 21:07):    No polymorphonuclear leukocytes per low power field    No Squamous epithelial cells per low power field    No organisms seen per oil power field  Preliminary Report (31 May 2022 17:54):    Normal Respiratory Wen present    Culture - Stool (collected 30 May 2022 11:49)  Source: .Stool sarthak betito  Preliminary Report (31 May 2022 15:22):    No enteric pathogens to date: Final culture pending    GI PCR Panel, Stool (collected 30 May 2022 09:55)  Source: .Stool Feces sarthak betito  Final Report (30 May 2022 19:15):    GI PCR Results: NOT detected    *******Please Note:*******    GI panel PCR evaluates for:    Campylobacter, Plesiomonas shigelloides, Salmonella,    Vibrio, Yersinia enterocolitica, Enteroaggregative    Escherichia coli (EAEC), Enteropathogenic E.coli (EPEC),    Enterotoxigenic E. coli (ETEC) lt/st, Shiga-like    toxin-producing E. coli (STEC) stx1/stx2,    Shigella/ Enteroinvasive E. coli (EIEC), Cryptosporidium,    Cyclospora cayetanensis, Entamoeba histolytica,    Giardia lamblia, Adenovirus F 40/41, Astrovirus,    Norovirus GI/GII, Rotavirus A, Sapovirus    Culture - Blood (collected 30 May 2022 05:08)  Source: .Blood Blood  Preliminary Report (31 May 2022 12:01):    No growth to date.    Culture - Fungal, Blood (collected 30 May 2022 05:06)  Source: .Blood Blood  Preliminary Report (31 May 2022 10:59):    Testing in progress    Culture - Blood (collected 30 May 2022 04:55)  Source: .Blood Blood  Preliminary Report (31 May 2022 12:01):    No growth to date.      RADIOLOGY:  [ ] Reviewed and interpreted by me    EKG:

## 2022-06-01 NOTE — PROGRESS NOTE ADULT - ASSESSMENT
67 yo M with a PMH HTN, HLD, SLE on Cellcept (MMF), class V lupus nephritis, CKD stage 2, DM2, diabetic neuropathy, TIA (2019) on plavix, BPH, HUNG, hospitalization in [facility] 3/27-4/20 tx for pancreatitis and C diff?, recent hospitalization at [place] 4/22-4/26 for acute pancreatitis and C diff colitis and another hospitalization 5/5 at [place] for septic shock (source buttock abscess) treated with vanc, cefepime (5/5-5/10), meropenem (5/11-5/18) and micafungin. Zosyn added 5/26 course complicated by recurrent fever now with suspected R PE and imaging with peritoneal lymphadenopathy lymphoma versus TB pending IR biopsy monitoring off abx.     Neuro  - AAOx3 but slow  - check ammonia level given hepatosplenomegaly with possible cirrhosis  #diabetic neuropathy  -start on home gabapentin    Pulm  #large left posterior consolidation seen on ultrasoud, possible PNA?  -on 2L NC, satting 98%  -cover with empiric abx for HAP  #upper airway stridor, wheezing  -AM consult ENT eval for upper airway stridor  -f/u CT neck/chest, looking for lymphadenopathy and possibly options for lymph node bx  -check RVP, urine legionella  -sputum culture  -gladys motabs    #RLL PE  -heparin gtt  -no further eliquis pending possible LN biopsy       Cardio  #HTN  - hold off on antihypertensive meds for now as patient could be septic  #HLD, hypertriglyceridemia  -f/u lipid profile  -check official TTE      Renal  #CKD stage 2, hx of lupus nephritis  -cont to trend Cr  -monitor I/Os  -cont to monitor electrolytes, replete as necessary    GI  #chronic inflammatory diarrhea  #possible Crohn's vs colitis vs infectious  -calprotectin elevated at [facility]  -check stool culture, stool PCR, ova and parasites, c diff  -consult ID and GI  #Diet  -NPO, patient has been receiving PPN at [facility] since 5/15  -S&S eval  -TPN consult, start on D10 for now  #hx of pancreatitis, elevated lipase  -recheck lipase  #hepatosplenomegaly shown on MRI at [facility]  -check hepatitis panel      #hx of BPH  -start on flomax    Endocrine  #DM2  -last HbA1c 4/29 6.5%  -start on ISS    ID/rheum  #hx of SLE  #recurrent fevers and leukocytosis of unknown etiology  diff dx: infectious, malignancy, IgG4 disease, HLH  -elevated WBC, elevated IgA at [facility]  -check quant gold  -resend cultures (blood, stool), UA, check procal, fungitell, galactomannan, MRSA swab  -check HIV, EBV, CMV  -possible yeast? unknown source at [facility]  -start on caspofungin  -consider doxycycline?  #r/o HLH, IgG4  -has fevers, splenomegaly, cytopenia  -check HLH labs: soluble IL-2 levels, ferritin, triglyceride  -check IgG4    Heme  #Anemia  -outside hospital reported to be consistent with AOCD  -CT ab pending, r/o bleed  -transfuse if Hb<7, maintain active type and screen  -DVT ppx: full AC    Lines: TOBY placed 5/27

## 2022-06-01 NOTE — CONSULT NOTE ADULT - SUBJECTIVE AND OBJECTIVE BOX
CHIEF COMPLAINT:      PAST MEDICAL & SURGICAL HISTORY:  Obstructive Sleep Apnea      Hepatitis B Carrier      Pneumonia      Other systemic lupus erythematosus with endocarditis      Type 2 diabetes mellitus with other neurologic complication, without long-term current use of insulin      Sleep apnea, unspecified type      Hypertension, unspecified type      Nephritic syndrome      No significant past surgical history          HPI:67 yo M with a PMH HTN, HLD, SLE on Cellcept (MMF), class V lupus nephritis, CKD stage 2, DM2, diabetic neuropathy, TIA () on plavix, BPH, HUNG, hospitalization in [facility] 3/27- tx for pancreatitis and C diff?, recent hospitalization at [facility] - for acute pancreatitis and C diff colitis and another hospitalization  at [facility] for septic shock (source buttock abscess) treated with vanc, cefepime (-5/10), meropenem (-) and micafungin. Zosyn added . Course complicated by recurrent fevers despite being on antibiotics, and leukocytosis up to 30k. Blood cx neg, UA unremarkable. MRI abdomen w/ contrast performed showing extensive abd lymphadenopathy (reactive to c diff vs lymphoma?). IR stated no window for bx. Course also complicated by a L brachial vein DVT and superficial DVT in arms. Had AFIB up to 3 minutes at [place].  Patient noted to have had a positive PPD but has possibly gotten the BCG vaccine. Quant gold performed at [facility] still pending.  Found to have PE, recurrent fevers, neeeds raissa biopsy. Noted to have short bursts of PAF yesterday, started on lopressor 5 IV q 12    Patient had a prior evaluation by me at the time of the TIA in . At that time, TIA occurred on ASA, and echo and stress test were normal                    PREVIOUS DIAGNOSTIC TESTING:      ECHO  FINDINGS:    STRESS  FINDINGS:    CATHETERIZATION  FINDINGS:    ELECTROPHYSIOLOGY STUDY  FINDINGS:    CAROTID ULTRASOUND:  FINDINGS    VENOUS DUPLEX SCAN:  FINDINGS:    CHEST CT PULMONARY ANGIO with IV Contrast:  FINDINGS:  MEDICATIONS  (STANDING):  chlorhexidine 4% Liquid 1 Application(s) Topical <User Schedule>  chlorhexidine 4% Liquid 1 Application(s) Topical <User Schedule>  dextrose 5%. 1000 milliLiter(s) (100 mL/Hr) IV Continuous <Continuous>  dextrose 5%. 1000 milliLiter(s) (50 mL/Hr) IV Continuous <Continuous>  dextrose 50% Injectable 25 Gram(s) IV Push once  dextrose 50% Injectable 12.5 Gram(s) IV Push once  dextrose 50% Injectable 25 Gram(s) IV Push once  folic acid Injectable 1 milliGRAM(s) IV Push daily  gabapentin 300 milliGRAM(s) Oral daily  glucagon  Injectable 1 milliGRAM(s) IntraMuscular once  heparin  Infusion. 1900 Unit(s)/Hr (19 mL/Hr) IV Continuous <Continuous>  insulin lispro (ADMELOG) corrective regimen sliding scale   SubCutaneous every 6 hours  lidocaine   4% Patch 1 Patch Transdermal daily  metoprolol tartrate Injectable 2.5 milliGRAM(s) IV Push every 8 hours  multivitamin/minerals 1 Tablet(s) Oral daily  tamsulosin 0.4 milliGRAM(s) Oral at bedtime    MEDICATIONS  (PRN):  acetaminophen    Suspension .. 500 milliGRAM(s) Oral every 6 hours PRN Temp greater or equal to 38C (100.4F), Mild Pain (1 - 3)  albuterol/ipratropium for Nebulization 3 milliLiter(s) Nebulizer every 6 hours PRN Shortness of Breath and/or Wheezing  dextrose Oral Gel 15 Gram(s) Oral once PRN Blood Glucose LESS THAN 70 milliGRAM(s)/deciliter  sodium chloride 0.9% lock flush 10 milliLiter(s) IV Push every 1 hour PRN Pre/post blood products, medications, blood draw, and to maintain line patency      FAMILY HISTORY:  FH: type 2 diabetes        SOCIAL HISTORY:    CIGARETTES:    ALCOHOL:    REVIEW OF SYSTEMS:    CONSTITUTIONAL: No fever, weight loss, chills, shakes, or fatigue  EYES: No eye pain, visual disturbances, or discharge  ENMT:  No difficulty hearing, tinnitus, vertigo; No sinus or throat pain  NECK: No pain or stiffness  BREASTS: No pain, masses, or nipple discharge  RESPIRATORY: No cough, wheezing, hemoptysis, or shortness of breath  CARDIOVASCULAR: No chest pain, dyspnea, palpitations, dizziness, syncope, paroxysmal nocturnal dyspnea, orthopnea, or arm or leg swelling  GASTROINTESTINAL: No abdominal  or epigastric pain, nausea, vomiting, hematemesis, diarrhea, constipation, melena or bright red blood.  GENITOURINARY: No dysuria, nocturia, hematuria, or urinary incontinence  NEUROLOGICAL: No headaches, memory loss, slurred speech, limb weakness, loss of strength, numbness, or tremors  SKIN: No itching, burning, rashes, or lesions   LYMPH NODES: No enlarged glands  ENDOCRINE: No heat or cold intolerance, or hair loss  MUSCULOSKELETAL: No joint pain or swelling, muscle, back, or extremity pain  PSYCHIATRIC: No depression, anxiety, or difficulty sleeping  HEME/LYMPH: No easy bruising or bleeding gums  ALLERY AND IMMUNOLOGIC: No hives or rash.      Vital Signs Last 24 Hrs  T(C): 20 (2022 07:00), Max: 39.7 (31 May 2022 16:00)  T(F): 100 (2022 07:00), Max: 103.5 (31 May 2022 16:00)  HR: 95 (:) (94 - 157)  BP: 104/61 (2022 07:00) (104/61 - 160/84)  BP(mean): 76 (:) (76 - 113)  RR: 21 (:) (14 - 30)  SpO2: 97% (:) (91% - 100%)  Daily     Daily Weight in k.3 (2022 01:00)    05-31 @ 07:01  -  06- @ 07:00  --------------------------------------------------------  IN: 1391 mL / OUT: 1190 mL / NET: 201 mL          PHYSICAL EXAM:    GENERAL: In no apparent distress, well nourished, and hydrated.  HEAD:  Atraumatic, Normocephalic  EYES: EOMI, PERRLA, conjunctiva and sclera clear  ENMT: No tonsillar erythema, exudates, or enlargement; Moist mucous membranes, Good dentition, No lesions  NECK: Supple and normal thyroid.  No JVD or carotid bruit.  Carotid pulse is 2+ bilaterally.  HEART: Regular rate and rhythm; No murmurs, rubs, or gallops.  PULMONARY: Clear to auscultation and perfusion.  No rales, wheezing, or rhonchi bilaterally.  ABDOMEN: Soft, Nontender, Nondistended; Bowel sounds present  EXTREMITIES:  2+ Peripheral Pulses, No clubbing, cyanosis, or edema  LYMPH: No lymphadenopathy noted  NEUROLOGICAL: Grossly nonfocal          INTERPRETATION OF TELEMETRY:    ECG:    I&O's Detail    31 May 2022 07:01  -  2022 07:00  --------------------------------------------------------  IN:    Albumin 25%  -  50 mL: 100 mL    Heparin Infusion: 471 mL    Oral Fluid: 820 mL  Total IN: 1391 mL    OUT:    Incontinent per Condom Catheter (mL): 1190 mL  Total OUT: 1190 mL    Total NET: 201 mL          LABS:                        7.8    33.90 )-----------( 120      ( 2022 01:16 )             25.5     06-01    136  |  102  |  21  ----------------------------<  90  3.4<L>   |  23  |  1.20    Ca    8.3<L>      2022 01:16  Phos  3.6     06-  Mg     2.0     06-    TPro  6.0  /  Alb  2.3<L>  /  TBili  1.1  /  DBili  x   /  AST  30  /  ALT  13  /  AlkPhos  469<H>  06-01        PT/INR - ( 2022 01:16 )   PT: 16.1 sec;   INR: 1.38 ratio         PTT - ( 2022 04:47 )  PTT:76.0 sec    BNP  I&O's Detail    31 May 2022 07:01  -  2022 07:00  --------------------------------------------------------  IN:    Albumin 25%  -  50 mL: 100 mL    Heparin Infusion: 471 mL    Oral Fluid: 820 mL  Total IN: 1391 mL    OUT:    Incontinent per Condom Catheter (mL): 1190 mL  Total OUT: 1190 mL    Total NET: 201 mL        Daily     Daily Weight in k.3 (2022 01:00)    RADIOLOGY & ADDITIONAL STUDIES:

## 2022-06-01 NOTE — PROGRESS NOTE ADULT - SUBJECTIVE AND OBJECTIVE BOX
Patient is a 66y old  Male who presents with a chief complaint of recurrent fevers, unknown source (01 Jun 2022 08:39)    Patient seen and examined this morning.    MEDICATIONS  (STANDING):  chlorhexidine 4% Liquid 1 Application(s) Topical <User Schedule>  chlorhexidine 4% Liquid 1 Application(s) Topical <User Schedule>  dextrose 5%. 1000 milliLiter(s) (100 mL/Hr) IV Continuous <Continuous>  dextrose 5%. 1000 milliLiter(s) (50 mL/Hr) IV Continuous <Continuous>  dextrose 50% Injectable 25 Gram(s) IV Push once  dextrose 50% Injectable 12.5 Gram(s) IV Push once  dextrose 50% Injectable 25 Gram(s) IV Push once  famotidine    Tablet 20 milliGRAM(s) Oral daily  folic acid Injectable 1 milliGRAM(s) IV Push daily  gabapentin 300 milliGRAM(s) Oral daily  glucagon  Injectable 1 milliGRAM(s) IntraMuscular once  heparin  Infusion. 1900 Unit(s)/Hr (19 mL/Hr) IV Continuous <Continuous>  insulin lispro (ADMELOG) corrective regimen sliding scale   SubCutaneous every 6 hours  lidocaine   4% Patch 1 Patch Transdermal daily  metoprolol tartrate Injectable 2.5 milliGRAM(s) IV Push every 8 hours  multivitamin/minerals 1 Tablet(s) Oral daily  tamsulosin 0.4 milliGRAM(s) Oral at bedtime    MEDICATIONS  (PRN):  acetaminophen    Suspension .. 500 milliGRAM(s) Oral every 6 hours PRN Temp greater or equal to 38C (100.4F), Mild Pain (1 - 3)  dextrose Oral Gel 15 Gram(s) Oral once PRN Blood Glucose LESS THAN 70 milliGRAM(s)/deciliter  sodium chloride 0.9% lock flush 10 milliLiter(s) IV Push every 1 hour PRN Pre/post blood products, medications, blood draw, and to maintain line patency      ROS  No, sweats, chills  + febrile  No epistaxis, HA, sore throat  No CP, SOB, cough, sputum  No n/v/d, abd pain, melena, hematochezia  No edema  No rash  + anxiety  No back pain, joint pain  No bleeding, bruising  No dysuria, hematuria    Vital Signs Last 24 Hrs  T(C): 25 (01 Jun 2022 10:00), Max: 39.7 (31 May 2022 16:00)  T(F): 101.7 (01 Jun 2022 10:00), Max: 103.5 (31 May 2022 16:00)  HR: 117 (01 Jun 2022 10:00) (94 - 157)  BP: 130/64 (01 Jun 2022 10:00) (104/61 - 160/84)  BP(mean): 91 (01 Jun 2022 10:00) (76 - 113)  RR: 22 (01 Jun 2022 10:00) (14 - 30)  SpO2: 99% (01 Jun 2022 10:00) (94% - 100%)    PE  NAD  Awake, alert  Anicteric, MMM  No c/c/e  No rash grossly                          7.8    33.90 )-----------( 120      ( 01 Jun 2022 01:16 )             25.5       06-01    136  |  102  |  21  ----------------------------<  90  3.4<L>   |  23  |  1.20    Ca    8.3<L>      01 Jun 2022 01:16  Phos  3.6     06-01  Mg     2.0     06-01    TPro  6.1  /  Alb  x   /  TBili  x   /  DBili  x   /  AST  x   /  ALT  x   /  AlkPhos  x   06-01

## 2022-06-01 NOTE — CONSULT NOTE ADULT - ASSESSMENT
67 yo M with a PMH HTN, HLD, SLE on Cellcept (MMF), class V lupus nephritis, CKD stage 2, DM2, diabetic neuropathy, TIA (2019) on plavix, BPH, HUNG, hospitalization in [facility] 3/27-4/20 tx for pancreatitis and C diff?, recent hospitalization at [place] 4/22-4/26 for acute pancreatitis and C diff colitis and another hospitalization 5/5 at [place] for septic shock (source buttock abscess) treated with vanc, cefepime (5/5-5/10), meropenem (5/11-5/18) and micafungin. Zosyn added 5/26 course complicated by recurrent fever now with suspected R PE and imaging with peritoneal lymphadenopathy lymphoma versus TB pending IR biopsy monitoring off abx. Had PAF in [facility], now shiort bursts on this admission     advise    1. replete K>4, mag >2  2. metoprolol 2.5 IV q 8 if BP will tolerate,, if taking PO , can change to metoprolol 12.5 PO BID  3. on heparin IV  4. check ECHO ( in past has been normal) with agitated saline  5. patient has no evidence of CHF or CAD. Overall cardiac stable to proceed with needed raissa biospy, abdominal surgery to remove lymph node if needed to make diagnosis.  6. ensure adequate hydration and nutrition, was on TPN at [place]?

## 2022-06-01 NOTE — OCCUPATIONAL THERAPY INITIAL EVALUATION ADULT - PERTINENT HX OF CURRENT PROBLEM, REHAB EVAL
65 y/o M with a PMH HTN, HLD, SLE on Cellcept (MMF), class V lupus nephritis, CKD stage 2, DM2, diabetic neuropathy, TIA (2019) on plavix, BPH, HUNG, hospitalization in [facility] 3/27-4/20 tx for pancreatitis and C diff?, recent hospitalization at [facility] 4/22-4/26 for acute pancreatitis and C diff colitis and another hospitalization 5/5 at [facility] for septic shock treated with antibiotics, course complicated by recurrent fever now with R PE

## 2022-06-01 NOTE — PHYSICAL THERAPY INITIAL EVALUATION ADULT - PRECAUTIONS/LIMITATIONS, REHAB EVAL
and imaging with peritoneal lymphadenopathy lymphoma versus TB pending IR biopsy monitoring off abx, pt was started on hep gtt. CTA&P: Questionable filling defect in RIGHT lower lobe medial pulmonary artery branches may be artifactual. If there is a question regarding possible pulmonary embolus.  Bilateral lower lobe atelectasis with bilateral pleural effusions./cardiac precautions/fall precautions

## 2022-06-01 NOTE — PROGRESS NOTE ADULT - ATTENDING COMMENTS
PT seen and examined. 66 yr M with medical hx and hospital course as noted now with fever, anasarca, mesenteric, RP, submandibular and L SC lymphadenopathy, cirrhosis/ ascites with portal HTN, splenic infarcts, L brachial DVT and possible filling defect on in RLL which may represent a PE. DDx includes TB vs malignancy. BM Bx at [facility] reportedly negative, will obtain report. Planned for Ln bx and diagnostic paracentesis by IR on 6/2. BP remains stables, ongoing fever which is likely driving the tachycardia.  Currently sinus tach but has had bursts of A fib at [facility]. Now on Lopressor per EP. Gentle hydration with LR. PT seen and examined. 66 yr M with medical hx and hospital course as noted now with fever, anasarca, mesenteric, RP, submandibular and L SC lymphadenopathy, cirrhosis/ ascites with portal HTN, splenic infarcts, L brachial DVT and possible filling defect on in RLL which may represent a PE. DDx includes TB vs malignancy. BM Bx at [facility] reportedly negative, will obtain report. Planned for Ln bx and diagnostic paracentesis by IR on 6/2. BP remains stables, ongoing fever which is likely driving the tachycardia.  Currently sinus tach but has had bursts of A fib at [facility]. Now on Lopressor per EP, cont to monitor HR. Gentle hydration with LR. Remains off ABx per ID, Cxs so far NGTD. No evidence of necrotizing infection in the R buttock. Overall prognosis guarded. Pt and his wife updated in detail at bedside.

## 2022-06-01 NOTE — PHYSICAL THERAPY INITIAL EVALUATION ADULT - PERTINENT HX OF CURRENT PROBLEM, REHAB EVAL
67 y/o M with a PMH HTN, HLD, SLE on Cellcept (MMF), class V lupus nephritis, CKD stage 2, DM2, diabetic neuropathy, TIA (2019) on plavix, BPH, HUNG, hospitalization in [facility] 3/27-4/20 tx for pancreatitis and C diff?, recent hospitalization at [place] 4/22-4/26 for acute pancreatitis and C diff colitis and another hospitalization 5/5 at [place] for septic shock treated with antibiotics, course complicated by recurrent fever now with R PE

## 2022-06-01 NOTE — OCCUPATIONAL THERAPY INITIAL EVALUATION ADULT - NS ASR OT EQUIP NEEDS DISCH
Chepe from [facility], at request of radiologist, to clarify order for large volume tap. Pt is scheduled for LP at 10am today, and they are uncertain if a large volume tap is necessary. Wanted to clarify before beginning procedure.   [facility] can be called b
Dr. Knox Peers spoke with xray to relay message below and recommendations. No further action required.
LP is for evaluation of demyelinating disease. No large volume tap needed.
tbd at rehab

## 2022-06-01 NOTE — CHART NOTE - NSCHARTNOTEFT_GEN_A_CORE
MICU Transfer Note    Transfer from: MICU    Transfer to: (  ) Medicine    ( X ) Telemetry with isolation     (   ) RCU        (    ) Palliative         (   ) Stroke Unit          (   ) __________________    Accepting Physician:  Signout given to:     MICU COURSE:    65 yo M with a PMH HTN, HLD, SLE on Cellcept (MMF), class V lupus nephritis, CKD stage 2, DM2, diabetic neuropathy, TIA (2019) on plavix, BPH, HUNG, hospitalization in [facility] 3/27-4/20 tx for pancreatitis and C diff?, recent hospitalization at [place] 4/22-4/26 for acute pancreatitis and C diff colitis and another hospitalization 5/5 at [place] for septic shock (source buttock abscess) treated with vanc, cefepime (5/5-5/10), meropenem (5/11-5/18) and micafungin. Zosyn added 5/26. Course complicated by recurrent fevers despite being on antibiotics, and leukocytosis up to 30k. Blood cx neg, UA unremarkable. MRI abdomen w/ contrast performed showing extensive abd lymphadenopathy (reactive to c diff vs lymphoma?). IR stated no window for bx. Course also complicated by a L brachial vein DVT and superficial DVT in arms. Patient noted to have had a positive PPD but has possibly gotten the BCG vaccine. Quant gold performed at [place] still pending.     Admitted to [facility] MICU for further management. Vital signs on arrival: temp 38.6, /77, , RR 28, O2 sat 97% on 2L NC.  Labs: WBC 34.53, Hb 9.6, lipase 740, procal 2.02, tBili 1.4, alk phos 498, lactate 2.4.    Imaging concerning for PE RLL placed on heparin gtt additionally with know LUE brachial DVT. Found to have cirrhosis and splenomegaly complicated by portal hyptertension and varices. With peritoneal lymphadenopathy greater than retroperitoneal concerning for peritoneal TB versus lymphoma. Bone marrow biopsy result communicated via Dr. Del Cid [place] negative for malignancy. With IR biopsy planned 6/2 supraclavicular/cervical LN biopsy and additionally planned for paracentesis. Noted to have paroxysm of afib to 160s on 5/31  which later transitioned to sinus rhythm with ectopy sustaining 120s-130s initially with soft blood pressures 100s  improving to 110s and greater. Given lopressor 5mg at that time and unable to tolerate po given lopressor 5mg q6h. With decreased functional status PT and OT consulted.           ASSESSMENT & PLAN:             FOR FOLLOW UP:      Venkat Steel MD  Internal Medicine  Pager #[phone] MICU Transfer Note    Transfer from: MICU    Transfer to: (  ) Medicine    ( X ) Telemetry with isolation     (   ) RCU        (    ) Palliative         (   ) Stroke Unit          (   ) __________________    Accepting Physician:  Signout given to:     MICU COURSE:    67 yo M with a PMH HTN, HLD, SLE on Cellcept (MMF), class V lupus nephritis, CKD stage 2, DM2, diabetic neuropathy, TIA (2019) on plavix, BPH, HUNG, hospitalization in [facility] 3/27-4/20 tx for pancreatitis and C diff?, recent hospitalization at [place] 4/22-4/26 for acute pancreatitis and C diff colitis and another hospitalization 5/5 at [place] for septic shock (source buttock abscess) treated with vanc, cefepime (5/5-5/10), meropenem (5/11-5/18) and micafungin. Zosyn added 5/26. Course complicated by recurrent fevers despite being on antibiotics, and leukocytosis up to 30k. Blood cx neg, UA unremarkable. MRI abdomen w/ contrast performed showing extensive abd lymphadenopathy (reactive to c diff vs lymphoma?). IR stated no window for bx. Course also complicated by a L brachial vein DVT and superficial DVT in arms. Patient noted to have had a positive PPD but has possibly gotten the BCG vaccine. Quant gold performed at [place] still pending.     Admitted to [facility] MICU for further management. Vital signs on arrival: temp 38.6, /77, , RR 28, O2 sat 97% on 2L NC.  Labs: WBC 34.53, Hb 9.6, lipase 740, procal 2.02, tBili 1.4, alk phos 498, lactate 2.4.    Imaging concerning for PE RLL placed on heparin gtt additionally with know LUE brachial DVT. Found to have cirrhosis and splenomegaly complicated by portal hyptertension and varices. With peritoneal lymphadenopathy greater than retroperitoneal concerning for peritoneal TB versus lymphoma. Bone marrow biopsy result communicated via Dr. Del Cid [place] negative for malignancy. With IR biopsy planned 6/2 supraclavicular/cervical LN biopsy and additionally planned for paracentesis. Noted to have paroxysm of afib to 160s on 5/31  which later transitioned to sinus rhythm with ectopy sustaining 120s-130s initially with soft blood pressures 100s  improving to 110s and greater. Given lopressor 5mg at that time and unable to tolerate po given lopressor 5mg q6h. ID consulted holding on further abx and antifungal with pending AFB sputum and stool. With decreased functional status PT and OT consulted.       ASSESSMENT & PLAN:     66M pmhx lupus on cellcept complicated by class V lupus nephritis, T2DM, diabetic neuropathy, TIA 2019, HUNG frequent hospitalizations pancreatitis and cdiff abroad in [facility] as well as [place] originally presenting as MICU to MICU transfer for recurrent fevers with clinical course complicated by RLL PE, paroxysm of afib and now monitoring off abx with ongoing TB workup pending LN biopsy and paracentesis with IR.      Neuro  - AAOx3 but slow  - Ammonia 36  -Fluctuating mentation while febrile which then returns to current clinical baseline slow however linear thought process and alert and oriented x3.     #diabetic neuropathy  -home gabapentin    Pulm  #large left posterior consolidation seen on ultrasoud, possible PNA?  -monitor off abx     #upper airway stridor, wheezing  -Resolved     #RLL PE  -heparin gtt  -no further eliquis pending LN biopsy and paracentesis with IR 6/2       Cardio  #HTN  - monitoring off home hypertensive meds     #Paroxysm of afib followed by sinus rhythm w frequent ectopy  -Lopressor 2.5 q6h for now.       Renal  #CKD stage 2, hx of lupus nephritis  -cont to trend Cr  -monitor I/Os  -cont to monitor electrolytes, replete as necessary    GI  #chronic inflammatory diarrhea  #possible Crohn's vs colitis vs infectious  -calprotectin elevated at [place] 532. no current plan by GI for colonoscopy at this time.   -C diff negative  -appreciate GI and ID input.     #Diet  -previously on TPN while at [facility]  -Supplementing with ensure, currently safe to swallow po nutrition.     #hx of pancreatitis, elevated lipase  -without current epigastric tenderness to palpation 5/30 lipase 740    #hepatosplenomegaly shown on MRI at [place]  -check hepatitis panel      #hx of BPH  -start on flomax    Endocrine  #DM2  -last HbA1c 4/29 6.5%  -start on ISS    ID/rheum  #hx of SLE  #recurrent fevers and leukocytosis of unknown etiology  diff dx: infectious, malignancy, IgG4 disease, HLH  -elevated WBC, elevated IgA at [place]  -check quant gold  -resend cultures (blood, stool), UA, check procal, fungitell, galactomannan, MRSA swab  -check HIV, EBV, CMV  -possible yeast? unknown source at [place]  -start on caspofungin  -consider doxycycline?  #r/o HLH, IgG4  -has fevers, splenomegaly, cytopenia  -check HLH labs: soluble IL-2 levels, ferritin, triglyceride  -check IgG4    Heme  #Anemia  -outside hospital reported to be consistent with AOCD  -CT ab pending, r/o bleed  -transfuse if Hb<7, maintain active type and screen  -DVT ppx: full AC    Lines: RIJ placed 5/27                FOR FOLLOW UP:

## 2022-06-02 NOTE — PROGRESS NOTE ADULT - SUBJECTIVE AND OBJECTIVE BOX
Venkat Steel MD  Internal Medicine  Pager #[phone]    CHIEF COMPLAINT:Patient is a 66y old  Male who presents with a chief complaint of recurrent fevers, unknown source (01 Jun 2022 11:42)        Interval Events:    REVIEW OF SYSTEMS:      OBJECTIVE:  ICU Vital Signs Last 24 Hrs  T(C): 38.2 (02 Jun 2022 06:00), Max: 39.8 (01 Jun 2022 22:00)  T(F): 100.8 (02 Jun 2022 06:00), Max: 103.6 (01 Jun 2022 22:00)  HR: 128 (02 Jun 2022 06:00) (96 - 132)  BP: 132/61 (02 Jun 2022 06:00) (90/56 - 155/74)  BP(mean): 88 (02 Jun 2022 06:00) (67 - 106)  ABP: --  ABP(mean): --  RR: 49 (02 Jun 2022 06:00) (14 - 49)  SpO2: 93% (02 Jun 2022 06:00) (93% - 100%)        06-01 @ 07:01  -  06-02 @ 07:00  --------------------------------------------------------  IN: 3590 mL / OUT: 1105 mL / NET: 2485 mL      CAPILLARY BLOOD GLUCOSE      POCT Blood Glucose.: 95 mg/dL (02 Jun 2022 06:06)      PHYSICAL EXAM:      LINES:    HOSPITAL MEDICATIONS:  Standing Meds:  chlorhexidine 4% Liquid 1 Application(s) Topical <User Schedule>  chlorhexidine 4% Liquid 1 Application(s) Topical <User Schedule>  dextrose 5%. 1000 milliLiter(s) IV Continuous <Continuous>  dextrose 5%. 1000 milliLiter(s) IV Continuous <Continuous>  dextrose 50% Injectable 25 Gram(s) IV Push once  dextrose 50% Injectable 12.5 Gram(s) IV Push once  dextrose 50% Injectable 25 Gram(s) IV Push once  famotidine    Tablet 20 milliGRAM(s) Oral daily  folic acid Injectable 1 milliGRAM(s) IV Push daily  gabapentin 300 milliGRAM(s) Oral daily  glucagon  Injectable 1 milliGRAM(s) IntraMuscular once  heparin  Infusion. 1900 Unit(s)/Hr IV Continuous <Continuous>  insulin lispro (ADMELOG) corrective regimen sliding scale   SubCutaneous every 6 hours  lactated ringers. 500 milliLiter(s) IV Continuous <Continuous>  lactated ringers. 1000 milliLiter(s) IV Continuous <Continuous>  lidocaine   4% Patch 1 Patch Transdermal daily  metoprolol tartrate Injectable 2.5 milliGRAM(s) IV Push every 6 hours  multivitamin/minerals 1 Tablet(s) Oral daily  tamsulosin 0.4 milliGRAM(s) Oral at bedtime      PRN Meds:  acetaminophen    Suspension .. 500 milliGRAM(s) Oral every 6 hours PRN  dextrose Oral Gel 15 Gram(s) Oral once PRN  sodium chloride 0.9% lock flush 10 milliLiter(s) IV Push every 1 hour PRN      LABS:                        7.8    33.11 )-----------( 143      ( 01 Jun 2022 23:56 )             25.4     Hgb Trend: 7.8<--, 7.8<--, 8.4<--, 8.7<--, 8.9<--  06-01    135  |  105  |  21  ----------------------------<  100<H>  4.2   |  17<L>  |  1.31<H>    Ca    8.4      01 Jun 2022 23:56  Phos  3.0     06-01  Mg     1.9     06-01    TPro  6.0  /  Alb  2.1<L>  /  TBili  0.8  /  DBili  x   /  AST  34  /  ALT  15  /  AlkPhos  483<H>  06-01    Creatinine Trend: 1.31<--, 1.20<--, 1.18<--, 1.19<--, 1.17<--, 1.16<--  PT/INR - ( 01 Jun 2022 23:56 )   PT: 16.5 sec;   INR: 1.42 ratio         PTT - ( 01 Jun 2022 23:56 )  PTT:72.0 sec    Arterial Blood Gas:  06-01 @ 23:35  7.42/29/77/19/97.6/-4.9  ABG lactate: --    Venous Blood Gas:  06-02 @ 06:28  7.40/31/68/19/95.8  VBG Lactate: 1.7  Venous Blood Gas:  06-01 @ 17:53  7.39/33/45/20/77.6  VBG Lactate: 2.9      MICROBIOLOGY:     Culture - Acid Fast - Stool w/Smear (collected 01 Jun 2022 07:34)  Source: .Stool Stool    Culture - Acid Fast - Sputum w/Smear (collected 01 Jun 2022 07:33)  Source: .Sputum Sputum    Culture - Acid Fast - Stool w/Smear (collected 31 May 2022 14:22)  Source: .Stool Stool    Culture - Acid Fast - Sputum w/Smear (collected 31 May 2022 08:54)  Source: .Sputum Sputum  Preliminary Report (01 Jun 2022 15:05):    Culture is being performed.    Culture - Sputum (collected 30 May 2022 13:12)  Source: .Sputum Sputum  Gram Stain (30 May 2022 21:07):    No polymorphonuclear leukocytes per low power field    No Squamous epithelial cells per low power field    No organisms seen per oil power field  Final Report (01 Jun 2022 16:43):    Normal Respiratory Wen present    Culture - Stool (collected 30 May 2022 11:49)  Source: .Stool sarthak betito  Final Report (01 Jun 2022 16:45):    No enteric pathogens isolated.    (Stool culture examined for Salmonella,    Shigella, Campylobacter, Aeromonas, Plesiomonas,    Vibrio, E.coli O157 and Yersinia)    GI PCR Panel, Stool (collected 30 May 2022 09:55)  Source: .Stool Feces sarthak betito  Final Report (30 May 2022 19:15):    GI PCR Results: NOT detected    *******Please Note:*******    GI panel PCR evaluates for:    Campylobacter, Plesiomonas shigelloides, Salmonella,    Vibrio, Yersinia enterocolitica, Enteroaggregative    Escherichia coli (EAEC), Enteropathogenic E.coli (EPEC),    Enterotoxigenic E. coli (ETEC) lt/st, Shiga-like    toxin-producing E. coli (STEC) stx1/stx2,    Shigella/ Enteroinvasive E. coli (EIEC), Cryptosporidium,    Cyclospora cayetanensis, Entamoeba histolytica,    Giardia lamblia, Adenovirus F 40/41, Astrovirus,    Norovirus GI/GII, Rotavirus A, Sapovirus      RADIOLOGY:  [ ] Reviewed and interpreted by me    EKG:     Venkat Steel MD  Internal Medicine  Pager #[phone]    CHIEF COMPLAINT:Patient is a 66y old  Male who presents with a chief complaint of recurrent fevers, unknown source (01 Jun 2022 11:42)        Interval Events:    Recurrent fevers tylenol and cultured ON    Heparin off 4AM    pending LN biopsy and paracentesis with IR    REVIEW OF SYSTEMS:  Const chills fevers  CV no chest discomfort  Pulm no SOB  Abd no abdominal pain       OBJECTIVE:  ICU Vital Signs Last 24 Hrs  T(C): 38.2 (02 Jun 2022 06:00), Max: 39.8 (01 Jun 2022 22:00)  T(F): 100.8 (02 Jun 2022 06:00), Max: 103.6 (01 Jun 2022 22:00)  HR: 128 (02 Jun 2022 06:00) (96 - 132)  BP: 132/61 (02 Jun 2022 06:00) (90/56 - 155/74)  BP(mean): 88 (02 Jun 2022 06:00) (67 - 106)  ABP: --  ABP(mean): --  RR: 49 (02 Jun 2022 06:00) (14 - 49)  SpO2: 93% (02 Jun 2022 06:00) (93% - 100%)        06-01 @ 07:01  -  06-02 @ 07:00  --------------------------------------------------------  IN: 3590 mL / OUT: 1105 mL / NET: 2485 mL      CAPILLARY BLOOD GLUCOSE      POCT Blood Glucose.: 95 mg/dL (02 Jun 2022 06:06)      PHYSICAL EXAM:  GENERAL: appears fatigued, weak  ENT: Moist mucous membranes  CHEST/LUNG: no wheezing or increased WOB  HEART: tachycardic, reg rhythm; No murmurs, rubs, or gallops  ABDOMEN: Abdomen feels full, Bowel sounds present; Soft, Nontender  EXTREMITIES:  2+ Peripheral Pulses, brisk capillary refill. No clubbing, cyanosis, or edema  NERVOUS SYSTEM:  Alert & Oriented X3  MSK: FROM all 4 extremities, full and equal strength  SKIN: No rashes or lesions    LINES:    HOSPITAL MEDICATIONS:  Standing Meds:  chlorhexidine 4% Liquid 1 Application(s) Topical <User Schedule>  chlorhexidine 4% Liquid 1 Application(s) Topical <User Schedule>  dextrose 5%. 1000 milliLiter(s) IV Continuous <Continuous>  dextrose 5%. 1000 milliLiter(s) IV Continuous <Continuous>  dextrose 50% Injectable 25 Gram(s) IV Push once  dextrose 50% Injectable 12.5 Gram(s) IV Push once  dextrose 50% Injectable 25 Gram(s) IV Push once  famotidine    Tablet 20 milliGRAM(s) Oral daily  folic acid Injectable 1 milliGRAM(s) IV Push daily  gabapentin 300 milliGRAM(s) Oral daily  glucagon  Injectable 1 milliGRAM(s) IntraMuscular once  heparin  Infusion. 1900 Unit(s)/Hr IV Continuous <Continuous>  insulin lispro (ADMELOG) corrective regimen sliding scale   SubCutaneous every 6 hours  lactated ringers. 500 milliLiter(s) IV Continuous <Continuous>  lactated ringers. 1000 milliLiter(s) IV Continuous <Continuous>  lidocaine   4% Patch 1 Patch Transdermal daily  metoprolol tartrate Injectable 2.5 milliGRAM(s) IV Push every 6 hours  multivitamin/minerals 1 Tablet(s) Oral daily  tamsulosin 0.4 milliGRAM(s) Oral at bedtime      PRN Meds:  acetaminophen    Suspension .. 500 milliGRAM(s) Oral every 6 hours PRN  dextrose Oral Gel 15 Gram(s) Oral once PRN  sodium chloride 0.9% lock flush 10 milliLiter(s) IV Push every 1 hour PRN      LABS:                        7.8    33.11 )-----------( 143      ( 01 Jun 2022 23:56 )             25.4     Hgb Trend: 7.8<--, 7.8<--, 8.4<--, 8.7<--, 8.9<--  06-01    135  |  105  |  21  ----------------------------<  100<H>  4.2   |  17<L>  |  1.31<H>    Ca    8.4      01 Jun 2022 23:56  Phos  3.0     06-01  Mg     1.9     06-01    TPro  6.0  /  Alb  2.1<L>  /  TBili  0.8  /  DBili  x   /  AST  34  /  ALT  15  /  AlkPhos  483<H>  06-01    Creatinine Trend: 1.31<--, 1.20<--, 1.18<--, 1.19<--, 1.17<--, 1.16<--  PT/INR - ( 01 Jun 2022 23:56 )   PT: 16.5 sec;   INR: 1.42 ratio         PTT - ( 01 Jun 2022 23:56 )  PTT:72.0 sec    Arterial Blood Gas:  06-01 @ 23:35  7.42/29/77/19/97.6/-4.9  ABG lactate: --    Venous Blood Gas:  06-02 @ 06:28  7.40/31/68/19/95.8  VBG Lactate: 1.7  Venous Blood Gas:  06-01 @ 17:53  7.39/33/45/20/77.6  VBG Lactate: 2.9      MICROBIOLOGY:     Culture - Acid Fast - Stool w/Smear (collected 01 Jun 2022 07:34)  Source: .Stool Stool    Culture - Acid Fast - Sputum w/Smear (collected 01 Jun 2022 07:33)  Source: .Sputum Sputum    Culture - Acid Fast - Stool w/Smear (collected 31 May 2022 14:22)  Source: .Stool Stool    Culture - Acid Fast - Sputum w/Smear (collected 31 May 2022 08:54)  Source: .Sputum Sputum  Preliminary Report (01 Jun 2022 15:05):    Culture is being performed.    Culture - Sputum (collected 30 May 2022 13:12)  Source: .Sputum Sputum  Gram Stain (30 May 2022 21:07):    No polymorphonuclear leukocytes per low power field    No Squamous epithelial cells per low power field    No organisms seen per oil power field  Final Report (01 Jun 2022 16:43):    Normal Respiratory Wen present    Culture - Stool (collected 30 May 2022 11:49)  Source: .Stool sarthak betito  Final Report (01 Jun 2022 16:45):    No enteric pathogens isolated.    (Stool culture examined for Salmonella,    Shigella, Campylobacter, Aeromonas, Plesiomonas,    Vibrio, E.coli O157 and Yersinia)    GI PCR Panel, Stool (collected 30 May 2022 09:55)  Source: .Stool Feces sarthak betito  Final Report (30 May 2022 19:15):    GI PCR Results: NOT detected    *******Please Note:*******    GI panel PCR evaluates for:    Campylobacter, Plesiomonas shigelloides, Salmonella,    Vibrio, Yersinia enterocolitica, Enteroaggregative    Escherichia coli (EAEC), Enteropathogenic E.coli (EPEC),    Enterotoxigenic E. coli (ETEC) lt/st, Shiga-like    toxin-producing E. coli (STEC) stx1/stx2,    Shigella/ Enteroinvasive E. coli (EIEC), Cryptosporidium,    Cyclospora cayetanensis, Entamoeba histolytica,    Giardia lamblia, Adenovirus F 40/41, Astrovirus,    Norovirus GI/GII, Rotavirus A, Sapovirus      RADIOLOGY:  [ ] Reviewed and interpreted by me    EKG:

## 2022-06-02 NOTE — PROCEDURE NOTE - PROCEDURE FINDINGS AND DETAILS
Small to moderate ascites. 180 cc of fluid aspirated for sample. Sterile dressing applied. Left supraclavicular lymph node biopsied using ultrasound. Core biopsy x3 and FNA x3. Sample given to cytopathology technician present at time of procedure.

## 2022-06-02 NOTE — PROGRESS NOTE ADULT - ASSESSMENT
left ischial wound  -the wound is healing slowly, but has no evidence of recurrent infection and is not the etiology for his persistent fevers  -unclear etiology for the abscess, but based on location, an anal fistula is possible. if the wound does not completely heal or develops persistent drainage, EUA wound be indicated to r/o anal fistula.    -reconsult acute care surgery PRN      care discussed with his wife at bedside  care discussed with the MICU team

## 2022-06-02 NOTE — PROGRESS NOTE ADULT - SUBJECTIVE AND OBJECTIVE BOX
INTERVAL HPI/OVERNIGHT EVENTS: remains with sinus tachycardia in the setting of anemia, fever and possible pulmonary embolism. Has knwon upper extremity DVT , s/p biopsy , heparin on hold. still having recurrent diarrhea    MEDICATIONS  (STANDING):  chlorhexidine 4% Liquid 1 Application(s) Topical <User Schedule>  chlorhexidine 4% Liquid 1 Application(s) Topical <User Schedule>  dextrose 5%. 1000 milliLiter(s) (100 mL/Hr) IV Continuous <Continuous>  dextrose 5%. 1000 milliLiter(s) (50 mL/Hr) IV Continuous <Continuous>  dextrose 50% Injectable 25 Gram(s) IV Push once  dextrose 50% Injectable 12.5 Gram(s) IV Push once  dextrose 50% Injectable 25 Gram(s) IV Push once  famotidine    Tablet 20 milliGRAM(s) Oral daily  folic acid Injectable 1 milliGRAM(s) IV Push daily  gabapentin 300 milliGRAM(s) Oral daily  glucagon  Injectable 1 milliGRAM(s) IntraMuscular once  insulin lispro (ADMELOG) corrective regimen sliding scale   SubCutaneous every 6 hours  lidocaine   4% Patch 1 Patch Transdermal daily  loperamide 2 milliGRAM(s) Oral daily  metoprolol tartrate Injectable 2.5 milliGRAM(s) IV Push every 6 hours  multivitamin/minerals 1 Tablet(s) Oral daily  tamsulosin 0.4 milliGRAM(s) Oral at bedtime    MEDICATIONS  (PRN):  acetaminophen     Tablet .. 500 milliGRAM(s) Oral every 6 hours PRN Temp greater or equal to 38C (100.4F)  dextrose Oral Gel 15 Gram(s) Oral once PRN Blood Glucose LESS THAN 70 milliGRAM(s)/deciliter  sodium chloride 0.9% lock flush 10 milliLiter(s) IV Push every 1 hour PRN Pre/post blood products, medications, blood draw, and to maintain line patency      Allergies    No Known Allergies    Intolerances      ROS:  General: Pt denies recent weight loss/fever/chills    Neurological: denies numbness or  sensation loss    Cardiovascular: denies chest pain/palpitations/leg edema    Respiratory and Thorax: denies SOB/cough/wheezing    Gastrointestinal: denies abdominal pain/diarrhea/constipation/bloody stool    Genitourinary: denies urinary frequency/urgency/ dysuria    Musculoskeletal: denies joint pain or swelling, denies restricted motion    Hematologic: denies abnormal bleeding  	    	  	    		        	    	            Vital Signs Last 24 Hrs  T(C): 38.4 (02 Jun 2022 19:50), Max: 39.8 (01 Jun 2022 22:00)  T(F): 101.1 (02 Jun 2022 19:50), Max: 103.6 (01 Jun 2022 22:00)  HR: 104 (02 Jun 2022 19:00) (88 - 132)  BP: 131/64 (02 Jun 2022 19:00) (111/55 - 167/79)  BP(mean): 91 (02 Jun 2022 19:00) (77 - 114)  RR: 28 (02 Jun 2022 19:00) (16 - 49)  SpO2: 97% (02 Jun 2022 19:00) (93% - 98%)  Daily     Daily     06-01 @ 07:01  -  06-02 @ 07:00  --------------------------------------------------------  IN: 3590 mL / OUT: 1105 mL / NET: 2485 mL    06-02 @ 07:01  -  06-02 @ 20:03  --------------------------------------------------------  IN: 875 mL / OUT: 475 mL / NET: 400 mL      Physical Exam: wd male, ill appearing  no JVD  cor RRR  lung clear  abd soft   ext no edema        LABS:                        7.8    33.11 )-----------( 143      ( 01 Jun 2022 23:56 )             25.4     06-01    135  |  105  |  21  ----------------------------<  100<H>  4.2   |  17<L>  |  1.31<H>    Ca    8.4      01 Jun 2022 23:56  Phos  3.0     06-01  Mg     1.9     06-01    TPro  6.0  /  Alb  2.1<L>  /  TBili  0.8  /  DBili  x   /  AST  34  /  ALT  15  /  AlkPhos  483<H>  06-01    PT/INR - ( 01 Jun 2022 23:56 )   PT: 16.5 sec;   INR: 1.42 ratio         PTT - ( 01 Jun 2022 23:56 )  PTT:72.0 sec      RADIOLOGY & ADDITIONAL TESTS:    TELE:    EKG:

## 2022-06-02 NOTE — PROGRESS NOTE ADULT - ASSESSMENT
HPI:  65 yo M with a PMH HTN, HLD, SLE on Cellcept (MMF), class V lupus nephritis, CKD stage 2, DM2, diabetic neuropathy, TIA (2019) on plavix, BPH, HUNG, hospitalization in [facility] 3/27-4/20 tx for pancreatitis and C diff?, recent hospitalization at [place] 4/22-4/26 for acute pancreatitis and C diff colitis and another hospitalization 5/5 at [facility] for septic shock (source buttock abscess) treated with vanc, cefepime (5/5-5/10), meropenem (5/11-5/18) and micafungin. Zosyn added 5/26. Course complicated by recurrent fevers despite being on antibiotics, and leukocytosis up to 30k. Blood cx neg, UA unremarkable. MRI abdomen w/ contrast performed showing extensive abd lymphadenopathy (reactive to c diff vs lymphoma?). IR stated no window for bx. Course also complicated by a L brachial vein DVT and superficial DVT in arms. Patient noted to have had a positive PPD but has possibly gotten the BCG vaccine. Quant gold performed at [place] [facility] still pending.     Admitted to [facility] MICU for further management. Vital signs on arrival: temp 38.6, /77, , RR 28, O2 sat 97% on 2L NC.  Labs: WBC 34.53, Hb 9.6, lipase 740, procal 2.02, tBili 1.4, alk phos 498, lactate 2.4. (30 May 2022 04:01)    Hematology/Oncology consulted d/t patient's history of anemia. Patient will [facility].    Anemia  --multifactorial  --Ferritin (511), folate (>20), B12 (>2000), LDH (311), haptoglobin (118), iron sat %(34)   --continue with folic acid  --keep iron % >20  --transfuse for Hgb <7    Lymphadenopathy   --infectious vs malignancy  --CT C/A/P done 5/31  ·	Markedly enlarged mesenteric lymph nodes/lymphadenopathy out of proportion of retroperitoneal lymphadenopathy. Although lymphoma/leukemia can have this appearance, consider possibility of gastrointestinal tuberculosis as well as other infectious etiologies  --supraclavicular/cervical IR biopsy scheduled 6/2  --flow cytometry, SPEP, immunoglobulins, immunofixation pending  --Jak2 (negative), CALR (negative), BCR/ABL pending  --BMB done on 5/20 at [facility]: negative for detectable monoclonal cell or B cell population    Questionable PE with splenic infarct  - on heparin GGT   - consider JORGE       Thank you for the opportunity to participate in Mr Sol's care    Mecca Oliveira NP  Hematology/ Oncology  [facility]  [phone] (office)  [phone] (alt office)  Evenings and weekends please call MD on call or office

## 2022-06-02 NOTE — PROGRESS NOTE ADULT - ATTENDING COMMENTS
PT seen and examined. 66 yr M with medical hx and hospital course as noted now with fever, anasarca, mesenteric, RP, submandibular and L SC lymphadenopathy, cirrhosis/ ascites with portal HTN, splenic infarcts, L brachial DVT and possible filling defect on in RLL which may represent a PE. DDx includes TB vs malignancy. BM Bx at [facility] reportedly negative, planned for LN bx and diagnostic paracentesis by IR today. Brief episode of Hypotension yesterday, resolved with volume resuscitation. BP remains stable, cont gentle IV hydration with LR, keep MAP >65. A fib RVR, now with sinus tach likely driven by fever, rate controlled on Lopressor IV. Ongoing fever, Cxs NGTD, repeat CXs sent overnight.  Remains off ABx per ID, Cxs so far NGTD. No evidence of necrotizing infection in the R buttock. Heparin gtt on hold for IR procedure, will resume post procedure. Overall prognosis guarded. Pt updated in detail at bedside. Remains full code.

## 2022-06-02 NOTE — PROGRESS NOTE ADULT - SUBJECTIVE AND OBJECTIVE BOX
CC: F/U for fever    Saw/spoke to patient. Still fevers. Appears well at bedside. No new complaints.    Allergies  No Known Allergies    ANTIMICROBIALS:      PE:    Vital Signs Last 24 Hrs  T(C): 38.1 (2022 12:00), Max: 39.8 (2022 22:00)  T(F): 100.6 (2022 12:00), Max: 103.6 (2022 22:00)  HR: 106 (2022 15:00) (96 - 132)  BP: 141/64 (2022 15:00) (101/61 - 155/74)  BP(mean): 92 (2022 15:00) (75 - 106)  RR: 18 (2022 15:00) (16 - 49)  SpO2: 94% (2022 15:00) (93% - 99%)    Gen: AOx3, NAD, non-toxic  Resp: Breathing comfortably, NC2  Abd: Soft, nontender  : No Jackson  IV/Skin: No thrombophlebitis    LABS:                        7.8    33.11 )-----------( 143      ( 2022 23:56 )             25.4     06-    135  |  105  |  21  ----------------------------<  100<H>  4.2   |  17<L>  |  1.31<H>    Ca    8.4      2022 23:56  Phos  3.0       Mg     1.9         TPro  6.0  /  Alb  2.1<L>  /  TBili  0.8  /  DBili  x   /  AST  34  /  ALT  15  /  AlkPhos  483<H>      MICROBIOLOGY:    .Stool Stool  22 --  --  --    .Stool Stool  22 --  --  --    .Sputum Sputum  22 --  --  --    .Stool Stool  22 --  --  --    .Sputum Sputum  22   Culture is being performed.  --  --    .Sputum Sputum  22   Normal Respiratory Wen present  --    No polymorphonuclear leukocytes per low power field  No Squamous epithelial cells per low power field  No organisms seen per oil power field    .Stool sarthak betito  22   No enteric pathogens isolated.  (Stool culture examined for Salmonella,  Shigella, Campylobacter, Aeromonas, Plesiomonas,  Vibrio, E.coli O157 and Yersinia)  --  --    .Blood Blood  22   NEGATIVE for Plasmodium antigens. Microscopy is performed for  confirmation.  This test does not detect the presence of Babesia species.  If Babesiosis is suspected, please order test for Babesia PCR: Babesia  microti PCR Bld  ************************************************************  No Blood Parasites observed by giemsa stain  One negative set of blood smears does not rule out  the possibility of a parasitic infection.  A minimum of 3  specimens should be collected, at least 12-24 hours apart,  over a 36 hour time period.  --  --    .Stool Feces [facility]  22   GI PCR Results: NOT detected  *******Please Note:*******  GI panel PCR evaluates for:  Campylobacter, Plesiomonas shigelloides, Salmonella,  Vibrio, Yersinia enterocolitica, Enteroaggregative  Escherichia coli (EAEC), Enteropathogenic E.coli (EPEC),  Enterotoxigenic E. coli (ETEC) lt/st, Shiga-like  toxin-producing E. coli (STEC) stx1/stx2,  Shigella/ Enteroinvasive E. coli (EIEC), Cryptosporidium,  Cyclospora cayetanensis, Entamoeba histolytica,  Giardia lamblia, Adenovirus F 40/41, Astrovirus,  Norovirus GI/GII, Rotavirus A, Sapovirus  --  --    .Blood Blood  22   No growth to date.  --  --    .Blood Blood  22   Testing in progress  --  --    .Blood Blood  22   No growth to date.  --  --    CMVPCR Lo.70 Aav24PC/mL ( @ 04:25)  Rapid RVP Result: NotDetec ( @ 04:14)    Clostridium difficile GDH Toxins A&amp;B, EIA:   Negative (05-30-22 @ 11:09)  Clostridium difficile GDH Interpretation: Negative for toxigenic C. Difficile.  This specimen is negative for C.  Difficile glutamate dehydrogenase (GDH) antigen and negative for C.  Difficile Toxins A & B, by EIA.  GDH is a highly sensitive screening  marker for C. Difficile that is produced in large amounts by all C.  Difficile strains, both toxigenic and nontoxigenic.  This assay has not  been validated as a test of cure.  Repeat testing during the same episode  of diarrhea is of limited value and is discouraged.  The results of this  assay should always be interpreted in conjunction with pateint's clinical  history. (22 @ 11:09)    RADIOLOGY:     CT:    IMPRESSION:  1.  Questionable filling defect in RIGHT lower lobe medial pulmonary   artery branches may be artifactual. If there is a question regarding   possible pulmonary embolus. Dedicated CTPA is recommended.  2.  Bilateral lower lobe atelectasis with bilateral pleural effusions.  3.  Enlarged spleen with splenic infarct.  4.  Markedly enlarged mesenteric lymph nodes/lymphadenopathy out of   proportion of retroperitoneal lymphadenopathy. Although lymphoma/leukemia   can have this appearance, consider possibility of gastrointestinal   tuberculosis as well as other infectious etiologies.  5.  Hepatosplenomegaly with portal hypertension and possible underlying   cirrhosis.  6.  Moderate ascites.  7.  Small bilateral pleural effusions.  8.  Anasarca.

## 2022-06-02 NOTE — PROGRESS NOTE ADULT - ASSESSMENT
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  Diarrhea looks noninfectious - C diff EIA, GI PCR negative.   No response to broad spectrum antibiotics and only had a small gluteal skin abscess, clean s/p I&D.   MTB is possible but less likely, no obvious lung lesions  Quant gold IND  HIV negative  CT mesenteric LAD, possible PE, enlarged spleen, HSM, ascites  Fevers with no chills  Suspected malignancy  Overall, Fevers, abnormal finding on imaging, leukocytosis  - Monitor off abx  - LN biopsy per team  - Maintain airborne until 3x AFB sputums negative  - F/U other pending cultures  - Monitor for alternate sources infection or symptoms  -please send for AFB, bacterial and fungal cultures when he undergoes lymph node biopsy and paracentesis   -check TTE (splenic infarct)     Ramesh Cali MD  Contact on TEAMS messaging from 9am - 5pm  From 5pm-9am, and on weekends call [phone]

## 2022-06-02 NOTE — PROGRESS NOTE ADULT - ASSESSMENT
67 yo M with a PMH HTN, HLD, SLE on Cellcept (MMF), class V lupus nephritis, CKD stage 2, DM2, diabetic neuropathy, TIA (2019) on plavix, BPH, HUNG, hospitalization in [facility] 3/27-4/20 tx for pancreatitis and C diff?, recent hospitalization at [place] 4/22-4/26 for acute pancreatitis and C diff colitis and another hospitalization 5/5 at [place] for septic shock (source buttock abscess) treated with vanc, cefepime (5/5-5/10), meropenem (5/11-5/18) and micafungin. Zosyn added 5/26 course complicated by recurrent fever now with suspected R PE and imaging with peritoneal lymphadenopathy lymphoma versus TB pending IR biopsy monitoring off abx. Had PAF in [facility], now shiort bursts on this admission , now post biopsy    advise    1. continue hydration,  replete K>4, mag >2  2. metoprolol 2.5 IV q 6   3. on heparin IV,  held for procedure, resume when stable  4.  ECHO ( in past has been normal)

## 2022-06-02 NOTE — PROGRESS NOTE ADULT - SUBJECTIVE AND OBJECTIVE BOX
Patient is a 66y old  Male who presents with a chief complaint of recurrent fevers, unknown source (02 Jun 2022 07:13)    Patient seen and examined at bedside.    MEDICATIONS  (STANDING):  chlorhexidine 4% Liquid 1 Application(s) Topical <User Schedule>  chlorhexidine 4% Liquid 1 Application(s) Topical <User Schedule>  dextrose 5%. 1000 milliLiter(s) (100 mL/Hr) IV Continuous <Continuous>  dextrose 5%. 1000 milliLiter(s) (50 mL/Hr) IV Continuous <Continuous>  dextrose 50% Injectable 25 Gram(s) IV Push once  dextrose 50% Injectable 12.5 Gram(s) IV Push once  dextrose 50% Injectable 25 Gram(s) IV Push once  famotidine    Tablet 20 milliGRAM(s) Oral daily  folic acid Injectable 1 milliGRAM(s) IV Push daily  gabapentin 300 milliGRAM(s) Oral daily  glucagon  Injectable 1 milliGRAM(s) IntraMuscular once  insulin lispro (ADMELOG) corrective regimen sliding scale   SubCutaneous every 6 hours  lactated ringers. 1000 milliLiter(s) (75 mL/Hr) IV Continuous <Continuous>  lidocaine   4% Patch 1 Patch Transdermal daily  metoprolol tartrate Injectable 2.5 milliGRAM(s) IV Push every 6 hours  multivitamin/minerals 1 Tablet(s) Oral daily  tamsulosin 0.4 milliGRAM(s) Oral at bedtime    MEDICATIONS  (PRN):  acetaminophen    Suspension .. 500 milliGRAM(s) Oral every 6 hours PRN Temp greater or equal to 38C (100.4F), Mild Pain (1 - 3)  dextrose Oral Gel 15 Gram(s) Oral once PRN Blood Glucose LESS THAN 70 milliGRAM(s)/deciliter  sodium chloride 0.9% lock flush 10 milliLiter(s) IV Push every 1 hour PRN Pre/post blood products, medications, blood draw, and to maintain line patency      ROS  No fever, sweats, chills  No epistaxis, HA, sore throat  No CP, SOB, cough, sputum  No n/v/d, abd pain, melena, hematochezia  No edema  No rash  No anxiety  No back pain, joint pain  No bleeding, bruising  No dysuria, hematuria    Vital Signs Last 24 Hrs  T(C): 38.1 (02 Jun 2022 12:00), Max: 39.8 (01 Jun 2022 22:00)  T(F): 100.6 (02 Jun 2022 12:00), Max: 103.6 (01 Jun 2022 22:00)  HR: 105 (02 Jun 2022 14:00) (96 - 132)  BP: 143/77 (02 Jun 2022 14:00) (95/58 - 155/74)  BP(mean): 101 (02 Jun 2022 14:00) (70 - 106)  RR: 31 (02 Jun 2022 14:00) (16 - 49)  SpO2: 98% (02 Jun 2022 14:00) (93% - 100%)    PE  NAD  Awake, alert  Anicteric, MMM  No c/c/e  No rash grossly  FROM                          7.8    33.11 )-----------( 143      ( 01 Jun 2022 23:56 )             25.4       06-01    135  |  105  |  21  ----------------------------<  100<H>  4.2   |  17<L>  |  1.31<H>    Ca    8.4      01 Jun 2022 23:56  Phos  3.0     06-01  Mg     1.9     06-01    TPro  6.0  /  Alb  2.1<L>  /  TBili  0.8  /  DBili  x   /  AST  34  /  ALT  15  /  AlkPhos  483<H>  06-01

## 2022-06-02 NOTE — PROCEDURE NOTE - SPECIMEN OBTAINED
Cores given to Cytopathology Technologist/Pathologist/Fluid sent for chemistry/Fluid sent for cytology

## 2022-06-02 NOTE — PROGRESS NOTE ADULT - SUBJECTIVE AND OBJECTIVE BOX
on contact precautions for r/o tuberculosis  5/5/2022 @ [facility] - I&D of left ischial abscess    Tm 103.6 (6/1 2200)  persistently febrile  15 x 5 x 5 mm wound just medial to left ischial tuberosity  wound is clean and granulated without drainage on gauze

## 2022-06-02 NOTE — PROGRESS NOTE ADULT - ASSESSMENT
67 yo M with a PMH HTN, HLD, SLE on Cellcept (MMF), class V lupus nephritis, CKD stage 2, DM2, diabetic neuropathy, TIA (2019) on plavix, BPH, HUNG, hospitalization in [facility] 3/27-4/20 tx for pancreatitis and C diff?, recent hospitalization at [place] 4/22-4/26 for acute pancreatitis and C diff colitis and another hospitalization 5/5 at [place] for septic shock (source buttock abscess) treated with vanc, cefepime (5/5-5/10), meropenem (5/11-5/18) and micafungin. Zosyn added 5/26 course complicated by recurrent fever now with suspected R PE and imaging with peritoneal lymphadenopathy lymphoma versus TB pending IR biopsy monitoring off abx.     Neuro  - AAOx3 but slow  - check ammonia level given hepatosplenomegaly with possible cirrhosis  #diabetic neuropathy  -start on home gabapentin    Pulm  #large left posterior consolidation seen on ultrasoud, possible PNA?  -on 2L NC, satting 98%  -cover with empiric abx for HAP  #upper airway stridor, wheezing  -AM consult ENT eval for upper airway stridor  -f/u CT neck/chest, looking for lymphadenopathy and possibly options for lymph node bx  -check RVP, urine legionella  -sputum culture  -gladys motabs    #RLL PE  -heparin gtt  -no further eliquis pending possible LN biopsy       Cardio  #HTN  - hold off on antihypertensive meds for now as patient could be septic  #HLD, hypertriglyceridemia  -f/u lipid profile  -check official TTE      Renal  #CKD stage 2, hx of lupus nephritis  -cont to trend Cr  -monitor I/Os  -cont to monitor electrolytes, replete as necessary    GI  #chronic inflammatory diarrhea  #possible Crohn's vs colitis vs infectious  -calprotectin elevated at [place] 532  -check stool culture, stool PCR, ova and parasites, c diff  -consult ID and GI  #Diet  -NPO, patient has been receiving PPN at [facility] since 5/15  -S&S eval  -TPN consult, start on D10 for now  #hx of pancreatitis, elevated lipase  -recheck lipase  #hepatosplenomegaly shown on MRI at [place]  -check hepatitis panel      #hx of BPH  -start on flomax    Endocrine  #DM2  -last HbA1c 4/29 6.5%  -start on ISS    ID/rheum  #hx of SLE  #recurrent fevers and leukocytosis of unknown etiology  diff dx: infectious, malignancy, IgG4 disease, HLH  -elevated WBC, elevated IgA at [place]  -check quant gold  -resend cultures (blood, stool), UA, check procal, fungitell, galactomannan, MRSA swab  -check HIV, EBV, CMV  -possible yeast? unknown source at [place]  -start on caspofungin  -consider doxycycline?  #r/o HLH, IgG4  -has fevers, splenomegaly, cytopenia  -check HLH labs: soluble IL-2 levels, ferritin, triglyceride  -check IgG4    Heme  #Anemia  -outside hospital reported to be consistent with AOCD  -CT ab pending, r/o bleed  -transfuse if Hb<7, maintain active type and screen  -DVT ppx: full AC    Lines: TOBY placed 5/27 67 yo M with a PMH HTN, HLD, SLE on Cellcept (MMF), class V lupus nephritis, CKD stage 2, DM2, diabetic neuropathy, TIA (2019) on plavix, BPH, HUNG, hospitalization in [facility] 3/27-4/20 tx for pancreatitis and C diff?, recent hospitalization at [place] 4/22-4/26 for acute pancreatitis and C diff colitis and another hospitalization 5/5 at [place] for septic shock (source buttock abscess) treated with vanc, cefepime (5/5-5/10), meropenem (5/11-5/18) and micafungin. Zosyn added 5/26 course complicated by recurrent fever now with suspected R PE and imaging with peritoneal lymphadenopathy lymphoma versus TB pending IR biopsy monitoring off abx.     Neuro  - AAOx3 but slow  - Ammonia 36  -Fluctuating mentation while febrile which then returns to current clinical baseline slow however linear thought process and alert and oriented x3.     #diabetic neuropathy  -home gabapentin    Pulm  #large left posterior consolidation seen on ultrasoud, possible PNA?  -monitor off abx     #upper airway stridor, wheezing  -Resolved     #RLL PE  -heparin gtt  -no further eliquis pending LN biopsy and paracentesis with IR 6/2       Cardio  #HTN  - monitoring off home hypertensive meds     #Paroxysm of afib followed by sinus rhythm w frequent ectopy  -Lopressor 2.5 q6h for now.       Renal  #CKD stage 2, hx of lupus nephritis  -cont to trend Cr  -monitor I/Os  -cont to monitor electrolytes, replete as necessary    GI  #chronic inflammatory diarrhea  #possible Crohn's vs colitis vs infectious  -calprotectin elevated at [place] 532. no current plan by GI for colonoscopy at this time.   -C diff negative  -appreciate GI and ID input.     #Diet  -previously on TPN while at [facility]  -Supplementing with ensure, currently safe to swallow po nutrition.     #hx of pancreatitis, elevated lipase  -without current epigastric tenderness to palpation 5/30 lipase 740    #hepatosplenomegaly shown on MRI at [place]  -hepatitis panel nonreactive including Hep A B and C      #hx of BPH  -start on flomax    Endocrine  #DM2  -last HbA1c 4/29 6.5%  -start on ISS    ID/rheum  #hx of SLE  #recurrent fevers and leukocytosis of unknown etiology  diff dx: infectious, malignancy, IgG4 disease, HLH  -elevated WBC, elevated IgA at [place]  -quant gold indeterminate  -5/30 BCx NGTD  -HIV nonreactive, EBV IgG+, CMV 50  -possilbility of peritoneal TB appreciate ID reccs AFB sputum and stool thus far are negative pending IR biopsy 6/2    #r/o HLH, IgG4  -has fevers, splenomegaly, cytopenia  -HLH labs: soluble IL-2 levels 73323, ferritin 511, triglyceride 412  -IgG4 27    Heme  #Anemia  -outside hospital reported to be consistent with AOCD  -CT ab pending, r/o bleed  -transfuse if Hb<7, maintain active type and screen  -DVT ppx: full AC    Lines: RIJ placed 5/27 Removed 6/1 ON

## 2022-06-03 NOTE — CONSULT NOTE ADULT - PROBLEM SELECTOR RECOMMENDATION 9
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 The patient is being treated with MMF 750mg as outpatient. Currently on hold given pt being treated for infection. [facility] noted to have a baseline SCr of 1.1mg/dL. Currently SCr elevated to 1.46mg/dL. Send UA, urine electrolytes, spot urine TP/CR. Recommend renal sonogram. Send for serological markers including C3, C4, dsDNA, ESR, CRP. Monitor labs and urine output. Avoid NSAIDs, ACEI/ARBS, RCA and nephrotoxins. Dose medications as per eGFR.    If any questions, please feel free to contact me     Thony Oh  Nephrology Fellow  [facility] Pager: [phone]

## 2022-06-03 NOTE — PROGRESS NOTE ADULT - SUBJECTIVE AND OBJECTIVE BOX
CC: F/U for Fever    Saw/spoke to patient. Still fevers, when having fevers appears lethargic. Team started edilson in the interim.    Allergies  No Known Allergies    ANTIMICROBIALS:  meropenem  IVPB 1000 every 12 hours  meropenem  IVPB      PE:    Vital Signs Last 24 Hrs  T(C): 39.2 (2022 15:00), Max: 40.7 (2022 13:00)  T(F): 102.6 (2022 15:00), Max: 105.3 (2022 13:00)  HR: 126 (2022 14:00) (88 - 135)  BP: 177/81 (2022 14:00) (112/57 - 177/81)  BP(mean): 116 (2022 14:00) (79 - 116)  RR: 36 (2022 14:00) (16 - 39)  SpO2: 100% (2022 14:00) (93% - 100%)    Gen: AOx3, NAD, fatigued  Resp: Appears SOB, NC 6  Abd: Soft, nontender  : No Jackson  IV/Skin: No thrombophlebitis    LABS:                        7.4    38.72 )-----------( 130      ( 2022 07:38 )             24.0         135  |  107  |  21  ----------------------------<  82  4.1   |  17<L>  |  1.46<H>    Ca    8.6      2022 00:28  Phos  3.8       Mg     2.0         TPro  6.0  /  Alb  2.1<L>  /  TBili  0.8  /  DBili  x   /  AST  29  /  ALT  12  /  AlkPhos  473<H>      MICROBIOLOGY:    .Body Fluid Peritoneal Fluid  22   Testing in progress  --    polymorphonuclear leukocytes seen  No organisms seen  by cytocentrifuge    .Blood Blood-Peripheral  22   No growth to date.  --  --    .Blood Blood-Venous  22   No growth to date.  --  --    .Stool Stool  22 --  --  --      .Stool Stool  22 --  --  --      .Sputum Sputum  22 --  --  --      .Stool Stool  22 --  --  --    .Sputum Sputum  22   Culture is being performed.  --  --    .Sputum Sputum  22   Normal Respiratory Wen present  --    No polymorphonuclear leukocytes per low power field  No Squamous epithelial cells per low power field  No organisms seen per oil power field    .Stool sarthak betito  22   No enteric pathogens isolated.  (Stool culture examined for Salmonella,  Shigella, Campylobacter, Aeromonas, Plesiomonas,  Vibrio, E.coli O157 and Yersinia)  --  --    .Blood Blood  22   NEGATIVE for Plasmodium antigens. Microscopy is performed for  confirmation.  This test does not detect the presence of Babesia species.  If Babesiosis is suspected, please order test for Babesia PCR: Babesia  microti PCR Bld  ************************************************************  No Blood Parasites observed by giemsa stain  One negative set of blood smears does not rule out  the possibility of a parasitic infection.  A minimum of 3  specimens should be collected, at least 12-24 hours apart,  over a 36 hour time period.  --  --    .Stool Feces sarthak betito  22   GI PCR Results: NOT detected  *******Please Note:*******  GI panel PCR evaluates for:  Campylobacter, Plesiomonas shigelloides, Salmonella,  Vibrio, Yersinia enterocolitica, Enteroaggregative  Escherichia coli (EAEC), Enteropathogenic E.coli (EPEC),  Enterotoxigenic E. coli (ETEC) lt/st, Shiga-like  toxin-producing E. coli (STEC) stx1/stx2,  Shigella/ Enteroinvasive E. coli (EIEC), Cryptosporidium,  Cyclospora cayetanensis, Entamoeba histolytica,  Giardia lamblia, Adenovirus F 40/41, Astrovirus,  Norovirus GI/GII, Rotavirus A, Sapovirus  --  --    .Blood Blood  22   No growth to date.  --  --    .Blood Blood  22   Testing in progress  --  --    .Blood Blood  22   No growth to date.  --  --    CMVPCR Lo.70 [id number]/mL ( @ 04:25)  Rapid RVP Result: NotDetec ( @ 04:14)    Clostridium difficile GDH Toxins A&amp;B, EIA:   Negative (22 @ 11:09)  Clostridium difficile GDH Interpretation: Negative for toxigenic C. Difficile.  This specimen is negative for C.  Difficile glutamate dehydrogenase (GDH) antigen and negative for C.  Difficile Toxins A & B, by EIA.  GDH is a highly sensitive screening  marker for C. Difficile that is produced in large amounts by all C.  Difficile strains, both toxigenic and nontoxigenic.  This assay has not  been validated as a test of cure.  Repeat testing during the same episode  of diarrhea is of limited value and is discouraged.  The results of this  assay should always be interpreted in conjunction with pateint's clinical  history. (22 @ 11:09)    RADIOLOGY:     CT:      IMPRESSION:  1.  Questionable filling defect in RIGHT lower lobe medial pulmonary   artery branches may be artifactual. If there is a question regarding   possible pulmonary embolus. Dedicated CTPA is recommended.  2.  Bilateral lower lobe atelectasis with bilateral pleural effusions.  3.  Enlarged spleen with splenic infarct.  4.  Markedly enlarged mesenteric lymph nodes/lymphadenopathy out of   proportion of retroperitoneal lymphadenopathy. Although lymphoma/leukemia   can have this appearance, consider possibility of gastrointestinal   tuberculosis as well as other infectious etiologies.  5.  Hepatosplenomegaly with portal hypertension and possible underlying   cirrhosis.  6.  Moderate ascites.  7.  Small bilateral pleural effusions.  8.  Anasarca.

## 2022-06-03 NOTE — PROGRESS NOTE ADULT - ASSESSMENT
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   No response to broad spectrum antibiotics and only had a small gluteal skin abscess, clean s/p I&D  MTB is possible but less likely, no obvious lung lesions  Quant gold IND  HIV negative  CT mesenteric LAD, possible PE, enlarged spleen, HSM, ascites  Fevers with no chills  Suspected malignancy  Team started abx in the interim--note peritoneal fluid has elevated neutrophils concerning for peritonitis--culture pending  Overall, Fevers, abnormal finding on imaging, leukocytosis  - Meropenem 1g q 12 (monitor CrCl closely--borderline, may need increase)  - Repeat BCXs x 2  - F/U LN biopsy  - Maintain airborne until 3x AFB sputums negative  - F/U pending peritoneal cultures  - Monitor for alternate sources infection or symptoms  - Check TTE (splenic infarct)     Ramesh Cali MD  Contact on TEAMS messaging from 9am - 5pm  From 5pm-9am, and on weekends call [phone]

## 2022-06-03 NOTE — PROGRESS NOTE ADULT - ASSESSMENT
HPI:  65 yo M with a PMH HTN, HLD, SLE on Cellcept (MMF), class V lupus nephritis, CKD stage 2, DM2, diabetic neuropathy, TIA (2019) on plavix, BPH, HUNG, hospitalization in [facility] 3/27-4/20 tx for pancreatitis and C diff?, recent hospitalization at [place] 4/22-4/26 for acute pancreatitis and C diff colitis and another hospitalization 5/5 at [place] for septic shock (source buttock abscess) treated with vanc, cefepime (5/5-5/10), meropenem (5/11-5/18) and micafungin. Zosyn added 5/26. Course complicated by recurrent fevers despite being on antibiotics, and leukocytosis up to 30k. Blood cx neg, UA unremarkable. MRI abdomen w/ contrast performed showing extensive abd lymphadenopathy (reactive to c diff vs lymphoma?). IR stated no window for bx. Course also complicated by a L brachial vein DVT and superficial DVT in arms. Patient noted to have had a positive PPD but has possibly gotten the BCG vaccine. Quant gold performed at [place] still pending.     Admitted to [facility] MICU for further management. Vital signs on arrival: temp 38.6, /77, , RR 28, O2 sat 97% on 2L NC.  Labs: WBC 34.53, Hb 9.6, lipase 740, procal 2.02, tBili 1.4, alk phos 498, lactate 2.4. (30 May 2022 04:01)    Hematology/Oncology consulted d/t patient's history of anemia. [facility]    Anemia  --multifactorial  --Ferritin (511), folate (>20), B12 (>2000), LDH (311), haptoglobin (118), iron sat %(34)   --continue with folic acid  --keep iron % >20  --transfuse for Hgb <7    Lymphadenopathy   --infectious vs malignancy  --CT C/A/P done 5/31  ·	Markedly enlarged mesenteric lymph nodes/lymphadenopathy out of proportion of retroperitoneal lymphadenopathy. Although lymphoma/leukemia can have this appearance, consider possibility of gastrointestinal tuberculosis as well as other infectious etiologies  --supraclavicular/cervical IR biopsy scheduled 6/2  --flow cytometry, SPEP, immunoglobulins, immunofixation pending  --Jak2 (negative), CALR (negative), BCR/ABL pending  --BMB done on 5/20 at [facility]: negative for detectable monoclonal cell or B cell population    Questionable PE with splenic infarct  - on heparin GGT   - consider JORGE       Thank you for the opportunity to participate in Mr Sol's care    Mecca Oliveira NP  Hematology/ Oncology  [facility]  [phone] (office)  [phone] (alt office)  Evenings and weekends please call MD on call or office    HPI:  67 yo M with a PMH HTN, HLD, SLE on Cellcept (MMF), class V lupus nephritis, CKD stage 2, DM2, diabetic neuropathy, TIA (2019) on plavix, BPH, HUNG, hospitalization in [facility] 3/27-4/20 tx for pancreatitis and C diff?, recent hospitalization at [place] 4/22-4/26 for acute pancreatitis and C diff colitis and another hospitalization 5/5 at [place] for septic shock (source buttock abscess) treated with vanc, cefepime (5/5-5/10), meropenem (5/11-5/18) and micafungin. Zosyn added 5/26. Course complicated by recurrent fevers despite being on antibiotics, and leukocytosis up to 30k. Blood cx neg, UA unremarkable. MRI abdomen w/ contrast performed showing extensive abd lymphadenopathy (reactive to c diff vs lymphoma?). IR stated no window for bx. Course also complicated by a L brachial vein DVT and superficial DVT in arms. Patient noted to have had a positive PPD but has possibly gotten the BCG vaccine. Quant gold performed at [place] still pending.     Admitted to [facility] MICU for further management. Vital signs on arrival: temp 38.6, /77, , RR 28, O2 sat 97% on 2L NC.  Labs: WBC 34.53, Hb 9.6, lipase 740, procal 2.02, tBili 1.4, alk phos 498, lactate 2.4. (30 May 2022 04:01)    Hematology/Oncology consulted d/t patient's history of anemia. [facility]    Anemia and Lymphaden opathy  --multifactorial  --transfuse for hgb > 7  --repeat ldh, retic ,ferritin  --infectious vs malignancy vs autoimmune  --CT C/A/P done 5/31  ·	Markedly enlarged mesenteric lymph nodes/lymphadenopathy out of proportion of retroperitoneal lymphadenopathy. Although lymphoma/leukemia can have this appearance, consider possibility of gastrointestinal tuberculosis as well as other infectious etiologies  --supraclavicular/cervical IR bedside biopsy pending  --flow cytometry, SPEP, immunoglobulins, immunofixation pending  -- MPN testing is negative  -  BMB done on 5/20 at [facility]: negative for detectable monoclonal cell or B cell population  -- large blood in urine    Questionable PE with splenic infarct  - on heparin GGT      hx of lupus nephritis, w/ rbc and blood in urine. send urine for cytology, check compliment levels, f/u nephrology recommendations.

## 2022-06-03 NOTE — PROGRESS NOTE ADULT - SUBJECTIVE AND OBJECTIVE BOX
Venkat Steel MD  Internal Medicine  Pager #[phone]    CHIEF COMPLAINT:Patient is a 66y old  Male who presents with a chief complaint of recurrent fevers, unknown source (03 Jun 2022 07:19)        Interval Events:    REVIEW OF SYSTEMS:    OBJECTIVE:  ICU Vital Signs Last 24 Hrs  T(C): 38.7 (03 Jun 2022 06:00), Max: 40.2 (03 Jun 2022 05:00)  T(F): 101.7 (03 Jun 2022 06:00), Max: 104.4 (03 Jun 2022 05:00)  HR: 112 (03 Jun 2022 07:00) (88 - 135)  BP: 114/58 (03 Jun 2022 07:00) (111/55 - 170/79)  BP(mean): 79 (03 Jun 2022 07:00) (77 - 116)  ABP: --  ABP(mean): --  RR: 20 (03 Jun 2022 07:00) (16 - 33)  SpO2: 93% (03 Jun 2022 07:00) (93% - 98%)        06-02 @ 07:01  -  06-03 @ 07:00  --------------------------------------------------------  IN: 1236 mL / OUT: 1325 mL / NET: -89 mL      CAPILLARY BLOOD GLUCOSE      POCT Blood Glucose.: 76 mg/dL (03 Jun 2022 05:10)      PHYSICAL EXAM:    LINES:    HOSPITAL MEDICATIONS:  Standing Meds:  chlorhexidine 4% Liquid 1 Application(s) Topical <User Schedule>  chlorhexidine 4% Liquid 1 Application(s) Topical <User Schedule>  dextrose 5%. 1000 milliLiter(s) IV Continuous <Continuous>  dextrose 5%. 1000 milliLiter(s) IV Continuous <Continuous>  dextrose 50% Injectable 25 Gram(s) IV Push once  dextrose 50% Injectable 12.5 Gram(s) IV Push once  dextrose 50% Injectable 25 Gram(s) IV Push once  famotidine    Tablet 20 milliGRAM(s) Oral daily  folic acid Injectable 1 milliGRAM(s) IV Push daily  gabapentin 300 milliGRAM(s) Oral daily  glucagon  Injectable 1 milliGRAM(s) IntraMuscular once  heparin  Infusion. 2300 Unit(s)/Hr IV Continuous <Continuous>  insulin lispro (ADMELOG) corrective regimen sliding scale   SubCutaneous every 6 hours  lidocaine   4% Patch 1 Patch Transdermal daily  loperamide 2 milliGRAM(s) Oral daily  metoprolol tartrate Injectable 2.5 milliGRAM(s) IV Push every 6 hours  multivitamin/minerals 1 Tablet(s) Oral daily  tamsulosin 0.4 milliGRAM(s) Oral at bedtime      PRN Meds:  acetaminophen     Tablet .. 500 milliGRAM(s) Oral every 6 hours PRN  dextrose Oral Gel 15 Gram(s) Oral once PRN  levalbuterol Inhalation 0.63 milliGRAM(s) Inhalation every 8 hours PRN  sodium chloride 0.9% lock flush 10 milliLiter(s) IV Push every 1 hour PRN      LABS:                        7.6    36.71 )-----------( 152      ( 03 Jun 2022 00:28 )             25.1     Hgb Trend: 7.6<--, 7.8<--, 7.8<--, 8.4<--, 8.7<--  06-03    135  |  107  |  21  ----------------------------<  82  4.1   |  17<L>  |  1.46<H>    Ca    8.6      03 Jun 2022 00:28  Phos  3.8     06-03  Mg     2.0     06-03    TPro  6.0  /  Alb  2.1<L>  /  TBili  0.8  /  DBili  x   /  AST  29  /  ALT  12  /  AlkPhos  473<H>  06-03    Creatinine Trend: 1.46<--, 1.31<--, 1.20<--, 1.18<--, 1.19<--, 1.17<--  PT/INR - ( 03 Jun 2022 00:28 )   PT: 17.7 sec;   INR: 1.53 ratio         PTT - ( 03 Jun 2022 00:28 )  PTT:30.6 sec    Arterial Blood Gas:  06-01 @ 23:35  7.42/29/77/19/97.6/-4.9  ABG lactate: --    Venous Blood Gas:  06-03 @ 00:08  7.38/31/61/18/91.4  VBG Lactate: 1.5  Venous Blood Gas:  06-02 @ 11:12  7.33/39/42/21/63.1  VBG Lactate: 2.2  Venous Blood Gas:  06-02 @ 06:28  7.40/31/68/19/95.8  VBG Lactate: 1.7  Venous Blood Gas:  06-01 @ 17:53  7.39/33/45/20/77.6  VBG Lactate: 2.9      MICROBIOLOGY:     Culture - Fungal, Body Fluid (collected 02 Jun 2022 19:03)  Source: .Body Fluid Peritoneal Fluid  Preliminary Report (03 Jun 2022 07:35):    Testing in progress    Culture - Body Fluid with Gram Stain (collected 02 Jun 2022 19:03)  Source: .Body Fluid Peritoneal Fluid  Gram Stain (03 Jun 2022 05:19):    polymorphonuclear leukocytes seen    No organisms seen    by cytocentrifuge    Culture - Blood (collected 01 Jun 2022 23:40)  Source: .Blood Blood-Peripheral  Preliminary Report (03 Jun 2022 02:02):    No growth to date.    Culture - Blood (collected 01 Jun 2022 23:30)  Source: .Blood Blood-Venous  Preliminary Report (03 Jun 2022 02:02):    No growth to date.    Culture - Acid Fast - Stool w/Smear (collected 01 Jun 2022 20:10)  Source: .Stool Stool    Culture - Acid Fast - Stool w/Smear (collected 01 Jun 2022 07:34)  Source: .Stool Stool    Culture - Acid Fast - Sputum w/Smear (collected 01 Jun 2022 07:33)  Source: .Sputum Sputum    Culture - Acid Fast - Stool w/Smear (collected 31 May 2022 14:22)  Source: .Stool Stool    Culture - Acid Fast - Sputum w/Smear (collected 31 May 2022 08:54)  Source: .Sputum Sputum  Preliminary Report (01 Jun 2022 15:05):    Culture is being performed.      RADIOLOGY:  [ ] Reviewed and interpreted by me    EKG:     Venkat Steel MD  Internal Medicine  Pager #[phone]    CHIEF COMPLAINT:Patient is a 66y old  Male who presents with a chief complaint of recurrent fevers, unknown source (03 Jun 2022 07:19)        Interval Events:    status post biopsy and paracentesis tolerated well    Persistently febrile requiring tylenol and cooling.     REVIEW OF SYSTEMS:    Const anxious affect  cardio no chest pain or palpitiation  pulm no shortness of breath or cough  abd no abdominal pain       OBJECTIVE:  ICU Vital Signs Last 24 Hrs  T(C): 38.7 (03 Jun 2022 06:00), Max: 40.2 (03 Jun 2022 05:00)  T(F): 101.7 (03 Jun 2022 06:00), Max: 104.4 (03 Jun 2022 05:00)  HR: 112 (03 Jun 2022 07:00) (88 - 135)  BP: 114/58 (03 Jun 2022 07:00) (111/55 - 170/79)  BP(mean): 79 (03 Jun 2022 07:00) (77 - 116)  ABP: --  ABP(mean): --  RR: 20 (03 Jun 2022 07:00) (16 - 33)  SpO2: 93% (03 Jun 2022 07:00) (93% - 98%)        06-02 @ 07:01  -  06-03 @ 07:00  --------------------------------------------------------  IN: 1236 mL / OUT: 1325 mL / NET: -89 mL      CAPILLARY BLOOD GLUCOSE      POCT Blood Glucose.: 76 mg/dL (03 Jun 2022 05:10)        PHYSICAL EXAM:  GENERAL: appears fatigued, weak  ENT: Moist mucous membranes  CHEST/LUNG: no wheezing or increased WOB  HEART: tachycardic, reg rhythm; No murmurs, rubs, or gallops  ABDOMEN: Abdomen feels full, Bowel sounds present; Soft, Nontender  EXTREMITIES:  2+ Peripheral Pulses, brisk capillary refill. No clubbing, cyanosis, or edema  NERVOUS SYSTEM:  Alert & Oriented X3  MSK: FROM all 4 extremities, full and equal strength  SKIN: No rashes or lesions    LINES:    HOSPITAL MEDICATIONS:  Standing Meds:  chlorhexidine 4% Liquid 1 Application(s) Topical <User Schedule>  chlorhexidine 4% Liquid 1 Application(s) Topical <User Schedule>  dextrose 5%. 1000 milliLiter(s) IV Continuous <Continuous>  dextrose 5%. 1000 milliLiter(s) IV Continuous <Continuous>  dextrose 50% Injectable 25 Gram(s) IV Push once  dextrose 50% Injectable 12.5 Gram(s) IV Push once  dextrose 50% Injectable 25 Gram(s) IV Push once  famotidine    Tablet 20 milliGRAM(s) Oral daily  folic acid Injectable 1 milliGRAM(s) IV Push daily  gabapentin 300 milliGRAM(s) Oral daily  glucagon  Injectable 1 milliGRAM(s) IntraMuscular once  heparin  Infusion. 2300 Unit(s)/Hr IV Continuous <Continuous>  insulin lispro (ADMELOG) corrective regimen sliding scale   SubCutaneous every 6 hours  lidocaine   4% Patch 1 Patch Transdermal daily  loperamide 2 milliGRAM(s) Oral daily  metoprolol tartrate Injectable 2.5 milliGRAM(s) IV Push every 6 hours  multivitamin/minerals 1 Tablet(s) Oral daily  tamsulosin 0.4 milliGRAM(s) Oral at bedtime      PRN Meds:  acetaminophen     Tablet .. 500 milliGRAM(s) Oral every 6 hours PRN  dextrose Oral Gel 15 Gram(s) Oral once PRN  levalbuterol Inhalation 0.63 milliGRAM(s) Inhalation every 8 hours PRN  sodium chloride 0.9% lock flush 10 milliLiter(s) IV Push every 1 hour PRN      LABS:                        7.6    36.71 )-----------( 152      ( 03 Jun 2022 00:28 )             25.1     Hgb Trend: 7.6<--, 7.8<--, 7.8<--, 8.4<--, 8.7<--  06-03    135  |  107  |  21  ----------------------------<  82  4.1   |  17<L>  |  1.46<H>    Ca    8.6      03 Jun 2022 00:28  Phos  3.8     06-03  Mg     2.0     06-03    TPro  6.0  /  Alb  2.1<L>  /  TBili  0.8  /  DBili  x   /  AST  29  /  ALT  12  /  AlkPhos  473<H>  06-03    Creatinine Trend: 1.46<--, 1.31<--, 1.20<--, 1.18<--, 1.19<--, 1.17<--  PT/INR - ( 03 Jun 2022 00:28 )   PT: 17.7 sec;   INR: 1.53 ratio         PTT - ( 03 Jun 2022 00:28 )  PTT:30.6 sec    Arterial Blood Gas:  06-01 @ 23:35  7.42/29/77/19/97.6/-4.9  ABG lactate: --    Venous Blood Gas:  06-03 @ 00:08  7.38/31/61/18/91.4  VBG Lactate: 1.5  Venous Blood Gas:  06-02 @ 11:12  7.33/39/42/21/63.1  VBG Lactate: 2.2  Venous Blood Gas:  06-02 @ 06:28  7.40/31/68/19/95.8  VBG Lactate: 1.7  Venous Blood Gas:  06-01 @ 17:53  7.39/33/45/20/77.6  VBG Lactate: 2.9      MICROBIOLOGY:     Culture - Fungal, Body Fluid (collected 02 Jun 2022 19:03)  Source: .Body Fluid Peritoneal Fluid  Preliminary Report (03 Jun 2022 07:35):    Testing in progress    Culture - Body Fluid with Gram Stain (collected 02 Jun 2022 19:03)  Source: .Body Fluid Peritoneal Fluid  Gram Stain (03 Jun 2022 05:19):    polymorphonuclear leukocytes seen    No organisms seen    by cytocentrifuge    Culture - Blood (collected 01 Jun 2022 23:40)  Source: .Blood Blood-Peripheral  Preliminary Report (03 Jun 2022 02:02):    No growth to date.    Culture - Blood (collected 01 Jun 2022 23:30)  Source: .Blood Blood-Venous  Preliminary Report (03 Jun 2022 02:02):    No growth to date.    Culture - Acid Fast - Stool w/Smear (collected 01 Jun 2022 20:10)  Source: .Stool Stool    Culture - Acid Fast - Stool w/Smear (collected 01 Jun 2022 07:34)  Source: .Stool Stool    Culture - Acid Fast - Sputum w/Smear (collected 01 Jun 2022 07:33)  Source: .Sputum Sputum    Culture - Acid Fast - Stool w/Smear (collected 31 May 2022 14:22)  Source: .Stool Stool    Culture - Acid Fast - Sputum w/Smear (collected 31 May 2022 08:54)  Source: .Sputum Sputum  Preliminary Report (01 Jun 2022 15:05):    Culture is being performed.      RADIOLOGY:  [ ] Reviewed and interpreted by me    EKG:

## 2022-06-03 NOTE — PROGRESS NOTE ADULT - ATTENDING COMMENTS
Pt seen and examined. 66 yr M with medical hx and hospital course as noted now with fever, anasarca, mesenteric, RP, submandibular and L SC lymphadenopathy, cirrhosis/ ascites with portal HTN, splenic infarcts, L brachial DVT and possible filling defect on in RLL which may represent a PE. DDx includes TB vs malignancy. BM Bx at [facility] reportedly negative, now s/p SCL LN bx and diagnostic paracentesis IR. Ascitic fluid with 64 % neutrophils concerning for infection. Ongoing high fever and rising WBC. FUP ascitic fluid Cxs and blood Cxs. Resume broad spectrum ABx therapy with Vanc by level and Daja. Ongoing sinus tach with stable BP. Bolus with  cc, followed by LR @ 100 cc/hr. Keep MAP >65. Cont  Lopressor IV for rate control. Ongoing concern for malignancy as the etiology of his symptoms and radiological findings, FUP path. Heparin gtt resumed, follow PTT. Progressive PEPE superimposed on CKD d/t lupus nephritis. Check urine electrolytes and renal US. Requires a nephrology evalv. Unable to track UO however, Pt adamantly refusing Jackson. Overall prognosis guarded. Pt and his son updated in detail at bedside. Remains full code.

## 2022-06-03 NOTE — PROGRESS NOTE ADULT - ASSESSMENT
65 yo M with a PMH HTN, HLD, SLE on Cellcept (MMF), class V lupus nephritis, CKD stage 2, DM2, diabetic neuropathy, TIA (2019) on plavix, BPH, HUNG, hospitalization in [facility] 3/27-4/20 tx for pancreatitis and C diff?, recent hospitalization at [facility] 4/22-4/26 for acute pancreatitis and C diff colitis and another hospitalization 5/5 at [facility] for septic shock (source buttock abscess) treated with vanc, cefepime (5/5-5/10), meropenem (5/11-5/18) and micafungin. Zosyn added 5/26 course complicated by recurrent fever now with suspected R PE and imaging with peritoneal lymphadenopathy lymphoma versus TB pending IR biopsy monitoring off abx. Had PAF in [facility], now shiort bursts on this admission , now post biopsy, recurrent high grade fevers, hypertensive, short bouts of PAF on lopressor 2.5 q 6      advise    1. consider increase metoprolol to 5 q 6  2. consider transfuse  with severe anemia, tachycardia?  3/  continue hydration,  replete K>4, mag >2  4.  ECHO ( in past has been normal)   5. continue heparin for now , eventual transition to DOAC

## 2022-06-03 NOTE — PROGRESS NOTE ADULT - SUBJECTIVE AND OBJECTIVE BOX
INTERVAL HPI/OVERNIGHT EVENTS: patietn noted  with recurrent high grade fevers, altered mental status     hypertensive today with telemetry c/w sinus tachcyardia, short salvos of atrial tachycardia     on heparin IV. had upper extremity DVT , posisble PE and also has a splenic infarct. Awaiting results of echo with agitated saline    MEDICATIONS  (STANDING):  chlorhexidine 4% Liquid 1 Application(s) Topical <User Schedule>  chlorhexidine 4% Liquid 1 Application(s) Topical <User Schedule>  dexMEDEtomidine Infusion 0.2 MICROgram(s)/kG/Hr (4.53 mL/Hr) IV Continuous <Continuous>  dextrose 5% + lactated ringers. 1000 milliLiter(s) (100 mL/Hr) IV Continuous <Continuous>  dextrose 5%. 1000 milliLiter(s) (100 mL/Hr) IV Continuous <Continuous>  dextrose 5%. 1000 milliLiter(s) (50 mL/Hr) IV Continuous <Continuous>  dextrose 50% Injectable 25 Gram(s) IV Push once  dextrose 50% Injectable 12.5 Gram(s) IV Push once  dextrose 50% Injectable 25 Gram(s) IV Push once  famotidine    Tablet 20 milliGRAM(s) Oral daily  folic acid Injectable 1 milliGRAM(s) IV Push daily  gabapentin 300 milliGRAM(s) Oral daily  glucagon  Injectable 1 milliGRAM(s) IntraMuscular once  heparin  Infusion. 2300 Unit(s)/Hr (23 mL/Hr) IV Continuous <Continuous>  insulin lispro (ADMELOG) corrective regimen sliding scale   SubCutaneous every 6 hours  lidocaine   4% Patch 1 Patch Transdermal daily  loperamide 2 milliGRAM(s) Oral daily  meropenem  IVPB 1000 milliGRAM(s) IV Intermittent every 12 hours  meropenem  IVPB      metoprolol tartrate 12.5 milliGRAM(s) Oral every 6 hours  multivitamin/minerals 1 Tablet(s) Oral daily  tamsulosin 0.4 milliGRAM(s) Oral at bedtime    MEDICATIONS  (PRN):  acetaminophen     Tablet .. 500 milliGRAM(s) Oral every 6 hours PRN Temp greater or equal to 38C (100.4F)  dextrose Oral Gel 15 Gram(s) Oral once PRN Blood Glucose LESS THAN 70 milliGRAM(s)/deciliter  levalbuterol Inhalation 0.63 milliGRAM(s) Inhalation every 8 hours PRN wheeze/ shortness of breath  sodium chloride 0.9% lock flush 10 milliLiter(s) IV Push every 1 hour PRN Pre/post blood products, medications, blood draw, and to maintain line patency      Allergies    No Known Allergies    Intolerances      ROS:  General: Pt denies recent weight loss/fever/chills    Neurological: denies numbness or  sensation loss    Cardiovascular: denies chest pain/palpitations/leg edema    Respiratory and Thorax: denies SOB/cough/wheezing    Gastrointestinal: denies abdominal pain/diarrhea/constipation/bloody stool    Genitourinary: denies urinary frequency/urgency/ dysuria    Musculoskeletal: denies joint pain or swelling, denies restricted motion    Hematologic: denies abnormal bleeding  	    	  	    		        	    	            Vital Signs Last 24 Hrs  T(C): 37 (2022 16:00), Max: 40.7 (2022 13:00)  T(F): 98.6 (2022 16:00), Max: 105.3 (2022 13:00)  HR: 118 (2022 16:00) (88 - 135)  BP: 171/78 (2022 16:00) (112/57 - 188/80)  BP(mean): 112 (2022 16:00) (79 - 116)  RR: 23 (2022 16:00) (16 - 39)  SpO2: 100% (2022 16:00) (93% - 100%)  Daily     Daily Weight in k.2 (2022 01:00)     @ 07: @ 07:00  --------------------------------------------------------  IN: 1236 mL / OUT: 1325 mL / NET: -89 mL     @ 07:  -   @ 16:28  --------------------------------------------------------  IN: 1830 mL / OUT: 316 mL / NET: 1514 mL      Physical Exam:    wdwn male   noJVD  cor RRr  ulung clear  abd soft   ext no edema      LABS:                        7.4    38.72 )-----------( 130      ( 2022 07:38 )             24.0     06    135  |  105  |  20  ----------------------------<  99  3.9   |  18<L>  |  1.42<H>    Ca    8.4      2022 14:45  Phos  3.4       Mg     1.9         TPro  6.2  /  Alb  2.3<L>  /  TBili  0.7  /  DBili  x   /  AST  31  /  ALT  14  /  AlkPhos  522<H>      PT/INR - ( 2022 00:28 )   PT: 17.7 sec;   INR: 1.53 ratio         PTT - ( 2022 07:38 )  PTT:67.8 sec      RADIOLOGY & ADDITIONAL TESTS:    TELE:    EKG:

## 2022-06-03 NOTE — PROGRESS NOTE ADULT - SUBJECTIVE AND OBJECTIVE BOX
Patient is a 66y old  Male who presents with a chief complaint of recurrent fevers, unknown source (03 Jun 2022 07:50)      MEDICATIONS  (STANDING):  chlorhexidine 4% Liquid 1 Application(s) Topical <User Schedule>  chlorhexidine 4% Liquid 1 Application(s) Topical <User Schedule>  dexMEDEtomidine Infusion 0.2 MICROgram(s)/kG/Hr (4.53 mL/Hr) IV Continuous <Continuous>  dextrose 5% + lactated ringers. 1000 milliLiter(s) (100 mL/Hr) IV Continuous <Continuous>  dextrose 5%. 1000 milliLiter(s) (100 mL/Hr) IV Continuous <Continuous>  dextrose 5%. 1000 milliLiter(s) (50 mL/Hr) IV Continuous <Continuous>  dextrose 50% Injectable 25 Gram(s) IV Push once  dextrose 50% Injectable 12.5 Gram(s) IV Push once  dextrose 50% Injectable 25 Gram(s) IV Push once  famotidine    Tablet 20 milliGRAM(s) Oral daily  folic acid Injectable 1 milliGRAM(s) IV Push daily  gabapentin 300 milliGRAM(s) Oral daily  glucagon  Injectable 1 milliGRAM(s) IntraMuscular once  heparin  Infusion. 2300 Unit(s)/Hr (23 mL/Hr) IV Continuous <Continuous>  insulin lispro (ADMELOG) corrective regimen sliding scale   SubCutaneous every 6 hours  lidocaine   4% Patch 1 Patch Transdermal daily  loperamide 2 milliGRAM(s) Oral daily  meropenem  IVPB 1000 milliGRAM(s) IV Intermittent every 12 hours  meropenem  IVPB      metoprolol tartrate 12.5 milliGRAM(s) Oral every 6 hours  multivitamin/minerals 1 Tablet(s) Oral daily  tamsulosin 0.4 milliGRAM(s) Oral at bedtime    MEDICATIONS  (PRN):  acetaminophen     Tablet .. 500 milliGRAM(s) Oral every 6 hours PRN Temp greater or equal to 38C (100.4F)  dextrose Oral Gel 15 Gram(s) Oral once PRN Blood Glucose LESS THAN 70 milliGRAM(s)/deciliter  levalbuterol Inhalation 0.63 milliGRAM(s) Inhalation every 8 hours PRN wheeze/ shortness of breath  sodium chloride 0.9% lock flush 10 milliLiter(s) IV Push every 1 hour PRN Pre/post blood products, medications, blood draw, and to maintain line patency      ROS  No fever, sweats, chills  No epistaxis, HA, sore throat  No CP, SOB, cough, sputum  No n/v/d, abd pain, melena, hematochezia  No edema  No rash  No anxiety  No back pain, joint pain  No bleeding, bruising  No dysuria, hematuria    Vital Signs Last 24 Hrs  T(C): 37 (03 Jun 2022 16:00), Max: 40.7 (03 Jun 2022 13:00)  T(F): 98.6 (03 Jun 2022 16:00), Max: 105.3 (03 Jun 2022 13:00)  HR: 118 (03 Jun 2022 16:00) (88 - 135)  BP: 171/78 (03 Jun 2022 16:00) (112/57 - 188/80)  BP(mean): 112 (03 Jun 2022 16:00) (79 - 116)  RR: 23 (03 Jun 2022 16:00) (16 - 39)  SpO2: 100% (03 Jun 2022 16:00) (93% - 100%)    PE  NAD  Awake, alert  Anicteric, MMM  RRR  CTAB  Abd soft, NT, ND  No c/c/e  No rash grossly  FROM                          7.4    38.72 )-----------( 130      ( 03 Jun 2022 07:38 )             24.0       06-03    135  |  105  |  20  ----------------------------<  99  3.9   |  18<L>  |  1.42<H>    Ca    8.4      03 Jun 2022 14:45  Phos  3.4     06-03  Mg     1.9     06-03    TPro  6.2  /  Alb  2.3<L>  /  TBili  0.7  /  DBili  x   /  AST  31  /  ALT  14  /  AlkPhos  522<H>  06-03

## 2022-06-03 NOTE — PROGRESS NOTE ADULT - SUBJECTIVE AND OBJECTIVE BOX
Interventional Radiology Follow-Up Note    Patient seen and examined @ bedside     This is a 66y Male s/p Diagnostic paracentesis, and left supraclavicular lymph node biopsy on 6/2/2022 in Interventional Radiology with Dr. Lujan.    No complaint offered.      Medication:  heparin  Infusion.: (06-03)  metoprolol tartrate Injectable: (06-03)  tamsulosin: (06-02)    Vitals:  T(F): 101.7, Max: 104.4 (05:00)  HR: 112  BP: 114/58  RR: 20  SpO2: 93%    Physical Exam:  General: Nontoxic, in NAD.  Abdomen: Soft. Distended/ ND. NT. Dressing c/d/i.        LABS:  Na: 135 (06-03 @ 00:28), 135 (06-01 @ 23:56), 136 (06-01 @ 01:16), 136 (05-31 @ 13:50)  K: 4.1 (06-03 @ 00:28), 4.2 (06-01 @ 23:56), 3.4 (06-01 @ 01:16), 3.7 (05-31 @ 13:50)  Cl: 107 (06-03 @ 00:28), 105 (06-01 @ 23:56), 102 (06-01 @ 01:16), 102 (05-31 @ 13:50)  CO2: 17 (06-03 @ 00:28), 17 (06-01 @ 23:56), 23 (06-01 @ 01:16), 21 (05-31 @ 13:50)  BUN: 21 (06-03 @ 00:28), 21 (06-01 @ 23:56), 21 (06-01 @ 01:16), 23 (05-31 @ 13:50)  Cr: 1.46 (06-03 @ 00:28), 1.31 (06-01 @ 23:56), 1.20 (06-01 @ 01:16), 1.18 (05-31 @ 13:50)  Glu: 82(06-03 @ 00:28), 100(06-01 @ 23:56), 90(06-01 @ 01:16), 90(05-31 @ 13:50)  Hgb: 7.6 (06-03 @ 00:28), 7.8 (06-01 @ 23:56), 7.8 (06-01 @ 01:16)  Hct: 25.1 (06-03 @ 00:28), 25.4 (06-01 @ 23:56), 25.5 (06-01 @ 01:16)  WBC: 36.71 (06-03 @ 00:28), 33.11 (06-01 @ 23:56), 33.90 (06-01 @ 01:16)  Plt: 152 (06-03 @ 00:28), 143 (06-01 @ 23:56), 120 (06-01 @ 01:16)  INR: 1.53 06-03-22 @ 00:28, 1.42 06-01-22 @ 23:56, 1.38 06-01-22 @ 01:16  PTT: 30.6 06-03-22 @ 00:28, 72.0 06-01-22 @ 23:56, 76.0 06-01-22 @ 04:47, 72.4 05-31-22 @ 21:06, 30.5 05-31-22 @ 13:50, 49.4 05-31-22 @ 08:39      LIVER FUNCTIONS - ( 03 Jun 2022 00:28 )  Alb: 2.1 g/dL / Pro: 6.0 g/dL / ALK PHOS: 473 U/L / ALT: 12 U/L / AST: 29 U/L / GGT: x         Aspartate Aminotransferase (AST/SGOT): 29 U/L (06-03-22 @ 00:28)  Alanine Aminotransferase (ALT/SGPT): 12 U/L (06-03-22 @ 00:28)  Aspartate Aminotransferase (AST/SGOT): 34 U/L (06-01-22 @ 23:56)  Alanine Aminotransferase (ALT/SGPT): 15 U/L (06-01-22 @ 23:56)               Assessment/Plan: This is a 66y Male s/p Diagnostic paracentesis, and left supraclavicular lymph node biopsy on 6/2/2022 in Interventional Radiology with Dr. Lujan.     - Small to moderate ascites. 180 cc of fluid aspirated for sample. Sterile dressing applied. Left supraclavicular lymph node biopsied using ultrasound. Core biopsy x3 and FNA x3. Sample given to cytopathology technician present at time of procedure  - follow up results of paracentesis and lymph node biopsy.   - remainder of care per primary team.  - Reconsult as needed.  - IR will sign off.     Please call IR at  [phone] with any questions, concerns, or issues regarding above.    Also available on Teams.

## 2022-06-03 NOTE — CONSULT NOTE ADULT - SUBJECTIVE AND OBJECTIVE BOX
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 Moore), DM, TIA, BPH, and HUNG who pwas transferred to the hospital due to concerns for septic shock and recurrent fevers. 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 Since then he has been following Dr. Moore as his primary nephrologist. The patient has was on treatment with steroids and cyclophosphamide. Most recently the patient is being treated with MMF 750mg BID. Upon arrival to the hospital the patient was noted to have a SCr of 1.16 which has since risen to 1.42mg/dL this morning. The nephrology team was consulted for PEPE. The pateint reports feeling some mild shortness of breath during the encounter. He denied any chest pain, shortness of breath, nausea, vomiting, or abdominal pain.     PAST HISTORY  --------------------------------------------------------------------------------  PAST MEDICAL & SURGICAL HISTORY:  Obstructive Sleep Apnea      Hepatitis B Carrier      Pneumonia      Other systemic lupus erythematosus with endocarditis      Type 2 diabetes mellitus with other neurologic complication, without long-term current use of insulin      Sleep apnea, unspecified type      Hypertension, unspecified type      Nephritic syndrome      No significant past surgical history        FAMILY HISTORY:  FH: type 2 diabetes      PAST SOCIAL HISTORY:    ALLERGIES & MEDICATIONS  --------------------------------------------------------------------------------  Allergies    No Known Allergies    Intolerances      Standing Inpatient Medications  chlorhexidine 4% Liquid 1 Application(s) Topical <User Schedule>  chlorhexidine 4% Liquid 1 Application(s) Topical <User Schedule>  dexMEDEtomidine Infusion 0.2 MICROgram(s)/kG/Hr IV Continuous <Continuous>  dextrose 5% + lactated ringers. 1000 milliLiter(s) IV Continuous <Continuous>  dextrose 5%. 1000 milliLiter(s) IV Continuous <Continuous>  dextrose 5%. 1000 milliLiter(s) IV Continuous <Continuous>  dextrose 50% Injectable 25 Gram(s) IV Push once  dextrose 50% Injectable 12.5 Gram(s) IV Push once  dextrose 50% Injectable 25 Gram(s) IV Push once  famotidine    Tablet 20 milliGRAM(s) Oral daily  folic acid Injectable 1 milliGRAM(s) IV Push daily  gabapentin 300 milliGRAM(s) Oral daily  glucagon  Injectable 1 milliGRAM(s) IntraMuscular once  heparin  Infusion. 2300 Unit(s)/Hr IV Continuous <Continuous>  insulin lispro (ADMELOG) corrective regimen sliding scale   SubCutaneous every 6 hours  lidocaine   4% Patch 1 Patch Transdermal daily  loperamide 2 milliGRAM(s) Oral daily  meropenem  IVPB 1000 milliGRAM(s) IV Intermittent every 12 hours  meropenem  IVPB      metoprolol tartrate 12.5 milliGRAM(s) Oral every 6 hours  multivitamin/minerals 1 Tablet(s) Oral daily  tamsulosin 0.4 milliGRAM(s) Oral at bedtime    PRN Inpatient Medications  acetaminophen     Tablet .. 500 milliGRAM(s) Oral every 6 hours PRN  dextrose Oral Gel 15 Gram(s) Oral once PRN  levalbuterol Inhalation 0.63 milliGRAM(s) Inhalation every 8 hours PRN  sodium chloride 0.9% lock flush 10 milliLiter(s) IV Push every 1 hour PRN      REVIEW OF SYSTEMS      All other systems were reviewed and are negative, except as noted.    VITALS/PHYSICAL EXAM  --------------------------------------------------------------------------------  T(C): 37 (06-03-22 @ 16:00), Max: 40.7 (06-03-22 @ 13:00)  HR: 118 (06-03-22 @ 16:00) (88 - 135)  BP: 171/78 (06-03-22 @ 16:00) (112/57 - 188/80)  RR: 23 (06-03-22 @ 16:00) (16 - 39)  SpO2: 100% (06-03-22 @ 16:00) (93% - 100%)  Wt(kg): --        06-02-22 @ 07:01  -  06-03-22 @ 07:00  --------------------------------------------------------  IN: 1236 mL / OUT: 1325 mL / NET: -89 mL    06-03-22 @ 07:01  -  06-03-22 @ 16:53  --------------------------------------------------------  IN: 1830 mL / OUT: 316 mL / NET: 1514 mL      Physical Exam:  	Gen: ill appearing  	HEENT: MMM  	Pulm: decreased breath sounds   	CV: S1S2  	Abd: Soft, +BS, distended   	Ext: trace LE edema B/L  	Neuro: Awake and alert   	Skin: Warm and dry    LABS/STUDIES  --------------------------------------------------------------------------------              7.4    38.72 >-----------<  130      [06-03-22 @ 07:38]              24.0     135  |  105  |  20  ----------------------------<  99      [06-03-22 @ 14:45]  3.9   |  18  |  1.42        Ca     8.4     [06-03-22 @ 14:45]      Mg     1.9     [06-03-22 @ 14:45]      Phos  3.4     [06-03-22 @ 14:45]    TPro  6.2  /  Alb  2.3  /  TBili  0.7  /  DBili  x   /  AST  31  /  ALT  14  /  AlkPhos  522  [06-03-22 @ 14:45]    PT/INR: PT 17.7 , INR 1.53       [06-03-22 @ 00:28]  PTT: 67.8       [06-03-22 @ 07:38]    CK 20      [06-03-22 @ 14:45]    Creatinine Trend:  SCr 1.42 [06-03 @ 14:45]  SCr 1.46 [06-03 @ 00:28]  SCr 1.31 [06-01 @ 23:56]  SCr 1.20 [06-01 @ 01:16]  SCr 1.18 [05-31 @ 13:50]    Urinalysis - [05-30-22 @ 03:44]      Color Yellow / Appearance Clear / SG 1.016 / pH 6.0      Gluc Negative / Ketone Negative  / Bili Negative / Urobili Negative       Blood Small / Protein 100 / Leuk Est Negative / Nitrite Negative      RBC 8 / WBC 3 / Hyaline 5 / Gran  / Sq Epi  / Non Sq Epi 1 / Bacteria Negative      Iron 43, TIBC 128, %sat 34      [06-01-22 @ 04:47]  Ferritin 511      [05-30-22 @ 03:48]  Lipid: chol 103, , HDL 12, LDL --      [05-30-22 @ 03:48]    HBsAg Nonreact      [05-30-22 @ 03:46]  HCV 0.31, Nonreact      [05-31-22 @ 00:32]  HIV Nonreact      [06-01-22 @ 13:46]  HIV Nonreact      [05-30-22 @ 04:13]    ED: titer 1:320, pattern Homogeneous      [06-01-22 @ 01:16]  dsDNA 34      [05-30-22 @ 10:58]  C3 Complement 125      [05-30-22 @ 10:58]  C4 Complement 30      [05-30-22 @ 10:58]  Free Light Chains: kappa 14.57, lambda 23.09, ratio = 0.63      [06-01 @ 04:47]

## 2022-06-03 NOTE — PROGRESS NOTE ADULT - ASSESSMENT
65 yo M with a PMH HTN, HLD, SLE on Cellcept (MMF), class V lupus nephritis, CKD stage 2, DM2, diabetic neuropathy, TIA (2019) on plavix, BPH, HUNG, with multiple hospitalizations for c diff, acute panc, septic shock 22 buttock abscess status post drainage transferred for recurrent fevers status post core biopsy and paracentesis suspected lymphoma v less likely peritoneal tb.     Neuro  - AAOx3 but slow  - Ammonia 36  -Fluctuating mentation while febrile which then returns to current clinical baseline slow however linear thought process and alert and oriented x3.     #diabetic neuropathy  -home gabapentin    Pulm  #large left posterior consolidation seen on ultrasoud, possible PNA?  -monitored off abx originally now back on edilson and vanc (6/3- )    #upper airway stridor, wheezing  -Resolved     #RLL PE  -heparin gtt    Cardio    #Episodes of hypotension  -hypotension episodic possibly rate related with period going into afib requiring rate control with lopressor.     #Hx of HTN  - monitored off home hypertensive meds due to periods of hypotension requiring fluid bolus, has not required pressors during hospitalization.       #Paroxysm of afib followed by sinus rhythm w frequent ectopy  -Lopressor 2.5 q6h for now.       Renal  #CKD stage 2, hx of lupus nephritis  -cont to trend Cr  -monitor I/Os  -cont to monitor electrolytes, replete as necessary  -urine lytes and urinalysis w microscopy sent  -canales placed 6/3    GI  #chronic inflammatory diarrhea  #possible Crohn's vs colitis vs infectious  -calprotectin elevated at [place] 532. no current plan by GI for colonoscopy at this time.   -C diff negative  -appreciate GI and ID input.   -less likely crohns disease     #Diet  -previously on TPN while at [facility]  -Supplementing with ensure, currently safe to swallow po nutrition.     #hx of pancreatitis, elevated lipase  -without current epigastric tenderness to palpation 5/30 lipase 740    #hepatosplenomegaly shown on MRI at [place]  -hepatitis panel nonreactive including Hep A B and C      #hx of BPH  -start on flomax    Endocrine  #DM2  -last HbA1c 4/29 6.5%  -start on ISS    ID/rheum  #hx of SLE  -monitoring off cellcept  -Has been seen by Dr. Swapnil Wolfe and also Dr. Shivam Malagon    #recurrent fevers and leukocytosis of unknown etiology  diff dx: infectious, malignancy, IgG4 disease, HLH  -elevated WBC, elevated IgA at [place]  -quant gold indeterminate  -5/30 BCx NGTD  -HIV nonreactive, EBV IgG+, CMV 50  -possilbility of peritoneal TB appreciate ID reccs AFB sputum and stool thus far are negative pending IR biopsy 6/2    #r/o HLH, IgG4  -has fevers, splenomegaly, cytopenia  -HLH labs: soluble IL-2 levels 33260, ferritin 511, triglyceride 412  -IgG4 27    Heme  #Anemia  -outside hospital reported to be consistent with AOCD  -CT ab pending, r/o bleed  -transfuse if Hb<7, maintain active type and screen  -DVT ppx: full AC    Pending results of core biopsy if further management required from heme onc perspective.     Lines: TOBY placed 5/27 Removed 6/1 ON

## 2022-06-04 NOTE — PROGRESS NOTE ADULT - PROBLEM SELECTOR PLAN 1
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 The patient is being treated with MMF 750mg as outpatient. Currently on hold given pt being treated for infection. [facility] noted to have a baseline SCr of 1.1mg/dL. Currently SCr elevated to 1.6mg/dL. Urine studies suggestive of pre-renal etiology. Hypotensive episodes noted. Pt. did receive 85 cc IV contrast on 5/30. Optimize hemodynamics. F/u outpatient records to be brought in by wife. Recommend renal sonogram. Send for serological markers including C3, C4, dsDNA, ESR, CRP. Monitor labs and urine output. Avoid NSAIDs, ACEI/ARBS, RCA and nephrotoxins. Dose medications as per eGFR.    If you have any questions, please feel free to contact me  Jameson Lipscomb  Nephrology Fellow  [phone]; Prefer [employer] TEAMS  (After 5pm or on weekends please page the on-call fellow)

## 2022-06-04 NOTE — CHART NOTE - NSCHARTNOTEFT_GEN_A_CORE
Nutrition Follow Up Note  Patient seen for: nutrition follow-up and assessment of calorie count    Hospital course per chart: 67 yo M with PMH of HTN, HLD, SLE on Cellcept, class V lupus nephritis, CKD stage 2, DM2, diabetic neuropathy, TIA (2019), BPH, HUNG, with hospitalization in [facility] 3/27- tx for pancreatitis and C. diff?, recent hospitalization at OSH - for acute pancreatitis and C. diff colitis and another hospitalization at OSH for septic shock (source: buttock abscess) treated with antibiotics. Hospital course complicated by recurrent fevers despite being on antibiotics, and leukocytosis. Blood cx negative, UA unremarkable. MRI abdomen with contrast showed extensive abdominal lymphadenopathy. No window for bx per IR. Hospital course also complicated by L brachial DVT and superficial DVT in arms. Noted to have positive PPD but has possibly gotten BCG vaccine. Transferred and admitted to [facility] MICU  for further management in setting of recurrent fevers s/p core biopsy and paracentesis with suspected lymphoma vs. less likely peritoneal tb. Chart reviewed, events noted.    Source: [] Patient       [x] EMR        [] RN        [] Family at bedside       [] Other:    Nutrition-Related Events: ordered for midodrine    Diet Order:   Diet, Regular:   Supplement Feeding Modality:  Oral  Glucerna Shake Cans or Servings Per Day:  2       Frequency:  Three Times a day (22)    Is current diet order appropriate/adequate? [x] Yes  []  No:     PO intake :   [] >75%  Adequate    [] 50-75%  Fair       [] <50%  Poor    Nutrition-related concerns:    GI: Fecal incontinence noted. Last BM today.    Ordered for Imodium    Weights:   Daily Weight in k (), Weight in k.2 (), Weight in k.3 () - weight fluctuations noted, likely in setting of fluid shifts vs. bed scale discrepancies. Overall with possible ~4% wt loss x <1 week (based on dosing weight 90.6 kg on  and current weight 87 kg today). Clinically significant weight loss noted. Will continue to monitor and trend weights as able/appropriate.     MEDICATIONS  (STANDING):  albumin human 25% IVPB  citric acid/sodium citrate Solution  dextrose 5% + sodium chloride 0.9%.  dextrose 5%.  dextrose 5%.  dextrose 50% Injectable  dextrose 50% Injectable  dextrose 50% Injectable  famotidine    Tablet  folic acid Injectable  glucagon  Injectable  insulin lispro (ADMELOG) corrective regimen sliding scale  loperamide  meropenem  IVPB  meropenem  IVPB  midodrine  multivitamin/minerals  tamsulosin    Pertinent Labs:  @ 05:11: Na 136, BUN 23, Cr 1.62<H>, BG 85, K+ 4.0, Phos 4.1, Mg 2.0, Alk Phos 401<H>, ALT/SGPT 12, AST/SGOT 27, HbA1c --   @ 23:18: Na 134<L>, BUN 23, Cr 1.66<H>, BG 80, K+ 4.2, Phos 4.0, Mg 1.8, Alk Phos 418<H>, ALT/SGPT 12, AST/SGOT 25, HbA1c --   @ 14:45: Na 135, BUN 20, Cr 1.42<H>, BG 99, K+ 3.9, Phos 3.4, Mg 1.9, Alk Phos 522<H>, ALT/SGPT 14, AST/SGOT 31, HbA1c --    A1C with Estimated Average Glucose Result: 5.2 % (22 @ 00:32)  A1C with Estimated Average Glucose Result: 5.0 % (22 @ 04:25)    Finger Sticks:  POCT Blood Glucose.: 112 mg/dL ( @ 17:29)  POCT Blood Glucose.: 122 mg/dL ( @ 14:03)  POCT Blood Glucose.: 88 mg/dL ( @ 11:32)    Triglycerides, Serum: 412 mg/dL (22 @ 03:48)    Skin per nursing documentation: suspected DTI to buttocks  Edema per flowsheets: 1+ generalized, 2+ to triny. feet     Estimated needs based on upper IBW 65 kg in setting of fluid accumulation and BMI >30  Estimated Energy Needs (30-35 kcal/kg): 9099-0872 kcal/day  Estimated Protein Needs (1.5-1.7 g/kg):  g/day  Defer fluids to team   [place] Equation:    Previous Nutrition Diagnosis: Increased Nutrient Needs  Nutrition Diagnosis is: [x] ongoing  [] resolved [] not applicable   Being addressed with PO diet and oral nutrition supplements at this time    New Nutrition Diagnosis:     Nutrition Care Plan:  [x] In Progress  [] Achieved  [] Not applicable    Nutrition Interventions:     Education Provided:       [] Yes:  [] No:        Recommendations:          Monitoring and Evaluation:   Continue to monitor nutritional intake, tolerance to diet prescription, weights, labs, skin integrity    RD remains available upon request and will follow up per protocol.  Whit Garza MS RD CDN [facility] Pager #[phone] Nutrition Follow Up Note  Patient seen for: nutrition follow-up and assessment of calorie count    Hospital course per chart: 65 yo M with PMH of HTN, HLD, SLE on Cellcept, class V lupus nephritis, CKD stage 2, DM2, diabetic neuropathy, TIA (2019), BPH, HUNG, with hospitalization in [facility] 3/27- tx for pancreatitis and C. diff?, recent hospitalization at OSH - for acute pancreatitis and C. diff colitis and another hospitalization at OSH for septic shock (source: buttock abscess) treated with antibiotics. Hospital course complicated by recurrent fevers despite being on antibiotics, and leukocytosis. Blood cx negative, UA unremarkable. MRI abdomen with contrast showed extensive abdominal lymphadenopathy. No window for bx per IR. Hospital course also complicated by L brachial DVT and superficial DVT in arms. Noted to have positive PPD but has possibly gotten BCG vaccine. Transferred and admitted to [facility] MICU  for further management in setting of recurrent fevers s/p core biopsy and paracentesis with suspected lymphoma vs. less likely peritoneal tb. Chart reviewed, events noted.    - Pt intubated by MICU MD this afternoon () for emergency airway in setting of respiratory distress     Source: [] Patient       [x] EMR        [] RN        [] Family at bedside       [] Other:  - unable to interview patient at this time as just intubated    Was ordered for Regular diet with Glucerna supplements, now made NPO in setting of intubation    PO intake: Calorie count had been ordered however unable to locate and PO diet inappropriate at this time as patient intubated. Noted with fair PO intake of meals yesterday (6/3).       Nutrition-related concerns:   - Propofol just ordered in setting of intubation    GI: Fecal incontinence noted. Last BM today.    Ordered for Imodium    Weights:   Daily Weight in k (), Weight in k.2 (), Weight in k.3 () - weight fluctuations noted, likely in setting of fluid shifts vs. bed scale discrepancies. Overall with possible ~4% wt loss x <1 week (based on dosing weight 90.6 kg on  and current weight 87 kg today). Clinically significant weight loss noted. Will continue to monitor and trend weights as able/appropriate.     MEDICATIONS  (STANDING):  albumin human 25% IVPB 100 milliLiter(s) IV Intermittent every 6 hours  chlorhexidine 4% Liquid 1 Application(s) Topical <User Schedule>  chlorhexidine 4% Liquid 1 Application(s) Topical <User Schedule>  citric acid/sodium citrate Solution 30 milliLiter(s) Oral every 6 hours  dextrose 5%. 1000 milliLiter(s) (100 mL/Hr) IV Continuous <Continuous>  dextrose 5%. 1000 milliLiter(s) (50 mL/Hr) IV Continuous <Continuous>  dextrose 50% Injectable 25 Gram(s) IV Push once  dextrose 50% Injectable 12.5 Gram(s) IV Push once  dextrose 50% Injectable 25 Gram(s) IV Push once  famotidine    Tablet 20 milliGRAM(s) Oral daily  fentaNYL   Infusion... 1 MICROgram(s)/kG/Hr (4.53 mL/Hr) IV Continuous <Continuous>  folic acid Injectable 1 milliGRAM(s) IV Push daily  gabapentin 300 milliGRAM(s) Oral daily  glucagon  Injectable 1 milliGRAM(s) IntraMuscular once  heparin  Infusion. 2300 Unit(s)/Hr (23 mL/Hr) IV Continuous <Continuous>  insulin lispro (ADMELOG) corrective regimen sliding scale   SubCutaneous every 6 hours  lactated ringers. 1000 milliLiter(s) (100 mL/Hr) IV Continuous <Continuous>  lidocaine   4% Patch 1 Patch Transdermal daily  lidocaine   4% Patch 1 Patch Transdermal every 24 hours  loperamide 2 milliGRAM(s) Oral daily  meropenem  IVPB 1000 milliGRAM(s) IV Intermittent every 12 hours  meropenem  IVPB      multivitamin/minerals 1 Tablet(s) Oral daily  propofol Infusion 40 MICROgram(s)/kG/Min (21.7 mL/Hr) IV Continuous <Continuous>  tamsulosin 0.4 milliGRAM(s) Oral at bedtime    Pertinent Labs:  @ 05:11: Na 136, BUN 23, Cr 1.62<H>, BG 85, K+ 4.0, Phos 4.1, Mg 2.0, Alk Phos 401<H>, ALT/SGPT 12, AST/SGOT 27, HbA1c --   @ 23:18: Na 134<L>, BUN 23, Cr 1.66<H>, BG 80, K+ 4.2, Phos 4.0, Mg 1.8, Alk Phos 418<H>, ALT/SGPT 12, AST/SGOT 25, HbA1c --   @ 14:45: Na 135, BUN 20, Cr 1.42<H>, BG 99, K+ 3.9, Phos 3.4, Mg 1.9, Alk Phos 522<H>, ALT/SGPT 14, AST/SGOT 31, HbA1c --    A1C with Estimated Average Glucose Result: 5.2 % (22 @ 00:32)  A1C with Estimated Average Glucose Result: 5.0 % (22 @ 04:25)    Finger Sticks:  POCT Blood Glucose.: 112 mg/dL ( @ 17:29)  POCT Blood Glucose.: 122 mg/dL ( @ 14:03)  POCT Blood Glucose.: 88 mg/dL ( @ 11:32)    Triglycerides, Serum: 412 mg/dL (22 @ 03:48)    Skin per nursing documentation: suspected DTI to buttocks  Edema per flowsheets: 1+ generalized, 2+ to triny. feet     Estimated needs recalculated in setting of intubation   Estimated Energy Needs based on lowest weight in-house 84.2 kg (6/3) (15-20 kcal/kg): 7093-4883 kcal/day  Estimated Protein Needs based on IBW 59.1 kg (1.8-2 g/kg): 106-118 g/day  Defer fluids to team   Deneen Gates: [phone]    Previous Nutrition Diagnosis: Increased Nutrient Needs  Nutrition Diagnosis is: [x] ongoing  [] resolved [] not applicable   Being addressed with PO diet and oral nutrition supplements at this time    New Nutrition Diagnosis: none at this time    Nutrition Care Plan:  [x] In Progress  [] Achieved  [] Not applicable    Nutrition Interventions:     Education Provided:       [] Yes:  [x] No: inappropriate at this time, patient intubated       Recommendations:      1) Defer nutrition provision to medical team  - If prolonged NPO expected, consider alternate means of nutrition if medically feasible and within GO  2) Continue multivitamin/minerals as able/appropriate    Monitoring and Evaluation:   Continue to monitor nutritional intake, tolerance to diet prescription, weights, labs, skin integrity    Nutrition care plan to remain consistent with [facility]  RD remains available upon request and will follow up per protocol.  Whit Greg MS RD CDN [facility] Pager #[phone]

## 2022-06-04 NOTE — PROGRESS NOTE ADULT - SUBJECTIVE AND OBJECTIVE BOX
INTERVAL HPI/OVERNIGHT EVENTS: patient appears lethargic but alert, worsening renal function , metoprolol discontinued , placed on midodrine.     MEDICATIONS  (STANDING):  albumin human 25% IVPB 100 milliLiter(s) IV Intermittent every 6 hours  chlorhexidine 4% Liquid 1 Application(s) Topical <User Schedule>  chlorhexidine 4% Liquid 1 Application(s) Topical <User Schedule>  citric acid/sodium citrate Solution 30 milliLiter(s) Oral every 6 hours  dextrose 5% + sodium chloride 0.9%. 1000 milliLiter(s) (125 mL/Hr) IV Continuous <Continuous>  dextrose 5%. 1000 milliLiter(s) (100 mL/Hr) IV Continuous <Continuous>  dextrose 5%. 1000 milliLiter(s) (50 mL/Hr) IV Continuous <Continuous>  dextrose 50% Injectable 25 Gram(s) IV Push once  dextrose 50% Injectable 12.5 Gram(s) IV Push once  dextrose 50% Injectable 25 Gram(s) IV Push once  famotidine    Tablet 20 milliGRAM(s) Oral daily  folic acid Injectable 1 milliGRAM(s) IV Push daily  gabapentin 300 milliGRAM(s) Oral daily  glucagon  Injectable 1 milliGRAM(s) IntraMuscular once  heparin  Infusion. 2300 Unit(s)/Hr (23 mL/Hr) IV Continuous <Continuous>  insulin lispro (ADMELOG) corrective regimen sliding scale   SubCutaneous every 6 hours  lidocaine   4% Patch 1 Patch Transdermal daily  loperamide 2 milliGRAM(s) Oral daily  meropenem  IVPB 1000 milliGRAM(s) IV Intermittent every 12 hours  meropenem  IVPB      midodrine 5 milliGRAM(s) Oral every 8 hours  multivitamin/minerals 1 Tablet(s) Oral daily  tamsulosin 0.4 milliGRAM(s) Oral at bedtime    MEDICATIONS  (PRN):  acetaminophen     Tablet .. 500 milliGRAM(s) Oral every 6 hours PRN Temp greater or equal to 38C (100.4F)  dextrose Oral Gel 15 Gram(s) Oral once PRN Blood Glucose LESS THAN 70 milliGRAM(s)/deciliter  levalbuterol Inhalation 0.63 milliGRAM(s) Inhalation every 8 hours PRN wheeze/ shortness of breath  sodium chloride 0.9% lock flush 10 milliLiter(s) IV Push every 1 hour PRN Pre/post blood products, medications, blood draw, and to maintain line patency      Allergies    No Known Allergies    Intolerances      ROS:  General: Pt denies recent weight loss/fever/chills    Neurological: denies numbness or  sensation loss    Cardiovascular: denies chest pain/palpitations/leg edema    Respiratory and Thorax: denies SOB/cough/wheezing    Gastrointestinal: denies abdominal pain/diarrhea/constipation/bloody stool    Genitourinary: denies urinary frequency/urgency/ dysuria    Musculoskeletal: denies joint pain or swelling, denies restricted motion    Hematologic: denies abnormal bleeding  	    	  	    		        	    	            Vital Signs Last 24 Hrs  T(C): 39 (2022 09:00), Max: 40.7 (2022 13:00)  T(F): 102.2 (2022 09:00), Max: 105.3 (2022 13:00)  HR: 126 (2022 09:00) (87 - 135)  BP: 131/63 (2022 09:00) (92/53 - 188/80)  BP(mean): 90 (2022 09:00) (68 - 127)  RR: 32 (2022 09:00) (13 - 39)  SpO2: 99% (2022 09:00) (93% - 100%)  Daily     Daily Weight in k (2022 05:00)     @ 07:  -   @ 07:00  --------------------------------------------------------  IN: 5132 mL / OUT: 1095 mL / NET: 4037 mL     @ 07:  -   @ 09:10  --------------------------------------------------------  IN: 177 mL / OUT: 35 mL / NET: 142 mL      Physical Exam:    wdwn male  no JVD  cor RRr  lung clear  abd soft   ext no edema      LABS:                        7.5    40.74 )-----------( 138      ( 2022 23:18 )             24.6     06-04    136  |  106  |  23  ----------------------------<  85  4.0   |  16<L>  |  1.62<H>    Ca    8.1<L>      2022 05:11  Phos  4.1     06-04  Mg     2.0     -04    TPro  5.8<L>  /  Alb  2.5<L>  /  TBili  0.8  /  DBili  x   /  AST  27  /  ALT  12  /  AlkPhos  401<H>  06-04    PT/INR - ( 2022 23:18 )   PT: 20.5 sec;   INR: 1.76 ratio         PTT - ( 2022 06:43 )  PTT:75.1 sec  Urinalysis Basic - ( 2022 16:45 )    Color: Yellow / Appearance: Slightly Turbid / S.019 / pH: x  Gluc: x / Ketone: Negative  / Bili: Negative / Urobili: Negative   Blood: x / Protein: 100 mg/dL / Nitrite: Negative   Leuk Esterase: Negative / RBC: 15-20 /hpf / WBC 6 /HPF   Sq Epi: x / Non Sq Epi: 8 /hpf / Bacteria: Moderate        RADIOLOGY & ADDITIONAL TESTS:    PROCEDURE: Transthoracic echocardiogram with 2-D, M-Mode  and complete spectral and color flow Doppler. Patient was  injected with 10 cc's of aerosolized saline. Patient was  injected with 10 cc's of aerosolized saline.  INDICATION: Cardiogenic shock (R57.0)  ------------------------------------------------------------------------  Dimensions:    Normal Values:  LA:     3.2    2.0 - 4.0 cm  Ao:     3.2    2.0 - 3.8 cm  SEPTUM: 0.8    0.6 - 1.2 cm  PWT:    0.8    0.6 - 1.1 cm  LVIDd:  4.9    3.0 - 5.6 cm  LVIDs:  3.4    1.8 - 4.0 cm  Derived variables:  LVMI: 67 g/m2  RWT: 0.32  Fractional short: 31 %  EF (Modified Jimenez Rule): 55 %  ------------------------------------------------------------------------  Observations:  Mitral Valve: Normal mitral valve.  Aortic Valve/Aorta: Normal trileaflet aortic valve. Minimal  aortic regurgitation.  Aortic Root: 3.2 cm.  Left Atrium: Normal left atrium.  LA volume index = 24  cc/m2.  Left Ventricle: Normal left ventricular systolic function.  Normal left ventricular internal dimensions and wall  thicknesses.  Right Heart: Normal right atrium. Normal right ventricular  size and function. Normal tricuspid valve. Minimal  tricuspid regurgitation. Normal pulmonic valve.  Pericardium/Pleura: Small pericardial effusion adjacant to  Lateral LV  Hemodynamic: Estimated right atrial pressure is 8 mm Hg.  Estimated right ventricular systolic pressure equals 35 mm  Hg, assuming right atrial pressure equals 8 mm Hg,  consistent with borderline pulmonary hypertension. Agitated  saline injection and color flow Doppler demonstrates no  evidence of a patent foramen ovale.  ------------------------------------------------------------------------  Conclusions:  1. Normal left ventricular systolic function.  2. Normal right ventricular size and function.  3. Estimated pulmonary artery systolic pressure equals 35  mm Hg, assuming right atrial pressure equals 8 mm Hg,  consistent with borderline pulmonary pressures.  4. Agitated saline injection and color flow Doppler  demonstrates no evidence of a patent foramen ovale.  5. Small pericardial effusion adjacant to Lateral LV  *** No previous Echo exam.  ------------------------------------------------------------------------  Confirmed on  6/3/2022 - 17:19:40 by USNI Haider      TELE:    EKG:

## 2022-06-04 NOTE — PROCEDURE NOTE - SUPERVISORY STATEMENT
No immediate complications   Emergent procedure for respiratory distress.  Preoxygenated with O2 sats >95%   Phenylephrine available but not started as SBP was 170s at start of procedure. No hypotension. Lowest  post induction/sedation.  NGT placed prior to intubation to suction out stomach contents.

## 2022-06-04 NOTE — PROGRESS NOTE ADULT - SUBJECTIVE AND OBJECTIVE BOX
Venkat Steel MD  Internal Medicine  Pager #[phone]    CHIEF COMPLAINT:Patient is a 66y old  Male who presents with a chief complaint of recurrent fevers, unknown source (2022 07:28)        Interval Events:    REVIEW OF SYSTEMS:    OBJECTIVE:  ICU Vital Signs Last 24 Hrs  T(C): 37.3 (2022 04:00), Max: 40.7 (2022 13:00)  T(F): 99.1 (2022 04:00), Max: 105.3 (2022 13:00)  HR: 112 (2022 06:00) (87 - 135)  BP: 146/116 (2022 06:00) (92/53 - 188/80)  BP(mean): 127 (2022 06:00) (68 - 127)  ABP: --  ABP(mean): --  RR: 30 (2022 06:00) (13 - 39)  SpO2: 99% (2022 06:00) (93% - 100%)        06-03 @ 07:01  -  06-04 @ 07:00  --------------------------------------------------------  IN: 5132 mL / OUT: 1095 mL / NET: 4037 mL      CAPILLARY BLOOD GLUCOSE      POCT Blood Glucose.: 112 mg/dL (2022 17:29)      PHYSICAL EXAM:      LINES:    HOSPITAL MEDICATIONS:  Standing Meds:  albumin human 25% IVPB 100 milliLiter(s) IV Intermittent every 6 hours  chlorhexidine 4% Liquid 1 Application(s) Topical <User Schedule>  chlorhexidine 4% Liquid 1 Application(s) Topical <User Schedule>  citric acid/sodium citrate Solution 30 milliLiter(s) Oral every 6 hours  dextrose 5% + sodium chloride 0.9%. 1000 milliLiter(s) IV Continuous <Continuous>  dextrose 5%. 1000 milliLiter(s) IV Continuous <Continuous>  dextrose 5%. 1000 milliLiter(s) IV Continuous <Continuous>  dextrose 50% Injectable 25 Gram(s) IV Push once  dextrose 50% Injectable 12.5 Gram(s) IV Push once  dextrose 50% Injectable 25 Gram(s) IV Push once  famotidine    Tablet 20 milliGRAM(s) Oral daily  folic acid Injectable 1 milliGRAM(s) IV Push daily  gabapentin 300 milliGRAM(s) Oral daily  glucagon  Injectable 1 milliGRAM(s) IntraMuscular once  heparin  Infusion. 2300 Unit(s)/Hr IV Continuous <Continuous>  insulin lispro (ADMELOG) corrective regimen sliding scale   SubCutaneous every 6 hours  lidocaine   4% Patch 1 Patch Transdermal daily  loperamide 2 milliGRAM(s) Oral daily  meropenem  IVPB 1000 milliGRAM(s) IV Intermittent every 12 hours  meropenem  IVPB      midodrine 5 milliGRAM(s) Oral every 8 hours  multivitamin/minerals 1 Tablet(s) Oral daily  tamsulosin 0.4 milliGRAM(s) Oral at bedtime      PRN Meds:  acetaminophen     Tablet .. 500 milliGRAM(s) Oral every 6 hours PRN  dextrose Oral Gel 15 Gram(s) Oral once PRN  levalbuterol Inhalation 0.63 milliGRAM(s) Inhalation every 8 hours PRN  sodium chloride 0.9% lock flush 10 milliLiter(s) IV Push every 1 hour PRN      LABS:                        7.5    40.74 )-----------( 138      ( 2022 23:18 )             24.6     Hgb Trend: 7.5<--, 7.4<--, 7.6<--, 7.8<--, 7.8<--  0604    136  |  106  |  23  ----------------------------<  85  4.0   |  16<L>  |  1.62<H>    Ca    8.1<L>      2022 05:11  Phos  4.1     06-  Mg     2.0     06-    TPro  5.8<L>  /  Alb  2.5<L>  /  TBili  0.8  /  DBili  x   /  AST  27  /  ALT  12  /  AlkPhos  401<H>      Creatinine Trend: 1.62<--, 1.66<--, 1.42<--, 1.46<--, 1.31<--, 1.20<--  PT/INR - ( 2022 23:18 )   PT: 20.5 sec;   INR: 1.76 ratio         PTT - ( 2022 06:43 )  PTT:75.1 sec  Urinalysis Basic - ( 2022 16:45 )    Color: Yellow / Appearance: Slightly Turbid / S.019 / pH: x  Gluc: x / Ketone: Negative  / Bili: Negative / Urobili: Negative   Blood: x / Protein: 100 mg/dL / Nitrite: Negative   Leuk Esterase: Negative / RBC: 15-20 /hpf / WBC 6 /HPF   Sq Epi: x / Non Sq Epi: 8 /hpf / Bacteria: Moderate      Arterial Blood Gas:   @ 04:27  7.35/30/94/17/97.9/-8.2  ABG lactate: --  Arterial Blood Gas:   @ 23:18  7.36/31/101/18/99.1/-7.2  ABG lactate: --  Arterial Blood Gas:   @ 18:50  7.32/35/94/18/97.9/-7.4  ABG lactate: --    Venous Blood Gas:   @ 00:08  7.38/31/61/18/91.4  VBG Lactate: 1.5  Venous Blood Gas:   @ 11:12  7.33/39/42/21/63.1  VBG Lactate: 2.2      MICROBIOLOGY:     Culture - Acid Fast - Body Fluid w/Smear (collected 2022 19:03)  Source: .Body Fluid Peritoneal Fluid    Culture - Fungal, Body Fluid (collected 2022 19:03)  Source: .Body Fluid Peritoneal Fluid  Preliminary Report (2022 07:35):    Testing in progress    Culture - Body Fluid with Gram Stain (collected 2022 19:03)  Source: .Body Fluid Peritoneal Fluid  Gram Stain (2022 05:19):    polymorphonuclear leukocytes seen    No organisms seen    by cytocentrifuge  Preliminary Report (2022 21:14):    No growth    Culture - Blood (collected 2022 23:40)  Source: .Blood Blood-Peripheral  Preliminary Report (2022 02:02):    No growth to date.    Culture - Blood (collected 2022 23:30)  Source: .Blood Blood-Venous  Preliminary Report (2022 02:02):    No growth to date.    Culture - Acid Fast - Stool w/Smear (collected 2022 20:10)  Source: .Stool Stool      RADIOLOGY:  [ ] Reviewed and interpreted by me    EKG:     Venkat Steel MD  Internal Medicine  Pager #[phone]    CHIEF COMPLAINT:Patient is a 66y old  Male who presents with a chief complaint of recurrent fevers, unknown source (2022 07:28)        Interval Events:    Worsening mental status and upper airway stridor requiring intubation and sedation. Upper airway stridor refractory to xopenex, ipratropium, racemic epinephrine, and methylprednisolone 40.     REVIEW OF SYSTEMS:    Unable to obtain due to intubated and sedated     OBJECTIVE:  ICU Vital Signs Last 24 Hrs  T(C): 37.3 (2022 04:00), Max: 40.7 (2022 13:00)  T(F): 99.1 (2022 04:00), Max: 105.3 (2022 13:00)  HR: 112 (2022 06:00) (87 - 135)  BP: 146/116 (2022 06:00) (92/53 - 188/80)  BP(mean): 127 (2022 06:00) (68 - 127)  ABP: --  ABP(mean): --  RR: 30 (2022 06:00) (13 - 39)  SpO2: 99% (2022 06:00) (93% - 100%)        06-03 @ 07:01  -  06-04 @ 07:00  --------------------------------------------------------  IN: 5132 mL / OUT: 1095 mL / NET: 4037 mL      CAPILLARY BLOOD GLUCOSE      POCT Blood Glucose.: 112 mg/dL (2022 17:29)      PHYSICAL EXAM:    GENERAL: intubated and sedated   CHEST/LUNG: ventilated breath sounds bilaterally   HEART: tachycardic, reg rhythm  ABDOMEN: Abdomen feels full,soft and nontender   EXTREMITIES:  2+ Peripheral Pulses, brisk capillary refill.  NERVOUS SYSTEM:  intubated and sedated. with vent dysynchrony and cough present despite sedation with prop and fent        LINES:    HOSPITAL MEDICATIONS:  Standing Meds:  albumin human 25% IVPB 100 milliLiter(s) IV Intermittent every 6 hours  chlorhexidine 4% Liquid 1 Application(s) Topical <User Schedule>  chlorhexidine 4% Liquid 1 Application(s) Topical <User Schedule>  citric acid/sodium citrate Solution 30 milliLiter(s) Oral every 6 hours  dextrose 5% + sodium chloride 0.9%. 1000 milliLiter(s) IV Continuous <Continuous>  dextrose 5%. 1000 milliLiter(s) IV Continuous <Continuous>  dextrose 5%. 1000 milliLiter(s) IV Continuous <Continuous>  dextrose 50% Injectable 25 Gram(s) IV Push once  dextrose 50% Injectable 12.5 Gram(s) IV Push once  dextrose 50% Injectable 25 Gram(s) IV Push once  famotidine    Tablet 20 milliGRAM(s) Oral daily  folic acid Injectable 1 milliGRAM(s) IV Push daily  gabapentin 300 milliGRAM(s) Oral daily  glucagon  Injectable 1 milliGRAM(s) IntraMuscular once  heparin  Infusion. 2300 Unit(s)/Hr IV Continuous <Continuous>  insulin lispro (ADMELOG) corrective regimen sliding scale   SubCutaneous every 6 hours  lidocaine   4% Patch 1 Patch Transdermal daily  loperamide 2 milliGRAM(s) Oral daily  meropenem  IVPB 1000 milliGRAM(s) IV Intermittent every 12 hours  meropenem  IVPB      midodrine 5 milliGRAM(s) Oral every 8 hours  multivitamin/minerals 1 Tablet(s) Oral daily  tamsulosin 0.4 milliGRAM(s) Oral at bedtime      PRN Meds:  acetaminophen     Tablet .. 500 milliGRAM(s) Oral every 6 hours PRN  dextrose Oral Gel 15 Gram(s) Oral once PRN  levalbuterol Inhalation 0.63 milliGRAM(s) Inhalation every 8 hours PRN  sodium chloride 0.9% lock flush 10 milliLiter(s) IV Push every 1 hour PRN      LABS:                        7.5    40.74 )-----------( 138      ( 2022 23:18 )             24.6     Hgb Trend: 7.5<--, 7.4<--, 7.6<--, 7.8<--, 7.8<--  -04    136  |  106  |  23  ----------------------------<  85  4.0   |  16<L>  |  1.62<H>    Ca    8.1<L>      2022 05:11  Phos  4.1       Mg     2.0         TPro  5.8<L>  /  Alb  2.5<L>  /  TBili  0.8  /  DBili  x   /  AST  27  /  ALT  12  /  AlkPhos  401<H>      Creatinine Trend: 1.62<--, 1.66<--, 1.42<--, 1.46<--, 1.31<--, 1.20<--  PT/INR - ( 2022 23:18 )   PT: 20.5 sec;   INR: 1.76 ratio         PTT - ( 2022 06:43 )  PTT:75.1 sec  Urinalysis Basic - ( 2022 16:45 )    Color: Yellow / Appearance: Slightly Turbid / S.019 / pH: x  Gluc: x / Ketone: Negative  / Bili: Negative / Urobili: Negative   Blood: x / Protein: 100 mg/dL / Nitrite: Negative   Leuk Esterase: Negative / RBC: 15-20 /hpf / WBC 6 /HPF   Sq Epi: x / Non Sq Epi: 8 /hpf / Bacteria: Moderate      Arterial Blood Gas:   @ 04:27  7.35/30/94/17/97.9/-8.2  ABG lactate: --  Arterial Blood Gas:   @ 23:18  7.36/31/101/18/99.1/-7.2  ABG lactate: --  Arterial Blood Gas:   @ 18:50  7.32/35/94/18/97.9/-7.4  ABG lactate: --    Venous Blood Gas:   @ 00:08  7.38/31/61/18/91.4  VBG Lactate: 1.5  Venous Blood Gas:   @ 11:12  7.33/39/42/21/63.1  VBG Lactate: 2.2      MICROBIOLOGY:     Culture - Acid Fast - Body Fluid w/Smear (collected 2022 19:03)  Source: .Body Fluid Peritoneal Fluid    Culture - Fungal, Body Fluid (collected 2022 19:03)  Source: .Body Fluid Peritoneal Fluid  Preliminary Report (2022 07:35):    Testing in progress    Culture - Body Fluid with Gram Stain (collected 2022 19:03)  Source: .Body Fluid Peritoneal Fluid  Gram Stain (2022 05:19):    polymorphonuclear leukocytes seen    No organisms seen    by cytocentrifuge  Preliminary Report (2022 21:14):    No growth    Culture - Blood (collected 2022 23:40)  Source: .Blood Blood-Peripheral  Preliminary Report (2022 02:02):    No growth to date.    Culture - Blood (collected 2022 23:30)  Source: .Blood Blood-Venous  Preliminary Report (2022 02:02):    No growth to date.    Culture - Acid Fast - Stool w/Smear (collected 2022 20:10)  Source: .Stool Stool      RADIOLOGY:  [ ] Reviewed and interpreted by me    EKG:

## 2022-06-04 NOTE — PROGRESS NOTE ADULT - ASSESSMENT
65 yo M with a PMH HTN, HLD, SLE on Cellcept (MMF), class V lupus nephritis, CKD stage 2, DM2, diabetic neuropathy, TIA (2019) on plavix, BPH, HUNG, hospitalization in [facility] 3/27-4/20 tx for pancreatitis and C diff?, recent hospitalization at [facility] 4/22-4/26 for acute pancreatitis and C diff colitis and another hospitalization 5/5 at [facility] for septic shock (source buttock abscess) treated with vanc, cefepime (5/5-5/10), meropenem (5/11-5/18) and micafungin. Zosyn added 5/26 course complicated by recurrent fever now with suspected R PE and imaging with peritoneal lymphadenopathy lymphoma versus TB pending IR biopsy monitoring + splenic infarct . Had PAF in [place], now short bursts on this admission , now post biopsy, recurrent high grade fevers, hypertensive, short bouts of PAF . was on lopressor 2.5 q 6. ECHO normal LV function, no evidence PFO/ASD    Lopressor now discontinued after patient developed high fevers and now has worsening hepatic and renal dysfunction      advise    1. metoprolol discontinued   2. consider transfuse  with severe anemia, tachycardia?  3/  continue hydration,  replete K>4, mag >2  4.  ECHO ( in past has been normal)   5. continue heparin for now , eventual transition to DOAC  6. oncology f u pending

## 2022-06-04 NOTE — PROGRESS NOTE ADULT - ATTENDING COMMENTS
Patient seen and examined. Patient critically ill requiring frequent bedside visits with therapy change. Patient is a 66M with extensive history and prolonged hospitalization with fevers, anasarca, and diffuse adenopathy. He has also been found to have portal htn, cirrhosis, splenic infarct, L brachial DVT/possible RLL PE, and leukocytosis. He was initially at [facility] but then transferred to [facility] for workup of possible abdominal TB vs lymphoma vs other.    1. Infectious Disease - patient with persistent fevers and thus far negative workup until paracentesis demonstrated SBP. Restarted on antibiotics. Followup cultures.  - ? if malaria could be a possibility given high fevers  - check FUO workup  - LN core biopsies sent and pending  - WBC 40K today and now with bandemia   - await AFB sputum and stool samples  2. Cardiovascular - not presently in shock and maintaining MAP > 65  - episodes of Afib with RVR  3. Hem/Onc - VTE on AC. Continue Hep gtt for now. If no further procedures planned may transition to DOAC  - Await LN biopsy   - BMBx done at [facility] negative by report  4. Nephrology - SLE with lupus nephritis  - off immunosuppressants  - monitor renal function - Cr 1.62  - Michael 67 - doubt hepatorenal - d/w Nephrology. Prerenal by FeNa - continue Albumin  5. Gastro - cirrhosis  - tolerating PO  6. Pulmonary - maintain O2 sat > 90%  - await 3rd sputum AFB - not yet sent - currently on airborne isolation  7. DVT proph - on Hep gtt    Prognosis guarded.  Will see if cytopathology can be expedited.

## 2022-06-04 NOTE — PROGRESS NOTE ADULT - ATTENDING COMMENTS
Lupus nephritis, SBP, recent radiocontrast study  1.  Lupus nephritis--no MMF, on steroids for airway constriction  2.  ARF--infection, contrast.  NON oliguric, no HD.  Med rx, avoid vancomycin/zosyn.  Volume optimize.  BNP, POCUS to avoid overload  discussed with MICU team, attending

## 2022-06-04 NOTE — PROGRESS NOTE ADULT - SUBJECTIVE AND OBJECTIVE BOX
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 Moore), DM, TIA, BPH, and HUNG who pwas transferred to the hospital due to concerns for septic shock and recurrent fevers. 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 Since then he has been following Dr. Moore as his primary nephrologist. The patient has was on treatment with steroids and cyclophosphamide. Most recently the patient is being treated with MMF 750mg BID. Upon arrival to the hospital the patient was noted to have a SCr of 1.16 which has since risen to 1.42mg/dL this morning. The nephrology team was consulted for PEPE. The pateint reports feeling some mild shortness of breath during the encounter. He denied any chest pain, shortness of breath, nausea, vomiting, or abdominal pain.     Pt. seen this AM in MICU. Febrile overnight. Tearful about current health status. Pt. denies any shortness of breath but is on NC. States wife will bring records from prior bone marrow biopsy. Discussed with ICU team        PAST HISTORY  --------------------------------------------------------------------------------  No significant changes to PMH, PSH, FHx, SHx, unless otherwise noted    ALLERGIES & MEDICATIONS  --------------------------------------------------------------------------------  Allergies    No Known Allergies    Intolerances      Standing Inpatient Medications  albumin human 25% IVPB 100 milliLiter(s) IV Intermittent every 6 hours  chlorhexidine 4% Liquid 1 Application(s) Topical <User Schedule>  chlorhexidine 4% Liquid 1 Application(s) Topical <User Schedule>  citric acid/sodium citrate Solution 30 milliLiter(s) Oral every 6 hours  dextrose 5% + sodium chloride 0.9%. 1000 milliLiter(s) IV Continuous <Continuous>  dextrose 5%. 1000 milliLiter(s) IV Continuous <Continuous>  dextrose 5%. 1000 milliLiter(s) IV Continuous <Continuous>  dextrose 50% Injectable 25 Gram(s) IV Push once  dextrose 50% Injectable 12.5 Gram(s) IV Push once  dextrose 50% Injectable 25 Gram(s) IV Push once  famotidine    Tablet 20 milliGRAM(s) Oral daily  folic acid Injectable 1 milliGRAM(s) IV Push daily  gabapentin 300 milliGRAM(s) Oral daily  glucagon  Injectable 1 milliGRAM(s) IntraMuscular once  heparin  Infusion. 2300 Unit(s)/Hr IV Continuous <Continuous>  insulin lispro (ADMELOG) corrective regimen sliding scale   SubCutaneous every 6 hours  lidocaine   4% Patch 1 Patch Transdermal daily  loperamide 2 milliGRAM(s) Oral daily  meropenem  IVPB 1000 milliGRAM(s) IV Intermittent every 12 hours  meropenem  IVPB      midodrine 5 milliGRAM(s) Oral every 8 hours  multivitamin/minerals 1 Tablet(s) Oral daily  tamsulosin 0.4 milliGRAM(s) Oral at bedtime    PRN Inpatient Medications  acetaminophen     Tablet .. 500 milliGRAM(s) Oral every 6 hours PRN  dextrose Oral Gel 15 Gram(s) Oral once PRN  levalbuterol Inhalation 0.63 milliGRAM(s) Inhalation every 8 hours PRN  sodium chloride 0.9% lock flush 10 milliLiter(s) IV Push every 1 hour PRN      REVIEW OF SYSTEMS  --------------------------------------------------------------------------------  Gen: Fever+  Skin: No rashes  Head/Eyes/Ears: Normal hearing,   Respiratory: No dyspnea, cough  CV: No chest pain  : No urinary changes  MSK: + edema  Psych: Anxious      All other systems were reviewed and are negative, except as noted.    VITALS/PHYSICAL EXAM  --------------------------------------------------------------------------------  T(C): 37.3 (06-04-22 @ 04:00), Max: 40.7 (06-03-22 @ 13:00)  HR: 112 (06-04-22 @ 06:00) (87 - 135)  BP: 146/116 (06-04-22 @ 06:00) (92/53 - 188/80)  RR: 30 (06-04-22 @ 06:00) (13 - 39)  SpO2: 99% (06-04-22 @ 06:00) (93% - 100%)  Wt(kg): --        06-03-22 @ 07:01  -  06-04-22 @ 07:00  --------------------------------------------------------  IN: 5132 mL / OUT: 1095 mL / NET: 4037 mL      Physical Exam:  	Gen: ill appearing  	HEENT: MMM  	Pulm: decreased breath sounds   	CV: S1S2  	Abd: Soft, +BS, distended   	Ext: + LE edema B/L  	Neuro: Awake and alert   	Skin: Warm and dry      LABS/STUDIES  --------------------------------------------------------------------------------              7.5    40.74 >-----------<  138      [06-03-22 @ 23:18]              24.6     136  |  106  |  23  ----------------------------<  85      [06-04-22 @ 05:11]  4.0   |  16  |  1.62        Ca     8.1     [06-04-22 @ 05:11]      Mg     2.0     [06-04-22 @ 05:11]      Phos  4.1     [06-04-22 @ 05:11]    TPro  5.8  /  Alb  2.5  /  TBili  0.8  /  DBili  x   /  AST  27  /  ALT  12  /  AlkPhos  401  [06-04-22 @ 05:11]    PT/INR: PT 20.5 , INR 1.76       [06-03-22 @ 23:18]  PTT: 75.1       [06-04-22 @ 06:43]    CK 20      [06-03-22 @ 14:45]  Serum Osmolality 283      [06-03-22 @ 23:18]    Creatinine Trend:  SCr 1.62 [06-04 @ 05:11]  SCr 1.66 [06-03 @ 23:18]  SCr 1.42 [06-03 @ 14:45]  SCr 1.46 [06-03 @ 00:28]  SCr 1.31 [06-01 @ 23:56]    Urinalysis - [06-03-22 @ 16:45]      Color Yellow / Appearance Slightly Turbid / SG 1.019 / pH 5.5      Gluc Negative / Ketone Negative  / Bili Negative / Urobili Negative       Blood Large / Protein 100 mg/dL / Leuk Est Negative / Nitrite Negative      RBC 15-20 / WBC 6 / Hyaline 32 / Gran 2-3 / Sq Epi  / Non Sq Epi 8 / Bacteria Moderate    Urine Creatinine 75      [06-03-22 @ 16:45]  Urine Sodium 66      [06-03-22 @ 16:45]  Urine Potassium 40      [06-03-22 @ 16:45]  Urine Chloride 65      [06-03-22 @ 16:45]  Urine Osmolality 415      [06-03-22 @ 16:45]    Iron 43, TIBC 128, %sat 34      [06-01-22 @ 04:47]  Ferritin 511      [05-30-22 @ 03:48]  TSH 3.52      [06-03-22 @ 14:46]  Lipid: chol 103, , HDL 12, LDL --      [05-30-22 @ 03:48]    HBsAg Nonreact      [05-30-22 @ 03:46]  HCV 0.31, Nonreact      [05-31-22 @ 00:32]  HIV Nonreact      [06-01-22 @ 13:46]  HIV Nonreact      [05-30-22 @ 04:13]    ED: titer 1:320, pattern Homogeneous      [06-01-22 @ 01:16]  dsDNA 34      [05-30-22 @ 10:58]  C3 Complement 125      [05-30-22 @ 10:58]  C4 Complement 30      [05-30-22 @ 10:58]  Free Light Chains: kappa 14.57, lambda 23.09, ratio = 0.63      [06-01 @ 04:47]

## 2022-06-04 NOTE — PROGRESS NOTE ADULT - ASSESSMENT
65 yo M with a PMH HTN, HLD, SLE on Cellcept (MMF), class V lupus nephritis, CKD stage 2, DM2, diabetic neuropathy, TIA (2019) on plavix, BPH, HUNG, with multiple hospitalizations for c diff, acute panc, septic shock 22 buttock abscess status post drainage transferred for recurrent fevers status post core biopsy and paracentesis suspected lymphoma v less likely peritoneal tb now intubated and sedated due to upper airway stridor and altered mental status.     Neuro    Acute encephalopathy requiring intubation and sedation  -Fluctuating mentation while febrile which then returns to current clinical baseline slow however linear thought process and alert and oriented x3.   -exacerbated by fever although remained altered despite control of fever with arctic sun 6/4 AM. Unclear etiology.     #diabetic neuropathy  -home gabapentin    Pulm  #Intubated for airway protection  -altered with stridor 6/4 AM  -with vent desynchrony including breath stacking requiring sedation with propofol, fentanyl, versed push.   -continue to optimize vent settings. SBT as tolerated when appropriate.      #large left posterior consolidation seen on ultrasoud, possible PNA?  -monitored off abx originally now back on edilson (6/3- ) and status post vanc (6/3) with negative MRSA MSSA swab     # stridor  -with additional  stridor 6/4 requiring intubation refractory to epinephrine, methylprednisolone, xopenex, ipratropium. Previously with stridor earlier in admission although more severe 6/4 AM requiring intubation unclear etiology with new medication bicitra, recent instrumentation with core biopsy although had tolerated procedure without evidence of neck swelling/edema or stridor previous, with underlying HUNG hx.     #RLL PE  -heparin gtt, may transition to alternative agent for full AC if no plans for additional surgical intervention/procedure.     Cardio    #Episodes of hypotension  -hypotension episodic possibly rate related with period going into afib requiring rate control with lopressor. Lopressor discontinued 6/3 ON remains tachycardic.     #Hx of HTN  - monitored off home hypertensive meds due to periods of hypotension requiring fluid bolus, has not required pressors during hospitalization.       #Paroxysm of afib followed by sinus rhythm w frequent ectopy  -Lopressor 2.5 q6h discontinued 6/3 ON receiving crystalloid and colloid 6/3 ON to 6/4.       Renal  #CKD stage 2, hx of lupus nephritis  -cont to trend Cr  -monitor I/Os  -cont to monitor electrolytes, replete as necessary  -urine lytes and urinalysis w microscopy sent  -canales placed 6/3    GI  #chronic inflammatory diarrhea  #possible Crohn's vs colitis vs infectious  -calprotectin elevated at [place] [facility]. no current plan by GI for colonoscopy at this time.   -C diff negative  -appreciate GI and ID input.   -less likely crohns disease     #Diet  -previously on TPN while at [facility]  -Supplementing with ensure, currently safe to swallow po nutrition.     #hx of pancreatitis, elevated lipase  -without current epigastric tenderness to palpation 5/30 lipase 740, no imaging findings of pancreatitis.     #hepatosplenomegaly shown on MRI at [place]  -hepatitis panel nonreactive including Hep A B and C      #hx of BPH  -start on flomax    Endocrine  #DM2  -last HbA1c 4/29 6.5%  - ISS    ID/rheum  #hx of SLE  -monitoring off cellcept  -Has been seen by Dr. Swapnil Wolfe and also Dr. Shivam Malagon    #recurrent fevers and leukocytosis of unknown etiology  diff dx: infectious, malignancy, IgG4 disease, HLH  -elevated WBC, elevated IgA at [place]  -quant gold indeterminate  -5/30 BCx NGTD  -HIV nonreactive, EBV IgG+, CMV 50  -possilbility of peritoneal TB appreciate ID reccs AFB sputum and stool thus far are negative pending IR biopsy 6/2    #r/o HLH, IgG4  -has fevers, splenomegaly, cytopenia  -HLH labs: soluble IL-2 levels 34634, ferritin 511, triglyceride 412  -IgG4 27    Heme  #Anemia  -outside hospital reported to be consistent with AOCD  -CT ab pending, r/o bleed  -transfuse if Hb<7, maintain active type and screen  -DVT ppx: full AC    Pending results of core biopsy if further management required from heme onc perspective.     Lines: RIJ placed 5/27 Removed 6/1 ON  67 yo M with a PMH HTN, HLD, SLE on Cellcept (MMF), class V lupus nephritis, CKD stage 2, DM2, diabetic neuropathy, TIA (2019) on plavix, BPH, HUNG, with multiple hospitalizations for c diff, acute panc, septic shock 22 buttock abscess status post drainage transferred for recurrent fevers status post core biopsy and paracentesis suspected lymphoma v less likely peritoneal tb now intubated and sedated due to upper airway stridor and altered mental status.     Neuro    Acute encephalopathy requiring intubation and sedation  -Fluctuating mentation while febrile which then returns to current clinical baseline slow however linear thought process and alert and oriented x3.   -exacerbated by fever although remained altered despite control of fever with arctic sun 6/4 AM. Unclear etiology.     #diabetic neuropathy  -home gabapentin    Pulm  #Intubated for airway protection  -altered with stridor 6/4 AM  -with vent desynchrony including breath stacking requiring sedation with propofol, fentanyl, versed push.   -continue to optimize vent settings. SBT as tolerated when appropriate.      #large left posterior consolidation seen on ultrasoud, possible PNA?  -monitored off abx originally now back on edilson (6/3- ) and status post vanc (6/3) with negative MRSA MSSA swab     # stridor  -with additional  stridor 6/4 requiring intubation refractory to epinephrine, methylprednisolone, xopenex, ipratropium. Previously with stridor earlier in admission although more severe 6/4 AM requiring intubation unclear etiology with new medication bicitra, recent instrumentation with core biopsy although had tolerated procedure without evidence of neck swelling/edema or stridor previous, with underlying HUNG hx.     #RLL PE  -heparin gtt, may transition to alternative agent for full AC if no plans for additional surgical intervention/procedure.     Cardio    #Episodes of hypotension  -hypotension episodic possibly rate related with period going into afib requiring rate control with lopressor. Lopressor discontinued 6/3 ON remains tachycardic.     #Hx of HTN  - monitored off home hypertensive meds due to periods of hypotension requiring fluid bolus, has not required pressors during hospitalization.       #Paroxysm of afib followed by sinus rhythm w frequent ectopy  -Lopressor 2.5 q6h discontinued 6/3 ON receiving crystalloid and colloid 6/3 ON to 6/4.       Renal  #CKD stage 2, hx of lupus nephritis  -cont to trend Cr  -monitor I/Os  -cont to monitor electrolytes, replete as necessary  -urine lytes and urinalysis w microscopy sent  -canales placed 6/3    #PEPE on CKD  -with worsening renal function FeNa prerenal receiving fluids 6/4. question role for rheumatology if no improvement in renal function.   -urine studies less suggestive of HRS picture discontinued midodrine although will continue albumin may benefit from volume.   -appreciate nephrology reccomendations      GI  #chronic inflammatory diarrhea  #possible Crohn's vs colitis vs infectious  -calprotectin elevated at [place] [facility]. no current plan by GI for colonoscopy at this time.   -C diff negative  -appreciate GI and ID input.   -less likely crohns disease     #Diet  -previously on TPN while at [facility]  -Supplementing with ensure, currently safe to swallow po nutrition.     #hx of pancreatitis, elevated lipase  -without current epigastric tenderness to palpation 5/30 lipase 740, no imaging findings of pancreatitis.     #hepatosplenomegaly shown on MRI at [place]  -hepatitis panel nonreactive including Hep A B and C      #hx of BPH  -start on flomax    Endocrine  #DM2  -last HbA1c 4/29 6.5%  - ISS    ID/rheum  #hx of SLE  -monitoring off cellcept  -Has been seen by Dr. Swapnil Wolfe and also Dr. Shivam Malagon    #recurrent fevers and leukocytosis of unknown etiology  diff dx: infectious, malignancy, IgG4 disease, HLH  -elevated WBC, elevated IgA at [place]  -quant gold indeterminate  -5/30 BCx NGTD  -HIV nonreactive, EBV IgG+, CMV 50  -possibility of peritoneal TB appreciate ID reccs AFB sputum and stool thus far are negative with IR biopsy completed pending final results 6/4  -Rheum eval including C3 C4 ESR CRP dsDNA progression of underlying lupus v other rheumatologic process causing fever although less likely.     #r/o HLH, IgG4  -has fevers, splenomegaly, cytopenia  -HLH labs: soluble IL-2 levels 26399, ferritin 511, triglyceride 412  -IgG4 27    Heme  #Anemia  -outside hospital reported to be consistent with AOCD  -CT ab pending, r/o bleed  -transfuse if Hb<7, maintain active type and screen  -DVT ppx: full AC    Pending results of core biopsy if further management required from heme onc perspective.     Lines: RIJ placed 5/27 Removed 6/1 ON

## 2022-06-05 NOTE — PROGRESS NOTE ADULT - SUBJECTIVE AND OBJECTIVE BOX
INTERVAL HPI/OVERNIGHT EVENTS:    SUBJECTIVE: Patient seen and examined at bedside.     CONSTITUTIONAL: No weakness, fevers or chills  EYES/ENT: No visual changes;  No vertigo or throat pain   NECK: No pain or stiffness  RESPIRATORY: No cough, wheezing, hemoptysis; No shortness of breath  CARDIOVASCULAR: No chest pain or palpitations  GASTROINTESTINAL: No abdominal or epigastric pain. No nausea, vomiting, or hematemesis; No diarrhea or constipation. No melena or hematochezia.  GENITOURINARY: No dysuria, frequency or hematuria  NEUROLOGICAL: No numbness or weakness  SKIN: No itching, rashes    OBJECTIVE:    VITAL SIGNS:  ICU Vital Signs Last 24 Hrs  T(C): 36.4 (2022 08:00), Max: 39 (2022 09:00)  T(F): 97.5 (2022 08:00), Max: 102.2 (2022 09:00)  HR: 61 (2022 08:00) (25 - 141)  BP: 91/57 (2022 08:00) (82/45 - 175/91)  BP(mean): 69 (2022 08:00) (60 - 134)  ABP: --  ABP(mean): --  RR: 26 (2022 08:00) (0 - 32)  SpO2: 100% (2022 08:) (92% - 100%)    Mode: AC/ CMV (Assist Control/ Continuous Mandatory Ventilation), RR (machine): 26, TV (machine): 400, FiO2: 50, PEEP: 5, ITime: 0.58, MAP: 11, PIP: 24     @ 07:  -   @ 07:00  --------------------------------------------------------  IN: 4522.5 mL / OUT: 1700 mL / NET: 2822.5 mL     @ 07:01  -  05 @ 08:31  --------------------------------------------------------  IN: 186.3 mL / OUT: 20 mL / NET: 166.3 mL      CAPILLARY BLOOD GLUCOSE      POCT Blood Glucose.: 110 mg/dL (2022 05:20)      PHYSICAL EXAM:    General: NAD  HEENT: NC/AT; PERRL, clear conjunctiva  Neck: supple  Respiratory: CTA b/l  Cardiovascular: +S1/S2; RRR  Abdomen: soft, NT/ND; +BS x4  Extremities: WWP, 2+ peripheral pulses b/l; no LE edema  Skin: normal color and turgor; no rash  Neurological:    MEDICATIONS:  MEDICATIONS  (STANDING):  albumin human 25% IVPB 100 milliLiter(s) IV Intermittent every 6 hours  chlorhexidine 0.12% Liquid 15 milliLiter(s) Oral Mucosa every 12 hours  chlorhexidine 4% Liquid 1 Application(s) Topical <User Schedule>  chlorhexidine 4% Liquid 1 Application(s) Topical <User Schedule>  citric acid/sodium citrate Solution 30 milliLiter(s) Oral every 6 hours  dextrose 5%. 1000 milliLiter(s) (100 mL/Hr) IV Continuous <Continuous>  dextrose 5%. 1000 milliLiter(s) (50 mL/Hr) IV Continuous <Continuous>  dextrose 50% Injectable 25 Gram(s) IV Push once  dextrose 50% Injectable 12.5 Gram(s) IV Push once  dextrose 50% Injectable 25 Gram(s) IV Push once  fentaNYL   Infusion... 3.002 MICROgram(s)/kG/Hr (13.6 mL/Hr) IV Continuous <Continuous>  folic acid Injectable 1 milliGRAM(s) IV Push daily  gabapentin 300 milliGRAM(s) Oral daily  glucagon  Injectable 1 milliGRAM(s) IntraMuscular once  insulin lispro (ADMELOG) corrective regimen sliding scale   SubCutaneous every 6 hours  lactated ringers. 1000 milliLiter(s) (100 mL/Hr) IV Continuous <Continuous>  lidocaine   4% Patch 1 Patch Transdermal daily  lidocaine   4% Patch 1 Patch Transdermal every 24 hours  loperamide 2 milliGRAM(s) Oral daily  meropenem  IVPB      meropenem  IVPB 1000 milliGRAM(s) IV Intermittent every 12 hours  multivitamin/minerals 1 Tablet(s) Oral daily  pantoprazole  Injectable 40 milliGRAM(s) IV Push daily  phenylephrine    Infusion 0.5 MICROgram(s)/kG/Min (17 mL/Hr) IV Continuous <Continuous>  propofol Infusion 39.919 MICROgram(s)/kG/Min (21.7 mL/Hr) IV Continuous <Continuous>  tamsulosin 0.4 milliGRAM(s) Oral at bedtime    MEDICATIONS  (PRN):  acetaminophen     Tablet .. 500 milliGRAM(s) Oral every 6 hours PRN Temp greater or equal to 38C (100.4F)  dextrose Oral Gel 15 Gram(s) Oral once PRN Blood Glucose LESS THAN 70 milliGRAM(s)/deciliter  fentaNYL    Injectable 25 MICROGram(s) IV Push every 15 minutes PRN for uptitration of fentanyl  sodium chloride 0.9% lock flush 10 milliLiter(s) IV Push every 1 hour PRN Pre/post blood products, medications, blood draw, and to maintain line patency      ALLERGIES:  Allergies    No Known Allergies    Intolerances        LABS:                        5.0    24.53 )-----------( 87       ( 2022 06:46 )             16.3     06-05    134<L>  |  105  |  23  ----------------------------<  91  4.3   |  15<L>  |  1.59<H>    Ca    7.9<L>      2022 02:26  Phos  5.1     06-05  Mg     2.0     06-05    TPro  5.6<L>  /  Alb  2.4<L>  /  TBili  0.8  /  DBili  x   /  AST  22  /  ALT  9<L>  /  AlkPhos  340<H>  06-05    PT/INR - ( 2022 00:23 )   PT: 20.8 sec;   INR: 1.80 ratio         PTT - ( 2022 00:23 )  PTT:154.7 sec  Urinalysis Basic - ( 2022 16:45 )    Color: Yellow / Appearance: Slightly Turbid / S.019 / pH: x  Gluc: x / Ketone: Negative  / Bili: Negative / Urobili: Negative   Blood: x / Protein: 100 mg/dL / Nitrite: Negative   Leuk Esterase: Negative / RBC: 15-20 /hpf / WBC 6 /HPF   Sq Epi: x / Non Sq Epi: 8 /hpf / Bacteria: Moderate        RADIOLOGY & ADDITIONAL TESTS: Reviewed. INTERVAL HPI/OVERNIGHT EVENTS: Hb 6.5 overnight, no signs of bleeding. Transfused one unit overnight. Repeat hb 5 possibly false result so repeat another cbc, hb 7.4, appropriate response to 1u prbc    SUBJECTIVE: ROS unable to obtain due to mental status    OBJECTIVE:    VITAL SIGNS:  ICU Vital Signs Last 24 Hrs  T(C): 36.4 (2022 08:00), Max: 39 (2022 09:00)  T(F): 97.5 (2022 08:00), Max: 102.2 (2022 09:00)  HR: 61 (:00) (25 - 141)  BP: 91/57 (:) (82/45 - 175/91)  BP(mean): 69 (:) (60 - 134)  ABP: --  ABP(mean): --  RR: 26 (2022 08:) (0 - 32)  SpO2: 100% (:00) (92% - 100%)    Mode: AC/ CMV (Assist Control/ Continuous Mandatory Ventilation), RR (machine): 26, TV (machine): 400, FiO2: 50, PEEP: 5, ITime: 0.58, MAP: 11, PIP: 24    04 @ 07: @ 07:00  --------------------------------------------------------  IN: 4522.5 mL / OUT: 1700 mL / NET: 2822.5 mL     @ 07: @ 08:31  --------------------------------------------------------  IN: 186.3 mL / OUT: 20 mL / NET: 166.3 mL      CAPILLARY BLOOD GLUCOSE      POCT Blood Glucose.: 110 mg/dL (2022 05:20)      PHYSICAL EXAM:    General: NAD, intubated and sedated  HEENT: PERRL, clear conjunctiva  Neck: supple  Respiratory: CTA b/l  Cardiovascular: +S1/S2; RRR  Abdomen: soft, NT/ND; +BS x4  Extremities: WWP, 2+ peripheral pulses b/l; no LE edema  Skin: normal color and turgor; no rash  Neurological:    MEDICATIONS:  MEDICATIONS  (STANDING):  albumin human 25% IVPB 100 milliLiter(s) IV Intermittent every 6 hours  chlorhexidine 0.12% Liquid 15 milliLiter(s) Oral Mucosa every 12 hours  chlorhexidine 4% Liquid 1 Application(s) Topical <User Schedule>  chlorhexidine 4% Liquid 1 Application(s) Topical <User Schedule>  citric acid/sodium citrate Solution 30 milliLiter(s) Oral every 6 hours  dextrose 5%. 1000 milliLiter(s) (100 mL/Hr) IV Continuous <Continuous>  dextrose 5%. 1000 milliLiter(s) (50 mL/Hr) IV Continuous <Continuous>  dextrose 50% Injectable 25 Gram(s) IV Push once  dextrose 50% Injectable 12.5 Gram(s) IV Push once  dextrose 50% Injectable 25 Gram(s) IV Push once  fentaNYL   Infusion... 3.002 MICROgram(s)/kG/Hr (13.6 mL/Hr) IV Continuous <Continuous>  folic acid Injectable 1 milliGRAM(s) IV Push daily  gabapentin 300 milliGRAM(s) Oral daily  glucagon  Injectable 1 milliGRAM(s) IntraMuscular once  insulin lispro (ADMELOG) corrective regimen sliding scale   SubCutaneous every 6 hours  lactated ringers. 1000 milliLiter(s) (100 mL/Hr) IV Continuous <Continuous>  lidocaine   4% Patch 1 Patch Transdermal daily  lidocaine   4% Patch 1 Patch Transdermal every 24 hours  loperamide 2 milliGRAM(s) Oral daily  meropenem  IVPB      meropenem  IVPB 1000 milliGRAM(s) IV Intermittent every 12 hours  multivitamin/minerals 1 Tablet(s) Oral daily  pantoprazole  Injectable 40 milliGRAM(s) IV Push daily  phenylephrine    Infusion 0.5 MICROgram(s)/kG/Min (17 mL/Hr) IV Continuous <Continuous>  propofol Infusion 39.919 MICROgram(s)/kG/Min (21.7 mL/Hr) IV Continuous <Continuous>  tamsulosin 0.4 milliGRAM(s) Oral at bedtime    MEDICATIONS  (PRN):  acetaminophen     Tablet .. 500 milliGRAM(s) Oral every 6 hours PRN Temp greater or equal to 38C (100.4F)  dextrose Oral Gel 15 Gram(s) Oral once PRN Blood Glucose LESS THAN 70 milliGRAM(s)/deciliter  fentaNYL    Injectable 25 MICROGram(s) IV Push every 15 minutes PRN for uptitration of fentanyl  sodium chloride 0.9% lock flush 10 milliLiter(s) IV Push every 1 hour PRN Pre/post blood products, medications, blood draw, and to maintain line patency      ALLERGIES:  Allergies    No Known Allergies    Intolerances        LABS:                        5.0    24.53 )-----------( 87       ( 2022 06:46 )             16.3     06-05    134<L>  |  105  |  23  ----------------------------<  91  4.3   |  15<L>  |  1.59<H>    Ca    7.9<L>      2022 02:26  Phos  5.1     06-05  Mg     2.0     06-05    TPro  5.6<L>  /  Alb  2.4<L>  /  TBili  0.8  /  DBili  x   /  AST  22  /  ALT  9<L>  /  AlkPhos  340<H>  06-05    PT/INR - ( 2022 00:23 )   PT: 20.8 sec;   INR: 1.80 ratio         PTT - ( 2022 00:23 )  PTT:154.7 sec  Urinalysis Basic - ( 2022 16:45 )    Color: Yellow / Appearance: Slightly Turbid / S.019 / pH: x  Gluc: x / Ketone: Negative  / Bili: Negative / Urobili: Negative   Blood: x / Protein: 100 mg/dL / Nitrite: Negative   Leuk Esterase: Negative / RBC: 15-20 /hpf / WBC 6 /HPF   Sq Epi: x / Non Sq Epi: 8 /hpf / Bacteria: Moderate        RADIOLOGY & ADDITIONAL TESTS: Reviewed. INTERVAL HPI/OVERNIGHT EVENTS: Hb 6.5 overnight, no signs of bleeding. Transfused one unit overnight. Repeat hb 5 possibly false result so repeat another cbc, hb 7.4, appropriate response to 1u prbc    SUBJECTIVE: ROS unable to obtain due to mental status    OBJECTIVE:    VITAL SIGNS:  ICU Vital Signs Last 24 Hrs  T(C): 36.4 (2022 08:00), Max: 39 (2022 09:00)  T(F): 97.5 (2022 08:00), Max: 102.2 (2022 09:00)  HR: 61 (:00) (25 - 141)  BP: 91/57 (:) (82/45 - 175/91)  BP(mean): 69 (:) (60 - 134)  ABP: --  ABP(mean): --  RR: 26 (2022 08:) (0 - 32)  SpO2: 100% (:00) (92% - 100%)    Mode: AC/ CMV (Assist Control/ Continuous Mandatory Ventilation), RR (machine): 26, TV (machine): 400, FiO2: 50, PEEP: 5, ITime: 0.58, MAP: 11, PIP: 24    04 @ 07: @ 07:00  --------------------------------------------------------  IN: 4522.5 mL / OUT: 1700 mL / NET: 2822.5 mL     @ 07: @ 08:31  --------------------------------------------------------  IN: 186.3 mL / OUT: 20 mL / NET: 166.3 mL      CAPILLARY BLOOD GLUCOSE      POCT Blood Glucose.: 110 mg/dL (2022 05:20)      PHYSICAL EXAM:    General: NAD, intubated and sedated  HEENT: PERRL, clear conjunctiva  Neck: supple  Respiratory: ventilated breath sound b/l  Cardiovascular: RRR  Abdomen: soft, NT/ND; +BS  Extremities: 2+ LE edema  Neurological: sedated    MEDICATIONS:  MEDICATIONS  (STANDING):  albumin human 25% IVPB 100 milliLiter(s) IV Intermittent every 6 hours  chlorhexidine 0.12% Liquid 15 milliLiter(s) Oral Mucosa every 12 hours  chlorhexidine 4% Liquid 1 Application(s) Topical <User Schedule>  chlorhexidine 4% Liquid 1 Application(s) Topical <User Schedule>  citric acid/sodium citrate Solution 30 milliLiter(s) Oral every 6 hours  dextrose 5%. 1000 milliLiter(s) (100 mL/Hr) IV Continuous <Continuous>  dextrose 5%. 1000 milliLiter(s) (50 mL/Hr) IV Continuous <Continuous>  dextrose 50% Injectable 25 Gram(s) IV Push once  dextrose 50% Injectable 12.5 Gram(s) IV Push once  dextrose 50% Injectable 25 Gram(s) IV Push once  fentaNYL   Infusion... 3.002 MICROgram(s)/kG/Hr (13.6 mL/Hr) IV Continuous <Continuous>  folic acid Injectable 1 milliGRAM(s) IV Push daily  gabapentin 300 milliGRAM(s) Oral daily  glucagon  Injectable 1 milliGRAM(s) IntraMuscular once  insulin lispro (ADMELOG) corrective regimen sliding scale   SubCutaneous every 6 hours  lactated ringers. 1000 milliLiter(s) (100 mL/Hr) IV Continuous <Continuous>  lidocaine   4% Patch 1 Patch Transdermal daily  lidocaine   4% Patch 1 Patch Transdermal every 24 hours  loperamide 2 milliGRAM(s) Oral daily  meropenem  IVPB      meropenem  IVPB 1000 milliGRAM(s) IV Intermittent every 12 hours  multivitamin/minerals 1 Tablet(s) Oral daily  pantoprazole  Injectable 40 milliGRAM(s) IV Push daily  phenylephrine    Infusion 0.5 MICROgram(s)/kG/Min (17 mL/Hr) IV Continuous <Continuous>  propofol Infusion 39.919 MICROgram(s)/kG/Min (21.7 mL/Hr) IV Continuous <Continuous>  tamsulosin 0.4 milliGRAM(s) Oral at bedtime    MEDICATIONS  (PRN):  acetaminophen     Tablet .. 500 milliGRAM(s) Oral every 6 hours PRN Temp greater or equal to 38C (100.4F)  dextrose Oral Gel 15 Gram(s) Oral once PRN Blood Glucose LESS THAN 70 milliGRAM(s)/deciliter  fentaNYL    Injectable 25 MICROGram(s) IV Push every 15 minutes PRN for uptitration of fentanyl  sodium chloride 0.9% lock flush 10 milliLiter(s) IV Push every 1 hour PRN Pre/post blood products, medications, blood draw, and to maintain line patency      ALLERGIES:  Allergies    No Known Allergies    Intolerances        LABS:                        5.0    24.53 )-----------( 87       ( 2022 06:46 )             16.3     06-05    134<L>  |  105  |  23  ----------------------------<  91  4.3   |  15<L>  |  1.59<H>    Ca    7.9<L>      2022 02:26  Phos  5.1     06-05  Mg     2.0     06-05    TPro  5.6<L>  /  Alb  2.4<L>  /  TBili  0.8  /  DBili  x   /  AST  22  /  ALT  9<L>  /  AlkPhos  340<H>  06-05    PT/INR - ( 2022 00:23 )   PT: 20.8 sec;   INR: 1.80 ratio         PTT - ( 2022 00:23 )  PTT:154.7 sec  Urinalysis Basic - ( 2022 16:45 )    Color: Yellow / Appearance: Slightly Turbid / S.019 / pH: x  Gluc: x / Ketone: Negative  / Bili: Negative / Urobili: Negative   Blood: x / Protein: 100 mg/dL / Nitrite: Negative   Leuk Esterase: Negative / RBC: 15-20 /hpf / WBC 6 /HPF   Sq Epi: x / Non Sq Epi: 8 /hpf / Bacteria: Moderate        RADIOLOGY & ADDITIONAL TESTS: Reviewed.

## 2022-06-05 NOTE — PROGRESS NOTE ADULT - ATTENDING COMMENTS
I have seen this patient with the fellow and agree with their assessment and plan. I was physically present for significant portions of the evaluation and management (E/M) service provided.  I agree with the above history, physical, and plan which I have reviewed and edited where appropriate.    Malignancy vs infection ongoing concerns  Likely lymphoma, pending results  PEPE stable but may need dialysis if uo drops  [facility] will let us know    Connor Casey MD  Pager   Office     Contact me directly via [employer] Teams     (After 5 pm or on weekends please page the on-call fellow/attending, can check [email or web address] for schedule. Login is aretha [email or web address], schedule under [facility] medicine, psych, derm) I have seen this patient with the fellow and agree with their assessment and plan. I was physically present for significant portions of the evaluation and management (E/M) service provided.  I agree with the above history, physical, and plan which I have reviewed and edited where appropriate.    Malignancy vs infection ongoing concerns  Likely lymphoma, pending results  PEPE stable but may need dialysis if uo drops  [facility] will let us know  CMV titer is low, d/w with ID  Check EBV PCR    Connor Casey MD  Pager   Office     Contact me directly via [employer] Teams     (After 5 pm or on weekends please page the on-call fellow/attending, can check [email or web address] for schedule. Login is aretha carlson, schedule under [facility] medicine, psych, derm)

## 2022-06-05 NOTE — PROGRESS NOTE ADULT - ASSESSMENT
65 yo M with a PMH HTN, HLD, SLE on Cellcept (MMF), class V lupus nephritis, CKD stage 2, DM2, diabetic neuropathy, TIA (2019) on plavix, BPH, HUNG, with multiple hospitalizations for c diff, acute panc, septic shock 22 buttock abscess status post drainage transferred for recurrent fevers status post core biopsy and paracentesis suspected lymphoma v less likely peritoneal tb now intubated and sedated due to upper airway stridor and altered mental status.     Neuro    Acute encephalopathy requiring intubation and sedation  -Fluctuating mentation while febrile which then returns to current clinical baseline slow however linear thought process and alert and oriented x3.   -exacerbated by fever although remained altered despite control of fever with arctic sun 6/4 AM. Unclear etiology.     #diabetic neuropathy  -home gabapentin    Pulm  #Intubated for airway protection  -altered with stridor 6/4 AM  -with vent desynchrony including breath stacking requiring sedation with propofol, fentanyl, versed push.   -continue to optimize vent settings. SBT as tolerated when appropriate.      #large left posterior consolidation seen on ultrasoud, possible PNA?  -monitored off abx originally now back on edilson (6/3- ) and status post vanc (6/3) with negative MRSA MSSA swab     # stridor  -with additional  stridor 6/4 requiring intubation refractory to epinephrine, methylprednisolone, xopenex, ipratropium. Previously with stridor earlier in admission although more severe 6/4 AM requiring intubation unclear etiology with new medication bicitra, recent instrumentation with core biopsy although had tolerated procedure without evidence of neck swelling/edema or stridor previous, with underlying HUNG hx.     #RLL PE  -heparin gtt, may transition to alternative agent for full AC if no plans for additional surgical intervention/procedure.     Cardio    #Episodes of hypotension  -hypotension episodic possibly rate related with period going into afib requiring rate control with lopressor. Lopressor discontinued 6/3 ON remains tachycardic.     #Hx of HTN  - monitored off home hypertensive meds due to periods of hypotension requiring fluid bolus, has not required pressors during hospitalization.       #Paroxysm of afib followed by sinus rhythm w frequent ectopy  -Lopressor 2.5 q6h discontinued 6/3 ON receiving crystalloid and colloid 6/3 ON to 6/4.       Renal  #CKD stage 2, hx of lupus nephritis  -cont to trend Cr  -monitor I/Os  -cont to monitor electrolytes, replete as necessary  -urine lytes and urinalysis w microscopy sent  -canales placed 6/3    #PEPE on CKD  -with worsening renal function FeNa prerenal receiving fluids 6/4. question role for rheumatology if no improvement in renal function.   -urine studies less suggestive of HRS picture discontinued midodrine although will continue albumin may benefit from volume.   -appreciate nephrology reccomendations      GI  #chronic inflammatory diarrhea  #possible Crohn's vs colitis vs infectious  -calprotectin elevated at [place] [facility]. no current plan by GI for colonoscopy at this time.   -C diff negative  -appreciate GI and ID input.   -less likely crohns disease     #Diet  -previously on TPN while at [facility]  -Supplementing with ensure, currently safe to swallow po nutrition.     #hx of pancreatitis, elevated lipase  -without current epigastric tenderness to palpation 5/30 lipase 740, no imaging findings of pancreatitis.     #hepatosplenomegaly shown on MRI at [place]  -hepatitis panel nonreactive including Hep A B and C      #hx of BPH  -start on flomax    Endocrine  #DM2  -last HbA1c 4/29 6.5%  - ISS    ID/rheum  #hx of SLE  -monitoring off cellcept  -Has been seen by Dr. Swapnil Wolfe and also Dr. Shivam Malagon    #recurrent fevers and leukocytosis of unknown etiology  diff dx: infectious, malignancy, IgG4 disease, HLH  -elevated WBC, elevated IgA at [place]  -quant gold indeterminate  -5/30 BCx NGTD  -HIV nonreactive, EBV IgG+, CMV 50  -possibility of peritoneal TB appreciate ID reccs AFB sputum and stool thus far are negative with IR biopsy completed pending final results 6/4  -Rheum eval including C3 C4 ESR CRP dsDNA progression of underlying lupus v other rheumatologic process causing fever although less likely.     #r/o HLH, IgG4  -has fevers, splenomegaly, cytopenia  -HLH labs: soluble IL-2 levels 05527, ferritin 511, triglyceride 412  -IgG4 27    Heme  #Anemia  -outside hospital reported to be consistent with AOCD  -CT ab pending, r/o bleed  -transfuse if Hb<7, maintain active type and screen  -DVT ppx: full AC    Pending results of core biopsy if further management required from heme onc perspective.     Lines: RIJ placed 5/27 Removed 6/1 ON  67 yo M with a PMH HTN, HLD, SLE on Cellcept (MMF), class V lupus nephritis, CKD stage 2, DM2, diabetic neuropathy, TIA (2019) on plavix, BPH, HUNG, with multiple hospitalizations for c diff, acute panc, septic shock 22 buttock abscess status post drainage transferred for recurrent fevers status post core biopsy and paracentesis suspected lymphoma v less likely peritoneal tb now intubated and sedated due to upper airway stridor and altered mental status.     Neuro    Acute encephalopathy requiring intubation and sedation  -Fluctuating mentation while febrile which then returns to current clinical baseline slow however linear thought process and alert and oriented x3.   -exacerbated by fever although remained altered despite control of fever with arctic sun 6/4 AM. Unclear etiology.     #diabetic neuropathy  -home gabapentin    Pulm  #Intubated for airway protection  -altered with stridor 6/4 AM  -with vent desynchrony including breath stacking requiring sedation with propofol, fentanyl, versed push.   -continue to optimize vent settings. SBT as tolerated when appropriate.      #large left posterior consolidation seen on ultrasoud, possible PNA?  -monitored off abx originally now back on edilson (6/3- ) and status post vanc (6/3) with negative MRSA MSSA swab     # stridor  -with additional  stridor 6/4 requiring intubation refractory to epinephrine, methylprednisolone, xopenex, ipratropium. Previously with stridor earlier in admission although more severe 6/4 AM requiring intubation unclear etiology with new medication bicitra, recent instrumentation with core biopsy although had tolerated procedure without evidence of neck swelling/edema or stridor previous, with underlying HUNG hx.     #RLL PE  -heparin gtt, may transition to alternative agent for full AC if no plans for additional surgical intervention/procedure  -heparin gtt on hold given downtrended hb overnight, can resume heparin gtt if 8pm cbc stable    Cardio    #Episodes of hypotension  -hypotension episodic possibly rate related with period going into afib requiring rate control with lopressor. Lopressor discontinued 6/3 ON remains tachycardic.   -now on mega    #Hx of HTN  - monitored off home hypertensive meds 2/2 hypotension      #Paroxysm of afib followed by sinus rhythm w frequent ectopy  -Lopressor 2.5 q6h discontinued 6/3 ON receiving crystalloid and colloid 6/3 ON to 6/4.       Renal  #CKD stage 2, hx of lupus nephritis  -cont to trend Cr, uptrending  -monitor I/Os  -cont to monitor electrolytes, replete as necessary  -urine lytes and urinalysis w microscopy sent  -canales placed 6/3    #PEPE on CKD  -with worsening renal function FeNa prerenal receiving fluids 6/4. question role for rheumatology if no improvement in renal function.   -urine studies less suggestive of HRS picture discontinued midodrine although will continue albumin may benefit from volume.   -appreciate nephrology recommendations    -urine studies consistent with pre-renal FeNA 0.8 however IVC 2.1 on POCUS. Will c/w albumin and give lasix 40 IV x1, goal neg 500cc-1L today    GI  #chronic inflammatory diarrhea  #possible Crohn's vs colitis vs infectious  -calprotectin elevated at [place] [facility]. no current plan by GI for colonoscopy at this time.   -C diff negative  -appreciate GI and ID input.   -less likely crohns disease     #Diet  -previously on TPN while at [facility]  -Supplementing with ensure, currently safe to swallow po nutrition.     #hx of pancreatitis, elevated lipase  -without current epigastric tenderness to palpation 5/30 lipase 740, no imaging findings of pancreatitis. Now downtrending    #hepatosplenomegaly shown on MRI at [place]  -hepatitis panel nonreactive including Hep A B and C      #hx of BPH  -start on flomax    Endocrine  #DM2  -last HbA1c 4/29 6.5%  -ISS    ID/rheum  #hx of SLE  -monitoring off cellcept  -Has been seen by Dr. Swapnil Wolfe and also Dr. Shivam Malagon    #recurrent fevers and leukocytosis of unknown etiology  diff dx: infectious, malignancy, IgG4 disease, HLH  -elevated WBC, elevated IgA at [place]  -quant gold indeterminate  -5/30 BCx NGTD  -HIV nonreactive, EBV IgG+, CMV 50  -possibility of peritoneal TB appreciate ID reccs AFB sputum and stool thus far are negative with IR biopsy completed pending final results 6/4  -Rheum eval including C3 C4 ESR CRP dsDNA progression of underlying lupus v other rheumatologic process causing fever although less likely.     #r/o HLH, IgG4  -has fevers, splenomegaly, cytopenia  -HLH labs: soluble IL-2 levels 45132, ferritin 511, triglyceride 412  -IgG4 27    Heme  #Anemia  -outside hospital reported to be consistent with AOCD  -CT ab pending, r/o bleed  -transfuse if Hb<7, maintain active type and screen  -DVT ppx: full AC    Pending results of core biopsy if further management required from heme onc perspective.     Lines: TOBY placed 5/27 Removed 6/1 ON  67 yo M with a PMH HTN, HLD, SLE on Cellcept (MMF), class V lupus nephritis, CKD stage 2, DM2, diabetic neuropathy, TIA (2019) on plavix, BPH, HUNG, with multiple hospitalizations for c diff, acute panc, septic shock 22 buttock abscess status post drainage transferred for recurrent fevers status post core biopsy and paracentesis suspected lymphoma v less likely peritoneal tb now intubated and sedated due to upper airway stridor and altered mental status.     Neuro    Acute encephalopathy requiring intubation and sedation  -Fluctuating mentation while febrile which then returns to current clinical baseline slow however linear thought process and alert and oriented x3.   -exacerbated by fever although remained altered despite control of fever with arctic sun 6/4 AM. Unclear etiology.     #diabetic neuropathy  -home gabapentin    Pulm  #Intubated for airway protection  -altered with stridor 6/4 AM  -with vent desynchrony including breath stacking requiring sedation with propofol, fentanyl, versed push.   -continue to optimize vent settings. SBT as tolerated when appropriate.      #large left posterior consolidation seen on ultrasoud, possible PNA?  -monitored off abx originally now back on edilson (6/3- ) and status post vanc (6/3) with negative MRSA MSSA swab     # stridor  -with additional  stridor 6/4 requiring intubation refractory to epinephrine, methylprednisolone, xopenex, ipratropium. Previously with stridor earlier in admission although more severe 6/4 AM requiring intubation unclear etiology with new medication bicitra, recent instrumentation with core biopsy although had tolerated procedure without evidence of neck swelling/edema or stridor previous, with underlying HUNG hx.     #RLL PE  -heparin gtt, may transition to alternative agent for full AC if no plans for additional surgical intervention/procedure  -heparin gtt on hold given downtrended hb overnight, can resume heparin gtt if 8pm cbc stable    Cardio    #Episodes of hypotension  -hypotension episodic possibly rate related with period going into afib requiring rate control with lopressor. Lopressor discontinued 6/3 ON remains tachycardic.   -now on mega    #Hx of HTN  - monitored off home hypertensive meds 2/2 hypotension      #Paroxysm of afib followed by sinus rhythm w frequent ectopy  -Lopressor 2.5 q6h discontinued 6/3 ON receiving crystalloid and colloid 6/3 ON to 6/4.       Renal  #CKD stage 2, hx of lupus nephritis  -cont to trend Cr, uptrending  -monitor I/Os  -cont to monitor electrolytes, replete as necessary  -urine lytes and urinalysis w microscopy sent  -canales placed 6/3    #PEPE on CKD  -with worsening renal function FeNa prerenal receiving fluids 6/4. question role for rheumatology if no improvement in renal function.   -urine studies less suggestive of HRS picture discontinued midodrine although will continue albumin may benefit from volume.   -appreciate nephrology recommendations    -urine studies consistent with pre-renal FeNA 0.8 however IVC 2.1 on POCUS. Will c/w albumin and give lasix 40 IV x1, goal neg 500cc-1L today    GI  #chronic inflammatory diarrhea  #possible Crohn's vs colitis vs infectious  -calprotectin elevated at [place] [facility]. no current plan by GI for colonoscopy at this time.   -C diff negative  -appreciate GI and ID input.   -less likely crohns disease     #Diet  -previously on TPN while at [facility]  -Supplementing with ensure, currently safe to swallow po nutrition.     #hx of pancreatitis, elevated lipase  -without current epigastric tenderness to palpation 5/30 lipase 740, no imaging findings of pancreatitis. Now downtrending    #hepatosplenomegaly shown on MRI at [place]  -hepatitis panel nonreactive including Hep A B and C      #hx of BPH  -start on flomax    Endocrine  #DM2  -last HbA1c 4/29 6.5%  -ISS    ID/rheum  #hx of SLE  -monitoring off cellcept  -Has been seen by Dr. Swapnil Wolfe and also Dr. Shivam Malagon  -According to sisters at bedside today, patient took Aranesp in the [facility] for lupus prior to him getting sick. Family are wondering if the medication can be contributory.     #recurrent fevers and leukocytosis of unknown etiology  diff dx: infectious, malignancy, IgG4 disease, HLH  -elevated WBC, elevated IgA at [place]  -quant gold indeterminate  -5/30 BCx NGTD  -HIV nonreactive, EBV IgG+, CMV 50  -possibility of peritoneal TB appreciate ID reccs AFB sputum and stool thus far are negative with IR biopsy completed pending final results 6/4  -Rheum eval including C3 C4 ESR CRP dsDNA progression of underlying lupus v other rheumatologic process causing fever although less likely.     #r/o HLH, IgG4  -has fevers, splenomegaly, cytopenia  -HLH labs: soluble IL-2 levels 21474, ferritin 511, triglyceride 412  -IgG4 27    Heme  #Anemia  -outside hospital reported to be consistent with AOCD  -CT ab pending, r/o bleed  -transfuse if Hb<7, maintain active type and screen  -DVT ppx: full AC    Pending results of core biopsy if further management required from heme onc perspective.     Lines: RITITA placed 5/27 Removed 6/1 ON

## 2022-06-05 NOTE — PROGRESS NOTE ADULT - PROBLEM SELECTOR PLAN 1
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 The patient is being treated with MMF 750mg as outpatient. Currently on hold given pt being treated for infection. [facility] to have a baseline SCr of 1.1mg/dL. Currently SCr elevated to 1.8mg/dL. Urine studies suggestive of pre-renal etiology. Hypotensive episodes again noted. Anticiapte need for IV pressors. Pt. did receive 85 cc IV contrast on 5/30. Optimize hemodynamics. F/u outpatient records to be brought in by wife. Recommend renal sonogram. Send for serological markers including C3, C4, dsDNA, ESR, CRP. Monitor labs and urine output. Assess daily for need of RRT. Avoid NSAIDs, ACEI/ARBS, RCA and nephrotoxins. Dose medications as per eGFR.    If you have any questions, please feel free to contact me  Jameson Lipscomb  Nephrology Fellow  [phone]; Prefer [employer] TEAMS  (After 5pm or on weekends please page the on-call fellow) 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 The patient is being treated with MMF 750mg as outpatient. Currently on hold given pt being treated for infection. [facility] noted to have a baseline SCr of 1.1mg/dL. Currently SCr elevated to 1.8mg/dL. Urine studies suggestive of pre-renal etiology. Hypotensive episodes again noted. Anticipate need for IV pressors. Pt. did receive 85 cc IV contrast on 5/30. Optimize hemodynamics. F/u outpatient records to be brought in by wife. Recommend renal sonogram. Send for serological markers including C3, C4, dsDNA, ESR, CRP. Monitor labs and urine output. Assess daily for need of RRT. Avoid NSAIDs, ACEI/ARBS, RCA and nephrotoxins. Dose medications as per eGFR.    If you have any questions, please feel free to contact me  Jameson Lipscomb  Nephrology Fellow  [phone]; Prefer [employer] TEAMS  (After 5pm or on weekends please page the on-call fellow) 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 The patient is being treated with MMF 750mg as outpatient. Currently on hold given pt being treated for infection. On review of [facility] pt. noted to have a baseline SCr of 1.1mg/dL. Currently SCr elevated to 1.8mg/dL. Urine studies suggestive of pre-renal etiology. Hypotensive episodes again noted. Anticipate need for IV pressors. Pt. did receive 85 cc IV contrast on 5/30. Optimize hemodynamics. F/u outpatient records to be brought in by wife. Recommend renal sonogram. Send for serological markers including C3, C4, dsDNA, ESR, CRP. Monitor labs and urine output. Assess daily for need of RRT. Avoid NSAIDs, ACEI/ARBS, RCA and nephrotoxins. CMV elevated but not concerning high. F/u EBV, discussed with ID. Dose medications as per eGFR.    If you have any questions, please feel free to contact me  Jameson Lipscomb  Nephrology Fellow  [phone]; Prefer [employer] TEAMS  (After 5pm or on weekends please page the on-call fellow)

## 2022-06-05 NOTE — PROGRESS NOTE ADULT - ATTENDING COMMENTS
Patient seen and examined. Patient critically ill requiring frequent bedside visits with therapy change. Patient is a 66M with extensive history and prolonged hospitalization with fevers, anasarca, and diffuse adenopathy. He has also been found to have portal htn, cirrhosis, splenic infarct, L brachial DVT/possible RLL PE, and leukocytosis. He was initially at [facility] but then transferred to [facility] for workup of possible abdominal TB vs lymphoma vs other.    1. Infectious Disease - patient with persistent fevers and thus far negative workup until paracentesis demonstrated SBP. Restarted on antibiotics. Followup cultures.  - ? if malaria could be a possibility given high fevers - though first set negative and no significant hemolysis  - check FUO workup, HLH labs sent and IL2RAb very high but other labs borderline, Dengue Ab sent  - LN core biopsies sent and pending  - WBC decreased today  - AFB sputum and stool negatives  2. Cardiovascular - vasopressors as needed to maintain MAP > 65  - episodes of Afib with RVR  3. Hem/Onc - VTE on AC. Continue Hep gtt for now. If no further procedures planned may transition to DOAC  - Await LN biopsy   - BMBx done at [place] negative by report  - If no clear signs of infection, and ascites fluid culture negative, may need to consider steroids  4. Nephrology - SLE with lupus nephritis  - off immunosuppressants  - monitor renal function which is worsening  - Michael 67 - doubt hepatorenal - d/w Nephrology. Prerenal by FeNa - continue Albumin. Monitor volume status as IVC appears slightly larger today  5. Gastro - hepatosplenomegaly  - lipase previously elevated now downtrending  - OGT to LWS - when able will start feeds  6. Pulmonary - maintain O2 sat > 90%  - intubated on 6/4 for acute respiratory distress and stridor - but airways clear - possibly related to increased work of breathing in setting of lymphadenopathy  7. DVT proph - will restart Hep gtt if anemia resolved  - Hb 6.5 this AM - received 1 unit PRBC. Will repeat CBC. No clear signs of bleeding    Prognosis guarded. Requested that cytopathology expedite results.  Discussed with patient's wife this AM.

## 2022-06-05 NOTE — PROGRESS NOTE ADULT - SUBJECTIVE AND OBJECTIVE BOX
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 Moore), DM, TIA, BPH, and HUNG who pwas transferred to the hospital due to concerns for septic shock and recurrent fevers. 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 Since then he has been following Dr. Moore as his primary nephrologist. The patient has was on treatment with steroids and cyclophosphamide. Most recently the patient is being treated with MMF 750mg BID. Upon arrival to the hospital the patient was noted to have a SCr of 1.16 which has since risen to 1.42mg/dL this morning. The nephrology team was consulted for PEPE. The pateint reports feeling some mild shortness of breath during the encounter. He denied any chest pain, shortness of breath, nausea, vomiting, or abdominal pain.     Pt. seen this AM in MICU. Febrile overnight. Pt. inutbated yesterday for worsening work of breathing. NGT with 1L output. SCr. trending up to 1.8. Case discussed with ICU team.       PAST HISTORY  --------------------------------------------------------------------------------  No significant changes to PMH, PSH, FHx, SHx, unless otherwise noted    ALLERGIES & MEDICATIONS  --------------------------------------------------------------------------------  Allergies    No Known Allergies    Intolerances      Standing Inpatient Medications  albumin human 25% IVPB 100 milliLiter(s) IV Intermittent every 6 hours  chlorhexidine 0.12% Liquid 15 milliLiter(s) Oral Mucosa every 12 hours  chlorhexidine 4% Liquid 1 Application(s) Topical <User Schedule>  chlorhexidine 4% Liquid 1 Application(s) Topical <User Schedule>  citric acid/sodium citrate Solution 30 milliLiter(s) Oral every 6 hours  dextrose 5%. 1000 milliLiter(s) IV Continuous <Continuous>  dextrose 5%. 1000 milliLiter(s) IV Continuous <Continuous>  dextrose 50% Injectable 25 Gram(s) IV Push once  dextrose 50% Injectable 12.5 Gram(s) IV Push once  dextrose 50% Injectable 25 Gram(s) IV Push once  fentaNYL   Infusion... 3.002 MICROgram(s)/kG/Hr IV Continuous <Continuous>  folic acid Injectable 1 milliGRAM(s) IV Push daily  gabapentin 300 milliGRAM(s) Oral daily  glucagon  Injectable 1 milliGRAM(s) IntraMuscular once  insulin lispro (ADMELOG) corrective regimen sliding scale   SubCutaneous every 6 hours  lactated ringers. 1000 milliLiter(s) IV Continuous <Continuous>  lidocaine   4% Patch 1 Patch Transdermal daily  lidocaine   4% Patch 1 Patch Transdermal every 24 hours  loperamide 2 milliGRAM(s) Oral daily  meropenem  IVPB      meropenem  IVPB 1000 milliGRAM(s) IV Intermittent every 12 hours  multivitamin/minerals 1 Tablet(s) Oral daily  pantoprazole  Injectable 40 milliGRAM(s) IV Push daily  phenylephrine    Infusion 0.5 MICROgram(s)/kG/Min IV Continuous <Continuous>  propofol Infusion 39.919 MICROgram(s)/kG/Min IV Continuous <Continuous>  tamsulosin 0.4 milliGRAM(s) Oral at bedtime    PRN Inpatient Medications  acetaminophen     Tablet .. 500 milliGRAM(s) Oral every 6 hours PRN  dextrose Oral Gel 15 Gram(s) Oral once PRN  fentaNYL    Injectable 25 MICROGram(s) IV Push every 15 minutes PRN  sodium chloride 0.9% lock flush 10 milliLiter(s) IV Push every 1 hour PRN      REVIEW OF SYSTEMS  --------------------------------------------------------------------------------  Unable to obtain ROS    VITALS/PHYSICAL EXAM  --------------------------------------------------------------------------------  T(C): 36.4 (06-05-22 @ 08:00), Max: 37.8 (06-04-22 @ 12:00)  HR: 61 (06-05-22 @ 10:00) (25 - 141)  BP: 101/62 (06-05-22 @ 10:00) (82/45 - 174/89)  RR: 26 (06-05-22 @ 10:00) (0 - 30)  SpO2: 100% (06-05-22 @ 10:00) (92% - 100%)  Wt(kg): --        06-04-22 @ 07:01  -  06-05-22 @ 07:00  --------------------------------------------------------  IN: 4522.5 mL / OUT: 1700 mL / NET: 2822.5 mL    06-05-22 @ 07:01  -  06-05-22 @ 10:55  --------------------------------------------------------  IN: 508.9 mL / OUT: 40 mL / NET: 468.9 mL    Physical Exam:  	Gen: Sedated   	HEENT: ETT+ NGT+  	Pulm: decreased breath sounds   	CV: S1S2  	Abd: Soft, +BS, distended   	Ext: + LE edema B/L  	Neuro: Sedated  	Skin: Warm and dry    LABS/STUDIES  --------------------------------------------------------------------------------              7.4    34.36 >-----------<  126      [06-05-22 @ 08:13]              24.7     136  |  106  |  24  ----------------------------<  83      [06-05-22 @ 08:10]  4.5   |  16  |  1.80        Ca     8.1     [06-05-22 @ 08:10]      Mg     2.1     [06-05-22 @ 08:10]      Phos  5.6     [06-05-22 @ 08:10]    TPro  5.9  /  Alb  3.0  /  TBili  1.0  /  DBili  x   /  AST  21  /  ALT  9   /  AlkPhos  327  [06-05-22 @ 08:10]    PT/INR: PT 20.8 , INR 1.80       [06-05-22 @ 00:23]  PTT: 154.7      [06-05-22 @ 00:23]    CK 20      [06-03-22 @ 14:45]        [06-05-22 @ 08:10]  Serum Osmolality 283      [06-03-22 @ 23:18]    Creatinine Trend:  SCr 1.80 [06-05 @ 08:10]  SCr 1.59 [06-05 @ 02:26]  SCr 1.60 [06-04 @ 20:00]  SCr 1.60 [06-04 @ 14:20]  SCr 1.51 [06-04 @ 09:24]    Urinalysis - [06-03-22 @ 16:45]      Color Yellow / Appearance Slightly Turbid / SG 1.019 / pH 5.5      Gluc Negative / Ketone Negative  / Bili Negative / Urobili Negative       Blood Large / Protein 100 mg/dL / Leuk Est Negative / Nitrite Negative      RBC 15-20 / WBC 6 / Hyaline 32 / Gran 2-3 / Sq Epi  / Non Sq Epi 8 / Bacteria Moderate    Urine Creatinine 75      [06-03-22 @ 16:45]  Urine Sodium 66      [06-03-22 @ 16:45]  Urine Potassium 40      [06-03-22 @ 16:45]  Urine Chloride 65      [06-03-22 @ 16:45]  Urine Osmolality 415      [06-03-22 @ 16:45]    Iron 43, TIBC 128, %sat 34      [06-01-22 @ 04:47]  Ferritin 511      [05-30-22 @ 03:48]  TSH 3.52      [06-03-22 @ 14:46]  Lipid: chol 103, , HDL 12, LDL --      [05-30-22 @ 03:48]    HBsAg Nonreact      [05-30-22 @ 03:46]  HCV 0.31, Nonreact      [05-31-22 @ 00:32]  HIV Nonreact      [06-01-22 @ 13:46]  HIV Nonreact      [05-30-22 @ 04:13]    ED: titer 1:320, pattern Homogeneous      [06-01-22 @ 01:16]  dsDNA 34      [05-30-22 @ 10:58]  C3 Complement 125      [05-30-22 @ 10:58]  C4 Complement 30      [05-30-22 @ 10:58]  Rheumatoid Factor <10      [06-04-22 @ 14:21]  ASLO <20      [06-04-22 @ 16:53]  Free Light Chains: kappa 14.57, lambda 23.09, ratio = 0.63      [06-01 @ 04:47]

## 2022-06-06 NOTE — PROGRESS NOTE ADULT - ATTENDING COMMENTS
66 year old man with HTN, HLD, DM, neuropathy on gabapentin, SLE on cellcept, lupus nephritis, CKD 2, recent trip to the [facility] where he was hospitalized and was most recently he was at another hospital with high feveres and alteration in mental status from which he was transferred to [facility] intubated for airway protection and remains with high fevers    - sedated for comfort and vent coordination  - tolerating tube feeds, LFTs WNL  - adequate renal function   - remains febrile with elevated WBC, infectious work up so far negative on broad spectrum ABx follow up cultures  - malaria smear negative  - a-fib rate controlled  - minimal vent settings  - s/p cervical lymph node biopsy pending results concern for underlying malignancy causing fevers  - H score also high with possible HLH will follow up with Heme Once re possible treatement  - hypoglycemia on D10 infusion    overall prognosis guarded  case discussed in detail with family members at bedside

## 2022-06-06 NOTE — PROGRESS NOTE ADULT - ASSESSMENT
HPI:  67 yo M with a PMH HTN, HLD, SLE on Cellcept (MMF), class V lupus nephritis, CKD stage 2, DM2, diabetic neuropathy, TIA (2019) on plavix, BPH, HUNG, hospitalization in [facility] 3/27-4/20 tx for pancreatitis and C diff?, recent hospitalization at [place] 4/22-4/26 for acute pancreatitis and C diff colitis and another hospitalization 5/5 at [place] for septic shock (source buttock abscess) treated with vanc, cefepime (5/5-5/10), meropenem (5/11-5/18) and micafungin. Zosyn added 5/26. Course complicated by recurrent fevers despite being on antibiotics, and leukocytosis up to 30k. Blood cx neg, UA unremarkable. MRI abdomen w/ contrast performed showing extensive abd lymphadenopathy (reactive to c diff vs lymphoma?). IR stated no window for bx. Course also complicated by a L brachial vein DVT and superficial DVT in arms. Patient noted to have had a positive PPD but has possibly gotten the BCG vaccine. Quant gold performed at [place] still pending.     Admitted to [facility] MICU for further management. Vital signs on arrival: temp 38.6, /77, , RR 28, O2 sat 97% on 2L NC.  Labs: WBC 34.53, Hb 9.6, lipase 740, procal 2.02, tBili 1.4, alk phos 498, lactate 2.4. (30 May 2022 04:01)    Hematology/Oncology consulted d/t patient's history of anemia. [facility]    Anemia and Lymphadenopathy  --multifactorial  --transfuse for hgb > 7  --ldh (318), retic (pendng) ,ferritin (511)  --infectious vs malignancy vs autoimmune  --CT C/A/P done 5/31  ·	Markedly enlarged mesenteric lymph nodes/lymphadenopathy out of proportion of retroperitoneal lymphadenopathy. Although lymphoma/leukemia can have this appearance, consider possibility of gastrointestinal tuberculosis as well as other infectious etiologies  --supraclavicular/cervical IR bedside biopsy pending  --flow cytometry, SPEP, immunoglobulins, immunofixation pending  --MPN testing is negative  --BMB done on 5/20 at [place]: negative for detectable monoclonal cell or B cell population  --large blood in urine  --triglyceride, fibrinogen ordered  --patient meets 4/5 criteria for HLH  --dexamethasone 20mg Q day recommended while awaiting results of further testing for HLH  --will check for hemophagocytosis in biopsy sample    --will re-check Ca 19-9    Questionable PE with splenic infarct  - on heparin GGT      Mecca Oliveira NP  Hematology/ Oncology  [facility]  [phone] (office)  [phone] (alt office)  Evenings and weekends please call MD on call or office         HPI:  65 yo M with a PMH HTN, HLD, SLE on Cellcept (MMF), class V lupus nephritis, CKD stage 2, DM2, diabetic neuropathy, TIA (2019) on plavix, BPH, HUNG, hospitalization in [facility] 3/27-4/20 tx for pancreatitis and C diff?, recent hospitalization at [place] 4/22-4/26 for acute pancreatitis and C diff colitis and another hospitalization 5/5 at [place] for septic shock (source buttock abscess) treated with vanc, cefepime (5/5-5/10), meropenem (5/11-5/18) and micafungin. Zosyn added 5/26. Course complicated by recurrent fevers despite being on antibiotics, and leukocytosis up to 30k. Blood cx neg, UA unremarkable. MRI abdomen w/ contrast performed showing extensive abd lymphadenopathy (reactive to c diff vs lymphoma?). IR stated no window for bx. Course also complicated by a L brachial vein DVT and superficial DVT in arms. Patient noted to have had a positive PPD but has possibly gotten the BCG vaccine. Quant gold performed at [place] still pending.     Admitted to [facility] MICU for further management. Vital signs on arrival: temp 38.6, /77, , RR 28, O2 sat 97% on 2L NC.  Labs: WBC 34.53, Hb 9.6, lipase 740, procal 2.02, tBili 1.4, alk phos 498, lactate 2.4. (30 May 2022 04:01)    Hematology/Oncology consulted d/t patient's history of anemia. [facility]    Anemia and Lymphadenopathy  --multifactorial  --transfuse for hgb > 7  --ldh (318), retic (pendng) ,ferritin (511)  --infectious vs malignancy vs autoimmune  --CT C/A/P done 5/31  ·	Markedly enlarged mesenteric lymph nodes/lymphadenopathy out of proportion of retroperitoneal lymphadenopathy. Although lymphoma/leukemia can have this appearance, consider possibility of gastrointestinal tuberculosis as well as other infectious etiologies  --supraclavicular/cervical IR bedside biopsy pending  --flow cytometry, SPEP, immunoglobulins, immunofixation pending  --MPN testing is negative  --BMB done on 5/20 at [facility]: negative for detectable monoclonal cell or B cell population  --large blood in urine  --triglyceride, fibrinogen ordered  --patient meets 4/5 criteria for HLH  --dexamethasone 20mg Q day recommended while awaiting results of further testing for HLH  --will check for hemophagocytosis in biopsy sample    --will re-check Ca 19-9    Questionable PE with splenic infarct  - on heparin GGT      Mecca Oliveira NP  Hematology/ Oncology  [facility]  [phone] (office)  [phone] (alt office)  Evenings and weekends please call MD on call or office      Patient seen and evaluated. Agree with plan as above. Question of HLH which is a possibility. Awaiting lymph node biopsy. If able to demonstrate evidence of hemophagocytes on biopsy or other criteria met on pending labs would to support diagnosis would consider treatment. While workup is ongoing would start dexamethasone as above.     Edith Melo D.O  Hematology Oncology   [facility]  [phone] ( Office)   Evenings and weekends please call MD on call or office

## 2022-06-06 NOTE — PROGRESS NOTE ADULT - ASSESSMENT
65 yo M with a PMH HTN, HLD, SLE on Cellcept (MMF), class V lupus nephritis, CKD stage 2, DM2, diabetic neuropathy, TIA (2019) on plavix, BPH, HUNG, with multiple hospitalizations for c diff, acute panc, septic shock 22 buttock abscess status post drainage transferred for recurrent fevers status post core biopsy and paracentesis suspected lymphoma v less likely peritoneal tb now intubated and sedated due to upper airway stridor and altered mental status.     Neuro    Acute encephalopathy requiring intubation and sedation  -Fluctuating mentation while febrile which then returns to current clinical baseline slow however linear thought process and alert and oriented x3.   -exacerbated by fever although remained altered despite control of fever with arctic sun 6/4 AM. Unclear etiology.   -Requiring intubation and sedation 6/4, 6/6 weaning sedation and SBT monitoring mental status     #diabetic neuropathy  -home gabapentin    Pulm  #Intubated for airway protection  -altered with stridor 6/4 AM  -with vent desynchrony including breath stacking requiring sedation with propofol, fentanyl, versed push.   -continue to optimize vent settings. SBT as tolerated when appropriate.      #large left posterior consolidation seen on ultrasoud, possible PNA?  -monitored off abx originally now back on edilson (6/3- ) and status post vanc (6/3) with negative MRSA MSSA swab     # stridor  -with additional  stridor 6/4 requiring intubation refractory to epinephrine, methylprednisolone, xopenex, ipratropium. Previously with stridor earlier in admission although more severe 6/4 AM requiring intubation unclear etiology with new medication bicitra, recent instrumentation with core biopsy although had tolerated procedure without evidence of neck swelling/edema or stridor previous, with underlying HUNG hx.     #RLL PE  -heparin gtt, may transition to alternative agent for full AC if no plans for additional surgical intervention/procedure  -heparin gtt on hold given downtrended hb overnight, can resume heparin gtt if 8pm cbc stable    Cardio    #Episodes of hypotension  -hypotension episodic possibly rate related with period going into afib requiring rate control with lopressor. Lopressor discontinued 6/3 ON remains tachycardic.   -now on mega    #Hx of HTN  - monitored off home hypertensive meds 2/2 hypotension      #Paroxysm of afib followed by sinus rhythm w frequent ectopy  -Lopressor 2.5 q6h discontinued 6/3 ON receiving crystalloid and colloid 6/3 ON to 6/4.       Renal  #CKD stage 2, hx of lupus nephritis  -cont to trend Cr, uptrending  -monitor I/Os  -cont to monitor electrolytes, replete as necessary  -urine lytes and urinalysis w microscopy sent  -canales placed 6/3    #PEPE on CKD  -with worsening renal function FeNa prerenal receiving fluids 6/4. question role for rheumatology if no improvement in renal function.   -urine studies less suggestive of HRS picture discontinued midodrine although will continue albumin may benefit from volume.   -appreciate nephrology recommendations    -urine studies consistent with pre-renal FeNA 0.8 however IVC 2.1 on POCUS. Will c/w albumin and give lasix 40 IV x1, goal neg 500cc-1L today    GI  #chronic inflammatory diarrhea  #possible Crohn's vs colitis vs infectious  -calprotectin elevated at [place] [address]. no current plan by GI for colonoscopy at this time.   -C diff negative  -appreciate GI and ID input.   -less likely crohns disease     #Diet  -previously on TPN while at [facility]  -Supplementing with ensure, currently safe to swallow po nutrition.     #hx of pancreatitis, elevated lipase  -without current epigastric tenderness to palpation 5/30 lipase 740, no imaging findings of pancreatitis. Now downtrending    #hepatosplenomegaly shown on MRI at [place]  -hepatitis panel nonreactive including Hep A B and C      #hx of BPH  -start on flomax    Endocrine  #DM2  -last HbA1c 4/29 6.5%  -ISS    ID/rheum  #hx of SLE  -monitoring off cellcept  -Has been seen by Dr. Swapnil Wolfe and also Dr. Shivam Malagon  -According to sisters at bedside today, patient took Aranesp in the [facility] for lupus prior to him getting sick. Family are wondering if the medication can be contributory.     #recurrent fevers and leukocytosis of unknown etiology  diff dx: infectious, malignancy, IgG4 disease, HLH  -elevated WBC, elevated IgA at [place]  -quant gold indeterminate  -5/30 BCx NGTD  -HIV nonreactive, EBV IgG+, CMV 50  -possibility of peritoneal TB appreciate ID reccs AFB sputum and stool thus far are negative with IR biopsy completed pending final results 6/4  -Rheum eval including C3 C4 ESR CRP dsDNA progression of underlying lupus v other rheumatologic process causing fever although less likely.     #r/o HLH, IgG4  -has fevers, splenomegaly, cytopenia  -HLH labs: soluble IL-2 levels 96738, ferritin 511, triglyceride 412  -IgG4 27    Heme  #Anemia  -outside hospital reported to be consistent with AOCD  -CT ab pending, r/o bleed  -transfuse if Hb<7, maintain active type and screen  -DVT ppx: full AC    Pending results of core biopsy if further management required from heme onc perspective.     Lines: TOBY placed 5/27 Removed 6/1 ON  67 yo M with a PMH HTN, HLD, SLE on Cellcept (MMF), class V lupus nephritis, CKD stage 2, DM2, diabetic neuropathy, TIA (2019) on plavix, BPH, HUNG, with multiple hospitalizations for c diff, acute panc, septic shock 22 buttock abscess status post drainage transferred for recurrent fevers status post core biopsy and paracentesis suspected lymphoma v less likely peritoneal tb now intubated and sedated due to upper airway stridor and altered mental status.     Neuro    Acute encephalopathy requiring intubation and sedation  -Fluctuating mentation while febrile which then returns to current clinical baseline slow however linear thought process and alert and oriented x3.   -exacerbated by fever although remained altered despite control of fever with arctic sun 6/4 AM. Unclear etiology.   -Requiring intubation and sedation 6/4, 6/6 weaning sedation and SBT monitoring mental status       #diabetic neuropathy  -home gabapentin    Pulm  #Intubated for airway protection  -altered with stridor 6/4 AM  -with vent desynchrony including breath stacking requiring sedation with propofol, fentanyl, versed push.   -continue to optimize vent settings. SBT as tolerated when appropriate.      #large left posterior consolidation seen on ultrasoud, possible PNA?  -monitored off abx originally now back on edilson (6/3- ) and status post vanc (6/3) with negative MRSA MSSA swab   -bronchoscopy planned 6/6 with BAL to rule out infectious etiologies.     # stridor  -with additional  stridor 6/4 requiring intubation refractory to epinephrine, methylprednisolone, xopenex, ipratropium. Previously with stridor earlier in admission although more severe 6/4 AM requiring intubation unclear etiology with new medication bicitra, recent instrumentation with core biopsy although had tolerated procedure without evidence of neck swelling/edema or stridor previous, with underlying HUNG hx.     #RLL PE  -heparin gtt, may transition to alternative agent for full AC if no plans for additional surgical intervention/procedure      Cardio    #Episodes of hypotension  -hypotension episodic possibly rate related with period going into afib requiring rate control with lopressor. Lopressor discontinued 6/3 ON remains tachycardic.   -now on mega    #Hx of HTN  - monitored off home hypertensive meds 2/2 hypotension      #Paroxysm of afib followed by sinus rhythm w frequent ectopy  -Lopressor 2.5 q6h discontinued 6/3 ON receiving crystalloid and colloid 6/3 ON to 6/4.       Renal  #CKD stage 2, hx of lupus nephritis  -cont to trend Cr, uptrending  -monitor I/Os  -cont to monitor electrolytes, replete as necessary  -urinalysis with 100mg/dL proteinuria with biopsy proven lupus nephritis   -canales placed 6/3  -6/6 discussed prior arinesp with family and risks of arinesp administration in critically ill explained including increased risk of thrombosis.     #PEPE on CKD  -with worsening renal function FeNa prerenal receiving fluids 6/4. question role for rheumatology if no improvement in renal function.   -urine studies less suggestive of HRS picture discontinued midodrine although will continue albumin may benefit from volume.   -appreciate nephrology recommendations    -6/5 urine studies consistent with pre-renal FeNA 0.8 however IVC 2.1 on POCUS. Will c/w albumin and give lasix 40 IV x1, goal neg 500cc-1L today  -6/6 pending rheumatology work up ED, ANCA, dsDNA esr 44 and crp 77      GI  #chronic inflammatory diarrhea  #possible Crohn's vs colitis vs infectious  -calprotectin elevated at [place] [address]. no current plan by GI for colonoscopy at this time.   -C diff negative  -appreciate GI and ID input.   -less likely crohns disease     #Diet  -previously on TPN while at [facility]  -intubated and sedated receiving tube feeds.     #hx of pancreatitis, elevated lipase  -without current epigastric tenderness to palpation 5/30 lipase 740, no imaging findings of pancreatitis. Now downtrending 149 and 158 respectively on repeat.     #hepatosplenomegaly shown on MRI at [place]  -hepatitis panel nonreactive including Hep A B and C      #hx of BPH  -start on flomax    Endocrine  #DM2  -last HbA1c 4/29 6.5%  -ISS    ID/rheum  #hx of SLE  -monitoring off cellcept  -Has been seen by Dr. Swapnil Wolfe and also Dr. Shivam Malagon  -According to sisters at bedside today, [facility] getting [facility]. Family are wondering if the medication can be contributory.     #recurrent fevers and leukocytosis of unknown etiology  diff dx: infectious, malignancy, IgG4 disease, HLH  -elevated WBC, elevated IgA at [place]  -quant gold indeterminate  -5/30 BCx NGTD  -HIV nonreactive, EBV IgG+, CMV 50  -possibility of peritoneal TB appreciate ID reccs AFB sputum and stool thus far are negative with IR biopsy completed pending final results 6/4  -Rheum eval including C3 C4 ESR 44 CRP 77 dsDNA progression of underlying lupus v other rheumatologic process causing fever although less likely.     #r/o HLH, IgG4  -has fevers, splenomegaly, cytopenia  -HLH labs: soluble IL-2 levels 50485, ferritin 511, triglyceride 412  -IgG4 27    Heme  #Anemia  -outside hospital reported to be consistent with AOCD  -CT ab pending, r/o bleed  -transfuse if Hb<7, maintain active type and screen  -DVT ppx: full AC    Pending results of core biopsy if further management required from heme onc perspective.     Lines: TOBY placed 5/27 Removed 6/1 ON  65 yo M with a PMH HTN, HLD, SLE on Cellcept (MMF), class V lupus nephritis, CKD stage 2, DM2, diabetic neuropathy, TIA (2019) on plavix, BPH, HUNG, with multiple hospitalizations for c diff, acute panc, septic shock 22 buttock abscess status post drainage transferred for recurrent fevers status post core biopsy and paracentesis suspected lymphoma v less likely peritoneal tb now intubated and sedated due to upper airway stridor and altered mental status.     Neuro    Acute encephalopathy requiring intubation and sedation  -Fluctuating mentation while febrile which then returns to current clinical baseline slow however linear thought process and alert and oriented x3.   -exacerbated by fever although remained altered despite control of fever with arctic sun 6/4 AM. Unclear etiology.   -Requiring intubation and sedation 6/4, 6/6 weaning sedation and SBT monitoring mental status       #diabetic neuropathy  -home gabapentin    Pulm  #Intubated for airway protection  -altered with stridor 6/4 AM  -with vent desynchrony including breath stacking requiring sedation with propofol, fentanyl, versed push.   -continue to optimize vent settings. SBT as tolerated when appropriate.      #large left posterior consolidation seen on ultrasoud, possible PNA?  -monitored off abx originally now back on edilson (6/3- ) and status post vanc (6/3) with negative MRSA MSSA swab   -bronchoscopy planned 6/6 with BAL to rule out infectious etiologies.     # stridor  -with additional  stridor 6/4 requiring intubation refractory to epinephrine, methylprednisolone, xopenex, ipratropium. Previously with stridor earlier in admission although more severe 6/4 AM requiring intubation unclear etiology with new medication bicitra, recent instrumentation with core biopsy although had tolerated procedure without evidence of neck swelling/edema or stridor previous, with underlying HUNG hx.     #RLL PE  -heparin gtt, may transition to alternative agent for full AC if no plans for additional surgical intervention/procedure      Cardio    #Episodes of hypotension  -hypotension episodic possibly rate related with period going into afib requiring rate control with lopressor. Lopressor discontinued 6/3 ON remains tachycardic.   -off mega     #Hx of HTN  - monitored off home hypertensive meds 2/2 hypotension      #Paroxysm of afib followed by sinus rhythm w frequent ectopy  -Lopressor 2.5 q6h discontinued 6/3 ON receiving crystalloid and colloid 6/3 ON to 6/4.   -6/6 metoprolol tartarate 12.5mg BID       Renal  #CKD stage 2, hx of lupus nephritis  -cont to trend Cr, uptrending  -monitor I/Os  -cont to monitor electrolytes, replete as necessary  -urinalysis with 100mg/dL proteinuria with biopsy proven lupus nephritis   -canales placed 6/3  -6/6 discussed prior arinesp with family and risks of arinesp administration in critically ill explained including increased risk of thrombosis.     #PEPE on CKD  -with worsening renal function FeNa prerenal receiving fluids 6/4. question role for rheumatology if no improvement in renal function.   -urine studies less suggestive of HRS picture discontinued midodrine although will continue albumin may benefit from volume.   -appreciate nephrology recommendations    -6/5 urine studies consistent with pre-renal FeNA 0.8 however IVC 2.1 on POCUS. Will c/w albumin and give lasix 40 IV x1, goal neg 500cc-1L today  -6/6 pending rheumatology work up ED, ANCA, dsDNA esr 44 and crp 77      GI  #chronic inflammatory diarrhea  #possible Crohn's vs colitis vs infectious  -calprotectin elevated at [place] [address]. no current plan by GI for colonoscopy at this time.   -C diff negative  -appreciate GI and ID input.   -less likely crohns disease     #Diet  -previously on TPN while at [facility]  -intubated and sedated receiving tube feeds.     #hx of pancreatitis, elevated lipase  -without current epigastric tenderness to palpation 5/30 lipase 740, no imaging findings of pancreatitis. Now downtrending 149 and 158 respectively on repeat.   -follow triglycerides, mixed picture concern for HLH however need to monitor for hypertriglyceridemia while on prop which could exacerbate a pancreatitis picture.     #hepatosplenomegaly shown on MRI at [place]  -hepatitis panel nonreactive including Hep A B and C      #hx of BPH  -start on flomax    Endocrine  #DM2  -last HbA1c 4/29 6.5%  -ISS    ID/rheum  #hx of SLE  -monitoring off cellcept  -Has been seen by Dr. Swapnil Wolfe and also Dr. Shivam Malagon  -According to sisters at bedside today, patient took Aranesp in the [place] for lupus prior to him getting sick. Family are wondering if the medication can be contributory.     #recurrent fevers and leukocytosis of unknown etiology  diff dx: infectious, malignancy, IgG4 disease, HLH  -elevated WBC, elevated IgA at [place]  -quant gold indeterminate  -5/30 BCx NGTD  -HIV nonreactive, EBV IgG+, CMV 50  -possibility of peritoneal TB appreciate ID reccs AFB sputum and stool thus far are negative with IR biopsy completed pending final results 6/4  -Rheum eval including C3 C4 ESR 44 CRP 77 dsDNA progression of underlying lupus v other rheumatologic process causing fever although less likely.       Heme  #Anemia  -outside hospital reported to be consistent with AOCD  -CT ab pending, r/o bleed  -transfuse if Hb<7, maintain active type and screen  -DVT ppx: full AC      #r/o HLH, IgG4  -has fevers, splenomegaly, cytopenia  -HLH labs: soluble IL-2 levels 05676, ferritin 511, triglyceride 412, 6/6 fibrinogen 218 6/6  , pending final pathology from cervical lymph node biopsy, flow cytometry from peripheral blood without acute abnormalities in lymphoid, myeloid, or plasma cell lines.   -IgG4 27  -plan for dexamethasone 20 qd discussed with heme on 6/6 following bronchoscopy    Lines: TOBY placed 5/27 Removed 6/1 ON

## 2022-06-06 NOTE — PROGRESS NOTE ADULT - SUBJECTIVE AND OBJECTIVE BOX
CC: F/U Fever    Saw/spoke to patient. Intubated. Family at bedside. Sedated.    Allergies  No Known Allergies    ANTIMICROBIALS:  meropenem  IVPB    meropenem  IVPB 1000 every 12 hours    PE:    Vital Signs Last 24 Hrs  T(C): 36.9 (2022 09:00), Max: 38.8 (2022 20:30)  T(F): 98.5 (2022 09:00), Max: 101.8 (2022 20:30)  HR: 99 (2022 10:30) (64 - 134)  BP: 140/67 (2022 10:30) (100/58 - 178/85)  BP(mean): 96 (2022 10:30) (74 - 122)  RR: 20 (2022 10:30) (10 - 32)  SpO2: 100% (2022 10:30) (94% - 100%)    Gen: AOx3, sedated  Resp: Intubated  Abd: Soft, nontender  : No suprapubic tenderness  IV/Skin: No thrombophlebitis    LABS:                        8.6    35.20 )-----------( 135      ( 2022 05:33 )             26.8     06-    135  |  105  |  24<H>  ----------------------------<  70  3.4<L>   |  17<L>  |  1.91<H>    Ca    8.4      2022 12:15  Phos  3.4     06-  Mg     1.8     -    TPro  6.1  /  Alb  3.3  /  TBili  1.5<H>  /  DBili  x   /  AST  23  /  ALT  9<L>  /  AlkPhos  263<H>  06-06    Urinalysis Basic - ( 2022 12:05 )    Color: Yellow / Appearance: Slightly Turbid / S.019 / pH: x  Gluc: x / Ketone: Negative  / Bili: Negative / Urobili: Negative   Blood: x / Protein: 100 / Nitrite: Negative   Leuk Esterase: Negative / RBC: 4 /hpf / WBC 5 /HPF   Sq Epi: x / Non Sq Epi: 3 / Bacteria: Many      MICROBIOLOGY:  Vancomycin Level, Random: 19.0 ug/mL (22 @ 16:43)    .Blood Blood  22   NEGATIVE for Plasmodium antigens. Microscopy is performed for  confirmation.  This test does not detect the presence of Babesia species.  If Babesiosis is suspected, please order test for Babesia PCR: Babesia  microti PCR Bld  ************************************************************  No Blood Parasites observed by giemsa stain  One negative set of blood smears does not rule out  the possibility of a parasitic infection.  A minimum of 3  specimens should be collected, at least 12-24 hours apart,  over a 36 hour time period.  --  --    .Blood Blood  22   NEGATIVE for Plasmodium antigens. Microscopy is performed for  confirmation.  This test does not detect the presence of Babesia species.  If Babesiosis is suspected, please order test for Babesia PCR: Babesia  microti PCR Bld  ************************************************************  No Blood Parasites observed by giemsa stain  One negative set of blood smears does not rule out  the possibility of a parasitic infection.  A minimum of 3  specimens should be collected, at least 12-24 hours apart,  over a 36 hour time period.  --  --    .Sputum Sputum  22   Normal Respiratory Wen present  --    Rare Squamous epithelial cells per low power field  Rare polymorphonuclear leukocytes per low power field  No organisms seen    .Sputum Sputum  22 --  --  --    .Blood Blood-Venous  22   No growth to date.  --  --    .Blood Blood-Peripheral  22   No growth to date.  --  --    .Body Fluid Peritoneal Fluid  22   No growth  --    polymorphonuclear leukocytes seen  No organisms seen  by cytocentrifuge    .Sputum Sputum  22   Normal Respiratory Wen present  --    No polymorphonuclear leukocytes per low power field  No Squamous epithelial cells per low power field  No organisms seen per oil power field    .Stool sarthak betito  22   No enteric pathogens isolated.  (Stool culture examined for Salmonella,  Shigella, Campylobacter, Aeromonas, Plesiomonas,  Vibrio, E.coli O157 and Yersinia)  --  --    .Blood Blood  22   NEGATIVE for Plasmodium antigens. Microscopy is performed for  confirmation.  This test does not detect the presence of Babesia species.  If Babesiosis is suspected, please order test for Babesia PCR: Babesia  microti PCR Bld  ************************************************************  No Blood Parasites observed by giemsa stain  One negative set of blood smears does not rule out  the possibility of a parasitic infection.  A minimum of 3  specimens should be collected, at least 12-24 hours apart,  over a 36 hour time period.  --  --    .Stool Feces [facility]  22   GI PCR Results: NOT detected  *******Please Note:*******  GI panel PCR evaluates for:  Campylobacter, Plesiomonas shigelloides, Salmonella,  Vibrio, Yersinia enterocolitica, Enteroaggregative  Escherichia coli (EAEC), Enteropathogenic E.coli (EPEC),  Enterotoxigenic E. coli (ETEC) lt/st, Shiga-like  toxin-producing E. coli (STEC) stx1/stx2,  Shigella/ Enteroinvasive E. coli (EIEC), Cryptosporidium,  Cyclospora cayetanensis, Entamoeba histolytica,  Giardia lamblia, Adenovirus F 40/41, Astrovirus,  Norovirus GI/GII, Rotavirus A, Sapovirus  --  --    .Blood Blood  22   No Growth Final  --  --    Rapid RVP Result: NotDetec ( @ 06:10)    (otherwise reviewed)    RADIOLOGY:     CXR:    Findings/  Impression: ET tube tip above lamar. NG tip in the stomach. Heart   unremarkable. Lungs are clear.

## 2022-06-06 NOTE — PROCEDURE NOTE - NSBRONCHFINDINGS_GEN_A_CORE_FT
Airways notable for areas of thick secretions scattered along right bronchial tree and distal trachea. Mucosa appeared edematous with friability/bloody secretions over proximal RML - no active bleeding seen. BAL in RML w/ return of thick secretions in distal airways. No endobronchial masses or lesions seen.

## 2022-06-06 NOTE — PROGRESS NOTE ADULT - ASSESSMENT
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   No response to broad spectrum antibiotics and only had a small gluteal skin abscess, clean s/p I&D  MTB is possible but less likely, no obvious lung lesions  Quant gold IND  HIV negative  CT mesenteric LAD, possible PE, enlarged spleen, HSM, ascites  Fevers with no chills  Peritoneal fluid has elevated neutrophils concerning for peritonitis--culture NGTD; on edilson for now  Malaria screenings negative  Suspected malignancy  Overall, Fevers, abnormal finding on imaging, leukocytosis  - Meropenem 1g q 12, empiric for peritonitis (fever/wbc has not responded to antibiotic at this point)  - F/U pending BCXs  - F/U LN biopsy  - AFB neg x 3, airborne stopped per infection control  - F/U pending peritoneal cultures/all pending cultures  - Monitor for alternate sources infection or symptoms  - Aware CMV low level elevated, doubt significance--repeat in 1 week    My colleagues will be covering this patient starting on 6/7/22. Please call [phone] or on call fellow with any questions or change in status.     Ramesh Cali MD  Contact on TEAMS messaging from 9am - 5pm  From 5pm-9am, and on weekends call [phone]

## 2022-06-06 NOTE — PROGRESS NOTE ADULT - SUBJECTIVE AND OBJECTIVE BOX
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 Moore), DM, TIA, BPH, and HUNG who pwas transferred to the hospital due to concerns for septic shock and recurrent fevers. 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 Since then he has been following Dr. Moore as his primary nephrologist. The patient has was on treatment with steroids and cyclophosphamide. Most recently the patient is being treated with MMF 750mg BID. Upon arrival to the hospital the patient was noted to have a SCr of 1.16 which has since risen to 1.98mg/dL this morning. The nephrology team was consulted for PEPE. The patient was intubated on 6/5 for worsening work of breathing.     Pt. seen and examined this morning in the ICU. Currently intubated and sedated. Unable to attain ROS . No acute issues noted overnight      PAST HISTORY  --------------------------------------------------------------------------------  No significant changes to PMH, PSH, FHx, SHx, unless otherwise noted    ALLERGIES & MEDICATIONS  --------------------------------------------------------------------------------  Allergies    No Known Allergies    Intolerances      Standing Inpatient Medications  albumin human 25% IVPB 100 milliLiter(s) IV Intermittent every 6 hours  chlorhexidine 0.12% Liquid 15 milliLiter(s) Oral Mucosa every 12 hours  chlorhexidine 4% Liquid 1 Application(s) Topical <User Schedule>  chlorhexidine 4% Liquid 1 Application(s) Topical <User Schedule>  citric acid/sodium citrate Solution 30 milliLiter(s) Oral every 6 hours  dextrose 10%. 1000 milliLiter(s) IV Continuous <Continuous>  dextrose 5%. 1000 milliLiter(s) IV Continuous <Continuous>  dextrose 5%. 1000 milliLiter(s) IV Continuous <Continuous>  dextrose 50% Injectable 25 Gram(s) IV Push once  dextrose 50% Injectable 12.5 Gram(s) IV Push once  dextrose 50% Injectable 25 Gram(s) IV Push once  fentaNYL   Infusion... 3.002 MICROgram(s)/kG/Hr IV Continuous <Continuous>  folic acid Injectable 1 milliGRAM(s) IV Push daily  gabapentin 300 milliGRAM(s) Oral daily  glucagon  Injectable 1 milliGRAM(s) IntraMuscular once  heparin  Infusion. 1900 Unit(s)/Hr IV Continuous <Continuous>  lidocaine   4% Patch 1 Patch Transdermal daily  loperamide 2 milliGRAM(s) Oral daily  meropenem  IVPB 1000 milliGRAM(s) IV Intermittent every 12 hours  meropenem  IVPB      multivitamin/minerals 1 Tablet(s) Oral daily  pantoprazole  Injectable 40 milliGRAM(s) IV Push daily  phenylephrine    Infusion 0.5 MICROgram(s)/kG/Min IV Continuous <Continuous>  propofol Infusion 39.919 MICROgram(s)/kG/Min IV Continuous <Continuous>  tamsulosin 0.4 milliGRAM(s) Oral at bedtime    PRN Inpatient Medications  acetaminophen     Tablet .. 500 milliGRAM(s) Oral every 6 hours PRN  dextrose Oral Gel 15 Gram(s) Oral once PRN  fentaNYL    Injectable 25 MICROGram(s) IV Push every 15 minutes PRN  sodium chloride 0.9% lock flush 10 milliLiter(s) IV Push every 1 hour PRN      REVIEW OF SYSTEMS  Unable to obtain     VITALS/PHYSICAL EXAM  --------------------------------------------------------------------------------  T(C): 37.2 (06-06-22 @ 05:00), Max: 38.8 (06-05-22 @ 20:30)  HR: 102 (06-06-22 @ 06:00) (59 - 134)  BP: 138/77 (06-06-22 @ 06:00) (87/51 - 178/85)  RR: 20 (06-06-22 @ 06:00) (10 - 32)  SpO2: 100% (06-06-22 @ 06:00) (94% - 100%)  Wt(kg): --        06-05-22 @ 07:01  -  06-06-22 @ 07:00  --------------------------------------------------------  IN: 1421.5 mL / OUT: 1375 mL / NET: 46.5 mL        Physical Exam:  	Gen: ill appearing  	HEENT: intubated  	Pulm: coarse breath sounds   	CV: S1S2  	Abd: Soft, +BS   	Ext: ++ LE edema B/L  	Neuro: intubated and sedated  	Skin: Warm and dry      LABS/STUDIES  --------------------------------------------------------------------------------              8.6    x     >-----------<  135      [06-06-22 @ 05:33]              26.8     134  |  103  |  26  ----------------------------<  56      [06-06-22 @ 05:33]  3.8   |  15  |  1.95        Ca     8.3     [06-06-22 @ 05:33]      Mg     1.8     [06-06-22 @ 05:33]      Phos  3.5     [06-06-22 @ 05:33]    TPro  6.2  /  Alb  3.3  /  TBili  1.4  /  DBili  x   /  AST  30  /  ALT  7   /  AlkPhos  288  [06-06-22 @ 05:33]    PT/INR: PT 14.6 , INR 1.26       [06-05-22 @ 22:32]  PTT: 57.3       [06-06-22 @ 05:33]          [06-05-22 @ 08:10]    Creatinine Trend:  SCr 1.95 [06-06 @ 05:33]  SCr 1.98 [06-05 @ 22:31]  SCr 1.88 [06-05 @ 13:50]  SCr 1.80 [06-05 @ 08:10]  SCr 1.59 [06-05 @ 02:26]    Urinalysis - [06-05-22 @ 12:05]      Color Yellow / Appearance Slightly Turbid / SG 1.019 / pH 6.0      Gluc Negative / Ketone Negative  / Bili Negative / Urobili Negative       Blood Small / Protein 100 / Leuk Est Negative / Nitrite Negative      RBC 4 / WBC 5 / Hyaline 2 / Gran  / Sq Epi  / Non Sq Epi 3 / Bacteria Many    Urine Creatinine 102      [06-05-22 @ 12:06]  Urine Protein 146      [06-05-22 @ 12:05]  Urine Sodium 21      [06-05-22 @ 12:06]  Urine Urea Nitrogen 181      [06-05-22 @ 12:05]  Urine Potassium 36      [06-05-22 @ 12:06]  Urine Chloride 65      [06-03-22 @ 16:45]  Urine Phosphorus 41.7      [06-05-22 @ 12:05]  Urine Osmolality 240      [06-05-22 @ 12:06]    Iron 43, TIBC 128, %sat 34      [06-01-22 @ 04:47]  Ferritin 511      [05-30-22 @ 03:48]  TSH 3.52      [06-03-22 @ 14:46]  Lipid: chol 103, , HDL 12, LDL --      [05-30-22 @ 03:48]    HBsAg Nonreact      [05-30-22 @ 03:46]  HCV 0.31, Nonreact      [05-31-22 @ 00:32]  HIV Nonreact      [06-01-22 @ 13:46]  HIV Nonreact      [05-30-22 @ 04:13]    ED: titer 1:320, pattern Homogeneous      [06-01-22 @ 01:16]  dsDNA 34      [05-30-22 @ 10:58]  C3 Complement 125      [05-30-22 @ 10:58]  C4 Complement 30      [05-30-22 @ 10:58]  Rheumatoid Factor <10      [06-04-22 @ 14:21]  ASLO <20      [06-04-22 @ 16:53]  Free Light Chains: kappa 14.57, lambda 23.09, ratio = 0.63      [06-01 @ 04:47]

## 2022-06-06 NOTE — PROGRESS NOTE ADULT - SUBJECTIVE AND OBJECTIVE BOX
Patient is a 66y old  Male who presents with a chief complaint of recurrent fevers, unknown source (06 Jun 2022 07:44)    Patient seen and examined this afternoon.    MEDICATIONS  (STANDING):  chlorhexidine 0.12% Liquid 15 milliLiter(s) Oral Mucosa every 12 hours  chlorhexidine 4% Liquid 1 Application(s) Topical <User Schedule>  chlorhexidine 4% Liquid 1 Application(s) Topical <User Schedule>  citric acid/sodium citrate Solution 30 milliLiter(s) Oral every 6 hours  dextrose 10%. 1000 milliLiter(s) (25 mL/Hr) IV Continuous <Continuous>  dextrose 5%. 1000 milliLiter(s) (100 mL/Hr) IV Continuous <Continuous>  dextrose 5%. 1000 milliLiter(s) (50 mL/Hr) IV Continuous <Continuous>  dextrose 50% Injectable 25 Gram(s) IV Push once  dextrose 50% Injectable 12.5 Gram(s) IV Push once  dextrose 50% Injectable 25 Gram(s) IV Push once  fentaNYL   Infusion... 3.002 MICROgram(s)/kG/Hr (13.6 mL/Hr) IV Continuous <Continuous>  folic acid Injectable 1 milliGRAM(s) IV Push daily  gabapentin 300 milliGRAM(s) Oral daily  glucagon  Injectable 1 milliGRAM(s) IntraMuscular once  heparin  Infusion. 1900 Unit(s)/Hr (19 mL/Hr) IV Continuous <Continuous>  lidocaine   4% Patch 1 Patch Transdermal daily  loperamide 2 milliGRAM(s) Oral daily  meropenem  IVPB      meropenem  IVPB 1000 milliGRAM(s) IV Intermittent every 12 hours  metoprolol tartrate 12.5 milliGRAM(s) Oral two times a day  multivitamin/minerals 1 Tablet(s) Oral daily  pantoprazole  Injectable 40 milliGRAM(s) IV Push daily  phenylephrine    Infusion 0.5 MICROgram(s)/kG/Min (17 mL/Hr) IV Continuous <Continuous>  propofol Infusion 39.919 MICROgram(s)/kG/Min (21.7 mL/Hr) IV Continuous <Continuous>  tamsulosin 0.4 milliGRAM(s) Oral at bedtime    MEDICATIONS  (PRN):  acetaminophen     Tablet .. 500 milliGRAM(s) Oral every 6 hours PRN Temp greater or equal to 38C (100.4F)  dextrose Oral Gel 15 Gram(s) Oral once PRN Blood Glucose LESS THAN 70 milliGRAM(s)/deciliter  fentaNYL    Injectable 25 MICROGram(s) IV Push every 15 minutes PRN for uptitration of fentanyl  sodium chloride 0.9% lock flush 10 milliLiter(s) IV Push every 1 hour PRN Pre/post blood products, medications, blood draw, and to maintain line patency      ROS  unable to assess, patient sedated    Vital Signs Last 24 Hrs  T(C): 36.9 (06 Jun 2022 09:00), Max: 38.8 (05 Jun 2022 20:30)  T(F): 98.5 (06 Jun 2022 09:00), Max: 101.8 (05 Jun 2022 20:30)  HR: 99 (06 Jun 2022 10:30) (65 - 134)  BP: 140/67 (06 Jun 2022 10:30) (108/60 - 178/85)  BP(mean): 96 (06 Jun 2022 10:30) (79 - 122)  RR: 20 (06 Jun 2022 10:30) (10 - 32)  SpO2: 100% (06 Jun 2022 10:30) (94% - 100%)    PE  NAD  Sedated  Intubated   No c/c/e  No rash grossly                            8.4    37.09 )-----------( 127      ( 06 Jun 2022 14:16 )             26.8       06-06    135  |  105  |  24<H>  ----------------------------<  70  3.4<L>   |  17<L>  |  1.91<H>    Ca    8.4      06 Jun 2022 12:15  Phos  3.4     06-06  Mg     1.8     06-06    TPro  6.1  /  Alb  3.3  /  TBili  1.5<H>  /  DBili  x   /  AST  23  /  ALT  9<L>  /  AlkPhos  263<H>  06-06

## 2022-06-06 NOTE — PROGRESS NOTE ADULT - SUBJECTIVE AND OBJECTIVE BOX
Venkat Steel MD  Internal Medicine  Pager #[phone]    CHIEF COMPLAINT:Patient is a 66y old  Male who presents with a chief complaint of recurrent fevers, unknown source (2022 07:04)        Interval Events:    REVIEW OF SYSTEMS:      OBJECTIVE:  ICU Vital Signs Last 24 Hrs  T(C): 37.2 (2022 05:00), Max: 38.8 (2022 20:30)  T(F): 99 (2022 05:00), Max: 101.8 (2022 20:30)  HR: 102 (2022 06:00) (59 - 134)  BP: 138/77 (2022 06:00) (87/51 - 178/85)  BP(mean): 102 (2022 06:00) (62 - 122)  ABP: --  ABP(mean): --  RR: 20 (2022 06:00) (10 - 32)  SpO2: 100% (2022 06:00) (94% - 100%)    Mode: AC/ CMV (Assist Control/ Continuous Mandatory Ventilation), RR (machine): 20, TV (machine): 500, FiO2: 30, PEEP: 5, ITime: 1, MAP: 11, PIP: 29    06-05 @ 07:01  -  06-06 @ 07:00  --------------------------------------------------------  IN: 1421.5 mL / OUT: 1375 mL / NET: 46.5 mL      CAPILLARY BLOOD GLUCOSE      POCT Blood Glucose.: 71 mg/dL (2022 04:54)      PHYSICAL EXAM:      LINES:    HOSPITAL MEDICATIONS:  Standing Meds:  albumin human 25% IVPB 100 milliLiter(s) IV Intermittent every 6 hours  chlorhexidine 0.12% Liquid 15 milliLiter(s) Oral Mucosa every 12 hours  chlorhexidine 4% Liquid 1 Application(s) Topical <User Schedule>  chlorhexidine 4% Liquid 1 Application(s) Topical <User Schedule>  citric acid/sodium citrate Solution 30 milliLiter(s) Oral every 6 hours  dextrose 10%. 1000 milliLiter(s) IV Continuous <Continuous>  dextrose 5%. 1000 milliLiter(s) IV Continuous <Continuous>  dextrose 5%. 1000 milliLiter(s) IV Continuous <Continuous>  dextrose 50% Injectable 25 Gram(s) IV Push once  dextrose 50% Injectable 12.5 Gram(s) IV Push once  dextrose 50% Injectable 25 Gram(s) IV Push once  fentaNYL   Infusion... 3.002 MICROgram(s)/kG/Hr IV Continuous <Continuous>  folic acid Injectable 1 milliGRAM(s) IV Push daily  gabapentin 300 milliGRAM(s) Oral daily  glucagon  Injectable 1 milliGRAM(s) IntraMuscular once  heparin  Infusion. 1900 Unit(s)/Hr IV Continuous <Continuous>  lidocaine   4% Patch 1 Patch Transdermal daily  loperamide 2 milliGRAM(s) Oral daily  meropenem  IVPB 1000 milliGRAM(s) IV Intermittent every 12 hours  meropenem  IVPB      multivitamin/minerals 1 Tablet(s) Oral daily  pantoprazole  Injectable 40 milliGRAM(s) IV Push daily  phenylephrine    Infusion 0.5 MICROgram(s)/kG/Min IV Continuous <Continuous>  propofol Infusion 39.919 MICROgram(s)/kG/Min IV Continuous <Continuous>  tamsulosin 0.4 milliGRAM(s) Oral at bedtime      PRN Meds:  acetaminophen     Tablet .. 500 milliGRAM(s) Oral every 6 hours PRN  dextrose Oral Gel 15 Gram(s) Oral once PRN  fentaNYL    Injectable 25 MICROGram(s) IV Push every 15 minutes PRN  sodium chloride 0.9% lock flush 10 milliLiter(s) IV Push every 1 hour PRN      LABS:                        8.6    35.20 )-----------( 135      ( 2022 05:33 )             26.8     Hgb Trend: 8.6<--, 8.5<--, 7.7<--, 7.4<--, 5.0<--  06-06    134<L>  |  103  |  26<H>  ----------------------------<  56<L>  3.8   |  15<L>  |  1.95<H>    Ca    8.3<L>      2022 05:33  Phos  3.5     06-06  Mg     1.8     06-06    TPro  6.2  /  Alb  3.3  /  TBili  1.4<H>  /  DBili  x   /  AST  30  /  ALT  7<L>  /  AlkPhos  288<H>      Creatinine Trend: 1.95<--, 1.98<--, 1.88<--, 1.80<--, 1.59<--, 1.60<--  PT/INR - ( 2022 22:32 )   PT: 14.6 sec;   INR: 1.26 ratio         PTT - ( 2022 05:33 )  PTT:57.3 sec  Urinalysis Basic - ( 2022 12:05 )    Color: Yellow / Appearance: Slightly Turbid / S.019 / pH: x  Gluc: x / Ketone: Negative  / Bili: Negative / Urobili: Negative   Blood: x / Protein: 100 / Nitrite: Negative   Leuk Esterase: Negative / RBC: 4 /hpf / WBC 5 /HPF   Sq Epi: x / Non Sq Epi: 3 / Bacteria: Many      Arterial Blood Gas:   @ 05:00  7.32/31/96/16/98.1/-9.1  ABG lactate: --  Arterial Blood Gas:   @ 01:06  7.28/35/79/16/96.3/-9.5  ABG lactate: --  Arterial Blood Gas:   @ 13:47  7.28/36/92/17/99.1/-9.1  ABG lactate: --  Arterial Blood Gas:   @ 00:10  7.28/36/184/17/99.0/-9.0  ABG lactate: --  Arterial Blood Gas:   @ 15:54  7.30/36/158/18/97.9/-8.0  ABG lactate: --  Arterial Blood Gas:   @ 14:38  7.17/50/157/18/99.0/-9.7  ABG lactate: --  Arterial Blood Gas:   @ 12:53  7.32/34/83/18/97.7/-7.8  ABG lactate: --        MICROBIOLOGY:     Culture - Sputum (collected 2022 14:12)  Source: .Sputum Sputum  Gram Stain (2022 22:13):    Rare Squamous epithelial cells per low power field    Rare polymorphonuclear leukocytes per low power field    No organisms seen  Preliminary Report (2022 16:27):    Normal Respiratory Wen present    Culture - Acid Fast - Sputum w/Smear (collected 2022 14:11)  Source: .Sputum Sputum    Culture - Blood (collected 2022 04:25)  Source: .Blood Blood-Venous  Preliminary Report (2022 09:01):    No growth to date.    Culture - Blood (collected 2022 23:05)  Source: .Blood Blood-Peripheral  Preliminary Report (2022 04:01):    No growth to date.      RADIOLOGY:  [ ] Reviewed and interpreted by me    EKG:     Venkat Steel MD  Internal Medicine  Pager #[phone]    CHIEF COMPLAINT:Patient is a 66y old  Male who presents with a chief complaint of recurrent fevers, unknown source (2022 07:04)        Interval Events:  Back on heparin gtt  Remains intubated and sedating pending SBT 6/6 AM    REVIEW OF SYSTEMS:  Unable to obtain due to intubated and sedated    OBJECTIVE:  ICU Vital Signs Last 24 Hrs  T(C): 37.2 (2022 05:00), Max: 38.8 (2022 20:30)  T(F): 99 (2022 05:00), Max: 101.8 (2022 20:30)  HR: 102 (2022 06:00) (59 - 134)  BP: 138/77 (2022 06:00) (87/51 - 178/85)  BP(mean): 102 (2022 06:00) (62 - 122)  ABP: --  ABP(mean): --  RR: 20 (2022 06:00) (10 - 32)  SpO2: 100% (2022 06:00) (94% - 100%)    Mode: AC/ CMV (Assist Control/ Continuous Mandatory Ventilation), RR (machine): 20, TV (machine): 500, FiO2: 30, PEEP: 5, ITime: 1, MAP: 11, PIP: 29    06-05 @ 07:01  -  06-06 @ 07:00  --------------------------------------------------------  IN: 1421.5 mL / OUT: 1375 mL / NET: 46.5 mL      CAPILLARY BLOOD GLUCOSE      POCT Blood Glucose.: 71 mg/dL (2022 04:54)      PHYSICAL EXAM:  General: NAD, intubated and sedated  HEENT: PERRL, clear conjunctiva  Neck: supple  Respiratory: ventilated breath sound b/l  Cardiovascular: RRR  Abdomen: distended abdomen, remains soft,  with artic sun apparatus in place.   Extremities: 2+ LE edema  Neurological: sedated        LINES:    HOSPITAL MEDICATIONS:  Standing Meds:  albumin human 25% IVPB 100 milliLiter(s) IV Intermittent every 6 hours  chlorhexidine 0.12% Liquid 15 milliLiter(s) Oral Mucosa every 12 hours  chlorhexidine 4% Liquid 1 Application(s) Topical <User Schedule>  chlorhexidine 4% Liquid 1 Application(s) Topical <User Schedule>  citric acid/sodium citrate Solution 30 milliLiter(s) Oral every 6 hours  dextrose 10%. 1000 milliLiter(s) IV Continuous <Continuous>  dextrose 5%. 1000 milliLiter(s) IV Continuous <Continuous>  dextrose 5%. 1000 milliLiter(s) IV Continuous <Continuous>  dextrose 50% Injectable 25 Gram(s) IV Push once  dextrose 50% Injectable 12.5 Gram(s) IV Push once  dextrose 50% Injectable 25 Gram(s) IV Push once  fentaNYL   Infusion... 3.002 MICROgram(s)/kG/Hr IV Continuous <Continuous>  folic acid Injectable 1 milliGRAM(s) IV Push daily  gabapentin 300 milliGRAM(s) Oral daily  glucagon  Injectable 1 milliGRAM(s) IntraMuscular once  heparin  Infusion. 1900 Unit(s)/Hr IV Continuous <Continuous>  lidocaine   4% Patch 1 Patch Transdermal daily  loperamide 2 milliGRAM(s) Oral daily  meropenem  IVPB 1000 milliGRAM(s) IV Intermittent every 12 hours  meropenem  IVPB      multivitamin/minerals 1 Tablet(s) Oral daily  pantoprazole  Injectable 40 milliGRAM(s) IV Push daily  phenylephrine    Infusion 0.5 MICROgram(s)/kG/Min IV Continuous <Continuous>  propofol Infusion 39.919 MICROgram(s)/kG/Min IV Continuous <Continuous>  tamsulosin 0.4 milliGRAM(s) Oral at bedtime      PRN Meds:  acetaminophen     Tablet .. 500 milliGRAM(s) Oral every 6 hours PRN  dextrose Oral Gel 15 Gram(s) Oral once PRN  fentaNYL    Injectable 25 MICROGram(s) IV Push every 15 minutes PRN  sodium chloride 0.9% lock flush 10 milliLiter(s) IV Push every 1 hour PRN      LABS:                        8.6    35.20 )-----------( 135      ( 2022 05:33 )             26.8     Hgb Trend: 8.6<--, 8.5<--, 7.7<--, 7.4<--, 5.0<--  -    134<L>  |  103  |  26<H>  ----------------------------<  56<L>  3.8   |  15<L>  |  1.95<H>    Ca    8.3<L>      2022 05:33  Phos  3.5       Mg     1.8         TPro  6.2  /  Alb  3.3  /  TBili  1.4<H>  /  DBili  x   /  AST  30  /  ALT  7<L>  /  AlkPhos  288<H>      Creatinine Trend: 1.95<--, 1.98<--, 1.88<--, 1.80<--, 1.59<--, 1.60<--  PT/INR - ( 2022 22:32 )   PT: 14.6 sec;   INR: 1.26 ratio         PTT - ( 2022 05:33 )  PTT:57.3 sec  Urinalysis Basic - ( 2022 12:05 )    Color: Yellow / Appearance: Slightly Turbid / S.019 / pH: x  Gluc: x / Ketone: Negative  / Bili: Negative / Urobili: Negative   Blood: x / Protein: 100 / Nitrite: Negative   Leuk Esterase: Negative / RBC: 4 /hpf / WBC 5 /HPF   Sq Epi: x / Non Sq Epi: 3 / Bacteria: Many      Arterial Blood Gas:   @ 05:00  7.32/31/96/16/98.1/-9.1  ABG lactate: --  Arterial Blood Gas:   @ 01:06  7.28/35/79/16/96.3/-9.5  ABG lactate: --  Arterial Blood Gas:   @ 13:47  7.28/36/92/17/99.1/-9.1  ABG lactate: --  Arterial Blood Gas:   @ 00:10  7.28/36/184/17/99.0/-9.0  ABG lactate: --  Arterial Blood Gas:   @ 15:54  7.30/36/158/18/97.9/-8.0  ABG lactate: --  Arterial Blood Gas:   @ 14:38  7.17/50/157/18/99.0/-9.7  ABG lactate: --  Arterial Blood Gas:   @ 12:53  7.32/34/83/18/97.7/-7.8  ABG lactate: --        MICROBIOLOGY:     Culture - Sputum (collected 2022 14:12)  Source: .Sputum Sputum  Gram Stain (2022 22:13):    Rare Squamous epithelial cells per low power field    Rare polymorphonuclear leukocytes per low power field    No organisms seen  Preliminary Report (2022 16:27):    Normal Respiratory Wen present    Culture - Acid Fast - Sputum w/Smear (collected 2022 14:11)  Source: .Sputum Sputum    Culture - Blood (collected 2022 04:25)  Source: .Blood Blood-Venous  Preliminary Report (2022 09:01):    No growth to date.    Culture - Blood (collected 2022 23:05)  Source: .Blood Blood-Peripheral  Preliminary Report (2022 04:01):    No growth to date.      RADIOLOGY:  [ ] Reviewed and interpreted by me    EKG:

## 2022-06-06 NOTE — PROGRESS NOTE ADULT - PROBLEM SELECTOR PLAN 1
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 The patient is being treated with MMF 750mg as outpatient. Currently on hold given pt being treated for infection. [facility] pt. noted to have a baseline SCr of 1.1mg/dL. Currently SCr elevated to 1.98mg/dL. Urine studies suggestive of pre-renal etiology. Pt. currently off IV vasopressors (did require for a short period). Pt. did receive 85 cc IV contrast on 5/30. Optimize hemodynamics. Renal sonogram without evidence of hydro. Please send for serological markers including C3, C4, dsDNA, ESR, CRP. Monitor labs and urine output. Pt. currently non-oliguric with UOP 775cc over 24 hours. Assess daily for need of RRT, no acute indication as of yet. Avoid NSAIDs, ACEI/ARBS, RCA and nephrotoxins. CMV elevated but not concerning high. EBV titers elevated. Dose medications as per eGFR.    If any questions, please feel free to contact me     Thony Oh  Nephrology Fellow  [facility] Pager: [phone]

## 2022-06-06 NOTE — PROGRESS NOTE ADULT - ATTENDING COMMENTS
Intubated, sedated  1.  ARF on CKD--volume, hemodynamic optimization.  NON oliguric, no HD required  2.  Lupus nephritis--holding cellcept with infection major concerm.  On dexamethasone which should have significant anti-inflammatory effect.  NO additional rx required  3.  Acidosis--titrate bicitra goal pH <7.4    discussed with MICU team, attending

## 2022-06-06 NOTE — PROCEDURE NOTE - NSICDXIRPOSTOP_GEN_A_CORE_FT
SURGEON: Britt Ruiz M.D.  Assitant surgeon: Ministerio Grayson  PREOPERATIVE DIAGNOSIS:   1. endophthalmitis and hypopyon left eye   POSTOPERATIVE DIAGNOSIS: same   NAME OF THE PROCEDURE:   1. 25 gauge pars plana vitectomy, vitreous biopsy and cryotherapy left eye   2. 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 The patient is here for surgical repair.  DESCRIPTION OF THE PROCEDURE   The patient was brought into the OR where monitored anesthetic care was given by the anesthesia department. A peribulbar block consistent of a 1:1 mixtures of 2% Lidocaine and 0.75 % of marcaine with epinephrine and wydase was administered.   The eye was then prepped and draped in the usual fashion for ophthalmic surgery, including the installation of one drop of povidone iodine.  The microscope was brought to the operative field. It was noted the patient to have injected conjunctiva, penetrating keratoplasty (PK) with mild inferior cornea edema, hypopyon, AC filled with  fibrinous whitish material over the intraocular lens. No view of the retina. A superotemporal paracentesis was performed.  AC sample was removed from whitish material in AC and sent to microbiology lab. Viscoelastic was used to dissect the fibrinous membranes over the intraocular lens. Next the vitrector was placed in the anterior chamber and the fibrinous membrane was removed.  Next, Attention was then turned to the vitrectomy. Marks were made on the sclera superotemporally, and superonasally 3.5 mm posterior to the limbus. The 25g transscleral cannulas were inserted through the sclera using the trocars. The vitrector handpiece  and endoilluiminator were placed in the eye. Upon entering the eye it was noted that the patient had a vitreous debri, hemorrhage and a whitish lesions,  possible fungus ball superiorly and localized retinitis with overlying dense vitritis inferior to macula. The optic nerve appeared well but vessels were attenuated.   A Pars plana vitrectomy (PPV) was performed and using the vitreous cutter attached to a syringe,  vitreous samples x3 were were obtained and sent to the pathology and microbiology lab. Peripheral vitrectomy was assisted with scleral depression. There were areas of suspected traction on retina at superior periphery where cryo spots were applied.   Next, the cannulas were removed and the sclerotomies and conjunctiva  were closed with 6-0 plain suture.  Intravitreal injection of amphotricin 0.005 mg/0.1 ml and Vancomycin 1 mg/0.1 ml were injected intravitreally.  Then, attention was turned to the inferior limbal/conjunctival pigmented lesion, the detail of which can be found in Dr. Reid's note.    Subconjunctival injections of dexamethasone were administered.   The lid speculum was removed. The eye was cleaned with wet and dry gauze. A drop of atropine and maxitrol ointment was placed in the eye. An eye pad and nava shield were taped over the eye.   The patient tolerated well the procedure and was discharge to the post-operative unit in stable conditions with no complications.  The surgery was assisted by Dr. Ministerio Mari, because no qualified resident was available on the day of the surgery. Due to the delicate and complex nature of this surgery, Dr. Ministerio Mari was required. Dr. Ministerio Mari assisted with vitrectomy. I was present for the entire surgery.           POST-OP DIAGNOSIS:  Acute respiratory failure with hypoxia 06-Jun-2022 17:00:44  Gian Oneil

## 2022-06-07 NOTE — PROGRESS NOTE ADULT - ASSESSMENT
67 yo M with a PMH HTN, HLD, SLE on Cellcept (MMF), class V lupus nephritis, CKD stage 2, DM2, diabetic neuropathy, TIA (2019) on plavix, BPH, HUNG, with multiple hospitalizations for c diff, acute panc, septic shock 22 buttock abscess status post drainage transferred for recurrent fevers status post core biopsy and paracentesis suspected lymphoma v less likely peritoneal tb now intubated and sedated due to upper airway stridor and altered mental status.     Neuro    Acute encephalopathy requiring intubation and sedation  -Fluctuating mentation while febrile which then returns to current clinical baseline slow however linear thought process and alert and oriented x3.   -exacerbated by fever although remained altered despite control of fever with arctic sun 6/4 AM. Unclear etiology.   -Requiring intubation and sedation 6/4, 6/6 weaning sedation and SBT monitoring mental status   -6/7 Urgent CTH concern for LUE weakness when sedation weaned down without acute findings and gtt resumed.       #diabetic neuropathy  -home gabapentin    Pulm  #Intubated for airway protection  -altered with stridor 6/4 AM  -with vent desynchrony including breath stacking requiring sedation with propofol, fentanyl, versed push.   -continue to optimize vent settings. SBT as tolerated when appropriate.      #large left posterior consolidation seen on ultrasoud, possible PNA?  -monitored off abx originally now back on edilson (6/3- ) and status post vanc (6/3) with negative MRSA MSSA swab   -bronchoscopy planned 6/6 with BAL to rule out infectious etiologies.     # stridor  -with additional  stridor 6/4 requiring intubation refractory to epinephrine, methylprednisolone, xopenex, ipratropium. Previously with stridor earlier in admission although more severe 6/4 AM requiring intubation unclear etiology with new medication bicitra, recent instrumentation with core biopsy although had tolerated procedure without evidence of neck swelling/edema or stridor previous, with underlying HUNG hx.     #RLL PE  -heparin gtt, may transition to alternative agent for full AC if no plans for additional surgical intervention/procedure      Cardio    #Episodes of hypotension  -hypotension episodic possibly rate related with period going into afib requiring rate control with lopressor. Lopressor discontinued 6/3 ON remains tachycardic.   -off mega     #Hx of HTN  - monitored off home hypertensive meds 2/2 hypotension      #Paroxysm of afib followed by sinus rhythm w frequent ectopy  -Lopressor 2.5 q6h discontinued 6/3 ON receiving crystalloid and colloid 6/3 ON to 6/4.   -6/6 metoprolol tartarate 12.5mg BID       Renal  #CKD stage 2, hx of lupus nephritis  -cont to trend Cr, uptrending  -monitor I/Os  -cont to monitor electrolytes, replete as necessary  -urinalysis with 100mg/dL proteinuria with biopsy proven lupus nephritis   -canales placed 6/3  -Discontinued tamsulosin for now cannot be crushed and canales in place  -6/6 discussed prior arinesp with family and risks of arinesp administration in critically ill explained including increased risk of thrombosis.     #PEPE on CKD  -with worsening renal function FeNa prerenal receiving fluids 6/4. question role for rheumatology if no improvement in renal function.   -urine studies less suggestive of HRS picture discontinued midodrine although will continue albumin may benefit from volume.   -appreciate nephrology recommendations    -6/5 urine studies consistent with pre-renal FeNA 0.8 however IVC 2.1 on POCUS. Will c/w albumin and give lasix 40 IV x1, goal neg 500cc-1L today  -6/6 pending rheumatology work up ED, ANCA, dsDNA esr 44 and crp 77      GI  #chronic inflammatory diarrhea  #possible Crohn's vs colitis vs infectious  -calprotectin elevated at [facility] 532. no current plan by GI for colonoscopy at this time.   -C diff negative  -appreciate GI and ID input.   -less likely crohns disease     #Diet  -previously on TPN while at [facility]  -intubated and sedated receiving tube feeds.     #hx of pancreatitis, elevated lipase  -without current epigastric tenderness to palpation 5/30 lipase 740, no imaging findings of pancreatitis. Now downtrending 149 and 158 respectively on repeat.   -follow triglycerides, mixed picture concern for HLH however need to monitor for hypertriglyceridemia while on prop which could exacerbate a pancreatitis picture.     #hepatosplenomegaly shown on MRI at [facility]  -hepatitis panel nonreactive including Hep A B and C      #hx of BPH  -start on flomax    Endocrine  #DM2  -last HbA1c 4/29 6.5%  -ISS    ID/rheum  #hx of SLE  -monitoring off cellcept  -Has been seen by Dr. Swapnil Wolfe and also Dr. Shivam Malagon  -According to sisters at bedside 6/6, patient took Aranesp in the [facility] for lupus prior to him getting sick. Family are wondering if the medication can be contributory.   -Following complements, ESR, CRP, dsDNA  -rheumatology consulted     #recurrent fevers and leukocytosis of unknown etiology  diff dx: infectious, malignancy, IgG4 disease, HLH  -elevated WBC, elevated IgA at [facility]  -quant gold indeterminate  -5/30 BCx NGTD  -HIV nonreactive, EBV IgG+, CMV 50  -possibility of peritoneal TB appreciate ID reccs AFB sputum and stool thus far are negative with IR biopsy completed pending final results 6/4  -Rheum eval including C3 C4 ESR 44 CRP 77 dsDNA progression of underlying lupus v other rheumatologic process causing fever although less likely.       Heme  #Anemia  -outside hospital reported to be consistent with AOCD  -CT ab pending, r/o bleed  -transfuse if Hb<7, maintain active type and screen  -DVT ppx: full AC      #r/o HLH, IgG4  -has fevers, splenomegaly, cytopenia  -HLH labs: soluble IL-2 levels 45002, ferritin 511, triglyceride 412, 6/6 fibrinogen 218 6/6  , pending final pathology from cervical lymph node biopsy, flow cytometry from peripheral blood without acute abnormalities in lymphoid, myeloid, or plasma cell lines.   -IgG4 27  -dexamethasone 20 qd discussed with heme on 6/6 following bronchoscopy  -pending pathology 6/7    Lines: TOBY placed 5/27 Removed 6/1 ON

## 2022-06-07 NOTE — PROGRESS NOTE ADULT - PROBLEM SELECTOR PLAN 1
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 The patient is being treated with MMF 750mg as outpatient. Currently on hold given pt being treated for infection. [facility] noted to have a baseline SCr of 1.1mg/dL. Currently SCr elevated to 1.9mg/dL. Urine studies suggestive of pre-renal etiology. Pt. currently on IV vasopressors. Pt. did receive 85 cc IV contrast on 5/30. Optimize hemodynamics. Renal sonogram without evidence of hydro. C3 and C4 not low, ESR elevated but trending down, CRP elevated but trending down. Monitor labs and urine output. Pt. currently non-oliguric with UOP 865cc over 24 hours. Assess daily for need of RRT, no acute indication as of yet. Avoid NSAIDs, ACEI/ARBS, RCA and nephrotoxins. CMV elevated but not concerning high. EBV titers elevated. Dose medications as per eGFR.    If any questions, please feel free to contact me     Thony Oh  Nephrology Fellow  [facility] Pager: [phone].

## 2022-06-07 NOTE — PROGRESS NOTE ADULT - ASSESSMENT
HPI:  65 yo M with a PMH HTN, HLD, SLE on Cellcept (MMF), class V lupus nephritis, CKD stage 2, DM2, diabetic neuropathy, TIA (2019) on plavix, BPH, HUNG, hospitalization in [facility] 3/27-4/20 tx for pancreatitis and C diff?, recent hospitalization at [facility] 4/22-4/26 for acute pancreatitis and C diff colitis and another hospitalization 5/5 at [facility] for septic shock (source buttock abscess) treated with vanc, cefepime (5/5-5/10), meropenem (5/11-5/18) and micafungin. Zosyn added 5/26. Course complicated by recurrent fevers despite being on antibiotics, and leukocytosis up to 30k. Blood cx neg, UA unremarkable. MRI abdomen w/ contrast performed showing extensive abd lymphadenopathy (reactive to c diff vs lymphoma?). IR stated no window for bx. Course also complicated by a L brachial vein DVT and superficial DVT in arms. Patient noted to have had a positive PPD but has possibly gotten the BCG vaccine. Quant gold performed at [facility] still pending.     Admitted to [facility] MICU for further management. Vital signs on arrival: temp 38.6, /77, , RR 28, O2 sat 97% on 2L NC.  Labs: WBC 34.53, Hb 9.6, lipase 740, procal 2.02, tBili 1.4, alk phos 498, lactate 2.4. (30 May 2022 04:01)    Hematology/Oncology consulted d/t patient's history of anemia. [facility]    Anemia and Lymphadenopathy  --multifactorial  --transfuse for hgb > 7  --ldh (318), retic (pendng) ,ferritin (511)  --infectious vs malignancy vs autoimmune  --CT C/A/P done 5/31  ·	Markedly enlarged mesenteric lymph nodes/lymphadenopathy out of proportion of retroperitoneal lymphadenopathy. Although lymphoma/leukemia can have this appearance, consider possibility of gastrointestinal tuberculosis as well as other infectious etiologies  --supraclavicular/cervical IR bedside biopsy pending  --flow cytometry, SPEP, immunoglobulins, immunofixation pending  --MPN testing is negative  --BMB done on 5/20 at [facility]: negative for detectable monoclonal cell or B cell population  --patient meets 4/5 criteria for HLH  --triglyceride (597), fibrinogen (235)  --dexamethasone 20mg Q day recommended while awaiting results of further testing for HLH  --will check for hemophagocytosis in biopsy sample    --Ca 19-9 (75)  --trending daily ferritin to help r/o HLH  --will check Interlukin-2 receptor (sCD25) to help r/o HLH    Questionable PE with splenic infarct  - on heparin GGT      Mecca Oliveira NP  Hematology/ Oncology  [facility]  [phone] (office)  [phone] (alt office)  Evenings and weekends please call MD on call or office      Patient seen and evaluated. Agree with plan as above. Question of HLH which is a possibility. Awaiting lymph node biopsy. If able to demonstrate evidence of hemophagocytes on biopsy or other criteria met on pending labs would to support diagnosis would consider treatment. While workup is ongoing would start dexamethasone as above.     Edith Melo D.O  Hematology Oncology   [facility]  [phone] ( Office)   Evenings and weekends please call MD on call or office   HPI:  67 yo M with a PMH HTN, HLD, SLE on Cellcept (MMF), class V lupus nephritis, CKD stage 2, DM2, diabetic neuropathy, TIA (2019) on plavix, BPH, HUNG, hospitalization in [facility] 3/27-4/20 tx for pancreatitis and C diff?, recent hospitalization at [facility] 4/22-4/26 for acute pancreatitis and C diff colitis and another hospitalization 5/5 at [facility] for septic shock (source buttock abscess) treated with vanc, cefepime (5/5-5/10), meropenem (5/11-5/18) and micafungin. Zosyn added 5/26. Course complicated by recurrent fevers despite being on antibiotics, and leukocytosis up to 30k. Blood cx neg, UA unremarkable. MRI abdomen w/ contrast performed showing extensive abd lymphadenopathy (reactive to c diff vs lymphoma?). IR stated no window for bx. Course also complicated by a L brachial vein DVT and superficial DVT in arms. Patient noted to have had a positive PPD but has possibly gotten the BCG vaccine. Quant gold performed at [facility] still pending.     Admitted to [facility] MICU for further management. Vital signs on arrival: temp 38.6, /77, , RR 28, O2 sat 97% on 2L NC.  Labs: WBC 34.53, Hb 9.6, lipase 740, procal 2.02, tBili 1.4, alk phos 498, lactate 2.4. (30 May 2022 04:01)    Hematology/Oncology consulted d/t patient's history of anemia. [facility]    Anemia and Lymphadenopathy  --multifactorial  --transfuse for hgb > 7  --ldh (318), retic (pendng) ,ferritin (511)  --infectious vs malignancy vs autoimmune  --CT C/A/P done 5/31  ·	Markedly enlarged mesenteric lymph nodes/lymphadenopathy out of proportion of retroperitoneal lymphadenopathy. Although lymphoma/leukemia can have this appearance, consider possibility of gastrointestinal tuberculosis as well as other infectious etiologies  --supraclavicular/cervical IR bedside biopsy pending  --flow cytometry, SPEP, immunoglobulins, immunofixation pending  --MPN testing is negative  --BMB done on 5/20 at [facility]: negative for detectable monoclonal cell or B cell population  --patient meets 4/5 criteria for HLH  --triglyceride (597), fibrinogen (235)  --dexamethasone 20mg Q day recommended while awaiting results of further testing for HLH  --will check for hemophagocytosis in biopsy sample    --Ca 19-9 (75)  --trending daily ferritin to help r/o HLH  --will check Interlukin-2 receptor (sCD25) to help r/o HLH    Questionable PE with splenic infarct  - on heparin GGT      Mecca Oliveira NP  Hematology/ Oncology  [facility]  [phone] (office)  [phone] (alt office)  Evenings and weekends please call MD on call or office

## 2022-06-07 NOTE — PROGRESS NOTE ADULT - SUBJECTIVE AND OBJECTIVE BOX
Patient is a 66y old  Male who presents with a chief complaint of recurrent fevers, unknown source (07 Jun 2022 10:50)    Patient seen and examined this morning.    MEDICATIONS  (STANDING):  chlorhexidine 0.12% Liquid 15 milliLiter(s) Oral Mucosa every 12 hours  chlorhexidine 4% Liquid 1 Application(s) Topical <User Schedule>  chlorhexidine 4% Liquid 1 Application(s) Topical <User Schedule>  citric acid/sodium citrate Solution 30 milliLiter(s) Oral every 6 hours  dexAMETHasone  IVPB 20 milliGRAM(s) IV Intermittent daily  dextrose 10%. 1000 milliLiter(s) (25 mL/Hr) IV Continuous <Continuous>  fentaNYL   Infusion... 3.002 MICROgram(s)/kG/Hr (13.6 mL/Hr) IV Continuous <Continuous>  folic acid Injectable 1 milliGRAM(s) IV Push daily  gabapentin 300 milliGRAM(s) Oral daily  glucagon  Injectable 1 milliGRAM(s) IntraMuscular once  heparin  Infusion. 2100 Unit(s)/Hr (21 mL/Hr) IV Continuous <Continuous>  lidocaine   4% Patch 1 Patch Transdermal daily  meropenem  IVPB 1000 milliGRAM(s) IV Intermittent every 12 hours  meropenem  IVPB      metoclopramide Injectable 5 milliGRAM(s) IV Push every 12 hours  metoprolol tartrate 12.5 milliGRAM(s) Oral two times a day  multivitamin/minerals 1 Tablet(s) Oral daily  pantoprazole  Injectable 40 milliGRAM(s) IV Push daily  phenylephrine    Infusion 0.5 MICROgram(s)/kG/Min (17 mL/Hr) IV Continuous <Continuous>  propofol Infusion 39.919 MICROgram(s)/kG/Min (21.7 mL/Hr) IV Continuous <Continuous>  tamsulosin 0.4 milliGRAM(s) Oral at bedtime    MEDICATIONS  (PRN):  acetaminophen     Tablet .. 500 milliGRAM(s) Oral every 6 hours PRN Temp greater or equal to 38C (100.4F)  fentaNYL    Injectable 25 MICROGram(s) IV Push every 15 minutes PRN for uptitration of fentanyl  sodium chloride 0.9% lock flush 10 milliLiter(s) IV Push every 1 hour PRN Pre/post blood products, medications, blood draw, and to maintain line patency      ROS  unable to assess, patient sedated    Vital Signs Last 24 Hrs  T(C): 36.8 (07 Jun 2022 00:00), Max: 37 (06 Jun 2022 20:00)  T(F): 98.3 (07 Jun 2022 00:00), Max: 98.6 (06 Jun 2022 20:00)  HR: 75 (07 Jun 2022 14:45) (68 - 157)  BP: 119/64 (07 Jun 2022 14:45) (67/41 - 171/98)  BP(mean): 86 (07 Jun 2022 14:45) (49 - 129)  RR: 18 (07 Jun 2022 14:45) (18 - 31)  SpO2: 100% (07 Jun 2022 14:45) (96% - 100%)    PE  NAD  Awake, alert  Anicteric, MMM  No c/c/e  No rash grossly                            7.9    43.38 )-----------( 114      ( 07 Jun 2022 07:17 )             24.3       06-07    135  |  102  |  26<H>  ----------------------------<  128<H>  3.9   |  14<L>  |  1.96<H>    Ca    7.9<L>      07 Jun 2022 07:23  Phos  4.9     06-07  Mg     2.1     06-07    TPro  5.6<L>  /  Alb  2.6<L>  /  TBili  1.4<H>  /  DBili  x   /  AST  27  /  ALT  7<L>  /  AlkPhos  232<H>  06-07

## 2022-06-07 NOTE — CONSULT NOTE ADULT - ASSESSMENT
Impression: Patient with significant leukocytosis, cytopenias (AOCD, thrombocytopenia), AMS, hepatosplenomegaly, LAD, elevated IL2R 34K is most concerning for HLH/MAS which can be seen with autoimmune disease but also malignancy (lymphoma) or infection (EBV PCR+, PPD+), all of which remain on the ddx for this patient. Biopsy results are pending from IR supraclavicular lymph node biopsy which will be helpful in determining etiology. BMBx at [facility] is negative for malignancy but per heme/onc will eval for hemophagocytosis. Given recurrent pancreatitis, can consider IgG4-RD but IgG4 is negative and there doubt this is etiology. Also with thrombotic complications (DVTs, PE, splenic infarcts), will check for APLS as a contributing factor given history of SLE. PEPE is present which can be seen with LN but complements, dsDNA negative and bland UA makes this less likely.   -check urine p/c ratio, KRISTY, lupus anticoagulant, anticardiolipin (IgM, IgA, IgG), beta 2 glycoprotein (IgM, IgA, IgG), Phosphatidylserine ab (IgM, IgA, IgG)  -f/u repeat dsDNA, complements  -steroid plan per heme/onc  -f/u biopsy results  -?significance for positive EBV PCR, ID input appreciated    Full note to follow  DW Dr. Murphy 67 yo M with a PMH HTN, HLD, SLE on Cellcept (MMF), class V lupus nephritis, CKD stage 2, DM2, diabetic neuropathy, TIA (2019) on plavix, BPH, HUNG, with multiple hospitalizations for c diff, acute panc, septic shock 22 buttock abscess status post drainage transferred for recurrent fevers status post core biopsy and paracentesis suspected lymphoma v less likely peritoneal tb now intubated and sedated due to upper airway stridor and altered mental status.    #SLE: Hx of SLE dx in 2014 and LN (membranous nephropathy renal biopsy 2017), +ED 1:320, elevated dsDNA with negative APS, sjogren's, and KRISTY in the past. Patient was on MMF 1500mg/day, was previously on PO cyclophosphamide.   Has had complicated hospital course. Patient with significant leukocytosis, cytopenias (AOCD, thrombocytopenia), AMS, hepatosplenomegaly, LAD, elevated IL2R 34K is most concerning for HLH/MAS which can be seen with autoimmune disease but also malignancy (lymphoma) or infection (EBV PCR+, PPD+), all of which remain on the ddx for this patient. Biopsy results are pending from IR supraclavicular lymph node biopsy which will be helpful in determining etiology. BMBx at [facility] is negative for malignancy but per heme/onc will eval for hemophagocytosis. Given recurrent pancreatitis, can consider IgG4-RD but IgG4 is negative and therefore doubt this is etiology. Also with thrombotic complications (DVTs, PE, splenic infarcts), will check for APLS as a contributing factor given history of SLE. PEPE is present which can be seen with LN but complements, dsDNA negative and bland UA makes this less likely (although p/c ratio to be evaluated).   -check urine p/c ratio, KRISTY, lupus anticoagulant, anticardiolipin (IgM, IgA, IgG), beta 2 glycoprotein (IgM, IgA, IgG), Phosphatidylserine ab (IgM, IgA, IgG)  -f/u repeat dsDNA, complements  -steroid plan per heme/onc  -f/u biopsy results  -?significance for positive EBV PCR, ID input appreciated    DW Dr. Simon-Micah Mahmood DO  Rheumatology Fellow PGY4  Pager [phone]

## 2022-06-07 NOTE — PROGRESS NOTE ADULT - SUBJECTIVE AND OBJECTIVE BOX
Venkat Steel MD  Internal Medicine  Pager #[phone]    CHIEF COMPLAINT:Patient is a 66y old  Male who presents with a chief complaint of recurrent fevers, unknown source (2022 06:42)        Interval Events:    REVIEW OF SYSTEMS:      OBJECTIVE:  ICU Vital Signs Last 24 Hrs  T(C): 36.8 (2022 00:00), Max: 37 (2022 11:00)  T(F): 98.3 (2022 00:00), Max: 98.6 (2022 11:00)  HR: 98 (2022 08:00) (75 - 157)  BP: 141/80 (2022 08:00) (67/41 - 171/98)  BP(mean): 103 (2022 08:00) (49 - 129)  ABP: --  ABP(mean): --  RR: 25 (2022 08:00) (18 - 31)  SpO2: 100% (2022 08:00) (96% - 100%)    Mode: AC/ CMV (Assist Control/ Continuous Mandatory Ventilation), RR (machine): 20, TV (machine): 500, FiO2: 30, PEEP: 5, ITime: 1, MAP: 11, PIP: 27    06- @ 07:01  -  - @ 07:00  --------------------------------------------------------  IN: 3076.6 mL / OUT: 1400 mL / NET: 1676.6 mL      CAPILLARY BLOOD GLUCOSE      POCT Blood Glucose.: 155 mg/dL (2022 06:54)      PHYSICAL EXAM:      LINES:    HOSPITAL MEDICATIONS:  Standing Meds:  chlorhexidine 0.12% Liquid 15 milliLiter(s) Oral Mucosa every 12 hours  chlorhexidine 4% Liquid 1 Application(s) Topical <User Schedule>  chlorhexidine 4% Liquid 1 Application(s) Topical <User Schedule>  citric acid/sodium citrate Solution 30 milliLiter(s) Oral every 6 hours  dexAMETHasone  IVPB 20 milliGRAM(s) IV Intermittent daily  dextrose 10%. 1000 milliLiter(s) IV Continuous <Continuous>  fentaNYL   Infusion... 3.002 MICROgram(s)/kG/Hr IV Continuous <Continuous>  folic acid Injectable 1 milliGRAM(s) IV Push daily  gabapentin 300 milliGRAM(s) Oral daily  glucagon  Injectable 1 milliGRAM(s) IntraMuscular once  heparin  Infusion. 2100 Unit(s)/Hr IV Continuous <Continuous>  lidocaine   4% Patch 1 Patch Transdermal daily  loperamide 2 milliGRAM(s) Oral daily  meropenem  IVPB 1000 milliGRAM(s) IV Intermittent every 12 hours  meropenem  IVPB      metoclopramide Injectable 5 milliGRAM(s) IV Push every 12 hours  metoprolol tartrate 12.5 milliGRAM(s) Oral two times a day  multivitamin/minerals 1 Tablet(s) Oral daily  pantoprazole  Injectable 40 milliGRAM(s) IV Push daily  phenylephrine    Infusion 0.5 MICROgram(s)/kG/Min IV Continuous <Continuous>  propofol Infusion 39.919 MICROgram(s)/kG/Min IV Continuous <Continuous>  tamsulosin 0.4 milliGRAM(s) Oral at bedtime      PRN Meds:  acetaminophen     Tablet .. 500 milliGRAM(s) Oral every 6 hours PRN  fentaNYL    Injectable 25 MICROGram(s) IV Push every 15 minutes PRN  sodium chloride 0.9% lock flush 10 milliLiter(s) IV Push every 1 hour PRN      LABS:                        8.8    46.37 )-----------( 120      ( 2022 00:49 )             26.8     Hgb Trend: 8.8<--, 8.6<--, 8.4<--, 8.6<--, 8.5<--      135  |  102  |  26<H>  ----------------------------<  128<H>  3.9   |  14<L>  |  1.96<H>    Ca    7.9<L>      2022 07:23  Phos  4.9       Mg     2.1         TPro  5.6<L>  /  Alb  2.6<L>  /  TBili  1.4<H>  /  DBili  x   /  AST  27  /  ALT  7<L>  /  AlkPhos  232<H>      Creatinine Trend: 1.96<--, 1.90<--, 1.90<--, 1.91<--, 1.95<--, 1.98<--  PT/INR - ( 2022 00:49 )   PT: 15.7 sec;   INR: 1.36 ratio         PTT - ( 2022 00:49 )  PTT:39.7 sec  Urinalysis Basic - ( 2022 12:05 )    Color: Yellow / Appearance: Slightly Turbid / S.019 / pH: x  Gluc: x / Ketone: Negative  / Bili: Negative / Urobili: Negative   Blood: x / Protein: 100 / Nitrite: Negative   Leuk Esterase: Negative / RBC: 4 /hpf / WBC 5 /HPF   Sq Epi: x / Non Sq Epi: 3 / Bacteria: Many      Arterial Blood Gas:   @ 12:07  7.31/32/97/16/96.8/-9.2  ABG lactate: --  Arterial Blood Gas:   @ 05:00  7.32/31/96/16/98.1/-9.1  ABG lactate: --  Arterial Blood Gas:   @ 01:06  7.28/35/79/16/96.3/-9.5  ABG lactate: --  Arterial Blood Gas:   @ 13:47  7.28/36/92/17/99.1/-9.1  ABG lactate: --    Venous Blood Gas:   @ 00:40  7.28/36/44/17/73.6  VBG Lactate: 1.3      MICROBIOLOGY:     Culture - Bronchial (collected 2022 18:04)  Source: .Bronchial Bronchial Lavage  Gram Stain (2022 04:42):    No polymorphonuclear cells seen per low power field    No squamous epithelial cells per low power field    No organisms seen per oil power field    Culture - Sputum (collected 2022 14:12)  Source: .Sputum Sputum  Gram Stain (2022 22:13):    Rare Squamous epithelial cells per low power field    Rare polymorphonuclear leukocytes per low power field    No organisms seen  Final Report (2022 18:44):    Normal Respiratory Wen present    Culture - Acid Fast - Sputum w/Smear (collected 2022 14:11)  Source: .Sputum Sputum      RADIOLOGY:  [ ] Reviewed and interpreted by me    EKG:     Venkat Steel MD  Internal Medicine  Pager #[phone]    CHIEF COMPLAINT:Patient is a 66y old  Male who presents with a chief complaint of recurrent fevers, unknown source (2022 06:42)        Interval Events:    Initiated on reglan overnight for reports of high residuals. Tube feeds downtitrated for similar concerns. With low-normal glucose despite D10 and tube feeds. Addison briefly on turned off ON.     Concern as sedation weaned for acute LUE weakness for which patient sent for CTH which was negative for acute hemorrhage and heparin gtt resumed.     REVIEW OF SYSTEMS:    Unable to obtain due to intubated and sedated       OBJECTIVE:  ICU Vital Signs Last 24 Hrs  T(C): 36.8 (2022 00:00), Max: 37 (2022 11:00)  T(F): 98.3 (2022 00:00), Max: 98.6 (2022 11:00)  HR: 98 (2022 08:00) (75 - 157)  BP: 141/80 (2022 08:00) (67/41 - 171/98)  BP(mean): 103 (2022 08:00) (49 - 129)  ABP: --  ABP(mean): --  RR: 25 (2022 08:00) (18 - 31)  SpO2: 100% (2022 08:00) (96% - 100%)    Mode: AC/ CMV (Assist Control/ Continuous Mandatory Ventilation), RR (machine): 20, TV (machine): 500, FiO2: 30, PEEP: 5, ITime: 1, MAP: 11, PIP: 27     @ 07:01  -   @ 07:00  --------------------------------------------------------  IN: 3076.6 mL / OUT: 1400 mL / NET: 1676.6 mL      CAPILLARY BLOOD GLUCOSE      POCT Blood Glucose.: 155 mg/dL (2022 06:54)      PHYSICAL EXAM:    General: NAD, intubated and sedated  Neuro: following simple one step commands, withdrawing to pain on all four extremities, pupils equal and reactive to light while sedation downtitrated in AM  HEENT: PERRL, clear conjunctiva  Neck: supple  Respiratory: ventilated breath sound b/l  Cardiovascular: RRR  Abdomen: distended abdomen, remains soft,  with artic sun apparatus in place.       LINES:    HOSPITAL MEDICATIONS:  Standing Meds:  chlorhexidine 0.12% Liquid 15 milliLiter(s) Oral Mucosa every 12 hours  chlorhexidine 4% Liquid 1 Application(s) Topical <User Schedule>  chlorhexidine 4% Liquid 1 Application(s) Topical <User Schedule>  citric acid/sodium citrate Solution 30 milliLiter(s) Oral every 6 hours  dexAMETHasone  IVPB 20 milliGRAM(s) IV Intermittent daily  dextrose 10%. 1000 milliLiter(s) IV Continuous <Continuous>  fentaNYL   Infusion... 3.002 MICROgram(s)/kG/Hr IV Continuous <Continuous>  folic acid Injectable 1 milliGRAM(s) IV Push daily  gabapentin 300 milliGRAM(s) Oral daily  glucagon  Injectable 1 milliGRAM(s) IntraMuscular once  heparin  Infusion. 2100 Unit(s)/Hr IV Continuous <Continuous>  lidocaine   4% Patch 1 Patch Transdermal daily  loperamide 2 milliGRAM(s) Oral daily  meropenem  IVPB 1000 milliGRAM(s) IV Intermittent every 12 hours  meropenem  IVPB      metoclopramide Injectable 5 milliGRAM(s) IV Push every 12 hours  metoprolol tartrate 12.5 milliGRAM(s) Oral two times a day  multivitamin/minerals 1 Tablet(s) Oral daily  pantoprazole  Injectable 40 milliGRAM(s) IV Push daily  phenylephrine    Infusion 0.5 MICROgram(s)/kG/Min IV Continuous <Continuous>  propofol Infusion 39.919 MICROgram(s)/kG/Min IV Continuous <Continuous>  tamsulosin 0.4 milliGRAM(s) Oral at bedtime      PRN Meds:  acetaminophen     Tablet .. 500 milliGRAM(s) Oral every 6 hours PRN  fentaNYL    Injectable 25 MICROGram(s) IV Push every 15 minutes PRN  sodium chloride 0.9% lock flush 10 milliLiter(s) IV Push every 1 hour PRN      LABS:                        8.8    46.37 )-----------( 120      ( 2022 00:49 )             26.8     Hgb Trend: 8.8<--, 8.6<--, 8.4<--, 8.6<--, 8.5<--      135  |  102  |  26<H>  ----------------------------<  128<H>  3.9   |  14<L>  |  1.96<H>    Ca    7.9<L>      2022 07:23  Phos  4.9       Mg     2.1         TPro  5.6<L>  /  Alb  2.6<L>  /  TBili  1.4<H>  /  DBili  x   /  AST  27  /  ALT  7<L>  /  AlkPhos  232<H>      Creatinine Trend: 1.96<--, 1.90<--, 1.90<--, 1.91<--, 1.95<--, 1.98<--  PT/INR - ( 2022 00:49 )   PT: 15.7 sec;   INR: 1.36 ratio         PTT - ( 2022 00:49 )  PTT:39.7 sec  Urinalysis Basic - ( 2022 12:05 )    Color: Yellow / Appearance: Slightly Turbid / S.019 / pH: x  Gluc: x / Ketone: Negative  / Bili: Negative / Urobili: Negative   Blood: x / Protein: 100 / Nitrite: Negative   Leuk Esterase: Negative / RBC: 4 /hpf / WBC 5 /HPF   Sq Epi: x / Non Sq Epi: 3 / Bacteria: Many      Arterial Blood Gas:   @ 12:07  7.31/32/97/16/96.8/-9.2  ABG lactate: --  Arterial Blood Gas:   @ 05:00  7.32/31/96/16/98.1/-9.1  ABG lactate: --  Arterial Blood Gas:   @ 01:06  7.28/35/79/16/96.3/-9.5  ABG lactate: --  Arterial Blood Gas:   @ 13:47  7.28/36/92/17/99.1/-9.1  ABG lactate: --    Venous Blood Gas:   @ 00:40  7.28/36/44/17/73.6  VBG Lactate: 1.3      MICROBIOLOGY:     Culture - Bronchial (collected 2022 18:04)  Source: .Bronchial Bronchial Lavage  Gram Stain (2022 04:42):    No polymorphonuclear cells seen per low power field    No squamous epithelial cells per low power field    No organisms seen per oil power field    Culture - Sputum (collected 2022 14:12)  Source: .Sputum Sputum  Gram Stain (2022 22:13):    Rare Squamous epithelial cells per low power field    Rare polymorphonuclear leukocytes per low power field    No organisms seen  Final Report (2022 18:44):    Normal Respiratory Wen present    Culture - Acid Fast - Sputum w/Smear (collected 2022 14:11)  Source: .Sputum Sputum      RADIOLOGY:  [ ] Reviewed and interpreted by me    EKG:

## 2022-06-07 NOTE — PROGRESS NOTE ADULT - ATTENDING COMMENTS
66 year old man with HTN, HLD, DM, neuropathy on gabapentin, SLE on cellcept, lupus nephritis, CKD 2, recent trip to the [facility] where he was hospitalized and was most recently he was at another hospital with high feveres and alteration in mental status from which he was transferred to [facility] intubated for airway protection and remains with high fevers    - sedated for comfort and vent coordination, decrease sedation to assess mental status  - SBT as tolerated  - increased residuals started on Reglan for Gi motility   - creatinine continues to rise, adequate urine output  - remains febrile with elevated WBC, infectious work up so far negative remains on broad spectrum ABx cultures NGTD  - malaria smear negative  - a-fib rate controlled  - s/p cervical lymph node biopsy pending results   - peripheral blood flow cytometry negative  - On steroids for possible HLH   - hypoglycemia improved will continue to wean D10     overall prognosis guarded

## 2022-06-07 NOTE — PROGRESS NOTE ADULT - SUBJECTIVE AND OBJECTIVE BOX
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 Moore), DM, TIA, BPH, and HUNG who pwas transferred to the hospital due to concerns for septic shock and recurrent fevers. 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 Since then he has been following Dr. Moore as his primary nephrologist. The patient has was on treatment with steroids and cyclophosphamide. Most recently the patient is being treated with MMF 750mg BID. Upon arrival to the hospital the patient was noted to have a SCr of 1.16 which has since risen to 1.98mg/dL this morning. The nephrology team was consulted for PEPE. The patient was intubated on 6/5 for worsening work of breathing.     Pt. seen and examined this morning in the ICU. Currently intubated and sedated. Unable to attain ROS . No acute issues noted overnight. Pt. currently on IV vasopressors.         PAST HISTORY  --------------------------------------------------------------------------------  No significant changes to PMH, PSH, FHx, SHx, unless otherwise noted    ALLERGIES & MEDICATIONS  --------------------------------------------------------------------------------  Allergies    No Known Allergies    Intolerances      Standing Inpatient Medications  chlorhexidine 0.12% Liquid 15 milliLiter(s) Oral Mucosa every 12 hours  chlorhexidine 4% Liquid 1 Application(s) Topical <User Schedule>  chlorhexidine 4% Liquid 1 Application(s) Topical <User Schedule>  citric acid/sodium citrate Solution 30 milliLiter(s) Oral every 6 hours  dexAMETHasone  IVPB 20 milliGRAM(s) IV Intermittent daily  dextrose 10%. 1000 milliLiter(s) IV Continuous <Continuous>  fentaNYL   Infusion... 3.002 MICROgram(s)/kG/Hr IV Continuous <Continuous>  folic acid Injectable 1 milliGRAM(s) IV Push daily  gabapentin 300 milliGRAM(s) Oral daily  glucagon  Injectable 1 milliGRAM(s) IntraMuscular once  heparin  Infusion. 2100 Unit(s)/Hr IV Continuous <Continuous>  lidocaine   4% Patch 1 Patch Transdermal daily  loperamide 2 milliGRAM(s) Oral daily  meropenem  IVPB 1000 milliGRAM(s) IV Intermittent every 12 hours  meropenem  IVPB      metoclopramide Injectable 5 milliGRAM(s) IV Push every 12 hours  metoprolol tartrate 12.5 milliGRAM(s) Oral two times a day  multivitamin/minerals 1 Tablet(s) Oral daily  pantoprazole  Injectable 40 milliGRAM(s) IV Push daily  phenylephrine    Infusion 0.5 MICROgram(s)/kG/Min IV Continuous <Continuous>  propofol Infusion 39.919 MICROgram(s)/kG/Min IV Continuous <Continuous>  tamsulosin 0.4 milliGRAM(s) Oral at bedtime    PRN Inpatient Medications  acetaminophen     Tablet .. 500 milliGRAM(s) Oral every 6 hours PRN  fentaNYL    Injectable 25 MICROGram(s) IV Push every 15 minutes PRN  sodium chloride 0.9% lock flush 10 milliLiter(s) IV Push every 1 hour PRN      REVIEW OF SYSTEMS  Unable to obtain     VITALS/PHYSICAL EXAM  --------------------------------------------------------------------------------  T(C): 36.8 (06-07-22 @ 00:00), Max: 37 (06-06-22 @ 08:00)  HR: 82 (06-07-22 @ 06:15) (75 - 157)  BP: 114/64 (06-07-22 @ 06:15) (67/41 - 171/98)  RR: 20 (06-07-22 @ 06:15) (18 - 27)  SpO2: 99% (06-07-22 @ 06:15) (97% - 100%)  Wt(kg): --        06-05-22 @ 07:01  -  06-06-22 @ 07:00  --------------------------------------------------------  IN: 1496.5 mL / OUT: 1410 mL / NET: 86.5 mL    06-06-22 @ 07:01  -  06-07-22 @ 06:42  --------------------------------------------------------  IN: 3002.7 mL / OUT: 1370 mL / NET: 1632.7 mL        Physical Exam:  	Gen: ill appearing  	HEENT: intubated  	Pulm: coarse breath sounds   	CV: S1S2  	Abd: Soft, +BS   	Ext: ++ LE edema B/L  	Neuro: intubated and sedated  	Skin: Warm and dry      LABS/STUDIES  --------------------------------------------------------------------------------              8.8    46.37 >-----------<  120      [06-07-22 @ 00:49]              26.8     133  |  104  |  24  ----------------------------<  95      [06-07-22 @ 00:49]  4.0   |  16  |  1.90        Ca     8.0     [06-07-22 @ 00:49]      Mg     2.2     [06-07-22 @ 00:49]      Phos  4.0     [06-07-22 @ 00:49]    TPro  5.8  /  Alb  2.8  /  TBili  2.0  /  DBili  x   /  AST  26  /  ALT  7   /  AlkPhos  261  [06-07-22 @ 00:49]    PT/INR: PT 15.7 , INR 1.36       [06-07-22 @ 00:49]  PTT: 39.7       [06-07-22 @ 00:49]          [06-06-22 @ 12:15]    Creatinine Trend:  SCr 1.90 [06-07 @ 00:49]  SCr 1.90 [06-06 @ 19:00]  SCr 1.91 [06-06 @ 12:15]  SCr 1.95 [06-06 @ 05:33]  SCr 1.98 [06-05 @ 22:31]    Urinalysis - [06-05-22 @ 12:05]      Color Yellow / Appearance Slightly Turbid / SG 1.019 / pH 6.0      Gluc Negative / Ketone Negative  / Bili Negative / Urobili Negative       Blood Small / Protein 100 / Leuk Est Negative / Nitrite Negative      RBC 4 / WBC 5 / Hyaline 2 / Gran  / Sq Epi  / Non Sq Epi 3 / Bacteria Many    Urine Creatinine 102      [06-05-22 @ 12:06]  Urine Protein 146      [06-05-22 @ 12:05]  Urine Sodium 21      [06-05-22 @ 12:06]  Urine Urea Nitrogen 181      [06-05-22 @ 12:05]  Urine Potassium 36      [06-05-22 @ 12:06]  Urine Chloride 65      [06-03-22 @ 16:45]  Urine Phosphorus 41.7      [06-05-22 @ 12:05]  Urine Osmolality 240      [06-05-22 @ 12:06]    Iron 43, TIBC 128, %sat 34      [06-01-22 @ 04:47]  Ferritin 841      [06-06-22 @ 12:15]  TSH 3.52      [06-03-22 @ 14:46]  Lipid: chol --, , HDL --, LDL --      [06-06-22 @ 19:01]    HBsAg Nonreact      [05-30-22 @ 03:46]  HCV 0.31, Nonreact      [05-31-22 @ 00:32]  HIV Nonreact      [06-01-22 @ 13:46]  HIV Nonreact      [05-30-22 @ 04:13]    ED: titer 1:320, pattern Homogeneous      [06-01-22 @ 01:16]  dsDNA 21      [06-05-22 @ 00:23]  C3 Complement 125      [05-30-22 @ 10:58]  C4 Complement 30      [05-30-22 @ 10:58]  Rheumatoid Factor <10      [06-04-22 @ 14:21]  ASLO <20      [06-04-22 @ 16:53]  Free Light Chains: kappa 14.57, lambda 23.09, ratio = 0.63      [06-01 @ 04:47]

## 2022-06-07 NOTE — PROGRESS NOTE ADULT - ATTENDING COMMENTS
Intubated, sedated  1.  ARF on CKD--non oliguric, no renal replacement rx required at this time  2.  Lupus nephritis--hold MMF, on steroids  3.  Respiratory failure, acute--no UF required but benefit from diuretic rx given s/o volume overload.  Bicitra components citric acid and Nacitrate not reported to cause respiratory failure.  Useful for rx acidosis  discussed with MICU team

## 2022-06-07 NOTE — CONSULT NOTE ADULT - SUBJECTIVE AND OBJECTIVE BOX
INCOMPLETE NOTE    HISTORY OF PRESENT ILLNESS:    PAST MEDICAL & SURGICAL HISTORY:  Obstructive Sleep Apnea      Hepatitis B Carrier      Pneumonia      Other systemic lupus erythematosus with endocarditis      Type 2 diabetes mellitus with other neurologic complication, without long-term current use of insulin      Sleep apnea, unspecified type      Hypertension, unspecified type      Nephritic syndrome      No significant past surgical history          REVIEW OF SYSTEMS      General:	    Skin/Breast:  	  Ophthalmologic:  	  ENMT:	    Respiratory and Thorax:  	  Cardiovascular:	    Gastrointestinal:	    Genitourinary:	    Musculoskeletal:	    Neurological:	    Psychiatric:	    Hematology/Lymphatics:	    Endocrine:	    Allergic/Immunologic:	    MEDICATIONS  (STANDING):  chlorhexidine 0.12% Liquid 15 milliLiter(s) Oral Mucosa every 12 hours  chlorhexidine 4% Liquid 1 Application(s) Topical <User Schedule>  chlorhexidine 4% Liquid 1 Application(s) Topical <User Schedule>  citric acid/sodium citrate Solution 30 milliLiter(s) Oral every 6 hours  dexAMETHasone  IVPB 20 milliGRAM(s) IV Intermittent daily  dextrose 10%. 1000 milliLiter(s) (25 mL/Hr) IV Continuous <Continuous>  fentaNYL   Infusion... 3.002 MICROgram(s)/kG/Hr (13.6 mL/Hr) IV Continuous <Continuous>  folic acid Injectable 1 milliGRAM(s) IV Push daily  gabapentin 300 milliGRAM(s) Oral daily  glucagon  Injectable 1 milliGRAM(s) IntraMuscular once  heparin  Infusion. 2100 Unit(s)/Hr (21 mL/Hr) IV Continuous <Continuous>  lidocaine   4% Patch 1 Patch Transdermal daily  meropenem  IVPB 1000 milliGRAM(s) IV Intermittent every 12 hours  meropenem  IVPB      metoclopramide Injectable 5 milliGRAM(s) IV Push every 12 hours  metoprolol tartrate 12.5 milliGRAM(s) Oral two times a day  multivitamin/minerals 1 Tablet(s) Oral daily  pantoprazole  Injectable 40 milliGRAM(s) IV Push daily  phenylephrine    Infusion 0.5 MICROgram(s)/kG/Min (17 mL/Hr) IV Continuous <Continuous>  propofol Infusion 39.919 MICROgram(s)/kG/Min (21.7 mL/Hr) IV Continuous <Continuous>  tamsulosin 0.4 milliGRAM(s) Oral at bedtime    MEDICATIONS  (PRN):  acetaminophen     Tablet .. 500 milliGRAM(s) Oral every 6 hours PRN Temp greater or equal to 38C (100.4F)  fentaNYL    Injectable 25 MICROGram(s) IV Push every 15 minutes PRN for uptitration of fentanyl  sodium chloride 0.9% lock flush 10 milliLiter(s) IV Push every 1 hour PRN Pre/post blood products, medications, blood draw, and to maintain line patency      Allergies    No Known Allergies    Intolerances        PERTINENT MEDICATION HISTORY:    SOCIAL HISTORY:    FAMILY HISTORY:  FH: type 2 diabetes        Vital Signs Last 24 Hrs  T(C): 36.8 (07 Jun 2022 00:00), Max: 37 (06 Jun 2022 20:00)  T(F): 98.3 (07 Jun 2022 00:00), Max: 98.6 (06 Jun 2022 20:00)  HR: 94 (07 Jun 2022 17:45) (68 - 157)  BP: 144/76 (07 Jun 2022 17:45) (67/41 - 171/98)  BP(mean): 103 (07 Jun 2022 17:45) (49 - 129)  RR: 21 (07 Jun 2022 17:45) (18 - 31)  SpO2: 100% (07 Jun 2022 17:45) (96% - 100%)    Daily     Daily     PHYSICAL EXAM:      Constitutional:    Eyes:    ENMT:    Neck:    Breasts:    Back:    Respiratory:    Cardiovascular:    Gastrointestinal:    Genitourinary:    Rectal:    Extremities:    Vascular:    Neurological:    Skin:    Lymph Nodes:    Musculoskeletal:    Psychiatric:        LABS:                        7.9    43.38 )-----------( 114      ( 07 Jun 2022 07:17 )             24.3     06-07    137  |  104  |  29<H>  ----------------------------<  129<H>  3.8   |  17<L>  |  1.98<H>    Ca    8.1<L>      07 Jun 2022 16:17  Phos  5.7     06-07  Mg     2.4     06-07    TPro  5.8<L>  /  Alb  2.7<L>  /  TBili  1.4<H>  /  DBili  x   /  AST  25  /  ALT  9<L>  /  AlkPhos  251<H>  06-07    PT/INR - ( 07 Jun 2022 00:49 )   PT: 15.7 sec;   INR: 1.36 ratio         PTT - ( 07 Jun 2022 07:22 )  PTT:91.7 sec      RADIOLOGY & ADDITIONAL STUDIES: HISTORY OF PRESENT ILLNESS :65 yo M with a PMH HTN, HLD, SLE on Cellcept (MMF), class V lupus nephritis, CKD stage 2, DM2, diabetic neuropathy, TIA (2019) on plavix, BPH, HUNG, hospitalization in [facility] 3/27-4/20 tx for pancreatitis and C diff?, recent hospitalization at [place] 4/22-4/26 for acute pancreatitis and C diff colitis and another hospitalization 5/5 at [place] [facility] for septic shock (source buttock abscess) treated with vanc, cefepime (5/5-5/10), meropenem (5/11-5/18) and micafungin. Zosyn added 5/26. Course complicated by recurrent fevers despite being on antibiotics, and leukocytosis up to 30k. Blood cx neg, UA unremarkable. MRI abdomen w/ contrast performed showing extensive abd lymphadenopathy (reactive to c diff vs lymphoma?). IR stated no window for bx. Course also complicated by a L brachial vein DVT and superficial DVT in arms. Patient noted to have had a positive PPD but has possibly gotten the BCG vaccine. Quant gold performed at [place] [facility] still pending.     Admitted to [facility] for further management. Vital signs on arrival: temp 38.6, /77, , RR 28, O2 sat 97% on 2L NC.  Labs: WBC 34.53, Hb 9.6, lipase 740, procal 2.02, tBili 1.4, alk phos 498, lactate 2.4.    SLE Hx:    SLE and Lupus Nephritis: Diagnosed in 2014, had arthritis and proteinuria and positive serologies, was under the care of Dr. Swapnil Castro who started MMF. Persistent proteinuria prompted renal biopsy in 2017 that showed class V LN, CellCept was continued and Cyclosporine was added briefly but was stopped due to elevated CK. Therapy was switched to PO Cyclophosphamide as serum creatinine increased and urine protein/creatine ratio was as high as 13. Serum creatinine remained elevated (above 2) but proteinuria reduced. In 5/2021 was switched again to MMF (1500 mg daily), since conversion, his protein/creatinine ratio has remained within ranges of 4-6, serum creatinine has been stable around 2. In late May his care was switched to Dr. Shivam Malagon who is continuing the MMF. Serologies: ED 1:320, elevated dsDNA, -ve KRISTY, -ve Sjogren labs and -ve APLS serologies.     ---------  Patient seen and evaluated at bedside. Intubated and sedated, unable to get history.     PAST MEDICAL & SURGICAL HISTORY:  Obstructive Sleep Apnea      Hepatitis B Carrier      Pneumonia      Other systemic lupus erythematosus with endocarditis      Type 2 diabetes mellitus with other neurologic complication, without long-term current use of insulin      Sleep apnea, unspecified type      Hypertension, unspecified type      Nephritic syndrome      No significant past surgical history          REVIEW OF SYSTEMS    as per HPI    MEDICATIONS  (STANDING):  chlorhexidine 0.12% Liquid 15 milliLiter(s) Oral Mucosa every 12 hours  chlorhexidine 4% Liquid 1 Application(s) Topical <User Schedule>  chlorhexidine 4% Liquid 1 Application(s) Topical <User Schedule>  citric acid/sodium citrate Solution 30 milliLiter(s) Oral every 6 hours  dexAMETHasone  IVPB 20 milliGRAM(s) IV Intermittent daily  dextrose 10%. 1000 milliLiter(s) (25 mL/Hr) IV Continuous <Continuous>  fentaNYL   Infusion... 3.002 MICROgram(s)/kG/Hr (13.6 mL/Hr) IV Continuous <Continuous>  folic acid Injectable 1 milliGRAM(s) IV Push daily  gabapentin 300 milliGRAM(s) Oral daily  glucagon  Injectable 1 milliGRAM(s) IntraMuscular once  heparin  Infusion. 2100 Unit(s)/Hr (21 mL/Hr) IV Continuous <Continuous>  lidocaine   4% Patch 1 Patch Transdermal daily  meropenem  IVPB 1000 milliGRAM(s) IV Intermittent every 12 hours  meropenem  IVPB      metoclopramide Injectable 5 milliGRAM(s) IV Push every 12 hours  metoprolol tartrate 12.5 milliGRAM(s) Oral two times a day  multivitamin/minerals 1 Tablet(s) Oral daily  pantoprazole  Injectable 40 milliGRAM(s) IV Push daily  phenylephrine    Infusion 0.5 MICROgram(s)/kG/Min (17 mL/Hr) IV Continuous <Continuous>  propofol Infusion 39.919 MICROgram(s)/kG/Min (21.7 mL/Hr) IV Continuous <Continuous>  tamsulosin 0.4 milliGRAM(s) Oral at bedtime    MEDICATIONS  (PRN):  acetaminophen     Tablet .. 500 milliGRAM(s) Oral every 6 hours PRN Temp greater or equal to 38C (100.4F)  fentaNYL    Injectable 25 MICROGram(s) IV Push every 15 minutes PRN for uptitration of fentanyl  sodium chloride 0.9% lock flush 10 milliLiter(s) IV Push every 1 hour PRN Pre/post blood products, medications, blood draw, and to maintain line patency      Allergies    No Known Allergies    Intolerances        PERTINENT MEDICATION HISTORY:    SOCIAL HISTORY:    FAMILY HISTORY:  FH: type 2 diabetes        Vital Signs Last 24 Hrs  T(C): 36.8 (07 Jun 2022 00:00), Max: 37 (06 Jun 2022 20:00)  T(F): 98.3 (07 Jun 2022 00:00), Max: 98.6 (06 Jun 2022 20:00)  HR: 94 (07 Jun 2022 17:45) (68 - 157)  BP: 144/76 (07 Jun 2022 17:45) (67/41 - 171/98)  BP(mean): 103 (07 Jun 2022 17:45) (49 - 129)  RR: 21 (07 Jun 2022 17:45) (18 - 31)  SpO2: 100% (07 Jun 2022 17:45) (96% - 100%)    Daily     Daily     PHYSICAL EXAM    Constitutional: Intubated and sedated  Neck: supple  Respiratory: Rhonchi b/l  Cardiac: +S1/S2; RRR  Gastrointestinal: soft,ND; +BSx4  Extremities: 2-3+ pitting edema UE and LE  Musculoskeletal: No synovitis  Dermatologic: no rash  Neurologic: Sedated    LABS:                        7.9    43.38 )-----------( 114      ( 07 Jun 2022 07:17 )             24.3     06-07    137  |  104  |  29<H>  ----------------------------<  129<H>  3.8   |  17<L>  |  1.98<H>    Ca    8.1<L>      07 Jun 2022 16:17  Phos  5.7     06-07  Mg     2.4     06-07    TPro  5.8<L>  /  Alb  2.7<L>  /  TBili  1.4<H>  /  DBili  x   /  AST  25  /  ALT  9<L>  /  AlkPhos  251<H>  06-07    PT/INR - ( 07 Jun 2022 00:49 )   PT: 15.7 sec;   INR: 1.36 ratio         PTT - ( 07 Jun 2022 07:22 )  PTT:91.7 sec      RADIOLOGY & ADDITIONAL STUDIES:  < from: CT Abdomen and Pelvis No Cont (06.07.22 @ 10:16) >  IMPRESSION:  New pulmonary opacities, edema versus infection.    Anasarca with increasing ascites.    No other interval change since CT of 5/30/2022.    < end of copied text >    < from: CT Head No Cont (06.07.22 @ 10:11) >  IMPRESSION:  Age-appropriate involutional change and microvascular   ischemic disease. No intracranial hemorrhage.    --- End of Report ---    < end of copied text >    < from: CT Neck Soft Tissue w/ IV Cont (05.30.22 @ 09:03) >    IMPRESSION: Right level 1 lymph nodeand left level 5 lymph node mass   suspicious for neoplasm, new since 3/8/2018.    < end of copied text >    < from: CT Chest w/ IV Cont (05.30.22 @ 08:59) >  IMPRESSION:  1.  Questionable filling defect in RIGHT lower lobe medial pulmonary   artery branches may be artifactual. If there is a question regarding   possible pulmonary embolus. Dedicated CTPA is recommended.  2.  Bilateral lower lobe atelectasis with bilateral pleural effusions.  3.  Enlarged spleen with splenic infarct.  4.  Markedly enlarged mesenteric lymph nodes/lymphadenopathy out of   proportion of retroperitoneal lymphadenopathy. Although lymphoma/leukemia   can have this appearance, consider possibility of gastrointestinal   tuberculosis as well as other infectious etiologies.  5.  Hepatosplenomegaly with portal hypertension and possible underlying   cirrhosis.  6.  Moderate ascites.  7.  Small bilateral pleural effusions.  8.  Anasarca.    < end of copied text >    Surgical Pathology Report:   ACCESSION No:  [id number]    HANDY FREEMAN                     3        Surgical Final Report          Final Diagnosis    Kidney, needle core biopsy    Limited sample, with glomerular changes consistent with  membranous nephropathy (see comment)    Summary of chronic changes:  - Global glomerulosclerosis (1 out of 8 glomeruli)  - Mild to moderate tubular atrophy and interstitial fibrosis  - Severe arterial and arteriolar sclerosis    Verified by: Aury Ga MD  (Electronic Signature)  Reported on: 12/15/17 13:46 EST,[address] [place] [address]  [id number]  _________________________________________________________________    Comment    The case was discussed with Dr. Moore on 12/15/2017 at 1:30PM.    The biopsy is limited in size and immunofluorescence microscopy  was not performed due to this limitation. The light microscopy  sample reveals glomerular and tubular hypertrophy, with only mild  mesangial expansion (history of diabetes mellitus). Some  glomeruli appear hypoperfused. There is also mild to moderate  interstitial fibrosis and severe vascular sclerosis. On electron  microscopy, most capillary walls are of normal thickness and  texture, but several reveal prominent subepithelial dense  deposits. The differential diagnosis includes an immune complex  mediated disease, most likely early membranous nephropathy;  secondary processes (in the context of autoimmune disease,  chronic infections, drug-induced, or tumor-related), should be  considered (please see more below). Given the lack of  immunofluorescence microscopy studies, other processes  (monoclonal immunoglobulin deposition disease, C3 glomerulopathy)  cannot be ruled out with absolute certainty, but given the  morphological features, they are less likelyin this case.    The membranous pattern of glomerular injury is rather non-  specific and can be observed in several groups of diseases. The  process is often primary and in 70% of cases related to the  presence of autoantibodies directed at an M2-type phospholipase  A2 receptor expressed on the glomerular visceral epithelial  cells. Secondary forms of membranous nephropathy are associated  with autoimmune diseases, infections, neoplasias, and drug use.              HANDY FREEMAN       3        Surgical Final Report          Autoimmune diseases that present with membranous nephropathy  include SLE, rheumatoid arthritis, the mixed and the  undifferentiated connective tissue disorder, Sjögren's syndrome,  and complement deficiencies. Sometimes the nephrotic syndrome  antedates the initiation of the full-fledged systemic disease.  Infections associated with membranous pattern of glomerular  injury include hepatitis B and C, syphilis, and chronic parasitic  diseases. Membranous nephropathy has been reported in association  with neoplastic processes, such as breast, colon, lung, prostate  cancer, etc. Finally, several therapeutic regimen and drugs have  been linked to this pattern of glomerular injury, in particular  d-penicillamine, hydralazine, captopril in high doses, gold  salts, and, more recently, non-steroidal anti-inflammatory drugs  (NSAIDs; diclofenac, fenoprofen, ibuprofen, nabumetone, naproxen,  and tolmetin).    Reference:  Aamir JULIEN, Juan Manuel RGD, Fide G, Aamir ATKINS, Moncho DW, Stacy TD,  Aliyah JACY, Jimenez DJ. M-type phospholipase A2 receptor as target  antigen in idiopathic membranous nephropathy. N Eng J Med 361:11-  21, 2009.    Microscopic Description    1. Light Microscopy:  Sections of formalin-fixed, paraffin embedded tissue were  evaluated using H&E, PAS, JMS, and trichrome stains. The sample  submitted for light microscopy consists mostly of renal medulla,  with a small portion of renal cortex with up to 4 glomeruli; 1  glomerulus is globally sclerosed and 1 appears hypoperfused.  Semi-thin toluidine blue-stained epoxy sections of the tissue  processed for electron microscopy were also evaluated using light  microscopy; these sections contain up to 4 glomeruli, of which 2  appear hypoperfused.  Significant endocapillary proliferation is  not present and cellular crescents are not seen in glomeruli. The  glomerular capillary loops are irregularly thickened. The  mesangium is mildly expanded by extracellular matrix. Non-  atrophic tubules are enlarged and show mild distension. Few  tubules contain PAS-positive hyaline casts. The interstitium  contains mild inflammatory infiltrates composed of mononuclear  cells. There is also mild to moderate tubular atrophy and  interstitial fibrosis. Arteries and arterioles show severe  sclerosis.    2. Electron Microscopy:  The sample submitted for electron microscopy examination contains  4 glomeruli; 3 glomeruli are examined              ANATALIO, HANDY                     3        Surgical Final Report          ultrastructurally. The glomerular visceral epithelial cells  reveal mild segmental effacement of their foot processes. The  glomerular basement membranes are of normal thickness and  texture. Several capillary loops reveal the presence of densely  distributed subepithelial electron dense deposits. The  endothelial cells show no significant changes. The mesangium  reveals normal cellular elements and a mildly increased amount of  matrix. Isolated small densities and electron dense deposits are  also seen in the mesangium.    Clinical History    A 62-year-old with diabetes, proteinuria, and increased  creatinine.    Specimen(s) Submitted    18q core left kidney x 2    Gross Description    The specimen is received unfixed during specimen adequacy  procedure,  labeled as: Left kidney.  It consists of two  fragments of cylindrical pink-tan soft tissue measuring 0.8 cm  and 1.5 cm in length and 0.1 cm in diameter. A portion of the  specimen is submitted for electron microscopy and the rest of the  specimen is entirely submitted in one cassette for paraffin  processing.    In addition to other data that may appear on the specimen  container, the label has been inspected to confirm the presence  of the patient's name and date of birth.  AVT12/13/17 11:16 (12.13.17 @ 10:15)

## 2022-06-08 NOTE — PROGRESS NOTE ADULT - ASSESSMENT
65 yo M with a PMH HTN, HLD, SLE on Cellcept (MMF), class V lupus nephritis, CKD stage 2, DM2, diabetic neuropathy, TIA (2019) on plavix, BPH, HUNG, with multiple hospitalizations for c diff, acute panc, septic shock 22 buttock abscess status post drainage transferred for recurrent fevers status post core biopsy and paracentesis suspected lymphoma v less likely peritoneal tb intubated and sedated due to upper airway stridor and altered mental status and extubated 6/8.    Neuro    Acute encephalopathy requiring intubation and sedation  -Fluctuating mentation while febrile which then returns to current clinical baseline slow however linear thought process and alert and oriented x3.   -exacerbated by fever although remained altered despite control of fever with arctic sun 6/4 AM. Unclear etiology.   -Requiring intubation and sedation 6/4, 6/6 weaning sedation and SBT monitoring mental status   -6/7 Urgent CTH concern for LUE weakness when sedation weaned down without acute findings and gtt resumed.       #diabetic neuropathy  -home gabapentin    Pulm  #Intubated for airway protection  -altered with stridor 6/4 AM  -extubated 6/8.      #large left posterior consolidation seen on ultrasoud, possible PNA?  -monitored off abx originally now back on edilson (6/3- ) and status post vanc (6/3) with negative MRSA MSSA swab   -bronchoscopy 6/6 with BAL to rule out infectious etiologies. nonrevealing.     # stridor  -with additional  stridor 6/4 requiring intubation refractory to epinephrine, methylprednisolone, xopenex, ipratropium. Previously with stridor earlier in admission although more severe 6/4 AM requiring intubation unclear etiology with new medication bicitra, recent instrumentation with core biopsy although had tolerated procedure without evidence of neck swelling/edema or stridor previous, with underlying HUNG hx.     #RLL PE  -heparin gtt, may transition to alternative agent for full AC if no plans for additional surgical intervention/procedure      Cardio    #Episodes of hypotension  -hypotension episodic possibly rate related with period going into afib requiring rate control with lopressor. Lopressor discontinued 6/3 ON remains tachycardic.   -off mega     #Hx of HTN  - monitored off home hypertensive meds 2/2 hypotension      #Paroxysm of afib followed by sinus rhythm w frequent ectopy  -Lopressor 2.5 q6h discontinued 6/3 ON receiving crystalloid and colloid 6/3 ON to 6/4.   -6/6 metoprolol tartarate 12.5mg BID       Renal  #CKD stage 2, hx of lupus nephritis  -cont to trend Cr, uptrending  -monitor I/Os  -cont to monitor electrolytes, replete as necessary  -urinalysis with 100mg/dL proteinuria with biopsy proven lupus nephritis   -canales placed 6/3  -Discontinued tamsulosin for now cannot be crushed and canales in place  -6/6 discussed prior arinesp with family and risks of arinesp administration in critically ill explained including increased risk of thrombosis.     #PEPE on CKD  -with worsening renal function FeNa prerenal receiving fluids 6/4. question role for rheumatology if no improvement in renal function.   -urine studies less suggestive of HRS picture discontinued midodrine although will continue albumin may benefit from volume.   -appreciate nephrology recommendations    -6/5 urine studies consistent with pre-renal FeNA 0.8 however IVC 2.1 on POCUS. Will c/w albumin and give lasix 40 IV x1, goal neg 500cc-1L today  -6/6 pending rheumatology work up ED, ANCA, dsDNA esr 44 and crp 77      GI  #chronic inflammatory diarrhea  #possible Crohn's vs colitis vs infectious  -calprotectin elevated at [facility] 532. no current plan by GI for colonoscopy at this time.   -C diff negative  -appreciate GI and ID input.   -less likely crohns disease     #Diet  -previously on TPN while at [facility]  -intubated and sedated receiving tube feeds.     #hx of pancreatitis, elevated lipase  -without current epigastric tenderness to palpation 5/30 lipase 740, no imaging findings of pancreatitis. Now downtrending 149 and 158 respectively on repeat.   -follow triglycerides, mixed picture concern for HLH however need to monitor for hypertriglyceridemia while on prop which could exacerbate a pancreatitis picture.     #hepatosplenomegaly shown on MRI at [facility]  -hepatitis panel nonreactive including Hep A B and C      #hx of BPH  -start on flomax    Endocrine  #DM2  -last HbA1c 4/29 6.5%  -ISS    ID/rheum  #hx of SLE  -monitoring off cellcept  -Has been seen by Dr. Swapnil Wolfe and also Dr. Shivam Malagon  -According to sisters at bedside 6/6, [place] to him getting sick. Family are wondering if the medication can be contributory.   -Following complements, ESR, CRP, dsDNA  -rheumatology consulted     #recurrent fevers and leukocytosis of unknown etiology  diff dx: infectious, malignancy, IgG4 disease, HLH  -elevated WBC, elevated IgA at [facility]  -quant gold indeterminate  -5/30 BCx NGTD  -HIV nonreactive, EBV IgG+, CMV 50  -possibility of peritoneal TB appreciate ID reccs AFB sputum and stool thus far are negative with IR biopsy completed pending final results 6/4  -Rheum eval including C3 C4 ESR 44 CRP 77 dsDNA progression of underlying lupus v other rheumatologic process causing fever although less likely.       Heme  #Anemia  -outside hospital reported to be consistent with AOCD  -CT ab pending, r/o bleed  -transfuse if Hb<7, maintain active type and screen  -DVT ppx: full AC      #r/o HLH, IgG4  -has fevers, splenomegaly, cytopenia  -HLH labs: soluble IL-2 levels 59859, ferritin 511, triglyceride 412, 6/6 fibrinogen 218 6/6  , pending final pathology from cervical lymph node biopsy, flow cytometry from peripheral blood without acute abnormalities in lymphoid, myeloid, or plasma cell lines.   -IgG4 27  -dexamethasone 20 qd discussed with heme on 6/6 following bronchoscopy  -pending pathology 6/7  -Prelim per verbal with pathology EBV+ lymphoprofileration, with empiric treatment initiated for HLH and no specific treatment planned for EBV per discussion with ID.     Lines: TOBY placed 5/27 Removed 6/1 ON

## 2022-06-08 NOTE — PROGRESS NOTE ADULT - SUBJECTIVE AND OBJECTIVE BOX
Patient is a 66y old  Male who presents with a chief complaint of recurrent fevers, unknown source (08 Jun 2022 11:30)    Patient seen and examined this morning    MEDICATIONS  (STANDING):  chlorhexidine 0.12% Liquid 15 milliLiter(s) Oral Mucosa every 12 hours  chlorhexidine 4% Liquid 1 Application(s) Topical <User Schedule>  chlorhexidine 4% Liquid 1 Application(s) Topical <User Schedule>  citric acid/sodium citrate Solution 30 milliLiter(s) Oral every 6 hours  dexAMETHasone  IVPB 20 milliGRAM(s) IV Intermittent daily  dexMEDEtomidine Infusion 0.2 MICROgram(s)/kG/Hr (4.53 mL/Hr) IV Continuous <Continuous>  fentaNYL   Infusion... 3.002 MICROgram(s)/kG/Hr (13.6 mL/Hr) IV Continuous <Continuous>  folic acid Injectable 1 milliGRAM(s) IV Push daily  gabapentin 300 milliGRAM(s) Oral daily  glucagon  Injectable 1 milliGRAM(s) IntraMuscular once  heparin  Infusion. 1900 Unit(s)/Hr (19 mL/Hr) IV Continuous <Continuous>  lidocaine   4% Patch 1 Patch Transdermal daily  meropenem  IVPB 1000 milliGRAM(s) IV Intermittent every 12 hours  meropenem  IVPB      metoprolol tartrate 12.5 milliGRAM(s) Oral two times a day  multivitamin/minerals 1 Tablet(s) Oral daily  pantoprazole  Injectable 40 milliGRAM(s) IV Push daily  phenylephrine    Infusion 0.5 MICROgram(s)/kG/Min (17 mL/Hr) IV Continuous <Continuous>  propofol Infusion 39.919 MICROgram(s)/kG/Min (21.7 mL/Hr) IV Continuous <Continuous>    MEDICATIONS  (PRN):  acetaminophen     Tablet .. 500 milliGRAM(s) Oral every 6 hours PRN Temp greater or equal to 38C (100.4F)  fentaNYL    Injectable 25 MICROGram(s) IV Push every 15 minutes PRN for uptitration of fentanyl  sodium chloride 0.9% lock flush 10 milliLiter(s) IV Push every 1 hour PRN Pre/post blood products, medications, blood draw, and to maintain line patency    ROS  No fever, sweats, chills  No epistaxis, HA, sore throat  No CP, SOB, cough, sputum  No n/v/d, abd pain, melena, hematochezia  No edema  No rash  No anxiety  No back pain, joint pain  No bleeding, bruising  No dysuria, hematuria    Vital Signs Last 24 Hrs  T(C): 37.4 (08 Jun 2022 12:00), Max: 37.4 (08 Jun 2022 12:00)  T(F): 99.3 (08 Jun 2022 12:00), Max: 99.3 (08 Jun 2022 12:00)  HR: 83 (08 Jun 2022 14:00) (72 - 96)  BP: 153/74 (08 Jun 2022 14:00) (100/58 - 173/95)  BP(mean): 106 (08 Jun 2022 14:00) (71 - 125)  RR: 28 (08 Jun 2022 14:00) (18 - 33)  SpO2: 97% (08 Jun 2022 14:00) (96% - 100%)    PE  NAD  Awake, alert  Anicteric, MMM  No c/c/e  No rash grossly                            7.5    33.37 )-----------( 120      ( 08 Jun 2022 09:21 )             22.9       06-08    139  |  105  |  32<H>  ----------------------------<  148<H>  3.9   |  17<L>  |  1.95<H>    Ca    8.1<L>      08 Jun 2022 09:21  Phos  6.4     06-08  Mg     2.4     06-08    TPro  5.9<L>  /  Alb  2.6<L>  /  TBili  1.1  /  DBili  x   /  AST  24  /  ALT  7<L>  /  AlkPhos  258<H>  06-08

## 2022-06-08 NOTE — CHART NOTE - NSCHARTNOTEFT_GEN_A_CORE
Nutrition Follow Up Note  Patient seen for: nutrition follow-up on MICU. Chart reviewed, events noted.    Hospital course per chart: Pt is a 67 y/o M with a PMH HTN, HLD, SLE on Cellcept (MMF), class V lupus nephritis, CKD stage 2, DM2, diabetic neuropathy, TIA (2019) on plavix, BPH, HUNG, with multiple hospitalizations for c diff, acute panc, septic shock 22 buttock abscess status post drainage transferred for recurrent fevers status post core biopsy and paracentesis suspected lymphoma v less likely peritoneal tb now intubated and sedated due to upper airway stridor and altered mental status.     Source: [x] Patient       [x] Medical Record        [x] RN        [] Family at bedside       [] Other:    -If unable to interview patient: [x] Trach/Vent/BiPAP  [] Disoriented/confused/inappropriate to interview    Nutrition-Related Events:   - Pressors:  [] Yes    [x] No   - Propofol:  [] Yes    [x] No         - Rate: __mL/hr. If maintained x 24 hours, propofol will provide:     Diet Order:   Diet, NPO with Tube Feed:   Tube Feeding Modality: Orogastric  Jevity 1.5 Jason (JEVITY1.5RTH)  Total Volume for 24 Hours (mL): 720  Intermittent  Starting Tube Feed Rate {mL per Hour}: 10  Increase Tube Feed Rate by (mL): 10    Every 4 hours  Until Goal Tube Feed Rate (mL per Hour): 40  Tube Feeding Hours ON: 18  Tube Feeding OFF (Hours): 6  Tube Feed Start Time: 02:00 (22)  PO diet from ->  with poor PO intake per previous RD notes.   Per MICU , "Initiated on reglan overnight for reports of high residuals."    EN Order Provides:    - Total volume: 720 mL    - Kcal:    1080   (12.8 kcal/kg based on lowest daily wt during admission 84.2 kG ())    - Gm Protein:  46  (0.5 g/kg based on lowest daily wt during admission 84.2 kG ())    - mL free water:  547   Protein Modular(s) Provides: N/A  Current Pump Rate: not presently on for possible extubation   2-Day EN Average: 320 mL, 480 kcals, 20 gm protein     Is current diet order appropriate/adequate? [] Yes  [x]  No:     PO intake :   [] >75%  Adequate    [] 50-75%  Fair       [] <50%  Poor   [x] Not Applicable     Nutrition-related concerns:  - Pt intubated .   - Hx of T2DM; A1C 5.2 % (- @ 00:32). On steroid.   - On folic acid and multivitamin/minerals  - PEPE on CKD. Elevated Phos noted.   - Chronic inflammatory diarrhea- hx of pancreatitis, elevated lipase    GI: Pt with diarrhea.  Last BM .   Bowel Regimen? [] Yes   [x] No  Rectal Tube (mL): 590 (6/7), 50 (6/6), 0 (6/5), 100 (6/)  NGT Output (mL): 100 (6/7), 400 (6/6), 600 (/), 1000 (/)    Weights:   Daily Weight in k (), 84.2 (), 85.3 ()  Dosing wt in k.6  Weight fluctuations noted, likely in setting of fluid shifts vs. bed scale discrepancies. Will continue to monitor and trend weights as able/appropriate.   Limited nutrition focused physical exam conducted with pt consent with mild muscle wasting to shoulders and clavicles     MEDICATIONS  (STANDING):  citric acid/sodium citrate Solution 30 milliLiter(s) Oral every 6 hours  dexAMETHasone  IVPB 20 milliGRAM(s) IV Intermittent daily  dexMEDEtomidine Infusion 0.2 MICROgram(s)/kG/Hr (4.53 mL/Hr) IV Continuous <Continuous>  fentaNYL   Infusion... 3.002 MICROgram(s)/kG/Hr (13.6 mL/Hr) IV Continuous <Continuous>  folic acid Injectable 1 milliGRAM(s) IV Push daily  gabapentin 300 milliGRAM(s) Oral daily  heparin  Infusion. 1900 Unit(s)/Hr (19 mL/Hr) IV Continuous <Continuous>  meropenem  IVPB 1000 milliGRAM(s) IV Intermittent every 12 hours  meropenem  IVPB      metoprolol tartrate 12.5 milliGRAM(s) Oral two times a day  multivitamin/minerals 1 Tablet(s) Oral daily  pantoprazole  Injectable 40 milliGRAM(s) IV Push daily  phenylephrine    Infusion 0.5 MICROgram(s)/kG/Min (17 mL/Hr) IV Continuous <Continuous>  propofol Infusion 39.919 MICROgram(s)/kG/Min (21.7 mL/Hr) IV Continuous <Continuous>    Pertinent Labs:  @ 09:21: Na 139, BUN 32<H>, Cr 1.95<H>, <H>, K+ 3.9, Phos 6.4<H>, Mg 2.4, Alk Phos 258<H>, ALT/SGPT 7<L>, AST/SGOT 24   @ 00:17: Na 136, BUN 30<H>, Cr 1.92<H>, <H>, K+ 3.5, Phos 5.4<H>, Mg 2.3, Alk Phos 249<H>, ALT/SGPT 7<L>, AST/SGOT 24   @ 16:17: Na 137, BUN 29<H>, Cr 1.98<H>, <H>, K+ 3.8, Phos 5.7<H>, Mg 2.4, Alk Phos 251<H>, ALT/SGPT 9<L>, AST/SGOT 25    A1C with Estimated Average Glucose Result: 5.2 % (22 @ 00:32)  A1C with Estimated Average Glucose Result: 5.0 % (22 @ 04:25)    Finger Sticks:  POCT Blood Glucose.: 146 mg/dL ( @ 09:00)  POCT Blood Glucose.: 164 mg/dL ( @ 03:55)  POCT Blood Glucose.: 157 mg/dL ( @ 23:18)  POCT Blood Glucose.: 147 mg/dL ( @ 13:53)    Triglycerides, Serum: 597 mg/dL (22 @ 07:15)  Triglycerides, Serum: 585 mg/dL (22 @ 19:01)  Triglycerides, Serum: 543 mg/dL (22 @ 12:15)  Triglycerides, Serum: 412 mg/dL (22 @ 03:48)    Skin per nursing documentation: Suspected deep tissue injury buttocks   Edema per flowsheets: 1+ dependent; generalized; head 2+ head; dependent 3+ generalized     Estimated Energy Needs:  Estimated Protein Needs:  Fluid needs deferred to provider.   Paul State Equation:    Previous Nutrition Diagnosis: Increased Nutrient Needs  Nutrition Diagnosis is: [x] ongoing  [] resolved [] not applicable     Nutrition Care Plan:  [x] In Progress  [] Achieved  [] Not applicable    New Nutrition Diagnosis: Moderate acute malnutrition related to lack of appetite/intubation/residuals as evidenced by intake <75% nutrient needs >7 days and moderate muscle wasting     Nutrition Interventions:     Education Provided:       [] Yes:  [x] No: Not appropriate at this time     Recommendations:      1) Defer nutrition provision to medical team  - If prolonged NPO expected, consider alternate means of nutrition if medically feasible and within GOC  2) Continue multivitamin/minerals as able/appropriate    Monitoring and Evaluation:   Continue to monitor nutritional intake, tolerance to diet prescription, weights, labs, skin integrity    RD remains available upon request and will follow up per protocol  Brittney Rocha RD, MS, CDN, CNSC Pager #[phone] Nutrition Follow Up Note  Patient seen for: nutrition follow-up on MICU. Chart reviewed, events noted.    Hospital course per chart: Pt is a 67 y/o M with a PMH HTN, HLD, SLE on Cellcept (MMF), class V lupus nephritis, CKD stage 2, DM2, diabetic neuropathy, TIA (2019) on plavix, BPH, HUNG, with multiple hospitalizations for c diff, acute panc, septic shock 22 buttock abscess status post drainage transferred for recurrent fevers status post core biopsy and paracentesis suspected lymphoma v less likely peritoneal tb now intubated and sedated due to upper airway stridor and altered mental status.     Source: [x] Patient       [x] Medical Record        [x] RN        [] Family at bedside       [] Other:    -If unable to interview patient: [x] Trach/Vent/BiPAP  [] Disoriented/confused/inappropriate to interview    Nutrition-Related Events:   - Pressors:  [] Yes    [x] No   - Propofol:  [] Yes    [x] No         - Rate: __mL/hr. If maintained x 24 hours, propofol will provide:     Diet Order:   Diet, NPO with Tube Feed:   Tube Feeding Modality: Orogastric  Jevity 1.5 Jason (JEVITY1.5RTH)  Total Volume for 24 Hours (mL): 720  Intermittent  Starting Tube Feed Rate {mL per Hour}: 10  Increase Tube Feed Rate by (mL): 10    Every 4 hours  Until Goal Tube Feed Rate (mL per Hour): 40  Tube Feeding Hours ON: 18  Tube Feeding OFF (Hours): 6  Tube Feed Start Time: 02:00 (22)  PO diet from ->  with poor PO intake per previous RD notes.   Per MICU , "Initiated on reglan overnight for reports of high residuals."    EN Order Provides:    - Total volume: 720 mL    - Kcal:    1080   (12.8 kcal/kg based on lowest daily wt during admission 84.2 kG ())    - Gm Protein:  46  (0.5 g/kg based on lowest daily wt during admission 84.2 kG ())    - mL free water:  547   Protein Modular(s) Provides: N/A  Current Pump Rate: not presently on for possible extubation   2-Day EN Average: 320 mL, 480 kcals, 20 gm protein     Is current diet order appropriate/adequate? [] Yes  [x]  No:     PO intake :   [] >75%  Adequate    [] 50-75%  Fair       [] <50%  Poor   [x] Not Applicable     Nutrition-related concerns:  - Pt intubated .   - Hx of T2DM; A1C 5.2 % (- @ 00:32). On steroid.   - On folic acid and multivitamin/minerals  - PEPE on CKD. Elevated Phos noted.   - Chronic inflammatory diarrhea- hx of pancreatitis, elevated lipase    GI: Pt with diarrhea.  Last BM .   Bowel Regimen? [] Yes   [x] No  Rectal Tube (mL): 590 (6/7), 50 (6/6), 0 (6/5), 100 (6/)  NGT Output (mL): 100 (6/7), 400 (6/6), 600 (/), 1000 (/)    Weights:   Daily Weight in k (), 84.2 (), 85.3 ()  Dosing wt in k.6  Weight fluctuations noted, likely in setting of fluid shifts vs. bed scale discrepancies. Will continue to monitor and trend weights as able/appropriate.   Limited nutrition focused physical exam conducted with pt consent with mild muscle wasting to shoulders and clavicles     MEDICATIONS  (STANDING):  citric acid/sodium citrate Solution 30 milliLiter(s) Oral every 6 hours  dexAMETHasone  IVPB 20 milliGRAM(s) IV Intermittent daily  dexMEDEtomidine Infusion 0.2 MICROgram(s)/kG/Hr (4.53 mL/Hr) IV Continuous <Continuous>  fentaNYL   Infusion... 3.002 MICROgram(s)/kG/Hr (13.6 mL/Hr) IV Continuous <Continuous>  folic acid Injectable 1 milliGRAM(s) IV Push daily  gabapentin 300 milliGRAM(s) Oral daily  heparin  Infusion. 1900 Unit(s)/Hr (19 mL/Hr) IV Continuous <Continuous>  meropenem  IVPB 1000 milliGRAM(s) IV Intermittent every 12 hours  meropenem  IVPB      metoprolol tartrate 12.5 milliGRAM(s) Oral two times a day  multivitamin/minerals 1 Tablet(s) Oral daily  pantoprazole  Injectable 40 milliGRAM(s) IV Push daily  phenylephrine    Infusion 0.5 MICROgram(s)/kG/Min (17 mL/Hr) IV Continuous <Continuous>  propofol Infusion 39.919 MICROgram(s)/kG/Min (21.7 mL/Hr) IV Continuous <Continuous>    Pertinent Labs:  @ 09:21: Na 139, BUN 32<H>, Cr 1.95<H>, <H>, K+ 3.9, Phos 6.4<H>, Mg 2.4, Alk Phos 258<H>, ALT/SGPT 7<L>, AST/SGOT 24   @ 00:17: Na 136, BUN 30<H>, Cr 1.92<H>, <H>, K+ 3.5, Phos 5.4<H>, Mg 2.3, Alk Phos 249<H>, ALT/SGPT 7<L>, AST/SGOT 24   @ 16:17: Na 137, BUN 29<H>, Cr 1.98<H>, <H>, K+ 3.8, Phos 5.7<H>, Mg 2.4, Alk Phos 251<H>, ALT/SGPT 9<L>, AST/SGOT 25    A1C with Estimated Average Glucose Result: 5.2 % (22 @ 00:32)  A1C with Estimated Average Glucose Result: 5.0 % (22 @ 04:25)    Finger Sticks:  POCT Blood Glucose.: 146 mg/dL ( @ 09:00)  POCT Blood Glucose.: 164 mg/dL ( @ 03:55)  POCT Blood Glucose.: 157 mg/dL ( @ 23:18)  POCT Blood Glucose.: 147 mg/dL ( @ 13:53)    Triglycerides, Serum: 597 mg/dL (22 @ 07:15)  Triglycerides, Serum: 585 mg/dL (22 @ 19:01)  Triglycerides, Serum: 543 mg/dL (22 @ 12:15)  Triglycerides, Serum: 412 mg/dL (22 @ 03:48)    Skin per nursing documentation: Suspected deep tissue injury buttocks   Edema per flowsheets: 1+ dependent; generalized; head 2+ head; dependent 3+ generalized     Estimated Energy Needs: (15-20 kcal/kg based on lowest weight in-house 84.2 kg (6/3)): 7465-9988 kcal/day  Estimated Protein Needs: (1.8-2.0 g/kg based on IBW 59.1 kG): 106-118 g/day  Fluid needs deferred to provider.   Paul State Equation: N/A    Previous Nutrition Diagnosis: Increased Nutrient Needs  Nutrition Diagnosis is: [x] ongoing  [] resolved [] not applicable     Nutrition Care Plan:  [x] In Progress  [] Achieved  [] Not applicable    New Nutrition Diagnosis: Moderate acute malnutrition related to lack of appetite/intubation/residuals as evidenced by intake <75% nutrient needs >7 days and moderate muscle wasting     Nutrition Interventions:     Education Provided:       [] Yes:  [x] No: Not appropriate at this time     Recommendations:      1) If pt to continue on EN, recommend Vital 1.5 at 10 mL/hr increasing as tolerated and electrolytes WNL until goal rate 70 mL/hr x18 hrs with ProSource NoCarb TF x2 daily to provide total volume 1260 mL, 2010 kcals, 107 gm protein, and 963 mL free water. Meets 24 kcals/kG based on lowest weight in-house 84.2 kg (6/3) and 1.8 gm protein/kG based on IBW 59.1 kG.   2) Continue multivitamin/minerals as able/appropriate; consider thiamine for possible refeeding risk given Hx of inadequate intake.  3) Malnutrition alert placed in chart.     Monitoring and Evaluation:   Continue to monitor nutritional intake, tolerance to diet prescription, weights, labs, skin integrity    RD remains available upon request and will follow up per protocol  Brittney Rocha, ROBERT, MS, CDN, [facility] Pager #[phone]

## 2022-06-08 NOTE — DIETITIAN NUTRITION RISK NOTIFICATION - TREATMENT: THE FOLLOWING DIET HAS BEEN RECOMMENDED
Diet, NPO with Tube Feed:   Tube Feeding Modality: Orogastric  Jevity 1.5 Jason (JEVITY1.5RTH)  Total Volume for 24 Hours (mL): 720  Intermittent  Starting Tube Feed Rate {mL per Hour}: 10  Increase Tube Feed Rate by (mL): 10    Every 4 hours  Until Goal Tube Feed Rate (mL per Hour): 40  Tube Feeding Hours ON: 18  Tube Feeding OFF (Hours): 6  Tube Feed Start Time: 02:00 (06-05-22 @ 23:01) [Available for Activation]

## 2022-06-08 NOTE — PROGRESS NOTE ADULT - SUBJECTIVE AND OBJECTIVE BOX
INCOMPLETE NOTE    OVERNIGHT EVENTS:    SUBJECTIVE / INTERVAL HPI: Patient seen and examined at bedside.     VITAL SIGNS:  Vital Signs Last 24 Hrs  T(C): 36.6 (08 Jun 2022 07:00), Max: 37.2 (07 Jun 2022 20:00)  T(F): 97.9 (08 Jun 2022 07:00), Max: 99 (07 Jun 2022 20:00)  HR: 79 (08 Jun 2022 08:42) (68 - 94)  BP: 140/79 (08 Jun 2022 08:00) (100/58 - 160/83)  BP(mean): 104 (08 Jun 2022 08:00) (71 - 117)  RR: 25 (08 Jun 2022 08:42) (18 - 33)  SpO2: 100% (08 Jun 2022 08:42) (96% - 100%)    PHYSICAL EXAM:    General: WDWN  HEENT: NC/AT; PERRL, anicteric sclera; MMM  Neck: supple  Cardiovascular: +S1/S2, RRR  Respiratory: CTA B/L; no W/R/R  Gastrointestinal: soft, NT/ND; +BSx4  Extremities: WWP; no edema, clubbing or cyanosis  Vascular: 2+ radial, DP/PT pulses B/L  Neurological: AAOx3; no focal deficits    MEDICATIONS:  MEDICATIONS  (STANDING):  chlorhexidine 0.12% Liquid 15 milliLiter(s) Oral Mucosa every 12 hours  chlorhexidine 4% Liquid 1 Application(s) Topical <User Schedule>  chlorhexidine 4% Liquid 1 Application(s) Topical <User Schedule>  citric acid/sodium citrate Solution 30 milliLiter(s) Oral every 6 hours  dexAMETHasone  IVPB 20 milliGRAM(s) IV Intermittent daily  dexMEDEtomidine Infusion 0.2 MICROgram(s)/kG/Hr (4.53 mL/Hr) IV Continuous <Continuous>  fentaNYL   Infusion... 3.002 MICROgram(s)/kG/Hr (13.6 mL/Hr) IV Continuous <Continuous>  folic acid Injectable 1 milliGRAM(s) IV Push daily  gabapentin 300 milliGRAM(s) Oral daily  glucagon  Injectable 1 milliGRAM(s) IntraMuscular once  heparin  Infusion. 1900 Unit(s)/Hr (19 mL/Hr) IV Continuous <Continuous>  lidocaine   4% Patch 1 Patch Transdermal daily  meropenem  IVPB 1000 milliGRAM(s) IV Intermittent every 12 hours  meropenem  IVPB      metoprolol tartrate 12.5 milliGRAM(s) Oral two times a day  multivitamin/minerals 1 Tablet(s) Oral daily  pantoprazole  Injectable 40 milliGRAM(s) IV Push daily  phenylephrine    Infusion 0.5 MICROgram(s)/kG/Min (17 mL/Hr) IV Continuous <Continuous>  propofol Infusion 39.919 MICROgram(s)/kG/Min (21.7 mL/Hr) IV Continuous <Continuous>    MEDICATIONS  (PRN):  acetaminophen     Tablet .. 500 milliGRAM(s) Oral every 6 hours PRN Temp greater or equal to 38C (100.4F)  fentaNYL    Injectable 25 MICROGram(s) IV Push every 15 minutes PRN for uptitration of fentanyl  sodium chloride 0.9% lock flush 10 milliLiter(s) IV Push every 1 hour PRN Pre/post blood products, medications, blood draw, and to maintain line patency      ALLERGIES:  Allergies    No Known Allergies    Intolerances        LABS:                        7.5    33.37 )-----------( 120      ( 08 Jun 2022 09:21 )             22.9     06-08    139  |  105  |  32<H>  ----------------------------<  148<H>  3.9   |  17<L>  |  1.95<H>    Ca    8.1<L>      08 Jun 2022 09:21  Phos  6.4     06-08  Mg     2.4     06-08    TPro  5.9<L>  /  Alb  2.6<L>  /  TBili  1.1  /  DBili  x   /  AST  24  /  ALT  7<L>  /  AlkPhos  258<H>  06-08    PT/INR - ( 08 Jun 2022 00:17 )   PT: 15.1 sec;   INR: 1.30 ratio         PTT - ( 08 Jun 2022 09:21 )  PTT:86.4 sec    CAPILLARY BLOOD GLUCOSE      POCT Blood Glucose.: 146 mg/dL (08 Jun 2022 09:00)      RADIOLOGY & ADDITIONAL TESTS: Reviewed.

## 2022-06-08 NOTE — PROGRESS NOTE ADULT - ASSESSMENT
HPI:  67 yo M with a PMH HTN, HLD, SLE on Cellcept (MMF), class V lupus nephritis, CKD stage 2, DM2, diabetic neuropathy, TIA (2019) on plavix, BPH, HUNG, hospitalization in [facility] 3/27-4/20 tx for pancreatitis and C diff?, recent hospitalization at [facility] 4/22-4/26 for acute pancreatitis and C diff colitis and another hospitalization 5/5 at [facility] for septic shock (source buttock abscess) treated with vanc, cefepime (5/5-5/10), meropenem (5/11-5/18) and micafungin. Zosyn added 5/26. Course complicated by recurrent fevers despite being on antibiotics, and leukocytosis up to 30k. Blood cx neg, UA unremarkable. MRI abdomen w/ contrast performed showing extensive abd lymphadenopathy (reactive to c diff vs lymphoma?). IR stated no window for bx. Course also complicated by a L brachial vein DVT and superficial DVT in arms. Patient noted to have had a positive PPD but has possibly gotten the BCG vaccine. Quant gold performed at [facility] still pending.     Admitted to [facility] MICU for further management. Vital signs on arrival: temp 38.6, /77, , RR 28, O2 sat 97% on 2L NC.  Labs: WBC 34.53, Hb 9.6, lipase 740, procal 2.02, tBili 1.4, alk phos 498, lactate 2.4. (30 May 2022 04:01)    Hematology/Oncology consulted d/t patient's history of anemia. [facility].    Anemia and Lymphadenopathy  --multifactorial  --transfuse for hgb > 7  --ldh (318), retic (pendng) ,ferritin (841)  --infectious vs malignancy vs autoimmune  --CT C/A/P done 5/31  ·	Markedly enlarged mesenteric lymph nodes/lymphadenopathy out of proportion of retroperitoneal lymphadenopathy. Although lymphoma/leukemia can have this appearance, consider possibility of gastrointestinal tuberculosis as well as other infectious etiologies  --supraclavicular/cervical IR bedside biopsy pending  --flow cytometry, SPEP, immunoglobulins, immunofixation pending  --MPN testing is negative  --BMB done on 5/20 at [facility]: negative for detectable monoclonal cell or B cell population  --will check for in biopsy sample    --Ca 19-9 (75)    HLH  -- primary vs secondary?  -- started dexamethasone 20mg Q day   -- meets 5 criteria:   ·	Interlukin-2 receptor (sCD25) elevated @ 34K  ·	splenomegaly   ·	cytopenia  ·	fevers (T Max 39)  ·	triglycerides (597)  -- will check peripheral smear for hemophagocytosis   -- will continue to trend daily ferritin     Questionable PE with splenic infarct  - on heparin GGT      Mecca Oliveira NP  Hematology/ Oncology  [facility]  [phone] (office)  [phone] (alt office)  Evenings and weekends please call MD on call or office

## 2022-06-08 NOTE — PROGRESS NOTE ADULT - PROBLEM SELECTOR PLAN 1
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 The patient is being treated with MMF 750mg as outpatient. Currently on hold given pt being treated for infection. On review of [facility] pt. noted to have a baseline SCr of 1.1mg/dL. Currently SCr elevated to 1.9mg/dL. Pt. currently off IV vasopressors. Pt. did receive 85 cc IV contrast on 5/30. Optimize hemodynamics. Renal sonogram without evidence of hydro. C3 and C4 not low, ESR elevated but trending down, CRP elevated but trending down. Monitor labs and urine output. Pt. currently non-oliguric with UOP 1.6L over 24 hours, improved from day prior. Pt. clinically volume overload, may benefit from diuretics as tolerated. Will assess daily for need of RRT, no acute indication as of yet. Avoid NSAIDs, ACEI/ARBS, RCA and nephrotoxins. CMV elevated but not concerning high. EBV titers elevated. Dose medications as per eGFR.    If any questions, please feel free to contact me     Thony Oh  Nephrology Fellow  [facility] Pager: [phone].

## 2022-06-08 NOTE — PROGRESS NOTE ADULT - SUBJECTIVE AND OBJECTIVE BOX
Venkat Steel MD  Internal Medicine  Pager #[phone]    CHIEF COMPLAINT:Patient is a 66y old  Male who presents with a chief complaint of recurrent fevers, unknown source (08 Jun 2022 06:55)        Interval Events:    REVIEW OF SYSTEMS:      OBJECTIVE:  ICU Vital Signs Last 24 Hrs  T(C): 36.6 (08 Jun 2022 07:00), Max: 37.2 (07 Jun 2022 20:00)  T(F): 97.9 (08 Jun 2022 07:00), Max: 99 (07 Jun 2022 20:00)  HR: 80 (08 Jun 2022 07:00) (68 - 98)  BP: 122/66 (08 Jun 2022 07:00) (83/46 - 160/83)  BP(mean): 88 (08 Jun 2022 07:00) (60 - 117)  ABP: --  ABP(mean): --  RR: 29 (08 Jun 2022 07:00) (18 - 33)  SpO2: 98% (08 Jun 2022 07:00) (96% - 100%)    Mode: CPAP with PS, FiO2: 30, PEEP: 8, PS: 8, MAP: 13, PIP: 18    06-07 @ 07:01 - 06-08 @ 07:00  --------------------------------------------------------  IN: 2048.8 mL / OUT: 2470 mL / NET: -421.2 mL    06-08 @ 07:01 - 06-08 @ 07:46  --------------------------------------------------------  IN: 38 mL / OUT: 0 mL / NET: 38 mL      CAPILLARY BLOOD GLUCOSE      POCT Blood Glucose.: 164 mg/dL (08 Jun 2022 03:55)      PHYSICAL EXAM:      LINES:    HOSPITAL MEDICATIONS:  Standing Meds:  chlorhexidine 0.12% Liquid 15 milliLiter(s) Oral Mucosa every 12 hours  chlorhexidine 4% Liquid 1 Application(s) Topical <User Schedule>  chlorhexidine 4% Liquid 1 Application(s) Topical <User Schedule>  citric acid/sodium citrate Solution 30 milliLiter(s) Oral every 6 hours  dexAMETHasone  IVPB 20 milliGRAM(s) IV Intermittent daily  fentaNYL   Infusion... 3.002 MICROgram(s)/kG/Hr IV Continuous <Continuous>  folic acid Injectable 1 milliGRAM(s) IV Push daily  gabapentin 300 milliGRAM(s) Oral daily  glucagon  Injectable 1 milliGRAM(s) IntraMuscular once  heparin  Infusion. 1900 Unit(s)/Hr IV Continuous <Continuous>  lidocaine   4% Patch 1 Patch Transdermal daily  meropenem  IVPB 1000 milliGRAM(s) IV Intermittent every 12 hours  meropenem  IVPB      metoprolol tartrate 12.5 milliGRAM(s) Oral two times a day  multivitamin/minerals 1 Tablet(s) Oral daily  pantoprazole  Injectable 40 milliGRAM(s) IV Push daily  phenylephrine    Infusion 0.5 MICROgram(s)/kG/Min IV Continuous <Continuous>  propofol Infusion 39.919 MICROgram(s)/kG/Min IV Continuous <Continuous>      PRN Meds:  acetaminophen     Tablet .. 500 milliGRAM(s) Oral every 6 hours PRN  fentaNYL    Injectable 25 MICROGram(s) IV Push every 15 minutes PRN  sodium chloride 0.9% lock flush 10 milliLiter(s) IV Push every 1 hour PRN      LABS:                        7.4    34.83 )-----------( 117      ( 08 Jun 2022 00:17 )             22.2     Hgb Trend: 7.4<--, 7.6<--, 7.9<--, 8.8<--, 8.6<--  06-08    136  |  103  |  30<H>  ----------------------------<  147<H>  3.5   |  16<L>  |  1.92<H>    Ca    8.1<L>      08 Jun 2022 00:17  Phos  5.4     06-08  Mg     2.3     06-08    TPro  5.5<L>  /  Alb  2.3<L>  /  TBili  1.3<H>  /  DBili  x   /  AST  24  /  ALT  7<L>  /  AlkPhos  249<H>  06-08    Creatinine Trend: 1.92<--, 1.98<--, 1.96<--, 1.90<--, 1.90<--, 1.91<--  PT/INR - ( 08 Jun 2022 00:17 )   PT: 15.1 sec;   INR: 1.30 ratio         PTT - ( 08 Jun 2022 00:17 )  PTT:113.7 sec    Arterial Blood Gas:  06-08 @ 00:00  7.35/32/102/18/98.4/-7.2  ABG lactate: --  Arterial Blood Gas:  06-07 @ 16:11  7.38/28/83/17/97.2/-7.6  ABG lactate: --  Arterial Blood Gas:  06-06 @ 12:07  7.31/32/97/16/96.8/-9.2  ABG lactate: --    Venous Blood Gas:  06-07 @ 00:40  7.28/36/44/17/73.6  VBG Lactate: 1.3      MICROBIOLOGY:     Culture - Fungal, Bronchial (collected 06 Jun 2022 18:04)  Source: .Bronchial Bronchial Lavage  Preliminary Report (07 Jun 2022 08:25):    Testing in progress    Culture - Bronchial (collected 06 Jun 2022 18:04)  Source: .Bronchial Bronchial Lavage  Gram Stain (07 Jun 2022 04:42):    No polymorphonuclear cells seen per low power field    No squamous epithelial cells per low power field    No organisms seen per oil power field  Preliminary Report (07 Jun 2022 16:33):    No growth to date.    Culture - Acid Fast - Bronchial w/Smear (collected 06 Jun 2022 18:04)  Source: .Bronchial Bronchial Lavage      RADIOLOGY:  [ ] Reviewed and interpreted by me    EKG:     Venkat Steel MD  Internal Medicine  Pager #[phone]    CHIEF COMPLAINT:Patient is a 66y old  Male who presents with a chief complaint of recurrent fevers, unknown source (08 Jun 2022 06:55)        Interval Events:    Following commands on minimal sedation successfully completed pressure support trial and extubated     REVIEW OF SYSTEMS:    Unable to obtain due to intubated and sedated    OBJECTIVE:  ICU Vital Signs Last 24 Hrs  T(C): 36.6 (08 Jun 2022 07:00), Max: 37.2 (07 Jun 2022 20:00)  T(F): 97.9 (08 Jun 2022 07:00), Max: 99 (07 Jun 2022 20:00)  HR: 80 (08 Jun 2022 07:00) (68 - 98)  BP: 122/66 (08 Jun 2022 07:00) (83/46 - 160/83)  BP(mean): 88 (08 Jun 2022 07:00) (60 - 117)  ABP: --  ABP(mean): --  RR: 29 (08 Jun 2022 07:00) (18 - 33)  SpO2: 98% (08 Jun 2022 07:00) (96% - 100%)    Mode: CPAP with PS, FiO2: 30, PEEP: 8, PS: 8, MAP: 13, PIP: 18    06-07 @ 07:01 - 06-08 @ 07:00  --------------------------------------------------------  IN: 2048.8 mL / OUT: 2470 mL / NET: -421.2 mL    06-08 @ 07:01  -  06-08 @ 07:46  --------------------------------------------------------  IN: 38 mL / OUT: 0 mL / NET: 38 mL    General: NAD, intubated and sedated  Neuro: following simple one step commands, withdrawing to pain on all four extremities, pupils equal and reactive to light   HEENT: PERRL, clear conjunctiva  Neck: supple  Respiratory: With lower airway breath sounds rhonchorous, no stridor  Cardiovascular: RRR  Abdomen: distended abdomen, remains soft,  with artic sun apparatus in place. turned off.       CAPILLARY BLOOD GLUCOSE      POCT Blood Glucose.: 164 mg/dL (08 Jun 2022 03:55)      PHYSICAL EXAM:      LINES:    HOSPITAL MEDICATIONS:  Standing Meds:  chlorhexidine 0.12% Liquid 15 milliLiter(s) Oral Mucosa every 12 hours  chlorhexidine 4% Liquid 1 Application(s) Topical <User Schedule>  chlorhexidine 4% Liquid 1 Application(s) Topical <User Schedule>  citric acid/sodium citrate Solution 30 milliLiter(s) Oral every 6 hours  dexAMETHasone  IVPB 20 milliGRAM(s) IV Intermittent daily  fentaNYL   Infusion... 3.002 MICROgram(s)/kG/Hr IV Continuous <Continuous>  folic acid Injectable 1 milliGRAM(s) IV Push daily  gabapentin 300 milliGRAM(s) Oral daily  glucagon  Injectable 1 milliGRAM(s) IntraMuscular once  heparin  Infusion. 1900 Unit(s)/Hr IV Continuous <Continuous>  lidocaine   4% Patch 1 Patch Transdermal daily  meropenem  IVPB 1000 milliGRAM(s) IV Intermittent every 12 hours  meropenem  IVPB      metoprolol tartrate 12.5 milliGRAM(s) Oral two times a day  multivitamin/minerals 1 Tablet(s) Oral daily  pantoprazole  Injectable 40 milliGRAM(s) IV Push daily  phenylephrine    Infusion 0.5 MICROgram(s)/kG/Min IV Continuous <Continuous>  propofol Infusion 39.919 MICROgram(s)/kG/Min IV Continuous <Continuous>      PRN Meds:  acetaminophen     Tablet .. 500 milliGRAM(s) Oral every 6 hours PRN  fentaNYL    Injectable 25 MICROGram(s) IV Push every 15 minutes PRN  sodium chloride 0.9% lock flush 10 milliLiter(s) IV Push every 1 hour PRN      LABS:                        7.4    34.83 )-----------( 117      ( 08 Jun 2022 00:17 )             22.2     Hgb Trend: 7.4<--, 7.6<--, 7.9<--, 8.8<--, 8.6<--  06-08    136  |  103  |  30<H>  ----------------------------<  147<H>  3.5   |  16<L>  |  1.92<H>    Ca    8.1<L>      08 Jun 2022 00:17  Phos  5.4     06-08  Mg     2.3     06-08    TPro  5.5<L>  /  Alb  2.3<L>  /  TBili  1.3<H>  /  DBili  x   /  AST  24  /  ALT  7<L>  /  AlkPhos  249<H>  06-08    Creatinine Trend: 1.92<--, 1.98<--, 1.96<--, 1.90<--, 1.90<--, 1.91<--  PT/INR - ( 08 Jun 2022 00:17 )   PT: 15.1 sec;   INR: 1.30 ratio         PTT - ( 08 Jun 2022 00:17 )  PTT:113.7 sec    Arterial Blood Gas:  06-08 @ 00:00  7.35/32/102/18/98.4/-7.2  ABG lactate: --  Arterial Blood Gas:  06-07 @ 16:11  7.38/28/83/17/97.2/-7.6  ABG lactate: --  Arterial Blood Gas:  06-06 @ 12:07  7.31/32/97/16/96.8/-9.2  ABG lactate: --    Venous Blood Gas:  06-07 @ 00:40  7.28/36/44/17/73.6  VBG Lactate: 1.3      MICROBIOLOGY:     Culture - Fungal, Bronchial (collected 06 Jun 2022 18:04)  Source: .Bronchial Bronchial Lavage  Preliminary Report (07 Jun 2022 08:25):    Testing in progress    Culture - Bronchial (collected 06 Jun 2022 18:04)  Source: .Bronchial Bronchial Lavage  Gram Stain (07 Jun 2022 04:42):    No polymorphonuclear cells seen per low power field    No squamous epithelial cells per low power field    No organisms seen per oil power field  Preliminary Report (07 Jun 2022 16:33):    No growth to date.    Culture - Acid Fast - Bronchial w/Smear (collected 06 Jun 2022 18:04)  Source: .Bronchial Bronchial Lavage      RADIOLOGY:  [ ] Reviewed and interpreted by me    EKG:

## 2022-06-08 NOTE — PROGRESS NOTE ADULT - SUBJECTIVE AND OBJECTIVE BOX
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 Moore), DM, TIA, BPH, and HUNG who pwas transferred to the hospital due to concerns for septic shock and recurrent fevers. 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 Since then he has been following Dr. Moore as his primary nephrologist. The patient has was on treatment with steroids and cyclophosphamide. Most recently the patient is being treated with MMF 750mg BID. Upon arrival to the hospital the patient was noted to have a SCr of 1.16 which has since risen to 1.98mg/dL this morning. The nephrology team was consulted for PEPE. The patient was intubated on 6/5 for worsening work of breathing.     Pt. seen and examined this morning in the ICU. Currently intubated and sedated. Unable to attain ROS. No acute issues noted overnight. Pt. currently off IV vasopressors.         PAST HISTORY  --------------------------------------------------------------------------------  No significant changes to PMH, PSH, FHx, SHx, unless otherwise noted    ALLERGIES & MEDICATIONS  --------------------------------------------------------------------------------  Allergies    No Known Allergies    Intolerances      Standing Inpatient Medications  chlorhexidine 0.12% Liquid 15 milliLiter(s) Oral Mucosa every 12 hours  chlorhexidine 4% Liquid 1 Application(s) Topical <User Schedule>  chlorhexidine 4% Liquid 1 Application(s) Topical <User Schedule>  citric acid/sodium citrate Solution 30 milliLiter(s) Oral every 6 hours  dexAMETHasone  IVPB 20 milliGRAM(s) IV Intermittent daily  fentaNYL   Infusion... 3.002 MICROgram(s)/kG/Hr IV Continuous <Continuous>  folic acid Injectable 1 milliGRAM(s) IV Push daily  gabapentin 300 milliGRAM(s) Oral daily  glucagon  Injectable 1 milliGRAM(s) IntraMuscular once  heparin  Infusion. 1900 Unit(s)/Hr IV Continuous <Continuous>  lidocaine   4% Patch 1 Patch Transdermal daily  meropenem  IVPB 1000 milliGRAM(s) IV Intermittent every 12 hours  meropenem  IVPB      metoprolol tartrate 12.5 milliGRAM(s) Oral two times a day  multivitamin/minerals 1 Tablet(s) Oral daily  pantoprazole  Injectable 40 milliGRAM(s) IV Push daily  phenylephrine    Infusion 0.5 MICROgram(s)/kG/Min IV Continuous <Continuous>  propofol Infusion 39.919 MICROgram(s)/kG/Min IV Continuous <Continuous>    PRN Inpatient Medications  acetaminophen     Tablet .. 500 milliGRAM(s) Oral every 6 hours PRN  fentaNYL    Injectable 25 MICROGram(s) IV Push every 15 minutes PRN  sodium chloride 0.9% lock flush 10 milliLiter(s) IV Push every 1 hour PRN      REVIEW OF SYSTEMS  Unable to obtain    VITALS/PHYSICAL EXAM  --------------------------------------------------------------------------------  T(C): 36.8 (06-08-22 @ 06:00), Max: 37.2 (06-07-22 @ 20:00)  HR: 79 (06-08-22 @ 06:01) (68 - 108)  BP: 126/68 (06-08-22 @ 06:00) (83/46 - 160/83)  RR: 20 (06-08-22 @ 06:00) (18 - 33)  SpO2: 99% (06-08-22 @ 06:01) (96% - 100%)  Wt(kg): --        06-06-22 @ 07:01  -  06-07-22 @ 07:00  --------------------------------------------------------  IN: 3076.6 mL / OUT: 1400 mL / NET: 1676.6 mL    06-07-22 @ 07:01  -  06-08-22 @ 06:55  --------------------------------------------------------  IN: 2001.8 mL / OUT: 2315 mL / NET: -313.2 mL        Physical Exam:  	Gen: ill appearing  	HEENT: intubated  	Pulm: coarse breath sounds   	CV: S1S2  	Abd: Soft, +BS   	Ext: ++ LE edema B/L  	Neuro: intubated and sedated  	Skin: Warm and dry      LABS/STUDIES  --------------------------------------------------------------------------------              7.4    34.83 >-----------<  117      [06-08-22 @ 00:17]              22.2     136  |  103  |  30  ----------------------------<  147      [06-08-22 @ 00:17]  3.5   |  16  |  1.92        Ca     8.1     [06-08-22 @ 00:17]      Mg     2.3     [06-08-22 @ 00:17]      Phos  5.4     [06-08-22 @ 00:17]    TPro  5.5  /  Alb  2.3  /  TBili  1.3  /  DBili  x   /  AST  24  /  ALT  7   /  AlkPhos  249  [06-08-22 @ 00:17]    PT/INR: PT 15.1 , INR 1.30       [06-08-22 @ 00:17]  PTT: 113.7      [06-08-22 @ 00:17]          [06-07-22 @ 07:23]    Creatinine Trend:  SCr 1.92 [06-08 @ 00:17]  SCr 1.98 [06-07 @ 16:17]  SCr 1.96 [06-07 @ 07:23]  SCr 1.90 [06-07 @ 00:49]  SCr 1.90 [06-06 @ 19:00]    Urinalysis - [06-05-22 @ 12:05]      Color Yellow / Appearance Slightly Turbid / SG 1.019 / pH 6.0      Gluc Negative / Ketone Negative  / Bili Negative / Urobili Negative       Blood Small / Protein 100 / Leuk Est Negative / Nitrite Negative      RBC 4 / WBC 5 / Hyaline 2 / Gran  / Sq Epi  / Non Sq Epi 3 / Bacteria Many    Urine Creatinine 102      [06-05-22 @ 12:06]  Urine Protein 146      [06-05-22 @ 12:05]  Urine Sodium 21      [06-05-22 @ 12:06]  Urine Urea Nitrogen 181      [06-05-22 @ 12:05]  Urine Potassium 36      [06-05-22 @ 12:06]  Urine Chloride 65      [06-03-22 @ 16:45]  Urine Phosphorus 41.7      [06-05-22 @ 12:05]  Urine Osmolality 240      [06-05-22 @ 12:06]    Iron 43, TIBC 128, %sat 34      [06-01-22 @ 04:47]  Ferritin 841      [06-06-22 @ 12:15]  TSH 3.52      [06-03-22 @ 14:46]  Lipid: chol --, , HDL --, LDL --      [06-07-22 @ 07:15]    HBsAg Nonreact      [05-30-22 @ 03:46]  HCV 0.31, Nonreact      [05-31-22 @ 00:32]  HIV Nonreact      [06-01-22 @ 13:46]  HIV Nonreact      [05-30-22 @ 04:13]    ED: titer 1:320, pattern Homogeneous      [06-04-22 @ 14:21]  dsDNA 21      [06-05-22 @ 00:23]  C3 Complement 116      [06-04-22 @ 14:21]  C4 Complement 36      [06-04-22 @ 14:21]  Rheumatoid Factor <10      [06-04-22 @ 14:21]  ANCA: cANCA Negative, pANCA Negative, atypical ANCA Indeterminate Method interference due to ED fluorescence      [06-04-22 @ 14:21]  ASLO <20      [06-04-22 @ 16:53]  Free Light Chains: kappa 14.57, lambda 23.09, ratio = 0.63      [06-01 @ 04:47]  Immunofixation Serum:   One Weak IgG Kappa Band and One Weak IgG Lambda Band Identified    Reference Range: None Detected      [06-01-22 @ 04:47]  SPEP Interpretation: Two Weak Gamma-Migrating Paraproteins Identified      [06-01-22 @ 04:47]

## 2022-06-08 NOTE — PROGRESS NOTE ADULT - ATTENDING COMMENTS
Lupus nephritis, ARF and respiratory failure  1.  ARF on CKD--non oliguric, no HD.    2.  Lupus nephritis--holding MMF, on steroids.  Concern for evolving HLH (would be with relatively low ferritin)    discussed with MICU team, attending

## 2022-06-08 NOTE — PROGRESS NOTE ADULT - SUBJECTIVE AND OBJECTIVE BOX
HANDY FREEMAN 66y [id number]    Patient is a 66y old  Male who presents with a chief complaint of recurrent fevers, unknown source (08 Jun 2022 14:16)      Follow Up/CC:  ID following for fever    Interval History/ROS: intubated, possible extubation today, no fever    Allergies    No Known Allergies    Intolerances        ANTIMICROBIALS:  meropenem  IVPB    meropenem  IVPB 1000 every 12 hours      MEDICATIONS  (STANDING):  chlorhexidine 4% Liquid 1 Application(s) Topical <User Schedule>  chlorhexidine 4% Liquid 1 Application(s) Topical <User Schedule>  citric acid/sodium citrate Solution 30 milliLiter(s) Oral every 6 hours  dexAMETHasone  IVPB 20 milliGRAM(s) IV Intermittent daily  dexMEDEtomidine Infusion 0.2 MICROgram(s)/kG/Hr (4.53 mL/Hr) IV Continuous <Continuous>  fentaNYL   Infusion... 3.002 MICROgram(s)/kG/Hr (13.6 mL/Hr) IV Continuous <Continuous>  folic acid Injectable 1 milliGRAM(s) IV Push daily  gabapentin 300 milliGRAM(s) Oral daily  glucagon  Injectable 1 milliGRAM(s) IntraMuscular once  heparin  Infusion. 1900 Unit(s)/Hr (19 mL/Hr) IV Continuous <Continuous>  lidocaine   4% Patch 1 Patch Transdermal daily  meropenem  IVPB      meropenem  IVPB 1000 milliGRAM(s) IV Intermittent every 12 hours  metoprolol tartrate 12.5 milliGRAM(s) Oral two times a day  multivitamin/minerals 1 Tablet(s) Oral daily  pantoprazole  Injectable 40 milliGRAM(s) IV Push daily  phenylephrine    Infusion 0.5 MICROgram(s)/kG/Min (17 mL/Hr) IV Continuous <Continuous>  propofol Infusion 39.919 MICROgram(s)/kG/Min (21.7 mL/Hr) IV Continuous <Continuous>    MEDICATIONS  (PRN):  acetaminophen     Tablet .. 500 milliGRAM(s) Oral every 6 hours PRN Temp greater or equal to 38C (100.4F)  fentaNYL    Injectable 25 MICROGram(s) IV Push every 15 minutes PRN for uptitration of fentanyl  sodium chloride 0.9% lock flush 10 milliLiter(s) IV Push every 1 hour PRN Pre/post blood products, medications, blood draw, and to maintain line patency    Mode: CPAP with PS  FiO2: 30  PEEP: 8  PS: 5  MAP: 10  PIP: 15      Vital Signs Last 24 Hrs  T(C): 37.3 (08 Jun 2022 15:00), Max: 37.4 (08 Jun 2022 12:00)  T(F): 99.1 (08 Jun 2022 15:00), Max: 99.3 (08 Jun 2022 12:00)  HR: 79 (08 Jun 2022 17:00) (72 - 96)  BP: 165/85 (08 Jun 2022 17:00) (102/55 - 173/95)  BP(mean): 118 (08 Jun 2022 17:00) (71 - 125)  RR: 33 (08 Jun 2022 17:00) (18 - 33)  SpO2: 98% (08 Jun 2022 17:00) (97% - 100%)    CBC Full  -  ( 08 Jun 2022 15:07 )  WBC Count : 30.55 K/uL  RBC Count : 2.34 M/uL  Hemoglobin : 7.1 g/dL  Hematocrit : 22.1 %  Platelet Count - Automated : 111 K/uL  Mean Cell Volume : 94.4 fl  Mean Cell Hemoglobin : 30.3 pg  Mean Cell Hemoglobin Concentration : 32.1 gm/dL  Auto Neutrophil # : x  Auto Lymphocyte # : x  Auto Monocyte # : x  Auto Eosinophil # : x  Auto Basophil # : x  Auto Neutrophil % : x  Auto Lymphocyte % : x  Auto Monocyte % : x  Auto Eosinophil % : x  Auto Basophil % : x    06-08    141  |  106  |  34<H>  ----------------------------<  125<H>  3.7   |  18<L>  |  1.88<H>    Ca    8.2<L>      08 Jun 2022 15:04  Phos  6.6     06-08  Mg     2.5     06-08    TPro  6.0  /  Alb  2.9<L>  /  TBili  1.0  /  DBili  x   /  AST  21  /  ALT  7<L>  /  AlkPhos  246<H>  06-08    LIVER FUNCTIONS - ( 08 Jun 2022 15:04 )  Alb: 2.9 g/dL / Pro: 6.0 g/dL / ALK PHOS: 246 U/L / ALT: 7 U/L / AST: 21 U/L / GGT: x               MICROBIOLOGY:  .Bronchial Bronchial Lavage  06-06-22   Culture is being performed.  --    No polymorphonuclear cells seen per low power field  No squamous epithelial cells per low power field  No organisms seen per oil power field      .Blood Blood  06-05-22   NEGATIVE for Plasmodium antigens. Microscopy is performed for  confirmation.  This test does not detect the presence of Babesia species.  If Babesiosis is suspected, please order test for Babesia PCR: Babesia  microti PCR Bld  ************************************************************  No Blood Parasites observed by giemsa stain  One negative set of blood smears does not rule out  the possibility of a parasitic infection.  A minimum of 3  specimens should be collected, at least 12-24 hours apart,  over a 36 hour time period.  --  --      .Blood Blood  06-04-22   NEGATIVE for Plasmodium antigens. Microscopy is performed for  confirmation.  This test does not detect the presence of Babesia species.  If Babesiosis is suspected, please order test for Babesia PCR: Babesia  microti PCR Bld  ************************************************************  No Blood Parasites observed by giemsa stain  One negative set of blood smears does not rule out  the possibility of a parasitic infection.  A minimum of 3  specimens should be collected, at least 12-24 hours apart,  over a 36 hour time period.  --  --      .Sputum Sputum  06-04-22   Normal Respiratory Wen present  --    Rare Squamous epithelial cells per low power field  Rare polymorphonuclear leukocytes per low power field  No organisms seen      .Sputum Sputum  06-04-22   Culture is being performed.  --  --      .Blood Blood-Venous  06-04-22   No growth to date.  --  --      .Blood Blood-Peripheral  06-03-22   No growth to date.  --  --      .Body Fluid Peritoneal Fluid  06-02-22   No growth  --    polymorphonuclear leukocytes seen  No organisms seen  by cytocentrifuge      .Blood Blood-Peripheral  06-01-22   No Growth Final  --  --      .Blood Blood-Venous  06-01-22   No Growth Final  --  --      .Stool Stool  06-01-22   Culture is being performed.  --  --      .Stool Stool  06-01-22   No growth at 1 week.  --  --      .Sputum Sputum  06-01-22   No growth at 1 week.  --  --      .Stool Stool  05-31-22   No growth at 1 week.  --  --      .Sputum Sputum  05-31-22   No growth at 1 week.  --  --      .Sputum Sputum  05-30-22   Normal Respiratory Wen present  --    No polymorphonuclear leukocytes per low power field  No Squamous epithelial cells per low power field  No organisms seen per oil power field      .Stool sarthak betito  05-30-22   No enteric pathogens isolated.  (Stool culture examined for Salmonella,  Shigella, Campylobacter, Aeromonas, Plesiomonas,  Vibrio, E.coli O157 and Yersinia)  --  --      .Blood Blood  05-30-22   NEGATIVE for Plasmodium antigens. Microscopy is performed for  confirmation.  This test does not detect the presence of Babesia species.  If Babesiosis is suspected, please order test for Babesia PCR: Babesia  microti PCR d  ************************************************************  No Blood Parasites observed by giemsa stain  One negative set of blood smears does not rule out  the possibility of a parasitic infection.  A minimum of 3  specimens should be collected, at least 12-24 hours apart,  over a 36 hour time period.  --  --      .Stool Feces sarthak betito  05-30-22   GI PCR Results: NOT detected  *******Please Note:*******  GI panel PCR evaluates for:  Campylobacter, Plesiomonas shigelloides, Salmonella,  Vibrio, Yersinia enterocolitica, Enteroaggregative  Escherichia coli (EAEC), Enteropathogenic E.coli (EPEC),  Enterotoxigenic E. coli (ETEC) lt/st, Shiga-like  toxin-producing E. coli (STEC) stx1/stx2,  Shigella/ Enteroinvasive E. coli (EIEC), Cryptosporidium,  Cyclospora cayetanensis, Entamoeba histolytica,  Giardia lamblia, Adenovirus F 40/41, Astrovirus,  Norovirus GI/GII, Rotavirus A, Sapovirus  --  --      .Blood Blood  05-30-22   No Growth Final  --  --      .Blood Blood  05-30-22   No growth  --  --      .Blood Blood  05-30-22   No Growth Final  --  --        Rapid RVP Result: NotDetec (06-04 @ 06:10)          RADIOLOGY    < from: Xray Chest 1 View- PORTABLE-Urgent (Xray Chest 1 View- PORTABLE-Urgent .) (06.08.22 @ 14:15) >  Persistent low lung lines of bibasilar atelectasis.    < end of copied text >  < from: CT Abdomen and Pelvis No Cont (06.07.22 @ 10:16) >  New pulmonary opacities, edema versus infection.    Anasarca with increasing ascites.    No other interval change since CT of 5/30/2022.    < end of copied text >  < from: CT Head No Cont (06.07.22 @ 10:11) >  IMPRESSION:  Age-appropriate involutional change and microvascular   ischemic disease. No intracranial hemorrhage.      < end of copied text >

## 2022-06-08 NOTE — PROGRESS NOTE ADULT - ASSESSMENT
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   No response to broad spectrum antibiotics and only had a small gluteal skin abscess, clean s/p I&D  MTB is possible but less likely, no obvious lung lesions  Quant gold IND  HIV negative  CT mesenteric LAD, possible PE, enlarged spleen, HSM, ascites  Fevers with no chills  Peritoneal fluid has elevated neutrophils concerning for peritonitis--culture NGTD; on edilson for now  Malaria screenings negative  Suspected malignancy  Overall, Fevers, abnormal finding on imaging, leukocytosis    -all cx negative  -path pending   -on steroids for HLH  -DC abx and observe - not sure what we are treating   -monitor temps, cbc  -vent per NICOLASA Crump  Attending Physician   Division of Infectious Disease  Office #[phone]  Available on Microsoft Teams also  After 5pm/weekend or no response, call #[phone]

## 2022-06-09 NOTE — PROGRESS NOTE ADULT - PROBLEM SELECTOR PLAN 3
-r/o HLH 2/2 EBV  -has fevers, splenomegaly, cytopenia  -HLH labs: soluble IL-2 levels 74452, ferritin 511, triglyceride 412, 6/6 fibrinogen 218 6/6  , pending final pathology from cervical lymph node biopsy, flow cytometry from peripheral blood without acute abnormalities in lymphoid, myeloid, or plasma cell lines.   -IgG4 27  -dexamethasone 20 qd discussed with heme on 6/6 following bronchoscopy  -pending pathology 6/7  -Prelim per verbal with pathology EBV+ lymphoprofileration, with empiric treatment initiated for HLH and no specific treatment planned for EBV per discussion with ID.

## 2022-06-09 NOTE — PROGRESS NOTE ADULT - ASSESSMENT
65 yo M with a PMH HTN, HLD, SLE on Cellcept (MMF), class V lupus nephritis, CKD stage 2, DM2, diabetic neuropathy, TIA (2019) on plavix, BPH, HUNG, with multiple hospitalizations for c diff, acute panc, septic shock 22 buttock abscess status post drainage transferred for recurrent fevers status post core biopsy and paracentesis suspected lymphoma v less likely peritoneal tb intubated and sedated due to upper airway stridor and altered mental status and extubated 6/8 now transferred to medicine service

## 2022-06-09 NOTE — PROGRESS NOTE ADULT - PROBLEM SELECTOR PLAN 5
#chronic inflammatory diarrhea  #possible Crohn's vs colitis vs infectious  -calprotectin elevated at [facility]. no current plan by GI for colonoscopy at this time.   -C diff negative  -appreciate GI and ID input.   -less likely crohns disease

## 2022-06-09 NOTE — PROGRESS NOTE ADULT - PROBLEM SELECTOR PLAN 9
-Lopressor 2.5 q6h discontinued 6/3 ON receiving crystalloid and colloid 6/3 ON to 6/4.   -6/6 metoprolol tartarate 12.5mg BID

## 2022-06-09 NOTE — PROGRESS NOTE ADULT - SUBJECTIVE AND OBJECTIVE BOX
Venkat Steel MD  Internal Medicine  Pager #[phone]    CHIEF COMPLAINT:Patient is a 66y old  Male who presents with a chief complaint of recurrent fevers, unknown source (09 Jun 2022 06:56)        Interval Events:    REVIEW OF SYSTEMS:      OBJECTIVE:  ICU Vital Signs Last 24 Hrs  T(C): 36.8 (09 Jun 2022 04:00), Max: 37.4 (08 Jun 2022 12:00)  T(F): 98.2 (09 Jun 2022 04:00), Max: 99.3 (08 Jun 2022 12:00)  HR: 69 (09 Jun 2022 07:00) (69 - 96)  BP: 161/83 (09 Jun 2022 07:00) (134/63 - 179/85)  BP(mean): 115 (09 Jun 2022 07:00) (91 - 125)  ABP: --  ABP(mean): --  RR: 17 (09 Jun 2022 07:00) (17 - 34)  SpO2: 99% (09 Jun 2022 07:00) (97% - 100%)    Mode: CPAP with PS, FiO2: 30, PEEP: 8, PS: 5, MAP: 10, PIP: 15    06-08 @ 07:01  -  06-09 @ 07:00  --------------------------------------------------------  IN: 1109.5 mL / OUT: 1800 mL / NET: -690.5 mL      CAPILLARY BLOOD GLUCOSE      POCT Blood Glucose.: 146 mg/dL (09 Jun 2022 05:38)      PHYSICAL EXAM:      LINES:    HOSPITAL MEDICATIONS:  Standing Meds:  chlorhexidine 4% Liquid 1 Application(s) Topical <User Schedule>  chlorhexidine 4% Liquid 1 Application(s) Topical <User Schedule>  citric acid/sodium citrate Solution 30 milliLiter(s) Oral every 6 hours  dexAMETHasone  IVPB 20 milliGRAM(s) IV Intermittent daily  folic acid Injectable 1 milliGRAM(s) IV Push daily  gabapentin 300 milliGRAM(s) Oral daily  glucagon  Injectable 1 milliGRAM(s) IntraMuscular once  heparin  Infusion. 1900 Unit(s)/Hr IV Continuous <Continuous>  lidocaine   4% Patch 1 Patch Transdermal daily  melatonin 5 milliGRAM(s) Oral at bedtime  metoprolol tartrate 12.5 milliGRAM(s) Oral two times a day  multivitamin/minerals 1 Tablet(s) Oral daily  pantoprazole  Injectable 40 milliGRAM(s) IV Push daily  phenylephrine    Infusion 0.5 MICROgram(s)/kG/Min IV Continuous <Continuous>      PRN Meds:  acetaminophen     Tablet .. 500 milliGRAM(s) Oral every 6 hours PRN  sodium chloride 0.9% lock flush 10 milliLiter(s) IV Push every 1 hour PRN      LABS:                        7.1    26.92 )-----------( 107      ( 09 Jun 2022 00:25 )             21.9     Hgb Trend: 7.1<--, 7.1<--, 7.5<--, 7.4<--, 7.6<--  06-09    140  |  107  |  36<H>  ----------------------------<  126<H>  3.5   |  18<L>  |  1.92<H>    Ca    8.3<L>      09 Jun 2022 00:25  Phos  6.8     06-09  Mg     2.5     06-09    TPro  5.8<L>  /  Alb  2.9<L>  /  TBili  1.1  /  DBili  x   /  AST  33  /  ALT  10  /  AlkPhos  253<H>  06-09    Creatinine Trend: 1.92<--, 1.88<--, 1.95<--, 1.92<--, 1.98<--, 1.96<--  PT/INR - ( 09 Jun 2022 00:25 )   PT: 14.5 sec;   INR: 1.26 ratio         PTT - ( 09 Jun 2022 00:25 )  PTT:96.3 sec    Arterial Blood Gas:  06-08 @ 00:00  7.35/32/102/18/98.4/-7.2  ABG lactate: --  Arterial Blood Gas:  06-07 @ 16:11  7.38/28/83/17/97.2/-7.6  ABG lactate: --        MICROBIOLOGY:     Culture - Fungal, Bronchial (collected 06 Jun 2022 18:04)  Source: .Bronchial Bronchial Lavage  Preliminary Report (07 Jun 2022 08:25):    Testing in progress    Culture - Bronchial (collected 06 Jun 2022 18:04)  Source: .Bronchial Bronchial Lavage  Gram Stain (07 Jun 2022 04:42):    No polymorphonuclear cells seen per low power field    No squamous epithelial cells per low power field    No organisms seen per oil power field  Final Report (08 Jun 2022 16:20):    No growth at 48 hours    Culture - Acid Fast - Bronchial w/Smear (collected 06 Jun 2022 18:04)  Source: .Bronchial Bronchial Lavage  Preliminary Report (08 Jun 2022 15:04):    Culture is being performed.      RADIOLOGY:  [ ] Reviewed and interpreted by me    EKG:     Venkat Steel MD  Internal Medicine  Pager #[phone]    CHIEF COMPLAINT:Patient is a 66y old  Male who presents with a chief complaint of recurrent fevers, unknown source (09 Jun 2022 06:56)        Interval Events:    Continuing to improve mentation and without difficulty breathing. concerns with rectal pain requiring dilaudid ON    REVIEW OF SYSTEMS:    CV no chest pain or palpitation  Pulm no SOB or cough  Abd no abdominal pain  Rectum: rectal pain    OBJECTIVE:  ICU Vital Signs Last 24 Hrs  T(C): 36.8 (09 Jun 2022 04:00), Max: 37.4 (08 Jun 2022 12:00)  T(F): 98.2 (09 Jun 2022 04:00), Max: 99.3 (08 Jun 2022 12:00)  HR: 69 (09 Jun 2022 07:00) (69 - 96)  BP: 161/83 (09 Jun 2022 07:00) (134/63 - 179/85)  BP(mean): 115 (09 Jun 2022 07:00) (91 - 125)  ABP: --  ABP(mean): --  RR: 17 (09 Jun 2022 07:00) (17 - 34)  SpO2: 99% (09 Jun 2022 07:00) (97% - 100%)    Mode: CPAP with PS, FiO2: 30, PEEP: 8, PS: 5, MAP: 10, PIP: 15    06-08 @ 07:01  -  06-09 @ 07:00  --------------------------------------------------------  IN: 1109.5 mL / OUT: 1800 mL / NET: -690.5 mL    General: NAD, extubated  Neuro: following simple one step commands, responding to verbal stimuli, pupils equal and reactive to light   HEENT: PERRL, clear conjunctiva  Neck: supple  Respiratory: CTAB  Cardiovascular: RRR  Abdomen: distended abdomen, remains soft,      CAPILLARY BLOOD GLUCOSE      POCT Blood Glucose.: 146 mg/dL (09 Jun 2022 05:38)      PHYSICAL EXAM:      LINES:    HOSPITAL MEDICATIONS:  Standing Meds:  chlorhexidine 4% Liquid 1 Application(s) Topical <User Schedule>  chlorhexidine 4% Liquid 1 Application(s) Topical <User Schedule>  citric acid/sodium citrate Solution 30 milliLiter(s) Oral every 6 hours  dexAMETHasone  IVPB 20 milliGRAM(s) IV Intermittent daily  folic acid Injectable 1 milliGRAM(s) IV Push daily  gabapentin 300 milliGRAM(s) Oral daily  glucagon  Injectable 1 milliGRAM(s) IntraMuscular once  heparin  Infusion. 1900 Unit(s)/Hr IV Continuous <Continuous>  lidocaine   4% Patch 1 Patch Transdermal daily  melatonin 5 milliGRAM(s) Oral at bedtime  metoprolol tartrate 12.5 milliGRAM(s) Oral two times a day  multivitamin/minerals 1 Tablet(s) Oral daily  pantoprazole  Injectable 40 milliGRAM(s) IV Push daily  phenylephrine    Infusion 0.5 MICROgram(s)/kG/Min IV Continuous <Continuous>      PRN Meds:  acetaminophen     Tablet .. 500 milliGRAM(s) Oral every 6 hours PRN  sodium chloride 0.9% lock flush 10 milliLiter(s) IV Push every 1 hour PRN      LABS:                        7.1    26.92 )-----------( 107      ( 09 Jun 2022 00:25 )             21.9     Hgb Trend: 7.1<--, 7.1<--, 7.5<--, 7.4<--, 7.6<--  06-09    140  |  107  |  36<H>  ----------------------------<  126<H>  3.5   |  18<L>  |  1.92<H>    Ca    8.3<L>      09 Jun 2022 00:25  Phos  6.8     06-09  Mg     2.5     06-09    TPro  5.8<L>  /  Alb  2.9<L>  /  TBili  1.1  /  DBili  x   /  AST  33  /  ALT  10  /  AlkPhos  253<H>  06-09    Creatinine Trend: 1.92<--, 1.88<--, 1.95<--, 1.92<--, 1.98<--, 1.96<--  PT/INR - ( 09 Jun 2022 00:25 )   PT: 14.5 sec;   INR: 1.26 ratio         PTT - ( 09 Jun 2022 00:25 )  PTT:96.3 sec    Arterial Blood Gas:  06-08 @ 00:00  7.35/32/102/18/98.4/-7.2  ABG lactate: --  Arterial Blood Gas:  06-07 @ 16:11  7.38/28/83/17/97.2/-7.6  ABG lactate: --        MICROBIOLOGY:     Culture - Fungal, Bronchial (collected 06 Jun 2022 18:04)  Source: .Bronchial Bronchial Lavage  Preliminary Report (07 Jun 2022 08:25):    Testing in progress    Culture - Bronchial (collected 06 Jun 2022 18:04)  Source: .Bronchial Bronchial Lavage  Gram Stain (07 Jun 2022 04:42):    No polymorphonuclear cells seen per low power field    No squamous epithelial cells per low power field    No organisms seen per oil power field  Final Report (08 Jun 2022 16:20):    No growth at 48 hours    Culture - Acid Fast - Bronchial w/Smear (collected 06 Jun 2022 18:04)  Source: .Bronchial Bronchial Lavage  Preliminary Report (08 Jun 2022 15:04):    Culture is being performed.      RADIOLOGY:  [ ] Reviewed and interpreted by me    EKG:

## 2022-06-09 NOTE — PROGRESS NOTE ADULT - SUBJECTIVE AND OBJECTIVE BOX
NATANAGUSHANDY HENDRICKSON 66y [id number]    Patient is a 66y old  Male who presents with a chief complaint of recurrent fevers, unknown source (09 Jun 2022 07:58)      Follow Up/CC:  ID following for fever    Interval History/ROS: no fever, extubated     Allergies    No Known Allergies    Intolerances        ANTIMICROBIALS:      MEDICATIONS  (STANDING):  chlorhexidine 4% Liquid 1 Application(s) Topical <User Schedule>  citric acid/sodium citrate Solution 30 milliLiter(s) Oral every 6 hours  dexAMETHasone  IVPB 20 milliGRAM(s) IV Intermittent daily  folic acid 1 milliGRAM(s) Oral daily  gabapentin 300 milliGRAM(s) Oral daily  glucagon  Injectable 1 milliGRAM(s) IntraMuscular once  heparin  Infusion. 1900 Unit(s)/Hr (19 mL/Hr) IV Continuous <Continuous>  hydrALAZINE 10 milliGRAM(s) Oral every 8 hours  lidocaine   4% Patch 1 Patch Transdermal daily  melatonin 5 milliGRAM(s) Oral at bedtime  metoprolol tartrate 12.5 milliGRAM(s) Oral two times a day  multivitamin/minerals 1 Tablet(s) Oral daily  pantoprazole  Injectable 40 milliGRAM(s) IV Push daily  phenylephrine    Infusion 0.5 MICROgram(s)/kG/Min (17 mL/Hr) IV Continuous <Continuous>  tamsulosin 0.4 milliGRAM(s) Oral at bedtime    MEDICATIONS  (PRN):  acetaminophen     Tablet .. 500 milliGRAM(s) Oral every 6 hours PRN Temp greater or equal to 38C (100.4F)  sodium chloride 0.9% lock flush 10 milliLiter(s) IV Push every 1 hour PRN Pre/post blood products, medications, blood draw, and to maintain line patency        Vital Signs Last 24 Hrs  T(C): 36.7 (09 Jun 2022 12:00), Max: 37.3 (08 Jun 2022 15:00)  T(F): 98.1 (09 Jun 2022 12:00), Max: 99.1 (08 Jun 2022 15:00)  HR: 70 (09 Jun 2022 12:00) (65 - 96)  BP: 172/88 (09 Jun 2022 12:00) (134/63 - 186/99)  BP(mean): 122 (09 Jun 2022 12:00) (91 - 135)  RR: 27 (09 Jun 2022 12:00) (17 - 34)  SpO2: 98% (09 Jun 2022 12:00) (97% - 100%)    CBC Full  -  ( 09 Jun 2022 00:25 )  WBC Count : 26.92 K/uL  RBC Count : 2.29 M/uL  Hemoglobin : 7.1 g/dL  Hematocrit : 21.9 %  Platelet Count - Automated : 107 K/uL  Mean Cell Volume : 95.6 fl  Mean Cell Hemoglobin : 31.0 pg  Mean Cell Hemoglobin Concentration : 32.4 gm/dL  Auto Neutrophil # : x  Auto Lymphocyte # : x  Auto Monocyte # : x  Auto Eosinophil # : x  Auto Basophil # : x  Auto Neutrophil % : x  Auto Lymphocyte % : x  Auto Monocyte % : x  Auto Eosinophil % : x  Auto Basophil % : x    06-09    141  |  106  |  38<H>  ----------------------------<  138<H>  4.0   |  19<L>  |  1.82<H>    Ca    8.4      09 Jun 2022 08:54  Phos  7.4     06-09  Mg     2.5     06-09    TPro  6.1  /  Alb  2.8<L>  /  TBili  1.0  /  DBili  x   /  AST  30  /  ALT  9<L>  /  AlkPhos  244<H>  06-09    LIVER FUNCTIONS - ( 09 Jun 2022 08:54 )  Alb: 2.8 g/dL / Pro: 6.1 g/dL / ALK PHOS: 244 U/L / ALT: 9 U/L / AST: 30 U/L / GGT: x               MICROBIOLOGY:  .Bronchial Bronchial Lavage  06-06-22   Culture is being performed.  --    No polymorphonuclear cells seen per low power field  No squamous epithelial cells per low power field  No organisms seen per oil power field      .Blood Blood  06-05-22   NEGATIVE for Plasmodium antigens. Microscopy is performed for  confirmation.  This test does not detect the presence of Babesia species.  If Babesiosis is suspected, please order test for Babesia PCR: Babesia  microti PCR Bld  ************************************************************  No Blood Parasites observed by giemsa stain  One negative set of blood smears does not rule out  the possibility of a parasitic infection.  A minimum of 3  specimens should be collected, at least 12-24 hours apart,  over a 36 hour time period.  --  --      .Blood Blood  06-04-22   NEGATIVE for Plasmodium antigens. Microscopy is performed for  confirmation.  This test does not detect the presence of Babesia species.  If Babesiosis is suspected, please order test for Babesia PCR: Babesia  microti PCR Bld  ************************************************************  No Blood Parasites observed by giemsa stain  One negative set of blood smears does not rule out  the possibility of a parasitic infection.  A minimum of 3  specimens should be collected, at least 12-24 hours apart,  over a 36 hour time period.  --  --      .Sputum Sputum  06-04-22   Normal Respiratory Wen present  --    Rare Squamous epithelial cells per low power field  Rare polymorphonuclear leukocytes per low power field  No organisms seen      .Sputum Sputum  06-04-22   Culture is being performed.  --  --      .Blood Blood-Venous  06-04-22   No Growth Final  --  --      .Blood Blood-Peripheral  06-03-22   No Growth Final  --  --      .Body Fluid Peritoneal Fluid  06-02-22   No growth  --    polymorphonuclear leukocytes seen  No organisms seen  by cytocentrifuge      .Blood Blood-Peripheral  06-01-22   No Growth Final  --  --      .Blood Blood-Venous  06-01-22   No Growth Final  --  --      .Stool Stool  06-01-22   Culture is being performed.  --  --      .Stool Stool  06-01-22   No growth at 1 week.  --  --      .Sputum Sputum  06-01-22   No growth at 1 week.  --  --      .Stool Stool  05-31-22   No growth at 1 week.  --  --      .Sputum Sputum  05-31-22   No growth at 1 week.  --  --      .Sputum Sputum  05-30-22   Normal Respiratory Wen present  --    No polymorphonuclear leukocytes per low power field  No Squamous epithelial cells per low power field  No organisms seen per oil power field      .Stool sarthak betito  05-30-22   No enteric pathogens isolated.  (Stool culture examined for Salmonella,  Shigella, Campylobacter, Aeromonas, Plesiomonas,  Vibrio, E.coli O157 and Yersinia)  --  --      .Blood Blood  05-30-22   NEGATIVE for Plasmodium antigens. Microscopy is performed for  confirmation.  This test does not detect the presence of Babesia species.  If Babesiosis is suspected, please order test for Babesia PCR: Babesia  microti PCR d  ************************************************************  No Blood Parasites observed by giemsa stain  One negative set of blood smears does not rule out  the possibility of a parasitic infection.  A minimum of 3  specimens should be collected, at least 12-24 hours apart,  over a 36 hour time period.  --  --      .Stool Feces [facility]  05-30-22   GI PCR Results: NOT detected  *******Please Note:*******  GI panel PCR evaluates for:  Campylobacter, Plesiomonas shigelloides, Salmonella,  Vibrio, Yersinia enterocolitica, Enteroaggregative  Escherichia coli (EAEC), Enteropathogenic E.coli (EPEC),  Enterotoxigenic E. coli (ETEC) lt/st, Shiga-like  toxin-producing E. coli (STEC) stx1/stx2,  Shigella/ Enteroinvasive E. coli (EIEC), Cryptosporidium,  Cyclospora cayetanensis, Entamoeba histolytica,  Giardia lamblia, Adenovirus F 40/41, Astrovirus,  Norovirus GI/GII, Rotavirus A, Sapovirus  --  --      .Blood Blood  05-30-22   No Growth Final  --  --      .Blood Blood  05-30-22   No growth  --  --      .Blood Blood  05-30-22   No Growth Final  --  --              v    Rapid RVP Result: Latasha (06-04 @ 06:10)          RADIOLOGY

## 2022-06-09 NOTE — PROGRESS NOTE ADULT - SUBJECTIVE AND OBJECTIVE BOX
Ramesh Somers MD  Internal Medicine PGY-1      MICU Downgrade Note:    Patient is a 66y old  Male who presents with a chief complaint of recurrent fevers, unknown source (09 Jun 2022 10:59)    67 yo M with a PMH HTN, HLD, SLE on Cellcept (MMF), class V lupus nephritis, CKD stage 2, DM2, diabetic neuropathy, TIA (2019) on plavix, BPH, HUNG, hospitalization in [facility] 3/27-4/20 tx for pancreatitis and C diff?, recent hospitalization at [facility] 4/22-4/26 for acute pancreatitis and C diff colitis and another hospitalization 5/5 at [facility] for septic shock (source buttock abscess) treated with vanc, cefepime (5/5-5/10), meropenem (5/11-5/18) and micafungin. Zosyn added 5/26. Course complicated by recurrent fevers despite being on antibiotics, and leukocytosis up to 30k. Blood cx neg, UA unremarkable. MRI abdomen w/ contrast performed showing extensive abd lymphadenopathy (reactive to c diff vs lymphoma?). IR stated no window for bx. Course also complicated by a L brachial vein DVT and superficial DVT in arms. Patient noted to have had a positive PPD but has possibly gotten the BCG vaccine. Quant gold performed at [facility] still pending.     Admitted to [facility] MICU for further management. Vital signs on arrival: temp 38.6, /77, , RR 28, O2 sat 97% on 2L NC.  Labs: WBC 34.53, Hb 9.6, lipase 740, procal 2.02, tBili 1.4, alk phos 498, lactate 2.4.    Patient with complicated clinical course in MICU. Initially presenting as ICU to ICU transfer from [facility] did not require pressors or intubation or other ICU needs at the time of transfer. Recurrent and then constant fevers requiring tylenol, cooling blanket, and cooled fluids as high as temeprature of 105. Extensive FUO evaluation conducted including blood cultures NGTD, sputum cx NGTD, and AFB stool and sputum NGTD x3 additionally without parasites including malaria x3 negative and all other infectious workup negative. Originally monitored off abx and antifungal then resumed on edilson for a period of time with findings concerning for peritonitis based upon neutrophilic predominance in ascitic fluid and total cell count 3518.  Rheumatological workup in part pending however with known lupus complicated by class V lupus nephritis less likely lupus flare. With known L brachial DVT and found to have R sided PE highly likely on CT additionally with peritoneal lymphadenopathy, cirrhosis with portal hypertension although minimal ascites, and splenomegaly with splenic infarct. Placed on heparin gtt. With core biopsy conducted by IR of cervical lymph nodes with preliminary pathology discussed verbally with pathology EBV+ lymphoprofileration although originally with concern for lymphoma pending further genetic testing with pathology. Working diagnosis of HLH caused by EBV v lymphoma v lupus discussed with heme onc pending final path on empiric dexamethasone at time of ICU transfer.     SUBJECTIVE / OVERNIGHT EVENTS:    Patient seen lying in bed in no acute distress, complaining of throat pain from extubation. Wife at bedside. No other active complaints at this time.    REVIEW OF SYSTEMS:    CONSTITUTIONAL: No weakness, fevers or chills  EYES/ENT: No visual changes;  +throat pain   NECK: No pain or stiffness  RESPIRATORY: No cough, wheezing, hemoptysis; No shortness of breath  CARDIOVASCULAR: No chest pain or palpitations  GASTROINTESTINAL: No abdominal or epigastric pain. No nausea, vomiting, or hematemesis; +green diarrhea -constipation. No melena or hematochezia.  GENITOURINARY: No dysuria, frequency or hematuria  NEUROLOGICAL: No numbness or weakness  SKIN: No itching, rashes      MEDICATIONS  (STANDING):  chlorhexidine 4% Liquid 1 Application(s) Topical <User Schedule>  citric acid/sodium citrate Solution 30 milliLiter(s) Oral every 6 hours  dexAMETHasone  IVPB 20 milliGRAM(s) IV Intermittent daily  folic acid 1 milliGRAM(s) Oral daily  gabapentin 300 milliGRAM(s) Oral daily  glucagon  Injectable 1 milliGRAM(s) IntraMuscular once  heparin  Infusion. 1900 Unit(s)/Hr (19 mL/Hr) IV Continuous <Continuous>  hydrALAZINE 10 milliGRAM(s) Oral every 8 hours  lidocaine   4% Patch 1 Patch Transdermal daily  melatonin 5 milliGRAM(s) Oral at bedtime  metoprolol tartrate 12.5 milliGRAM(s) Oral two times a day  multivitamin/minerals 1 Tablet(s) Oral daily  pantoprazole  Injectable 40 milliGRAM(s) IV Push daily  tamsulosin 0.4 milliGRAM(s) Oral at bedtime    MEDICATIONS  (PRN):  acetaminophen     Tablet .. 500 milliGRAM(s) Oral every 6 hours PRN Temp greater or equal to 38C (100.4F)  heparin   Injectable 7500 Unit(s) IV Push every 6 hours PRN For aPTT less than 40  heparin   Injectable 3500 Unit(s) IV Push every 6 hours PRN For aPTT between 40 - 57  sodium chloride 0.9% lock flush 10 milliLiter(s) IV Push every 1 hour PRN Pre/post blood products, medications, blood draw, and to maintain line patency      CAPILLARY BLOOD GLUCOSE      POCT Blood Glucose.: 146 mg/dL (09 Jun 2022 05:38)    I&O's Summary    08 Jun 2022 07:01  -  09 Jun 2022 07:00  --------------------------------------------------------  IN: 1109.5 mL / OUT: 1800 mL / NET: -690.5 mL    09 Jun 2022 07:01  -  09 Jun 2022 15:52  --------------------------------------------------------  IN: 257 mL / OUT: 425 mL / NET: -168 mL        VITALS:   T(C): 36.7 (06-09-22 @ 14:30), Max: 37.1 (06-08-22 @ 20:00)  HR: 77 (06-09-22 @ 14:30) (65 - 82)  BP: 157/73 (06-09-22 @ 14:30) (134/63 - 186/99)  RR: 19 (06-09-22 @ 14:30) (17 - 34)  SpO2: 98% (06-09-22 @ 14:30) (97% - 100%)    GENERAL: NAD, lying in bed comfortably  HEAD:  Atraumatic, normocephalic  EYES: EOMI, PERRLA, conjunctiva and sclera clear  ENT: Moist mucous membranes  NECK: Supple, no JVD  HEART: Regular rate and rhythm, no murmurs, rubs, or gallops  LUNGS: Unlabored respirations.  Clear to auscultation bilaterally, no crackles, wheezing, or rhonchi  ABDOMEN: Soft, nontender, +distended, +BS  EXTREMITIES: 2+ peripheral pulses bilaterally. No clubbing, cyanosis, 3+ pitting edema noted up to knee  NERVOUS SYSTEM:  A&Ox3, no focal deficits, decreased motor strength 2/5 bilaterally but sensory fxn intact, answering all questions appropriately but mild delay in response time.  SKIN: No rashes, site of triple lumen cath and cervical LN biopsy c/d/i and dressed.    LABS:                        7.1    26.92 )-----------( 107      ( 09 Jun 2022 00:25 )             21.9     06-09    141  |  106  |  38<H>  ----------------------------<  138<H>  4.0   |  19<L>  |  1.82<H>    Ca    8.4      09 Jun 2022 08:54  Phos  7.4     06-09  Mg     2.5     06-09    TPro  6.1  /  Alb  2.8<L>  /  TBili  1.0  /  DBili  x   /  AST  30  /  ALT  9<L>  /  AlkPhos  244<H>  06-09    PT/INR - ( 09 Jun 2022 00:25 )   PT: 14.5 sec;   INR: 1.26 ratio         PTT - ( 09 Jun 2022 00:25 )  PTT:96.3 sec  CARDIAC MARKERS ( 08 Jun 2022 15:04 )  x     / x     / 13 U/L / x     / 1.0 ng/mL    Culture - Fungal, Bronchial (collected 06 Jun 2022 18:04)  Source: .Bronchial Bronchial Lavage  Preliminary Report (07 Jun 2022 08:25):    Testing in progress    Culture - Bronchial (collected 06 Jun 2022 18:04)  Source: .Bronchial Bronchial Lavage  Gram Stain (07 Jun 2022 04:42):    No polymorphonuclear cells seen per low power field    No squamous epithelial cells per low power field    No organisms seen per oil power field  Final Report (08 Jun 2022 16:20):    No growth at 48 hours    Culture - Acid Fast - Bronchial w/Smear (collected 06 Jun 2022 18:04)  Source: .Bronchial Bronchial Lavage  Preliminary Report (08 Jun 2022 15:04):    Culture is being performed.        RADIOLOGY & ADDITIONAL TESTS:  Results Reviewed:   Imaging Personally Reviewed:  Electrocardiogram Personally Reviewed:    COORDINATION OF CARE:  Care Discussed with Consultants/Other Providers [Y/N]:  Prior or Outpatient Records Reviewed [Y/N]:

## 2022-06-09 NOTE — PROGRESS NOTE ADULT - PROBLEM SELECTOR PLAN 4
-monitoring off cellcept  -Has been seen by Dr. Swapnil Wolfe and also Dr. Shivam Malagon  -According to sisters at bedside 6/6, patient took Aranesp in the [facility] for lupus prior to him getting sick. Family are wondering if the medication can be contributory.   -Following complements, ESR, CRP, dsDNA  -rheumatology consulted -monitoring off cellcept  -Has been seen by Dr. Swapnil Wolfe and also Dr. Shivam Malagon  -According to sisters at bedside 6/6, patient took Aranesp in the [facility] for lupus prior to him getting sick. Family are wondering if the medication can be contributory.   -Following complements, ESR, CRP, dsDNA  -rheumatology consulted, will f/u recs

## 2022-06-09 NOTE — PROGRESS NOTE ADULT - PROBLEM SELECTOR PLAN 6
-outside hospital reported to be consistent with AOCD  -CT ab pending, r/o bleed  -transfuse if Hb<7, maintain active type and screen  -DVT ppx: full AC

## 2022-06-09 NOTE — PROGRESS NOTE ADULT - PROBLEM SELECTOR PLAN 2
PEPE on CKD, hx lupus nephritis  -with worsening renal function FeNa prerenal receiving fluids 6/4. question role for rheumatology if no improvement in renal function.   -urine studies less suggestive of HRS picture discontinued midodrine although will continue albumin may benefit from volume.   -appreciate nephrology recommendations    -6/5 urine studies consistent with pre-renal FeNA 0.8 however IVC 2.1 on POCUS. Will c/w albumin and give lasix 40 IV x1, goal neg 500cc-1L today  -6/6 pending rheumatology work up ED, ANCA, dsDNA esr 44 and crp 77  -urinalysis with 100mg/dL proteinuria with biopsy proven lupus nephritis   -canales placed 6/3  -Discontinued tamsulosin for now cannot be crushed and canales in place  -6/6 discussed prior arinesp with family and risks of arinesp administration in critically ill explained including increased risk of thrombosis.

## 2022-06-09 NOTE — PROGRESS NOTE ADULT - PROBLEM SELECTOR PLAN 10
- monitored off home hypertensive meds 22 relative hypotension   - holding home ACEi additionally due to PEPE on CKD  -hydral 10 TID initiated 6/9

## 2022-06-09 NOTE — PROGRESS NOTE ADULT - ASSESSMENT
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, transferred to [facility] 5/30  Bone marrow biopsy at [facility], per Heme Onc note--no formal dx  LN biopsy done, path pending  Diarrhea looks noninfectious - C diff EIA, GI PCR negative.   No response to broad spectrum antibiotics and only had a small gluteal skin abscess, clean s/p I&D  MTB is possible but less likely, no obvious lung lesions  Quant gold IND  HIV negative  CT mesenteric LAD, possible PE, enlarged spleen, HSM, ascites  Fevers with no chills  Peritoneal fluid has elevated neutrophils concerning for peritonitis--culture NGTD; on edilson for now  Malaria screenings negative  Suspected malignancy  Overall, Fevers, abnormal finding on imaging, leukocytosis    -all cx negative  -path pending   -on steroids for HLH  -off abx for now   -monitor temps, cbc  -check cdiff if diarrhea    Inocente Crump  Attending Physician   Division of Infectious Disease  Office #[phone]  Available on Microsoft Teams also  After 5pm/weekend or no response, call #[phone]

## 2022-06-09 NOTE — PROGRESS NOTE ADULT - SUBJECTIVE AND OBJECTIVE BOX
[facility] DIVISION OF KIDNEY DISEASES AND HYPERTENSION -- FOLLOW UP NOTE  --------------------------------------------------------------------------------  Patient is 66 year old male with PMH of HTN, HLD, class V membranous nephropathy in the setting of Lupus Nephritis (follows with Dr. Moore), DM, TIA, BPH, and HUNG who pwas transferred to the hospital due to concerns for septic shock and recurrent fevers. 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 Since then he has been following Dr. Moore as his primary nephrologist. The patient has was on treatment with steroids and cyclophosphamide. Most recently the patient is being treated with MMF 750mg BID. Upon arrival to the hospital the patient was noted to have a SCr of 1.16 which has since risen to 1.98mg/dL this morning. The nephrology team was consulted for PEPE. The patient was intubated on 6/5 for worsening work of breathing, successfully extubated on 6/8.     Pt. seen and examined this morning in the ICU. Now extubated. Pateint denied any chest pain or shortness of breath. He reported feeling very weak.. No acute issues noted overnight. Pt. currently off IV vasopressors.         PAST HISTORY  --------------------------------------------------------------------------------  No significant changes to PMH, PSH, FHx, SHx, unless otherwise noted    ALLERGIES & MEDICATIONS  --------------------------------------------------------------------------------  Allergies    No Known Allergies    Intolerances      Standing Inpatient Medications  chlorhexidine 4% Liquid 1 Application(s) Topical <User Schedule>  chlorhexidine 4% Liquid 1 Application(s) Topical <User Schedule>  citric acid/sodium citrate Solution 30 milliLiter(s) Oral every 6 hours  dexAMETHasone  IVPB 20 milliGRAM(s) IV Intermittent daily  folic acid Injectable 1 milliGRAM(s) IV Push daily  gabapentin 300 milliGRAM(s) Oral daily  glucagon  Injectable 1 milliGRAM(s) IntraMuscular once  heparin  Infusion. 1900 Unit(s)/Hr IV Continuous <Continuous>  lidocaine   4% Patch 1 Patch Transdermal daily  melatonin 5 milliGRAM(s) Oral at bedtime  metoprolol tartrate 12.5 milliGRAM(s) Oral two times a day  multivitamin/minerals 1 Tablet(s) Oral daily  pantoprazole  Injectable 40 milliGRAM(s) IV Push daily  phenylephrine    Infusion 0.5 MICROgram(s)/kG/Min IV Continuous <Continuous>    PRN Inpatient Medications  acetaminophen     Tablet .. 500 milliGRAM(s) Oral every 6 hours PRN  sodium chloride 0.9% lock flush 10 milliLiter(s) IV Push every 1 hour PRN      REVIEW OF SYSTEMS      All other systems were reviewed and are negative, except as noted.    VITALS/PHYSICAL EXAM  --------------------------------------------------------------------------------  T(C): 36.8 (06-09-22 @ 04:00), Max: 37.4 (06-08-22 @ 12:00)  HR: 72 (06-09-22 @ 06:00) (72 - 96)  BP: 160/81 (06-09-22 @ 06:00) (122/66 - 179/85)  RR: 17 (06-09-22 @ 06:00) (17 - 34)  SpO2: 98% (06-09-22 @ 06:00) (97% - 100%)  Wt(kg): --        06-07-22 @ 07:01  -  06-08-22 @ 07:00  --------------------------------------------------------  IN: 2048.8 mL / OUT: 2470 mL / NET: -421.2 mL    06-08-22 @ 07:01  -  06-09-22 @ 06:57  --------------------------------------------------------  IN: 1109.5 mL / OUT: 1800 mL / NET: -690.5 mL        Physical Exam:  	Gen: ill appearing  	HEENT: MMM  	Pulm: Decreased breath sounds   	CV: S1S2  	Abd: Soft, +BS   	Ext: ++ LE edema B/L  	Neuro: awake and alert   	Skin: Warm and dry        LABS/STUDIES  --------------------------------------------------------------------------------              7.1    26.92 >-----------<  107      [06-09-22 @ 00:25]              21.9     140  |  107  |  36  ----------------------------<  126      [06-09-22 @ 00:25]  3.5   |  18  |  1.92        Ca     8.3     [06-09-22 @ 00:25]      Mg     2.5     [06-09-22 @ 00:25]      Phos  6.8     [06-09-22 @ 00:25]    TPro  5.8  /  Alb  2.9  /  TBili  1.1  /  DBili  x   /  AST  33  /  ALT  10  /  AlkPhos  253  [06-09-22 @ 00:25]    PT/INR: PT 14.5 , INR 1.26       [06-09-22 @ 00:25]  PTT: 96.3       [06-09-22 @ 00:25]    CK 13      [06-08-22 @ 15:04]        [06-07-22 @ 07:23]    Creatinine Trend:  SCr 1.92 [06-09 @ 00:25]  SCr 1.88 [06-08 @ 15:04]  SCr 1.95 [06-08 @ 09:21]  SCr 1.92 [06-08 @ 00:17]  SCr 1.98 [06-07 @ 16:17]    Urinalysis - [06-05-22 @ 12:05]      Color Yellow / Appearance Slightly Turbid / SG 1.019 / pH 6.0      Gluc Negative / Ketone Negative  / Bili Negative / Urobili Negative       Blood Small / Protein 100 / Leuk Est Negative / Nitrite Negative      RBC 4 / WBC 5 / Hyaline 2 / Gran  / Sq Epi  / Non Sq Epi 3 / Bacteria Many    Urine Creatinine 49      [06-08-22 @ 15:09]  Urine Protein 88      [06-08-22 @ 15:09]  Urine Sodium 21      [06-05-22 @ 12:06]  Urine Urea Nitrogen 181      [06-05-22 @ 12:05]  Urine Potassium 36      [06-05-22 @ 12:06]  Urine Chloride 65      [06-03-22 @ 16:45]  Urine Phosphorus 41.7      [06-05-22 @ 12:05]  Urine Osmolality 240      [06-05-22 @ 12:06]    Iron 43, TIBC 128, %sat 34      [06-01-22 @ 04:47]  Ferritin 972      [06-08-22 @ 09:19]  TSH 3.52      [06-03-22 @ 14:46]  Lipid: chol --, , HDL --, LDL --      [06-07-22 @ 07:15]    HBsAg Nonreact      [05-30-22 @ 03:46]  HCV 0.31, Nonreact      [05-31-22 @ 00:32]  HIV Nonreact      [06-01-22 @ 13:46]  HIV Nonreact      [05-30-22 @ 04:13]    ED: titer 1:320, pattern Homogeneous      [06-04-22 @ 14:21]  dsDNA 17      [06-07-22 @ 16:17]  C3 Complement 116      [06-04-22 @ 14:21]  C4 Complement 36      [06-04-22 @ 14:21]  Rheumatoid Factor <10      [06-04-22 @ 14:21]  ANCA: cANCA Negative, pANCA Negative, atypical ANCA Indeterminate Method interference due to ED Fluorescence      [06-04-22 @ 16:53]  ASLO <20      [06-04-22 @ 16:53]  Free Light Chains: kappa 14.57, lambda 23.09, ratio = 0.63      [06-01 @ 04:47]  Immunofixation Serum:   One Weak IgG Kappa Band and One Weak IgG Lambda Band Identified    Reference Range: None Detected      [06-01-22 @ 04:47]  SPEP Interpretation: Two Weak Gamma-Migrating Paraproteins Identified      [06-01-22 @ 04:47]

## 2022-06-09 NOTE — PROGRESS NOTE ADULT - PROBLEM SELECTOR PLAN 12
Diet: minced and moist  DVT: heparin gtt  Dispo: pending Diet: minced and moist  DVT: heparin gtt  Dispo: pending PT consult

## 2022-06-09 NOTE — CHART NOTE - NSCHARTNOTEFT_GEN_A_CORE
MICU Transfer Note    Transfer from: MICU    Transfer to: (  X) Medicine    (  ) Telemetry     (   ) RCU        (    ) Palliative         (   ) Stroke Unit          (   ) __________________    Accepting Physician: Sylvia Simmons  Signout given to: Sylvia Simmons    MICU COURSE:    67 yo M with a PMH HTN, HLD, SLE on Cellcept (MMF), class V lupus nephritis, CKD stage 2, DM2, diabetic neuropathy, TIA (2019) on plavix, BPH, HUNG, hospitalization in [facility] 3/27-4/20 tx for pancreatitis and C diff?, recent hospitalization at [place] 4/22-4/26 for acute pancreatitis and C diff colitis and another hospitalization 5/5 at [place] for septic shock (source buttock abscess) treated with vanc, cefepime (5/5-5/10), meropenem (5/11-5/18) and micafungin. Zosyn added 5/26. Course complicated by recurrent fevers despite being on antibiotics, and leukocytosis up to 30k. Blood cx neg, UA unremarkable. MRI abdomen w/ contrast performed showing extensive abd lymphadenopathy (reactive to c diff vs lymphoma?). IR stated no window for bx. Course also complicated by a L brachial vein DVT and superficial DVT in arms. Patient noted to have had a positive PPD but has possibly gotten the BCG vaccine. Quant gold performed at [place] still pending.     Admitted to [facility] MICU for further management. Vital signs on arrival: temp 38.6, /77, , RR 28, O2 sat 97% on 2L NC.  Labs: WBC 34.53, Hb 9.6, lipase 740, procal 2.02, tBili 1.4, alk phos 498, lactate 2.4.    Patient with complicated clinical course in MICU. Initially presenting as ICU to ICU transfer from [place] did not require pressors or intubation or other ICU needs at the time of transfer. Extensive FUO evaluation conducted including blood cultures.           ASSESSMENT & PLAN:             FOR FOLLOW UP:      Venkat Steel MD  Internal Medicine  Pager #[phone] MICU Transfer Note    Transfer from: MICU    Transfer to: (  X) Medicine    (  ) Telemetry     (   ) RCU        (    ) Palliative         (   ) Stroke Unit          (   ) __________________    Accepting Physician: Sylvia Simmons  Signout given to: Sylvia Simmons    MICU COURSE:    67 yo M with a PMH HTN, HLD, SLE on Cellcept (MMF), class V lupus nephritis, CKD stage 2, DM2, diabetic neuropathy, TIA (2019) on plavix, BPH, HUNG, hospitalization in [facility] 3/27-4/20 tx for pancreatitis and C diff?, recent hospitalization at [place] 4/22-4/26 for acute pancreatitis and C diff colitis and another hospitalization 5/5 at [place] for septic shock (source buttock abscess) treated with vanc, cefepime (5/5-5/10), meropenem (5/11-5/18) and micafungin. Zosyn added 5/26. Course complicated by recurrent fevers despite being on antibiotics, and leukocytosis up to 30k. Blood cx neg, UA unremarkable. MRI abdomen w/ contrast performed showing extensive abd lymphadenopathy (reactive to c diff vs lymphoma?). IR stated no window for bx. Course also complicated by a L brachial vein DVT and superficial DVT in arms. Patient noted to have had a positive PPD but has possibly gotten the BCG vaccine. Quant gold performed at [place] still pending.     Admitted to [facility] MICU for further management. Vital signs on arrival: temp 38.6, /77, , RR 28, O2 sat 97% on 2L NC.  Labs: WBC 34.53, Hb 9.6, lipase 740, procal 2.02, tBili 1.4, alk phos 498, lactate 2.4.    Patient with complicated clinical course in MICU. Initially presenting as ICU to ICU transfer from [place] did not require pressors or intubation or other ICU needs at the time of transfer. Extensive FUO evaluation conducted including blood cultures NGTD, sputum cx NGTD, and AFB stool and sputum NGTD x3 additionally without parasites including malaria x3 negative and all other infectious workup negative.           ASSESSMENT & PLAN:             FOR FOLLOW UP:      Venkat Steel MD  Internal Medicine  Pager #[phone] MICU Transfer Note    Transfer from: MICU    Transfer to: (  X) Medicine    (  ) Telemetry     (   ) RCU        (    ) Palliative         (   ) Stroke Unit          (   ) __________________    Accepting Physician: Sylvia Simmons  Signout given to: Sylvia Simmons    MICU COURSE:    67 yo M with a PMH HTN, HLD, SLE on Cellcept (MMF), class V lupus nephritis, CKD stage 2, DM2, diabetic neuropathy, TIA (2019) on plavix, BPH, HUNG, hospitalization in [facility] 3/27-4/20 tx for pancreatitis and C diff?, recent hospitalization at [place] 4/22-4/26 for acute pancreatitis and C diff colitis and another hospitalization 5/5 at [place] for septic shock (source buttock abscess) treated with vanc, cefepime (5/5-5/10), meropenem (5/11-5/18) and micafungin. Zosyn added 5/26. Course complicated by recurrent fevers despite being on antibiotics, and leukocytosis up to 30k. Blood cx neg, UA unremarkable. MRI abdomen w/ contrast performed showing extensive abd lymphadenopathy (reactive to c diff vs lymphoma?). IR stated no window for bx. Course also complicated by a L brachial vein DVT and superficial DVT in arms. Patient noted to have had a positive PPD but has possibly gotten the BCG vaccine. Quant gold performed at [place] still pending.     Admitted to [facility] MICU for further management. Vital signs on arrival: temp 38.6, /77, , RR 28, O2 sat 97% on 2L NC.  Labs: WBC 34.53, Hb 9.6, lipase 740, procal 2.02, tBili 1.4, alk phos 498, lactate 2.4.    Patient with complicated clinical course in MICU. Initially presenting as ICU to ICU transfer from [place] did not require pressors or intubation or other ICU needs at the time of transfer. Extensive FUO evaluation conducted including blood cultures NGTD, sputum cx NGTD, and AFB stool and sputum NGTD x3 additionally without parasites including malaria x3 negative and all other infectious workup negative. Originally monitored off abx and antifungal then resumed on edilson for a period of time with findings concerning for peritonitis based upon neutrophilic predominance in ascitic fluid and total cell count 3518.  Rheumatological workup in part pending however with known lupus complicated by class V lupus nephritis less likely lupus flare. With known L brachial DVT and found to have R sided PE highly likely on CT additionally with peritoneal lymphadenopathy, cirrhosis with portal hypertension although minimal ascites, and splenomegaly with splenic infarct. Placed on heparin gtt. With core biopsy conducted by IR of cervical lymph nodes with preliminary pathology discussed verbally with pathology EBV+ lymphoprofileration although originally with concern for lymphoma pending further genetic testing with pathology.         ASSESSMENT & PLAN:     67 yo M with a PMH HTN, HLD, SLE on Cellcept (MMF), class V lupus nephritis, CKD stage 2, DM2, diabetic neuropathy, TIA (2019) on plavix, BPH, HUNG, with multiple hospitalizations for c diff, acute panc, septic shock 22 buttock abscess status post drainage transferred for recurrent fevers status post core biopsy and paracentesis suspected lymphoma v less likely peritoneal tb intubated and sedated due to upper airway stridor and altered mental status and extubated 6/8.    Neuro    Acute encephalopathy requiring intubation and sedation  -Fluctuating mentation while febrile which then returns to current clinical baseline slow however linear thought process and alert and oriented x3.   -exacerbated by fever although remained altered despite control of fever with arctic sun 6/4 AM. Unclear etiology.   -Requiring intubation and sedation 6/4, 6/6 weaning sedation and SBT monitoring mental status.  -6/7 Urgent CTH concern for LUE weakness when sedation weaned down without acute findings and gtt resumed.       #diabetic neuropathy  -home gabapentin    Pulm  #Intubated for airway protection  -altered with stridor 6/4 AM  -extubated 6/8.      #large left posterior consolidation seen on ultrasoud, possible PNA?  -monitored off abx originally now back on edilson (6/3- ) and status post vanc (6/3) with negative MRSA MSSA swab   -bronchoscopy 6/6 with BAL to rule out infectious etiologies. nonrevealing.     # stridor  -with additional  stridor 6/4 requiring intubation refractory to epinephrine, methylprednisolone, xopenex, ipratropium. Previously with stridor earlier in admission although more severe 6/4 AM requiring intubation unclear etiology with new medication bicitra, recent instrumentation with core biopsy although had tolerated procedure without evidence of neck swelling/edema or stridor previous, with underlying HUNG hx.     #RLL PE  -heparin gtt, may transition to alternative agent for full AC if no plans for additional surgical intervention/procedure      Cardio    #Episodes of hypotension  -hypotension episodic possibly rate related with period going into afib requiring rate control with lopressor. Lopressor discontinued 6/3 ON remains tachycardic.   -off mega     #Hx of HTN  - monitored off home hypertensive meds 2/2 hypotension      #Paroxysm of afib followed by sinus rhythm w frequent ectopy  -Lopressor 2.5 q6h discontinued 6/3 ON receiving crystalloid and colloid 6/3 ON to 6/4.   -6/6 metoprolol tartarate 12.5mg BID       Renal  #CKD stage 2, hx of lupus nephritis  -cont to trend Cr, uptrending  -monitor I/Os  -cont to monitor electrolytes, replete as necessary  -urinalysis with 100mg/dL proteinuria with biopsy proven lupus nephritis   -canales placed 6/3  -Discontinued tamsulosin for now cannot be crushed and canales in place  -6/6 discussed prior arinesp with family and risks of arinesp administration in critically ill explained including increased risk of thrombosis.     #PEPE on CKD  -with worsening renal function FeNa prerenal receiving fluids 6/4. question role for rheumatology if no improvement in renal function.   -urine studies less suggestive of HRS picture discontinued midodrine although will continue albumin may benefit from volume.   -appreciate nephrology recommendations    -6/5 urine studies consistent with pre-renal FeNA 0.8 however IVC 2.1 on POCUS. Will c/w albumin and give lasix 40 IV x1, goal neg 500cc-1L today  -6/6 pending rheumatology work up ED, ANCA, dsDNA esr 44 and crp 77      GI  #chronic inflammatory diarrhea  #possible Crohn's vs colitis vs infectious  -calprotectin elevated at [place] 532. no current plan by GI for colonoscopy at this time.   -C diff negative  -appreciate GI and ID input.   -less likely crohns disease     #Diet  -previously on TPN while at [facility]  -intubated and sedated receiving tube feeds.     #hx of pancreatitis, elevated lipase  -without current epigastric tenderness to palpation 5/30 lipase 740, no imaging findings of pancreatitis. Now downtrending 149 and 158 respectively on repeat.   -follow triglycerides, mixed picture concern for HLH however need to monitor for hypertriglyceridemia while on prop which could exacerbate a pancreatitis picture.     #hepatosplenomegaly shown on MRI at [place]  -hepatitis panel nonreactive including Hep A B and C      #hx of BPH  -start on flomax    Endocrine  #DM2  -last HbA1c 4/29 6.5%  -ISS    ID/rheum  #hx of SLE  -monitoring off cellcept  -Has been seen by Dr. Swapnil Wolfe and also Dr. Shivam Malagon  -According to sisters at bedside 6/6, [facility] prior to him getting sick. Family are wondering if the medication can be contributory.   -Following complements, ESR, CRP, dsDNA  -rheumatology consulted     #recurrent fevers and leukocytosis of unknown etiology  diff dx: infectious, malignancy, IgG4 disease, HLH  -elevated WBC, elevated IgA at [place]  -quant gold indeterminate  -5/30 BCx NGTD  -HIV nonreactive, EBV IgG+, CMV 50  -possibility of peritoneal TB appreciate ID reccs AFB sputum and stool thus far are negative with IR biopsy completed pending final results 6/4  -Rheum eval including C3 C4 ESR 44 CRP 77 dsDNA progression of underlying lupus v other rheumatologic process causing fever although less likely.       Heme  #Anemia  -outside hospital reported to be consistent with AOCD  -CT ab pending, r/o bleed  -transfuse if Hb<7, maintain active type and screen  -DVT ppx: full AC      #r/o HLH, IgG4  -has fevers, splenomegaly, cytopenia  -HLH labs: soluble IL-2 levels 43708, ferritin 511, triglyceride 412, 6/6 fibrinogen 218 6/6  , pending final pathology from cervical lymph node biopsy, flow cytometry from peripheral blood without acute abnormalities in lymphoid, myeloid, or plasma cell lines.   -IgG4 27  -dexamethasone 20 qd discussed with heme on 6/6 following bronchoscopy  -pending pathology 6/7  -Prelim per verbal with pathology EBV+ lymphoprofileration, with empiric treatment initiated for HLH and no specific treatment planned for EBV per discussion with ID.     Lines: RIJ placed 5/27 Removed 6/1 ON              FOR FOLLOW UP:      Venkat Steel MD  Internal Medicine  Pager #[phone] MICU Transfer Note    Transfer from: MICU    Transfer to: (  X) Medicine    (  ) Telemetry     (   ) RCU        (    ) Palliative         (   ) Stroke Unit          (   ) __________________    Accepting Physician: Sylvia Simmons  Signout given to: Sylvia Simmons    MICU COURSE:    65 yo M with a PMH HTN, HLD, SLE on Cellcept (MMF), class V lupus nephritis, CKD stage 2, DM2, diabetic neuropathy, TIA (2019) on plavix, BPH, HUNG, hospitalization in [facility] 3/27-4/20 tx for pancreatitis and C diff?, recent hospitalization at [place] 4/22-4/26 for acute pancreatitis and C diff colitis and another hospitalization 5/5 at [place] for septic shock (source buttock abscess) treated with vanc, cefepime (5/5-5/10), meropenem (5/11-5/18) and micafungin. Zosyn added 5/26. Course complicated by recurrent fevers despite being on antibiotics, and leukocytosis up to 30k. Blood cx neg, UA unremarkable. MRI abdomen w/ contrast performed showing extensive abd lymphadenopathy (reactive to c diff vs lymphoma?). IR stated no window for bx. Course also complicated by a L brachial vein DVT and superficial DVT in arms. Patient noted to have had a positive PPD but has possibly gotten the BCG vaccine. Quant gold performed at [place] still pending.     Admitted to [facility] MICU for further management. Vital signs on arrival: temp 38.6, /77, , RR 28, O2 sat 97% on 2L NC.  Labs: WBC 34.53, Hb 9.6, lipase 740, procal 2.02, tBili 1.4, alk phos 498, lactate 2.4.    Patient with complicated clinical course in MICU. Initially presenting as ICU to ICU transfer from [place] did not require pressors or intubation or other ICU needs at the time of transfer. Recurrent and then constant fevers requiring tylenol, cooling blanket, and cooled fluids as high as temeprature of 105. Extensive FUO evaluation conducted including blood cultures NGTD, sputum cx NGTD, and AFB stool and sputum NGTD x3 additionally without parasites including malaria x3 negative and all other infectious workup negative. Originally monitored off abx and antifungal then resumed on edilson for a period of time with findings concerning for peritonitis based upon neutrophilic predominance in ascitic fluid and total cell count 3518.  Rheumatological workup in part pending however with known lupus complicated by class V lupus nephritis less likely lupus flare. With known L brachial DVT and found to have R sided PE highly likely on CT additionally with peritoneal lymphadenopathy, cirrhosis with portal hypertension although minimal ascites, and splenomegaly with splenic infarct. Placed on heparin gtt. With core biopsy conducted by IR of cervical lymph nodes with preliminary pathology discussed verbally with pathology EBV+ lymphoprofileration although originally with concern for lymphoma pending further genetic testing with pathology. Working diagnosis of HLH caused by EBV v lymphoma v lupus discussed with heme onc pending final path on empiric dexamethasone at time of ICU transfer.         ASSESSMENT & PLAN:     65 yo M with a PMH HTN, HLD, SLE on Cellcept (MMF), class V lupus nephritis, CKD stage 2, DM2, diabetic neuropathy, TIA (2019) on plavix, BPH, HUNG, with multiple hospitalizations for c diff, acute panc, septic shock 22 buttock abscess status post drainage transferred for recurrent fevers status post core biopsy and paracentesis suspected lymphoma v less likely peritoneal tb intubated and sedated due to upper airway stridor and altered mental status and extubated 6/8.    Neuro    Acute encephalopathy requiring intubation and sedation  -Fluctuating mentation while febrile which then returns to current clinical baseline slow however linear thought process and alert and oriented x3.   -exacerbated by fever although remained altered despite control of fever with arctic sun 6/4 AM. Unclear etiology.   -Requiring intubation and sedation 6/4, 6/6 weaning sedation and SBT monitoring mental status.  -6/7 Urgent CTH concern for LUE weakness when sedation weaned down without acute findings and gtt resumed.   -mentation at baseline while in hospital at time of transfer from ICU 6/9       #diabetic neuropathy  -home gabapentin    Pulm  #Intubated for airway protection  -altered with stridor 6/4 AM  -extubated 6/8.  Comfortable on minimal nasal cannula     #large left posterior consolidation seen on ultrasoud, possible PNA?  -monitored off abx originally now back on edilson (6/3- ) and status post vanc (6/3) with negative MRSA MSSA swab   -bronchoscopy 6/6 with BAL to rule out infectious etiologies. nonrevealing.     # stridor  -with additional  stridor 6/4 requiring intubation refractory to epinephrine, methylprednisolone, xopenex, ipratropium. Previously with stridor earlier in admission although more severe 6/4 AM requiring intubation unclear etiology with new medication bicitra, recent instrumentation with core biopsy although had tolerated procedure without evidence of neck swelling/edema or stridor previous, with underlying HUNG hx.     #RLL PE  -heparin gtt, may transition to alternative agent for full AC if no plans for additional surgical intervention/procedure      Cardio    #Episodes of hypotension  -hypotension episodic possibly rate related with period going into afib requiring rate control with lopressor. Lopressor discontinued 6/3 ON remains tachycardic.   -off mega     #Hx of HTN  - monitored off home hypertensive meds 22 relative hypotension   - holding home ACEi additionally due to PEPE on CKD  -hydral 10 TID initiated 6/9       #Paroxysm of afib followed by sinus rhythm w frequent ectopy  -Lopressor 2.5 q6h discontinued 6/3 ON receiving crystalloid and colloid 6/3 ON to 6/4.   -6/6 metoprolol tartarate 12.5mg BID       Renal  #CKD stage 2, hx of lupus nephritis  -cont to trend Cr, uptrending  -monitor I/Os  -cont to monitor electrolytes, replete as necessary  -urinalysis with 100mg/dL proteinuria with biopsy proven lupus nephritis   -canales placed 6/3  -Discontinued tamsulosin for now cannot be crushed and canales in place  -6/6 discussed prior arinesp with family and risks of arinesp administration in critically ill explained including increased risk of thrombosis.     #PEPE on CKD  -with worsening renal function FeNa prerenal receiving fluids 6/4. question role for rheumatology if no improvement in renal function.   -urine studies less suggestive of HRS picture discontinued midodrine although will continue albumin may benefit from volume.   -appreciate nephrology recommendations    -6/5 urine studies consistent with pre-renal FeNA 0.8 however IVC 2.1 on POCUS. Will c/w albumin and give lasix 40 IV x1, goal neg 500cc-1L today  -6/6 pending rheumatology work up ED, ANCA, dsDNA esr 44 and crp 77      GI  #chronic inflammatory diarrhea  #possible Crohn's vs colitis vs infectious  -calprotectin elevated at [place] [id number]. no current plan by GI for colonoscopy at this time.   -C diff negative  -appreciate GI and ID input.   -less likely crohns disease     #Diet  -previously on TPN while at [facility]  -intubated and sedated receiving tube feeds.     #hx of pancreatitis, elevated lipase  -without current epigastric tenderness to palpation 5/30 lipase 740, no imaging findings of pancreatitis. Now downtrending 149 and 158 respectively on repeat.   -follow triglycerides, mixed picture concern for HLH however need to monitor for hypertriglyceridemia while on prop which could exacerbate a pancreatitis picture.     #hepatosplenomegaly shown on MRI at [place]  -hepatitis panel nonreactive including Hep A B and C      #hx of BPH  -start on flomax    Endocrine  #DM2  -last HbA1c 4/29 6.5%  -ISS    ID/rheum  #hx of SLE  -monitoring off cellcept  -Has been seen by Dr. Swapnil Wolfe and also Dr. Shivam Malagon  -According to sisters at bedside 6/6, patient took Aranesp in the [facility] for lupus prior to him getting sick. Family are wondering if the medication can be contributory.   -Following complements, ESR, CRP, dsDNA  -rheumatology consulted     #recurrent fevers and leukocytosis of unknown etiology  diff dx: infectious, malignancy, IgG4 disease, HLH  -elevated WBC, elevated IgA at [place]  -quant gold indeterminate  -5/30 BCx NGTD  -HIV nonreactive, EBV IgG+, CMV 50  -possibility of peritoneal TB appreciate ID reccs AFB sputum and stool thus far are negative with IR biopsy completed pending final results 6/4  -Rheum eval including C3 C4 ESR 44 CRP 77 dsDNA progression of underlying lupus v other rheumatologic process causing fever although less likely.       Heme  #Anemia  -outside hospital reported to be consistent with AOCD  -CT ab pending, r/o bleed  -transfuse if Hb<7, maintain active type and screen  -DVT ppx: full AC      #r/o HLH, IgG4  -has fevers, splenomegaly, cytopenia  -HLH labs: soluble IL-2 levels 29604, ferritin 511, triglyceride 412, 6/6 fibrinogen 218 6/6  , pending final pathology from cervical lymph node biopsy, flow cytometry from peripheral blood without acute abnormalities in lymphoid, myeloid, or plasma cell lines.   -IgG4 27  -dexamethasone 20 qd discussed with heme on 6/6 following bronchoscopy  -pending pathology 6/7  -Prelim per verbal with pathology EBV+ lymphoprofileration, with empiric treatment initiated for HLH and no specific treatment planned for EBV per discussion with ID.     Lines: RIJ placed 5/27 Removed 6/1 ON              FOR FOLLOW UP:    [ ] follow up pathology results from cervical LN core biopsy  [ ] Continue empiric treatment for HLH with dexamethasone and follow up heme onc reccomendations  [ ] Physical therapy  [ ] Monitor respiratory status and mental status. Monitor for fevers and judiciously use tylenol in setting of cirrhosis. May also consider cooled fluids and cooling blankets.       Venkat Steel MD  Internal Medicine  Pager #[phone]

## 2022-06-09 NOTE — PROGRESS NOTE ADULT - ASSESSMENT
67 yo M with a PMH HTN, HLD, SLE on Cellcept (MMF), class V lupus nephritis, CKD stage 2, DM2, diabetic neuropathy, TIA (2019) on plavix, BPH, HUNG, with multiple hospitalizations for c diff, acute panc, septic shock 22 buttock abscess status post drainage transferred for recurrent fevers status post core biopsy and paracentesis suspected lymphoma v less likely peritoneal tb intubated and sedated due to upper airway stridor and altered mental status and extubated 6/8.    Neuro    Acute encephalopathy requiring intubation and sedation  -Fluctuating mentation while febrile which then returns to current clinical baseline slow however linear thought process and alert and oriented x3.   -exacerbated by fever although remained altered despite control of fever with arctic sun 6/4 AM. Unclear etiology.   -Requiring intubation and sedation 6/4, 6/6 weaning sedation and SBT monitoring mental status   -6/7 Urgent CTH concern for LUE weakness when sedation weaned down without acute findings and gtt resumed.       #diabetic neuropathy  -home gabapentin    Pulm  #Intubated for airway protection  -altered with stridor 6/4 AM  -extubated 6/8.      #large left posterior consolidation seen on ultrasoud, possible PNA?  -monitored off abx originally now back on edilson (6/3- ) and status post vanc (6/3) with negative MRSA MSSA swab   -bronchoscopy 6/6 with BAL to rule out infectious etiologies. nonrevealing.     # stridor  -with additional  stridor 6/4 requiring intubation refractory to epinephrine, methylprednisolone, xopenex, ipratropium. Previously with stridor earlier in admission although more severe 6/4 AM requiring intubation unclear etiology with new medication bicitra, recent instrumentation with core biopsy although had tolerated procedure without evidence of neck swelling/edema or stridor previous, with underlying HUNG hx.     #RLL PE  -heparin gtt, may transition to alternative agent for full AC if no plans for additional surgical intervention/procedure      Cardio    #Episodes of hypotension  -hypotension episodic possibly rate related with period going into afib requiring rate control with lopressor. Lopressor discontinued 6/3 ON remains tachycardic.   -off mega     #Hx of HTN  - monitored off home hypertensive meds 2/2 hypotension      #Paroxysm of afib followed by sinus rhythm w frequent ectopy  -Lopressor 2.5 q6h discontinued 6/3 ON receiving crystalloid and colloid 6/3 ON to 6/4.   -6/6 metoprolol tartarate 12.5mg BID       Renal  #CKD stage 2, hx of lupus nephritis  -cont to trend Cr, uptrending  -monitor I/Os  -cont to monitor electrolytes, replete as necessary  -urinalysis with 100mg/dL proteinuria with biopsy proven lupus nephritis   -canales placed 6/3  -Discontinued tamsulosin for now cannot be crushed and canales in place  -6/6 discussed prior arinesp with family and risks of arinesp administration in critically ill explained including increased risk of thrombosis.     #PEPE on CKD  -with worsening renal function FeNa prerenal receiving fluids 6/4. question role for rheumatology if no improvement in renal function.   -urine studies less suggestive of HRS picture discontinued midodrine although will continue albumin may benefit from volume.   -appreciate nephrology recommendations    -6/5 urine studies consistent with pre-renal FeNA 0.8 however IVC 2.1 on POCUS. Will c/w albumin and give lasix 40 IV x1, goal neg 500cc-1L today  -6/6 pending rheumatology work up ED, ANCA, dsDNA esr 44 and crp 77      GI  #chronic inflammatory diarrhea  #possible Crohn's vs colitis vs infectious  -calprotectin elevated at [place] [facility]. no current plan by GI for colonoscopy at this time.   -C diff negative  -appreciate GI and ID input.   -less likely crohns disease     #Diet  -previously on TPN while at [facility]  -intubated and sedated receiving tube feeds.     #hx of pancreatitis, elevated lipase  -without current epigastric tenderness to palpation 5/30 lipase 740, no imaging findings of pancreatitis. Now downtrending 149 and 158 respectively on repeat.   -follow triglycerides, mixed picture concern for HLH however need to monitor for hypertriglyceridemia while on prop which could exacerbate a pancreatitis picture.     #hepatosplenomegaly shown on MRI at [place]  -hepatitis panel nonreactive including Hep A B and C      #hx of BPH  -start on flomax    Endocrine  #DM2  -last HbA1c 4/29 6.5%  -ISS    ID/rheum  #hx of SLE  -monitoring off cellcept  -Has been seen by Dr. Swapnil Wolfe and also Dr. Shivam Malagon  -According to sisters at bedside 6/6, patient [facility] in [facility] for [facility] prior to him getting sick. Family are wondering if the medication can be contributory.   -Following complements, ESR, CRP, dsDNA  -rheumatology consulted     #recurrent fevers and leukocytosis of unknown etiology  diff dx: infectious, malignancy, IgG4 disease, HLH  -elevated WBC, elevated IgA at [place]  -quant gold indeterminate  -5/30 BCx NGTD  -HIV nonreactive, EBV IgG+, CMV 50  -possibility of peritoneal TB appreciate ID reccs AFB sputum and stool thus far are negative with IR biopsy completed pending final results 6/4  -Rheum eval including C3 C4 ESR 44 CRP 77 dsDNA progression of underlying lupus v other rheumatologic process causing fever although less likely.       Heme  #Anemia  -outside hospital reported to be consistent with AOCD  -CT ab pending, r/o bleed  -transfuse if Hb<7, maintain active type and screen  -DVT ppx: full AC      #r/o HLH, IgG4  -has fevers, splenomegaly, cytopenia  -HLH labs: soluble IL-2 levels 70457, ferritin 511, triglyceride 412, 6/6 fibrinogen 218 6/6  , pending final pathology from cervical lymph node biopsy, flow cytometry from peripheral blood without acute abnormalities in lymphoid, myeloid, or plasma cell lines.   -IgG4 27  -dexamethasone 20 qd discussed with heme on 6/6 following bronchoscopy  -pending pathology 6/7  -Prelim per verbal with pathology EBV+ lymphoprofileration, with empiric treatment initiated for HLH and no specific treatment planned for EBV per discussion with ID.     Lines: TOBY placed 5/27 Removed 6/1 ON  65 yo M with a PMH HTN, HLD, SLE on Cellcept (MMF), class V lupus nephritis, CKD stage 2, DM2, diabetic neuropathy, TIA (2019) on plavix, BPH, HUNG, with multiple hospitalizations for c diff, acute panc, septic shock 22 buttock abscess status post drainage transferred for recurrent fevers status post core biopsy and paracentesis suspected lymphoma v less likely peritoneal tb intubated and sedated due to upper airway stridor and altered mental status and extubated 6/8.    Neuro    Acute encephalopathy requiring intubation and sedation  -Fluctuating mentation while febrile which then returns to current clinical baseline slow however linear thought process and alert and oriented x3.   -exacerbated by fever although remained altered despite control of fever with arctic sun 6/4 AM. Unclear etiology.   -Requiring intubation and sedation 6/4, 6/6 weaning sedation and SBT monitoring mental status.  -6/7 Urgent CTH concern for LUE weakness when sedation weaned down without acute findings and gtt resumed.   -mentation at baseline while in hospital at time of transfer from ICU 6/9       #diabetic neuropathy  -home gabapentin    Pulm  #Intubated for airway protection  -altered with stridor 6/4 AM  -extubated 6/8.  Comfortable on minimal nasal cannula     #large left posterior consolidation seen on ultrasoud, possible PNA?  -monitored off abx originally now back on edilson (6/3- ) and status post vanc (6/3) with negative MRSA MSSA swab   -bronchoscopy 6/6 with BAL to rule out infectious etiologies. nonrevealing.     # stridor  -with additional  stridor 6/4 requiring intubation refractory to epinephrine, methylprednisolone, xopenex, ipratropium. Previously with stridor earlier in admission although more severe 6/4 AM requiring intubation unclear etiology with new medication bicitra, recent instrumentation with core biopsy although had tolerated procedure without evidence of neck swelling/edema or stridor previous, with underlying HUNG hx.     #RLL PE  -heparin gtt, may transition to alternative agent for full AC if no plans for additional surgical intervention/procedure      Cardio    #Episodes of hypotension  -hypotension episodic possibly rate related with period going into afib requiring rate control with lopressor. Lopressor discontinued 6/3 ON remains tachycardic.   -off mega     #Hx of HTN  - monitored off home hypertensive meds 22 relative hypotension   - holding home ACEi additionally due to PEPE on CKD  -hydral 10 TID initiated 6/9       #Paroxysm of afib followed by sinus rhythm w frequent ectopy  -Lopressor 2.5 q6h discontinued 6/3 ON receiving crystalloid and colloid 6/3 ON to 6/4.   -6/6 metoprolol tartarate 12.5mg BID       Renal  #CKD stage 2, hx of lupus nephritis  -cont to trend Cr, uptrending  -monitor I/Os  -cont to monitor electrolytes, replete as necessary  -urinalysis with 100mg/dL proteinuria with biopsy proven lupus nephritis   -canales placed 6/3  -Discontinued tamsulosin for now cannot be crushed and canales in place  -6/6 discussed prior arinesp with family and risks of arinesp administration in critically ill explained including increased risk of thrombosis.     #PEPE on CKD  -with worsening renal function FeNa prerenal receiving fluids 6/4. question role for rheumatology if no improvement in renal function.   -urine studies less suggestive of HRS picture discontinued midodrine although will continue albumin may benefit from volume.   -appreciate nephrology recommendations    -6/5 urine studies consistent with pre-renal FeNA 0.8 however IVC 2.1 on POCUS. Will c/w albumin and give lasix 40 IV x1, goal neg 500cc-1L today  -6/6 pending rheumatology work up ED, ANCA, dsDNA esr 44 and crp 77      GI  #chronic inflammatory diarrhea  #possible Crohn's vs colitis vs infectious  -calprotectin elevated at [place] [facility]. no current plan by GI for colonoscopy at this time.   -C diff negative  -appreciate GI and ID input.   -less likely crohns disease     #Diet  -previously on TPN while at [facility]  -intubated and sedated receiving tube feeds.     #hx of pancreatitis, elevated lipase  -without current epigastric tenderness to palpation 5/30 lipase 740, no imaging findings of pancreatitis. Now downtrending 149 and 158 respectively on repeat.   -follow triglycerides, mixed picture concern for HLH however need to monitor for hypertriglyceridemia while on prop which could exacerbate a pancreatitis picture.     #hepatosplenomegaly shown on MRI at [place]  -hepatitis panel nonreactive including Hep A B and C      #hx of BPH  -start on flomax    Endocrine  #DM2  -last HbA1c 4/29 6.5%  -ISS    ID/rheum  #hx of SLE  -monitoring off cellcept  -Has been seen by Dr. Swapnil Wolfe and also Dr. Shivam Malagon  -According to sisters at bedside 6/6, patient took Aranesp in the [facility] for lupus prior to him getting sick. Family are wondering if the medication can be contributory.   -Following complements, ESR, CRP, dsDNA  -rheumatology consulted     #recurrent fevers and leukocytosis of unknown etiology  diff dx: infectious, malignancy, IgG4 disease, HLH  -elevated WBC, elevated IgA at [place]  -quant gold indeterminate  -5/30 BCx NGTD  -HIV nonreactive, EBV IgG+, CMV 50  -possibility of peritoneal TB appreciate ID reccs AFB sputum and stool thus far are negative with IR biopsy completed pending final results 6/4  -Rheum eval including C3 C4 ESR 44 CRP 77 dsDNA progression of underlying lupus v other rheumatologic process causing fever although less likely.       Heme  #Anemia  -outside hospital reported to be consistent with AOCD  -CT ab pending, r/o bleed  -transfuse if Hb<7, maintain active type and screen  -DVT ppx: full AC      #r/o HLH, IgG4  -has fevers, splenomegaly, cytopenia  -HLH labs: soluble IL-2 levels 10961, ferritin 511, triglyceride 412, 6/6 fibrinogen 218 6/6  , pending final pathology from cervical lymph node biopsy, flow cytometry from peripheral blood without acute abnormalities in lymphoid, myeloid, or plasma cell lines.   -IgG4 27  -dexamethasone 20 qd discussed with heme on 6/6 following bronchoscopy  -pending pathology 6/7  -Prelim per verbal with pathology EBV+ lymphoprofileration, with empiric treatment initiated for HLH and no specific treatment planned for EBV per discussion with ID.     Lines: RIJ placed 5/27 Removed 6/1 ON , additional central line RIJ removed 6/9 PM.

## 2022-06-09 NOTE — PROGRESS NOTE ADULT - PROBLEM SELECTOR PLAN 11
-stridor 6/4 requiring intubation refractory to epinephrine, methylprednisolone, xopenex, ipratropium. Previously with stridor earlier in admission although more severe 6/4 AM requiring intubation unclear etiology with new medication bicitra, recent instrumentation with core biopsy although had tolerated procedure without evidence of neck swelling/edema or stridor previous, with underlying HUNG hx.   -now resolved

## 2022-06-09 NOTE — PROGRESS NOTE ADULT - PROBLEM SELECTOR PLAN 1
diff dx: infectious, malignancy, IgG4 disease, HLH  -elevated WBC, elevated IgA at [place]  -s/p core biopsy and paracentesis at [place] prior to transfer to [facility]  -quant gold indeterminate  -5/30 BCx NGTD  -HIV nonreactive, EBV IgG+, CMV 50  -possibility of peritoneal TB appreciate ID reccs AFB sputum and stool thus far are negative with IR biopsy completed pending final results 6/4  -Rheum eval including C3 C4 ESR 44 CRP 77 dsDNA progression of underlying lupus v other rheumatologic process causing fever although less likely.  -monitoring off antibiotics at this time.  -will f/u pathology results from cervical LN core biopsy diff dx: infectious, malignancy, IgG4 disease, HLH  -elevated WBC, elevated IgA at [place]  -s/p core biopsy and paracentesis at [place] prior to transfer to [facility]  -quant gold indeterminate  -5/30 BCx NGTD  -HIV nonreactive, EBV IgG+, CMV 50  -possibility of peritoneal TB appreciate ID reccs AFB sputum and stool thus far are negative with IR biopsy completed pending final results 6/4  -Rheum eval including C3 C4 ESR 44 CRP 77 dsDNA progression of underlying lupus v other rheumatologic process causing fever although less likely.  -monitoring off antibiotics at this time, will f/u ID recs  -will f/u pathology results from cervical LN core biopsy

## 2022-06-09 NOTE — PROGRESS NOTE ADULT - PROBLEM SELECTOR PLAN 7
#RLL PE  -heparin gtt, may transition to alternative agent for full AC if no plans for additional surgical intervention/procedure

## 2022-06-09 NOTE — PROGRESS NOTE ADULT - ATTENDING COMMENTS
I agree with the above with the following additions and exceptions:   MICU downgrade  67 yo M with a PMH HTN, HLD, SLE on Cellcept (MMF), class V lupus nephritis, CKD stage 2, DM2, diabetic neuropathy, TIA (2019) on plavix, BPH, HUNG, with multiple hospitalizations for c diff, acute panc, septic shock 22 buttock abscess status post drainage transferred for recurrent fevers status post core biopsy and paracentesis suspected lymphoma v less likely peritoneal tb intubated and sedated due to upper airway stridor and altered mental status and extubated 6/8 now transferred to medicine service also with concern for HLH.     Seen bedside with wife no complaint aside from abd, leg, and testicular swelling. Denies cp, sob  Exam with florid overload 4+ pitting edema b/l LE, scrotal edema, +ascites, UE 1+ pitting edema b/l     Fever: ?due to HLH vs occult infection  -last fever on 6/5  -completed 7 day course of meropenem on 6/8 per ID  -all cx NGTD, fever workup negative otherwise  -HD stable  -+EBV PCR: discussed with Id DR Crump: c/w steroids, will reassess     Fluid Overload: discussed with renal Dr Guerra: ok with IV lasix 20mg x 1 today  -monitor bmp given PEPE  -strict I/O, daily weights    HLH with hx of SLE: c/w steroids per heme  -monitor ferritin, cbc other counts  -f/u lymph node biopsy    PE: c/w IV heparin, +cardiolipin antibodies, other serologies pending  -heme following     Acute resp failure: multifactorial s/p extubation  -on NC satting well, wean 02 as tolerated  -no stridor  -treat PE  -s/p antibiotics  -lasix prn     PEPE: multifactorial, cr steady at 1.8 (baseline 1.0), per renal ok with IV lasix 20mg x 1, monitor bmp    PT  prognosis guarded  discussed with DR Graves, Dr Crump, and wife bedside

## 2022-06-09 NOTE — PROGRESS NOTE ADULT - PROBLEM SELECTOR PLAN 1
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 The patient is being treated with MMF 750mg as outpatient. Currently on hold. On review of [facility] pt. noted to have a baseline SCr of 1.1mg/dL. Currently SCr elevated to 1.92mg/dL. Pt. currently off IV vasopressors. Optimize hemodynamics. Renal sonogram without evidence of hydro. C3 and C4 not low, ESR elevated but trending down, CRP elevated but trending down. Monitor labs and urine output. Pt. currently non-oliguric with UOP 1L over 24 hours. Pt. clinically volume overload, may benefit from diuretics as tolerated. Will assess daily for need of RRT, no acute indication as of yet. Avoid NSAIDs, ACEI/ARBS, RCA and nephrotoxins. CMV elevated but not concerning high. EBV titers elevated. Dose medications as per eGFR.    If any questions, please feel free to contact me     Thony Oh  Nephrology Fellow  [facility] Pager: [phone]

## 2022-06-09 NOTE — CHART NOTE - NSCHARTNOTEFT_GEN_A_CORE
MAR Accept Note  Dr. Anais Smith, PGY3  Transfer to:  medicine  Accepting Attending Physician:  Dr yeager  Assigned Room:  396W    Patient seen and examined.   Labs and data reviewed.   No findings precluding transfer of service.       HPI/MICU COURSE:   Please refer to MICU transfer note for full details. Briefly, this is a 67 yo M with a PMH HTN, HLD, SLE on Cellcept (MMF), class V lupus nephritis, CKD stage 2, DM2, diabetic neuropathy, TIA (2019) on plavix, BPH, HUNG, hospitalization in [facility] 3/27-4/20 tx for pancreatitis and C diff?, recent hospitalization at [place] 4/22-4/26 for acute pancreatitis and C diff colitis and another hospitalization 5/5 at [place] for septic shock (source buttock abscess) treated with vanc, cefepime (5/5-5/10), meropenem (5/11-5/18) and micafungin. Zosyn added 5/26. Course complicated by recurrent fevers despite being on antibiotics, and leukocytosis up to 30k. Blood cx neg, UA unremarkable. MRI abdomen w/ contrast performed showing extensive abd lymphadenopathy (reactive to c diff vs lymphoma?). IR stated no window for bx. Course also complicated by a L brachial vein DVT and superficial DVT in arms. Patient noted to have had a positive PPD but has possibly gotten the BCG vaccine. Quant gold performed at [place] still pending.     Admitted to [facility] MICU for further management. Vital signs on arrival: temp 38.6, /77, , RR 28, O2 sat 97% on 2L NC.  Labs: WBC 34.53, Hb 9.6, lipase 740, procal 2.02, tBili 1.4, alk phos 498, lactate 2.4.    Patient with complicated clinical course in MICU. Initially presenting as ICU to ICU transfer from [place] did not require pressors or intubation or other ICU needs at the time of transfer. Recurrent and then constant fevers requiring tylenol, cooling blanket, and cooled fluids as high as temeprature of 105. Extensive FUO evaluation conducted including blood cultures NGTD, sputum cx NGTD, and AFB stool and sputum NGTD x3 additionally without parasites including malaria x3 negative and all other infectious workup negative. Originally monitored off abx and antifungal then resumed on edilson for a period of time with findings concerning for peritonitis based upon neutrophilic predominance in ascitic fluid and total cell count 3518.  Rheumatological workup in part pending however with known lupus complicated by class V lupus nephritis less likely lupus flare. With known L brachial DVT and found to have R sided PE highly likely on CT additionally with peritoneal lymphadenopathy, cirrhosis with portal hypertension although minimal ascites, and splenomegaly with splenic infarct. Placed on heparin gtt. With core biopsy conducted by IR of cervical lymph nodes with preliminary pathology discussed verbally with pathology EBV+ lymphoprofileration although originally with concern for lymphoma pending further genetic testing with pathology. Working diagnosis of HLH caused by EBV v lymphoma v lupus discussed with heme onc pending final path on empiric dexamethasone at time of ICU transfer.       FOR FOLLOW-UP:  [ ] follow up pathology results from cervical LN core biopsy  [ ] Continue empiric treatment for HLH with dexamethasone and follow up heme onc recommendations  [ ] Physical therapy  [ ] Monitor respiratory status and mental status. Monitor for fevers and judiciously use tylenol in setting of cirrhosis. May also consider cooled fluids and cooling blankets.   [ ] continue AC use in setting of suspected R sided PE and LUE DVT and switch to PO as medically warranted     Anais Smith, PGY3

## 2022-06-09 NOTE — PROGRESS NOTE ADULT - ATTENDING COMMENTS
No new complaints.  Breathing better.  For transfer ot floor  1.  ARF on CKD--non oliguric, no HD required.  Judicious diuretic rx as required to mitigate 2 recurrence  2.  Respiratory failure, acute--extubated, now supplemental O2  3.  Lupus nephritis--major concern is rx infection before reinstituting immunosuppression rx    discussed with MICU and floor med attending

## 2022-06-10 NOTE — CHART NOTE - NSCHARTNOTEFT_GEN_A_CORE
hospital course reviewed   No evidence serologic activity  Defer care to medicine, heme/onc, renal  Please recall as needed

## 2022-06-10 NOTE — ADVANCED PRACTICE NURSE CONSULT - REASON FOR CONSULT
Requested by staff to assess skin status of pt a/w a pressure injury. PMH is noted:  65 yo M with a PMH HTN, HLD, SLE on Cellcept (MMF), class V lupus nephritis, CKD stage 2, DM2, diabetic neuropathy, TIA (2019) on plavix, BPH, HUNG, hospitalization in [facility] 3/27-4/20 tx for pancreatitis and C diff?, recent hospitalization at [place] 4/22-4/26 for acute pancreatitis and C diff colitis and another hospitalization 5/5 at [place] for septic shock (source buttock abscess) treated with vanc, cefepime (5/5-5/10), meropenem (5/11-5/18) and micafungin. Zosyn added 5/26. Course complicated by recurrent fevers despite being on antibiotics, and leukocytosis up to 30k. Blood cx neg, UA unremarkable. MRI abdomen w/ contrast performed showing extensive abd lymphadenopathy (reactive to c diff vs lymphoma?). IR stated no window for bx. Course also complicated by a L brachial vein DVT and superficial DVT in arms. Patient noted to have had a positive PPD but has possibly gotten the BCG vaccine. Quant gold performed at [place] still pending.     Admitted to [facility] MICU for further management. Vital signs on arrival: temp 38.6, /77, , RR 28, O2 sat 97% on 2L NC.  Labs: WBC 34.53, Hb 9.6, lipase 740, procal 2.02, tBili 1.4, alk phos 498, lactate 2.4.  
Requested by staff to re-evaluate skin status: b/l buttocks. PMH is noted:  65 yo M with a PMH HTN, HLD, SLE on Cellcept (MMF), class V lupus nephritis, CKD stage 2, DM2, diabetic neuropathy, TIA (2019) on plavix, BPH, HUNG, hospitalization in [facility] 3/27-4/20 tx for pancreatitis and C diff?, recent hospitalization at [place] 4/22-4/26 for acute pancreatitis and C diff colitis and another hospitalization 5/5 at [place] for septic shock (source buttock abscess) treated with vanc, cefepime (5/5-5/10), meropenem (5/11-5/18) and micafungin. Zosyn added 5/26. Course complicated by recurrent fevers despite being on antibiotics, and leukocytosis up to 30k. Blood cx neg, UA unremarkable. MRI abdomen w/ contrast performed showing extensive abd lymphadenopathy (reactive to c diff vs lymphoma?). IR stated no window for bx. Course also complicated by a L brachial vein DVT and superficial DVT in arms. Patient noted to have had a positive PPD but has possibly gotten the BCG vaccine. Quant gold performed at [place] still pending.     Admitted to [facility] MICU for further management. Vital signs on arrival: temp 38.6, /77, , RR 28, O2 sat 97% on 2L NC.  Labs: WBC 34.53, Hb 9.6, lipase 740, procal 2.02, tBili 1.4, alk phos 498, lactate 2.4.  Since last assessment by the [facility] on 5/31, the pt was transferred from ICU to [facility]

## 2022-06-10 NOTE — PROGRESS NOTE ADULT - PROBLEM SELECTOR PLAN 1
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 The patient is being treated with MMF 750mg as outpatient. Currently on hold. On review of [facility] pt. noted to have a baseline SCr of 1.1mg/dL. Currently SCr elevated but improving to 1.65mg/dL. Pt. currently off IV vasopressors. Optimize hemodynamics. Renal sonogram without evidence of hydro. C3 and C4 not low, ESR elevated but trending down, CRP elevated but trending down. Monitor labs and urine output. Pt. currently non-oliguric with UOP 2.4L over 24 hours. Agree with diuretics as needed for volume overload. Avoid NSAIDs, ACEI/ARBS, RCA and nephrotoxins. CMV elevated but not concerning high. MMF currently on hold, will need to rule out infection prior to restarting Dose medications as per eGFR.    If any questions, please feel free to contact me     Thony Oh  Nephrology Fellow  [facility] Pager: [phone].

## 2022-06-10 NOTE — PROGRESS NOTE ADULT - PROBLEM SELECTOR PLAN 2
PEPE on CKD, hx lupus nephritis  -with worsening renal function FeNa prerenal receiving fluids 6/4. question role for rheumatology if no improvement in renal function.   -urine studies less suggestive of HRS picture discontinued midodrine although will continue albumin may benefit from volume.   -appreciate nephrology recommendations    -6/5 urine studies consistent with pre-renal FeNA 0.8 however IVC 2.1 on POCUS. Will c/w albumin and give lasix 40 IV x1, goal neg 500cc-1L today  -6/6 pending rheumatology work up ED, ANCA, dsDNA esr 44 and crp 77  -urinalysis with 100mg/dL proteinuria with biopsy proven lupus nephritis   -canales placed 6/3  -Discontinued tamsulosin for now cannot be crushed and canales in place  -6/6 discussed prior arinesp with family and risks of arinesp administration in critically ill explained including increased risk of thrombosis. PEPE on CKD, hx lupus nephritis  -with worsening renal function FeNa prerenal receiving fluids 6/4. question role for rheumatology if no improvement in renal function.   -urine studies less suggestive of HRS picture discontinued midodrine although will continue albumin may benefit from volume.   -appreciate nephrology recommendations    -6/5 urine studies consistent with pre-renal FeNA 0.8 however IVC 2.1 on POCUS. Will c/w albumin and give lasix 40 IV x1, goal neg 500cc-1L today  -6/6 pending rheumatology work up ED, ANCA, dsDNA esr 44 and crp 77  -urinalysis with 100mg/dL proteinuria with biopsy proven lupus nephritis   -canales placed 6/3  -Discontinued tamsulosin for now cannot be crushed and canales in place  -6/6 discussed prior arinesp with family and risks of arinesp administration in critically ill explained including increased risk of thrombosis.  -6/10 s/p 2 doses IV Lasix 20 for edema, pt. very fluid positive.

## 2022-06-10 NOTE — PROGRESS NOTE ADULT - PROBLEM SELECTOR PLAN 5
#chronic inflammatory diarrhea  #possible Crohn's vs colitis vs infectious  -calprotectin elevated at [place] [facility]. no current plan by GI for colonoscopy at this time.   -C diff negative  -appreciate GI and ID input.   -less likely crohns disease

## 2022-06-10 NOTE — ADVANCED PRACTICE NURSE CONSULT - ASSESSMENT
The pt was encountered in the 8ICU- MR Sol is on Air borne Isolation to r/o TB.  His wife was at the bedside.  He is on a Progressa support surface and needs assistance with T&P- he reports pain in his legs when being moved.  Will recommend Complete CAir boots to off-load the heels.  As the pt was a/w a deep tissue injury, he was seen by  nutrition and a consult is noted.  Upon assessment, the pt was incontinent of a large amount of green-colored stool and pericare was provided On the sacrum, b/l buttocks, b/l ischial areas, and posterior thighs,  the skin was intact but with a dusky maroon and purple hue; the area was large measuring approximately 20cmx 14 cm x0cm. Mrs oSl saw the skin and reported " this was not like this yesterday,"  On the left buttocks was a wound which as per hx in the medical record, was an abscess that was I&D'd in an OSH. the wound measured  4cmx 2cm x 1.5cm- the wound was red and moist with serosanguinous drainage- the periwound skin was dusky. An Aquacel dressing was applied to  wick the drainage- spoke with the MICU team and  suggested further evaluation of the b/l buttocks and the wound on the left buttocks by Surgery.,
The pt was encountered on [facility]- his son Ryan was at the bedside and assisting pt with his food. A VersaCare P 500 support surface is in use and the pt needs assistance with T&P. As per discussion with the primary RN, the pt is declining to wear off-loading boots- his heels are elevated on a pillow.  Mr Sol is being followed by nutrition.  Upon assessment, the pt was incontinent of a large amount of green liquid stool and pericare was provided. The dusky maroon discoloration of the sacrum buttocks, ischium and thighs appears to be improving. On the left buttocks, the wound is improving as well with a decrease in size noted. The wound is secondary to I&D of an abscess at an OSH. The wound measures 2cmx 1cm x 1.2cm- with moist red tissue- the periwound skin was hyperpigmented - will recommend to continue with the Aquacel dressing to promote moist healing.  Education was provided regarding the status of the skin and wound and the tx chosen. I also provided education about pressure injury prevention

## 2022-06-10 NOTE — PROGRESS NOTE ADULT - PROBLEM SELECTOR PLAN 3
-r/o HLH 2/2 EBV  -has fevers, splenomegaly, cytopenia  -HLH labs: soluble IL-2 levels 75022, ferritin 511, triglyceride 412, 6/6 fibrinogen 218 6/6  , pending final pathology from cervical lymph node biopsy, flow cytometry from peripheral blood without acute abnormalities in lymphoid, myeloid, or plasma cell lines.   -IgG4 27  -dexamethasone 20 qd discussed with heme on 6/6 following bronchoscopy  -pending pathology 6/7  -Prelim per verbal with pathology EBV+ lymphoprofileration, with empiric treatment initiated for HLH and no specific treatment planned for EBV per discussion with ID. -r/o HLH 2/2 EBV  -has fevers, splenomegaly, cytopenia  -HLH labs: soluble IL-2 levels 45087, ferritin 511, triglyceride 412, 6/6 fibrinogen 218 6/6  , pending final pathology from cervical lymph node biopsy, flow cytometry from peripheral blood without acute abnormalities in lymphoid, myeloid, or plasma cell lines.   -IgG4 27  -dexamethasone 20 qd discussed with heme on 6/6 following bronchoscopy  -pending pathology 6/7  -Prelim per verbal with pathology EBV+ lymphoprofileration, with empiric treatment initiated for HLH and no specific treatment planned for EBV per discussion with ID.  -Fever to 102 o/n, got tylenol and new UA sent.

## 2022-06-10 NOTE — ADVANCED PRACTICE NURSE CONSULT - RECOMMEDATIONS
Will recommend the followin. B/l buttocks: routine pericare with Tyesha  2. Left buttocks: Cavilon to periwound skin, Aquacel rope to gently fill wound cavity, cover with gauze and secure with tegaderm. Change daily and prn for drainage/soiling  3. continue with T&P  4. Continue with heel off-loading  5. Seat cushion when OOB to chair  6, Nutrition support as pt condition allows  Tx plan discussed with RN

## 2022-06-10 NOTE — PROGRESS NOTE ADULT - ASSESSMENT
65 yo M with a PMH HTN, HLD, SLE on Cellcept (MMF), class V lupus nephritis, CKD stage 2, DM2, diabetic neuropathy, TIA (2019) on plavix, BPH, HUNG, hospitalization in [facility] 3/27-4/20 tx for pancreatitis and C diff?, recent hospitalization at [place] 4/22-4/26 for acute pancreatitis and C diff colitis and another hospitalization 5/5 at [place] for septic shock (source buttock abscess) treated with vanc, cefepime (5/5-5/10), meropenem (5/11-5/18) and micafungin. Zosyn added 5/26 course complicated by recurrent fever now with suspected R PE and imaging with  splenic infarct  ? HLH. Had PAF in [place], now short bursts on this admission , now post biopsy, recurrent high grade fevers, hypertensive, short bouts of PAF . was on lopressor 2.5 q 6., now placed on hydralazine.  ECHO normal LV function, no evidence PFO/ASD . On steroids as per team    advise    1. consider restart metoprolol instead of hydralazine   2. replete K>4, mag >2  3 telemetry  4.  continue anticoagulation  5. volume management as per team

## 2022-06-10 NOTE — PROGRESS NOTE ADULT - PROBLEM SELECTOR PLAN 4
-monitoring off cellcept  -Has been seen by Dr. Swapnil Wolfe and also Dr. Shivam Malagon  -According to sisters at bedside 6/6, patient took Aranesp in the [facility] for lupus prior to him getting sick. Family are wondering if the medication can be contributory.   -Following complements, ESR, CRP, dsDNA  -rheumatology consulted, will f/u recs

## 2022-06-10 NOTE — PROGRESS NOTE ADULT - SUBJECTIVE AND OBJECTIVE BOX
INTERVAL HPI/OVERNIGHT EVENTS: patient noted with fevers, no chest pain , no dyspnea, remains on dexamethazone., still febrile    MEDICATIONS  (STANDING):  acetaminophen     Tablet .. 650 milliGRAM(s) Oral once  chlorhexidine 4% Liquid 1 Application(s) Topical <User Schedule>  citric acid/sodium citrate Solution 30 milliLiter(s) Oral every 6 hours  dexAMETHasone  IVPB 20 milliGRAM(s) IV Intermittent daily  folic acid 1 milliGRAM(s) Oral daily  gabapentin 300 milliGRAM(s) Oral daily  glucagon  Injectable 1 milliGRAM(s) IntraMuscular once  heparin  Infusion. 1900 Unit(s)/Hr (19 mL/Hr) IV Continuous <Continuous>  hydrALAZINE 10 milliGRAM(s) Oral every 8 hours  lidocaine   4% Patch 1 Patch Transdermal daily  melatonin 5 milliGRAM(s) Oral at bedtime  metoprolol tartrate 12.5 milliGRAM(s) Oral two times a day  multivitamin/minerals 1 Tablet(s) Oral daily  pantoprazole  Injectable 40 milliGRAM(s) IV Push daily  tamsulosin 0.4 milliGRAM(s) Oral at bedtime    MEDICATIONS  (PRN):  acetaminophen     Tablet .. 650 milliGRAM(s) Oral every 6 hours PRN Temp greater or equal to 38.5C (101.3F)  heparin   Injectable 7500 Unit(s) IV Push every 6 hours PRN For aPTT less than 40  heparin   Injectable 3500 Unit(s) IV Push every 6 hours PRN For aPTT between 40 - 57  sodium chloride 0.9% lock flush 10 milliLiter(s) IV Push every 1 hour PRN Pre/post blood products, medications, blood draw, and to maintain line patency      Allergies    No Known Allergies    Intolerances      ROS:  General: Pt denies recent weight loss/fever/chills    Neurological: denies numbness or  sensation loss    Cardiovascular: denies chest pain/palpitations/leg edema    Respiratory and Thorax: denies SOB/cough/wheezing    Gastrointestinal: denies abdominal pain/diarrhea/constipation/bloody stool    Genitourinary: denies urinary frequency/urgency/ dysuria    Musculoskeletal: denies joint pain or swelling, denies restricted motion    Hematologic: denies abnormal bleeding  	    	  	    		        	    	            Vital Signs Last 24 Hrs  T(C): 36.8 (10 [place] 2022 13:16), Max: 39.4 (10 [place] 2022 00:47)  T(F): 98.2 (10 [place] 2022 13:16), Max: 102.9 (10 [place] 2022 00:47)  HR: 90 (10 [place] 2022 15:30) (80 - 103)  BP: 167/71 (10 [place] 2022 15:30) (114/66 - 186/75)  BP(mean): --  RR: 18 (10 [place] 2022 15:30) (18 - 19)  SpO2: 95% (10 [place] 2022 15:30) (94% - 99%)  Daily     Daily Weight in k.1 (10 [place] 2022 09:00)     @ 07:01  -  06-10 @ 07:00  --------------------------------------------------------  IN: 257 mL / OUT: 2425 mL / NET: -2168 mL    06-10 @ 07:  -  06-10 @ 17:01  --------------------------------------------------------  IN: 60 mL / OUT: 1775 mL / NET: -1715 mL      Physical Exam:      LABS:                        7.4    17.46 )-----------( 92       ( 10 [place] 2022 07:44 )             22.9     06-10    141  |  107  |  40<H>  ----------------------------<  154<H>  3.7   |  19<L>  |  1.65<H>    Ca    8.3<L>      10 [place] 2022 07:44  Phos  7.4     06-  Mg     2.5     06-09    TPro  6.1  /  Alb  2.9<L>  /  TBili  1.0  /  DBili  x   /  AST  31  /  ALT  10  /  AlkPhos  218<H>  06-10    PT/INR - ( 2022 00:25 )   PT: 14.5 sec;   INR: 1.26 ratio         PTT - ( 10 [place] 2022 07:44 )  PTT:131.3 sec  Urinalysis Basic - ( 10 [place] 2022 07:57 )    Color: Light Yellow / Appearance: Turbid / S.014 / pH: x  Gluc: x / Ketone: Negative  / Bili: Negative / Urobili: Negative   Blood: x / Protein: 100 / Nitrite: Negative   Leuk Esterase: Negative / RBC: 67 /hpf / WBC 4 /HPF   Sq Epi: x / Non Sq Epi: 3 /hpf / Bacteria: Negative        RADIOLOGY & ADDITIONAL TESTS:    TELE:    EKG:

## 2022-06-10 NOTE — PROGRESS NOTE ADULT - SUBJECTIVE AND OBJECTIVE BOX
Patient is a 66y old  Male who presents with a chief complaint of recurrent fevers, unknown source (10 [place] 2022 10:08)    Patient seen and examined this morning.    MEDICATIONS  (STANDING):  acetaminophen     Tablet .. 650 milliGRAM(s) Oral once  chlorhexidine 4% Liquid 1 Application(s) Topical <User Schedule>  citric acid/sodium citrate Solution 30 milliLiter(s) Oral every 6 hours  dexAMETHasone  IVPB 20 milliGRAM(s) IV Intermittent daily  folic acid 1 milliGRAM(s) Oral daily  gabapentin 300 milliGRAM(s) Oral daily  glucagon  Injectable 1 milliGRAM(s) IntraMuscular once  heparin  Infusion. 1900 Unit(s)/Hr (19 mL/Hr) IV Continuous <Continuous>  hydrALAZINE 10 milliGRAM(s) Oral every 8 hours  HYDROmorphone  Injectable 0.5 milliGRAM(s) IV Push once  lidocaine   4% Patch 1 Patch Transdermal daily  lidocaine 2% Gel 1 Application(s) Topical two times a day  melatonin 5 milliGRAM(s) Oral at bedtime  metoprolol tartrate 12.5 milliGRAM(s) Oral two times a day  multivitamin/minerals 1 Tablet(s) Oral daily  pantoprazole  Injectable 40 milliGRAM(s) IV Push daily  tamsulosin 0.4 milliGRAM(s) Oral at bedtime    MEDICATIONS  (PRN):  acetaminophen     Tablet .. 650 milliGRAM(s) Oral every 6 hours PRN Temp greater or equal to 38.5C (101.3F)  heparin   Injectable 7500 Unit(s) IV Push every 6 hours PRN For aPTT less than 40  heparin   Injectable 3500 Unit(s) IV Push every 6 hours PRN For aPTT between 40 - 57  sodium chloride 0.9% lock flush 10 milliLiter(s) IV Push every 1 hour PRN Pre/post blood products, medications, blood draw, and to maintain line patency      ROS  No fever, sweats, chills  No epistaxis, HA, sore throat  No CP, SOB, cough, sputum  No n/v/d, abd pain, melena, hematochezia  + B/L LE edema  No rash  No anxiety  No back pain, joint pain  No bleeding, bruising  hematuria  +dysurea    Vital Signs Last 24 Hrs  T(C): 37 (10 [place] 2022 05:04), Max: 39.4 (10 [place] 2022 00:47)  T(F): 98.6 (10 [place] 2022 05:04), Max: 102.9 (10 [place] 2022 00:47)  HR: 97 (10 [place] 2022 05:04) (67 - 103)  BP: 114/66 (10 [place] 2022 05:04) (114/66 - 186/75)  BP(mean): 121 (09 Jun 2022 13:00) (121 - 131)  RR: 18 (10 [place] 2022 05:04) (18 - 29)  SpO2: 94% (10 [place] 2022 05:04) (94% - 100%)    PE  NAD  Awake, alert  Anicteric, MMM  Abd soft, NT, ND  No c/c  + BL JORGITO  No rash grossly                            7.4    17.46 )-----------( 92       ( 10 [place] 2022 07:44 )             22.9       06-10    141  |  107  |  40<H>  ----------------------------<  154<H>  3.7   |  19<L>  |  1.65<H>    Ca    8.3<L>      10 [place] 2022 07:44  Phos  7.4     06-09  Mg     2.5     06-09    TPro  6.1  /  Alb  2.9<L>  /  TBili  1.0  /  DBili  x   /  AST  31  /  ALT  10  /  AlkPhos  218<H>  06-10

## 2022-06-10 NOTE — PROGRESS NOTE ADULT - SUBJECTIVE AND OBJECTIVE BOX
Ramesh Somers MD  Internal Medicine PGY-1      PROGRESS NOTE:     Patient is a 66y old  Male who presents with a chief complaint of recurrent fevers, unknown source (09 Jun 2022 15:52)      SUBJECTIVE / OVERNIGHT EVENTS:    1 fever overnight to 102.9 with tachycardia to 103, got 650 mg PO. reporting pain from canales catheter, urine studies sent and given IV tylenol.     REVIEW OF SYSTEMS:    CONSTITUTIONAL: No weakness, fevers or chills  EYES/ENT: No visual changes;  No vertigo or throat pain   NECK: No pain or stiffness  RESPIRATORY: No cough, wheezing, hemoptysis; No shortness of breath  CARDIOVASCULAR: No chest pain or palpitations  GASTROINTESTINAL: No abdominal or epigastric pain. No nausea, vomiting, or hematemesis; No diarrhea or constipation. No melena or hematochezia.  GENITOURINARY: No dysuria, frequency or hematuria  NEUROLOGICAL: No numbness or weakness  SKIN: No itching, rashes      MEDICATIONS  (STANDING):  acetaminophen     Tablet .. 650 milliGRAM(s) Oral once  acetaminophen   IVPB .. 1000 milliGRAM(s) IV Intermittent once  chlorhexidine 4% Liquid 1 Application(s) Topical <User Schedule>  citric acid/sodium citrate Solution 30 milliLiter(s) Oral every 6 hours  dexAMETHasone  IVPB 20 milliGRAM(s) IV Intermittent daily  folic acid 1 milliGRAM(s) Oral daily  gabapentin 300 milliGRAM(s) Oral daily  glucagon  Injectable 1 milliGRAM(s) IntraMuscular once  heparin  Infusion. 1900 Unit(s)/Hr (19 mL/Hr) IV Continuous <Continuous>  hydrALAZINE 10 milliGRAM(s) Oral every 8 hours  lidocaine   4% Patch 1 Patch Transdermal daily  melatonin 5 milliGRAM(s) Oral at bedtime  metoprolol tartrate 12.5 milliGRAM(s) Oral two times a day  multivitamin/minerals 1 Tablet(s) Oral daily  pantoprazole  Injectable 40 milliGRAM(s) IV Push daily  tamsulosin 0.4 milliGRAM(s) Oral at bedtime    MEDICATIONS  (PRN):  acetaminophen     Tablet .. 650 milliGRAM(s) Oral every 6 hours PRN Temp greater or equal to 38.5C (101.3F)  heparin   Injectable 7500 Unit(s) IV Push every 6 hours PRN For aPTT less than 40  heparin   Injectable 3500 Unit(s) IV Push every 6 hours PRN For aPTT between 40 - 57  sodium chloride 0.9% lock flush 10 milliLiter(s) IV Push every 1 hour PRN Pre/post blood products, medications, blood draw, and to maintain line patency      CAPILLARY BLOOD GLUCOSE      POCT Blood Glucose.: 110 mg/dL (09 Jun 2022 17:57)    I&O's Summary    09 Jun 2022 07:01  -  10 Vernon 2022 07:00  --------------------------------------------------------  IN: 257 mL / OUT: 2425 mL / NET: -2168 mL        VITALS:   T(C): 37 (06-10-22 @ 05:04), Max: 39.4 (06-10-22 @ 00:47)  HR: 97 (06-10-22 @ 05:04) (65 - 103)  BP: 114/66 (06-10-22 @ 05:04) (114/66 - 186/99)  RR: 18 (06-10-22 @ 05:04) (18 - 30)  SpO2: 94% (06-10-22 @ 05:04) (94% - 100%)    GENERAL: NAD, lying in bed comfortably  HEAD:  Atraumatic, normocephalic  EYES: EOMI, PERRLA, conjunctiva and sclera clear  ENT: Moist mucous membranes  NECK: Supple, no JVD  HEART: Regular rate and rhythm, no murmurs, rubs, or gallops  LUNGS: Unlabored respirations.  Clear to auscultation bilaterally, no crackles, wheezing, or rhonchi  ABDOMEN: Soft, nontender, nondistended, +BS  EXTREMITIES: 2+ peripheral pulses bilaterally. No clubbing, cyanosis, or edema  NERVOUS SYSTEM:  A&Ox3, no focal deficits   SKIN: No rashes or lesions    LABS:                        8.0    20.52 )-----------( 118      ( 10 Vernon 2022 00:34 )             26.3     06-09    141  |  106  |  38<H>  ----------------------------<  138<H>  4.0   |  19<L>  |  1.82<H>    Ca    8.4      09 Jun 2022 08:54  Phos  7.4     06-09  Mg     2.5     06-09    TPro  6.1  /  Alb  2.8<L>  /  TBili  1.0  /  DBili  x   /  AST  30  /  ALT  9<L>  /  AlkPhos  244<H>  06-09    PT/INR - ( 09 Jun 2022 00:25 )   PT: 14.5 sec;   INR: 1.26 ratio         PTT - ( 10 Vernon 2022 00:34 )  PTT:69.5 sec  CARDIAC MARKERS ( 08 Jun 2022 15:04 )  x     / x     / 13 U/L / x     / 1.0 ng/mL            RADIOLOGY & ADDITIONAL TESTS:  Results Reviewed:   Imaging Personally Reviewed:  Electrocardiogram Personally Reviewed:    COORDINATION OF CARE:  Care Discussed with Consultants/Other Providers [Y/N]:  Prior or Outpatient Records Reviewed [Y/N]:   Ramesh Somers MD  Internal Medicine PGY-1      PROGRESS NOTE:     Patient is a 66y old  Male who presents with a chief complaint of recurrent fevers, unknown source (09 Jun 2022 15:52)      SUBJECTIVE / OVERNIGHT EVENTS:    1 fever overnight to 102.9 with tachycardia to 103, got 650 mg PO. reporting pain from canales catheter + generalized pain, urinalysis sent and given IV tylenol. Lidocaine jelly ordered for application to catheter insertion site. Canales output yellow but cloudydf    REVIEW OF SYSTEMS:    CONSTITUTIONAL: No weakness, fevers or chills  EYES/ENT: No visual changes;  No vertigo or throat pain   NECK: No pain or stiffness  RESPIRATORY: No cough, wheezing, hemoptysis; No shortness of breath  CARDIOVASCULAR: No chest pain or palpitations  GASTROINTESTINAL: No abdominal or epigastric pain. No nausea, vomiting, or hematemesis; No diarrhea or constipation. No melena or hematochezia.  GENITOURINARY: No increased frequency or incontinence but +pain at catheter insertion site.   NEUROLOGICAL: No numbness or weakness  SKIN: No itching, rashes      MEDICATIONS  (STANDING):  acetaminophen     Tablet .. 650 milliGRAM(s) Oral once  acetaminophen   IVPB .. 1000 milliGRAM(s) IV Intermittent once  chlorhexidine 4% Liquid 1 Application(s) Topical <User Schedule>  citric acid/sodium citrate Solution 30 milliLiter(s) Oral every 6 hours  dexAMETHasone  IVPB 20 milliGRAM(s) IV Intermittent daily  folic acid 1 milliGRAM(s) Oral daily  gabapentin 300 milliGRAM(s) Oral daily  glucagon  Injectable 1 milliGRAM(s) IntraMuscular once  heparin  Infusion. 1900 Unit(s)/Hr (19 mL/Hr) IV Continuous <Continuous>  hydrALAZINE 10 milliGRAM(s) Oral every 8 hours  lidocaine   4% Patch 1 Patch Transdermal daily  melatonin 5 milliGRAM(s) Oral at bedtime  metoprolol tartrate 12.5 milliGRAM(s) Oral two times a day  multivitamin/minerals 1 Tablet(s) Oral daily  pantoprazole  Injectable 40 milliGRAM(s) IV Push daily  tamsulosin 0.4 milliGRAM(s) Oral at bedtime    MEDICATIONS  (PRN):  acetaminophen     Tablet .. 650 milliGRAM(s) Oral every 6 hours PRN Temp greater or equal to 38.5C (101.3F)  heparin   Injectable 7500 Unit(s) IV Push every 6 hours PRN For aPTT less than 40  heparin   Injectable 3500 Unit(s) IV Push every 6 hours PRN For aPTT between 40 - 57  sodium chloride 0.9% lock flush 10 milliLiter(s) IV Push every 1 hour PRN Pre/post blood products, medications, blood draw, and to maintain line patency      CAPILLARY BLOOD GLUCOSE      POCT Blood Glucose.: 110 mg/dL (09 Jun 2022 17:57)    I&O's Summary    09 Jun 2022 07:01  -  10 [place] 2022 07:00  --------------------------------------------------------  IN: 257 mL / OUT: 2425 mL / NET: -2168 mL        VITALS:   T(C): 37 (06-10-22 @ 05:04), Max: 39.4 (06-10-22 @ 00:47)  HR: 97 (06-10-22 @ 05:04) (65 - 103)  BP: 114/66 (06-10-22 @ 05:04) (114/66 - 186/99)  RR: 18 (06-10-22 @ 05:04) (18 - 30)  SpO2: 94% (06-10-22 @ 05:04) (94% - 100%)    GENERAL: NAD, lying in bed in mild discomfort  HEAD:  Atraumatic, normocephalic  EYES: EOMI, PERRLA, conjunctiva and sclera clear  ENT: Moist mucous membranes  NECK: Supple, no JVD  HEART: Regular rate and rhythm, no murmurs, rubs, or gallops  LUNGS: Unlabored respirations.  Clear to auscultation bilaterally, no crackles, wheezing, or rhonchi  ABDOMEN: Soft, nontender, +distended, +BS, no suprapubic tenderness appreciated  EXTREMITIES: 2+ peripheral pulses bilaterally. No clubbing, cyanosis, +2 edema of LE bilaterally noted. No scrotal swelling appreciated, no erythema of penis at catheter insertion site.  NERVOUS SYSTEM:  A&Ox3, no focal deficit but slow to respond to questioning.  SKIN: No rashes or lesions    LABS:                        8.0    20.52 )-----------( 118      ( 10 Vernon 2022 00:34 )             26.3     06-09    141  |  106  |  38<H>  ----------------------------<  138<H>  4.0   |  19<L>  |  1.82<H>    Ca    8.4      09 Jun 2022 08:54  Phos  7.4     06-09  Mg     2.5     06-09    TPro  6.1  /  Alb  2.8<L>  /  TBili  1.0  /  DBili  x   /  AST  30  /  ALT  9<L>  /  AlkPhos  244<H>  06-09    PT/INR - ( 09 Jun 2022 00:25 )   PT: 14.5 sec;   INR: 1.26 ratio         PTT - ( 10 Vernon 2022 00:34 )  PTT:69.5 sec  CARDIAC MARKERS ( 08 Jun 2022 15:04 )  x     / x     / 13 U/L / x     / 1.0 ng/mL            RADIOLOGY & ADDITIONAL TESTS:  Results Reviewed:   Imaging Personally Reviewed:  Electrocardiogram Personally Reviewed:    COORDINATION OF CARE:  Care Discussed with Consultants/Other Providers [Y/N]:  Prior or Outpatient Records Reviewed [Y/N]:

## 2022-06-10 NOTE — PROGRESS NOTE ADULT - SUBJECTIVE AND OBJECTIVE BOX
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 Moore), DM, TIA, BPH, and HUNG who pwas transferred to the hospital due to concerns for septic shock and recurrent fevers. 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 Since then he has been following Dr. Moore as his primary nephrologist. The patient has was on treatment with steroids and cyclophosphamide. Most recently the patient is being treated with MMF 750mg BID. Upon arrival to the hospital the patient was noted to have a SCr of 1.16 which has since risen to 1.98mg/dL this morning. The nephrology team was consulted for PEPE. The patient was intubated on 6/5 for worsening work of breathing, successfully extubated on 6/8.     Pt. seen and examined this morning. Pt. reported some pain in his abdomen but otherwise reports feeling okay. Overnight noted ot have a fever. Denied any cough and states SOB is better.         PAST HISTORY  --------------------------------------------------------------------------------  No significant changes to PMH, PSH, FHx, SHx, unless otherwise noted    ALLERGIES & MEDICATIONS  --------------------------------------------------------------------------------  Allergies    No Known Allergies    Intolerances      Standing Inpatient Medications  acetaminophen     Tablet .. 650 milliGRAM(s) Oral once  chlorhexidine 4% Liquid 1 Application(s) Topical <User Schedule>  citric acid/sodium citrate Solution 30 milliLiter(s) Oral every 6 hours  dexAMETHasone  IVPB 20 milliGRAM(s) IV Intermittent daily  folic acid 1 milliGRAM(s) Oral daily  gabapentin 300 milliGRAM(s) Oral daily  glucagon  Injectable 1 milliGRAM(s) IntraMuscular once  heparin  Infusion. 1900 Unit(s)/Hr IV Continuous <Continuous>  hydrALAZINE 10 milliGRAM(s) Oral every 8 hours  HYDROmorphone  Injectable 0.5 milliGRAM(s) IV Push once  lidocaine   4% Patch 1 Patch Transdermal daily  lidocaine 2% Gel 1 Application(s) Topical two times a day  melatonin 5 milliGRAM(s) Oral at bedtime  metoprolol tartrate 12.5 milliGRAM(s) Oral two times a day  multivitamin/minerals 1 Tablet(s) Oral daily  pantoprazole  Injectable 40 milliGRAM(s) IV Push daily  tamsulosin 0.4 milliGRAM(s) Oral at bedtime    PRN Inpatient Medications  acetaminophen     Tablet .. 650 milliGRAM(s) Oral every 6 hours PRN  heparin   Injectable 7500 Unit(s) IV Push every 6 hours PRN  heparin   Injectable 3500 Unit(s) IV Push every 6 hours PRN  sodium chloride 0.9% lock flush 10 milliLiter(s) IV Push every 1 hour PRN      REVIEW OF SYSTEMS      All other systems were reviewed and are negative, except as noted.    VITALS/PHYSICAL EXAM  --------------------------------------------------------------------------------  T(C): 37 (06-10-22 @ 05:04), Max: 39.4 (06-10-22 @ 00:47)  HR: 97 (06-10-22 @ 05:04) (67 - 103)  BP: 114/66 (06-10-22 @ 05:04) (114/66 - 186/75)  RR: 18 (06-10-22 @ 05:04) (18 - 29)  SpO2: 94% (06-10-22 @ 05:04) (94% - 100%)  Wt(kg): --        06-09-22 @ 07:01  -  06-10-22 @ 07:00  --------------------------------------------------------  IN: 257 mL / OUT: 2425 mL / NET: -2168 mL        Physical Exam:  	Gen: ill appearing  	HEENT: MMM  	Pulm: Decreased breath sounds   	CV: S1S2  	Abd: Soft, +BS   	Ext: + LE edema B/L  	Neuro: awake and alert   	Skin: Warm and dry    LABS/STUDIES  --------------------------------------------------------------------------------              7.4    17.46 >-----------<  92       [06-10-22 @ 07:44]              22.9     141  |  107  |  40  ----------------------------<  154      [06-10-22 @ 07:44]  3.7   |  19  |  1.65        Ca     8.3     [06-10-22 @ 07:44]      Mg     2.5     [06-09-22 @ 08:54]      Phos  7.4     [06-09-22 @ 08:54]    TPro  6.1  /  Alb  2.9  /  TBili  1.0  /  DBili  x   /  AST  31  /  ALT  10  /  AlkPhos  218  [06-10-22 @ 07:44]    PT/INR: PT 14.5 , INR 1.26       [06-09-22 @ 00:25]  PTT: 131.3      [06-10-22 @ 07:44]    CK 13      [06-08-22 @ 15:04]    Creatinine Trend:  SCr 1.65 [06-10 @ 07:44]  SCr 1.82 [06-09 @ 08:54]  SCr 1.92 [06-09 @ 00:25]  SCr 1.88 [06-08 @ 15:04]  SCr 1.95 [06-08 @ 09:21]    Urinalysis - [06-10-22 @ 07:57]      Color Light Yellow / Appearance Turbid / SG 1.014 / pH 6.0      Gluc Negative / Ketone Negative  / Bili Negative / Urobili Negative       Blood Moderate / Protein 100 / Leuk Est Negative / Nitrite Negative      RBC  / WBC  / Hyaline  / Gran  / Sq Epi  / Non Sq Epi  / Bacteria     Urine Creatinine 49      [06-08-22 @ 15:09]  Urine Protein 88      [06-08-22 @ 15:09]  Urine Sodium 21      [06-05-22 @ 12:06]  Urine Urea Nitrogen 181      [06-05-22 @ 12:05]  Urine Potassium 36      [06-05-22 @ 12:06]  Urine Chloride 65      [06-03-22 @ 16:45]  Urine Phosphorus 41.7      [06-05-22 @ 12:05]  Urine Osmolality 240      [06-05-22 @ 12:06]    Iron 43, TIBC 128, %sat 34      [06-01-22 @ 04:47]  Ferritin 972      [06-08-22 @ 09:19]  TSH 3.52      [06-03-22 @ 14:46]  Lipid: chol --, , HDL --, LDL --      [06-07-22 @ 07:15]    HBsAg Nonreact      [05-30-22 @ 03:46]  HCV 0.31, Nonreact      [05-31-22 @ 00:32]  HIV Nonreact      [06-01-22 @ 13:46]  HIV Nonreact      [05-30-22 @ 04:13]    ED: titer 1:320, pattern Homogeneous      [06-04-22 @ 14:21]  dsDNA 17      [06-07-22 @ 16:17]  C3 Complement 116      [06-04-22 @ 14:21]  C4 Complement 36      [06-04-22 @ 14:21]  Rheumatoid Factor <10      [06-04-22 @ 14:21]  ANCA: cANCA Negative, pANCA Negative, atypical ANCA Indeterminate Method interference due to ED Fluorescence      [06-04-22 @ 16:53]  ASLO <20      [06-04-22 @ 16:53]  Free Light Chains: kappa 14.57, lambda 23.09, ratio = 0.63      [06-01 @ 04:47]  Immunofixation Serum:   One Weak IgG Kappa Band and One Weak IgG Lambda Band Identified    Reference Range: None Detected      [06-01-22 @ 04:47]  SPEP Interpretation: Two Weak Gamma-Migrating Paraproteins Identified      [06-01-22 @ 04:47]

## 2022-06-10 NOTE — PROGRESS NOTE ADULT - PROBLEM SELECTOR PLAN 1
diff dx: infectious, malignancy, IgG4 disease, HLH  -elevated WBC, elevated IgA at [place]  -s/p core biopsy and paracentesis at [place] prior to transfer to [facility]  -quant gold indeterminate  -5/30 BCx NGTD  -HIV nonreactive, EBV IgG+, CMV 50  -possibility of peritoneal TB appreciate ID reccs AFB sputum and stool thus far are negative with IR biopsy completed pending final results 6/4  -Rheum eval including C3 C4 ESR 44 CRP 77 dsDNA progression of underlying lupus v other rheumatologic process causing fever although less likely.  -monitoring off antibiotics at this time, will f/u ID recs  -will f/u pathology results from cervical LN core biopsy diff dx: infectious, malignancy, IgG4 disease, HLH  -elevated WBC, elevated IgA at [place]  -s/p core biopsy and paracentesis at [place] prior to transfer to [facility]  -quant gold indeterminate  -5/30 BCx NGTD  -HIV nonreactive, EBV IgG+, CMV 50  -possibility of peritoneal TB appreciate ID reccs AFB sputum and stool thus far are negative with IR biopsy completed pending final results 6/4  -Rheum eval including C3 C4 ESR 44 CRP 77 dsDNA progression of underlying lupus v other rheumatologic process causing fever although less likely.  -monitoring off antibiotics at this time, will f/u ID recs  -will f/u pathology results from cervical LN core biopsy  -fever o/n 102 6/10, pt. complaining of pain at catheter insertion site. UA sent, lidocaine jelly, IV tylenol, and dilaudid given for pain relief.

## 2022-06-10 NOTE — PROGRESS NOTE ADULT - ATTENDING COMMENTS
Feeling improved  1.  ARF on CKD--non oliguric, no HD required, no progression at present  2.  Lupus nephritis--MMF being held during infection w/u, rx    discussed with med, team

## 2022-06-10 NOTE — PROGRESS NOTE ADULT - ASSESSMENT
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   No response to broad spectrum antibiotics and only had a small gluteal skin abscess, clean s/p I&D  MTB is possible but less likely, no obvious lung lesions  Quant gold IND  HIV negative  CT mesenteric LAD, possible PE, enlarged spleen, HSM, ascites  Fevers with no chills  Peritoneal fluid has elevated neutrophils concerning for peritonitis--culture NGTD; on edilson for now  Malaria screenings negative  Suspected malignancy  Overall, Fevers, abnormal finding on imaging, leukocytosis  EBV viremia    -EBV viremia could driving his HLH   -check EBV pcr next week   -all cx negative  -path pending   -on steroids for HLH  -off abx for now   -monitor temps, cbc - wbc better  -check cdiff if diarrhea    Inocente Crump  Attending Physician   Division of Infectious Disease  Office #[phone]  Available on Microsoft Teams also  After 5pm/weekend or no response, call #[phone]

## 2022-06-10 NOTE — PROGRESS NOTE ADULT - ATTENDING COMMENTS
No On events. Reports pain at canales insertion site, no discharge, abd pain, n/v.     Fever: ?due to HLH vs occult infection  -last fever on 6/5  -Treat HLD as below  -completed 7 day course of meropenem on 6/8 per ID  -all cx NGTD, fever workup negative otherwise  -HD stable  -bronch cytopath showing cytopathic viral effects  -+EBV PCR: discussed with Id DR Crump: c/w steroids, will reassess next week     Fluid Overload: cr improving with IV lasix 20 x 1 yesterday  -give IV lasix 20mg x 1 today, if cr remains stable each morning will benefit from further IV lasix each day  -strict I/O, daily weights    HLH with hx of SLE:   c/w steroids per heme  -monitor ferritin, cbc other counts  -f/u lymph node biopsy    PE: c/w IV heparin, +cardiolipin antibodies, other serologies pending  -heme following     Acute resp failure: multifactorial s/p extubation  -on NC satting well, wean 02 as tolerated  -no stridor  -treat PE  -s/p antibiotics  -lasix prn     PEPE: multifactorial, cr improving 1.6 (baseline 1.0), c/w IV lasix 20mg x 1 today, repeat cr in AM, if stable needs further doses    PT  prognosis guarded

## 2022-06-10 NOTE — PROGRESS NOTE ADULT - ASSESSMENT
HPI:  67 yo M with a PMH HTN, HLD, SLE on Cellcept (MMF), class V lupus nephritis, CKD stage 2, DM2, diabetic neuropathy, TIA (2019) on plavix, BPH, HUNG, hospitalization in [facility] 3/27-4/20 tx for pancreatitis and C diff?, recent hospitalization at [place] 4/22-4/26 for acute pancreatitis and C diff colitis and another hospitalization 5/5 at [place] for septic shock (source buttock abscess) treated with vanc, cefepime (5/5-5/10), meropenem (5/11-5/18) and micafungin. Zosyn added 5/26. Course complicated by recurrent fevers despite being on antibiotics, and leukocytosis up to 30k. Blood cx neg, UA unremarkable. MRI abdomen w/ contrast performed showing extensive abd lymphadenopathy (reactive to c diff vs lymphoma?). IR stated no window for bx. Course also complicated by a L brachial vein DVT and superficial DVT in arms. Patient noted to have had a positive PPD but has possibly gotten the BCG vaccine. Quant gold performed at [place] still pending.     Admitted to [facility] MICU for further management. Vital signs on arrival: temp 38.6, /77, , RR 28, O2 sat 97% on 2L NC.  Labs: WBC 34.53, Hb 9.6, lipase 740, procal 2.02, tBili 1.4, alk phos 498, lactate 2.4. (30 May 2022 04:01)    Hematology/Oncology consulted d/t patient's history of anemia. [facility]    Anemia and Lymphadenopathy  --multifactorial  --transfuse for hgb > 7  --ldh (318), retic (pendng) ,ferritin (841)  --infectious vs malignancy vs autoimmune  --CT C/A/P done 5/31  ·	Markedly enlarged mesenteric lymph nodes/lymphadenopathy out of proportion of retroperitoneal lymphadenopathy. Although lymphoma/leukemia can have this appearance, consider possibility of gastrointestinal tuberculosis as well as other infectious etiologies  --supraclavicular/cervical IR bedside biopsy pending  --flow cytometry, SPEP, immunoglobulins, immunofixation pending  --MPN testing is negative  --BMB done on 5/20 at [facility]: negative for detectable monoclonal cell or B cell population  --Ca 19-9 (75)    HLH  -- primary vs secondary?  -- started dexamethasone 20mg Q day   -- meets 5 criteria:   ·	Interlukin-2 receptor (sCD25) elevated @ 34K  ·	splenomegaly   ·	cytopenia  ·	fevers (T Max 39)  ·	triglycerides (597)  --peripheral smear negative for hemophagocytosis   --will continue to trend daily ferritin   --rheumatology consulted    Questionable PE with splenic infarct  - on heparin GGT      Mecca Oliveira NP  Hematology/ Oncology  [facility]  [phone] (office)  [phone] (alt office)  Evenings and weekends please call MD on call or office HPI:  67 yo M with a PMH HTN, HLD, SLE on Cellcept (MMF), class V lupus nephritis, CKD stage 2, DM2, diabetic neuropathy, TIA (2019) on plavix, BPH, HUNG, hospitalization in [facility] 3/27-4/20 tx for pancreatitis and C diff?, recent hospitalization at [place] 4/22-4/26 for acute pancreatitis and C diff colitis and another hospitalization 5/5 at [place] for septic shock (source buttock abscess) treated with vanc, cefepime (5/5-5/10), meropenem (5/11-5/18) and micafungin. Zosyn added 5/26. Course complicated by recurrent fevers despite being on antibiotics, and leukocytosis up to 30k. Blood cx neg, UA unremarkable. MRI abdomen w/ contrast performed showing extensive abd lymphadenopathy (reactive to c diff vs lymphoma?). IR stated no window for bx. Course also complicated by a L brachial vein DVT and superficial DVT in arms. Patient noted to have had a positive PPD but has possibly gotten the BCG vaccine. Quant gold performed at [place] still pending.     Admitted to [facility] MICU for further management. Vital signs on arrival: temp 38.6, /77, , RR 28, O2 sat 97% on 2L NC.  Labs: WBC 34.53, Hb 9.6, lipase 740, procal 2.02, tBili 1.4, alk phos 498, lactate 2.4. (30 May 2022 04:01)    Hematology/Oncology consulted d/t patient's history of anemia. [facility].    Anemia and Lymphadenopathy  --multifactorial  --transfuse for hgb > 7  --ldh (318), retic (pendng) ,ferritin (841)  --infectious vs malignancy vs autoimmune  --CT C/A/P done 5/31  ·	Markedly enlarged mesenteric lymph nodes/lymphadenopathy out of proportion of retroperitoneal lymphadenopathy. Although lymphoma/leukemia can have this appearance, consider possibility of gastrointestinal tuberculosis as well as other infectious etiologies  --supraclavicular/cervical IR bedside biopsy pending  --flow cytometry, SPEP, immunoglobulins, immunofixation pending  --MPN testing is negative  --BMB done on 5/20 at [facility]: negative for detectable monoclonal cell or B cell population  --Ca 19-9 (75)    Systemic Inflammatory/Viral Disorder with Lymphadenopathy  -- started dexamethasone 20mg Q day   -- meets 5 criteria of HLH but no erythrophagocytosis / hyperferritinemia present.:   ·	Interlukin-2 receptor (sCD25) elevated @ 34K  ·	splenomegaly   ·	cytopenia  ·	fevers (T Max 39)  ·	triglycerides (597)  --peripheral smear negative for hemophagocytosis   --will continue to trend daily ferritin   --rheumatology consulted    Questionable PE with splenic infarct  - on heparin GGT      Mecca Oliveira NP  Hematology/ Oncology  [facility]  [phone] (office)  [phone] (alt office)  Evenings and weekends please call MD on call or office

## 2022-06-10 NOTE — PROGRESS NOTE ADULT - SUBJECTIVE AND OBJECTIVE BOX
HANDY FREEMAN 66y [id number]    Patient is a 66y old  Male who presents with a chief complaint of recurrent fevers, unknown source (10 Vernon 2022 07:28)      Follow Up/CC:  ID following for fever    Interval History/ROS: fever+, discomfort with canales    Allergies    No Known Allergies    Intolerances        ANTIMICROBIALS:      MEDICATIONS  (STANDING):  acetaminophen     Tablet .. 650 milliGRAM(s) Oral once  chlorhexidine 4% Liquid 1 Application(s) Topical <User Schedule>  citric acid/sodium citrate Solution 30 milliLiter(s) Oral every 6 hours  dexAMETHasone  IVPB 20 milliGRAM(s) IV Intermittent daily  folic acid 1 milliGRAM(s) Oral daily  gabapentin 300 milliGRAM(s) Oral daily  glucagon  Injectable 1 milliGRAM(s) IntraMuscular once  heparin  Infusion. 1900 Unit(s)/Hr (19 mL/Hr) IV Continuous <Continuous>  hydrALAZINE 10 milliGRAM(s) Oral every 8 hours  lidocaine   4% Patch 1 Patch Transdermal daily  lidocaine 2% Gel 1 Application(s) Topical two times a day  melatonin 5 milliGRAM(s) Oral at bedtime  metoprolol tartrate 12.5 milliGRAM(s) Oral two times a day  multivitamin/minerals 1 Tablet(s) Oral daily  pantoprazole  Injectable 40 milliGRAM(s) IV Push daily  tamsulosin 0.4 milliGRAM(s) Oral at bedtime    MEDICATIONS  (PRN):  acetaminophen     Tablet .. 650 milliGRAM(s) Oral every 6 hours PRN Temp greater or equal to 38.5C (101.3F)  heparin   Injectable 7500 Unit(s) IV Push every 6 hours PRN For aPTT less than 40  heparin   Injectable 3500 Unit(s) IV Push every 6 hours PRN For aPTT between 40 - 57  sodium chloride 0.9% lock flush 10 milliLiter(s) IV Push every 1 hour PRN Pre/post blood products, medications, blood draw, and to maintain line patency        Vital Signs Last 24 Hrs  T(C): 37 (10 Vernon 2022 05:04), Max: 39.4 (10 Vernon 2022 00:47)  T(F): 98.6 (10 Vernon 2022 05:04), Max: 102.9 (10 Vernon 2022 00:47)  HR: 97 (10 Vernon 2022 05:04) (67 - 103)  BP: 114/66 (10 Vernon 2022 05:04) (114/66 - 186/75)  BP(mean): 121 (2022 13:00) (121 - 131)  RR: 18 (10 Vernon 2022 05:04) (18 - 29)  SpO2: 94% (10 Vernon 2022 05:04) (94% - 100%)    CBC Full  -  ( 10 Vernon 2022 07:44 )  WBC Count : 17.46 K/uL  RBC Count : 2.38 M/uL  Hemoglobin : 7.4 g/dL  Hematocrit : 22.9 %  Platelet Count - Automated : 92 K/uL  Mean Cell Volume : 96.2 fl  Mean Cell Hemoglobin : 31.1 pg  Mean Cell Hemoglobin Concentration : 32.3 gm/dL  Auto Neutrophil # : 12.90 K/uL  Auto Lymphocyte # : 0.45 K/uL  Auto Monocyte # : 0.45 K/uL  Auto Eosinophil # : 0.00 K/uL  Auto Basophil # : 0.00 K/uL  Auto Neutrophil % : 68.7 %  Auto Lymphocyte % : 2.6 %  Auto Monocyte % : 2.6 %  Auto Eosinophil % : 0.0 %  Auto Basophil % : 0.0 %    06-10    141  |  107  |  40<H>  ----------------------------<  154<H>  3.7   |  19<L>  |  1.65<H>    Ca    8.3<L>      10 Vernon 2022 07:44  Phos  7.4     06-  Mg     2.5     06-09    TPro  6.1  /  Alb  2.9<L>  /  TBili  1.0  /  DBili  x   /  AST  31  /  ALT  10  /  AlkPhos  218<H>  06-10    LIVER FUNCTIONS - ( 10 Vernon 2022 07:44 )  Alb: 2.9 g/dL / Pro: 6.1 g/dL / ALK PHOS: 218 U/L / ALT: 10 U/L / AST: 31 U/L / GGT: x           Urinalysis Basic - ( 10 Vernon 2022 07:57 )    Color: Light Yellow / Appearance: Turbid / S.014 / pH: x  Gluc: x / Ketone: Negative  / Bili: Negative / Urobili: Negative   Blood: x / Protein: 100 / Nitrite: Negative   Leuk Esterase: Negative / RBC: x / WBC x   Sq Epi: x / Non Sq Epi: x / Bacteria: x        MICROBIOLOGY:  .Bronchial Bronchial Lavage  22   Culture is being performed.  --    No polymorphonuclear cells seen per low power field  No squamous epithelial cells per low power field  No organisms seen per oil power field      .Blood Blood  22   NEGATIVE for Plasmodium antigens. Microscopy is performed for  confirmation.  This test does not detect the presence of Babesia species.  If Babesiosis is suspected, please order test for Babesia PCR: Babesia  microti PCR Bld  ************************************************************  No Blood Parasites observed by giemsa stain  One negative set of blood smears does not rule out  the possibility of a parasitic infection.  A minimum of 3  specimens should be collected, at least 12-24 hours apart,  over a 36 hour time period.  --  --      .Blood Blood  22   NEGATIVE for Plasmodium antigens. Microscopy is performed for  confirmation.  This test does not detect the presence of Babesia species.  If Babesiosis is suspected, please order test for Babesia PCR: Babesia  microti PCR Bld  ************************************************************  No Blood Parasites observed by giemsa stain  One negative set of blood smears does not rule out  the possibility of a parasitic infection.  A minimum of 3  specimens should be collected, at least 12-24 hours apart,  over a 36 hour time period.  --  --      .Sputum Sputum  22   Normal Respiratory Wen present  --    Rare Squamous epithelial cells per low power field  Rare polymorphonuclear leukocytes per low power field  No organisms seen      .Sputum Sputum  22   Culture is being performed.  --  --      .Blood Blood-Venous  22   No Growth Final  --  --      .Blood Blood-Peripheral  22   No Growth Final  --  --      .Body Fluid Peritoneal Fluid  22   No growth  --    polymorphonuclear leukocytes seen  No organisms seen  by cytocentrifuge      .Blood Blood-Peripheral  22   No Growth Final  --  --      .Blood Blood-Venous  22   No Growth Final  --  --      .Stool Stool  22   Culture is being performed.  --  --      .Stool Stool  22   No growth at 1 week.  --  --      .Sputum Sputum  22   No growth at 1 week.  --  --      .Stool Stool  22   No growth at 1 week.  --  --      .Sputum Sputum  22   No growth at 1 week.  --  --      .Sputum Sputum  22   Normal Respiratory Wen present  --    No polymorphonuclear leukocytes per low power field  No Squamous epithelial cells per low power field  No organisms seen per oil power field      .Stool sarthak betito  22   No enteric pathogens isolated.  (Stool culture examined for Salmonella,  Shigella, Campylobacter, Aeromonas, Plesiomonas,  Vibrio, E.coli O157 and Yersinia)  --  --      .Blood Blood  22   NEGATIVE for Plasmodium antigens. Microscopy is performed for  confirmation.  This test does not detect the presence of Babesia species.  If Babesiosis is suspected, please order test for Babesia PCR: Babesia  microti PCR d  ************************************************************  No Blood Parasites observed by giemsa stain  One negative set of blood smears does not rule out  the possibility of a parasitic infection.  A minimum of 3  specimens should be collected, at least 12-24 hours apart,  over a 36 hour time period.  --  --      .Stool Feces sarthak betito  22   GI PCR Results: NOT detected  *******Please Note:*******  GI panel PCR evaluates for:  Campylobacter, Plesiomonas shigelloides, Salmonella,  Vibrio, Yersinia enterocolitica, Enteroaggregative  Escherichia coli (EAEC), Enteropathogenic E.coli (EPEC),  Enterotoxigenic E. coli (ETEC) lt/st, Shiga-like  toxin-producing E. coli (STEC) stx1/stx2,  Shigella/ Enteroinvasive E. coli (EIEC), Cryptosporidium,  Cyclospora cayetanensis, Entamoeba histolytica,  Giardia lamblia, Adenovirus F 40/41, Astrovirus,  Norovirus GI/GII, Rotavirus A, Sapovirus  --  --      .Blood Blood  22   No Growth Final  --  --      .Blood Blood  22   No growth  --  --      .Blood Blood  22   No Growth Final  --  --              v    Rapid RVP Result: NotDetec ( @ 06:10)      Clostridium difficile GDH Toxins A&amp;B, EIA:   Negative (22 @ 20:53)  Clostridium difficile GDH Interpretation: Negative for toxigenic C. Difficile.  This specimen is negative for C.  Difficile glutamate dehydrogenase (GDH) antigen and negative for C.  Difficile Toxins A & B, by EIA.  GDH is a highly sensitive screening  marker for C. Difficile that is produced in large amounts by all C.  Difficile strains, both toxigenic and nontoxigenic.  This assay has not  been validated as a test of cure.  Repeat testing during the same episode  of diarrhea is of limited value and is discouraged.  The results of this  assay should always be interpreted in conjunction with pateint's clinical  history. (22 @ 20:53)      RADIOLOGY

## 2022-06-11 NOTE — PROGRESS NOTE ADULT - ATTENDING COMMENTS
Patient seen and examined.  Agree with resident note.  Meds, labs and vitals all reviewed.  Patient with prolonged hospitalization, PMH HTN, HLD, SLE on Cellcept (MMF), class V lupus nephritis, CKD stage 2, DM2, diabetic neuropathy, TIA (2019) on plavix, BPH, HUNG, with multiple hospitalizations.  Currently being followed by Hematology, Rheumatology, profoundly high fevers, as high as 105.5 today. On steroids. Suspected HLH, immunosupressed. EBV positive.   Found to be altered with fever of 105.5 this morning. S/p IV Tylenol. Cooling blanket. Cultures to be sent. Collaboration with ID and Heme.   Evidence of PE, on Heparin gtt.   Repeat US in LUE to evaluate for resolution of DVT.

## 2022-06-11 NOTE — DISCHARGE NOTE PROVIDER - NSDCMRMEDTOKEN_GEN_ALL_CORE_FT
candesartan 16 mg oral tablet: 1 tab(s) orally once a day  Coreg 25 mg oral tablet: 1 tab(s) orally 2 times a day  famotidine 20 mg oral tablet: 1 tab(s) orally once a day  fenofibrate micronized 200 mg oral capsule: 1 cap(s) orally once a day  Flomax 0.4 mg oral capsule: 1 cap(s) orally once a day  glimepiride 1 mg oral tablet: 1 tab(s) orally once a day  Icosapent Ethyl 1 g oral capsule: 2 cap(s) orally 2 times a day  Jardiance 10 mg oral tablet: 1 tab(s) orally once a day (in the morning)  mycophenolate mofetil 250 mg oral capsule: 3 cap(s) orally 2 times a day  Neurontin 300 mg oral capsule: 1 cap(s) orally 3 times a day  Plavix 75 mg oral tablet: 1 tab(s) orally once a day

## 2022-06-11 NOTE — PROVIDER CONTACT NOTE (OTHER) - SITUATION
Pt had 2 fevers throughout night. First fever treated with Tylenol. Second Tylenol was held for 6hours for next Iv dose d/t total of 2650mg already given.

## 2022-06-11 NOTE — PROGRESS NOTE ADULT - PROBLEM SELECTOR PLAN 1
diff dx: infectious, malignancy, IgG4 disease, HLH  -elevated WBC, elevated IgA at [place]  -s/p core biopsy and paracentesis at [place] prior to transfer to [facility]  -quant gold indeterminate  -5/30 BCx NGTD  -HIV nonreactive, EBV IgG+, CMV 50  -possibility of peritoneal TB appreciate ID reccs AFB sputum and stool thus far are negative with IR biopsy completed pending final results 6/4  -Rheum eval including C3 C4 ESR 44 CRP 77 dsDNA progression of underlying lupus v other rheumatologic process causing fever although less likely.  -monitoring off antibiotics at this time, will f/u ID recs  -will f/u pathology results from cervical LN core biopsy  -bronch lavage cytology negative for malignancy  -fever o/n 102 6/10, pt. complaining of pain at catheter insertion site. UA sent, lidocaine jelly, IV tylenol, and dilaudid given for pain relief. diff dx: infectious, malignancy, IgG4 disease, HLH  -elevated WBC, elevated IgA at [place]  -s/p core biopsy and paracentesis at [place] prior to transfer to [facility]  -quant gold indeterminate  -5/30 BCx NGTD  -HIV nonreactive, EBV IgG+, CMV 50  -possibility of peritoneal TB appreciate ID reccs AFB sputum and stool thus far are negative with IR biopsy completed pending final results 6/4  -Rheum eval including C3 C4 ESR 44 CRP 77 dsDNA progression of underlying lupus v other rheumatologic process causing fever although less likely.  -monitoring off antibiotics at this time, will f/u ID recs  -will f/u pathology results from cervical LN core biopsy  -bronch lavage cytology negative for malignancy  -fever o/n 102 6/10, pt. complaining of pain at catheter insertion site. UA sent, lidocaine jelly, IV tylenol, and dilaudid given for pain relief.  -fever 105 rectal 6/11, tylenol 1g IV, cooling measures reinstated, Bcx x2 sent.

## 2022-06-11 NOTE — PROGRESS NOTE ADULT - SUBJECTIVE AND OBJECTIVE BOX
Ramesh Somers MD  Internal Medicine PGY-1      PROGRESS NOTE:     Patient is a 66y old  Male who presents with a chief complaint of recurrent fevers, unknown source (10 [place] 2022 17:00)      SUBJECTIVE / OVERNIGHT EVENTS:    Patient had canales removed yesterday PM due to pain at insertion site unrelieved with IV tylenol and lidocaine jelly. UA negative for infection. Patient transitioned to condom catheter, passed TOV. Febrile once overnight, no new cultures sent. PTT subtherapeutic overnight, heparin drip restarted but due to single IV access, decadron held at this time.    REVIEW OF SYSTEMS:    CONSTITUTIONAL: No weakness, fevers or chills  EYES/ENT: No visual changes;  No vertigo or throat pain   NECK: No pain or stiffness  RESPIRATORY: No cough, wheezing, hemoptysis; No shortness of breath  CARDIOVASCULAR: No chest pain or palpitations  GASTROINTESTINAL: No abdominal or epigastric pain. No nausea, vomiting, or hematemesis; No diarrhea or constipation. No melena or hematochezia.  GENITOURINARY: No dysuria, frequency or hematuria  NEUROLOGICAL: No numbness or weakness  SKIN: No itching, rashes      MEDICATIONS  (STANDING):  chlorhexidine 4% Liquid 1 Application(s) Topical <User Schedule>  citric acid/sodium citrate Solution 30 milliLiter(s) Oral every 6 hours  dexAMETHasone  IVPB 20 milliGRAM(s) IV Intermittent daily  folic acid 1 milliGRAM(s) Oral daily  gabapentin 300 milliGRAM(s) Oral daily  glucagon  Injectable 1 milliGRAM(s) IntraMuscular once  heparin  Infusion. 1900 Unit(s)/Hr (19 mL/Hr) IV Continuous <Continuous>  hydrALAZINE 10 milliGRAM(s) Oral every 8 hours  lidocaine   4% Patch 1 Patch Transdermal daily  melatonin 5 milliGRAM(s) Oral at bedtime  metoprolol tartrate 12.5 milliGRAM(s) Oral two times a day  multivitamin/minerals 1 Tablet(s) Oral daily  pantoprazole  Injectable 40 milliGRAM(s) IV Push daily  tamsulosin 0.4 milliGRAM(s) Oral at bedtime    MEDICATIONS  (PRN):  heparin   Injectable 7500 Unit(s) IV Push every 6 hours PRN For aPTT less than 40  heparin   Injectable 3500 Unit(s) IV Push every 6 hours PRN For aPTT between 40 - 57  sodium chloride 0.9% lock flush 10 milliLiter(s) IV Push every 1 hour PRN Pre/post blood products, medications, blood draw, and to maintain line patency      CAPILLARY BLOOD GLUCOSE      POCT Blood Glucose.: 112 mg/dL (10 [place] 2022 21:38)  POCT Blood Glucose.: 103 mg/dL (10 [place] 2022 17:09)  POCT Blood Glucose.: 106 mg/dL (10 [place] 2022 13:07)  POCT Blood Glucose.: 150 mg/dL (10 [place] 2022 08:07)    I&O's Summary    10 [place] 2022 07:01  -  2022 07:00  --------------------------------------------------------  IN: 216 mL / OUT: 1775 mL / NET: -1559 mL        VITALS:   T(C): 36.8 (22 @ 04:45), Max: 38 (22 @ 00:30)  HR: 121 (22 @ 04:45) (81 - 121)  BP: 145/83 (22 @ 04:45) (114/68 - 167/71)  RR: 20 (22 @ 04:45) (18 - 28)  SpO2: 94% (22 @ 04:45) (94% - 99%)    GENERAL: NAD, lying in bed comfortably  HEAD:  Atraumatic, normocephalic  EYES: EOMI, PERRLA, conjunctiva and sclera clear  ENT: Moist mucous membranes  NECK: Supple, no JVD  HEART: Regular rate and rhythm, no murmurs, rubs, or gallops  LUNGS: Unlabored respirations.  Clear to auscultation bilaterally, no crackles, wheezing, or rhonchi  ABDOMEN: Soft, nontender, nondistended, +BS  EXTREMITIES: 2+ peripheral pulses bilaterally. No clubbing, cyanosis, or edema  NERVOUS SYSTEM:  A&Ox3, no focal deficits   SKIN: No rashes or lesions    LABS:                        7.4    17.46 )-----------( 92       ( 10 Vernon 2022 07:44 )             22.9     06-10    141  |  107  |  40<H>  ----------------------------<  154<H>  3.7   |  19<L>  |  1.65<H>    Ca    8.3<L>      10 [place] 2022 07:44  Phos  7.4       Mg     2.5         TPro  6.1  /  Alb  2.9<L>  /  TBili  1.0  /  DBili  x   /  AST  31  /  ALT  10  /  AlkPhos  218<H>  06-10    PTT - ( 2022 02:55 )  PTT:35.0 sec      Urinalysis Basic - ( 10 [place] 2022 07:57 )    Color: Light Yellow / Appearance: Turbid / S.014 / pH: x  Gluc: x / Ketone: Negative  / Bili: Negative / Urobili: Negative   Blood: x / Protein: 100 / Nitrite: Negative   Leuk Esterase: Negative / RBC: 67 /hpf / WBC 4 /HPF   Sq Epi: x / Non Sq Epi: 3 /hpf / Bacteria: Negative          RADIOLOGY & ADDITIONAL TESTS:  Results Reviewed:   Imaging Personally Reviewed:  Electrocardiogram Personally Reviewed:    COORDINATION OF CARE:  Care Discussed with Consultants/Other Providers [Y/N]:  Prior or Outpatient Records Reviewed [Y/N]:   Ramesh Somers MD  Internal Medicine PGY-1      PROGRESS NOTE:     Patient is a 66y old  Male who presents with a chief complaint of recurrent fevers, unknown source (10 [place] 2022 17:00)      SUBJECTIVE / OVERNIGHT EVENTS:    Patient had canales removed yesterday PM due to pain at insertion site unrelieved with IV tylenol and lidocaine jelly. UA negative for infection. Patient transitioned to condom catheter, passed TOV. Febrile once overnight, no new cultures sent. PTT subtherapeutic overnight, heparin drip restarted but due to single IV access, decadron held at this time. A&Ox3 this morning, no pain at catheter insertion site and overall felt he was improving clinically. At appx. 11AM received call from nurse saying patient had axillary temp of 104 and had altered mental status. Ordered 1g IV tylenol and cooling measures.     REVIEW OF SYSTEMS:    CONSTITUTIONAL: No weakness, +fevers -chills  EYES/ENT: No visual changes;  No vertigo or throat pain   NECK: No pain or stiffness  RESPIRATORY: No cough, wheezing, hemoptysis; No shortness of breath  CARDIOVASCULAR: No chest pain or palpitations  GASTROINTESTINAL: No abdominal or epigastric pain. No nausea, vomiting, or hematemesis; No diarrhea or constipation. No melena or hematochezia.  GENITOURINARY: No dysuria, frequency or hematuria  NEUROLOGICAL: No numbness or weakness  SKIN: No itching, rashes      MEDICATIONS  (STANDING):  chlorhexidine 4% Liquid 1 Application(s) Topical <User Schedule>  citric acid/sodium citrate Solution 30 milliLiter(s) Oral every 6 hours  dexAMETHasone  IVPB 20 milliGRAM(s) IV Intermittent daily  folic acid 1 milliGRAM(s) Oral daily  gabapentin 300 milliGRAM(s) Oral daily  glucagon  Injectable 1 milliGRAM(s) IntraMuscular once  heparin  Infusion. 1900 Unit(s)/Hr (19 mL/Hr) IV Continuous <Continuous>  hydrALAZINE 10 milliGRAM(s) Oral every 8 hours  lidocaine   4% Patch 1 Patch Transdermal daily  melatonin 5 milliGRAM(s) Oral at bedtime  metoprolol tartrate 12.5 milliGRAM(s) Oral two times a day  multivitamin/minerals 1 Tablet(s) Oral daily  pantoprazole  Injectable 40 milliGRAM(s) IV Push daily  tamsulosin 0.4 milliGRAM(s) Oral at bedtime    MEDICATIONS  (PRN):  heparin   Injectable 7500 Unit(s) IV Push every 6 hours PRN For aPTT less than 40  heparin   Injectable 3500 Unit(s) IV Push every 6 hours PRN For aPTT between 40 - 57  sodium chloride 0.9% lock flush 10 milliLiter(s) IV Push every 1 hour PRN Pre/post blood products, medications, blood draw, and to maintain line patency      CAPILLARY BLOOD GLUCOSE      POCT Blood Glucose.: 112 mg/dL (10 [place] 2022 21:38)  POCT Blood Glucose.: 103 mg/dL (10 [place] 2022 17:09)  POCT Blood Glucose.: 106 mg/dL (10 [place] 2022 13:07)  POCT Blood Glucose.: 150 mg/dL (10 [place] 2022 08:07)    I&O's Summary    10 [place] 2022 07:01  -  2022 07:00  --------------------------------------------------------  IN: 216 mL / OUT: 1775 mL / NET: -1559 mL        VITALS:   T(C): 36.8 (22 @ 04:45), Max: 38 (22 @ 00:30)  HR: 121 (22 @ 04:45) (81 - 121)  BP: 145/83 (22 @ 04:45) (114/68 - 167/71)  RR: 20 (22 @ 04:45) (18 - 28)  SpO2: 94% (22 @ 04:45) (94% - 99%)    GENERAL: NAD, lying in bed comfortably  HEAD:  Atraumatic, normocephalic  EYES: EOMI, PERRLA, conjunctiva and sclera clear  ENT: Moist mucous membranes  NECK: Supple, no JVD  HEART: Regular rate and rhythm, no murmurs, rubs, or gallops  LUNGS: Unlabored respirations.  Clear to auscultation bilaterally, no crackles, wheezing, or rhonchi  ABDOMEN: Soft, nontender, nondistended, +BS  EXTREMITIES: 2+ peripheral pulses bilaterally. No clubbing, cyanosis, or edema  NERVOUS SYSTEM:  A&Ox3 at 8AM with no focal deficits, A&Ox0 responsive to verbal 11AM   SKIN: No rashes or lesions    LABS:                        7.4    17.46 )-----------( 92       ( 10 [place] 2022 07:44 )             22.9     06-10    141  |  107  |  40<H>  ----------------------------<  154<H>  3.7   |  19<L>  |  1.65<H>    Ca    8.3<L>      10 [place] 2022 07:44  Phos  7.4     06  Mg     2.5     06-    TPro  6.1  /  Alb  2.9<L>  /  TBili  1.0  /  DBili  x   /  AST  31  /  ALT  10  /  AlkPhos  218<H>  06-10    PTT - ( 2022 02:55 )  PTT:35.0 sec      Urinalysis Basic - ( 10 [place] 2022 07:57 )    Color: Light Yellow / Appearance: Turbid / S.014 / pH: x  Gluc: x / Ketone: Negative  / Bili: Negative / Urobili: Negative   Blood: x / Protein: 100 / Nitrite: Negative   Leuk Esterase: Negative / RBC: 67 /hpf / WBC 4 /HPF   Sq Epi: x / Non Sq Epi: 3 /hpf / Bacteria: Negative      RADIOLOGY & ADDITIONAL TESTS:  Results Reviewed:   Imaging Personally Reviewed:  Electrocardiogram Personally Reviewed:    COORDINATION OF CARE:  Care Discussed with Consultants/Other Providers [Y/N]:  Prior or Outpatient Records Reviewed [Y/N]:

## 2022-06-11 NOTE — DISCHARGE NOTE PROVIDER - HOSPITAL COURSE
67 yo M with a PMH HTN, HLD, SLE on Cellcept (MMF), class V lupus nephritis, CKD stage 2, DM2, diabetic neuropathy, TIA (2019) on plavix, BPH, HUNG, hospitalization in [facility] 3/27-4/20 tx for pancreatitis and C diff?, recent hospitalization at [facility] 4/22-4/26 for acute pancreatitis and C diff colitis and another hospitalization 5/5 at [facility] for septic shock (source buttock abscess) treated with vanc, cefepime (5/5-5/10), meropenem (5/11-5/18) and micafungin. Zosyn added 5/26. Course complicated by recurrent fevers despite being on antibiotics, and leukocytosis up to 30k. Blood cx neg, UA unremarkable. MRI abdomen w/ contrast performed showing extensive abd lymphadenopathy (reactive to c diff vs lymphoma?). IR stated no window for bx. Course also complicated by a L brachial vein DVT and superficial DVT in arms. Patient noted to have had a positive PPD but has possibly gotten the BCG vaccine. Quant gold performed at [facility] still pending.     Admitted to [facility] MICU for further management. Vital signs on arrival: temp 38.6, /77, , RR 28, O2 sat 97% on 2L NC.  Labs: WBC 34.53, Hb 9.6, lipase 740, procal 2.02, tBili 1.4, alk phos 498, lactate 2.4.    Patient with complicated clinical course in MICU. Initially presenting as ICU to ICU transfer from [facility] did not require pressors or intubation or other ICU needs at the time of transfer. Recurrent and then constant fevers requiring tylenol, cooling blanket, and cooled fluids as high as temeprature of 105. Extensive FUO evaluation conducted including blood cultures NGTD, sputum cx NGTD, and AFB stool and sputum NGTD x3 additionally without parasites including malaria x3 negative and all other infectious workup negative. Originally monitored off abx and antifungal then resumed on edilson for a period of time with findings concerning for peritonitis based upon neutrophilic predominance in ascitic fluid and total cell count 3518.  Rheumatological workup in part pending however with known lupus complicated by class V lupus nephritis less likely lupus flare. With known L brachial DVT and found to have R sided PE highly likely on CT additionally with peritoneal lymphadenopathy, cirrhosis with portal hypertension although minimal ascites, and splenomegaly with splenic infarct. Placed on heparin gtt. With core biopsy conducted by IR of cervical lymph nodes with preliminary pathology discussed verbally with pathology EBV+ lymphoprofileration although originally with concern for lymphoma pending further genetic testing with pathology. Working diagnosis of HLH caused by EBV v lymphoma v lupus discussed with heme onc pending final path on empiric dexamethasone at time of ICU transfer.     Patient was stable for transfer out of ICU to medicine floors, extubated 6/8. Patient complained of pain at canales insertion site + fevers, UA collected was negative for infection. Canales removed and condom cath placed which relieved patient's pain. Pt. continued to have fevers overnight but remained off antibiotics, fevers dissipated with tylenol and cooling measures. Midline placed 6/10 for IV access.

## 2022-06-11 NOTE — PROGRESS NOTE ADULT - PROBLEM SELECTOR PLAN 4
-monitoring off cellcept  -Has been seen by Dr. Swapnil Wolfe and also Dr. Shivam Malagon  -According to sisters at bedside 6/6, patient took Aranesp in the [facility] for lupus prior to him getting sick. Family are wondering if the medication can be contributory.   -Following complements, ESR, CRP, dsDNA  -rheumatology consulted, will f/u recs

## 2022-06-11 NOTE — DISCHARGE NOTE PROVIDER - DETAILS OF MALNUTRITION DIAGNOSIS/DIAGNOSES
This patient has been assessed with a concern for Malnutrition and was treated during this hospitalization for the following Nutrition diagnosis/diagnoses:     -  06/08/2022: Moderate protein-calorie malnutrition

## 2022-06-11 NOTE — PROGRESS NOTE ADULT - PROBLEM SELECTOR PLAN 5
#chronic inflammatory diarrhea  #possible Crohn's vs colitis vs infectious  -calprotectin elevated at [place] [facility]. no current plan by GI for colonoscopy at this time.   -C diff negative  -appreciate GI and ID input.   -less likely crohns disease

## 2022-06-11 NOTE — PROVIDER CONTACT NOTE (OTHER) - BACKGROUND
pt came in with admitting dx of infectious disease/ septic shock  was admitted to MICU pt came in with admitting dx of infectious disease/ septic shock d/t L gluteal abscess  was admitted to MICU

## 2022-06-11 NOTE — PROGRESS NOTE ADULT - SUBJECTIVE AND OBJECTIVE BOX
Patient is a 66y old  Male who presents with a chief complaint of recurrent fevers, unknown source (11 Jun 2022 10:46)      MEDICATIONS  (STANDING):  chlorhexidine 4% Liquid 1 Application(s) Topical <User Schedule>  citric acid/sodium citrate Solution 30 milliLiter(s) Oral every 6 hours  dexAMETHasone  IVPB 20 milliGRAM(s) IV Intermittent daily  folic acid 1 milliGRAM(s) Oral daily  furosemide   Injectable 20 milliGRAM(s) IV Push once  gabapentin 300 milliGRAM(s) Oral daily  glucagon  Injectable 1 milliGRAM(s) IntraMuscular once  heparin  Infusion. 1900 Unit(s)/Hr (19 mL/Hr) IV Continuous <Continuous>  hydrALAZINE 10 milliGRAM(s) Oral every 8 hours  lidocaine   4% Patch 1 Patch Transdermal daily  melatonin 5 milliGRAM(s) Oral at bedtime  metoprolol tartrate 12.5 milliGRAM(s) Oral two times a day  multivitamin/minerals 1 Tablet(s) Oral daily  pantoprazole  Injectable 40 milliGRAM(s) IV Push daily  tamsulosin 0.4 milliGRAM(s) Oral at bedtime    MEDICATIONS  (PRN):  heparin   Injectable 7500 Unit(s) IV Push every 6 hours PRN For aPTT less than 40  heparin   Injectable 3500 Unit(s) IV Push every 6 hours PRN For aPTT between 40 - 57  sodium chloride 0.9% lock flush 10 milliLiter(s) IV Push every 1 hour PRN Pre/post blood products, medications, blood draw, and to maintain line patency      ROS  No fever, sweats, chills  No epistaxis, HA, sore throat  No CP, SOB, cough, sputum  No n/v/d, abd pain, melena, hematochezia  No edema  No rash  No anxiety  No back pain, joint pain  No bleeding, bruising  No dysuria, hematuria    Vital Signs Last 24 Hrs  T(C): 38.9 (11 Jun 2022 13:00), Max: 40.8 (11 Jun 2022 11:30)  T(F): 102 (11 Jun 2022 13:00), Max: 105.5 (11 Jun 2022 11:30)  HR: 93 (11 Jun 2022 05:00) (90 - 128)  BP: 145/83 (11 Jun 2022 04:45) (145/83 - 167/71)  BP(mean): --  RR: 20 (11 Jun 2022 04:45) (18 - 28)  SpO2: 94% (11 Jun 2022 04:45) (94% - 96%)    PE  NAD  Awake, alert  Anicteric, MMM  RRR  CTAB  Abd soft, NT, ND  No c/c/e  No rash grossly  FROM                          7.3    14.50 )-----------( 81       ( 11 Jun 2022 06:51 )             23.8       06-11    142  |  108  |  40<H>  ----------------------------<  129<H>  3.6   |  20<L>  |  1.46<H>    Ca    8.1<L>      11 Jun 2022 06:50  Phos  5.1     06-11  Mg     1.9     06-11    TPro  5.9<L>  /  Alb  2.1<L>  /  TBili  1.0  /  DBili  x   /  AST  45<H>  /  ALT  12  /  AlkPhos  223<H>  06-11

## 2022-06-11 NOTE — PROVIDER CONTACT NOTE (OTHER) - SITUATION
pt on heparin drip running at 17cc/hr- pt due for PTT at 2132  unable to obtain PTT d/t pt being an extremely hard stick  3 people attempted to obtain the PTT pt on heparin drip running at 17cc/hr- pt due for PTT at 2132  unable to obtain PTT d/t pt being an extremely hard stick & edematous  3 people attempted to obtain the PTT

## 2022-06-11 NOTE — PROGRESS NOTE ADULT - ASSESSMENT
HPI:  65 yo M with a PMH HTN, HLD, SLE on Cellcept (MMF), class V lupus nephritis, CKD stage 2, DM2, diabetic neuropathy, TIA (2019) on plavix, BPH, HUNG, hospitalization in [facility] 3/27-4/20 tx for pancreatitis and C diff?, recent hospitalization at [facility] 4/22-4/26 for acute pancreatitis and C diff colitis and another hospitalization 5/5 at [facility] for septic shock (source buttock abscess) treated with vanc, cefepime (5/5-5/10), meropenem (5/11-5/18) and micafungin. Zosyn added 5/26. Course complicated by recurrent fevers despite being on antibiotics, and leukocytosis up to 30k. Blood cx neg, UA unremarkable. MRI abdomen w/ contrast performed showing extensive abd lymphadenopathy (reactive to c diff vs lymphoma?). IR stated no window for bx. Course also complicated by a L brachial vein DVT and superficial DVT in arms. Patient noted to have had a positive PPD but has possibly gotten the BCG vaccine. Quant gold performed at [facility] still pending.     Admitted to [facility] MICU for further management. Vital signs on arrival: temp 38.6, /77, , RR 28, O2 sat 97% on 2L NC.  Labs: WBC 34.53, Hb 9.6, lipase 740, procal 2.02, tBili 1.4, alk phos 498, lactate 2.4. (30 May 2022 04:01)    Hematology/Oncology consulted d/t patient's history of anemia. [facility].    Anemia and Lymphadenopathy  --multifactorial  --transfuse for hgb > 7  --ldh (318), retic (pendng) ,ferritin (841)  --infectious vs malignancy vs autoimmune  --CT C/A/P done 5/31  ·	Markedly enlarged mesenteric lymph nodes/lymphadenopathy out of proportion of retroperitoneal lymphadenopathy. Although lymphoma/leukemia can have this appearance, consider possibility of gastrointestinal tuberculosis as well as other infectious etiologies  --supraclavicular/cervical IR bedside biopsy pending  --flow cytometry, SPEP, immunoglobulins, immunofixation pending  --MPN testing is negative  --BMB done on 5/20 at [facility]: negative for detectable monoclonal cell or B cell population  --Ca 19-9 (75)    Systemic Inflammatory/Viral Disorder with Lymphadenopathy  -- started dexamethasone 20mg Q day   -- meets 5 criteria of HLH but no erythrophagocytosis / hyperferritinemia present.:   ·	Interlukin-2 receptor (sCD25) elevated @ 34K  ·	splenomegaly   ·	cytopenia  ·	fevers (T Max 39)  ·	triglycerides (597)  --peripheral smear negative for hemophagocytosis   --will continue to trend daily ferritin   --rheumatology consulted    Questionable PE with splenic infarct  - on heparin GGT      Mecca Oliveira NP  Hematology/ Oncology  [facility]  [phone] (office)  [phone] (alt office)  Evenings and weekends please call MD on call or office HPI:  67 yo M with a PMH HTN, HLD, SLE on Cellcept (MMF), class V lupus nephritis, CKD stage 2, DM2, diabetic neuropathy, TIA (2019) on plavix, BPH, HUNG, hospitalization in [facility] 3/27-4/20 tx for pancreatitis and C diff?, recent hospitalization at [facility] 4/22-4/26 for acute pancreatitis and C diff colitis and another hospitalization 5/5 at [facility] for septic shock (source buttock abscess) treated with vanc, cefepime (5/5-5/10), meropenem (5/11-5/18) and micafungin. Zosyn added 5/26. Course complicated by recurrent fevers despite being on antibiotics, and leukocytosis up to 30k. Blood cx neg, UA unremarkable. MRI abdomen w/ contrast performed showing extensive abd lymphadenopathy (reactive to c diff vs lymphoma?). IR stated no window for bx. Course also complicated by a L brachial vein DVT and superficial DVT in arms. Patient noted to have had a positive PPD but has possibly gotten the BCG vaccine. Quant gold performed at [facility] still pending.     Admitted to [facility] MICU for further management. Vital signs on arrival: temp 38.6, /77, , RR 28, O2 sat 97% on 2L NC.  Labs: WBC 34.53, Hb 9.6, lipase 740, procal 2.02, tBili 1.4, alk phos 498, lactate 2.4. (30 May 2022 04:01)    Hematology/Oncology consulted d/t patient's history of anemia. [facility]    Anemia and Lymphadenopathy  --multifactorial  --transfuse for hgb > 7  --infectious vs malignancy vs autoimmune  --supraclavicular/cervical IR bedside biopsy  pathology is still pending  --MPN testing is negative  --BMB done on 5/20 at [facility]: negative for detectable monoclonal cell or B cell population  Systemic Inflammatory/Viral Disorder with Lymphadenopathy  -- started dexamethasone 20mg Q day   -- meets 5 criteria of HLH but important negative features: no presence of erythrophagocytosis / hyperferritinemia present  --peripheral smear negative for hemophagocytosis   --will continue to trend daily ferritin   --rheumatology consulted  -- c/w dex daily      Questionable PE with splenic infarct  - on heparin GGT

## 2022-06-11 NOTE — DISCHARGE NOTE PROVIDER - NSDCCPCAREPLAN_GEN_ALL_CORE_FT
PRINCIPAL DISCHARGE DIAGNOSIS  Diagnosis: Fever, unknown origin  Assessment and Plan of Treatment:       SECONDARY DISCHARGE DIAGNOSES  Diagnosis: PEPE (acute kidney injury)  Assessment and Plan of Treatment:     Diagnosis: HLH (hemophagocytic lymphohistiocytosis)  Assessment and Plan of Treatment:      PRINCIPAL DISCHARGE DIAGNOSIS  Diagnosis: Fever, unknown origin  Assessment and Plan of Treatment: You were transferred from the [place] ICU for recurring fevers on antibiotics and septic shock for infection likely from your sacaral wound. You completed multiple antibiotics including meropenem. Infectious workup here at [facility] including BCx, AFB stool, sputum, stool o/p were all negative. Our infectious disease doctors saw you and recommended monitoring you off antibiotics and our heme/onc doctors began looking into alternate sources of fever. You contineud to have fevers while on the medicine floor that were treated with tylenol and cooling measures.      SECONDARY DISCHARGE DIAGNOSES  Diagnosis: PEPE (acute kidney injury)  Assessment and Plan of Treatment: Your kidney function was worsening on your arrival to [facility]. You were given albumin and a diruetic and your kidney function began to improve with good urine output. Rheumatology recommended blood work to check for other causes of kidney injury.    Diagnosis: HLH (hemophagocytic lymphohistiocytosis)  Assessment and Plan of Treatment: Your pattern of fevers, large spleen, and low number of blood cells was suspicious for an inflammatory condition known as HLH. You underwent a cervical lymph node biopsy with heme onc to determine if there was a pathologic abnormality with your white blood cells that would explain your fevers. You were also given steroids to help treat the inflammation.    Diagnosis: DVT (deep venous thrombosis)  Assessment and Plan of Treatment: You were found to have a DVT as well as a PE on imaging. You were placed on a heparin drip as a result.

## 2022-06-11 NOTE — PROGRESS NOTE ADULT - PROBLEM SELECTOR PLAN 2
PEPE on CKD, hx lupus nephritis  -with worsening renal function FeNa prerenal receiving fluids 6/4. question role for rheumatology if no improvement in renal function.   -urine studies less suggestive of HRS picture discontinued midodrine although will continue albumin may benefit from volume.   -appreciate nephrology recommendations    -6/5 urine studies consistent with pre-renal FeNA 0.8 however IVC 2.1 on POCUS. Will c/w albumin and give lasix 40 IV x1, goal neg 500cc-1L today  -6/6 pending rheumatology work up ED, ANCA, dsDNA esr 44 and crp 77  -urinalysis with 100mg/dL proteinuria with biopsy proven lupus nephritis   -canales placed 6/3  -Discontinued tamsulosin for now cannot be crushed and canales in place  -6/6 discussed prior arinesp with family and risks of arinesp administration in critically ill explained including increased risk of thrombosis.  -6/10 s/p 2 doses IV Lasix 20 for edema, pt. very fluid positive.

## 2022-06-11 NOTE — PROGRESS NOTE ADULT - PROBLEM SELECTOR PLAN 3
-r/o HLH 2/2 EBV  -has fevers, splenomegaly, cytopenia  -HLH labs: soluble IL-2 levels 23010, ferritin 511, triglyceride 412, 6/6 fibrinogen 218 6/6  , pending final pathology from cervical lymph node biopsy, flow cytometry from peripheral blood without acute abnormalities in lymphoid, myeloid, or plasma cell lines.   -IgG4 27  -dexamethasone 20 qd discussed with heme on 6/6 following bronchoscopy  -pending pathology 6/7  -Prelim per verbal with pathology EBV+ lymphoprofileration, with empiric treatment initiated for HLH and no specific treatment planned for EBV per discussion with ID.  -Fever to 102 o/n, got tylenol and new UA sent.

## 2022-06-12 NOTE — PROGRESS NOTE ADULT - PROBLEM SELECTOR PLAN 3
-r/o HLH 2/2 EBV  -has fevers, splenomegaly, cytopenia  -HLH labs: soluble IL-2 levels 95103, ferritin 511, triglyceride 412, 6/6 fibrinogen 218 6/6  , pending final pathology from cervical lymph node biopsy, flow cytometry from peripheral blood without acute abnormalities in lymphoid, myeloid, or plasma cell lines.   -IgG4 27  -dexamethasone 20 qd discussed with heme on 6/6 following bronchoscopy  -pending pathology 6/7  -Prelim per verbal with pathology EBV+ lymphoprofileration, with empiric treatment initiated for HLH and no specific treatment planned for EBV per discussion with ID.  -Fever to 102 o/n, got tylenol and new UA sent.

## 2022-06-12 NOTE — PROGRESS NOTE ADULT - PROBLEM SELECTOR PLAN 5
#chronic inflammatory diarrhea  #possible Crohn's vs colitis vs infectious  -calprotectin elevated at [facility]. no current plan by GI for colonoscopy at this time.   -C diff negative  -appreciate GI and ID input.   -less likely crohns disease

## 2022-06-12 NOTE — CONSULT NOTE ADULT - SUBJECTIVE AND OBJECTIVE BOX
ACUTE CARE SURGERY CONSULT NOTE  HPI: 67 yo M with a PMH HTN, HLD, SLE on Cellcept (MMF), class V lupus nephritis, CKD stage 2, DM2, diabetic neuropathy, TIA (2019) on plavix, BPH, HUNG, hospitalization in [facility] 3/27-4/20 tx for pancreatitis and C diff?, recent hospitalization at [facility] 4/22-4/26 for acute pancreatitis and C diff colitis and another hospitalization 5/5 at [facility] for septic shock (source buttock abscess) treated with vanc, cefepime (5/5-5/10), meropenem (5/11-5/18) and micafungin. Zosyn added 5/26. Course complicated by recurrent fevers despite being on antibiotics, and leukocytosis up to 30k. Blood cx neg, UA unremarkable. MRI abdomen w/ contrast performed showing extensive abd lymphadenopathy (reactive to c diff vs lymphoma?). IR stated no window for bx. Course also complicated by a L brachial vein DVT and superficial DVT in arms. Patient noted to have had a positive PPD but has possibly gotten the BCG vaccine. Quant gold performed at [facility] still pending.     Admitted to [facility] MICU for further management. Vital signs on arrival: temp 38.6, /77, , RR 28, O2 sat 97% on 2L NC.  Labs: WBC 34.53, Hb 9.6, lipase 740, procal 2.02, tBili 1.4, alk phos 498, lactate 2.4. (30 May 2022 04:01)    Patient undergoing work-up for fever of unknown origin. Found on admission to have RLL PE, on heparin gtt, recent PTTs supratherapeutic. Pt with acute H/H drop to 5.4/17.7 today. S/p 1u pRBC with H/H rise to 7.1/22.4. CT A/P non con with evidence of right gluteus and L psoas intramuscular hematomas. General Surgery consulted for further recommendations.       PAST MEDICAL & SURGICAL HISTORY:  Obstructive Sleep Apnea      Hepatitis B Carrier      Pneumonia      Other systemic lupus erythematosus with endocarditis      Type 2 diabetes mellitus with other neurologic complication, without long-term current use of insulin      Sleep apnea, unspecified type      Hypertension, unspecified type      Nephritic syndrome      No significant past surgical history          MEDICATIONS  (STANDING):  chlorhexidine 4% Liquid 1 Application(s) Topical <User Schedule>  citric acid/sodium citrate Solution 30 milliLiter(s) Oral every 6 hours  dexAMETHasone  IVPB 20 milliGRAM(s) IV Intermittent daily  dextrose 5%. 1000 milliLiter(s) (50 mL/Hr) IV Continuous <Continuous>  dextrose 5%. 1000 milliLiter(s) (100 mL/Hr) IV Continuous <Continuous>  dextrose 50% Injectable 25 Gram(s) IV Push once  dextrose 50% Injectable 12.5 Gram(s) IV Push once  dextrose 50% Injectable 25 Gram(s) IV Push once  folic acid 1 milliGRAM(s) Oral daily  gabapentin 300 milliGRAM(s) Oral daily  glucagon  Injectable 1 milliGRAM(s) IntraMuscular once  glucagon  Injectable 1 milliGRAM(s) IntraMuscular once  hydrALAZINE 10 milliGRAM(s) Oral every 8 hours  insulin lispro (ADMELOG) corrective regimen sliding scale   SubCutaneous three times a day before meals  insulin lispro (ADMELOG) corrective regimen sliding scale   SubCutaneous at bedtime  lidocaine   4% Patch 1 Patch Transdermal daily  melatonin 5 milliGRAM(s) Oral at bedtime  metoprolol tartrate 12.5 milliGRAM(s) Oral two times a day  multivitamin/minerals 1 Tablet(s) Oral daily  pantoprazole  Injectable 40 milliGRAM(s) IV Push two times a day  tamsulosin 0.4 milliGRAM(s) Oral at bedtime    MEDICATIONS  (PRN):  acetaminophen     Tablet .. 650 milliGRAM(s) Oral every 6 hours PRN Temp greater or equal to 38C (100.4F), Mild Pain (1 - 3)  dextrose Oral Gel 15 Gram(s) Oral once PRN Blood Glucose LESS THAN 70 milliGRAM(s)/deciliter  sodium chloride 0.9% lock flush 10 milliLiter(s) IV Push every 1 hour PRN Pre/post blood products, medications, blood draw, and to maintain line patency      Allergies    No Known Allergies    Intolerances        SOCIAL HISTORY: nonsmoker  no current alcohol use (30 May 2022 04:01)      FAMILY HISTORY:  FH: type 2 diabetes        Physical Exam:  General: NAD, resting comfortably  HEENT: NC/AT, EOMI, normal hearing, no oral lesions, no LAD, neck supple  Pulmonary: normal resp effort on RA  Cardiovascular: tacchycardic  Abdominal: soft, ND/NT  Neuro: A/O x 1      Vital Signs Last 24 Hrs  T(C): 37.2 (12 Jun 2022 21:05), Max: 39.3 (12 Jun 2022 16:30)  T(F): 99 (12 Jun 2022 21:05), Max: 102.8 (12 Jun 2022 16:30)  HR: 100 (12 Jun 2022 21:05) (80 - 120)  BP: 154/78 (12 Jun 2022 21:05) (109/69 - 154/78)  BP(mean): --  RR: 18 (12 Jun 2022 21:05) (18 - 20)  SpO2: 94% (12 Jun 2022 21:05) (94% - 99%)    I&O's Summary    11 Jun 2022 07:01  -  12 Jun 2022 07:00  --------------------------------------------------------  IN: 0 mL / OUT: 300 mL / NET: -300 mL    12 Jun 2022 07:01  -  12 Jun 2022 21:28  --------------------------------------------------------  IN: 120 mL / OUT: 0 mL / NET: 120 mL            LABS:                        7.1    17.70 )-----------( 75       ( 12 Jun 2022 18:45 )             22.4     06-12    144  |  108  |  50<H>  ----------------------------<  116<H>  3.8   |  21<L>  |  1.46<H>    Ca    8.0<L>      12 Jun 2022 11:37  Phos  6.3     06-12  Mg     1.9     06-12    TPro  5.8<L>  /  Alb  2.8<L>  /  TBili  1.0  /  DBili  x   /  AST  30  /  ALT  13  /  AlkPhos  182<H>  06-12    PTT - ( 12 Jun 2022 18:45 )  PTT:34.7 sec    CAPILLARY BLOOD GLUCOSE      POCT Blood Glucose.: 114 mg/dL (12 Jun 2022 16:43)  POCT Blood Glucose.: 107 mg/dL (12 Jun 2022 12:27)  POCT Blood Glucose.: 123 mg/dL (12 Jun 2022 08:19)    LIVER FUNCTIONS - ( 12 Jun 2022 11:37 )  Alb: 2.8 g/dL / Pro: 5.8 g/dL / ALK PHOS: 182 U/L / ALT: 13 U/L / AST: 30 U/L / GGT: x             Cultures:  Culture Results:   No growth to date. (06-11 @ 14:00)      RADIOLOGY & ADDITIONAL STUDIES:  < from: CT Abdomen and Pelvis No Cont (06.12.22 @ 16:21) >  FINDINGS:  LOWER CHEST: There are mild bilateral layering pleural effusions with   mild bilateral lower lobe dependent compressive atelectasis.    LIVER: Within normal limits.  BILE DUCTS: Normal caliber.  GALLBLADDER: Within normal limits.  SPLEEN: Again is noted splenomegaly to 16 cm in maximal axial dimension   and 17.6 cm in craniocaudad dimension. Again is noted a large   wedge-shaped region of decreased attenuation in the inferior spleen,   compatible with infarct. No subcapsular or perisplenic hematoma is  identified.  PANCREAS: Within normal limits.  ADRENALS: Within normal limits.  KIDNEYS/URETERS: Within normal limits.    BLADDER: Within normal limits.  REPRODUCTIVE ORGANS: The prostate is prominent.    BOWEL: No bowel obstruction. Again are notedpathologically enlarged   homogeneously attenuating mesenteric lymph nodes. Fluid infiltration of   the presacral space.  PERITONEUM: There is mild to moderate free fluid throughout the   peritoneal cavity.  VESSELS: Within normal limits.  RETROPERITONEUM/LYMPH NODES: Again are noted prominent homogeneously   attenuating para-aortic, retroperitoneal, and paracaval lymph nodes  ABDOMINAL WALL: Bilateral inguinal hernias containing fat and peritoneal   fluid.  BONES: Within normal limits. A PICC isnoted in the right upper   extremity. There is a moderate heterogeneously hyperattenuating   collection in the right gluteus musculature with multiple fluid levels,   compatible with hematoma and hematocrit levels. There is heterogeneously   hyperattenuating enlargement of the left psoas muscle, also compatible   with intramuscular hematoma. There is mild fluid infiltration of the   subcutaneous tissues of the abdomen and pelvis.    IMPRESSION:  1. Moderate hematoma in the right gluteus musculature and left psoas   muscle. Results discussed with Dr. Acuña at time of interpretation.  2. Hepatosplenomegaly and mesenteric adenopathy, strongly suspicious for   leukemia or lymphoma.    < end of copied text >   ACUTE CARE SURGERY CONSULT NOTE  HPI: 67 yo M with a PMH HTN, HLD, SLE on Cellcept (MMF), class V lupus nephritis, CKD stage 2, DM2, diabetic neuropathy, TIA (2019) on plavix, BPH, HUNG, hospitalization in [facility] 3/27-4/20 tx for pancreatitis and C diff?, recent hospitalization at [facility] 4/22-4/26 for acute pancreatitis and C diff colitis and another hospitalization 5/5 at [facility] for septic shock (source buttock abscess) treated with vanc, cefepime (5/5-5/10), meropenem (5/11-5/18) and micafungin. Zosyn added 5/26. Course complicated by recurrent fevers despite being on antibiotics, and leukocytosis up to 30k. Blood cx neg, UA unremarkable. MRI abdomen w/ contrast performed showing extensive abd lymphadenopathy (reactive to c diff vs lymphoma?). IR stated no window for bx. Course also complicated by a L brachial vein DVT and superficial DVT in arms. Patient noted to have had a positive PPD but has possibly gotten the BCG vaccine. Quant gold performed at [facility] still pending.     Admitted to [facility] MICU for further management. Vital signs on arrival: temp 38.6, /77, , RR 28, O2 sat 97% on 2L NC.  Labs: WBC 34.53, Hb 9.6, lipase 740, procal 2.02, tBili 1.4, alk phos 498, lactate 2.4. (30 May 2022 04:01)    Patient undergoing work-up for fever of unknown origin. Found on admission to have RLL PE, on heparin gtt, recent PTTs supratherapeutic. Pt with acute H/H drop to 5.4/17.7 today. S/p 1u pRBC with H/H rise to 7.1/22.4. CT A/P non con with evidence of right gluteus and L psoas intramuscular hematomas. General Surgery consulted for further recommendations.       PAST MEDICAL & SURGICAL HISTORY:  Obstructive Sleep Apnea      Hepatitis B Carrier      Pneumonia      Other systemic lupus erythematosus with endocarditis      Type 2 diabetes mellitus with other neurologic complication, without long-term current use of insulin      Sleep apnea, unspecified type      Hypertension, unspecified type      Nephritic syndrome      No significant past surgical history          MEDICATIONS  (STANDING):  chlorhexidine 4% Liquid 1 Application(s) Topical <User Schedule>  citric acid/sodium citrate Solution 30 milliLiter(s) Oral every 6 hours  dexAMETHasone  IVPB 20 milliGRAM(s) IV Intermittent daily  dextrose 5%. 1000 milliLiter(s) (50 mL/Hr) IV Continuous <Continuous>  dextrose 5%. 1000 milliLiter(s) (100 mL/Hr) IV Continuous <Continuous>  dextrose 50% Injectable 25 Gram(s) IV Push once  dextrose 50% Injectable 12.5 Gram(s) IV Push once  dextrose 50% Injectable 25 Gram(s) IV Push once  folic acid 1 milliGRAM(s) Oral daily  gabapentin 300 milliGRAM(s) Oral daily  glucagon  Injectable 1 milliGRAM(s) IntraMuscular once  glucagon  Injectable 1 milliGRAM(s) IntraMuscular once  hydrALAZINE 10 milliGRAM(s) Oral every 8 hours  insulin lispro (ADMELOG) corrective regimen sliding scale   SubCutaneous three times a day before meals  insulin lispro (ADMELOG) corrective regimen sliding scale   SubCutaneous at bedtime  lidocaine   4% Patch 1 Patch Transdermal daily  melatonin 5 milliGRAM(s) Oral at bedtime  metoprolol tartrate 12.5 milliGRAM(s) Oral two times a day  multivitamin/minerals 1 Tablet(s) Oral daily  pantoprazole  Injectable 40 milliGRAM(s) IV Push two times a day  tamsulosin 0.4 milliGRAM(s) Oral at bedtime    MEDICATIONS  (PRN):  acetaminophen     Tablet .. 650 milliGRAM(s) Oral every 6 hours PRN Temp greater or equal to 38C (100.4F), Mild Pain (1 - 3)  dextrose Oral Gel 15 Gram(s) Oral once PRN Blood Glucose LESS THAN 70 milliGRAM(s)/deciliter  sodium chloride 0.9% lock flush 10 milliLiter(s) IV Push every 1 hour PRN Pre/post blood products, medications, blood draw, and to maintain line patency      Allergies    No Known Allergies    Intolerances        SOCIAL HISTORY: nonsmoker  no current alcohol use (30 May 2022 04:01)      FAMILY HISTORY:  FH: type 2 diabetes        Physical Exam:  General: NAD, resting comfortably  HEENT: NC/AT, EOMI, normal hearing, no oral lesions, no LAD, neck supple  Pulmonary: normal resp effort on RA  Cardiovascular: tacchycardic  Abdominal: soft, ND/NT  Buttock: R buttock with palpable mass, no overlying skin changes or ecchymoses  Neuro: A/O x 1      Vital Signs Last 24 Hrs  T(C): 37.2 (12 Jun 2022 21:05), Max: 39.3 (12 Jun 2022 16:30)  T(F): 99 (12 Jun 2022 21:05), Max: 102.8 (12 Jun 2022 16:30)  HR: 100 (12 Jun 2022 21:05) (80 - 120)  BP: 154/78 (12 Jun 2022 21:05) (109/69 - 154/78)  BP(mean): --  RR: 18 (12 Jun 2022 21:05) (18 - 20)  SpO2: 94% (12 Jun 2022 21:05) (94% - 99%)    I&O's Summary    11 Jun 2022 07:01  -  12 Jun 2022 07:00  --------------------------------------------------------  IN: 0 mL / OUT: 300 mL / NET: -300 mL    12 Jun 2022 07:01  -  12 Jun 2022 21:28  --------------------------------------------------------  IN: 120 mL / OUT: 0 mL / NET: 120 mL            LABS:                        7.1    17.70 )-----------( 75       ( 12 Jun 2022 18:45 )             22.4     06-12    144  |  108  |  50<H>  ----------------------------<  116<H>  3.8   |  21<L>  |  1.46<H>    Ca    8.0<L>      12 Jun 2022 11:37  Phos  6.3     06-12  Mg     1.9     06-12    TPro  5.8<L>  /  Alb  2.8<L>  /  TBili  1.0  /  DBili  x   /  AST  30  /  ALT  13  /  AlkPhos  182<H>  06-12    PTT - ( 12 Jun 2022 18:45 )  PTT:34.7 sec    CAPILLARY BLOOD GLUCOSE      POCT Blood Glucose.: 114 mg/dL (12 Jun 2022 16:43)  POCT Blood Glucose.: 107 mg/dL (12 Jun 2022 12:27)  POCT Blood Glucose.: 123 mg/dL (12 Jun 2022 08:19)    LIVER FUNCTIONS - ( 12 Jun 2022 11:37 )  Alb: 2.8 g/dL / Pro: 5.8 g/dL / ALK PHOS: 182 U/L / ALT: 13 U/L / AST: 30 U/L / GGT: x             Cultures:  Culture Results:   No growth to date. (06-11 @ 14:00)      RADIOLOGY & ADDITIONAL STUDIES:  < from: CT Abdomen and Pelvis No Cont (06.12.22 @ 16:21) >  FINDINGS:  LOWER CHEST: There are mild bilateral layering pleural effusions with   mild bilateral lower lobe dependent compressive atelectasis.    LIVER: Within normal limits.  BILE DUCTS: Normal caliber.  GALLBLADDER: Within normal limits.  SPLEEN: Again is noted splenomegaly to 16 cm in maximal axial dimension   and 17.6 cm in craniocaudad dimension. Again is noted a large   wedge-shaped region of decreased attenuation in the inferior spleen,   compatible with infarct. No subcapsular or perisplenic hematoma is  identified.  PANCREAS: Within normal limits.  ADRENALS: Within normal limits.  KIDNEYS/URETERS: Within normal limits.    BLADDER: Within normal limits.  REPRODUCTIVE ORGANS: The prostate is prominent.    BOWEL: No bowel obstruction. Again are notedpathologically enlarged   homogeneously attenuating mesenteric lymph nodes. Fluid infiltration of   the presacral space.  PERITONEUM: There is mild to moderate free fluid throughout the   peritoneal cavity.  VESSELS: Within normal limits.  RETROPERITONEUM/LYMPH NODES: Again are noted prominent homogeneously   attenuating para-aortic, retroperitoneal, and paracaval lymph nodes  ABDOMINAL WALL: Bilateral inguinal hernias containing fat and peritoneal   fluid.  BONES: Within normal limits. A PICC isnoted in the right upper   extremity. There is a moderate heterogeneously hyperattenuating   collection in the right gluteus musculature with multiple fluid levels,   compatible with hematoma and hematocrit levels. There is heterogeneously   hyperattenuating enlargement of the left psoas muscle, also compatible   with intramuscular hematoma. There is mild fluid infiltration of the   subcutaneous tissues of the abdomen and pelvis.    IMPRESSION:  1. Moderate hematoma in the right gluteus musculature and left psoas   muscle. Results discussed with Dr. Acuña at time of interpretation.  2. Hepatosplenomegaly and mesenteric adenopathy, strongly suspicious for   leukemia or lymphoma.    < end of copied text >

## 2022-06-12 NOTE — PROVIDER CONTACT NOTE (CRITICAL VALUE NOTIFICATION) - ASSESSMENT
Pt A&O3. Pt on bipap at this time. VS as follows  T: 103.0 rectal  HR: 111  147/76 Left leg  94% on bipap/cpap Pt A&O2. Pt on bipap at this time. VS as follows  T: 103.0 rectal  HR: 111  147/76 Left leg  94% on bipap/cpap

## 2022-06-12 NOTE — PROGRESS NOTE ADULT - PROBLEM SELECTOR PLAN 1
diff dx: infectious, malignancy, IgG4 disease, HLH  -elevated WBC, elevated IgA at [place] [facility]  -s/p core biopsy and paracentesis at [place] [facility] prior to transfer to [facility]  -quant gold indeterminate  -5/30 BCx NGTD  -HIV nonreactive, EBV IgG+, CMV 50  -possibility of peritoneal TB appreciate ID reccs AFB sputum and stool thus far are negative with IR biopsy completed pending final results 6/4  -Rheum eval including C3 C4 ESR 44 CRP 77 dsDNA progression of underlying lupus v other rheumatologic process causing fever although less likely.  -monitoring off antibiotics at this time, will f/u ID recs  -will f/u pathology results from cervical LN core biopsy  -bronch lavage cytology negative for malignancy  -fever o/n 102 6/10, pt. complaining of pain at catheter insertion site. UA sent, lidocaine jelly, IV tylenol, and dilaudid given for pain relief.  -fever 105 rectal 6/11, tylenol 1g IV, cooling measures reinstated, Bcx x2 sent.

## 2022-06-12 NOTE — PROGRESS NOTE ADULT - PROBLEM SELECTOR PLAN 4
-monitoring off cellcept  -Has been seen by Dr. Swapnil Wolfe and also Dr. Shivam Malagon  -According to sisters at bedside 6/6, patient took Aranesp in the [facility] for lupus prior to him getting sick. Family are wondering if the medication can be contributory.   -Following complements, ESR, CRP, dsDNA  -rheumatology consulted, will f/u recs

## 2022-06-12 NOTE — PROGRESS NOTE ADULT - SUBJECTIVE AND OBJECTIVE BOX
Patient is a 66y old  Male who presents with a chief complaint of recurrent fevers, unknown source (12 Jun 2022 07:26)      MEDICATIONS  (STANDING):  chlorhexidine 4% Liquid 1 Application(s) Topical <User Schedule>  citric acid/sodium citrate Solution 30 milliLiter(s) Oral every 6 hours  dexAMETHasone  IVPB 20 milliGRAM(s) IV Intermittent daily  dextrose 5%. 1000 milliLiter(s) (50 mL/Hr) IV Continuous <Continuous>  dextrose 5%. 1000 milliLiter(s) (100 mL/Hr) IV Continuous <Continuous>  dextrose 50% Injectable 25 Gram(s) IV Push once  dextrose 50% Injectable 12.5 Gram(s) IV Push once  dextrose 50% Injectable 25 Gram(s) IV Push once  folic acid 1 milliGRAM(s) Oral daily  gabapentin 300 milliGRAM(s) Oral daily  glucagon  Injectable 1 milliGRAM(s) IntraMuscular once  glucagon  Injectable 1 milliGRAM(s) IntraMuscular once  heparin  Infusion. 1900 Unit(s)/Hr (19 mL/Hr) IV Continuous <Continuous>  hydrALAZINE 10 milliGRAM(s) Oral every 8 hours  insulin lispro (ADMELOG) corrective regimen sliding scale   SubCutaneous three times a day before meals  insulin lispro (ADMELOG) corrective regimen sliding scale   SubCutaneous at bedtime  lidocaine   4% Patch 1 Patch Transdermal daily  melatonin 5 milliGRAM(s) Oral at bedtime  metoprolol tartrate 12.5 milliGRAM(s) Oral two times a day  multivitamin/minerals 1 Tablet(s) Oral daily  pantoprazole  Injectable 40 milliGRAM(s) IV Push daily  tamsulosin 0.4 milliGRAM(s) Oral at bedtime    MEDICATIONS  (PRN):  acetaminophen     Tablet .. 650 milliGRAM(s) Oral every 6 hours PRN Temp greater or equal to 38C (100.4F), Mild Pain (1 - 3)  dextrose Oral Gel 15 Gram(s) Oral once PRN Blood Glucose LESS THAN 70 milliGRAM(s)/deciliter  heparin   Injectable 7500 Unit(s) IV Push every 6 hours PRN For aPTT less than 40  heparin   Injectable 3500 Unit(s) IV Push every 6 hours PRN For aPTT between 40 - 57  sodium chloride 0.9% lock flush 10 milliLiter(s) IV Push every 1 hour PRN Pre/post blood products, medications, blood draw, and to maintain line patency      ROS  No fever, sweats, chills  No epistaxis, HA, sore throat  No CP, SOB, cough, sputum  No n/v/d, abd pain, melena, hematochezia  No edema  No rash  No anxiety  No back pain, joint pain  No bleeding, bruising  No dysuria, hematuria    Vital Signs Last 24 Hrs  T(C): 36.6 (12 Jun 2022 04:35), Max: 40.8 (11 Jun 2022 11:30)  T(F): 97.9 (12 Jun 2022 04:35), Max: 105.5 (11 Jun 2022 11:30)  HR: 97 (12 Jun 2022 06:14) (82 - 97)  BP: 129/73 (12 Jun 2022 06:14) (109/69 - 149/81)  BP(mean): --  RR: 18 (12 Jun 2022 04:35) (18 - 20)  SpO2: 99% (12 Jun 2022 04:35) (97% - 100%)    PE  NAD  Awake, alert  Anicteric, MMM  RRR  CTAB  Abd soft, NT, ND  No c/c/e  No rash grossly  FROM                          7.3    14.50 )-----------( 81       ( 11 Jun 2022 06:51 )             23.8       06-11    142  |  108  |  40<H>  ----------------------------<  129<H>  3.6   |  20<L>  |  1.46<H>    Ca    8.1<L>      11 Jun 2022 06:50  Phos  5.1     06-11  Mg     1.9     06-11    TPro  5.9<L>  /  Alb  2.1<L>  /  TBili  1.0  /  DBili  x   /  AST  45<H>  /  ALT  12  /  AlkPhos  223<H>  06-11

## 2022-06-12 NOTE — PROGRESS NOTE ADULT - ATTENDING COMMENTS
I agree with the above with the following additions and exceptions:    No On events. R upper Arm more swollen near midline but no pain, numbness, tingling. Distal extremities warm well perfused, not tense, RP 2+. No abd pain tenderness, but unchanged distention. Reports normal stools, green, no melena or blood    Anemia: Hgb drop from 5.4 from 7.3, difficult stick, had to A stick to get blood and labs look accurate otherwise  -unclear etiology of hgb drop: Supratheuputic PTT on heparin ggt thus Ddx RP bleed, hematoma in RUE, vs slow GIB vs less likely hemolysis  -get RUQ US  -get Ct abd pelvis r/o RP bleed  -GI eval  -given 1 unit prbcs with lasix prn given massive overload on exam  -trend cbc  -start PPI IV BID   -HD stable  -will defer to heme onc and further workup if heparin ggt should be held given recent PE and UE DVT at OSH per son     Fever: ?due to HLH vs occult infection  -last fever on 6/11 to 105  -repeat cultures pending  -Treat HLD as below  -completed 7 day course of meropenem on 6/8 per ID  -f/u further ID recs  -all cx NGTD, fever workup negative otherwise  -HD stable  -bronch cytopath showing cytopathic viral effects  -+EBV PCR: discussed with Id DR Crump: c/w steroids, will reassess next week     Fluid Overload: likely will need IV lasix with prbcs today, cr remains stable with daily IV lasix    HLH with hx of SLE:   c/w steroids per heme  -monitor ferritin, cbc other counts  -f/u lymph node biopsy    PE: defer IV heparin to heme onc given anemia  -,+cardiolipin antibodies, other serologies pending  -heme following     Acute resp failure: multifactorial s/p extubation  -weaned to RA, satting well  -no stridor  -treat PE  -s/p antibiotics  -lasix prn     PEPE: multifactorial, cr improving 1.4 (baseline 1.0), IV lasix 20mg with prbcs today    PT  prognosis guarded   discussed with son bedside    MICU consult if any sign of decompensation or change in HD status I agree with the above with the following additions and exceptions:    No On events. R upper Arm more swollen near midline but no pain, numbness, tingling. Distal extremities warm well perfused, not tense, RP 2+. No abd pain tenderness, but unchanged distention. Reports normal stools, green, no melena or blood    Anemia: Hgb drop from 5.4 from 7.3, difficult stick, had to A stick to get blood and labs look accurate otherwise  -unclear etiology of hgb drop: Supratheuputic PTT on heparin ggt thus Ddx RP bleed, hematoma in RUE, vs slow GIB vs less likely hemolysis  -get RUQ US  -get Ct abd pelvis r/o RP bleed  -GI eval  -given 1 unit prbcs with lasix prn given massive overload on exam  -trend cbc  -start PPI IV BID   -HD stable  -will defer to heme onc and further workup if heparin ggt should be held given recent PE and UE DVT at OSH per son     Fever: ?due to HLH vs occult infection  -last fever on 6/11 to 105  -repeat cultures pending  -Treat HLD as below  -completed 7 day course of meropenem on 6/8 per ID  -f/u further ID recs  -all cx NGTD, fever workup negative otherwise  -HD stable  -bronch cytopath showing cytopathic viral effects  -+EBV PCR: discussed with Id DR Crump: c/w steroids, will reassess next week     Fluid Overload: likely will need IV lasix with prbcs today, cr remains stable with daily IV lasix    HLH with hx of SLE:   c/w steroids per heme  -monitor ferritin, cbc other counts  -f/u lymph node biopsy    PE: defer IV heparin to heme onc given anemia  -,+cardiolipin antibodies, other serologies pending  -heme following   -get b/l UE duplex    Acute resp failure: multifactorial s/p extubation  -weaned to RA, satting well  -no stridor  -treat PE  -s/p antibiotics  -lasix prn     PEPE: multifactorial, cr improving 1.4 (baseline 1.0), IV lasix 20mg with prbcs today    PT  prognosis guarded   discussed with son bedside    MICU consult if any sign of decompensation or change in HD status I agree with the above with the following additions and exceptions:    No On events. R upper Arm more swollen near midline but no pain, numbness, tingling. Distal extremities warm well perfused, not tense, RP 2+. No abd pain tenderness, but unchanged distention. Reports normal stools, green, no melena or blood    Anemia: Hgb drop from 5.4 from 7.3, difficult stick, had to A stick to get blood and labs look accurate otherwise  -unclear etiology of hgb drop: Supratheuputic PTT on heparin ggt thus Ddx RP bleed, hematoma in RUE, vs slow GIB vs less likely hemolysis  -get RUE US  -no current s/s of compartment syndrome, monitor arm, if changes vascular consult  -get Ct abd pelvis r/o RP bleed  -GI eval  -given 1 unit prbcs with lasix prn given massive overload on exam  -trend cbc  -start PPI IV BID   -HD stable  -will defer to heme onc and further workup if heparin ggt should be held given recent PE and UE DVT at OSH per son     Fever: ?due to HLH vs occult infection  -last fever on 6/11 to 105  -repeat cultures pending  -Treat HLD as below  -completed 7 day course of meropenem on 6/8 per ID  -f/u further ID recs  -all cx NGTD, fever workup negative otherwise  -HD stable  -bronch cytopath showing cytopathic viral effects  -+EBV PCR: discussed with Id DR Crump: c/w steroids, will reassess next week     Fluid Overload: likely will need IV lasix with prbcs today, cr remains stable with daily IV lasix    HLH with hx of SLE:   c/w steroids per heme  -monitor ferritin, cbc other counts  -f/u lymph node biopsy    PE: defer IV heparin to heme onc given anemia  -,+cardiolipin antibodies, other serologies pending  -heme following   -get b/l UE duplex    Acute resp failure: multifactorial s/p extubation  -weaned to RA, satting well  -no stridor  -treat PE  -s/p antibiotics  -lasix prn     PEPE: multifactorial, cr improving 1.4 (baseline 1.0), IV lasix 20mg with prbcs today    PT  prognosis guarded   discussed with son bedside    MICU consult if any sign of decompensation or change in HD status

## 2022-06-12 NOTE — CONSULT NOTE ADULT - ASSESSMENT
ASSESSMENT/PLAN: 65 yo M with a PMH HTN, HLD, SLE on Cellcept (MMF), class V lupus nephritis, CKD stage 2, DM2, diabetic neuropathy, TIA (2019) on Plavix, BPH, HUNG, undergoing work-up for FUO and found on admission to have RLL PE. Pt with acute H/H drop today and supratherapeutic PTTs. CT A/P non con with evidence of right gluteus and L psoas intramuscular hematomas. General Surgery consulted for further recommendations. Hep gtt stopped, PTT now wnl. Hematomas likely spontaneous in setting of supratherapeutic anticoagulation. Hematomas usually self-resolving in this setting when A/C stopped.       - No acute surgical intervention indicated   - Trend CBC  - If H/H drops again, recommend CTA to determine source of bleeding  - Risks vs. benefits of restarting A/C per Medicine  - Care per primary team    Patient discussed with attending, Dr. Garces.    Vanessa García MD  PGY2 Consult Resident  ACS/Trauma Surgery  [phone]

## 2022-06-12 NOTE — PROGRESS NOTE ADULT - ASSESSMENT
HPI:  65 yo M with a PMH HTN, HLD, SLE on Cellcept (MMF), class V lupus nephritis, CKD stage 2, DM2, diabetic neuropathy, TIA (2019) on plavix, BPH, HUNG, hospitalization in [facility] 3/27-4/20 tx for pancreatitis and C diff?, recent hospitalization at [facility] 4/22-4/26 for acute pancreatitis and C diff colitis and another hospitalization 5/5 at [facility] for septic shock (source buttock abscess) treated with vanc, cefepime (5/5-5/10), meropenem (5/11-5/18) and micafungin. Zosyn added 5/26. Course complicated by recurrent fevers despite being on antibiotics, and leukocytosis up to 30k. Blood cx neg, UA unremarkable. MRI abdomen w/ contrast performed showing extensive abd lymphadenopathy (reactive to c diff vs lymphoma?). IR stated no window for bx. Course also complicated by a L brachial vein DVT and superficial DVT in arms. Patient noted to have had a positive PPD but has possibly gotten the BCG vaccine. Quant gold performed at [facility] still pending.     Admitted to [facility] MICU for further management. Vital signs on arrival: temp 38.6, /77, , RR 28, O2 sat 97% on 2L NC.  Labs: WBC 34.53, Hb 9.6, lipase 740, procal 2.02, tBili 1.4, alk phos 498, lactate 2.4. (30 May 2022 04:01)    Hematology/Oncology consulted d/t patient's history of anemia. [facility]    Anemia and Lymphadenopathy  --multifactorial  --transfuse for hgb > 7  --infectious vs malignancy vs autoimmune  --supraclavicular/cervical IR bedside biopsy  pathology is still pending  --MPN testing is negative  --BMB done on 5/20 at [place]'s: negative for detectable monoclonal cell or B cell population  Systemic Inflammatory/Viral Disorder with Lymphadenopathy  -- started dexamethasone 20mg Q day   -- meets 5 criteria of HLH but important negative features: no presence of erythrophagocytosis / hyperferritinemia present  --peripheral smear negative for hemophagocytosis   --will continue to trend daily ferritin   --rheumatology consulted  -- c/w dex daily      Questionable PE with splenic infarct  - on heparin GGT       HPI:  67 yo M with a PMH HTN, HLD, SLE on Cellcept (MMF), class V lupus nephritis, CKD stage 2, DM2, diabetic neuropathy, TIA (2019) on plavix, BPH, HUNG, hospitalization in [facility] 3/27-4/20 tx for pancreatitis and C diff?, recent hospitalization at [place] 4/22-4/26 for acute pancreatitis and C diff colitis and another hospitalization 5/5 at [place] for septic shock (source buttock abscess) treated with vanc, cefepime (5/5-5/10), meropenem (5/11-5/18) and micafungin. Zosyn added 5/26. Course complicated by recurrent fevers despite being on antibiotics, and leukocytosis up to 30k. Blood cx neg, UA unremarkable. MRI abdomen w/ contrast performed showing extensive abd lymphadenopathy (reactive to c diff vs lymphoma?). IR stated no window for bx. Course also complicated by a L brachial vein DVT and superficial DVT in arms. Patient noted to have had a positive PPD but has possibly gotten the BCG vaccine. Quant gold performed at [place] still pending.     Admitted to [facility] MICU for further management. Vital signs on arrival: temp 38.6, /77, , RR 28, O2 sat 97% on 2L NC.  Labs: WBC 34.53, Hb 9.6, lipase 740, procal 2.02, tBili 1.4, alk phos 498, lactate 2.4. (30 May 2022 04:01)    Hematology/Oncology consulted d/t patient's history of anemia. [facility].    Anemia and Lymphadenopathy  --multifactorial  --transfuse for hgb > 7  --infectious vs malignancy vs autoimmune  --supraclavicular/cervical IR bedside biopsy  pathology is still pending  --MPN testing is negative  --BMB done on 5/20 at [place]: negative for detectable monoclonal cell or B cell population  Systemic Inflammatory/Viral Disorder with Lymphadenopathy  -- started dexamethasone 20mg Q day   -- meets 5 criteria of HLH but important negative features: no presence of erythrophagocytosis / hyperferritinemia present  --peripheral smear negative for hemophagocytosis   --will continue to trend daily ferritin   --rheumatology consulted  -- c/w dex daily      Questionable PE with splenic infarct  - on heparin GGT     Patient hgb dropped 2 gram overnight  ct scan c/a/p to be attained immediately  hold heparin drip  vascular consultation recommended.

## 2022-06-12 NOTE — PROVIDER CONTACT NOTE (OTHER) - SITUATION
Pt with increased temp after receiving antipyretics 1 hours ago. Pt fever 1 hour prior was 100.4 and pt received PO Tylenol.

## 2022-06-12 NOTE — PROGRESS NOTE ADULT - SUBJECTIVE AND OBJECTIVE BOX
Gabriele Acuña MD  Internal Medicine PGY-3      PROGRESS NOTE:     Patient is a 66y old  Male who presents with a chief complaint of recurrent fevers, unknown source (10 [place] 2022 17:00)      SUBJECTIVE / OVERNIGHT EVENTS:    No events overnight. No fevers, chills, SOB, CP. Heparin drip supratherapeutic, adjusted per nomogram    REVIEW OF SYSTEMS:    CONSTITUTIONAL: No weakness, +fevers -chills  EYES/ENT: No visual changes;  No vertigo or throat pain   NECK: No pain or stiffness  RESPIRATORY: No cough, wheezing, hemoptysis; No shortness of breath  CARDIOVASCULAR: No chest pain or palpitations  GASTROINTESTINAL: No abdominal or epigastric pain. No nausea, vomiting, or hematemesis; No diarrhea or constipation. No melena or hematochezia.  GENITOURINARY: No dysuria, frequency or hematuria  NEUROLOGICAL: No numbness or weakness  SKIN: No itching, rashes      MEDICATIONS  (STANDING):  chlorhexidine 4% Liquid 1 Application(s) Topical <User Schedule>  citric acid/sodium citrate Solution 30 milliLiter(s) Oral every 6 hours  dexAMETHasone  IVPB 20 milliGRAM(s) IV Intermittent daily  dextrose 5%. 1000 milliLiter(s) (100 mL/Hr) IV Continuous <Continuous>  dextrose 5%. 1000 milliLiter(s) (50 mL/Hr) IV Continuous <Continuous>  dextrose 50% Injectable 25 Gram(s) IV Push once  dextrose 50% Injectable 12.5 Gram(s) IV Push once  dextrose 50% Injectable 25 Gram(s) IV Push once  folic acid 1 milliGRAM(s) Oral daily  gabapentin 300 milliGRAM(s) Oral daily  glucagon  Injectable 1 milliGRAM(s) IntraMuscular once  glucagon  Injectable 1 milliGRAM(s) IntraMuscular once  heparin  Infusion. 1900 Unit(s)/Hr (19 mL/Hr) IV Continuous <Continuous>  hydrALAZINE 10 milliGRAM(s) Oral every 8 hours  insulin lispro (ADMELOG) corrective regimen sliding scale   SubCutaneous three times a day before meals  insulin lispro (ADMELOG) corrective regimen sliding scale   SubCutaneous at bedtime  lidocaine   4% Patch 1 Patch Transdermal daily  melatonin 5 milliGRAM(s) Oral at bedtime  metoprolol tartrate 12.5 milliGRAM(s) Oral two times a day  multivitamin/minerals 1 Tablet(s) Oral daily  pantoprazole  Injectable 40 milliGRAM(s) IV Push daily  tamsulosin 0.4 milliGRAM(s) Oral at bedtime    MEDICATIONS  (PRN):  acetaminophen     Tablet .. 650 milliGRAM(s) Oral every 6 hours PRN Temp greater or equal to 38C (100.4F), Mild Pain (1 - 3)  dextrose Oral Gel 15 Gram(s) Oral once PRN Blood Glucose LESS THAN 70 milliGRAM(s)/deciliter  heparin   Injectable 7500 Unit(s) IV Push every 6 hours PRN For aPTT less than 40  heparin   Injectable 3500 Unit(s) IV Push every 6 hours PRN For aPTT between 40 - 57  sodium chloride 0.9% lock flush 10 milliLiter(s) IV Push every 1 hour PRN Pre/post blood products, medications, blood draw, and to maintain line patency        CAPILLARY BLOOD GLUCOSE      POCT Blood Glucose.: 112 mg/dL (10 [place] 2022 21:38)  POCT Blood Glucose.: 103 mg/dL (10 [place] 2022 17:09)  POCT Blood Glucose.: 106 mg/dL (10 [place] 2022 13:07)  POCT Blood Glucose.: 150 mg/dL (10 [place] 2022 08:07)    I&O's Summary    10 [place] 2022 07:01  -  2022 07:00  --------------------------------------------------------  IN: 216 mL / OUT: 1775 mL / NET: -1559 mL        VITALS:   T(C): 36.8 (22 @ 04:45), Max: 38 (22 @ 00:30)  HR: 121 (22 @ 04:45) (81 - 121)  BP: 145/83 (22 @ 04:45) (114/68 - 167/71)  RR: 20 (22 @ 04:45) (18 - 28)  SpO2: 94% (22 @ 04:45) (94% - 99%)    GENERAL: NAD, lying in bed comfortably  HEAD:  Atraumatic, normocephalic  EYES: EOMI, PERRLA, conjunctiva and sclera clear  ENT: Moist mucous membranes  NECK: Supple, no JVD  HEART: Regular rate and rhythm, no murmurs, rubs, or gallops  LUNGS: Unlabored respirations.  Clear to auscultation bilaterally, no crackles, wheezing, or rhonchi  ABDOMEN: Soft, nontender, nondistended, +BS  EXTREMITIES: 2+ peripheral pulses bilaterally. No clubbing, cyanosis, or edema  NERVOUS SYSTEM:  A&Ox3 at 8AM with no focal deficits, A&Ox0 responsive to verbal 11AM   SKIN: No rashes or lesions    LABS:                        7.4    17.46 )-----------( 92       ( 10 [place] 2022 07:44 )             22.9     06-10    141  |  107  |  40<H>  ----------------------------<  154<H>  3.7   |  19<L>  |  1.65<H>    Ca    8.3<L>      10 [place] 2022 07:44  Phos  7.4     06-  Mg     2.5     06-    TPro  6.1  /  Alb  2.9<L>  /  TBili  1.0  /  DBili  x   /  AST  31  /  ALT  10  /  AlkPhos  218<H>  06-10    PTT - ( 2022 02:55 )  PTT:35.0 sec      Urinalysis Basic - ( 10 [place] 2022 07:57 )    Color: Light Yellow / Appearance: Turbid / S.014 / pH: x  Gluc: x / Ketone: Negative  / Bili: Negative / Urobili: Negative   Blood: x / Protein: 100 / Nitrite: Negative   Leuk Esterase: Negative / RBC: 67 /hpf / WBC 4 /HPF   Sq Epi: x / Non Sq Epi: 3 /hpf / Bacteria: Negative      RADIOLOGY & ADDITIONAL TESTS:  Results Reviewed:   Imaging Personally Reviewed:  Electrocardiogram Personally Reviewed:    COORDINATION OF CARE:  Care Discussed with Consultants/Other Providers [Y/N]:  Prior or Outpatient Records Reviewed [Y/N]:

## 2022-06-13 NOTE — PROGRESS NOTE ADULT - PROBLEM SELECTOR PLAN 6
-outside hospital reported to be consistent with AOCD  -CT ab pending, r/o bleed  -transfuse if Hb<7, maintain active type and screen  -DVT ppx: full AC -outside hospital reported to be consistent with AOCD  -CT ab pending, r/o bleed  - 6/12 overnight s/p 1u pRBC hgb 6.8 --> 7.3  -transfuse if Hb<7, maintain active type and screen  -DVT ppx: full AC

## 2022-06-13 NOTE — PROGRESS NOTE ADULT - ASSESSMENT
5 yo M with a PMH HTN, HLD, SLE on Cellcept (MMF), class V lupus nephritis, CKD stage 2, DM2, diabetic neuropathy, TIA (2019) on plavix, BPH, HUNG, hospitalization in [facility] 3/27-4/20 tx for pancreatitis and C diff?, recent hospitalization at [facility] 4/22-4/26 for acute pancreatitis and C diff colitis and another hospitalization 5/5 at [facility] for septic shock (source buttock abscess) treated with vanc, cefepime (5/5-5/10), meropenem (5/11-5/18) and micafungin. Zosyn added 5/26. Course complicated by recurrent fevers despite being on antibiotics, and leukocytosis up to 30k. Blood cx neg, UA unremarkable. MRI abdomen w/ contrast performed showing extensive abd lymphadenopathy (reactive to c diff vs lymphoma?). IR stated no window for bx. Course also complicated by a L brachial vein DVT and superficial DVT in arms. Patient noted to have had a positive PPD but has possibly gotten the BCG vaccine. Quant gold performed at [facility] still pending.     Admitted to [facility] MICU for further management. Vital signs on arrival: temp 38.6, /77, , RR 28, O2 sat 97% on 2L NC.  Labs: WBC 34.53, Hb 9.6, lipase 740, procal 2.02, tBili 1.4, alk phos 498, lactate 2.4. (30 May 2022 04:01)    Hematology/Oncology consulted d/t patient's history of anemia. [facility]    Anemia and Lymphadenopathy  --multifactorial: infectious vs malignancy vs autoimmune  --supraclavicular/cervical IR bedside biopsy  pathology is still pending- follow up on final pathology   ---BMB done on 5/20 at [facility]: negative for detectable monoclonal cell or B cell population  -- HLH in differential thus patient started on steroids dexamethasone 20mg Q day.    -- meets 5 criteria of HLH but important negative features: no presence of erythrophagocytosis- will however follow up on LN biopsy.       7 yo M with a PMH HTN, HLD, SLE on Cellcept (MMF), class V lupus nephritis, CKD stage 2, DM2, diabetic neuropathy, TIA (2019) on plavix, BPH, HUNG, hospitalization in [facility] 3/27-4/20 tx for pancreatitis and C diff?, recent hospitalization at [place] 4/22-4/26 for acute pancreatitis and C diff colitis and another hospitalization 5/5 at [place] for septic shock (source buttock abscess) treated with vanc, cefepime (5/5-5/10), meropenem (5/11-5/18) and micafungin. Zosyn added 5/26. Course complicated by recurrent fevers despite being on antibiotics, and leukocytosis up to 30k. Blood cx neg, UA unremarkable. MRI abdomen w/ contrast performed showing extensive abd lymphadenopathy (reactive to c diff vs lymphoma?). IR stated no window for bx. Course also complicated by a L brachial vein DVT and superficial DVT in arms. Patient noted to have had a positive PPD but has possibly gotten the BCG vaccine. Quant gold performed at [place] still pending.     Admitted to [facility] MICU for further management. Vital signs on arrival: temp 38.6, /77, , RR 28, O2 sat 97% on 2L NC.  Labs: WBC 34.53, Hb 9.6, lipase 740, procal 2.02, tBili 1.4, alk phos 498, lactate 2.4. (30 May 2022 04:01)    Hematology/Oncology consulted d/t patient's history of anemia. [facility].    Anemia and Lymphadenopathy  --multifactorial: infectious vs malignancy vs autoimmune  --supraclavicular/cervical IR bedside biopsy  pathology is still pending- follow up on final pathology   - Noted CT findings. If excisional LN biopsy negative would consider IRL biopsy of femur if possible vs repeat BM biopsy. Follow up on pending [id number]   - Noted drop in counts. Continue steroids for now.   ---BMB done on 5/20 at [place]: negative for detectable monoclonal cell or B cell population  -- HLH in differential thus patient started on steroids dexamethasone 20mg Q day.    -- meets 5 criteria of HLH but important negative features: no presence of erythrophagocytosis- will however follow up on LN biopsy.    -- recommend transfusion for Hgb less than 7 or platelets less than 10 or if bleeding       Nolyjohn paul Melo D.O  Hematology Oncology   [facility]  [phone] ( Office)   Evenings and weekends please call MD on call or office

## 2022-06-13 NOTE — PROGRESS NOTE ADULT - PROBLEM SELECTOR PLAN 5
#chronic inflammatory diarrhea  #possible Crohn's vs colitis vs infectious  -calprotectin elevated at [place] [address]. no current plan by GI for colonoscopy at this time.   -C diff negative  -appreciate GI and ID input.   -less likely crohns disease

## 2022-06-13 NOTE — PROGRESS NOTE ADULT - ASSESSMENT
65 yo M with a PMH HTN, HLD, SLE on Cellcept (MMF), class V lupus nephritis, CKD stage 2, DM2, diabetic neuropathy, TIA (2019) on plavix, BPH, HUNG, with multiple hospitalizations for c diff, acute panc, septic shock 22 buttock abscess status post drainage transferred for recurrent fevers status post core biopsy and paracentesis suspected lymphoma v less likely peritoneal tb intubated and sedated due to upper airway stridor and altered mental status and extubated 6/8 now transferred to medicine service 65 yo M with a PMH HTN, HLD, SLE on Cellcept (MMF), class V lupus nephritis, CKD stage 2, DM2, diabetic neuropathy, TIA (2019) on plavix, BPH, HUNG, with multiple hospitalizations for c diff, acute panc, septic shock 2/2 buttock abscess status post drainage. PT transferred for recurrent fevers s/p core biopsy. In MICU, paracentesis suspected lymphoma vs. HLH, pt intubated and sedated due to upper airway stridor and AMS. Successfully extubated on 6/8 and transferred to floors with heme/onc, ID, rheum, and surg evaluation. Pt s/p pRBC transfusion, hgb stable >7, recurrent fevers monitoring off abx, bcx negative 6/11, pending CTA to r/o hematoma. Pending O/T and PM&R evaluation.

## 2022-06-13 NOTE — PROGRESS NOTE ADULT - SUBJECTIVE AND OBJECTIVE BOX
PROGRESS NOTE:   Authored by Dr. Fatuma Olsen MD (PGY-1). Pager [facility] [phone] / [facility]     Patient is a 66y old  Male who presents with a chief complaint of recurrent fevers, unknown source (12 Jun 2022 21:27)      SUBJECTIVE / OVERNIGHT EVENTS:  - Fever 101.5, received tylenol   - Hgb 6.8 s/p 1u pRBC  - per surg reccs: CTA to r/o hematoma     ADDITIONAL REVIEW OF SYSTEMS:  Patient denies fevers, chills, chest pain, shortness of breath, nausea, abdominal pain, diarrhea, constipation, dysuria, leg swelling, headache, light headedness.    MEDICATIONS  (STANDING):  chlorhexidine 4% Liquid 1 Application(s) Topical <User Schedule>  citric acid/sodium citrate Solution 30 milliLiter(s) Oral every 6 hours  dexAMETHasone  IVPB 20 milliGRAM(s) IV Intermittent daily  dextrose 5%. 1000 milliLiter(s) (100 mL/Hr) IV Continuous <Continuous>  dextrose 5%. 1000 milliLiter(s) (50 mL/Hr) IV Continuous <Continuous>  dextrose 50% Injectable 25 Gram(s) IV Push once  dextrose 50% Injectable 12.5 Gram(s) IV Push once  dextrose 50% Injectable 25 Gram(s) IV Push once  folic acid 1 milliGRAM(s) Oral daily  gabapentin 300 milliGRAM(s) Oral daily  glucagon  Injectable 1 milliGRAM(s) IntraMuscular once  glucagon  Injectable 1 milliGRAM(s) IntraMuscular once  hydrALAZINE 10 milliGRAM(s) Oral every 8 hours  insulin lispro (ADMELOG) corrective regimen sliding scale   SubCutaneous three times a day before meals  insulin lispro (ADMELOG) corrective regimen sliding scale   SubCutaneous at bedtime  lidocaine   4% Patch 1 Patch Transdermal daily  melatonin 5 milliGRAM(s) Oral at bedtime  metoprolol tartrate 12.5 milliGRAM(s) Oral two times a day  multivitamin/minerals 1 Tablet(s) Oral daily  pantoprazole  Injectable 40 milliGRAM(s) IV Push two times a day  tamsulosin 0.4 milliGRAM(s) Oral at bedtime    MEDICATIONS  (PRN):  acetaminophen     Tablet .. 650 milliGRAM(s) Oral every 6 hours PRN Temp greater or equal to 38C (100.4F), Mild Pain (1 - 3)  dextrose Oral Gel 15 Gram(s) Oral once PRN Blood Glucose LESS THAN 70 milliGRAM(s)/deciliter  sodium chloride 0.9% lock flush 10 milliLiter(s) IV Push every 1 hour PRN Pre/post blood products, medications, blood draw, and to maintain line patency      CAPILLARY BLOOD GLUCOSE      POCT Blood Glucose.: 121 mg/dL (12 Jun 2022 21:28)  POCT Blood Glucose.: 114 mg/dL (12 Jun 2022 16:43)  POCT Blood Glucose.: 107 mg/dL (12 Jun 2022 12:27)  POCT Blood Glucose.: 123 mg/dL (12 Jun 2022 08:19)    I&O's Summary    12 Jun 2022 07:01  -  13 Jun 2022 07:00  --------------------------------------------------------  IN: 120 mL / OUT: 0 mL / NET: 120 mL        PHYSICAL EXAM:  Vital Signs Last 24 Hrs  T(C): 36.8 (13 Jun 2022 04:40), Max: 39.4 (12 Jun 2022 22:35)  T(F): 98.2 (13 Jun 2022 04:40), Max: 103 (12 Jun 2022 22:35)  HR: 81 (13 Jun 2022 05:36) (80 - 120)  BP: 146/77 (13 Jun 2022 04:40) (133/77 - 155/71)  BP(mean): --  RR: 21 (13 Jun 2022 05:36) (18 - 21)  SpO2: 95% (13 Jun 2022 05:36) (94% - 98%)    CONSTITUTIONAL: NAD, well-developed  RESPIRATORY: Normal respiratory effort; lungs are clear to auscultation bilaterally  CARDIOVASCULAR: Regular rate and rhythm, normal S1 and S2, no murmur/rub/gallop; No lower extremity edema; Peripheral pulses are 2+ bilaterally  ABDOMEN: Nontender to palpation, normoactive bowel sounds, no rebound/guarding; No hepatosplenomegaly  MUSCLOSKELETAL: no clubbing or cyanosis of digits; no joint swelling or tenderness to palpation  PSYCH: A+O to person, place, and time; affect appropriate    LABS:                        6.8    18.03 )-----------( 78       ( 12 Jun 2022 22:04 )             20.4     06-12    144  |  108  |  50<H>  ----------------------------<  116<H>  3.8   |  21<L>  |  1.46<H>    Ca    8.0<L>      12 Jun 2022 11:37  Phos  6.3     06-12  Mg     1.9     06-12    TPro  5.8<L>  /  Alb  2.8<L>  /  TBili  1.0  /  DBili  x   /  AST  30  /  ALT  13  /  AlkPhos  182<H>  06-12    PTT - ( 12 Jun 2022 18:45 )  PTT:34.7 sec          Culture - Blood (collected 11 Jun 2022 14:00)  Source: .Blood Blood  Preliminary Report (12 Jun 2022 18:01):    No growth to date.        Tele Reviewed:    RADIOLOGY & ADDITIONAL TESTS:  Results Reviewed:   Imaging Personally Reviewed:  Electrocardiogram Personally Reviewed:     PROGRESS NOTE:   Authored by Dr. Fatuma Olsen MD (PGY-1). Pager [facility] [phone] / [facility]     Patient is a 66y old  Male who presents with a chief complaint of recurrent fevers, unknown source (12 Jun 2022 21:27)  Pt is awake and alert this morning, has productive coughing episodes during evaluation. Aware of the 1 u pRBC that was transfused overnight. No fever or chills this AM.       SUBJECTIVE / OVERNIGHT EVENTS:  - Fever 101.5, received tylenol   - Hgb 6.8 s/p 1u pRBC  - per surg reccs: CTA to r/o hematoma     ADDITIONAL REVIEW OF SYSTEMS:  Patient denies fevers, chills, chest pain, nausea, abdominal pain, diarrhea, constipation, dysuria, headache, light headedness.    MEDICATIONS  (STANDING):  chlorhexidine 4% Liquid 1 Application(s) Topical <User Schedule>  citric acid/sodium citrate Solution 30 milliLiter(s) Oral every 6 hours  dexAMETHasone  IVPB 20 milliGRAM(s) IV Intermittent daily  dextrose 5%. 1000 milliLiter(s) (100 mL/Hr) IV Continuous <Continuous>  dextrose 5%. 1000 milliLiter(s) (50 mL/Hr) IV Continuous <Continuous>  dextrose 50% Injectable 25 Gram(s) IV Push once  dextrose 50% Injectable 12.5 Gram(s) IV Push once  dextrose 50% Injectable 25 Gram(s) IV Push once  folic acid 1 milliGRAM(s) Oral daily  gabapentin 300 milliGRAM(s) Oral daily  glucagon  Injectable 1 milliGRAM(s) IntraMuscular once  glucagon  Injectable 1 milliGRAM(s) IntraMuscular once  hydrALAZINE 10 milliGRAM(s) Oral every 8 hours  insulin lispro (ADMELOG) corrective regimen sliding scale   SubCutaneous three times a day before meals  insulin lispro (ADMELOG) corrective regimen sliding scale   SubCutaneous at bedtime  lidocaine   4% Patch 1 Patch Transdermal daily  melatonin 5 milliGRAM(s) Oral at bedtime  metoprolol tartrate 12.5 milliGRAM(s) Oral two times a day  multivitamin/minerals 1 Tablet(s) Oral daily  pantoprazole  Injectable 40 milliGRAM(s) IV Push two times a day  tamsulosin 0.4 milliGRAM(s) Oral at bedtime    MEDICATIONS  (PRN):  acetaminophen     Tablet .. 650 milliGRAM(s) Oral every 6 hours PRN Temp greater or equal to 38C (100.4F), Mild Pain (1 - 3)  dextrose Oral Gel 15 Gram(s) Oral once PRN Blood Glucose LESS THAN 70 milliGRAM(s)/deciliter  sodium chloride 0.9% lock flush 10 milliLiter(s) IV Push every 1 hour PRN Pre/post blood products, medications, blood draw, and to maintain line patency      CAPILLARY BLOOD GLUCOSE      POCT Blood Glucose.: 121 mg/dL (12 Jun 2022 21:28)  POCT Blood Glucose.: 114 mg/dL (12 Jun 2022 16:43)  POCT Blood Glucose.: 107 mg/dL (12 Jun 2022 12:27)  POCT Blood Glucose.: 123 mg/dL (12 Jun 2022 08:19)    I&O's Summary    12 Jun 2022 07:01  -  13 Jun 2022 07:00  --------------------------------------------------------  IN: 120 mL / OUT: 0 mL / NET: 120 mL        PHYSICAL EXAM:  Vital Signs Last 24 Hrs  T(C): 36.8 (13 Jun 2022 04:40), Max: 39.4 (12 Jun 2022 22:35)  T(F): 98.2 (13 Jun 2022 04:40), Max: 103 (12 Jun 2022 22:35)  HR: 81 (13 Jun 2022 05:36) (80 - 120)  BP: 146/77 (13 Jun 2022 04:40) (133/77 - 155/71)  BP(mean): --  RR: 21 (13 Jun 2022 05:36) (18 - 21)  SpO2: 95% (13 Jun 2022 05:36) (94% - 98%)    CONSTITUTIONAL: NAD  RESPIRATORY: Expiratory wheezes and crackles throughout posterior lung fields, on RA.   CARDIOVASCULAR: Regular rate and rhythm, normal S1 and S2, no murmur/rub/gallop; 3+ lower extremity edema below the knee; Peripheral pulses are 2+ bilaterally  ABDOMEN: distended and nontender to palpation, normoactive bowel sounds, no rebound/guarding  MUSCLOSKELETAL: no clubbing or cyanosis of digits; no joint swelling or tenderness to palpation  PSYCH: A+O to person, place, and time; affect appropriate    LABS:                        6.8    18.03 )-----------( 78       ( 12 Jun 2022 22:04 )             20.4     06-12    144  |  108  |  50<H>  ----------------------------<  116<H>  3.8   |  21<L>  |  1.46<H>    Ca    8.0<L>      12 Jun 2022 11:37  Phos  6.3     06-12  Mg     1.9     06-12    TPro  5.8<L>  /  Alb  2.8<L>  /  TBili  1.0  /  DBili  x   /  AST  30  /  ALT  13  /  AlkPhos  182<H>  06-12    PTT - ( 12 Jun 2022 18:45 )  PTT:34.7 sec          Culture - Blood (collected 11 Jun 2022 14:00)  Source: .Blood Blood  Preliminary Report (12 Jun 2022 18:01):    No growth to date.        Tele Reviewed:    RADIOLOGY & ADDITIONAL TESTS:  Results Reviewed:   Imaging Personally Reviewed:  Electrocardiogram Personally Reviewed:

## 2022-06-13 NOTE — PROGRESS NOTE ADULT - PROBLEM SELECTOR PLAN 12
Diet: minced and moist  DVT: heparin gtt  Dispo: pending PT consult Diet: minced and moist  DVT: heparin gtt on hold  Dispo: pending PT consult

## 2022-06-13 NOTE — PROGRESS NOTE ADULT - PROBLEM SELECTOR PLAN 3
-r/o HLH 2/2 EBV  -has fevers, splenomegaly, cytopenia  -HLH labs: soluble IL-2 levels 63799, ferritin 511, triglyceride 412, 6/6 fibrinogen 218 6/6  , pending final pathology from cervical lymph node biopsy, flow cytometry from peripheral blood without acute abnormalities in lymphoid, myeloid, or plasma cell lines.   -IgG4 27  -dexamethasone 20 qd discussed with heme on 6/6 following bronchoscopy  -pending pathology 6/7  -Prelim per verbal with pathology EBV+ lymphoprofileration, with empiric treatment initiated for HLH and no specific treatment planned for EBV per discussion with ID.  -Fever to 102 o/n, got tylenol and new UA sent. -r/o HLH 2/2 EBV  -has fevers, splenomegaly, cytopenia  -HLH labs: soluble IL-2 levels 28388, ferritin 511, triglyceride 412, 6/6 fibrinogen 218 6/6  , pending final pathology from cervical lymph node biopsy, flow cytometry from peripheral blood without acute abnormalities in lymphoid, myeloid, or plasma cell lines.   -IgG4 27  -dexamethasone 20 qd discussed with heme on 6/6 following bronchoscopy  -pending pathology 6/7  -Prelim per verbal with pathology EBV+ lymphoprofileration, with empiric treatment initiated for HLH and no specific treatment planned for EBV per discussion with ID.  -Fever to 102 o/n, got tylenol and new UA sent.    #Anemia and Lymphadenopathy  --multifactorial: infectious vs malignancy vs autoimmune  --supraclavicular/cervical IR bedside biopsy  pathology is still pending- follow up on final pathology   ---BMB done on 5/20 at [facility]: negative for detectable monoclonal cell or B cell population  -- HLH in differential thus patient started on steroids dexamethasone 20mg Q day.    -- meets 5 criteria of HLH but important negative features: no presence of erythrophagocytosis- will however follow up on LN biopsy.

## 2022-06-13 NOTE — PROGRESS NOTE ADULT - PROBLEM SELECTOR PLAN 8
#Superficial L brachial DVT  -heparin gtt as above #Superficial L brachial DVT  -heparin gtt on hold

## 2022-06-13 NOTE — PROGRESS NOTE ADULT - PROBLEM SELECTOR PLAN 1
diff dx: infectious, malignancy, IgG4 disease, HLH  -elevated WBC, elevated IgA at [facility]  -s/p core biopsy and paracentesis at [facility] prior to transfer to [facility]  -quant gold indeterminate  -5/30 BCx NGTD  -HIV nonreactive, EBV IgG+, CMV 50  -possibility of peritoneal TB appreciate ID reccs AFB sputum and stool thus far are negative with IR biopsy completed pending final results 6/4  -Rheum eval including C3 C4 ESR 44 CRP 77 dsDNA progression of underlying lupus v other rheumatologic process causing fever although less likely.  -monitoring off antibiotics at this time, will f/u ID recs  -will f/u pathology results from cervical LN core biopsy  -bronch lavage cytology negative for malignancy  -fever o/n 102 6/10, pt. complaining of pain at catheter insertion site. UA sent, lidocaine jelly, IV tylenol, and dilaudid given for pain relief.  -fever 105 rectal 6/11, tylenol 1g IV, cooling measures reinstated, Bcx x2 sent.  -fever 6/12 overnight, s/p tylenol, f/u BCX from 6/11 diff dx: infectious, malignancy, IgG4 disease, HLH  -elevated WBC, elevated IgA at [facility]  -s/p core biopsy and paracentesis at [facility] prior to transfer to [facility]  -quant gold indeterminate  -5/30 BCx NGTD  -HIV nonreactive, EBV IgG+, CMV 50  -possibility of peritoneal TB appreciate ID reccs AFB sputum and stool thus far are negative with IR biopsy completed pending final results 6/4  -Rheum eval including C3 C4 ESR 44 CRP 77 dsDNA progression of underlying lupus v other rheumatologic process causing fever although less likely.  -monitoring off antibiotics at this time, will f/u ID recs  -will f/u pathology results from cervical LN core biopsy  -bronch lavage cytology negative for malignancy  -fever o/n 102 6/10, pt. complaining of pain at catheter insertion site. UA sent, lidocaine jelly, IV tylenol, and dilaudid given for pain relief.  -fever 105 rectal 6/11, tylenol 1g IV, cooling measures reinstated, Bcx x2 sent.  -fever 6/12 overnight, s/p tylenol, f/u BCX from 6/11 -NGTD

## 2022-06-13 NOTE — PROGRESS NOTE ADULT - PROBLEM SELECTOR PLAN 7
#RLL PE  -heparin gtt, may transition to alternative agent for full AC if no plans for additional surgical intervention/procedure #RLL PE  -heparin gtt on hold, may transition to alternative agent for full AC if no plans for additional surgical intervention/procedure

## 2022-06-13 NOTE — PROGRESS NOTE ADULT - ATTENDING COMMENTS
65 y/o M with h/o SLE, CKD, presenting with FUO with CT evidence of significant lymphadenopathy. Concern for HLH however BMbx without hemophagocytosis although meets other criteria vs other inflammatory disorder. Course c/b splenic infarct/PE/DVTs on hep c/b acute anemia requiring transfusions.     #Anemia - concern 2/2 to blood loss, CT performed for localization of possible source, pending read. Transfuse >7, holding hep gtt  #FUO: ?due to HLH vs occult infection/inflammatory disorder - f/u LN bx, continue steroids. s/p abx course. Trend Ferritin. EBV+ unclear significance/possible . ID Rheum Heme following  #Fluid Overload - 40 IV lasix today. If Cr tolerates, will continue daily and uptitrate as tolerated  #PE - holding hep iso acute anemia ,+cardiolipin most recent ds and sm neg,, f/u dupplex  #PEPE: multifactorial, cr improving 1.6 (baseline 1.0), 1.4-1.8 during admission    discussed with son bedside

## 2022-06-13 NOTE — PROGRESS NOTE ADULT - SUBJECTIVE AND OBJECTIVE BOX
Patient is a 66y old  Male who presents with a chief complaint of recurrent fevers, unknown source (13 Jun 2022 07:04)      MEDICATIONS  (STANDING):  chlorhexidine 4% Liquid 1 Application(s) Topical <User Schedule>  citric acid/sodium citrate Solution 30 milliLiter(s) Oral every 6 hours  dexAMETHasone  IVPB 20 milliGRAM(s) IV Intermittent daily  dextrose 5%. 1000 milliLiter(s) (50 mL/Hr) IV Continuous <Continuous>  dextrose 5%. 1000 milliLiter(s) (100 mL/Hr) IV Continuous <Continuous>  dextrose 50% Injectable 25 Gram(s) IV Push once  dextrose 50% Injectable 12.5 Gram(s) IV Push once  dextrose 50% Injectable 25 Gram(s) IV Push once  folic acid 1 milliGRAM(s) Oral daily  gabapentin 300 milliGRAM(s) Oral daily  glucagon  Injectable 1 milliGRAM(s) IntraMuscular once  glucagon  Injectable 1 milliGRAM(s) IntraMuscular once  hydrALAZINE 10 milliGRAM(s) Oral every 8 hours  insulin lispro (ADMELOG) corrective regimen sliding scale   SubCutaneous three times a day before meals  insulin lispro (ADMELOG) corrective regimen sliding scale   SubCutaneous at bedtime  lidocaine   4% Patch 1 Patch Transdermal daily  melatonin 5 milliGRAM(s) Oral at bedtime  metoprolol tartrate 12.5 milliGRAM(s) Oral two times a day  multivitamin/minerals 1 Tablet(s) Oral daily  pantoprazole  Injectable 40 milliGRAM(s) IV Push two times a day  tamsulosin 0.4 milliGRAM(s) Oral at bedtime    MEDICATIONS  (PRN):  acetaminophen     Tablet .. 650 milliGRAM(s) Oral every 6 hours PRN Temp greater or equal to 38C (100.4F), Mild Pain (1 - 3)  dextrose Oral Gel 15 Gram(s) Oral once PRN Blood Glucose LESS THAN 70 milliGRAM(s)/deciliter  sodium chloride 0.9% lock flush 10 milliLiter(s) IV Push every 1 hour PRN Pre/post blood products, medications, blood draw, and to maintain line patency      ROS  No fever, sweats, chills  No epistaxis, HA, sore throat  No CP, SOB, cough, sputum  No n/v/d, abd pain, melena, hematochezia  No edema  No rash  No anxiety  No back pain, joint pain  No bleeding, bruising  No dysuria, hematuria    Vital Signs Last 24 Hrs  T(C): 36.8 (13 Jun 2022 08:30), Max: 39.4 (12 Jun 2022 22:35)  T(F): 98.2 (13 Jun 2022 08:30), Max: 103 (12 Jun 2022 22:35)  HR: 95 (13 Jun 2022 11:32) (81 - 120)  BP: 161/77 (13 Jun 2022 11:32) (138/88 - 161/77)  BP(mean): --  RR: 18 (13 Jun 2022 11:32) (17 - 21)  SpO2: 95% (13 Jun 2022 11:32) (94% - 98%)    PE  NAD  Awake, alert  Anicteric, MMM  RRR  CTAB  Abd soft, NT, ND  No c/c/e  No rash grossly  FROM                          7.3    15.21 )-----------( 66       ( 13 Jun 2022 11:24 )             22.6       06-13    145  |  108  |  54<H>  ----------------------------<  107<H>  4.0   |  21<L>  |  1.60<H>    Ca    7.7<L>      13 Jun 2022 11:24  Phos  5.4     06-13  Mg     2.0     06-13    TPro  5.7<L>  /  Alb  2.9<L>  /  TBili  1.3<H>  /  DBili  x   /  AST  38  /  ALT  14  /  AlkPhos  175<H>  06-13       Patient is a 66y old  Male who presents with a chief complaint of recurrent fevers, unknown source (13 Jun 2022 07:04)    Attempted to see patient he was off the floor for imaging. Chart reviewed.     MEDICATIONS  (STANDING):  chlorhexidine 4% Liquid 1 Application(s) Topical <User Schedule>  citric acid/sodium citrate Solution 30 milliLiter(s) Oral every 6 hours  dexAMETHasone  IVPB 20 milliGRAM(s) IV Intermittent daily  dextrose 5%. 1000 milliLiter(s) (50 mL/Hr) IV Continuous <Continuous>  dextrose 5%. 1000 milliLiter(s) (100 mL/Hr) IV Continuous <Continuous>  dextrose 50% Injectable 25 Gram(s) IV Push once  dextrose 50% Injectable 12.5 Gram(s) IV Push once  dextrose 50% Injectable 25 Gram(s) IV Push once  folic acid 1 milliGRAM(s) Oral daily  gabapentin 300 milliGRAM(s) Oral daily  glucagon  Injectable 1 milliGRAM(s) IntraMuscular once  glucagon  Injectable 1 milliGRAM(s) IntraMuscular once  hydrALAZINE 10 milliGRAM(s) Oral every 8 hours  insulin lispro (ADMELOG) corrective regimen sliding scale   SubCutaneous three times a day before meals  insulin lispro (ADMELOG) corrective regimen sliding scale   SubCutaneous at bedtime  lidocaine   4% Patch 1 Patch Transdermal daily  melatonin 5 milliGRAM(s) Oral at bedtime  metoprolol tartrate 12.5 milliGRAM(s) Oral two times a day  multivitamin/minerals 1 Tablet(s) Oral daily  pantoprazole  Injectable 40 milliGRAM(s) IV Push two times a day  tamsulosin 0.4 milliGRAM(s) Oral at bedtime    MEDICATIONS  (PRN):  acetaminophen     Tablet .. 650 milliGRAM(s) Oral every 6 hours PRN Temp greater or equal to 38C (100.4F), Mild Pain (1 - 3)  dextrose Oral Gel 15 Gram(s) Oral once PRN Blood Glucose LESS THAN 70 milliGRAM(s)/deciliter  sodium chloride 0.9% lock flush 10 milliLiter(s) IV Push every 1 hour PRN Pre/post blood products, medications, blood draw, and to maintain line patency      ROS   patient off floor     Vital Signs Last 24 Hrs  T(C): 36.8 (13 Jun 2022 08:30), Max: 39.4 (12 Jun 2022 22:35)  T(F): 98.2 (13 Jun 2022 08:30), Max: 103 (12 Jun 2022 22:35)  HR: 95 (13 Jun 2022 11:32) (81 - 120)  BP: 161/77 (13 Jun 2022 11:32) (138/88 - 161/77)  BP(mean): --  RR: 18 (13 Jun 2022 11:32) (17 - 21)  SpO2: 95% (13 Jun 2022 11:32) (94% - 98%)    PE  Patient off floor                         7.3    15.21 )-----------( 66       ( 13 Jun 2022 11:24 )             22.6       06-13    145  |  108  |  54<H>  ----------------------------<  107<H>  4.0   |  21<L>  |  1.60<H>    Ca    7.7<L>      13 Jun 2022 11:24  Phos  5.4     06-13  Mg     2.0     06-13    TPro  5.7<L>  /  Alb  2.9<L>  /  TBili  1.3<H>  /  DBili  x   /  AST  38  /  ALT  14  /  AlkPhos  175<H>  06-13  CT    IMPRESSION:  Hematomas within the left psoas musculature and right gluteus musculature   without change. No evidence of active bleeding.    Splenomegaly with extensive mesenteric adenopathy. Concern for a marrow   infiltrative process in the femurs. Correlate for a lymphoproliferative   disorder.    --- End of Report ---

## 2022-06-14 NOTE — PROGRESS NOTE ADULT - SUBJECTIVE AND OBJECTIVE BOX
PROGRESS NOTE:   Authored by Dr. Fatuma Olsen MD (PGY-1). Pager [facility] [phone] / [facility]     Patient is a 66y old  Male who presents with a chief complaint of recurrent fevers, unknown source (13 Jun 2022 12:44)      SUBJECTIVE / OVERNIGHT EVENTS:  No acute events overnight.     ADDITIONAL REVIEW OF SYSTEMS:  Patient denies fevers, chills, chest pain, shortness of breath, nausea, abdominal pain, diarrhea, constipation, dysuria, leg swelling, headache, light headedness.    MEDICATIONS  (STANDING):  chlorhexidine 4% Liquid 1 Application(s) Topical <User Schedule>  citric acid/sodium citrate Solution 30 milliLiter(s) Oral every 6 hours  dexAMETHasone  IVPB 20 milliGRAM(s) IV Intermittent daily  dextrose 5%. 1000 milliLiter(s) (50 mL/Hr) IV Continuous <Continuous>  dextrose 5%. 1000 milliLiter(s) (100 mL/Hr) IV Continuous <Continuous>  dextrose 50% Injectable 25 Gram(s) IV Push once  dextrose 50% Injectable 12.5 Gram(s) IV Push once  dextrose 50% Injectable 25 Gram(s) IV Push once  folic acid 1 milliGRAM(s) Oral daily  gabapentin 300 milliGRAM(s) Oral daily  glucagon  Injectable 1 milliGRAM(s) IntraMuscular once  glucagon  Injectable 1 milliGRAM(s) IntraMuscular once  hydrALAZINE 10 milliGRAM(s) Oral every 8 hours  insulin lispro (ADMELOG) corrective regimen sliding scale   SubCutaneous three times a day before meals  insulin lispro (ADMELOG) corrective regimen sliding scale   SubCutaneous at bedtime  lidocaine   4% Patch 1 Patch Transdermal daily  metoprolol tartrate 12.5 milliGRAM(s) Oral two times a day  multivitamin/minerals 1 Tablet(s) Oral daily  pantoprazole  Injectable 40 milliGRAM(s) IV Push two times a day  tamsulosin 0.4 milliGRAM(s) Oral at bedtime    MEDICATIONS  (PRN):  acetaminophen     Tablet .. 650 milliGRAM(s) Oral every 6 hours PRN Temp greater or equal to 38C (100.4F), Mild Pain (1 - 3)  dextrose Oral Gel 15 Gram(s) Oral once PRN Blood Glucose LESS THAN 70 milliGRAM(s)/deciliter  sodium chloride 0.9% lock flush 10 milliLiter(s) IV Push every 1 hour PRN Pre/post blood products, medications, blood draw, and to maintain line patency      CAPILLARY BLOOD GLUCOSE      POCT Blood Glucose.: 124 mg/dL (13 Jun 2022 21:44)  POCT Blood Glucose.: 116 mg/dL (13 Jun 2022 17:31)  POCT Blood Glucose.: 113 mg/dL (13 Jun 2022 12:18)  POCT Blood Glucose.: 133 mg/dL (13 Jun 2022 08:26)    I&O's Summary    13 Jun 2022 07:01  -  14 Jun 2022 07:00  --------------------------------------------------------  IN: 100 mL / OUT: 2 mL / NET: 98 mL        PHYSICAL EXAM:  Vital Signs Last 24 Hrs  T(C): 37 (14 Jun 2022 05:00), Max: 38.3 (13 Jun 2022 15:15)  T(F): 98.6 (14 Jun 2022 05:00), Max: 100.9 (13 Jun 2022 15:15)  HR: 101 (14 Jun 2022 05:54) (87 - 122)  BP: 156/83 (14 Jun 2022 05:00) (148/72 - 162/74)  BP(mean): --  RR: 18 (14 Jun 2022 05:00) (17 - 19)  SpO2: 95% (14 Jun 2022 05:54) (93% - 98%)    CONSTITUTIONAL: NAD  RESPIRATORY: Expiratory wheezes and crackles throughout posterior lung fields, on RA.   CARDIOVASCULAR: Regular rate and rhythm, normal S1 and S2, no murmur/rub/gallop; 3+ lower extremity edema below the knee; Peripheral pulses are 2+ bilaterally  ABDOMEN: distended and nontender to palpation, normoactive bowel sounds, no rebound/guarding  MUSCLOSKELETAL: no clubbing or cyanosis of digits; no joint swelling or tenderness to palpation  PSYCH: A+O to person, place, and time; affect appropriate  LABS:                        7.3    15.21 )-----------( 65       ( 13 Jun 2022 11:24 )             22.6     06-13    145  |  108  |  54<H>  ----------------------------<  107<H>  4.0   |  21<L>  |  1.60<H>    Ca    7.7<L>      13 Jun 2022 11:24  Phos  5.4     06-13  Mg     2.0     06-13    TPro  5.7<L>  /  Alb  2.9<L>  /  TBili  1.3<H>  /  DBili  x   /  AST  38  /  ALT  14  /  AlkPhos  175<H>  06-13    PTT - ( 12 Jun 2022 18:45 )  PTT:34.7 sec          Culture - Blood (collected 11 Jun 2022 14:00)  Source: .Blood Blood  Preliminary Report (12 Jun 2022 18:01):    No growth to date.        Tele Reviewed:    RADIOLOGY & ADDITIONAL TESTS:  Results Reviewed:   Imaging Personally Reviewed:  Electrocardiogram Personally Reviewed:     PROGRESS NOTE:   Authored by Dr. Fatuma Olsen MD (PGY-1). Pager [facility] [phone] / [facility]     Patient is a 66y old  Male who presents with a chief complaint of recurrent fevers, unknown source (13 Jun 2022 12:44)  Patient laying in bed, resting, complaining of sore throat and cough. Currently getting 1u pRBC transfused this morning for hgb 6.7  Patient is denying any chest pain or SOB, on room air now.       SUBJECTIVE / OVERNIGHT EVENTS:  Hgb 6.7 this AM    ADDITIONAL REVIEW OF SYSTEMS:  Patient denies fevers, chills, chest pain, shortness of breath, nausea, abdominal pain, diarrhea, constipation, dysuria, leg swelling, headache, light headedness.    MEDICATIONS  (STANDING):  chlorhexidine 4% Liquid 1 Application(s) Topical <User Schedule>  citric acid/sodium citrate Solution 30 milliLiter(s) Oral every 6 hours  dexAMETHasone  IVPB 20 milliGRAM(s) IV Intermittent daily  dextrose 5%. 1000 milliLiter(s) (50 mL/Hr) IV Continuous <Continuous>  dextrose 5%. 1000 milliLiter(s) (100 mL/Hr) IV Continuous <Continuous>  dextrose 50% Injectable 25 Gram(s) IV Push once  dextrose 50% Injectable 12.5 Gram(s) IV Push once  dextrose 50% Injectable 25 Gram(s) IV Push once  folic acid 1 milliGRAM(s) Oral daily  gabapentin 300 milliGRAM(s) Oral daily  glucagon  Injectable 1 milliGRAM(s) IntraMuscular once  glucagon  Injectable 1 milliGRAM(s) IntraMuscular once  hydrALAZINE 10 milliGRAM(s) Oral every 8 hours  insulin lispro (ADMELOG) corrective regimen sliding scale   SubCutaneous three times a day before meals  insulin lispro (ADMELOG) corrective regimen sliding scale   SubCutaneous at bedtime  lidocaine   4% Patch 1 Patch Transdermal daily  metoprolol tartrate 12.5 milliGRAM(s) Oral two times a day  multivitamin/minerals 1 Tablet(s) Oral daily  pantoprazole  Injectable 40 milliGRAM(s) IV Push two times a day  tamsulosin 0.4 milliGRAM(s) Oral at bedtime    MEDICATIONS  (PRN):  acetaminophen     Tablet .. 650 milliGRAM(s) Oral every 6 hours PRN Temp greater or equal to 38C (100.4F), Mild Pain (1 - 3)  dextrose Oral Gel 15 Gram(s) Oral once PRN Blood Glucose LESS THAN 70 milliGRAM(s)/deciliter  sodium chloride 0.9% lock flush 10 milliLiter(s) IV Push every 1 hour PRN Pre/post blood products, medications, blood draw, and to maintain line patency      CAPILLARY BLOOD GLUCOSE      POCT Blood Glucose.: 124 mg/dL (13 Jun 2022 21:44)  POCT Blood Glucose.: 116 mg/dL (13 Jun 2022 17:31)  POCT Blood Glucose.: 113 mg/dL (13 Jun 2022 12:18)  POCT Blood Glucose.: 133 mg/dL (13 Jun 2022 08:26)    I&O's Summary    13 Jun 2022 07:01  -  14 Jun 2022 07:00  --------------------------------------------------------  IN: 100 mL / OUT: 2 mL / NET: 98 mL        PHYSICAL EXAM:  Vital Signs Last 24 Hrs  T(C): 37 (14 Jun 2022 05:00), Max: 38.3 (13 Jun 2022 15:15)  T(F): 98.6 (14 Jun 2022 05:00), Max: 100.9 (13 Jun 2022 15:15)  HR: 101 (14 Jun 2022 05:54) (87 - 122)  BP: 156/83 (14 Jun 2022 05:00) (148/72 - 162/74)  BP(mean): --  RR: 18 (14 Jun 2022 05:00) (17 - 19)  SpO2: 95% (14 Jun 2022 05:54) (93% - 98%)    CONSTITUTIONAL: NAD  RESPIRATORY: Expiratory wheezes and crackles throughout posterior lung fields, on RA.   CARDIOVASCULAR: Regular rate and rhythm, normal S1 and S2, no murmur/rub/gallop; 3+ lower extremity edema below the knee; Peripheral pulses are 2+ bilaterally  ABDOMEN: distended and nontender to palpation, normoactive bowel sounds, no rebound/guarding  MUSCLOSKELETAL: no clubbing or cyanosis of digits; no joint swelling or tenderness to palpation  PSYCH: A+O to person, place, and time; affect appropriate  LABS:                        7.3    15.21 )-----------( 65       ( 13 Jun 2022 11:24 )             22.6     06-13    145  |  108  |  54<H>  ----------------------------<  107<H>  4.0   |  21<L>  |  1.60<H>    Ca    7.7<L>      13 Jun 2022 11:24  Phos  5.4     06-13  Mg     2.0     06-13    TPro  5.7<L>  /  Alb  2.9<L>  /  TBili  1.3<H>  /  DBili  x   /  AST  38  /  ALT  14  /  AlkPhos  175<H>  06-13    PTT - ( 12 Jun 2022 18:45 )  PTT:34.7 sec          Culture - Blood (collected 11 Jun 2022 14:00)  Source: .Blood Blood  Preliminary Report (12 Jun 2022 18:01):    No growth to date.        Tele Reviewed:    RADIOLOGY & ADDITIONAL TESTS:  Results Reviewed:   Imaging Personally Reviewed:  Electrocardiogram Personally Reviewed:

## 2022-06-14 NOTE — PROGRESS NOTE ADULT - ATTENDING COMMENTS
65 y/o M with h/o SLE, CKD, presenting with FUO with CT evidence of significant lymphadenopathy. Concern for HLH however BMbx without hemophagocytosis although meets other criteria vs other inflammatory disorder. Course c/b splenic infarct/PE/DVTs initially on hep gtt. c/b acute anemia requiring transfusions.     #Anemia - 6.7 hg today, no signs of bleeding on CT, possibly 2/2 to ongoing underlying process, transfuse today, holding hep gtt  #FUO: ?due to HLH vs occult infection/inflammatory disorder - f/u LN bx, continue steroids. May start HLH treatment tomorrow. ID Rheum Heme following  #Fluid Overload - 40 IV lasix BID today, uptitrate as tolerated  #PE - holding hep iso acute anemia ,+cardiolipin most recent ds and sm neg,, dupplex neg  #PEPE: multifactorial, cr improving (baseline 1.0), 1.4-1.8 during admission    discussed with son bedside.

## 2022-06-14 NOTE — PROGRESS NOTE ADULT - SUBJECTIVE AND OBJECTIVE BOX
Patient is a 66y old  Male who presents with a chief complaint of recurrent fevers, unknown source (14 Jun 2022 11:52)    Patient seen and examined this afternoon. Son at bedside.    MEDICATIONS  (STANDING):  benzocaine 15 mG/menthol 3.6 mG Lozenge 1 Lozenge Oral two times a day  chlorhexidine 4% Liquid 1 Application(s) Topical <User Schedule>  citric acid/sodium citrate Solution 30 milliLiter(s) Oral every 6 hours  dexAMETHasone  IVPB 20 milliGRAM(s) IV Intermittent daily  dextrose 5%. 1000 milliLiter(s) (50 mL/Hr) IV Continuous <Continuous>  dextrose 5%. 1000 milliLiter(s) (100 mL/Hr) IV Continuous <Continuous>  dextrose 50% Injectable 25 Gram(s) IV Push once  dextrose 50% Injectable 12.5 Gram(s) IV Push once  dextrose 50% Injectable 25 Gram(s) IV Push once  folic acid 1 milliGRAM(s) Oral daily  furosemide   Injectable 40 milliGRAM(s) IV Push two times a day  gabapentin 300 milliGRAM(s) Oral daily  glucagon  Injectable 1 milliGRAM(s) IntraMuscular once  glucagon  Injectable 1 milliGRAM(s) IntraMuscular once  hydrALAZINE 10 milliGRAM(s) Oral every 8 hours  insulin lispro (ADMELOG) corrective regimen sliding scale   SubCutaneous three times a day before meals  insulin lispro (ADMELOG) corrective regimen sliding scale   SubCutaneous at bedtime  lidocaine   4% Patch 1 Patch Transdermal daily  metoprolol tartrate 12.5 milliGRAM(s) Oral two times a day  multivitamin/minerals 1 Tablet(s) Oral daily  pantoprazole    Tablet 40 milliGRAM(s) Oral before breakfast  tamsulosin 0.4 milliGRAM(s) Oral at bedtime    MEDICATIONS  (PRN):  acetaminophen     Tablet .. 650 milliGRAM(s) Oral every 6 hours PRN Temp greater or equal to 38C (100.4F), Mild Pain (1 - 3)  dextrose Oral Gel 15 Gram(s) Oral once PRN Blood Glucose LESS THAN 70 milliGRAM(s)/deciliter  sodium chloride 0.9% lock flush 10 milliLiter(s) IV Push every 1 hour PRN Pre/post blood products, medications, blood draw, and to maintain line patency      ROS  + fever, sweats, chills  No epistaxis, HA,   +sore throat  No CP, SOB, cough, sputum  No n/v/d, abd pain, melena, hematochezia  + BL LE edema  No rash  No anxiety  No back pain, joint pain  No bleeding, bruising  No dysuria, hematuria    Vital Signs Last 24 Hrs  T(C): 37.3 (14 Jun 2022 12:15), Max: 38.3 (13 Jun 2022 15:15)  T(F): 99.1 (14 Jun 2022 12:15), Max: 100.9 (13 Jun 2022 15:15)  HR: 90 (14 Jun 2022 12:15) (88 - 122)  BP: 181/73 (14 Jun 2022 12:15) (148/72 - 181/73)  BP(mean): --  RR: 18 (14 Jun 2022 12:15) (17 - 20)  SpO2: 98% (14 Jun 2022 12:15) (93% - 98%)    PE  NAD  Awake, alert  Anicteric, MMM  No c/c  + BL JORGITO  No rash grossly                            6.7    10.48 )-----------( 61       ( 14 Jun 2022 07:06 )             21.4       06-14    145  |  109<H>  |  55<H>  ----------------------------<  159<H>  4.0   |  19<L>  |  1.46<H>    Ca    7.5<L>      14 Jun 2022 07:07  Phos  5.0     06-14  Mg     1.9     06-14    TPro  5.4<L>  /  Alb  2.7<L>  /  TBili  1.1  /  DBili  x   /  AST  38  /  ALT  15  /  AlkPhos  182<H>  06-14

## 2022-06-14 NOTE — PROGRESS NOTE ADULT - PROBLEM SELECTOR PLAN 1
diff dx: infectious, malignancy, IgG4 disease, HLH  -elevated WBC, elevated IgA at [facility]  -s/p core biopsy and paracentesis at [facility] prior to transfer to [facility]  -quant gold indeterminate  -5/30 BCx NGTD  -HIV nonreactive, EBV IgG+, CMV 50  -possibility of peritoneal TB appreciate ID reccs AFB sputum and stool thus far are negative with IR biopsy completed pending final results 6/4  -Rheum eval including C3 C4 ESR 44 CRP 77 dsDNA progression of underlying lupus v other rheumatologic process causing fever although less likely.  -monitoring off antibiotics at this time, will f/u ID recs  -will f/u pathology results from cervical LN core biopsy  -bronch lavage cytology negative for malignancy  -fever o/n 102 6/10, pt. complaining of pain at catheter insertion site. UA sent, lidocaine jelly, IV tylenol, and dilaudid given for pain relief.  -fever 105 rectal 6/11, tylenol 1g IV, cooling measures reinstated, Bcx x2 sent.  -fever 6/12 overnight, s/p tylenol, f/u BCX from 6/11 -NGTD diff dx: infectious, malignancy, IgG4 disease, HLH  -elevated WBC, elevated IgA at [facility]  -s/p core biopsy and paracentesis at [facility] prior to transfer to [facility]  -quant gold indeterminate  -5/30 BCx NGTD  -HIV nonreactive, EBV IgG+, CMV 50  -possibility of peritoneal TB appreciate ID reccs AFB sputum and stool thus far are negative with IR biopsy completed pending final results 6/4  -Rheum eval including C3 C4 ESR 44 CRP 77 dsDNA progression of underlying lupus v other rheumatologic process causing fever although less likely.  -monitoring off antibiotics at this time, will f/u ID recs  -will f/u pathology results from cervical LN core biopsy  -bronch lavage cytology negative for malignancy  -fever o/n 102 6/10; fever 105 rectal 6/11, tylenol 1g IV, cooling measures reinstated, Bcx x2 sent.  -fever 6/12 overnight, s/p tylenol, f/u BCX from 6/11 -NGTD

## 2022-06-14 NOTE — CONSULT NOTE ADULT - ATTENDING COMMENTS
Agree with A/P. Open wound on buttock, no purulence, no cellulitis, no clinical evidence of NSTI, no radiographic evidence of NSTI. No surgical intervention at this time.
65 y/o M w/ hx of SLE on Cellcept, class V lupus nephritis, DM w/ neuropathy, TIA (2019) on plavix, recent hospitalizations in the [facility] in the end of March and April for recurrent pancreatitis of unclear etiology and Cdiff, septic shock due to buttock abscess in May 2022 now with persistent leukocytosis, extensive abdominal lymphoadenopathy, LUE DVT, positive PPD, and findings of liver cirrhosis with portal hypertension (ascites, varices) on imaging.    # Cryptogenic cirrhosis vs non-cirrhotic portal hypertension  Suspect that he may have lymphoma or TB in the liver causing portal hypertension which would fit more with his diffuse lymphadenopathy  awaiting lymph node biopsy on 6/22/22  awaiting TB rule out tests  please obtain diagnosis paracentesis (send out cell count, albumin, total protein, AFB, MTB-PCR, cytology)  may need liver biopsy later
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
pt seen and examined  by me personally   agree with above    wife aware of our impression and plans   please call with questions or acute changes via TEAMS
Alert, conversant.  Rxed for V lupus nephritis.  Ascites with numerous PMNs  1.  Lupus nephritis--hold MMF until infection risk minimized.  Should check adrenal function with cortisol, ACTH stim test if equivocal  2.  ARF on CKD--non oliguric.  Rx infection, volume optimize.  No renal replacement rx at present    discussed with MICU team, attending

## 2022-06-14 NOTE — PROVIDER CONTACT NOTE (OTHER) - SITUATION
Unable to get CBC. Missed stick and QNS sample. Pt refusing to be stuck again. Pt states "I need a break from all the needles".

## 2022-06-14 NOTE — PROGRESS NOTE ADULT - ASSESSMENT
65 yo M with a PMH HTN, HLD, SLE on Cellcept (MMF), class V lupus nephritis, CKD stage 2, DM2, diabetic neuropathy, TIA (2019) on plavix, BPH, HUNG, with multiple hospitalizations for c diff, acute panc, septic shock 2/2 buttock abscess status post drainage. PT transferred for recurrent fevers s/p core biopsy. In MICU, paracentesis suspected lymphoma vs. HLH, pt intubated and sedated due to upper airway stridor and AMS. Successfully extubated on 6/8 and transferred to floors with heme/onc, ID, rheum, and surg evaluation. Pt s/p pRBC transfusion, hgb stable >7, recurrent fevers monitoring off abx, bcx negative 6/11, pending CTA to r/o hematoma. Pending O/T and PM&R evaluation.

## 2022-06-14 NOTE — PROGRESS NOTE ADULT - PROBLEM SELECTOR PLAN 6
-outside hospital reported to be consistent with AOCD  -CT ab pending, r/o bleed  - 6/12 overnight s/p 1u pRBC hgb 6.8 --> 7.3  -transfuse if Hb<7, maintain active type and screen  -DVT ppx: full AC

## 2022-06-14 NOTE — PROGRESS NOTE ADULT - PROBLEM SELECTOR PLAN 1
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 The patient is being treated with MMF 750mg as outpatient. Currently on hold. On review of [facility] pt. noted to have a baseline SCr of 1.1mg/dL. Currently SCr elevated but improving to 1.46mg/dL. Optimize hemodynamics. Renal sonogram without evidence of hydro. Monitor labs and urine output. Pt. with clinical evidence of volume overload at this time. Recommend lasix 40mg IV BID. Given history of lupus would ideally like to avoid use of hydralazine. Avoid NSAIDs, ACEI/ARBS, RCA and nephrotoxins. MMF currently on hold, will need to rule out infection prior to restarting Dose medications as per eGFR.    If any questions, please feel free to contact me     Thony Oh  Nephrology Fellow  [facility] Pager: [phone]. 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 The patient is being treated with MMF 750mg as outpatient. Currently on hold. On review of [facility] pt. noted to have a baseline SCr of 1.1mg/dL. Currently SCr elevated but improving to 1.46mg/dL. Optimize hemodynamics. Renal sonogram without evidence of hydro. Monitor labs and urine output. Pt. with clinical evidence of volume overload at this time. Recommend lasix 40mg IV BID. Given history of lupus would ideally like to avoid use of hydralazine. Avoid NSAIDs, ACEI/ARBS, RCA and nephrotoxins. Dose medications as per eGFR.    If any questions, please feel free to contact me     Thony Oh  Nephrology Fellow  [facility] Pager: [phone].

## 2022-06-14 NOTE — PROGRESS NOTE ADULT - PROBLEM SELECTOR PLAN 3
-r/o HLH 2/2 EBV  -has fevers, splenomegaly, cytopenia  -HLH labs: soluble IL-2 levels 19898, ferritin 511, triglyceride 412, 6/6 fibrinogen 218 6/6  , pending final pathology from cervical lymph node biopsy, flow cytometry from peripheral blood without acute abnormalities in lymphoid, myeloid, or plasma cell lines.   -IgG4 27  -dexamethasone 20 qd discussed with heme on 6/6 following bronchoscopy  -pending pathology 6/7  -Prelim per verbal with pathology EBV+ lymphoprofileration, with empiric treatment initiated for HLH and no specific treatment planned for EBV per discussion with ID.  -Fever to 102 o/n, got tylenol and new UA sent.    #Anemia and Lymphadenopathy  --multifactorial: infectious vs malignancy vs autoimmune  --supraclavicular/cervical IR bedside biopsy  pathology is still pending- follow up on final pathology   ---BMB done on 5/20 at [facility]: negative for detectable monoclonal cell or B cell population  -- HLH in differential thus patient started on steroids dexamethasone 20mg Q day.    -- meets 5 criteria of HLH but important negative features: no presence of erythrophagocytosis- will however follow up on LN biopsy.

## 2022-06-14 NOTE — PROGRESS NOTE ADULT - PROBLEM SELECTOR PLAN 4
-monitoring off cellcept  -Has been seen by Dr. Swapnil Wolfe and also Dr. Shivam Malagon  -According to sisters at bedside 6/6, patient took Aranesp in the [facility] for lupus prior to him getting sick. Family are wondering if the medication can be contributory.   -Following complements, ESR, CRP, dsDNA  -rheumatology consulted, will f/u recs

## 2022-06-14 NOTE — PROGRESS NOTE ADULT - PROBLEM SELECTOR PLAN 7
#RLL PE  -heparin gtt on hold, may transition to alternative agent for full AC if no plans for additional surgical intervention/procedure

## 2022-06-14 NOTE — PROGRESS NOTE ADULT - NS ATTEND OPT1 GEN_ALL_CORE
I independently performed the documented:
I attest my time as attending is greater than 50% of the total combined time spent on qualifying patient care activities by the PA/NP and attending.
I independently performed the documented:

## 2022-06-14 NOTE — PROGRESS NOTE ADULT - NS ATTEND AMEND GEN_ALL_CORE FT
I have fully participated in the care of this patient. I have made amendments to the documentation where necessary, and agree with the history, physical exam, and plan as documented by the ACP.    1. Complex inflammatory systemic disorder; w/ improvement on steroid therapy. c/w steroids.  SCN pathology is still not available. c/w care as per primary. f.u. recs    Thank you for the consultation  Ramila Villareal MD  [facility]  Hematology/Oncology
Pt with fever ,hepatosplemogelay, anemia and lymphadenopathy. undergoing LN bx tomorrow. will follow up on bone marrow biopsy done at [place] by Dr Umanzor. cont heparin for possible PE with splenic infarcts. consider JORGE
Will follow up pathology from lymph node biopsy. HLH remains in the differential. Please continue dexamethasone. Although a much higher level of ferritin would typically be seen with HLH, with significantly elevated soluble IL-2R and not much clinical improvement, we may have to consider adding etoposide for more complete HLH treatment.
65 y/o male with history of systemic lupus erythematosus admitted for recurrent fever, leukocytosis. Imaging showed extensive retroperitoneal lymphadenopathy. Pending quantiferon testing for tuberculosis.     - Bone marrow biopsy at [facility]. No evidence of myelodysplastic syndrome, leukemia, lymphoma, plasma cell neoplasm.  - Will need biopsy of lymphadenopathy for definitive diagnosis.  - Follow-up on TB testing.  - Monitor CBC and transfuse as needed.
Reviewed case today. Smear and bone marrow shows no hemophagocytes. Other markers are suggestive of HLH. Appears that pt is improving with high dose steroids. 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 Literature shows that with secondary HLH steroids maybe adequate. LN bx is pending. pt also with EBV positive. Recommend Rheum and ID follow up. Will cont to follow up
HLH remains a possibility, but typically would see much higher ferritin levels. IL-2 receptor (sCD25) levels ordered, may help rule out HLH. Continue dexamethasone. Will have low threshold to add etoposide with clinical deterioration or worsening labs.
67 y/o male with history of systemic lupus erythematosus admitted for recurrent fever, leukocytosis. Imaging showed extensive retroperitoneal lymphadenopathy. Pending quantiferon testing for tuberculosis.     - Bone marrow biopsy at [facility]. No evidence of myelodysplastic syndrome, leukemia, lymphoma, plasma cell neoplasm.  - Will need biopsy of lymphadenopathy for definitive diagnosis.  - Follow-up on TB testing.  - Monitor CBC and transfuse as needed.

## 2022-06-14 NOTE — PROGRESS NOTE ADULT - SUBJECTIVE AND OBJECTIVE BOX
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 Moore), DM, TIA, BPH, and HUNG who pwas transferred to the hospital due to concerns for septic shock and recurrent fevers. 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 Since then he has been following Dr. Moore as his primary nephrologist. The patient has was on treatment with steroids and cyclophosphamide. Most recently the patient is being treated with MMF 750mg BID. Upon arrival to the hospital the patient was noted to have a SCr of 1.16 which had since risen to 1.98mg/dL The nephrology team was consulted for PEPE. The patient was intubated on 6/5 for worsening work of breathing, successfully extubated on 6/8. Scr most recently 1.46mg/dL this morning    Pt. seen and examined this morning. Pt. reported sore throat and difficulty swallowing food. He otherwise reports feeling well and denied any chest pain or shortness of breath.       PAST HISTORY  --------------------------------------------------------------------------------  No significant changes to PMH, PSH, FHx, SHx, unless otherwise noted    ALLERGIES & MEDICATIONS  --------------------------------------------------------------------------------  Allergies    No Known Allergies    Intolerances      Standing Inpatient Medications  benzocaine 15 mG/menthol 3.6 mG Lozenge 1 Lozenge Oral two times a day  chlorhexidine 4% Liquid 1 Application(s) Topical <User Schedule>  citric acid/sodium citrate Solution 30 milliLiter(s) Oral every 6 hours  dexAMETHasone  IVPB 20 milliGRAM(s) IV Intermittent daily  dextrose 5%. 1000 milliLiter(s) IV Continuous <Continuous>  dextrose 5%. 1000 milliLiter(s) IV Continuous <Continuous>  dextrose 50% Injectable 25 Gram(s) IV Push once  dextrose 50% Injectable 12.5 Gram(s) IV Push once  dextrose 50% Injectable 25 Gram(s) IV Push once  folic acid 1 milliGRAM(s) Oral daily  furosemide   Injectable 40 milliGRAM(s) IV Push two times a day  gabapentin 300 milliGRAM(s) Oral daily  glucagon  Injectable 1 milliGRAM(s) IntraMuscular once  glucagon  Injectable 1 milliGRAM(s) IntraMuscular once  hydrALAZINE 10 milliGRAM(s) Oral every 8 hours  insulin lispro (ADMELOG) corrective regimen sliding scale   SubCutaneous three times a day before meals  insulin lispro (ADMELOG) corrective regimen sliding scale   SubCutaneous at bedtime  lidocaine   4% Patch 1 Patch Transdermal daily  metoprolol tartrate 12.5 milliGRAM(s) Oral two times a day  multivitamin/minerals 1 Tablet(s) Oral daily  pantoprazole    Tablet 40 milliGRAM(s) Oral before breakfast  tamsulosin 0.4 milliGRAM(s) Oral at bedtime    PRN Inpatient Medications  acetaminophen     Tablet .. 650 milliGRAM(s) Oral every 6 hours PRN  dextrose Oral Gel 15 Gram(s) Oral once PRN  sodium chloride 0.9% lock flush 10 milliLiter(s) IV Push every 1 hour PRN      REVIEW OF SYSTEMS      All other systems were reviewed and are negative, except as noted.    VITALS/PHYSICAL EXAM  --------------------------------------------------------------------------------  T(C): 36.9 (06-14-22 @ 09:11), Max: 38.3 (06-13-22 @ 15:15)  HR: 92 (06-14-22 @ 09:11) (88 - 122)  BP: 168/82 (06-14-22 @ 09:11) (148/72 - 168/82)  RR: 18 (06-14-22 @ 09:11) (17 - 20)  SpO2: 97% (06-14-22 @ 09:11) (93% - 98%)  Wt(kg): --        06-13-22 @ 07:01  -  06-14-22 @ 07:00  --------------------------------------------------------  IN: 100 mL / OUT: 2 mL / NET: 98 mL    06-14-22 @ 07:01  -  06-14-22 @ 11:53  --------------------------------------------------------  IN: 240 mL / OUT: 0 mL / NET: 240 mL        Physical Exam:  	Gen: ill appearing  	HEENT: MMM, no thrush  	Pulm: CTA b/l  	CV: S1S2  	Abd: Soft, +BS   	Ext: Pitting LE edema B/L  	Neuro: awake and alert   	Skin: Warm and dry    LABS/STUDIES  --------------------------------------------------------------------------------              6.7    10.48 >-----------<  61       [06-14-22 @ 07:06]              21.4     145  |  109  |  55  ----------------------------<  159      [06-14-22 @ 07:07]  4.0   |  19  |  1.46        Ca     7.5     [06-14-22 @ 07:07]      Mg     1.9     [06-14-22 @ 07:07]      Phos  5.0     [06-14-22 @ 07:07]    TPro  5.4  /  Alb  2.7  /  TBili  1.1  /  DBili  x   /  AST  38  /  ALT  15  /  AlkPhos  182  [06-14-22 @ 07:07]      PTT: 34.7       [06-12-22 @ 18:45]          [06-14-22 @ 07:07]    Creatinine Trend:  SCr 1.46 [06-14 @ 07:07]  SCr 1.60 [06-13 @ 11:24]  SCr 1.46 [06-12 @ 11:37]  SCr 1.46 [06-11 @ 06:50]  SCr 1.65 [06-10 @ 07:44]    Urinalysis - [06-10-22 @ 07:57]      Color Light Yellow / Appearance Turbid / SG 1.014 / pH 6.0      Gluc Negative / Ketone Negative  / Bili Negative / Urobili Negative       Blood Moderate / Protein 100 / Leuk Est Negative / Nitrite Negative      RBC 67 / WBC 4 / Hyaline 4 / Gran  / Sq Epi  / Non Sq Epi 3 / Bacteria Negative    Urine Creatinine 49      [06-08-22 @ 15:09]  Urine Protein 88      [06-08-22 @ 15:09]    Iron 43, TIBC 128, %sat 34      [06-01-22 @ 04:47]  Ferritin 909      [06-10-22 @ 07:44]  TSH 3.52      [06-03-22 @ 14:46]  Lipid: chol --, , HDL --, LDL --      [06-07-22 @ 07:15]    HBsAg Nonreact      [05-30-22 @ 03:46]  HCV 0.31, Nonreact      [05-31-22 @ 00:32]  HIV Nonreact      [06-01-22 @ 13:46]  HIV Nonreact      [05-30-22 @ 04:13]    ED: titer 1:320, pattern Homogeneous      [06-04-22 @ 14:21]  dsDNA 17      [06-07-22 @ 16:17]  C3 Complement 116      [06-04-22 @ 14:21]  C4 Complement 36      [06-04-22 @ 14:21]  Rheumatoid Factor <10      [06-04-22 @ 14:21]  ANCA: cANCA Negative, pANCA Negative, atypical ANCA Indeterminate Method interference due to ED Fluorescence      [06-04-22 @ 16:53]  ASLO <20      [06-04-22 @ 16:53]  Free Light Chains: kappa 14.57, lambda 23.09, ratio = 0.63      [06-01 @ 04:47]  Immunofixation Serum:   One Weak IgG Kappa Band and One Weak IgG Lambda Band Identified    Reference Range: None Detected      [06-01-22 @ 04:47]  SPEP Interpretation: Two Weak Gamma-Migrating Paraproteins Identified      [06-01-22 @ 04:47]

## 2022-06-14 NOTE — PROGRESS NOTE ADULT - ATTENDING COMMENTS
stable kidney function off MMF.  follow up LN biopsy results.  patient presently on empiric steroids due to concern for HLH.  monitor kidney function give diuretics as above.

## 2022-06-14 NOTE — CONSULT NOTE ADULT - SUBJECTIVE AND OBJECTIVE BOX
HPI:  65 yo M with a PMH HTN, HLD, SLE on Cellcept (MMF), class V lupus nephritis, CKD stage 2, DM2, diabetic neuropathy, TIA (2019) on plavix, BPH, HUNG, hospitalization in [facility] 3/27-4/20 tx for pancreatitis and C diff?, recent hospitalization at [facility] 4/22-4/26 for acute pancreatitis and C diff colitis and another hospitalization 5/5 at [facility] for septic shock (source buttock abscess) treated with vanc, cefepime (5/5-5/10), meropenem (5/11-5/18) and micafungin. Zosyn added 5/26. Course complicated by recurrent fevers despite being on antibiotics, and leukocytosis up to 30k. Blood cx neg, UA unremarkable. MRI abdomen w/ contrast performed showing extensive abd lymphadenopathy (reactive to c diff vs lymphoma?). IR stated no window for bx. Course also complicated by a L brachial vein DVT and superficial DVT in arms. Patient noted to have had a positive PPD but has possibly gotten the BCG vaccine. Quant gold performed at [facility] still pending.     Admitted to [facility] MICU for further management. Vital signs on arrival: temp 38.6, /77, , RR 28, O2 sat 97% on 2L NC.  Labs: WBC 34.53, Hb 9.6, lipase 740, procal 2.02, tBili 1.4, alk phos 498, lactate 2.4. (30 May 2022 04:01)    Patient was admitted on 5/30,     REVIEW OF SYSTEMS  Constitutional - No fever, No weight loss, No fatigue  HEENT - No eye pain, No visual disturbances, No difficulty hearing, No tinnitus, No vertigo, No neck pain  Respiratory - No cough, No wheezing, No shortness of breath  Cardiovascular - No chest pain, No palpitations  Gastrointestinal - No abdominal pain, No nausea, No vomiting, No diarrhea, No constipation  Genitourinary - No dysuria, No frequency, No hematuria, No incontinence  Neurological - No headaches, No memory loss, No loss of strength, No numbness, No tremors  Skin - No itching, No rashes, No lesions   Endocrine - No temperature intolerance  Musculoskeletal - No joint pain, No joint swelling, No muscle pain  Psychiatric - No depression, No anxiety    VITALS  T(C): 36.9 (06-14-22 @ 09:11), Max: 38.3 (06-13-22 @ 15:15)  HR: 92 (06-14-22 @ 09:11) (88 - 122)  BP: 168/82 (06-14-22 @ 09:11) (148/72 - 168/82)  RR: 18 (06-14-22 @ 09:11) (17 - 20)  SpO2: 97% (06-14-22 @ 09:11) (93% - 98%)  Wt(kg): --    PAST MEDICAL & SURGICAL HISTORY  Obstructive Sleep Apnea    Hepatitis B Carrier    Pneumonia    Other systemic lupus erythematosus with endocarditis    Type 2 diabetes mellitus with other neurologic complication, without long-term current use of insulin    Sleep apnea, unspecified type    Hypertension, unspecified type    Nephritic syndrome    No significant past surgical history        SOCIAL HISTORY  Smoking - Denied  EtOH - Denied   Drugs - Denied    FUNCTIONAL HISTORY  Lives   Independent    CURRENT FUNCTIONAL STATUS      FAMILY HISTORY   No pertinent family history in first degree relatives    FH: type 2 diabetes        RECENT LABS/IMAGING  CBC Full  -  ( 14 Jun 2022 07:06 )  WBC Count : 10.48 K/uL  RBC Count : 2.16 M/uL  Hemoglobin : 6.7 g/dL  Hematocrit : 21.4 %  Platelet Count - Automated : 61 K/uL  Mean Cell Volume : 99.1 fl  Mean Cell Hemoglobin : 31.0 pg  Mean Cell Hemoglobin Concentration : 31.3 gm/dL  Auto Neutrophil # : 8.34 K/uL  Auto Lymphocyte # : 1.21 K/uL  Auto Monocyte # : 0.19 K/uL  Auto Eosinophil # : 0.00 K/uL  Auto Basophil # : 0.00 K/uL  Auto Neutrophil % : 75.2 %  Auto Lymphocyte % : 11.5 %  Auto Monocyte % : 1.8 %  Auto Eosinophil % : 0.0 %  Auto Basophil % : 0.0 %    06-14    145  |  109<H>  |  55<H>  ----------------------------<  159<H>  4.0   |  19<L>  |  1.46<H>    Ca    7.5<L>      14 Jun 2022 07:07  Phos  5.0     06-14  Mg     1.9     06-14    TPro  5.4<L>  /  Alb  2.7<L>  /  TBili  1.1  /  DBili  x   /  AST  38  /  ALT  15  /  AlkPhos  182<H>  06-14        ALLERGIES  No Known Allergies      MEDICATIONS   acetaminophen     Tablet .. 650 milliGRAM(s) Oral every 6 hours PRN  benzocaine 15 mG/menthol 3.6 mG Lozenge 1 Lozenge Oral two times a day  chlorhexidine 4% Liquid 1 Application(s) Topical <User Schedule>  citric acid/sodium citrate Solution 30 milliLiter(s) Oral every 6 hours  dexAMETHasone  IVPB 20 milliGRAM(s) IV Intermittent daily  dextrose 5%. 1000 milliLiter(s) IV Continuous <Continuous>  dextrose 5%. 1000 milliLiter(s) IV Continuous <Continuous>  dextrose 50% Injectable 25 Gram(s) IV Push once  dextrose 50% Injectable 12.5 Gram(s) IV Push once  dextrose 50% Injectable 25 Gram(s) IV Push once  dextrose Oral Gel 15 Gram(s) Oral once PRN  folic acid 1 milliGRAM(s) Oral daily  furosemide   Injectable 40 milliGRAM(s) IV Push two times a day  gabapentin 300 milliGRAM(s) Oral daily  glucagon  Injectable 1 milliGRAM(s) IntraMuscular once  glucagon  Injectable 1 milliGRAM(s) IntraMuscular once  hydrALAZINE 10 milliGRAM(s) Oral every 8 hours  insulin lispro (ADMELOG) corrective regimen sliding scale   SubCutaneous three times a day before meals  insulin lispro (ADMELOG) corrective regimen sliding scale   SubCutaneous at bedtime  lidocaine   4% Patch 1 Patch Transdermal daily  metoprolol tartrate 12.5 milliGRAM(s) Oral two times a day  multivitamin/minerals 1 Tablet(s) Oral daily  pantoprazole    Tablet 40 milliGRAM(s) Oral before breakfast  sodium chloride 0.9% lock flush 10 milliLiter(s) IV Push every 1 hour PRN  tamsulosin 0.4 milliGRAM(s) Oral at bedtime      ----------------------------------------------------------------------------------------  PHYSICAL EXAM  Constitutional - NAD, Comfortable  HEENT - NCAT, EOMI  Neck - Supple, No limited ROM  Chest - Breathing comfortably, No wheezing  Cardiovascular - S1S2   Abdomen - Soft   Extremities - No C/C/E, No calf tenderness   Neurologic Exam -                    Cognitive - Awake, Alert, AAO to self, place, date, year, situation     Communication - Fluent, No dysarthria     Cranial Nerves - CN 2-12 intact     Motor - No focal deficits                    LEFT    UE - ShAB 5/5, EF 5/5, EE 5/5, WE 5/5,  5/5                    RIGHT UE - ShAB 5/5, EF 5/5, EE 5/5, WE 5/5,  5/5                    LEFT    LE - HF 5/5, KE 5/5, DF 5/5, PF 5/5                    RIGHT LE - HF 5/5, KE 5/5, DF 5/5, PF 5/5        Sensory - Intact to LT     Reflexes - DTR Intact, No primitive reflexive     Coordination - FTN intact     OculoVestibular - No saccades, No nystagmus, VOR         Balance - WNL Static  Psychiatric - Mood stable, Affect WNL  ----------------------------------------------------------------------------------------  ASSESSMENT/PLAN  66yMale with functional deficits after  Pain - Tylenol  DVT PPX - SCDs  Rehab - Will continue to follow for ongoing rehab needs and recommendations.    Recommend ACUTE inpatient rehabilitation for the functional deficits consisting of 3 hours of therapy/day & 24 hour RN/daily PMR physician for comorbid medical management. Patient will be able to tolerate 3 hours a day.   Recommend [facility], patient DOES NOT meet acute inpatient rehabilitation criteria   Expect patient to achieve functional goals for DC HOME with OUTPATIENT   Expect patient to achieve functional goals for DC HOME with HOME CARE   Follow up with [facility] - Call [phone] for an appointment  Will sign off, please reconsult if needed for rehab dispo recommendations.    HPI:  65 yo M with a PMH HTN, HLD, SLE on Cellcept (MMF), class V lupus nephritis, CKD stage 2, DM2, diabetic neuropathy, TIA (2019) on plavix, BPH, HUNG, hospitalization in [facility] 3/27-4/20 tx for pancreatitis and C diff?, recent hospitalization at [place] 4/22-4/26 for acute pancreatitis and C diff colitis and another hospitalization 5/5 at [place] for septic shock (source buttock abscess) treated with vanc, cefepime (5/5-5/10), meropenem (5/11-5/18) and micafungin. Zosyn added 5/26. Course complicated by recurrent fevers despite being on antibiotics, and leukocytosis up to 30k. Blood cx neg, UA unremarkable. MRI abdomen w/ contrast performed showing extensive abd lymphadenopathy (reactive to c diff vs lymphoma?). IR stated no window for bx. Course also complicated by a L brachial vein DVT and superficial DVT in arms. Patient noted to have had a positive PPD but has possibly gotten the BCG vaccine. Quant gold performed at [place] still pending.     Admitted to [facility] MICU for further management on 5/30. Vital signs on arrival: temp 38.6, /77, , RR 28, O2 sat 97% on 2L NC.  Labs: WBC 34.53, Hb 9.6, lipase 740, procal 2.02, tBili 1.4, alk phos 498, lactate 2.4. (30 May 2022 04:01)    Patient was admitted on 5/30, CT findings concerning fro RLL PE and initiated on heparin and he was previously diagnosed with Hep B and has been told he may have cirrhosis based on early imaging findings. 5/31 continuing with fever 38.5 with gel cooling overlay in place and addition of folic acid and albumin 25% for q6h 24hr total ordered. 6/1 intermittent fevers requiring tylenol and persistent tachycardia managed with lopressor pending LN biopsy. 6/2 fevers continue to be managed with tylenol and cultures sent, heparin discontinued before diagnostic paracentesis performed along with left supraclavicular lymph node biopsy. 6/3 s/p paracentesis tolerated well, still febrile managed with tylenol. 6/4 Worsening mental status and upper airway stridor requiring intubation and sedation. 6/5 overnight hb 5, repeat hb 7.4 after 1 u pRBC. 6/6 heparin restarted, remains intubated and sedated. 6/7 initiated on reglan, tube feeds downtitrated, sedation weaned and LUE weakness revealed, CTH performed which was negative for acute hemorrhage, heparin resumed. 6/8 following commands on minimal sedation, extubated transferred to floors. 6/9 mentation improving without difficulty breathing, concerns of rectal pain requiring dilaudid ON. 6/10 Overnight fever to 102.9 with tachycardia to 103 reporting pain from canales catheter and generalized pain, urinalysis sent and IV tylenol to manage pain. Canales output yellow but cloudy. 6/11 canales removed due to pain, UA negative for infection patient transitioned to condom catheter and passed TOV, axillary temp of 104 and AMS at 11am, treated with tylenol and cooling measures. 6/12 no overnight events, heparin drip adjusted per normogram. 6/13  Awake and alert w/ productive coughing episodes, 1u pRBC infusion overnight, no fever or chills. 6/14 New sore throat with consistent cough, 1 u pRBC for hgb 6.7, patient denies chest pain or SOB on RA.              < from: CT Head No Cont (06.07.22 @ 10:11) >  IMPRESSION:  Age-appropriate involutional change and microvascular   ischemic disease. No intracranial hemorrhage.    < end of copied text >              REVIEW OF SYSTEMS  Constitutional - + Fever, + weight loss (unintentional, 30lbs over 2 months), + fatigue  HEENT - No eye pain, reports flashing lights in the inferior portion of both visual fields, No difficulty hearing, No tinnitus, No vertigo, No neck pain  Respiratory - + cough, + wheezing, No shortness of breath  Cardiovascular - No chest pain, No palpitations  Gastrointestinal - No abdominal pain, No nausea, No vomiting, No diarrhea, No constipation  Genitourinary - No dysuria, No frequency, No hematuria, No incontinence  Neurological - No headaches, No memory loss, No loss of strength, No numbness, No tremors  Skin - No itching, No rashes, No lesions   Musculoskeletal - No joint pain, No joint swelling, No muscle pain    VITALS  T(C): 36.9 (06-14-22 @ 09:11), Max: 38.3 (06-13-22 @ 15:15)  HR: 92 (06-14-22 @ 09:11) (88 - 122)  BP: 168/82 (06-14-22 @ 09:11) (148/72 - 168/82)  RR: 18 (06-14-22 @ 09:11) (17 - 20)  SpO2: 97% (06-14-22 @ 09:11) (93% - 98%)  Wt(kg): --    PAST MEDICAL & SURGICAL HISTORY  Obstructive Sleep Apnea    Hepatitis B Carrier    Pneumonia    Other systemic lupus erythematosus with endocarditis    Type 2 diabetes mellitus with other neurologic complication, without long-term current use of insulin    Sleep apnea, unspecified type    Hypertension, unspecified type    Nephritic syndrome    No significant past surgical history        SOCIAL HISTORY  Smoking - Denied  EtOH - Denied   Drugs - Denied    FUNCTIONAL HISTORY    Prior to initial hospitalization pt lived with his spouse and children in a ph, 12 fadi +HR, additional 5 steps to bedroom +hand rail  +tub in bathroom +hand rail. Pt reports that he was independent in all adl's and functional mobility without DME.  Prior to transfer here from OSH pt was ambulating with a rolling walker.    CURRENT FUNCTIONAL STATUS    Bed mobility mod to max assist on 6/13.      FAMILY HISTORY   No pertinent family history in first degree relatives    FH: type 2 diabetes, Prostate cancer and AAA (father), depression (mother)      RECENT LABS/IMAGING  CBC Full  -  ( 14 Jun 2022 07:06 )  WBC Count : 10.48 K/uL  RBC Count : 2.16 M/uL  Hemoglobin : 6.7 g/dL  Hematocrit : 21.4 %  Platelet Count - Automated : 61 K/uL  Mean Cell Volume : 99.1 fl  Mean Cell Hemoglobin : 31.0 pg  Mean Cell Hemoglobin Concentration : 31.3 gm/dL  Auto Neutrophil # : 8.34 K/uL  Auto Lymphocyte # : 1.21 K/uL  Auto Monocyte # : 0.19 K/uL  Auto Eosinophil # : 0.00 K/uL  Auto Basophil # : 0.00 K/uL  Auto Neutrophil % : 75.2 %  Auto Lymphocyte % : 11.5 %  Auto Monocyte % : 1.8 %  Auto Eosinophil % : 0.0 %  Auto Basophil % : 0.0 %    06-14    145  |  109<H>  |  55<H>  ----------------------------<  159<H>  4.0   |  19<L>  |  1.46<H>    Ca    7.5<L>      14 Jun 2022 07:07  Phos  5.0     06-14  Mg     1.9     06-14    TPro  5.4<L>  /  Alb  2.7<L>  /  TBili  1.1  /  DBili  x   /  AST  38  /  ALT  15  /  AlkPhos  182<H>  06-14        ALLERGIES  No Known Allergies      MEDICATIONS   acetaminophen     Tablet .. 650 milliGRAM(s) Oral every 6 hours PRN  benzocaine 15 mG/menthol 3.6 mG Lozenge 1 Lozenge Oral two times a day  chlorhexidine 4% Liquid 1 Application(s) Topical <User Schedule>  citric acid/sodium citrate Solution 30 milliLiter(s) Oral every 6 hours  dexAMETHasone  IVPB 20 milliGRAM(s) IV Intermittent daily  dextrose 5%. 1000 milliLiter(s) IV Continuous <Continuous>  dextrose 5%. 1000 milliLiter(s) IV Continuous <Continuous>  dextrose 50% Injectable 25 Gram(s) IV Push once  dextrose 50% Injectable 12.5 Gram(s) IV Push once  dextrose 50% Injectable 25 Gram(s) IV Push once  dextrose Oral Gel 15 Gram(s) Oral once PRN  folic acid 1 milliGRAM(s) Oral daily  furosemide   Injectable 40 milliGRAM(s) IV Push two times a day  gabapentin 300 milliGRAM(s) Oral daily  glucagon  Injectable 1 milliGRAM(s) IntraMuscular once  glucagon  Injectable 1 milliGRAM(s) IntraMuscular once  hydrALAZINE 10 milliGRAM(s) Oral every 8 hours  insulin lispro (ADMELOG) corrective regimen sliding scale   SubCutaneous three times a day before meals  insulin lispro (ADMELOG) corrective regimen sliding scale   SubCutaneous at bedtime  lidocaine   4% Patch 1 Patch Transdermal daily  metoprolol tartrate 12.5 milliGRAM(s) Oral two times a day  multivitamin/minerals 1 Tablet(s) Oral daily  pantoprazole    Tablet 40 milliGRAM(s) Oral before breakfast  sodium chloride 0.9% lock flush 10 milliLiter(s) IV Push every 1 hour PRN  tamsulosin 0.4 milliGRAM(s) Oral at bedtime      ----------------------------------------------------------------------------------------  PHYSICAL EXAM  Constitutional - NAD, Comfortable  HEENT - NCAT, EOMI  Neck - Supple, No limited ROM  Chest - Breathing comfortably, No wheezing, coughing noted  Cardiovascular - S1S2   Abdomen - Soft   Extremities - No C/C, 3+ pitting edema in the LE bilaterally up to the knee, No calf tenderness   Neurologic Exam -                    Cognitive - Awake, Alert, AAO to self, place, date, year, situation     Communication - Fluent, No dysarthria     Cranial Nerves - CN 2-12 intact     Motor - No focal deficits                    LEFT    UE - ShAB 4/5, EF 4/5, EE 4/5, WE 5/5,  5/5                    RIGHT UE - ShAB 4/5, EF 4/5, EE 4/5, WE 5/5,  5/5                    LEFT    LE - HF 2/5, KE 2/5, DF 5/5, PF 5/5                    RIGHT LE - HF 2/5, KE 2/5, DF 5/5, PF 5/5        Sensory - Intact to LT     Reflexes - Not assessed      Coordination - FTN intact     OculoVestibular - No saccades, No nystagmus, VOR         Balance - Fall risk, gait and balance deferred  Psychiatric - Mood stable, Affect WNL  ----------------------------------------------------------------------------------------  ASSESSMENT/PLAN  ·  Problem: Preventive measure.   ·  Plan: Diet: minced and moist  DVT: heparin gtt on hold  Dispo: pending PT consult.    66yMale with functional proximal muscle group deficits after prolonged hospital stay.     Pain - Tylenol    DVT PPX - SCDs    Rehab - Will continue to follow for ongoing rehab needs and recommendations.     Will sign off, please reconsult if needed for rehab dispo recommendations.    HPI:  65 yo M with a PMH HTN, HLD, SLE on Cellcept (MMF), class V lupus nephritis, CKD stage 2, DM2, diabetic neuropathy, TIA (2019) on plavix, BPH, HUNG, hospitalization in [facility] 3/27-4/20 tx for pancreatitis and C diff?, recent hospitalization at [place] 4/22-4/26 for acute pancreatitis and C diff colitis and another hospitalization 5/5 at [place] for septic shock (source buttock abscess) treated with vanc, cefepime (5/5-5/10), meropenem (5/11-5/18) and micafungin. Zosyn added 5/26. Course complicated by recurrent fevers despite being on antibiotics, and leukocytosis up to 30k. Blood cx neg, UA unremarkable. MRI abdomen w/ contrast performed showing extensive abd lymphadenopathy (reactive to c diff vs lymphoma?). IR stated no window for bx. Course also complicated by a L brachial vein DVT and superficial DVT in arms. Patient noted to have had a positive PPD but has possibly gotten the BCG vaccine. Quant gold performed at [facility] still pending.     Admitted to [facility] MICU for further management on 5/30. Vital signs on arrival: temp 38.6, /77, , RR 28, O2 sat 97% on 2L NC.  Labs: WBC 34.53, Hb 9.6, lipase 740, procal 2.02, tBili 1.4, alk phos 498, lactate 2.4. (30 May 2022 04:01)    Patient was admitted on 5/30, CT findings concerning for RLL PE and initiated on heparin and he was previously diagnosed with Hep B and has been told he may have cirrhosis based on early imaging findings. 5/31 continuing with fever 38.5 with gel cooling overlay in place and addition of folic acid and albumin 25% for q6h 24hr total ordered. 6/1 intermittent fevers requiring tylenol and persistent tachycardia managed with lopressor pending LN biopsy. 6/2 fevers continue to be managed with tylenol and cultures sent, heparin discontinued before diagnostic paracentesis performed along with left supraclavicular lymph node biopsy. 6/3 s/p paracentesis tolerated well, still febrile managed with tylenol. 6/4 Worsening mental status and upper airway stridor requiring intubation and sedation. 6/5 overnight hb 5, repeat hb 7.4 after 1 u pRBC. 6/6 heparin restarted, remains intubated and sedated. 6/7 initiated on reglan, tube feeds downtitrated, sedation weaned and LUE weakness revealed, CTH performed which was negative for acute hemorrhage, heparin resumed. 6/8 following commands on minimal sedation, extubated transferred to floors. 6/9 mentation improving without difficulty breathing, concerns of rectal pain requiring dilaudid ON. 6/10 Overnight fever to 102.9 with tachycardia to 103 reporting pain from canales catheter and generalized pain, urinalysis sent and IV tylenol to manage pain. Canales output yellow but cloudy. 6/11 canales removed due to pain, UA negative for infection patient transitioned to condom catheter and passed TOV, axillary temp of 104 and AMS at 11am, treated with tylenol and cooling measures. 6/12 no overnight events, heparin drip adjusted per normogram. 6/13  Awake and alert w/ productive coughing episodes, 1u pRBC infusion overnight, no fever or chills. 6/14 New sore throat with consistent cough, 1 u pRBC for hgb 6.7, patient denies chest pain or SOB on RA.              < from: CT Head No Cont (06.07.22 @ 10:11) >  IMPRESSION:  Age-appropriate involutional change and microvascular   ischemic disease. No intracranial hemorrhage.    < end of copied text >              REVIEW OF SYSTEMS  Constitutional - + Fever, + weight loss (unintentional, 30lbs over 2 months), + fatigue  HEENT - No eye pain, reports flashing lights in the inferior portion of both visual fields, No difficulty hearing, No tinnitus, No vertigo, No neck pain  Respiratory - + cough, + wheezing, No shortness of breath  Cardiovascular - No chest pain, No palpitations  Gastrointestinal - No abdominal pain, No nausea, No vomiting, No diarrhea, No constipation  Genitourinary - No dysuria, No frequency, No hematuria, No incontinence  Neurological - No headaches, No memory loss, No loss of strength, No numbness, No tremors  Skin - No itching, No rashes, No lesions   Musculoskeletal - No joint pain, No joint swelling, No muscle pain    VITALS  T(C): 36.9 (06-14-22 @ 09:11), Max: 38.3 (06-13-22 @ 15:15)  HR: 92 (06-14-22 @ 09:11) (88 - 122)  BP: 168/82 (06-14-22 @ 09:11) (148/72 - 168/82)  RR: 18 (06-14-22 @ 09:11) (17 - 20)  SpO2: 97% (06-14-22 @ 09:11) (93% - 98%)  Wt(kg): --    PAST MEDICAL & SURGICAL HISTORY  Obstructive Sleep Apnea    Hepatitis B Carrier    Pneumonia    Other systemic lupus erythematosus with endocarditis    Type 2 diabetes mellitus with other neurologic complication, without long-term current use of insulin    Sleep apnea, unspecified type    Hypertension, unspecified type    Nephritic syndrome    No significant past surgical history        SOCIAL HISTORY  Smoking - Denied  EtOH - Denied   Drugs - Denied    FUNCTIONAL HISTORY  Prior to initial hospitalization pt lived with his spouse and children in a ph, 12 fadi +HR, additional 5 steps to bedroom +hand rail  +tub in bathroom +hand rail. Pt reports that he was independent in all adl's and functional mobility without DME.  Prior to transfer here from OSH pt was ambulating with a rolling walker.    CURRENT FUNCTIONAL STATUS  PT 6/13  Bed mobility mod to max assist  sitting Edge of bed x 10 minutes     6/1 PT  bed mobility mod assist  transfers mod assist with RW  gait min assist with RW x 25 feet      FAMILY HISTORY     FH: type 2 diabetes, Prostate cancer and AAA (father), depression (mother)      RECENT LABS/IMAGING  CBC Full  -  ( 14 Jun 2022 07:06 )  WBC Count : 10.48 K/uL  RBC Count : 2.16 M/uL  Hemoglobin : 6.7 g/dL  Hematocrit : 21.4 %  Platelet Count - Automated : 61 K/uL  Mean Cell Volume : 99.1 fl  Mean Cell Hemoglobin : 31.0 pg  Mean Cell Hemoglobin Concentration : 31.3 gm/dL  Auto Neutrophil # : 8.34 K/uL  Auto Lymphocyte # : 1.21 K/uL  Auto Monocyte # : 0.19 K/uL  Auto Eosinophil # : 0.00 K/uL  Auto Basophil # : 0.00 K/uL  Auto Neutrophil % : 75.2 %  Auto Lymphocyte % : 11.5 %  Auto Monocyte % : 1.8 %  Auto Eosinophil % : 0.0 %  Auto Basophil % : 0.0 %    06-14    145  |  109<H>  |  55<H>  ----------------------------<  159<H>  4.0   |  19<L>  |  1.46<H>    Ca    7.5<L>      14 Jun 2022 07:07  Phos  5.0     06-14  Mg     1.9     06-14    TPro  5.4<L>  /  Alb  2.7<L>  /  TBili  1.1  /  DBili  x   /  AST  38  /  ALT  15  /  AlkPhos  182<H>  06-14        ALLERGIES  No Known Allergies      MEDICATIONS   acetaminophen     Tablet .. 650 milliGRAM(s) Oral every 6 hours PRN  benzocaine 15 mG/menthol 3.6 mG Lozenge 1 Lozenge Oral two times a day  chlorhexidine 4% Liquid 1 Application(s) Topical <User Schedule>  citric acid/sodium citrate Solution 30 milliLiter(s) Oral every 6 hours  dexAMETHasone  IVPB 20 milliGRAM(s) IV Intermittent daily  dextrose 5%. 1000 milliLiter(s) IV Continuous <Continuous>  dextrose 5%. 1000 milliLiter(s) IV Continuous <Continuous>  dextrose 50% Injectable 25 Gram(s) IV Push once  dextrose 50% Injectable 12.5 Gram(s) IV Push once  dextrose 50% Injectable 25 Gram(s) IV Push once  dextrose Oral Gel 15 Gram(s) Oral once PRN  folic acid 1 milliGRAM(s) Oral daily  furosemide   Injectable 40 milliGRAM(s) IV Push two times a day  gabapentin 300 milliGRAM(s) Oral daily  glucagon  Injectable 1 milliGRAM(s) IntraMuscular once  glucagon  Injectable 1 milliGRAM(s) IntraMuscular once  hydrALAZINE 10 milliGRAM(s) Oral every 8 hours  insulin lispro (ADMELOG) corrective regimen sliding scale   SubCutaneous three times a day before meals  insulin lispro (ADMELOG) corrective regimen sliding scale   SubCutaneous at bedtime  lidocaine   4% Patch 1 Patch Transdermal daily  metoprolol tartrate 12.5 milliGRAM(s) Oral two times a day  multivitamin/minerals 1 Tablet(s) Oral daily  pantoprazole    Tablet 40 milliGRAM(s) Oral before breakfast  sodium chloride 0.9% lock flush 10 milliLiter(s) IV Push every 1 hour PRN  tamsulosin 0.4 milliGRAM(s) Oral at bedtime      ----------------------------------------------------------------------------------------  PHYSICAL EXAM  Constitutional - NAD, Comfortable, in bed   HEENT - NCAT, EOMI  Neck - Supple, No limited ROM  Chest - Breathing comfortably, No wheezing, coughing noted  Cardiovascular - S1S2   Abdomen - Soft   Extremities - No C/C, 3+ pitting edema in the LE bilaterally up to the knee, No calf tenderness   Neurologic Exam -                    Cognitive - Awake, Alert, AAO to self, place, date, year, situation     Communication - Fluent, No dysarthria     Cranial Nerves - CN 2-12 intact     Motor - No focal deficits                    LEFT    UE - ShAB 4/5, EF 4/5, EE 4/5, WE 5/5,  5/5                    RIGHT UE - ShAB 4/5, EF 4/5, EE 4/5, WE 5/5,  5/5                    LEFT    LE - HF 2/5, KE 2/5, DF 5/5, PF 5/5                    RIGHT LE - HF 2/5, KE 2/5, DF 5/5, PF 5/5        Sensory - Intact to LT     Reflexes - Not assessed      Coordination - FTN intact     OculoVestibular - No saccades, No nystagmus, VOR         Balance - Fall risk, gait and balance deferred  Psychiatric - Mood stable, Affect WNL  ----------------------------------------------------------------------------------------  ASSESSMENT/PLAN  ·  Problem: Preventive measure.   ·  Plan: Diet: minced and moist  DVT: heparin gtt on hold  Dispo: pending PT consult.    66yMale with functional proximal muscle group deficits after prolonged hospital stay.     Pain - Tylenol    DVT PPX - SCDs    Rehab - Will continue to follow for ongoing rehab needs and recommendations.

## 2022-06-14 NOTE — PROGRESS NOTE ADULT - ASSESSMENT
5 yo M with a PMH HTN, HLD, SLE on Cellcept (MMF), class V lupus nephritis, CKD stage 2, DM2, diabetic neuropathy, TIA (2019) on plavix, BPH, HUNG, hospitalization in [facility] 3/27-4/20 tx for pancreatitis and C diff?, recent hospitalization at [place] 4/22-4/26 for acute pancreatitis and C diff colitis and another hospitalization 5/5 at [place] for septic shock (source buttock abscess) treated with vanc, cefepime (5/5-5/10), meropenem (5/11-5/18) and micafungin. Zosyn added 5/26. Course complicated by recurrent fevers despite being on antibiotics, and leukocytosis up to 30k. Blood cx neg, UA unremarkable. MRI abdomen w/ contrast performed showing extensive abd lymphadenopathy (reactive to c diff vs lymphoma?). IR stated no window for bx. Course also complicated by a L brachial vein DVT and superficial DVT in arms. Patient noted to have had a positive PPD but has possibly gotten the BCG vaccine. Quant gold performed at [place] still pending.     Admitted to [facility] MICU for further management. Vital signs on arrival: temp 38.6, /77, , RR 28, O2 sat 97% on 2L NC.  Labs: WBC 34.53, Hb 9.6, lipase 740, procal 2.02, tBili 1.4, alk phos 498, lactate 2.4. (30 May 2022 04:01)    Hematology/Oncology consulted d/t patient's history of anemia. [facility]    Anemia and Lymphadenopathy  --multifactorial: infectious vs malignancy vs autoimmune  --supraclavicular/cervical IR bedside biopsy  pathology is still pending- follow up on final pathology   -- Noted CT findings. If excisional LN biopsy negative would consider IRL biopsy of femur if possible vs repeat BM biopsy. Follow up on pending [id number]   -- Noted drop in counts. Continue steroids for now.   ---BMB done on 5/20 at [facility]: negative for detectable monoclonal cell or B cell population  -- HLH in differential thus patient started on steroids dexamethasone 20mg Q day.    -- meets 5 criteria of HLH but important negative features: no presence of erythrophagocytosis- will however follow up on LN biopsy.    -- recommend transfusion for Hgb less than 7 or platelets less than 10 or if bleeding   --Hgb 6.7 today, s/p 1U PRBC    Mecca Oliveira NP  Hematology/ Oncology  [facility]  [phone] (office)  [phone] (alt office)  Evenings and weekends please call MD on call or office

## 2022-06-14 NOTE — PROGRESS NOTE ADULT - PROBLEM SELECTOR PLAN 2
Bp uncontrolled at this time likely also in the setting of the patient being uncontrolled. Would ideally like to avoid use of hydralazine given history of lupus. Pt. currently on metoprolol; consider changing back to home medication Coreg for better BP control. Or can add amlodipine 5mg daily.     If any questions, please feel free to contact me     Thony Oh  Nephrology Fellow  [facility] Pager: [phone] Bp uncontrolled at this time likely also in the setting of the patient being hypervoelmic.  Would ideally like to avoid use of hydralazine given history of lupus. Pt. currently on metoprolol; consider changing back to home medication Coreg for better BP control. Or can add amlodipine 5mg daily.  Diuretics as above.    If any questions, please feel free to contact me     Thony Oh  Nephrology Fellow  [facility] Pager: [phone]

## 2022-06-14 NOTE — PROGRESS NOTE ADULT - PROBLEM SELECTOR PLAN 5
#chronic inflammatory diarrhea  #possible Crohn's vs colitis vs infectious  -calprotectin elevated at [place] [address]. no current plan by GI for colonoscopy at this time.   -C diff negative  -appreciate GI and ID input.   -less likely crohns disease

## 2022-06-15 NOTE — PROGRESS NOTE ADULT - PROBLEM SELECTOR PLAN 5
#chronic inflammatory diarrhea  #possible Crohn's vs colitis vs infectious  -calprotectin elevated at [place] 532. no current plan by GI for colonoscopy at this time.   -C diff negative  -appreciate GI and ID input.   -less likely crohns disease

## 2022-06-15 NOTE — PROGRESS NOTE ADULT - ASSESSMENT
5 yo M with a PMH HTN, HLD, SLE on Cellcept (MMF), class V lupus nephritis, CKD stage 2, DM2, diabetic neuropathy, TIA (2019) on plavix, BPH, HUNG, hospitalization in [facility] 3/27-4/20 tx for pancreatitis and C diff?, recent hospitalization at [place] 4/22-4/26 for acute pancreatitis and C diff colitis and another hospitalization 5/5 at [place] for septic shock (source buttock abscess) treated with vanc, cefepime (5/5-5/10), meropenem (5/11-5/18) and micafungin. Zosyn added 5/26. Course complicated by recurrent fevers despite being on antibiotics, and leukocytosis up to 30k. Blood cx neg, UA unremarkable. MRI abdomen w/ contrast performed showing extensive abd lymphadenopathy (reactive to c diff vs lymphoma?). IR stated no window for bx. Course also complicated by a L brachial vein DVT and superficial DVT in arms. Patient noted to have had a positive PPD but has possibly gotten the BCG vaccine. Quant gold performed at [place] still pending.     Admitted to [facility] MICU for further management. Vital signs on arrival: temp 38.6, /77, , RR 28, O2 sat 97% on 2L NC.  Labs: WBC 34.53, Hb 9.6, lipase 740, procal 2.02, tBili 1.4, alk phos 498, lactate 2.4. (30 May 2022 04:01)    Hematology/Oncology consulted d/t patient's history of anemia. [facility]    Anemia and Lymphadenopathy  --multifactorial: infectious vs malignancy vs autoimmune  --supraclavicular/cervical IR bedside biopsy  pathology is still pending-  prelim stating possible lympho proiferative disorder. will need final path resulted   -- Noted CT findings. If excisional LN biopsy negative would consider IRL biopsy of femur if possible vs repeat BM biopsy. Follow up on pending [id number]   -- Noted drop in counts. Continue steroids for now.   ---BMB done on 5/20 at [facility]: negative for detectable monoclonal cell or B cell population  -- HLH in differential thus patient started on steroids dexamethasone 20mg Q day.    -- meets 5 criteria of HLH but important negative features: no presence of erythrophagocytosis- will however follow up on LN biopsy.    -- recommend transfusion for Hgb less than 7 or platelets less than 10 or if bleeding     upper ext dvt and questionable pe   - holding ac for now as plt count is trending down         Aaron Marie MD  HematologyOncology   O: [phone]

## 2022-06-15 NOTE — PROGRESS NOTE ADULT - PROBLEM SELECTOR PLAN 2
Bp uncontrolled at this time likely also in the setting of the patient being hypervoelmic.  Would ideally like to avoid use of hydralazine given history of lupus. Pt. currently on carvedilol and diuretics.  continue to monitor BP.

## 2022-06-15 NOTE — PROGRESS NOTE ADULT - SUBJECTIVE AND OBJECTIVE BOX
[facility] Division of Kidney Diseases & Hypertension  FOLLOW UP NOTE  --------------------------------------------------------------------------------  HPI: Patient is 66 year old male with PMH of HTN, HLD, class V membranous nephropathy in the setting of Lupus Nephritis (follows with Dr. Moore), DM, TIA, BPH, and HUNG who pwas transferred to the hospital due to concerns for septic shock and recurrent fevers. 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 Since then he has been following Dr. Moore as his primary nephrologist. The patient had treatment with steroids and cyclophosphamide. Most recently the patient is being treated with MMF 750mg BID. Upon arrival to the hospital the patient was noted to have a SCr of 1.16 which had since risen to 1.98mg/dL The nephrology team was consulted for PEPE. The patient was intubated on 6/5 for worsening work of breathing, successfully extubated on 6/8. Now being evaluated for HLH on decadron off MMF.  Has active signs of bleeding now getting PRBCs with evidence of volume overload.    Pt. seen and examined this morning reports feeling better overall.  No dyspnea no difficulty with urination and reports urination is frequent.  Reports improvement in edema.    PAST HISTORY  --------------------------------------------------------------------------------  No significant changes to PMH, PSH, FHx, SHx, unless otherwise noted    ALLERGIES & MEDICATIONS  --------------------------------------------------------------------------------  Allergies    No Known Allergies    Intolerances      Standing Inpatient Medications  benzocaine 15 mG/menthol 3.6 mG Lozenge 1 Lozenge Oral two times a day  carvedilol 25 milliGRAM(s) Oral every 12 hours  chlorhexidine 4% Liquid 1 Application(s) Topical <User Schedule>  citric acid/sodium citrate Solution 30 milliLiter(s) Oral every 6 hours  dexAMETHasone  IVPB 20 milliGRAM(s) IV Intermittent daily  dextrose 5%. 1000 milliLiter(s) IV Continuous <Continuous>  dextrose 5%. 1000 milliLiter(s) IV Continuous <Continuous>  dextrose 50% Injectable 25 Gram(s) IV Push once  dextrose 50% Injectable 12.5 Gram(s) IV Push once  dextrose 50% Injectable 25 Gram(s) IV Push once  folic acid 1 milliGRAM(s) Oral daily  furosemide   Injectable 40 milliGRAM(s) IV Push two times a day  gabapentin 300 milliGRAM(s) Oral daily  glucagon  Injectable 1 milliGRAM(s) IntraMuscular once  glucagon  Injectable 1 milliGRAM(s) IntraMuscular once  insulin lispro (ADMELOG) corrective regimen sliding scale   SubCutaneous three times a day before meals  insulin lispro (ADMELOG) corrective regimen sliding scale   SubCutaneous at bedtime  lidocaine   4% Patch 1 Patch Transdermal daily  multivitamin/minerals 1 Tablet(s) Oral daily  pantoprazole    Tablet 40 milliGRAM(s) Oral before breakfast  tamsulosin 0.4 milliGRAM(s) Oral at bedtime    PRN Inpatient Medications  acetaminophen     Tablet .. 650 milliGRAM(s) Oral every 6 hours PRN  dextrose Oral Gel 15 Gram(s) Oral once PRN  sodium chloride 0.9% lock flush 10 milliLiter(s) IV Push every 1 hour PRN      REVIEW OF SYSTEMS  --------------------------------------------------------------------------------  Gen: +fever  Respiratory: no sob  CV: no cp    GI: no ab pain   : as per HPI  MSK: improving edema    VITALS/PHYSICAL EXAM  --------------------------------------------------------------------------------  T(C): 36.7 (06-15-22 @ 08:52), Max: 38.7 (06-14-22 @ 15:00)  HR: 73 (06-15-22 @ 08:52) (73 - 108)  BP: 137/75 (06-15-22 @ 08:52) (112/61 - 181/73)  ABP: --  ABP(mean): --  RR: 18 (06-15-22 @ 08:52) (17 - 18)  SpO2: 96% (06-15-22 @ 08:52) (93% - 98%)  CVP(mm Hg): --        06-14-22 @ 07:01  -  06-15-22 @ 07:00  --------------------------------------------------------  IN: 690 mL / OUT: 300 mL / NET: 390 mL      Physical Exam:  	Gen: ill appearing  	HEENT: MMM  	Pulm: CTA b/l  	CV: S1S2  	Abd: Soft, +BS   	Ext: Pitting LE edema B/L but appears improved compared to previous.  	Neuro: awake and alert   	Skin: Warm and dry    LABS/STUDIES  --------------------------------------------------------------------------------              6.7    8.17  >-----------<  49       [06-15-22 @ 05:01]              20.4     142  |  108  |  52  ----------------------------<  175      [06-15-22 @ 05:01]  3.9   |  20  |  1.35        Ca     7.3     [06-15-22 @ 05:01]      Mg     1.8     [06-15-22 @ 05:01]      Phos  4.5     [06-15-22 @ 05:01]    TPro  5.0  /  Alb  2.4  /  TBili  1.0  /  DBili  x   /  AST  37  /  ALT  15  /  AlkPhos  190  [06-15-22 @ 05:01]              [06-14-22 @ 07:07]    Creatinine Trend:  SCr 1.35 [06-15 @ 05:01]  SCr 1.46 [06-14 @ 07:07]  SCr 1.60 [06-13 @ 11:24]  SCr 1.46 [06-12 @ 11:37]  SCr 1.46 [06-11 @ 06:50]    Urinalysis - [06-10-22 @ 07:57]      Color Light Yellow / Appearance Turbid / SG 1.014 / pH 6.0      Gluc Negative / Ketone Negative  / Bili Negative / Urobili Negative       Blood Moderate / Protein 100 / Leuk Est Negative / Nitrite Negative      RBC 67 / WBC 4 / Hyaline 4 / Gran  / Sq Epi  / Non Sq Epi 3 / Bacteria Negative    Urine Creatinine 49      [06-08-22 @ 15:09]  Urine Protein 88      [06-08-22 @ 15:09]    Ferritin 1195      [06-15-22 @ 05:01]  Lipid: chol --, , HDL --, LDL --      [06-15-22 @ 05:01]

## 2022-06-15 NOTE — PROGRESS NOTE ADULT - PROBLEM SELECTOR PLAN 3
-r/o HLH 2/2 EBV  -has fevers, splenomegaly, cytopenia  -HLH labs: soluble IL-2 levels 51641, ferritin 511, triglyceride 412, 6/6 fibrinogen 218 6/6  , pending final pathology from cervical lymph node biopsy, flow cytometry from peripheral blood without acute abnormalities in lymphoid, myeloid, or plasma cell lines.   -IgG4 27  -dexamethasone 20 qd discussed with heme on 6/6 following bronchoscopy  -pending pathology 6/7  -Prelim per verbal with pathology EBV+ lymphoprofileration, with empiric treatment initiated for HLH and no specific treatment planned for EBV per discussion with ID.  -Fever to 102 o/n, got tylenol and new UA sent.    #Anemia and Lymphadenopathy  --multifactorial: infectious vs malignancy vs autoimmune  --supraclavicular/cervical IR bedside biopsy  pathology is still pending- follow up on final pathology   ---BMB done on 5/20 at [facility]: negative for detectable monoclonal cell or B cell population  -- HLH in differential thus patient started on steroids dexamethasone 20mg Q day.    -- meets 5 criteria of HLH but important negative features: no presence of erythrophagocytosis- will however follow up on LN biopsy. -r/o HLH 2/2 EBV  -has fevers, splenomegaly, cytopenia  -HLH labs: soluble IL-2 levels 80888, ferritin 511, triglyceride 412, 6/6 fibrinogen 218 6/6  , pending final pathology from cervical lymph node biopsy, flow cytometry from peripheral blood without acute abnormalities in lymphoid, myeloid, or plasma cell lines - preliminary EBV positive lymphoproliferative disease   -IgG4 27  -dexamethasone 20 qd discussed with heme on 6/6 following bronchoscopy  -pending pathology 6/7  -Prelim per verbal with pathology EBV+ lymphoprofileration, with empiric treatment initiated for HLH and no specific treatment planned for EBV per discussion with ID.  -Fever to 102 o/n, got tylenol and new UA sent.    #Anemia and Lymphadenopathy  --multifactorial: infectious vs malignancy vs autoimmune  --supraclavicular/cervical IR bedside biopsy  pathology is still pending- follow up on final pathology   ---BMB done on 5/20 at [facility]: negative for detectable monoclonal cell or B cell population  -- HLH in differential thus patient started on steroids dexamethasone 20mg Q day.    -- meets 5 criteria of HLH but important negative features: no presence of erythrophagocytosis- will however follow up on LN biopsy.

## 2022-06-15 NOTE — PROGRESS NOTE ADULT - SUBJECTIVE AND OBJECTIVE BOX
Patient seen and examined at bedside. feels ok. no active complaints     MEDICATIONS  (STANDING):  benzocaine 15 mG/menthol 3.6 mG Lozenge 1 Lozenge Oral two times a day  carvedilol 25 milliGRAM(s) Oral every 12 hours  chlorhexidine 4% Liquid 1 Application(s) Topical <User Schedule>  citric acid/sodium citrate Solution 30 milliLiter(s) Oral every 6 hours  dexAMETHasone  IVPB 20 milliGRAM(s) IV Intermittent daily  dextrose 5%. 1000 milliLiter(s) (50 mL/Hr) IV Continuous <Continuous>  dextrose 5%. 1000 milliLiter(s) (100 mL/Hr) IV Continuous <Continuous>  dextrose 50% Injectable 25 Gram(s) IV Push once  dextrose 50% Injectable 12.5 Gram(s) IV Push once  dextrose 50% Injectable 25 Gram(s) IV Push once  folic acid 1 milliGRAM(s) Oral daily  furosemide   Injectable 40 milliGRAM(s) IV Push two times a day  gabapentin 300 milliGRAM(s) Oral daily  glucagon  Injectable 1 milliGRAM(s) IntraMuscular once  glucagon  Injectable 1 milliGRAM(s) IntraMuscular once  insulin lispro (ADMELOG) corrective regimen sliding scale   SubCutaneous three times a day before meals  insulin lispro (ADMELOG) corrective regimen sliding scale   SubCutaneous at bedtime  lidocaine   4% Patch 1 Patch Transdermal daily  multivitamin/minerals 1 Tablet(s) Oral daily  pantoprazole    Tablet 40 milliGRAM(s) Oral before breakfast  tamsulosin 0.4 milliGRAM(s) Oral at bedtime    MEDICATIONS  (PRN):  acetaminophen     Tablet .. 650 milliGRAM(s) Oral every 6 hours PRN Temp greater or equal to 38C (100.4F), Mild Pain (1 - 3)  dextrose Oral Gel 15 Gram(s) Oral once PRN Blood Glucose LESS THAN 70 milliGRAM(s)/deciliter  sodium chloride 0.9% lock flush 10 milliLiter(s) IV Push every 1 hour PRN Pre/post blood products, medications, blood draw, and to maintain line patency        Vital Signs Last 24 Hrs  T(C): 36.7 (15 [place] 2022 10:10), Max: 38.7 (14 Jun 2022 15:00)  T(F): 98.1 (15 [place] 2022 10:10), Max: 101.6 (14 Jun 2022 15:00)  HR: 74 (15 [place] 2022 11:33) (73 - 108)  BP: 136/70 (15 [place] 2022 11:33) (112/61 - 155/73)  BP(mean): --  RR: 18 (15 [place] 2022 11:33) (17 - 18)  SpO2: 95% (15 [place] 2022 11:33) (93% - 96%)      PHYSICAL EXAM:     GENERAL:  Appears stated age, well-groomed  HEENT:  NC/AT,    CHEST:  CTA b/l  HEART:  S1 s2+   ABDOMEN:  Soft, non-tender, non-distended                             6.7    8.17  )-----------( 49       ( 15 [place] 2022 05:01 )             20.4       06-15    142  |  108  |  52<H>  ----------------------------<  175<H>  3.9   |  20<L>  |  1.35<H>    Ca    7.3<L>      15 [place] 2022 05:01  Phos  4.5     06-15  Mg     1.8     06-15    TPro  5.0<L>  /  Alb  2.4<L>  /  TBili  1.0  /  DBili  x   /  AST  37  /  ALT  15  /  AlkPhos  190<H>  06-15

## 2022-06-15 NOTE — PROGRESS NOTE ADULT - PROBLEM SELECTOR PLAN 9
-Lopressor 2.5 q6h discontinued 6/3 ON receiving crystalloid and colloid 6/3 ON to 6/4.   -6/6 metoprolol tartarate 12.5mg BID --> d/c 6/14, restarted coreg

## 2022-06-15 NOTE — PROGRESS NOTE ADULT - ATTENDING COMMENTS
65 y/o M with h/o SLE, CKD, presenting with FUO with CT evidence of significant lymphadenopathy. Concern for HLH however BMbx without hemophagocytosis although meets other criteria Course c/b splenic infarct/PE/DVTs initially on hep gtt. c/b acute anemia and hematomas requiring transfusions.     #Anemia - hgb again <7 today, no signs of bleeding likely 2/2 to underlying process  #FUO: concern HLH, on steroids with little improvement, Heme considering starting etoposide.   #Fluid Overload - tolerating 40 IV BID, continue, monitor I/O  #PE - holding hep iso acute anemia and thrombocytopenia ,+cardiolipin most recent ds and sm neg,, dupplex neg  #PEPE: multifactorial, cr improving (baseline 1.0), 1.4-1.8 during admission

## 2022-06-15 NOTE — PROGRESS NOTE ADULT - SUBJECTIVE AND OBJECTIVE BOX
Patient was seen and examined at bedside.       Vital Signs Last 24 Hrs  T(C): 36.7 (15 [place] 2022 08:52), Max: 38.7 (14 Jun 2022 15:00)  T(F): 98.1 (15 [place] 2022 08:52), Max: 101.6 (14 Jun 2022 15:00)  HR: 73 (15 [place] 2022 08:52) (73 - 108)  BP: 137/75 (15 [place] 2022 08:52) (112/61 - 181/73)  BP(mean): --  RR: 18 (15 [place] 2022 08:52) (17 - 18)  SpO2: 96% (15 [place] 2022 08:52) (93% - 98%)      LABS:  cret                        6.7    8.17  )-----------( 49       ( 15 [place] 2022 05:01 )             20.4     06-15    142  |  108  |  52<H>  ----------------------------<  175<H>  3.9   |  20<L>  |  1.35<H>    Ca    7.3<L>      15 [place] 2022 05:01  Phos  4.5     06-15  Mg     1.8     06-15    TPro  5.0<L>  /  Alb  2.4<L>  /  TBili  1.0  /  DBili  x   /  AST  37  /  ALT  15  /  AlkPhos  190<H>  06-15            I&O's Detail    14 Jun 2022 07:01  -  15 [place] 2022 07:00  --------------------------------------------------------  IN:    Oral Fluid: 690 mL  Total IN: 690 mL    OUT:    Voided (mL): 300 mL  Total OUT: 300 mL    Total NET: 390 mL

## 2022-06-15 NOTE — PROGRESS NOTE ADULT - PROBLEM SELECTOR PLAN 1
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 The patient is being treated with MMF 750mg as outpatient. Currently on hold. On review of [facility] pt. noted to have a baseline SCr of 1.1mg/dL. Currently SCr elevated but improving to 1.3 mg/dL. Optimize hemodynamics. Renal sonogram without evidence of hydro. Monitor labs and urine output. Pt. with clinical evidence of volume overload at this time. Recommend lasix 40mg but would change to PO. Given history of lupus would ideally like to avoid use of hydralazine. Avoid NSAIDs, ACEI/ARBS, RCA and nephrotoxins. Dose medications as per eGFR.

## 2022-06-15 NOTE — PROGRESS NOTE ADULT - PROBLEM SELECTOR PLAN 1
diff dx: infectious, malignancy, IgG4 disease, HLH  -elevated WBC, elevated IgA at [facility]  -s/p core biopsy and paracentesis at [facility] prior to transfer to [facility]  -quant gold indeterminate  -5/30 BCx NGTD  -HIV nonreactive, EBV IgG+, CMV 50  -possibility of peritoneal TB appreciate ID reccs AFB sputum and stool thus far are negative with IR biopsy completed pending final results 6/4  -Rheum eval including C3 C4 ESR 44 CRP 77 dsDNA progression of underlying lupus v other rheumatologic process causing fever although less likely.  -monitoring off antibiotics at this time, will f/u ID recs  -will f/u pathology results from cervical LN core biopsy  -bronch lavage cytology negative for malignancy  -fever o/n 102 6/10; fever 105 rectal 6/11, tylenol 1g IV, cooling measures reinstated, Bcx x2 sent.  -fever 6/12 overnight, s/p tylenol, f/u BCX from 6/11 -NGTD - no fever on 6/15  diff dx: infectious, malignancy, IgG4 disease, HLH  -elevated WBC, elevated IgA at [place], HIV nonreactive, EBV IgG+, CMV 50  -s/p core biopsy and paracentesis at [place] prior to transfer to [facility]  -quant gold indeterminate  -5/30 BCx NGTD  -possibility of peritoneal TB appreciate ID reccs AFB sputum and stool thus far are negative with IR biopsy completed pending final results 6/4  -Rheum eval including C3 C4 ESR 44 CRP 77 dsDNA progression of underlying lupus v other rheumatologic process causing fever although less likely.  -monitoring off antibiotics at this time, will f/u ID recs  -will f/u pathology results from cervical LN core biopsy - EBV positive lymphoproliferative preliminary results   -bronch lavage cytology negative for malignancy  -fever o/n 102 6/10; fever 105 rectal 6/11, tylenol 1g IV, cooling measures reinstated, Bcx x2 sent.  -fever 6/12 overnight, s/p tylenol, f/u BCX from 6/11 -NGTD

## 2022-06-15 NOTE — PROGRESS NOTE ADULT - PROBLEM SELECTOR PLAN 10
- holding home ACEi additionally due to PEPE on CKD  - hydral 10 TID 6/9 - 6/14 d/c i/s/o renal dx   - dc metoprolol   - cw coreg 6/15

## 2022-06-15 NOTE — PROGRESS NOTE ADULT - PROBLEM SELECTOR PLAN 6
-outside hospital reported to be consistent with AOCD  -CT ab pending, r/o bleed  - 6/13 AM s/p 1u pRBC hgb 6.7  - 6/14 AM s/p 1u pRBC hgb 6.7   - 6/15 AM s/p 1u pRBC hgb 6.7  -transfuse if Hb<7, maintain active type and screen  -DVT ppx: full AC -outside hospital reported to be consistent with AOCD  -CT ab pending, r/o bleed  - 6/13 AM s/p 1u pRBC hgb 6.7  - 6/14 AM s/p 1u pRBC hgb 6.7   - 6/15 AM s/p 1u pRBC hgb 6.7  -transfuse if Hb<7, maintain active type and screen  - no active bleed, will hold off on CTA, consider splenic accumulation of RBC from EBV  -DVT ppx: full AC

## 2022-06-15 NOTE — PROGRESS NOTE ADULT - PROBLEM SELECTOR PLAN 4
-monitoring off cellcept  -Has been seen by Dr. Swapnil Wolfe and also Dr. Shivam Malagon  -According to sisters at bedside 6/6, patient took Aranesp in the [facility] for lupus prior to him getting sick. Family are wondering if the medication can be contributory.   -Following complements, ESR, CRP, dsDNA  -rheumatology consulted, will f/u recs

## 2022-06-15 NOTE — PROGRESS NOTE ADULT - SUBJECTIVE AND OBJECTIVE BOX
PROGRESS NOTE:   Authored by Dr. Fatuma Olsen MD (PGY-1). Pager [facility] [phone] / [facility]     Patient is a 66y old  Male who presents with a chief complaint of recurrent fevers, unknown source (14 Jun 2022 12:59)      SUBJECTIVE / OVERNIGHT EVENTS:  No acute events overnight.     ADDITIONAL REVIEW OF SYSTEMS:  Patient denies fevers, chills, chest pain, shortness of breath, nausea, abdominal pain, diarrhea, constipation, dysuria, leg swelling, headache, light headedness.    MEDICATIONS  (STANDING):  benzocaine 15 mG/menthol 3.6 mG Lozenge 1 Lozenge Oral two times a day  carvedilol 25 milliGRAM(s) Oral every 12 hours  chlorhexidine 4% Liquid 1 Application(s) Topical <User Schedule>  citric acid/sodium citrate Solution 30 milliLiter(s) Oral every 6 hours  dexAMETHasone  IVPB 20 milliGRAM(s) IV Intermittent daily  dextrose 5%. 1000 milliLiter(s) (50 mL/Hr) IV Continuous <Continuous>  dextrose 5%. 1000 milliLiter(s) (100 mL/Hr) IV Continuous <Continuous>  dextrose 50% Injectable 25 Gram(s) IV Push once  dextrose 50% Injectable 12.5 Gram(s) IV Push once  dextrose 50% Injectable 25 Gram(s) IV Push once  folic acid 1 milliGRAM(s) Oral daily  furosemide   Injectable 40 milliGRAM(s) IV Push two times a day  gabapentin 300 milliGRAM(s) Oral daily  glucagon  Injectable 1 milliGRAM(s) IntraMuscular once  glucagon  Injectable 1 milliGRAM(s) IntraMuscular once  insulin lispro (ADMELOG) corrective regimen sliding scale   SubCutaneous three times a day before meals  insulin lispro (ADMELOG) corrective regimen sliding scale   SubCutaneous at bedtime  lidocaine   4% Patch 1 Patch Transdermal daily  multivitamin/minerals 1 Tablet(s) Oral daily  pantoprazole    Tablet 40 milliGRAM(s) Oral before breakfast  tamsulosin 0.4 milliGRAM(s) Oral at bedtime    MEDICATIONS  (PRN):  acetaminophen     Tablet .. 650 milliGRAM(s) Oral every 6 hours PRN Temp greater or equal to 38C (100.4F), Mild Pain (1 - 3)  dextrose Oral Gel 15 Gram(s) Oral once PRN Blood Glucose LESS THAN 70 milliGRAM(s)/deciliter  sodium chloride 0.9% lock flush 10 milliLiter(s) IV Push every 1 hour PRN Pre/post blood products, medications, blood draw, and to maintain line patency      CAPILLARY BLOOD GLUCOSE      POCT Blood Glucose.: 136 mg/dL (14 Jun 2022 21:57)  POCT Blood Glucose.: 126 mg/dL (14 Jun 2022 17:28)  POCT Blood Glucose.: 111 mg/dL (14 Jun 2022 12:05)  POCT Blood Glucose.: 143 mg/dL (14 Jun 2022 08:06)    I&O's Summary    14 Jun 2022 07:01  -  15 Vernon 2022 07:00  --------------------------------------------------------  IN: 690 mL / OUT: 300 mL / NET: 390 mL        PHYSICAL EXAM:  Vital Signs Last 24 Hrs  T(C): 36.7 (15 Vernon 2022 04:19), Max: 38.7 (14 Jun 2022 15:00)  T(F): 98 (15 Vernon 2022 04:19), Max: 101.6 (14 Jun 2022 15:00)  HR: 90 (15 Vernon 2022 05:30) (81 - 108)  BP: 118/64 (15 Vernon 2022 04:19) (112/61 - 181/73)  BP(mean): --  RR: 18 (15 Vernon 2022 04:19) (17 - 20)  SpO2: 95% (15 Vernon 2022 05:30) (93% - 98%)    ***  LABS:                        6.7    8.17  )-----------( 49       ( 15 Vernon 2022 05:01 )             20.4     06-15    142  |  108  |  52<H>  ----------------------------<  175<H>  3.9   |  20<L>  |  1.35<H>    Ca    7.3<L>      15 Vernon 2022 05:01  Phos  4.5     06-15  Mg     1.8     06-15    TPro  5.0<L>  /  Alb  2.4<L>  /  TBili  1.0  /  DBili  x   /  AST  37  /  ALT  15  /  AlkPhos  190<H>  06-15                Tele Reviewed:    RADIOLOGY & ADDITIONAL TESTS:  Results Reviewed:   Imaging Personally Reviewed:  Electrocardiogram Personally Reviewed:     PROGRESS NOTE:   Authored by Dr. Fatuma Olsen MD (PGY-1). Pager [facility] [phone] / [facility]     Patient is a 66y old  Male who presents with a chief complaint of recurrent fevers, unknown source (14 Jun 2022 12:59)  Patient is resting, intermittent coughing, sore throat improved, continues to be edematous    SUBJECTIVE / OVERNIGHT EVENTS:  - hgb < 7 this AM, s/p 1u pRBC    ADDITIONAL REVIEW OF SYSTEMS:  Patient denies fevers, chills, chest pain, shortness of breath, nausea, abdominal pain, diarrhea, constipation, dysuria, headache, light headedness.    MEDICATIONS  (STANDING):  benzocaine 15 mG/menthol 3.6 mG Lozenge 1 Lozenge Oral two times a day  carvedilol 25 milliGRAM(s) Oral every 12 hours  chlorhexidine 4% Liquid 1 Application(s) Topical <User Schedule>  citric acid/sodium citrate Solution 30 milliLiter(s) Oral every 6 hours  dexAMETHasone  IVPB 20 milliGRAM(s) IV Intermittent daily  dextrose 5%. 1000 milliLiter(s) (50 mL/Hr) IV Continuous <Continuous>  dextrose 5%. 1000 milliLiter(s) (100 mL/Hr) IV Continuous <Continuous>  dextrose 50% Injectable 25 Gram(s) IV Push once  dextrose 50% Injectable 12.5 Gram(s) IV Push once  dextrose 50% Injectable 25 Gram(s) IV Push once  folic acid 1 milliGRAM(s) Oral daily  furosemide   Injectable 40 milliGRAM(s) IV Push two times a day  gabapentin 300 milliGRAM(s) Oral daily  glucagon  Injectable 1 milliGRAM(s) IntraMuscular once  glucagon  Injectable 1 milliGRAM(s) IntraMuscular once  insulin lispro (ADMELOG) corrective regimen sliding scale   SubCutaneous three times a day before meals  insulin lispro (ADMELOG) corrective regimen sliding scale   SubCutaneous at bedtime  lidocaine   4% Patch 1 Patch Transdermal daily  multivitamin/minerals 1 Tablet(s) Oral daily  pantoprazole    Tablet 40 milliGRAM(s) Oral before breakfast  tamsulosin 0.4 milliGRAM(s) Oral at bedtime    MEDICATIONS  (PRN):  acetaminophen     Tablet .. 650 milliGRAM(s) Oral every 6 hours PRN Temp greater or equal to 38C (100.4F), Mild Pain (1 - 3)  dextrose Oral Gel 15 Gram(s) Oral once PRN Blood Glucose LESS THAN 70 milliGRAM(s)/deciliter  sodium chloride 0.9% lock flush 10 milliLiter(s) IV Push every 1 hour PRN Pre/post blood products, medications, blood draw, and to maintain line patency      CAPILLARY BLOOD GLUCOSE      POCT Blood Glucose.: 136 mg/dL (14 Jun 2022 21:57)  POCT Blood Glucose.: 126 mg/dL (14 Jun 2022 17:28)  POCT Blood Glucose.: 111 mg/dL (14 Jun 2022 12:05)  POCT Blood Glucose.: 143 mg/dL (14 Jun 2022 08:06)    I&O's Summary    14 Jun 2022 07:01  -  15 [place] 2022 07:00  --------------------------------------------------------  IN: 690 mL / OUT: 300 mL / NET: 390 mL        PHYSICAL EXAM:  Vital Signs Last 24 Hrs  T(C): 36.7 (15 Vernon 2022 04:19), Max: 38.7 (14 Jun 2022 15:00)  T(F): 98 (15 Vernon 2022 04:19), Max: 101.6 (14 Jun 2022 15:00)  HR: 90 (15 Vernon 2022 05:30) (81 - 108)  BP: 118/64 (15 Vernon 2022 04:19) (112/61 - 181/73)  BP(mean): --  RR: 18 (15 Vernon 2022 04:19) (17 - 20)  SpO2: 95% (15 Vernon 2022 05:30) (93% - 98%)    GENERAL: NAD   HEENT: erythema on BL cheeks, moist MMM, NCAT, EOMI, PERRL  CARDIO: RRR, no murmur, 3+ LE edema (ace wrapped), no JVD  RESP: posterior expiratory crackles on lower bases BL, no wheezes  GI: abd soft, nontender, nondistended, normal BS   : condom cath in place  NEURO: AOX4, follows commands, CN II-XII intact   PSYCH: no SI/HI    LABS:                        6.7    8.17  )-----------( 49       ( 15 Vernon 2022 05:01 )             20.4     06-15    142  |  108  |  52<H>  ----------------------------<  175<H>  3.9   |  20<L>  |  1.35<H>    Ca    7.3<L>      15 Vernon 2022 05:01  Phos  4.5     06-15  Mg     1.8     06-15    TPro  5.0<L>  /  Alb  2.4<L>  /  TBili  1.0  /  DBili  x   /  AST  37  /  ALT  15  /  AlkPhos  190<H>  06-15                Tele Reviewed:    RADIOLOGY & ADDITIONAL TESTS:  Results Reviewed:   Imaging Personally Reviewed:  Electrocardiogram Personally Reviewed:

## 2022-06-15 NOTE — PROGRESS NOTE ADULT - ASSESSMENT
ASSESSMENT/PLAN: 65 yo M with a PMH HTN, HLD, SLE on Cellcept (MMF), class V lupus nephritis, CKD stage 2, DM2, diabetic neuropathy, TIA (2019) on Plavix, BPH, HUNG, undergoing work-up for FUO and found on admission to have RLL PE. Pt with acute H/H drop today and supratherapeutic PTTs. CT A/P non con with evidence of right gluteus and L psoas intramuscular hematomas. General Surgery consulted for further recommendations. Hep gtt stopped, PTT now wnl. Hematomas likely spontaneous in setting of supratherapeutic anticoagulation. Hematomas usually self-resolving in this setting when A/C stopped.       - No acute surgical intervention indicated   - Trend CBC; H/H 6.7/02.4 today, transfuse as needed  - Recommend CTA to determine source of bleeding and consult Interventional Radiology   - New set of coags (PTT/PT/INR) and correct coagulopathy based on results  - Risks vs. benefits of restarting A/C per Medicine  - Care per primary team  - Please call [facility] if needed    Plan discussed and agreeable with Dr. Nazario and Dr. Rodriguez PGY4    [facility]  [phone]   ASSESSMENT/PLAN: 65 yo M with a PMH HTN, HLD, SLE on Cellcept (MMF), class V lupus nephritis, CKD stage 2, DM2, diabetic neuropathy, TIA (2019) on Plavix, BPH, HUNG, undergoing work-up for FUO and found on admission to have RLL PE. Pt with acute H/H drop today and supratherapeutic PTTs. CT A/P non con with evidence of right gluteus and L psoas intramuscular hematomas. General Surgery consulted for further recommendations. Hep gtt stopped, PTT 06/12 wnl. Hematomas likely spontaneous in setting of supratherapeutic anticoagulation. Hematomas usually self-resolving in this setting when A/C stopped.       - No acute surgical intervention indicated   - Trend CBC; H/H 6.7/20.4 today, transfuse as needed  - Recommend CTA to determine source of bleeding and consult Interventional Radiology   - New set of coags (PTT/PT/INR) and correct coagulopathy based on results  - Risks vs. benefits of restarting A/C per Medicine  - Care per primary team  - Please call [facility] if needed    Plan discussed and agreeable with Dr. Nazario and Dr. Rodriguez PGY4    [facility]  [phone]

## 2022-06-15 NOTE — PROGRESS NOTE ADULT - PROBLEM SELECTOR PLAN 2
Ms. Sanchez presents for rheumatology consultation at the request of Dr. Barber Lopez to evaluate chronic arthralgias, myalgias, and elevated CHELSEY test. +CHELSEY, R    She reports that since October she has had pains in the muscles and joints. She also reports muscle cramps in the proximal and distal LE bilaterally.    By November she could only barely walk or get up from the toilet and had poor sleep due to the cramping pain. She also developed dizziness. She was treated with prednisone 20 mg daily for 1 week with significant improvement, but this was not durable. She was later treated with prednisone 5 mg daily since then and this has kept her partially functional.    She has very poor sleep due to pain in her shoulders, back, arms and legs. She will get up at night and use ibuprofen for pain control.    She has symptoms of orthostasis when she arises from the bed. There is a vertigo component when she first arises, but she feels dizzy all of the time. If she moves too quickly she will have cramping in her legs. She feels like she has bone pains even when in the bed at rest. These pains are everywhere. She has lost a lot of strength in her shoulders and upper arms.     She denies joint problems. No dysfunction, swelling, redness, or warmth. The pains are not focused on the joints. She has intermittent skin rashes thought to be eczema that responses to hydrocortisone cream. She has generally dry and itching skin. No other rash, ulcers, raynaud's, chest pains, or alopecia. She has fatigue. She does not really notice stiffness in the morning, just pain. No fevers, chills, sweats or weight loss.     Ibuprofen 400 mg once or twice daily with some improvement in her pain. No other analgesic use.    She has a history of collagenous colitis treated briefly with budesonide. Now controlled with peptobismol. Also has osteoporosis previously treated with alendronate and actonel but she develops bone pains with these medications.  "She denies vitamin D deficiency, but takes calcium and vitamin D.    PMI:  Medical-hypothyroidism, osteoporosis, empyema, carpal tunnel disease right, collagenous colitis, plantar fasciitis, eczema  Surgical-bunionectomy bilateral, carpal tunnel release, cataract surgery bilateral, right lung resection/pleural surgery/evacuation, tonsillectomy  Injuries-none    SH:  , previous  provider, 5 children, no tobacco or EtOH. Right handed.    FH:  Mother dead with alzheimer's  Father dead with MI  Sibs and children are healthy    ROS:  +dry mouth at night without dysphagia  +palpitations with \"pounding\" heart rhythm  +low blood pressure  +insomnia  +actonel and alendronate intolerant  Remainder of the 14 point ROS obtained and found negative.    Physical Exam:  Constitutional: [id number] cooperative  Eyes: nl EOM, PERRLA, vision, conjunctiva, sclera  ENT: nl external ears, nose, hearing, lips, teeth, gums, throat  Neck: no mass or thyroid enlargement  Resp: lungs clear to auscultation, nl to palpation, nl effort  CV: RRR, no murmurs, rubs or gallops, no edema  GI: no ABD mass or tenderness, no HSM  : not tested  Lymph: no cervical or epitrochlear nodes  MS: +diffuse heberden's nodes and right PIP 2,3 perez's nodes; subtle 1st CMC squaring and thumb Z-laxity; normal , tuck and prayer; neck rotation limited to 45 degrees bilaterally with slightly reduced flexion and extension; All other TMJ, neck, shoulder, elbow, wrist, hand, spine, hip, knee, ankle, and foot joints were examined and otherwise found normal. No active synovitis. Full ROM.  Skin: no nail pitting, alopecia, rash  Neuro: nl cranial nerves, strength, sensation, DTRs.   Psych: nl judgement, orientation, memory, affect.    Laboratory:    CRP 1.2 (nl <0.5), ESR 56, creat1.01, ALT 34, AST 31, Albumin 4.4, Alk Phos 67, calcium 10.3, CHELSEY 1:320 homogeneous  WBC 10.8, Hgb 13.1, plat 293, lyme-, RF-, TSH 2.24, free T4 1.02    An EKG is abnormal with " question of septal infarct.     Impression:    Polymyalgia rheumatica-with musculoskeletal pain and stiffness associated with elevated inflammatory markers. Improvement with low dose prednisone is consistent with this. I find no evidence of fabienne arthritis to suggest RA. To gain better control of symptoms, I will increase the prednisone to 10 mg daily.    Positive CHELSEY-associated with hypothyroidism. I doubt the presence of SLE. Nevertheless, I will investigate further her autoimmune serologies.    Osteoporosis-with complaint of bone pain. I will investigate for Vitamin D deficiency and or hyper parathyroidism.    Inappropriate bradycardia-with question of septal infarct on EKG. Get echocardiogram and consider cardiology referral to evaluate orthostasis.    Dizziness and vertigo-with in appropriate bradycardia and chronic pain, improved with prednisone. This may represent some autonomic dysfunction vs. Cardiac disease vs. Inner ear disease vs. CNS disease vs. Adrenal insufficiency. This needs to be evaluated further in primary care clinic, but I will also seek endocrinology consultation to evaluate for adrenal insufficiency.    Plan:  Treat PMR with prednisone 10 mg daily  Evaluate the +CHELSEY testing with SANDRO, dsDNA, cardiolipin Abs  Re-Evaluate metabolic status, inflammatory markers, and CBC  Evaluate PTH level and vitamin D  EKG to evaluate palpitations, dizziness and low heart rate  Endocrinology consultation to consider possible adrenal insufficiency  Get echocardiogram and depending on result consider cardiology consult for question of autonomic dysfunction.  RTC in 6 weeks         PEPE on CKD, hx lupus nephritis  -with worsening renal function FeNa prerenal receiving fluids 6/4. question role for rheumatology if no improvement in renal function.   -urine studies less suggestive of HRS picture discontinued midodrine although will continue albumin may benefit from volume.   -appreciate nephrology recommendations    -6/5 urine studies consistent with pre-renal FeNA 0.8 however IVC 2.1 on POCUS. Will c/w albumin and give lasix 40 IV x1, goal neg 500cc-1L today  -6/6 pending rheumatology work up ED, ANCA, dsDNA esr 44 and crp 77  -urinalysis with 100mg/dL proteinuria with biopsy proven lupus nephritis   -canales placed 6/3  -Discontinued tamsulosin for now cannot be crushed and canales in place  -6/6 discussed prior arinesp with family and risks of arinesp administration in critically ill explained including increased risk of thrombosis.  -6/10 s/p 2 doses IV Lasix 20 for edema, pt. very fluid positive. PEPE on CKD, hx lupus nephritis  -with worsening renal function FeNa prerenal receiving fluids 6/4. question role for rheumatology if no improvement in renal function.   -urine studies less suggestive of HRS picture discontinued midodrine although will continue albumin may benefit from volume.   -appreciate nephrology recommendations    -6/5 urine studies consistent with pre-renal FeNA 0.8 however IVC 2.1 on POCUS. Will c/w albumin and give lasix 40 IV x1, goal neg 500cc-1L today  -6/6 pending rheumatology work up ED, ANCA, dsDNA esr 44 and crp 77  -urinalysis with 100mg/dL proteinuria with biopsy proven lupus nephritis   -canales placed 6/3  -Discontinued tamsulosin for now cannot be crushed and canales in place  -6/6 discussed prior arinesp with family and risks of arinesp administration in critically ill explained including increased risk of thrombosis.  -6/10 s/p 2 doses IV Lasix 20 for edema, pt. very fluid positive.  - 6/15 started IV lasix 40 mg BID with strict I/O

## 2022-06-15 NOTE — PROGRESS NOTE ADULT - ASSESSMENT
65 yo M with a PMH HTN, HLD, SLE on Cellcept (MMF), class V lupus nephritis, CKD stage 2, DM2, diabetic neuropathy, TIA (2019) on plavix, BPH, HUNG, with multiple hospitalizations for c diff, acute panc, septic shock 2/2 buttock abscess status post drainage. PT transferred for recurrent fevers s/p core biopsy. In MICU, paracentesis suspected lymphoma vs. HLH, pt intubated and sedated due to upper airway stridor and AMS. Successfully extubated on 6/8 and transferred to floors with heme/onc, ID, rheum, and surg evaluation. Pt s/p pRBC transfusion (6/13, 6,14 and 6/15), hgb stable >7, recurrent fevers monitoring off abx, bcx negative 6/11, unremarkable CTA to r/o hematoma. Pending O/T and PM&R needs finalized inpt trt plan. Cardiology meds, restarted coreg, d/c hydralazine and metoprolol, started IV lasix BID. HLH workup, cw steroids, consider etoposide, f/u repeat labs 67 yo M with a PMH HTN, HLD, SLE on Cellcept (MMF), class V lupus nephritis, CKD stage 2, DM2, diabetic neuropathy, TIA (2019) on plavix, BPH, HUNG, with multiple hospitalizations for c diff, acute panc, septic shock 2/2 buttock abscess status post drainage. PT transferred for recurrent fevers s/p core biopsy. In MICU, paracentesis suspected lymphoma vs. HLH, pt intubated and sedated due to upper airway stridor and AMS. Successfully extubated on 6/8 and transferred to floors with heme/onc, ID, rheum, and surg evaluation. Pt s/p pRBC transfusion (6/13, 6,14 and 6/15), hgb stable >7, recurrent fevers monitoring off abx, bcx negative 6/11, unremarkable CTA to r/o hematoma. Pending O/T and PM&R needs finalized inpt trt plan. Cardiology meds, restarted coreg, d/c hydralazine and metoprolol, started IV lasix 40 mg BID (6/15). HLH workup, cw steroids, consider etoposide, prelim LN biopsy s/f EBV positive lymphoproliferative process, pending molecular studies

## 2022-06-16 NOTE — PROGRESS NOTE ADULT - PROBLEM SELECTOR PLAN 10
- holding home ACEi additionally due to PEPE on CKD  - hydral 10 TID 6/9 - 6/14 d/c i/s/o renal dx   - dc metoprolol   - cw coreg 6/15 -stridor 6/4 requiring intubation refractory to epinephrine, methylprednisolone, xopenex, ipratropium. Previously with stridor earlier in admission although more severe 6/4 AM requiring intubation unclear etiology with new medication bicitra, recent instrumentation with core biopsy although had tolerated procedure without evidence of neck swelling/edema or stridor previous, with underlying HUNG hx.   -now resolved

## 2022-06-16 NOTE — PROGRESS NOTE ADULT - PROBLEM SELECTOR PLAN 9
-Lopressor 2.5 q6h discontinued 6/3 ON receiving crystalloid and colloid 6/3 ON to 6/4.   -6/6 metoprolol tartarate 12.5mg BID --> d/c 6/14, restarted coreg - holding home ACEi additionally due to PEPE on CKD  - hydral 10 TID 6/9 - 6/14 d/c i/s/o renal dx   - dc metoprolol   - cw coreg 6/15

## 2022-06-16 NOTE — PROGRESS NOTE ADULT - PROBLEM SELECTOR PLAN 4
-r/o HLH 2/2 EBV  -has fevers, splenomegaly, cytopenia  -HLH labs: soluble IL-2 levels 09566, ferritin 511, triglyceride 412, 6/6 fibrinogen 218 6/6  , pending final pathology from cervical lymph node biopsy, flow cytometry from peripheral blood without acute abnormalities in lymphoid, myeloid, or plasma cell lines - preliminary EBV positive lymphoproliferative disease   -IgG4 27  -dexamethasone 20 qd discussed with heme on 6/6 following bronchoscopy  -pending pathology 6/7  -Prelim per verbal with pathology EBV+ lymphoprofileration, with empiric treatment initiated for HLH and no specific treatment planned for EBV per discussion with ID.  -Fever to 102 o/n, got tylenol and new UA sent.    #Anemia and Lymphadenopathy  --multifactorial: infectious vs malignancy vs autoimmune  --supraclavicular/cervical IR bedside biopsy  pathology is still pending- follow up on final pathology   ---BMB done on 5/20 at [facility]: negative for detectable monoclonal cell or B cell population  -- HLH in differential thus patient started on steroids dexamethasone 20mg Q day.    -- meets 5 criteria of HLH but important negative features: no presence of erythrophagocytosis- will however follow up on LN biopsy. - r/o HLH 2/2 EBV vs. leukemia/lymphoma  - has fevers, splenomegaly, cytopenia  - HLH labs: soluble IL-2 levels 30135, ferritin 511 --> +1000 (6/15), triglyceride 412 --> 430+, 6/6 fibrinogen 218 6/6  , pending final pathology from cervical lymph node biopsy (prelim EBV +lymphoproliferative), flow cytometry from peripheral blood without acute abnormalities in lymphoid, myeloid, or plasma cell lines  - IgG4 27  - dexamethasone 20 qd discussed with heme on 6/6 following bronchoscopy  - pending pathology 6/7  - Prelim per verbal with pathology EBV+ lymphoprofileration, with empiric treatment initiated for HLH and no specific treatment planned for EBV per discussion with ID.    #Anemia and Lymphadenopathy  --multifactorial: infectious vs malignancy vs autoimmune  --supraclavicular/cervical IR bedside biopsy  pathology is still pending- follow up on final pathology   ---BMB done on 5/20 at [facility]: negative for detectable monoclonal cell or B cell population  -- HLH in differential thus patient started on steroids dexamethasone 20mg Q day.    -- meets 5 criteria of HLH but important negative features: no presence of erythrophagocytosis- will however follow up on LN biopsy.

## 2022-06-16 NOTE — CONSULT NOTE ADULT - CONSULT REASON
PEPE
adenopathy
LN biopsy
Anemia of CKD
eval SLE flare
Cirrhotic liver
Fever
R/O NSTI
Rehabilitation consult
gluteus and psoas hematomas
AFIb

## 2022-06-16 NOTE — CONSULT NOTE ADULT - REASON FOR ADMISSION
recurrent fevers, unknown source

## 2022-06-16 NOTE — CONSULT NOTE ADULT - ASSESSMENT
patient with lupus and he was getting immunosuppression here in the hospital with multiple medial issues but from hem/onc standpoint he has splenomegaly, adenopathy, anemia, and thrombocytopenia. Could have sequestration. Could have bone marrow suppression due to prior immunosuppressive medications. 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 He had a bone marrow biopsy at another hospital and per notes it was negative for malignancy. LDH is high and haptoglobin is low. Bilirubin is normal. Mild kidney dysfunction. HIT testing was negative. HLH has been considered and he is being treated with steroids. Also a 2nd biopsy (excisional) being considered if prior biopsy is not diagnostic. Also a bone marrow biopsy being considered depending upon results. Iron studies note an elevated ferritin and per notes plan is for supportive transfusions as needed.  Appears that primary hem/onc team is doing an appropriate evaluation and management and further care per them. Will not be following patient.

## 2022-06-16 NOTE — CONSULT NOTE ADULT - CONSULT REQUESTED DATE/TIME
03-Jun-2022 16:53
14-Jun-2022 11:53
30-May-2022 17:28
07-Jun-2022 10:51
31-May-2022 09:17
31-May-2022 10:50
01-Jun-2022 08:40
30-May-2022 16:07
31-May-2022
12-Jun-2022 21:28
15-Vernon-2022

## 2022-06-16 NOTE — PROGRESS NOTE ADULT - PROBLEM SELECTOR PLAN 7
#RLL PE  -heparin gtt on hold, may transition to alternative agent for full AC if no plans for additional surgical intervention/procedure #Superficial L brachial DVT  -heparin gtt on hold

## 2022-06-16 NOTE — PROGRESS NOTE ADULT - SUBJECTIVE AND OBJECTIVE BOX
Patient is a 66y old  Male who presents with a chief complaint of recurrent fevers, unknown source (16 Jun 2022 13:10)      MEDICATIONS  (STANDING):  benzocaine 15 mG/menthol 3.6 mG Lozenge 1 Lozenge Oral two times a day  carvedilol 25 milliGRAM(s) Oral every 12 hours  chlorhexidine 4% Liquid 1 Application(s) Topical <User Schedule>  citric acid/sodium citrate Solution 30 milliLiter(s) Oral every 6 hours  dexAMETHasone  IVPB 20 milliGRAM(s) IV Intermittent daily  dextrose 5%. 1000 milliLiter(s) (100 mL/Hr) IV Continuous <Continuous>  dextrose 5%. 1000 milliLiter(s) (50 mL/Hr) IV Continuous <Continuous>  dextrose 50% Injectable 25 Gram(s) IV Push once  dextrose 50% Injectable 12.5 Gram(s) IV Push once  dextrose 50% Injectable 25 Gram(s) IV Push once  FIRST- Mouthwash  BLM 15 milliLiter(s) Swish and Spit two times a day  fluconAZOLE IVPB 400 milliGRAM(s) IV Intermittent every 24 hours  folic acid 1 milliGRAM(s) Oral daily  furosemide   Injectable 40 milliGRAM(s) IV Push daily  gabapentin 300 milliGRAM(s) Oral daily  glucagon  Injectable 1 milliGRAM(s) IntraMuscular once  glucagon  Injectable 1 milliGRAM(s) IntraMuscular once  insulin lispro (ADMELOG) corrective regimen sliding scale   SubCutaneous three times a day before meals  insulin lispro (ADMELOG) corrective regimen sliding scale   SubCutaneous at bedtime  lidocaine   4% Patch 1 Patch Transdermal daily  multivitamin/minerals 1 Tablet(s) Oral daily  pantoprazole    Tablet 40 milliGRAM(s) Oral before breakfast  tamsulosin 0.4 milliGRAM(s) Oral at bedtime  trimethoprim  160 mG/sulfamethoxazole 800 mG 1 Tablet(s) Oral daily  valACYclovir 1000 milliGRAM(s) Oral two times a day    MEDICATIONS  (PRN):  acetaminophen     Tablet .. 650 milliGRAM(s) Oral every 6 hours PRN Temp greater or equal to 38C (100.4F), Mild Pain (1 - 3)  dextrose Oral Gel 15 Gram(s) Oral once PRN Blood Glucose LESS THAN 70 milliGRAM(s)/deciliter  sodium chloride 0.9% lock flush 10 milliLiter(s) IV Push every 1 hour PRN Pre/post blood products, medications, blood draw, and to maintain line patency      ROS  No fever, sweats, chills  No epistaxis, HA, sore throat  No CP, SOB, cough, sputum  No n/v/d, abd pain, melena, hematochezia  No edema  No rash  No anxiety  No back pain, joint pain  No bleeding, bruising  No dysuria, hematuria    Vital Signs Last 24 Hrs  T(C): 38.2 (16 Jun 2022 17:02), Max: 39.3 (15 [place] 2022 20:18)  T(F): 100.7 (16 Jun 2022 17:02), Max: 102.8 (15 [place] 2022 20:18)  HR: 91 (16 Jun 2022 17:02) (75 - 100)  BP: 178/85 (16 Jun 2022 17:02) (121/65 - 178/85)  BP(mean): --  RR: 18 (16 Jun 2022 17:02) (18 - 19)  SpO2: 92% (16 Jun 2022 17:02) (92% - 98%)    PE  NAD  Awake, alert  Anicteric, MMM  RRR  CTAB  Abd soft, NT, ND  No c/c/e  No rash grossly  FROM                          9.1    7.43  )-----------( 54       ( 16 Jun 2022 11:25 )             27.8       06-16    142  |  107  |  48<H>  ----------------------------<  111<H>  3.9   |  24  |  1.36<H>    Ca    7.9<L>      16 Jun 2022 11:25  Phos  3.8     06-16  Mg     1.7     06-16    TPro  5.9<L>  /  Alb  3.0<L>  /  TBili  1.2  /  DBili  x   /  AST  38  /  ALT  20  /  AlkPhos  202<H>  06-16

## 2022-06-16 NOTE — PROGRESS NOTE ADULT - PROBLEM SELECTOR PLAN 3
PEPE on CKD, hx lupus nephritis  - Cr 1.35 (6/15) improving   - p/w with worsening renal function FeNa prerenal  - 6/3 canales placed   - 6/5 urine studies consistent with pre-renal FeNA 0.8 however IVC 2.1 on POCUS.  - urinalysis with 100mg/dL proteinuria with biopsy proven lupus nephritis   - urine studies less suggestive of HRS picture discontinued midodrine although will continue albumin may benefit from volume.   - appreciate nephrology recommendations        -Discontinued tamsulosin for now cannot be crushed and canales in place  -6/6 discussed prior arinesp with family and risks of arinesp administration in critically ill explained including increased risk of thrombosis.  -6/10 s/p 2 doses IV Lasix 20 for edema, pt. very fluid positive.  - 6/15 started IV lasix 40 mg BID with strict I/O PEPE on CKD, hx lupus nephritis  - Cr 1.35 (6/15) improving   - p/w with worsening renal function FeNa prerenal  - 6/3 - 6/10 canales, 6/15 placed condom cath, but pt uses urinal intermittently   - 6/5 urine studies consistent with pre-renal FeNA 0.8 however IVC 2.1 on POCUS.  - urinalysis with 100mg/dL proteinuria with biopsy proven lupus nephritis   - appreciate nephrology recommendations      - Discontinued tamsulosin for now cannot be crushed and canales in place  - 6/6 discussed prior arinesp with family and risks of arinesp administration in critically ill explained including increased risk of thrombosis.  - 6/10 s/p 2 doses IV Lasix 20 for edema, pt. very fluid positive.  - 6/15 - 6/16 started (6/15) IV lasix 40 mg BID with strict I/O --> 6/16 transitioned to daily

## 2022-06-16 NOTE — PROGRESS NOTE ADULT - PROBLEM SELECTOR PLAN 1
- *6/15 - 6/16 overnight fever 102.4 with loose, diarrhea stool 5-6 episodes, no abx, pending GI PCR  - hospital course fevers: 6/10 102F; 6/11 105F, 6/12 101F 6/12, no fever on 6/15  - leukocytosis (on steroids as well):   - BCx: 6/11 NGTD, 5/30 NGTD  - Abx: monitoring off abx as of 6/15  - sources: lines [RUE midline vs. peripheral lines vs. canales cath]    - diff dx: infectious, malignancy, IgG4 disease, HLH  - elevated WBC, elevated IgA at [place], HIV nonreactive, EBV IgG+, CMV 50  - s/p core biopsy and paracentesis at [place] prior to transfer to [facility]  - quant gold indeterminate    - possibility of peritoneal TB appreciate ID reccs AFB sputum and stool thus far are negative with IR biopsy completed pending final results 6/4  -Rheum eval including C3 C4 ESR 44 CRP 77 dsDNA progression of underlying lupus v other rheumatologic process causing fever although less likely.  -will f/u pathology results from cervical LN core biopsy - EBV positive lymphoproliferative preliminary results   -bronch lavage cytology negative for malignancy - *6/15 - 6/16 overnight fever 102.4 with loose, diarrhea stool 5-6 episodes, no abx, pending GI PCR  - hospital course fevers: 6/10 102F; 6/11 105F, 6/12 101F 6/12, no fever on 6/15  - leukocytosis (on steroids as well):   - BCx: pending 6/16, 6/11 NGTD, 5/30 NGTD  - Abx: monitoring off abx as of 6/15  - sources: previous perianal abscess area? vs. lines [RUE midline vs. peripheral lines vs. 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  - quant gold indeterminate    - possibility of peritoneal TB appreciate ID reccs AFB sputum and stool thus far are negative with IR biopsy completed pending final results 6/4  -Rheum eval including C3 C4 ESR 44 CRP 77 dsDNA progression of underlying lupus v other rheumatologic process causing fever although less likely.  -will f/u pathology results from cervical LN core biopsy - EBV positive lymphoproliferative preliminary results   -bronch lavage cytology negative for malignancy

## 2022-06-16 NOTE — PROGRESS NOTE ADULT - PROBLEM SELECTOR PLAN 2
- 6/15 - 6/16 overnight with 5-6 episodes of light, brown/green colored loose stool with fever of 102.4  - f/u GI PCR     #chronic inflammatory diarrhea  #possible Crohn's vs colitis vs infectious  -calprotectin elevated at [place] [id number]. no current plan by GI for colonoscopy at this time.   -C diff negative  -appreciate GI and ID input.   -less likely crohns disease - 6/15 - 6/16 overnight with 5-6 episodes of light, brown/green colored loose stool with fever of 102.4  - no bowel regimen in place, monitor hydration status, reduced diuretics from lasix 40 BID to daily   - f/u GI PCR, consider CMV r/o if persists     Additional Hx:   #chronic inflammatory diarrhea  #possible Crohn's vs colitis vs infectious  -calprotectin elevated at [place] [id number]. no current plan by GI for colonoscopy at this time.   -C diff negative (5/30)  -less likely crohns disease

## 2022-06-16 NOTE — CONSULT NOTE ADULT - SUBJECTIVE AND OBJECTIVE BOX
Chief Complaint:  Patient is a 66y old  Male who presents with a chief complaint of recurrent fevers, unknown source (16 Jun 2022 19:29)      HPI: Asked to see patient for a 2nd opinion. He has a complicated history. From hem/onc standpoint he has adenopathy and splenomegaly. He had a lymph node biopsy and per report it would appear that he has an EBV positive associated LPD process. Appears that final testing is still in process. He is also anemic and has thrombocytopenia. The platelets have been trending down. HIT testing was negative. Initially LDH was slightly high and haptoglobin was normal. Repeat testing noted that the LDH was higher and the haptoglobin was low. he is anemic. He has mild kidney disease. Bilirubin is normal. Ferritin is high. HLH being consider and he is being treated with steroids.     Medications:  acetaminophen     Tablet .. 650 milliGRAM(s) Oral every 6 hours PRN  benzocaine 15 mG/menthol 3.6 mG Lozenge 1 Lozenge Oral two times a day  carvedilol 25 milliGRAM(s) Oral every 12 hours  chlorhexidine 4% Liquid 1 Application(s) Topical <User Schedule>  citric acid/sodium citrate Solution 30 milliLiter(s) Oral every 6 hours  dexAMETHasone  IVPB 20 milliGRAM(s) IV Intermittent daily  dextrose 5%. 1000 milliLiter(s) IV Continuous <Continuous>  dextrose 5%. 1000 milliLiter(s) IV Continuous <Continuous>  dextrose 50% Injectable 25 Gram(s) IV Push once  dextrose 50% Injectable 12.5 Gram(s) IV Push once  dextrose 50% Injectable 25 Gram(s) IV Push once  dextrose Oral Gel 15 Gram(s) Oral once PRN  FIRST- Mouthwash  BLM 15 milliLiter(s) Swish and Spit two times a day  fluconAZOLE IVPB 400 milliGRAM(s) IV Intermittent every 24 hours  folic acid 1 milliGRAM(s) Oral daily  furosemide   Injectable 40 milliGRAM(s) IV Push daily  gabapentin 300 milliGRAM(s) Oral daily  glucagon  Injectable 1 milliGRAM(s) IntraMuscular once  glucagon  Injectable 1 milliGRAM(s) IntraMuscular once  insulin lispro (ADMELOG) corrective regimen sliding scale   SubCutaneous three times a day before meals  insulin lispro (ADMELOG) corrective regimen sliding scale   SubCutaneous at bedtime  lidocaine   4% Patch 1 Patch Transdermal daily  multivitamin/minerals 1 Tablet(s) Oral daily  pantoprazole    Tablet 40 milliGRAM(s) Oral before breakfast  sodium chloride 0.9% lock flush 10 milliLiter(s) IV Push every 1 hour PRN  tamsulosin 0.4 milliGRAM(s) Oral at bedtime  trimethoprim  160 mG/sulfamethoxazole 800 mG 1 Tablet(s) Oral daily  valACYclovir 1000 milliGRAM(s) Oral two times a day        Allergies:  No Known Allergies      FAMILY HISTORY:  FH: type 2 diabetes    PAST MEDICAL & SURGICAL HISTORY:  Obstructive Sleep Apnea      Hepatitis B Carrier      Pneumonia      Other systemic lupus erythematosus with endocarditis      Type 2 diabetes mellitus with other neurologic complication, without long-term current use of insulin      Sleep apnea, unspecified type      Hypertension, unspecified type      Nephritic syndrome      No significant past surgical history    REVIEW OF SYSTEMS  patient asleep      Vitals:  Vital Signs Last 24 Hrs  T(C): 37.1 (16 Jun 2022 20:26), Max: 38.2 (16 Jun 2022 17:02)  T(F): 98.8 (16 Jun 2022 20:26), Max: 100.7 (16 Jun 2022 17:02)  HR: 81 (16 Jun 2022 21:10) (75 - 91)  BP: 126/56 (16 Jun 2022 20:26) (121/65 - 178/85)  BP(mean): --  RR: 21 (16 Jun 2022 21:10) (18 - 21)  SpO2: 94% (16 Jun 2022 21:10) (92% - 98%)    Pex:  resting NAD  Cv s1 S2 RRR  Lungs clear B/L  abd soft +BS  No LE edema or tenderness    Labs:                        9.1    7.43  )-----------( 54       ( 16 Jun 2022 11:25 )             27.8     CBC Full  -  ( 16 Jun 2022 11:25 )  WBC Count : 7.43 K/uL  RBC Count : 2.94 M/uL  Hemoglobin : 9.1 g/dL  Hematocrit : 27.8 %  Platelet Count - Automated : 54 K/uL  Mean Cell Volume : 94.6 fl  Mean Cell Hemoglobin : 31.0 pg  Mean Cell Hemoglobin Concentration : 32.7 gm/dL  Auto Neutrophil # : x  Auto Lymphocyte # : x  Auto Monocyte # : x  Auto Eosinophil # : x  Auto Basophil # : x  Auto Neutrophil % : x  Auto Lymphocyte % : x  Auto Monocyte % : x  Auto Eosinophil % : x  Auto Basophil % : x    06-16    142  |  107  |  48<H>  ----------------------------<  111<H>  3.9   |  24  |  1.36<H>    Ca    7.9<L>      16 Jun 2022 11:25  Phos  3.8     06-16  Mg     1.7     06-16    TPro  5.9<L>  /  Alb  3.0<L>  /  TBili  1.2  /  DBili  x   /  AST  38  /  ALT  20  /  AlkPhos  202<H>  06-16      [id number]

## 2022-06-16 NOTE — PROGRESS NOTE ADULT - PROBLEM SELECTOR PLAN 8
#Superficial L brachial DVT  -heparin gtt on hold -Lopressor 2.5 q6h discontinued 6/3 ON receiving crystalloid and colloid 6/3 ON to 6/4.   -6/6 metoprolol tartarate 12.5mg BID --> d/c 6/14, restarted coreg

## 2022-06-16 NOTE — PROGRESS NOTE ADULT - PROBLEM SELECTOR PLAN 6
-outside hospital reported to be consistent with AOCD  -CT ab pending, r/o bleed  - 6/13 AM s/p 1u pRBC hgb 6.7  - 6/14 AM s/p 1u pRBC hgb 6.7   - 6/15 AM s/p 1u pRBC hgb 6.7  -transfuse if Hb<7, maintain active type and screen  - no active bleed, will hold off on CTA, consider splenic accumulation of RBC from EBV  -DVT ppx: full AC -outside hospital reported to be consistent with AOCD  -CT ab pending, r/o bleed  - 6/13 AM s/p 1u pRBC hgb 6.7  - 6/14 AM s/p 1u pRBC hgb 6.7   - 6/15 AM s/p 1u pRBC hgb 6.7 --> 9.3 (overcorrection s/p 1u pRBC?)  - 6/16 AM hgb ____   - transfuse if Hb<7, maintain active type and screen  - (no active bleed, will hold off on CTA, consider splenic accumulation of RBC from EBV  - DVT ppx: hold AC

## 2022-06-16 NOTE — PROGRESS NOTE ADULT - SUBJECTIVE AND OBJECTIVE BOX
HANDY FREEMAN 66y [id number]    Patient is a 66y old  Male who presents with a chief complaint of recurrent fevers, unknown source (16 Jun 2022 07:04)      Follow Up/CC:  ID following for    Interval History/ROS:    Allergies    No Known Allergies    Intolerances        ANTIMICROBIALS:  fluconAZOLE IVPB 400 every 24 hours  trimethoprim  160 mG/sulfamethoxazole 800 mG 1 daily      MEDICATIONS  (STANDING):  benzocaine 15 mG/menthol 3.6 mG Lozenge 1 Lozenge Oral two times a day  carvedilol 25 milliGRAM(s) Oral every 12 hours  chlorhexidine 4% Liquid 1 Application(s) Topical <User Schedule>  citric acid/sodium citrate Solution 30 milliLiter(s) Oral every 6 hours  dexAMETHasone  IVPB 20 milliGRAM(s) IV Intermittent daily  dextrose 5%. 1000 milliLiter(s) (100 mL/Hr) IV Continuous <Continuous>  dextrose 5%. 1000 milliLiter(s) (50 mL/Hr) IV Continuous <Continuous>  dextrose 50% Injectable 25 Gram(s) IV Push once  dextrose 50% Injectable 12.5 Gram(s) IV Push once  dextrose 50% Injectable 25 Gram(s) IV Push once  fluconAZOLE IVPB 400 milliGRAM(s) IV Intermittent every 24 hours  folic acid 1 milliGRAM(s) Oral daily  furosemide   Injectable 40 milliGRAM(s) IV Push two times a day  gabapentin 300 milliGRAM(s) Oral daily  glucagon  Injectable 1 milliGRAM(s) IntraMuscular once  glucagon  Injectable 1 milliGRAM(s) IntraMuscular once  insulin lispro (ADMELOG) corrective regimen sliding scale   SubCutaneous three times a day before meals  insulin lispro (ADMELOG) corrective regimen sliding scale   SubCutaneous at bedtime  lidocaine   4% Patch 1 Patch Transdermal daily  multivitamin/minerals 1 Tablet(s) Oral daily  pantoprazole    Tablet 40 milliGRAM(s) Oral before breakfast  tamsulosin 0.4 milliGRAM(s) Oral at bedtime  trimethoprim  160 mG/sulfamethoxazole 800 mG 1 Tablet(s) Oral daily    MEDICATIONS  (PRN):  acetaminophen     Tablet .. 650 milliGRAM(s) Oral every 6 hours PRN Temp greater or equal to 38C (100.4F), Mild Pain (1 - 3)  dextrose Oral Gel 15 Gram(s) Oral once PRN Blood Glucose LESS THAN 70 milliGRAM(s)/deciliter  sodium chloride 0.9% lock flush 10 milliLiter(s) IV Push every 1 hour PRN Pre/post blood products, medications, blood draw, and to maintain line patency        Vital Signs Last 24 Hrs  T(C): 36.9 (16 Jun 2022 08:15), Max: 39.3 (15 Vernon 2022 20:18)  T(F): 98.4 (16 Jun 2022 08:15), Max: 102.8 (15 Vernon 2022 20:18)  HR: 76 (16 Jun 2022 08:15) (73 - 100)  BP: 153/73 (16 Jun 2022 08:15) (121/65 - 160/73)  BP(mean): --  RR: 18 (16 Jun 2022 08:15) (18 - 20)  SpO2: 95% (16 Jun 2022 08:15) (91% - 98%)    CBC Full  -  ( 15 Vernon 2022 14:18 )  WBC Count : 11.28 K/uL  RBC Count : 3.11 M/uL  Hemoglobin : 9.3 g/dL  Hematocrit : 29.6 %  Platelet Count - Automated : 52 K/uL  Mean Cell Volume : 95.2 fl  Mean Cell Hemoglobin : 29.9 pg  Mean Cell Hemoglobin Concentration : 31.4 gm/dL  Auto Neutrophil # : x  Auto Lymphocyte # : x  Auto Monocyte # : x  Auto Eosinophil # : x  Auto Basophil # : x  Auto Neutrophil % : x  Auto Lymphocyte % : x  Auto Monocyte % : x  Auto Eosinophil % : x  Auto Basophil % : x    06-15    142  |  108  |  52<H>  ----------------------------<  175<H>  3.9   |  20<L>  |  1.35<H>    Ca    7.3<L>      15 Vernon 2022 05:01  Phos  4.5     06-15  Mg     1.8     06-15    TPro  5.0<L>  /  Alb  2.4<L>  /  TBili  1.0  /  DBili  x   /  AST  37  /  ALT  15  /  AlkPhos  190<H>  06-15    LIVER FUNCTIONS - ( 15 Vernon 2022 05:01 )  Alb: 2.4 g/dL / Pro: 5.0 g/dL / ALK PHOS: 190 U/L / ALT: 15 U/L / AST: 37 U/L / GGT: x               MICROBIOLOGY:  .Blood Blood  06-11-22   No growth to date.  --  --      .Bronchial Bronchial Lavage  06-06-22   No growth at 1 week.  --    No polymorphonuclear cells seen per low power field  No squamous epithelial cells per low power field  No organisms seen per oil power field      .Blood Blood  06-05-22   NEGATIVE for Plasmodium antigens. Microscopy is performed for  confirmation.  This test does not detect the presence of Babesia species.  If Babesiosis is suspected, please order test for Babesia PCR: Babesia  microti PCR Bld  ************************************************************  No Blood Parasites observed by giemsa stain  One negative set of blood smears does not rule out  the possibility of a parasitic infection.  A minimum of 3  specimens should be collected, at least 12-24 hours apart,  over a 36 hour time period.  --  --      .Blood Blood  06-04-22   NEGATIVE for Plasmodium antigens. Microscopy is performed for  confirmation.  This test does not detect the presence of Babesia species.  If Babesiosis is suspected, please order test for Babesia PCR: Babesia  microti PCR Bld  ************************************************************  No Blood Parasites observed by giemsa stain  One negative set of blood smears does not rule out  the possibility of a parasitic infection.  A minimum of 3  specimens should be collected, at least 12-24 hours apart,  over a 36 hour time period.  --  --      .Sputum Sputum  06-04-22   Normal Respiratory Wen present  --    Rare Squamous epithelial cells per low power field  Rare polymorphonuclear leukocytes per low power field  No organisms seen      .Sputum Sputum  06-04-22   No growth at 1 week.  --  --      .Blood Blood-Venous  06-04-22   No Growth Final  --  --      .Blood Blood-Peripheral  06-03-22   No Growth Final  --  --      .Body Fluid Peritoneal Fluid  06-02-22   No growth  --    polymorphonuclear leukocytes seen  No organisms seen  by cytocentrifuge      .Blood Blood-Peripheral  06-01-22   No Growth Final  --  --      .Blood Blood-Venous  06-01-22   No Growth Final  --  --      .Stool Stool  06-01-22   No growth at 1 week.  --  --      .Stool Stool  06-01-22   No growth at 1 week.  --  --      .Sputum Sputum  06-01-22   No growth at 1 week.  --  --      .Stool Stool  05-31-22   No growth at 1 week.  --  --      .Sputum Sputum  05-31-22   No growth at 1 week.  --  --      .Sputum Sputum  05-30-22   Normal Respiratory Wen present  --    No polymorphonuclear leukocytes per low power field  No Squamous epithelial cells per low power field  No organisms seen per oil power field      .Stool sarthak betito  05-30-22   No enteric pathogens isolated.  (Stool culture examined for Salmonella,  Shigella, Campylobacter, Aeromonas, Plesiomonas,  Vibrio, E.coli O157 and Yersinia)  --  --      .Blood Blood  05-30-22   NEGATIVE for Plasmodium antigens. Microscopy is performed for  confirmation.  This test does not detect the presence of Babesia species.  If Babesiosis is suspected, please order test for Babesia PCR: Babesia  microti PCR Bld  ************************************************************  No Blood Parasites observed by giemsa stain  One negative set of blood smears does not rule out  the possibility of a parasitic infection.  A minimum of 3  specimens should be collected, at least 12-24 hours apart,  over a 36 hour time period.  --  --      .Stool Feces sarthak betito  05-30-22   GI PCR Results: NOT detected  *******Please Note:*******  GI panel PCR evaluates for:  Campylobacter, Plesiomonas shigelloides, Salmonella,  Vibrio, Yersinia enterocolitica, Enteroaggregative  Escherichia coli (EAEC), Enteropathogenic E.coli (EPEC),  Enterotoxigenic E. coli (ETEC) lt/st, Shiga-like  toxin-producing E. coli (STEC) stx1/stx2,  Shigella/ Enteroinvasive E. coli (EIEC), Cryptosporidium,  Cyclospora cayetanensis, Entamoeba histolytica,  Giardia lamblia, Adenovirus F 40/41, Astrovirus,  Norovirus GI/GII, Rotavirus A, Sapovirus  --  --      .Blood Blood  05-30-22   No Growth Final  --  --      .Blood Blood  05-30-22   No growth  --  --      .Blood Blood  05-30-22   No Growth Final  --  --              v        Clostridium difficile GDH Toxins A&amp;B, EIA:   Negative (06-09-22 @ 20:53)  Clostridium difficile GDH Interpretation: Negative for toxigenic C. Difficile.  This specimen is negative for C.  Difficile glutamate dehydrogenase (GDH) antigen and negative for C.  Difficile Toxins A & B, by EIA.  GDH is a highly sensitive screening  marker for C. Difficile that is produced in large amounts by all C.  Difficile strains, both toxigenic and nontoxigenic.  This assay has not  been validated as a test of cure.  Repeat testing during the same episode  of diarrhea is of limited value and is discouraged.  The results of this  assay should always be interpreted in conjunction with pateint's clinical  history. (06-09-22 @ 20:53)      RADIOLOGY     HANDY FREEMAN 66y [id number]    Patient is a 66y old  Male who presents with a chief complaint of recurrent fevers, unknown source (16 Jun 2022 07:04)      Follow Up/CC:  ID following for fever    Interval History/ROS: fever+, c/o tongue sore    Allergies    No Known Allergies    Intolerances        ANTIMICROBIALS:  fluconAZOLE IVPB 400 every 24 hours  trimethoprim  160 mG/sulfamethoxazole 800 mG 1 daily      MEDICATIONS  (STANDING):  benzocaine 15 mG/menthol 3.6 mG Lozenge 1 Lozenge Oral two times a day  carvedilol 25 milliGRAM(s) Oral every 12 hours  chlorhexidine 4% Liquid 1 Application(s) Topical <User Schedule>  citric acid/sodium citrate Solution 30 milliLiter(s) Oral every 6 hours  dexAMETHasone  IVPB 20 milliGRAM(s) IV Intermittent daily  dextrose 5%. 1000 milliLiter(s) (100 mL/Hr) IV Continuous <Continuous>  dextrose 5%. 1000 milliLiter(s) (50 mL/Hr) IV Continuous <Continuous>  dextrose 50% Injectable 25 Gram(s) IV Push once  dextrose 50% Injectable 12.5 Gram(s) IV Push once  dextrose 50% Injectable 25 Gram(s) IV Push once  fluconAZOLE IVPB 400 milliGRAM(s) IV Intermittent every 24 hours  folic acid 1 milliGRAM(s) Oral daily  furosemide   Injectable 40 milliGRAM(s) IV Push two times a day  gabapentin 300 milliGRAM(s) Oral daily  glucagon  Injectable 1 milliGRAM(s) IntraMuscular once  glucagon  Injectable 1 milliGRAM(s) IntraMuscular once  insulin lispro (ADMELOG) corrective regimen sliding scale   SubCutaneous three times a day before meals  insulin lispro (ADMELOG) corrective regimen sliding scale   SubCutaneous at bedtime  lidocaine   4% Patch 1 Patch Transdermal daily  multivitamin/minerals 1 Tablet(s) Oral daily  pantoprazole    Tablet 40 milliGRAM(s) Oral before breakfast  tamsulosin 0.4 milliGRAM(s) Oral at bedtime  trimethoprim  160 mG/sulfamethoxazole 800 mG 1 Tablet(s) Oral daily    MEDICATIONS  (PRN):  acetaminophen     Tablet .. 650 milliGRAM(s) Oral every 6 hours PRN Temp greater or equal to 38C (100.4F), Mild Pain (1 - 3)  dextrose Oral Gel 15 Gram(s) Oral once PRN Blood Glucose LESS THAN 70 milliGRAM(s)/deciliter  sodium chloride 0.9% lock flush 10 milliLiter(s) IV Push every 1 hour PRN Pre/post blood products, medications, blood draw, and to maintain line patency        Vital Signs Last 24 Hrs  T(C): 36.9 (16 Jun 2022 08:15), Max: 39.3 (15 Vernon 2022 20:18)  T(F): 98.4 (16 Jun 2022 08:15), Max: 102.8 (15 Vernon 2022 20:18)  HR: 76 (16 Jun 2022 08:15) (73 - 100)  BP: 153/73 (16 Jun 2022 08:15) (121/65 - 160/73)  BP(mean): --  RR: 18 (16 Jun 2022 08:15) (18 - 20)  SpO2: 95% (16 Jun 2022 08:15) (91% - 98%)    CBC Full  -  ( 15 Vernon 2022 14:18 )  WBC Count : 11.28 K/uL  RBC Count : 3.11 M/uL  Hemoglobin : 9.3 g/dL  Hematocrit : 29.6 %  Platelet Count - Automated : 52 K/uL  Mean Cell Volume : 95.2 fl  Mean Cell Hemoglobin : 29.9 pg  Mean Cell Hemoglobin Concentration : 31.4 gm/dL  Auto Neutrophil # : x  Auto Lymphocyte # : x  Auto Monocyte # : x  Auto Eosinophil # : x  Auto Basophil # : x  Auto Neutrophil % : x  Auto Lymphocyte % : x  Auto Monocyte % : x  Auto Eosinophil % : x  Auto Basophil % : x    06-15    142  |  108  |  52<H>  ----------------------------<  175<H>  3.9   |  20<L>  |  1.35<H>    Ca    7.3<L>      15 Vernon 2022 05:01  Phos  4.5     06-15  Mg     1.8     06-15    TPro  5.0<L>  /  Alb  2.4<L>  /  TBili  1.0  /  DBili  x   /  AST  37  /  ALT  15  /  AlkPhos  190<H>  06-15    LIVER FUNCTIONS - ( 15 Vern[place] 2022 05:01 )  Alb: 2.4 g/dL / Pro: 5.0 g/dL / ALK PHOS: 190 U/L / ALT: 15 U/L / AST: 37 U/L / GGT: x               MICROBIOLOGY:  .Blood Blood  06-11-22   No growth to date.  --  --      .Bronchial Bronchial Lavage  06-06-22   No growth at 1 week.  --    No polymorphonuclear cells seen per low power field  No squamous epithelial cells per low power field  No organisms seen per oil power field      .Blood Blood  06-05-22   NEGATIVE for Plasmodium antigens. Microscopy is performed for  confirmation.  This test does not detect the presence of Babesia species.  If Babesiosis is suspected, please order test for Babesia PCR: Babesia  microti PCR Bld  ************************************************************  No Blood Parasites observed by giemsa stain  One negative set of blood smears does not rule out  the possibility of a parasitic infection.  A minimum of 3  specimens should be collected, at least 12-24 hours apart,  over a 36 hour time period.  --  --      .Blood Blood  06-04-22   NEGATIVE for Plasmodium antigens. Microscopy is performed for  confirmation.  This test does not detect the presence of Babesia species.  If Babesiosis is suspected, please order test for Babesia PCR: Babesia  microti PCR Bld  ************************************************************  No Blood Parasites observed by giemsa stain  One negative set of blood smears does not rule out  the possibility of a parasitic infection.  A minimum of 3  specimens should be collected, at least 12-24 hours apart,  over a 36 hour time period.  --  --      .Sputum Sputum  06-04-22   Normal Respiratory Wen present  --    Rare Squamous epithelial cells per low power field  Rare polymorphonuclear leukocytes per low power field  No organisms seen      .Sputum Sputum  06-04-22   No growth at 1 week.  --  --      .Blood Blood-Venous  06-04-22   No Growth Final  --  --      .Blood Blood-Peripheral  06-03-22   No Growth Final  --  --      .Body Fluid Peritoneal Fluid  06-02-22   No growth  --    polymorphonuclear leukocytes seen  No organisms seen  by cytocentrifuge      .Blood Blood-Peripheral  06-01-22   No Growth Final  --  --      .Blood Blood-Venous  06-01-22   No Growth Final  --  --      .Stool Stool  06-01-22   No growth at 1 week.  --  --      .Stool Stool  06-01-22   No growth at 1 week.  --  --      .Sputum Sputum  06-01-22   No growth at 1 week.  --  --      .Stool Stool  05-31-22   No growth at 1 week.  --  --      .Sputum Sputum  05-31-22   No growth at 1 week.  --  --      .Sputum Sputum  05-30-22   Normal Respiratory Wen present  --    No polymorphonuclear leukocytes per low power field  No Squamous epithelial cells per low power field  No organisms seen per oil power field      .Stool sarthak betito  05-30-22   No enteric pathogens isolated.  (Stool culture examined for Salmonella,  Shigella, Campylobacter, Aeromonas, Plesiomonas,  Vibrio, E.coli O157 and Yersinia)  --  --      .Blood Blood  05-30-22   NEGATIVE for Plasmodium antigens. Microscopy is performed for  confirmation.  This test does not detect the presence of Babesia species.  If Babesiosis is suspected, please order test for Babesia PCR: Babesia  microti PCR d  ************************************************************  No Blood Parasites observed by giemsa stain  One negative set of blood smears does not rule out  the possibility of a parasitic infection.  A minimum of 3  specimens should be collected, at least 12-24 hours apart,  over a 36 hour time period.  --  --      .Stool Feces sarthak betito  05-30-22   GI PCR Results: NOT detected  *******Please Note:*******  GI panel PCR evaluates for:  Campylobacter, Plesiomonas shigelloides, Salmonella,  Vibrio, Yersinia enterocolitica, Enteroaggregative  Escherichia coli (EAEC), Enteropathogenic E.coli (EPEC),  Enterotoxigenic E. coli (ETEC) lt/st, Shiga-like  toxin-producing E. coli (STEC) stx1/stx2,  Shigella/ Enteroinvasive E. coli (EIEC), Cryptosporidium,  Cyclospora cayetanensis, Entamoeba histolytica,  Giardia lamblia, Adenovirus F 40/41, Astrovirus,  Norovirus GI/GII, Rotavirus A, Sapovirus  --  --      .Blood Blood  05-30-22   No Growth Final  --  --      .Blood Blood  05-30-22   No growth  --  --      .Blood Blood  05-30-22   No Growth Final  --  --          Clostridium difficile GDH Toxins A&amp;B, EIA:   Negative (06-09-22 @ 20:53)  Clostridium difficile GDH Interpretation: Negative for toxigenic C. Difficile.  This specimen is negative for C.  Difficile glutamate dehydrogenase (GDH) antigen and negative for C.  Difficile Toxins A & B, by EIA.  GDH is a highly sensitive screening  marker for C. Difficile that is produced in large amounts by all C.  Difficile strains, both toxigenic and nontoxigenic.  This assay has not  been validated as a test of cure.  Repeat testing during the same episode  of diarrhea is of limited value and is discouraged.  The results of this  assay should always be interpreted in conjunction with pateint's clinical  history. (06-09-22 @ 20:53)      RADIOLOGY    < from: [facility] Duplex Upper Ext Vein Scan, Bilat (06.13.22 @ 16:54) >  No evidence of deep venous thrombosis in either upper extremity.    Superficial thrombophlebitis affects the right cephalic vein.      < end of copied text >

## 2022-06-16 NOTE — PROGRESS NOTE ADULT - ATTENDING COMMENTS
Enedelia Simmons MD  Off: [phone]  contact me on teams    (After 5 pm or on weekends please page the on-call fellow/attending, can check [email or web address] for schedule. Login is aretha carlson, schedule under [facility] medicine, psych, derm)

## 2022-06-16 NOTE — PROGRESS NOTE ADULT - ASSESSMENT
Patient is a 66y old  Male who presents with a chief complaint of recurrent fevers, unknown source (16 Jun 2022 13:10)      MEDICATIONS  (STANDING):  benzocaine 15 mG/menthol 3.6 mG Lozenge 1 Lozenge Oral two times a day  carvedilol 25 milliGRAM(s) Oral every 12 hours  chlorhexidine 4% Liquid 1 Application(s) Topical <User Schedule>  citric acid/sodium citrate Solution 30 milliLiter(s) Oral every 6 hours  dexAMETHasone  IVPB 20 milliGRAM(s) IV Intermittent daily  dextrose 5%. 1000 milliLiter(s) (100 mL/Hr) IV Continuous <Continuous>  dextrose 5%. 1000 milliLiter(s) (50 mL/Hr) IV Continuous <Continuous>  dextrose 50% Injectable 25 Gram(s) IV Push once  dextrose 50% Injectable 12.5 Gram(s) IV Push once  dextrose 50% Injectable 25 Gram(s) IV Push once  FIRST- Mouthwash  BLM 15 milliLiter(s) Swish and Spit two times a day  fluconAZOLE IVPB 400 milliGRAM(s) IV Intermittent every 24 hours  folic acid 1 milliGRAM(s) Oral daily  furosemide   Injectable 40 milliGRAM(s) IV Push daily  gabapentin 300 milliGRAM(s) Oral daily  glucagon  Injectable 1 milliGRAM(s) IntraMuscular once  glucagon  Injectable 1 milliGRAM(s) IntraMuscular once  insulin lispro (ADMELOG) corrective regimen sliding scale   SubCutaneous three times a day before meals  insulin lispro (ADMELOG) corrective regimen sliding scale   SubCutaneous at bedtime  lidocaine   4% Patch 1 Patch Transdermal daily  multivitamin/minerals 1 Tablet(s) Oral daily  pantoprazole    Tablet 40 milliGRAM(s) Oral before breakfast  tamsulosin 0.4 milliGRAM(s) Oral at bedtime  trimethoprim  160 mG/sulfamethoxazole 800 mG 1 Tablet(s) Oral daily  valACYclovir 1000 milliGRAM(s) Oral two times a day    MEDICATIONS  (PRN):  acetaminophen     Tablet .. 650 milliGRAM(s) Oral every 6 hours PRN Temp greater or equal to 38C (100.4F), Mild Pain (1 - 3)  dextrose Oral Gel 15 Gram(s) Oral once PRN Blood Glucose LESS THAN 70 milliGRAM(s)/deciliter  sodium chloride 0.9% lock flush 10 milliLiter(s) IV Push every 1 hour PRN Pre/post blood products, medications, blood draw, and to maintain line patency      ROS  No fever, sweats, chills  No epistaxis, HA, sore throat  No CP, SOB, cough, sputum  No n/v/d, abd pain, melena, hematochezia  No edema  No rash  No anxiety  No back pain, joint pain  No bleeding, bruising  No dysuria, hematuria    Vital Signs Last 24 Hrs  T(C): 38.2 (16 Jun 2022 17:02), Max: 39.3 (15 Vernon 2022 20:18)  T(F): 100.7 (16 Jun 2022 17:02), Max: 102.8 (15 Vernon 2022 20:18)  HR: 91 (16 Jun 2022 17:02) (75 - 100)  BP: 178/85 (16 Jun 2022 17:02) (121/65 - 178/85)  BP(mean): --  RR: 18 (16 Jun 2022 17:02) (18 - 19)  SpO2: 92% (16 Jun 2022 17:02) (92% - 98%)    PE  NAD  Awake, alert  Anicteric, MMM  RRR  CTAB  Abd soft, NT, ND  No c/c/e  No rash grossly  FROM                          9.1    7.43  )-----------( 54       ( 16 Jun 2022 11:25 )             27.8       06-16    142  |  107  |  48<H>  ----------------------------<  111<H>  3.9   |  24  |  1.36<H>    Ca    7.9<L>      16 Jun 2022 11:25  Phos  3.8     06-16  Mg     1.7     06-16    TPro  5.9<L>  /  Alb  3.0<L>  /  TBili  1.2  /  DBili  x   /  AST  38  /  ALT  20  /  AlkPhos  202<H>  06-16        65 yo M with a PMH HTN, HLD, SLE on Cellcept (MMF), class V lupus nephritis, CKD stage 2, DM2, diabetic neuropathy, TIA (2019) on plavix, BPH, HUNG, hospitalization in [facility] 3/27-4/20 tx for pancreatitis and C diff?, recent hospitalization at [facility] 4/22-4/26 for acute pancreatitis and C diff colitis and another hospitalization 5/5 at [facility] for septic shock (source buttock abscess) treated with vanc, cefepime (5/5-5/10), meropenem (5/11-5/18) and micafungin. Zosyn added 5/26. Course complicated by recurrent fevers despite being on antibiotics, and leukocytosis up to 30k. Blood cx neg, UA unremarkable. MRI abdomen w/ contrast performed showing extensive abd lymphadenopathy (reactive to c diff vs lymphoma?). IR stated no window for bx. Course also complicated by a L brachial vein DVT and superficial DVT in arms. Patient noted to have had a positive PPD but has possibly gotten the BCG vaccine. Quant gold performed at [facility] still pending.     Admitted to [facility] MICU for further management. Vital signs on arrival: temp 38.6, /77, , RR 28, O2 sat 97% on 2L NC.  Labs: WBC 34.53, Hb 9.6, lipase 740, procal 2.02, tBili 1.4, alk phos 498, lactate 2.4. (30 May 2022 04:01)    Hematology/Oncology consulted d/t patient's history of anemia. Patient will [facility] hospital [facility].    EBV LPD  1. pathology pending  2. c/w steroids until pathology is reported of scn.  2. c/w supportive care  3. obtain bone marrow slides for review at [facility] [place]  4. f/u id recs    will follow

## 2022-06-16 NOTE — PROGRESS NOTE ADULT - PROBLEM SELECTOR PLAN 2
Bp control improved with improved volume control. Pt. currently on carvedilol and diuretics. Monitor BP     If any questions, please feel free to contact me     Thony Oh  Nephrology Fellow  [facility] Pager: [phone]

## 2022-06-16 NOTE — CONSULT NOTE ADULT - PROVIDER SPECIALTY LIST ADULT
Rheumatology
Infectious Disease
Rehab Medicine
Nephrology
Electrophysiology
Heme/Onc
Intervent Radiology
Heme/Onc
Hepatology
Surgery
Surgery

## 2022-06-16 NOTE — CONSULT NOTE ADULT - CONSULT REQUESTED BY NAME
Dr. Vargas
Medicine
MICU
Dr. Vargas
[facility]
Dr Vargas
Dr. Vargas
ICU
MICU
[facility]
dr melendez

## 2022-06-16 NOTE — PROGRESS NOTE ADULT - PROBLEM SELECTOR PLAN 5
-monitoring off cellcept  -Has been seen by Dr. Swapnil Wolfe and also Dr. Shivam Malagon  -According to sisters at bedside 6/6, patient took Aranesp in the [facility] for lupus prior to him getting sick. Family are wondering if the medication can be contributory.   -Following complements, ESR, CRP, dsDNA  -rheumatology consulted, will f/u recs -monitoring off cellcept  -Has been seen by Dr. Swapnil Wolfe and also Dr. Shivam Malagon  -According to sisters at bedside 6/6, patient took Aranesp in the [facility] for lupus prior to him getting sick. Family are wondering if the medication can be contributory.   - Non-routine medications:   -Following complements, ESR, CRP, dsDNA  -rheumatology consulted, will f/u recs

## 2022-06-16 NOTE — PROGRESS NOTE ADULT - PROBLEM SELECTOR PLAN 11
-stridor 6/4 requiring intubation refractory to epinephrine, methylprednisolone, xopenex, ipratropium. Previously with stridor earlier in admission although more severe 6/4 AM requiring intubation unclear etiology with new medication bicitra, recent instrumentation with core biopsy although had tolerated procedure without evidence of neck swelling/edema or stridor previous, with underlying HUNG hx.   -now resolved Diet: minced and moist  DVT: heparin gtt on hold  Dispo: pending PM&R (needs indept trt plan), OT

## 2022-06-16 NOTE — PROGRESS NOTE ADULT - SUBJECTIVE AND OBJECTIVE BOX
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 Moore), DM, TIA, BPH, and HUNG who pwas transferred to the hospital due to concerns for septic shock and recurrent fevers. 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 Since then he has been following Dr. Moore as his primary nephrologist. The patient had treatment with steroids and cyclophosphamide. Most recently the patient is being treated with MMF 750mg BID. Upon arrival to the hospital the patient was noted to have a SCr of 1.16 which had since risen to 1.98mg/dL The nephrology team was consulted for PEPE. The patient was intubated on 6/5 for worsening work of breathing, successfully extubated on 6/8. Now being evaluated for HLH on decadron off MMF.     Pt. seen and examined this morning reports feeling better overall.  Denied any shortness of breath. Reports improved swelling in LE.     PAST HISTORY  --------------------------------------------------------------------------------  No significant changes to PMH, PSH, FHx, SHx, unless otherwise noted    ALLERGIES & MEDICATIONS  --------------------------------------------------------------------------------  Allergies    No Known Allergies    Intolerances      Standing Inpatient Medications  benzocaine 15 mG/menthol 3.6 mG Lozenge 1 Lozenge Oral two times a day  carvedilol 25 milliGRAM(s) Oral every 12 hours  chlorhexidine 4% Liquid 1 Application(s) Topical <User Schedule>  citric acid/sodium citrate Solution 30 milliLiter(s) Oral every 6 hours  dexAMETHasone  IVPB 20 milliGRAM(s) IV Intermittent daily  dextrose 5%. 1000 milliLiter(s) IV Continuous <Continuous>  dextrose 5%. 1000 milliLiter(s) IV Continuous <Continuous>  dextrose 50% Injectable 25 Gram(s) IV Push once  dextrose 50% Injectable 12.5 Gram(s) IV Push once  dextrose 50% Injectable 25 Gram(s) IV Push once  FIRST- Mouthwash  BLM 15 milliLiter(s) Swish and Spit two times a day  fluconAZOLE IVPB 400 milliGRAM(s) IV Intermittent every 24 hours  folic acid 1 milliGRAM(s) Oral daily  furosemide   Injectable 40 milliGRAM(s) IV Push daily  gabapentin 300 milliGRAM(s) Oral daily  glucagon  Injectable 1 milliGRAM(s) IntraMuscular once  glucagon  Injectable 1 milliGRAM(s) IntraMuscular once  insulin lispro (ADMELOG) corrective regimen sliding scale   SubCutaneous three times a day before meals  insulin lispro (ADMELOG) corrective regimen sliding scale   SubCutaneous at bedtime  lidocaine   4% Patch 1 Patch Transdermal daily  multivitamin/minerals 1 Tablet(s) Oral daily  pantoprazole    Tablet 40 milliGRAM(s) Oral before breakfast  tamsulosin 0.4 milliGRAM(s) Oral at bedtime  trimethoprim  160 mG/sulfamethoxazole 800 mG 1 Tablet(s) Oral daily  valACYclovir 1000 milliGRAM(s) Oral two times a day    PRN Inpatient Medications  acetaminophen     Tablet .. 650 milliGRAM(s) Oral every 6 hours PRN  dextrose Oral Gel 15 Gram(s) Oral once PRN  sodium chloride 0.9% lock flush 10 milliLiter(s) IV Push every 1 hour PRN      REVIEW OF SYSTEMS    All other systems were reviewed and are negative, except as noted.    VITALS/PHYSICAL EXAM  --------------------------------------------------------------------------------  T(C): 36.9 (06-16-22 @ 11:57), Max: 39.3 (06-15-22 @ 20:18)  HR: 76 (06-16-22 @ 11:57) (75 - 100)  BP: 152/89 (06-16-22 @ 11:57) (121/65 - 160/73)  RR: 18 (06-16-22 @ 11:57) (18 - 20)  SpO2: 96% (06-16-22 @ 11:57) (91% - 98%)  Wt(kg): --        06-15-22 @ 07:01  -  06-16-22 @ 07:00  --------------------------------------------------------  IN: 0 mL / OUT: 509 mL / NET: -509 mL        Physical Exam:  	Gen: ill appearing  	HEENT: MMM  	Pulm: CTA b/l  	CV: S1S2  	Abd: Soft, +BS   	Ext: trace LE edema b/l  	Neuro: awake and alert   	Skin: Warm and dry      LABS/STUDIES  --------------------------------------------------------------------------------              9.1    7.43  >-----------<  54       [06-16-22 @ 11:25]              27.8     142  |  107  |  48  ----------------------------<  111      [06-16-22 @ 11:25]  3.9   |  24  |  1.36        Ca     7.9     [06-16-22 @ 11:25]      Mg     1.7     [06-16-22 @ 11:25]      Phos  3.8     [06-16-22 @ 11:25]    TPro  5.9  /  Alb  3.0  /  TBili  1.2  /  DBili  x   /  AST  38  /  ALT  20  /  AlkPhos  202  [06-16-22 @ 11:25]          Creatinine Trend:  SCr 1.36 [06-16 @ 11:25]  SCr 1.35 [06-15 @ 05:01]  SCr 1.46 [06-14 @ 07:07]  SCr 1.60 [06-13 @ 11:24]  SCr 1.46 [06-12 @ 11:37]    Urinalysis - [06-10-22 @ 07:57]      Color Light Yellow / Appearance Turbid / SG 1.014 / pH 6.0      Gluc Negative / Ketone Negative  / Bili Negative / Urobili Negative       Blood Moderate / Protein 100 / Leuk Est Negative / Nitrite Negative      RBC 67 / WBC 4 / Hyaline 4 / Gran  / Sq Epi  / Non Sq Epi 3 / Bacteria Negative      Iron 43, TIBC 128, %sat 34      [06-01-22 @ 04:47]  Ferritin 1195      [06-15-22 @ 05:01]  TSH 3.52      [06-03-22 @ 14:46]  Lipid: chol --, , HDL --, LDL --      [06-15-22 @ 05:01]    HBsAg Nonreact      [05-30-22 @ 03:46]  HCV 0.31, Nonreact      [05-31-22 @ 00:32]  HIV Nonreact      [06-01-22 @ 13:46]  HIV Nonreact      [05-30-22 @ 04:13]    ED: titer 1:320, pattern Homogeneous      [06-04-22 @ 14:21]  dsDNA 17      [06-07-22 @ 16:17]  C3 Complement 116      [06-04-22 @ 14:21]  C4 Complement 36      [06-04-22 @ 14:21]  Rheumatoid Factor <10      [06-04-22 @ 14:21]  ANCA: cANCA Negative, pANCA Negative, atypical ANCA Indeterminate Method interference due to ED Fluorescence      [06-04-22 @ 16:53]  ASLO <20      [06-04-22 @ 16:53]  Free Light Chains: kappa 14.57, lambda 23.09, ratio = 0.63      [06-01 @ 04:47]  Immunofixation Serum:   One Weak IgG Kappa Band and One Weak IgG Lambda Band Identified    Reference Range: None Detected      [06-01-22 @ 04:47]  SPEP Interpretation: Two Weak Gamma-Migrating Paraproteins Identified      [06-01-22 @ 04:47]

## 2022-06-16 NOTE — PROGRESS NOTE ADULT - ATTENDING COMMENTS
65 y/o M with h/o SLE, CKD, presenting with FUO with CT evidence of significant lymphadenopathy. Concern for HLH  vs other lymphoproliferative process (possibly EBV driven). Bmbx without hemophagocytosis. 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 c/b acute anemia and hematomas requiring transfusions. Course c/b odynophagia c/f candidal esophagitis vs HSV.     #Anemia - improved to 9s  #FUO: Most concern HLH vs other process on steroids, Heme initially considering starting etoposide will f/u given LN bx results.   #Fluid Overload - improved with lasix, decrease to 40 IV daily today  #PE - hep held iso acute anemia and thrombocytopenia , ctm, consider restarting pending further labs  #PEPE: multifactorial, cr improving (baseline 1.0), 1.4-1.8 during admission.  #Odynophagia - started on fluconazole and HSV, monitor symptoms

## 2022-06-16 NOTE — PROGRESS NOTE ADULT - PROBLEM SELECTOR PLAN 1
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 The patient is being treated with MMF 750mg as outpatient. Currently on hold. On review of [facility]ARRE[facility] pt. noted to have a baseline SCr of 1.1mg/dL. Currently SCr elevated but improving to 1.3 mg/dL. Optimize hemodynamics. Renal sonogram without evidence of hydro. Monitor labs and urine output. Recommend to continue with lasix 40mg but please transition to PO. Avoid NSAIDs, ACEI/ARBS, RCA and nephrotoxins. Dose medications as per eGFR.

## 2022-06-16 NOTE — PROGRESS NOTE ADULT - SUBJECTIVE AND OBJECTIVE BOX
PROGRESS NOTE:   Authored by Dr. Fatuma Olsen MD (PGY-1). Pager [facility] [phone] / [facility]     Patient is a 66y old  Male who presents with a chief complaint of recurrent fevers, unknown source (15 [place] 2022 14:19)      SUBJECTIVE / OVERNIGHT EVENTS:  No acute events overnight.     ADDITIONAL REVIEW OF SYSTEMS:  Patient denies fevers, chills, chest pain, shortness of breath, nausea, abdominal pain, diarrhea, constipation, dysuria, leg swelling, headache, light headedness.    MEDICATIONS  (STANDING):  benzocaine 15 mG/menthol 3.6 mG Lozenge 1 Lozenge Oral two times a day  carvedilol 25 milliGRAM(s) Oral every 12 hours  chlorhexidine 4% Liquid 1 Application(s) Topical <User Schedule>  citric acid/sodium citrate Solution 30 milliLiter(s) Oral every 6 hours  dexAMETHasone  IVPB 20 milliGRAM(s) IV Intermittent daily  dextrose 5%. 1000 milliLiter(s) (100 mL/Hr) IV Continuous <Continuous>  dextrose 5%. 1000 milliLiter(s) (50 mL/Hr) IV Continuous <Continuous>  dextrose 50% Injectable 25 Gram(s) IV Push once  dextrose 50% Injectable 12.5 Gram(s) IV Push once  dextrose 50% Injectable 25 Gram(s) IV Push once  folic acid 1 milliGRAM(s) Oral daily  furosemide   Injectable 40 milliGRAM(s) IV Push two times a day  gabapentin 300 milliGRAM(s) Oral daily  glucagon  Injectable 1 milliGRAM(s) IntraMuscular once  glucagon  Injectable 1 milliGRAM(s) IntraMuscular once  insulin lispro (ADMELOG) corrective regimen sliding scale   SubCutaneous three times a day before meals  insulin lispro (ADMELOG) corrective regimen sliding scale   SubCutaneous at bedtime  lidocaine   4% Patch 1 Patch Transdermal daily  multivitamin/minerals 1 Tablet(s) Oral daily  pantoprazole    Tablet 40 milliGRAM(s) Oral before breakfast  tamsulosin 0.4 milliGRAM(s) Oral at bedtime    MEDICATIONS  (PRN):  acetaminophen     Tablet .. 650 milliGRAM(s) Oral every 6 hours PRN Temp greater or equal to 38C (100.4F), Mild Pain (1 - 3)  dextrose Oral Gel 15 Gram(s) Oral once PRN Blood Glucose LESS THAN 70 milliGRAM(s)/deciliter  sodium chloride 0.9% lock flush 10 milliLiter(s) IV Push every 1 hour PRN Pre/post blood products, medications, blood draw, and to maintain line patency      CAPILLARY BLOOD GLUCOSE      POCT Blood Glucose.: 112 mg/dL (15 Vernon 2022 21:20)  POCT Blood Glucose.: 110 mg/dL (15 Vernon 2022 16:55)  POCT Blood Glucose.: 111 mg/dL (15 Vernon 2022 12:38)  POCT Blood Glucose.: 132 mg/dL (15 Vernon 2022 08:19)    I&O's Summary    15 [place] 2022 07:01  -  16 Jun 2022 07:00  --------------------------------------------------------  IN: 0 mL / OUT: 509 mL / NET: -509 mL        PHYSICAL EXAM:  Vital Signs Last 24 Hrs  T(C): 36.8 (16 Jun 2022 04:20), Max: 39.3 (15 Vernon 2022 20:18)  T(F): 98.2 (16 Jun 2022 04:20), Max: 102.8 (15 Vernon 2022 20:18)  HR: 78 (16 Jun 2022 05:45) (73 - 100)  BP: 137/71 (16 Jun 2022 04:20) (121/65 - 160/73)  BP(mean): --  RR: 18 (16 Jun 2022 04:20) (18 - 20)  SpO2: 95% (16 Jun 2022 05:45) (91% - 98%)    CONSTITUTIONAL: NAD  RESPIRATORY:   CARDIOVASCULAR:   MUSCLOSKELETAL:   PSYCH: A+O to person, place, and time; affect appropriate    LABS:                        9.3    11.28 )-----------( 52       ( 15 Vernon 2022 14:18 )             29.6     06-15    142  |  108  |  52<H>  ----------------------------<  175<H>  3.9   |  20<L>  |  1.35<H>    Ca    7.3<L>      15 Vernon 2022 05:01  Phos  4.5     06-15  Mg     1.8     06-15    TPro  5.0<L>  /  Alb  2.4<L>  /  TBili  1.0  /  DBili  x   /  AST  37  /  ALT  15  /  AlkPhos  190<H>  06-15                Tele Reviewed:    RADIOLOGY & ADDITIONAL TESTS:  Results Reviewed:   Imaging Personally Reviewed:  Electrocardiogram Personally Reviewed:     PROGRESS NOTE:   Authored by Dr. Fatuma Olsen MD (PGY-1). Pager [facility] [phone] / [facility]     Patient is a 66y old  Male who presents with a chief complaint of recurrent fevers, unknown source (15 [place] 2022 14:19)      SUBJECTIVE / OVERNIGHT EVENTS:  No acute events overnight.     ADDITIONAL REVIEW OF SYSTEMS:  Patient denies fevers, chills, chest pain, shortness of breath, nausea, abdominal pain, diarrhea, constipation, dysuria, leg swelling, headache, light headedness.    MEDICATIONS  (STANDING):  benzocaine 15 mG/menthol 3.6 mG Lozenge 1 Lozenge Oral two times a day  carvedilol 25 milliGRAM(s) Oral every 12 hours  chlorhexidine 4% Liquid 1 Application(s) Topical <User Schedule>  citric acid/sodium citrate Solution 30 milliLiter(s) Oral every 6 hours  dexAMETHasone  IVPB 20 milliGRAM(s) IV Intermittent daily  dextrose 5%. 1000 milliLiter(s) (100 mL/Hr) IV Continuous <Continuous>  dextrose 5%. 1000 milliLiter(s) (50 mL/Hr) IV Continuous <Continuous>  dextrose 50% Injectable 25 Gram(s) IV Push once  dextrose 50% Injectable 12.5 Gram(s) IV Push once  dextrose 50% Injectable 25 Gram(s) IV Push once  folic acid 1 milliGRAM(s) Oral daily  furosemide   Injectable 40 milliGRAM(s) IV Push two times a day  gabapentin 300 milliGRAM(s) Oral daily  glucagon  Injectable 1 milliGRAM(s) IntraMuscular once  glucagon  Injectable 1 milliGRAM(s) IntraMuscular once  insulin lispro (ADMELOG) corrective regimen sliding scale   SubCutaneous three times a day before meals  insulin lispro (ADMELOG) corrective regimen sliding scale   SubCutaneous at bedtime  lidocaine   4% Patch 1 Patch Transdermal daily  multivitamin/minerals 1 Tablet(s) Oral daily  pantoprazole    Tablet 40 milliGRAM(s) Oral before breakfast  tamsulosin 0.4 milliGRAM(s) Oral at bedtime    MEDICATIONS  (PRN):  acetaminophen     Tablet .. 650 milliGRAM(s) Oral every 6 hours PRN Temp greater or equal to 38C (100.4F), Mild Pain (1 - 3)  dextrose Oral Gel 15 Gram(s) Oral once PRN Blood Glucose LESS THAN 70 milliGRAM(s)/deciliter  sodium chloride 0.9% lock flush 10 milliLiter(s) IV Push every 1 hour PRN Pre/post blood products, medications, blood draw, and to maintain line patency      CAPILLARY BLOOD GLUCOSE      POCT Blood Glucose.: 112 mg/dL (15 [place] 2022 21:20)  POCT Blood Glucose.: 110 mg/dL (15 [place] 2022 16:55)  POCT Blood Glucose.: 111 mg/dL (15 Vernon 2022 12:38)  POCT Blood Glucose.: 132 mg/dL (15 [place] 2022 08:19)    I&O's Summary    15 [place] 2022 07:01  -  16 Jun 2022 07:00  --------------------------------------------------------  IN: 0 mL / OUT: 509 mL / NET: -509 mL        PHYSICAL EXAM:  Vital Signs Last 24 Hrs  T(C): 36.8 (16 Jun 2022 04:20), Max: 39.3 (15 [place] 2022 20:18)  T(F): 98.2 (16 Jun 2022 04:20), Max: 102.8 (15 [place] 2022 20:18)  HR: 78 (16 Jun 2022 05:45) (73 - 100)  BP: 137/71 (16 Jun 2022 04:20) (121/65 - 160/73)  BP(mean): --  RR: 18 (16 Jun 2022 04:20) (18 - 20)  SpO2: 95% (16 Jun 2022 05:45) (91% - 98%)    CONSTITUTIONAL: NAD  HEENT: dry mucous membranes with off-white lesions on lateral aspect of the tongue BL, mild yellow/red colored oropharynx, uvula midline  RESPIRATORY: CTA anteriorly with improved posterior lower lobe crackles, no wheezes, saturating well on RA  CARDIOVASCULAR: RRR, no murmurs, LE 2+ edema with ACE compression wrap   GI:   MUSCLOSKELETAL: no cyanosis of extremities, RUE with improving scattered ecchymosis/petechiae on antecubital fossa, no significant edema   PSYCH: A+O to person, place, and time; affect appropriate    LABS:                        9.3    11.28 )-----------( 52       ( 15 [place] 2022 14:18 )             29.6     06-15    142  |  108  |  52<H>  ----------------------------<  175<H>  3.9   |  20<L>  |  1.35<H>    Ca    7.3<L>      15 Vernon 2022 05:01  Phos  4.5     06-15  Mg     1.8     06-15    TPro  5.0<L>  /  Alb  2.4<L>  /  TBili  1.0  /  DBili  x   /  AST  37  /  ALT  15  /  AlkPhos  190<H>  06-15                Tele Reviewed:    RADIOLOGY & ADDITIONAL TESTS:  Results Reviewed:   Imaging Personally Reviewed:  Electrocardiogram Personally Reviewed:     PROGRESS NOTE:   Authored by Dr. Fatuma Olsen MD (PGY-1). Pager [facility] [phone] / [facility]     Patient is a 66y old  Male who presents with a chief complaint of recurrent fevers, unknown source (15 [place] 2022 14:19)  Patient c/o mouth soreness and sore throat, 3 episodes of diarrhea overnight, pending GI PCR. Pt states he has good urine output, condom cath does not stay on, using the bedside urinal. Pt's      SUBJECTIVE / OVERNIGHT EVENTS:  - loose, light brown/yellow/green small episodes of diarrhea   - fever 6/15 @ 2000, 102.8F --> 98.2 in 6/16 AM  - mouth soreness/dysphagia    ADDITIONAL REVIEW OF SYSTEMS:  Patient denies  chest pain, shortness of breath, nausea, abdominal pain, constipation, dysuria, headache, light headedness.    MEDICATIONS  (STANDING):  benzocaine 15 mG/menthol 3.6 mG Lozenge 1 Lozenge Oral two times a day  carvedilol 25 milliGRAM(s) Oral every 12 hours  chlorhexidine 4% Liquid 1 Application(s) Topical <User Schedule>  citric acid/sodium citrate Solution 30 milliLiter(s) Oral every 6 hours  dexAMETHasone  IVPB 20 milliGRAM(s) IV Intermittent daily  dextrose 5%. 1000 milliLiter(s) (100 mL/Hr) IV Continuous <Continuous>  dextrose 5%. 1000 milliLiter(s) (50 mL/Hr) IV Continuous <Continuous>  dextrose 50% Injectable 25 Gram(s) IV Push once  dextrose 50% Injectable 12.5 Gram(s) IV Push once  dextrose 50% Injectable 25 Gram(s) IV Push once  folic acid 1 milliGRAM(s) Oral daily  furosemide   Injectable 40 milliGRAM(s) IV Push two times a day  gabapentin 300 milliGRAM(s) Oral daily  glucagon  Injectable 1 milliGRAM(s) IntraMuscular once  glucagon  Injectable 1 milliGRAM(s) IntraMuscular once  insulin lispro (ADMELOG) corrective regimen sliding scale   SubCutaneous three times a day before meals  insulin lispro (ADMELOG) corrective regimen sliding scale   SubCutaneous at bedtime  lidocaine   4% Patch 1 Patch Transdermal daily  multivitamin/minerals 1 Tablet(s) Oral daily  pantoprazole    Tablet 40 milliGRAM(s) Oral before breakfast  tamsulosin 0.4 milliGRAM(s) Oral at bedtime    MEDICATIONS  (PRN):  acetaminophen     Tablet .. 650 milliGRAM(s) Oral every 6 hours PRN Temp greater or equal to 38C (100.4F), Mild Pain (1 - 3)  dextrose Oral Gel 15 Gram(s) Oral once PRN Blood Glucose LESS THAN 70 milliGRAM(s)/deciliter  sodium chloride 0.9% lock flush 10 milliLiter(s) IV Push every 1 hour PRN Pre/post blood products, medications, blood draw, and to maintain line patency      CAPILLARY BLOOD GLUCOSE      POCT Blood Glucose.: 112 mg/dL (15 [place] 2022 21:20)  POCT Blood Glucose.: 110 mg/dL (15 Vernon 2022 16:55)  POCT Blood Glucose.: 111 mg/dL (15 Vernon 2022 12:38)  POCT Blood Glucose.: 132 mg/dL (15 Vernon 2022 08:19)    I&O's Summary    15 [place] 2022 07:01  -  16 Jun 2022 07:00  --------------------------------------------------------  IN: 0 mL / OUT: 509 mL / NET: -509 mL        PHYSICAL EXAM:  Vital Signs Last 24 Hrs  T(C): 36.8 (16 Jun 2022 04:20), Max: 39.3 (15 [place] 2022 20:18)  T(F): 98.2 (16 Jun 2022 04:20), Max: 102.8 (15 Vernon 2022 20:18)  HR: 78 (16 Jun 2022 05:45) (73 - 100)  BP: 137/71 (16 Jun 2022 04:20) (121/65 - 160/73)  BP(mean): --  RR: 18 (16 Jun 2022 04:20) (18 - 20)  SpO2: 95% (16 Jun 2022 05:45) (91% - 98%)    CONSTITUTIONAL: NAD  HEENT: dry mucous membranes with off-white lesions on lateral aspect of the tongue BL, mild yellow/red colored oropharynx, uvula midline  RESPIRATORY: CTA anteriorly with improved posterior lower lobe crackles, no wheezes, saturating well on RA  CARDIOVASCULAR: RRR, no murmurs, LE 2+ edema with ACE compression wrap   GI:   MUSCLOSKELETAL: no cyanosis of extremities, RUE with improving scattered ecchymosis/petechiae on antecubital fossa, no significant edema   PSYCH: A+O to person, place, and time; affect appropriate    LABS:                        9.3    11.28 )-----------( 52       ( 15 Vernon 2022 14:18 )             29.6     06-15    142  |  108  |  52<H>  ----------------------------<  175<H>  3.9   |  20<L>  |  1.35<H>    Ca    7.3<L>      15 Vernon 2022 05:01  Phos  4.5     06-15  Mg     1.8     06-15    TPro  5.0<L>  /  Alb  2.4<L>  /  TBili  1.0  /  DBili  x   /  AST  37  /  ALT  15  /  AlkPhos  190<H>  06-15                Tele Reviewed:    RADIOLOGY & ADDITIONAL TESTS:  Results Reviewed:   Imaging Personally Reviewed:  Electrocardiogram Personally Reviewed:     PROGRESS NOTE:   Authored by Dr. Fatuma Olsen MD (PGY-1). Pager [facility] [phone] / [facility]     Patient is a 66y old  Male who presents with a chief complaint of recurrent fevers, unknown source (15 [place] 2022 14:19)  - Patient c/o mouth soreness and sore throat, 3 episodes of diarrhea overnight, pending GI PCR.   - Pt states he has good urine output, condom cath does not stay on, using the bedside urinal. Improved LE swelling with ACE wraps  - Pt has decreased appetite      SUBJECTIVE / OVERNIGHT EVENTS:  - loose, light brown/yellow/green small episodes of diarrhea   - fever 6/15 @ 2000, 102.8F --> 98.2 in 6/16 AM  - mouth soreness/dysphagia    ADDITIONAL REVIEW OF SYSTEMS:  Patient denies  chest pain, shortness of breath, nausea, abdominal pain, constipation, dysuria, headache, light headedness.    MEDICATIONS  (STANDING):  benzocaine 15 mG/menthol 3.6 mG Lozenge 1 Lozenge Oral two times a day  carvedilol 25 milliGRAM(s) Oral every 12 hours  chlorhexidine 4% Liquid 1 Application(s) Topical <User Schedule>  citric acid/sodium citrate Solution 30 milliLiter(s) Oral every 6 hours  dexAMETHasone  IVPB 20 milliGRAM(s) IV Intermittent daily  dextrose 5%. 1000 milliLiter(s) (100 mL/Hr) IV Continuous <Continuous>  dextrose 5%. 1000 milliLiter(s) (50 mL/Hr) IV Continuous <Continuous>  dextrose 50% Injectable 25 Gram(s) IV Push once  dextrose 50% Injectable 12.5 Gram(s) IV Push once  dextrose 50% Injectable 25 Gram(s) IV Push once  folic acid 1 milliGRAM(s) Oral daily  furosemide   Injectable 40 milliGRAM(s) IV Push two times a day  gabapentin 300 milliGRAM(s) Oral daily  glucagon  Injectable 1 milliGRAM(s) IntraMuscular once  glucagon  Injectable 1 milliGRAM(s) IntraMuscular once  insulin lispro (ADMELOG) corrective regimen sliding scale   SubCutaneous three times a day before meals  insulin lispro (ADMELOG) corrective regimen sliding scale   SubCutaneous at bedtime  lidocaine   4% Patch 1 Patch Transdermal daily  multivitamin/minerals 1 Tablet(s) Oral daily  pantoprazole    Tablet 40 milliGRAM(s) Oral before breakfast  tamsulosin 0.4 milliGRAM(s) Oral at bedtime    MEDICATIONS  (PRN):  acetaminophen     Tablet .. 650 milliGRAM(s) Oral every 6 hours PRN Temp greater or equal to 38C (100.4F), Mild Pain (1 - 3)  dextrose Oral Gel 15 Gram(s) Oral once PRN Blood Glucose LESS THAN 70 milliGRAM(s)/deciliter  sodium chloride 0.9% lock flush 10 milliLiter(s) IV Push every 1 hour PRN Pre/post blood products, medications, blood draw, and to maintain line patency      CAPILLARY BLOOD GLUCOSE      POCT Blood Glucose.: 112 mg/dL (15 Vernon 2022 21:20)  POCT Blood Glucose.: 110 mg/dL (15 Vernon 2022 16:55)  POCT Blood Glucose.: 111 mg/dL (15 Vernon 2022 12:38)  POCT Blood Glucose.: 132 mg/dL (15 Vernon 2022 08:19)    I&O's Summary    15 Vernon 2022 07:01  -  16 Jun 2022 07:00  --------------------------------------------------------  IN: 0 mL / OUT: 509 mL / NET: -509 mL        PHYSICAL EXAM:  Vital Signs Last 24 Hrs  T(C): 36.8 (16 Jun 2022 04:20), Max: 39.3 (15 Vernon 2022 20:18)  T(F): 98.2 (16 Jun 2022 04:20), Max: 102.8 (15 Vernon 2022 20:18)  HR: 78 (16 Jun 2022 05:45) (73 - 100)  BP: 137/71 (16 Jun 2022 04:20) (121/65 - 160/73)  BP(mean): --  RR: 18 (16 Jun 2022 04:20) (18 - 20)  SpO2: 95% (16 Jun 2022 05:45) (91% - 98%)    CONSTITUTIONAL: NAD  HEENT: dry mucous membranes with off-white lesions on lateral aspect of the tongue BL, mild yellow/red colored oropharynx, uvula midline  RESPIRATORY: CTA anteriorly with improved posterior lower lobe crackles, no wheezes, saturating well on RA  CARDIOVASCULAR: RRR, no murmurs, LE 2+ edema with ACE compression wrap   GI: abd soft, nondistended, bowel sounds slightly hyperactive, no TTP  MUSCLOSKELETAL: no cyanosis of extremities, RUE midline with no surrounding erythema or induration, improving scattered ecchymosis/petechiae on antecubital fossa, no significant edema   PSYCH: A+O to person, place, and time; affect appropriate    LABS:                        9.3    11.28 )-----------( 52       ( 15 Vernon 2022 14:18 )             29.6     06-15    142  |  108  |  52<H>  ----------------------------<  175<H>  3.9   |  20<L>  |  1.35<H>    Ca    7.3<L>      15 Vernon 2022 05:01  Phos  4.5     06-15  Mg     1.8     06-15    TPro  5.0<L>  /  Alb  2.4<L>  /  TBili  1.0  /  DBili  x   /  AST  37  /  ALT  15  /  AlkPhos  190<H>  06-15

## 2022-06-16 NOTE — PROGRESS NOTE ADULT - ASSESSMENT
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   No response to broad spectrum antibiotics and only had a small gluteal skin abscess, clean s/p I&D  MTB is possible but less likely, no obvious lung lesions  Quant gold IND  HIV negative  CT mesenteric LAD, possible PE, enlarged spleen, HSM, ascites  Fevers with no chills  Peritoneal fluid has elevated neutrophils concerning for peritonitis--culture NGTD; on edilson for now  Malaria screenings negative  Suspected malignancy  Overall, Fevers, abnormal finding on imaging, leukocytosis  EBV viremia    -EBV viremia could driving his HLH   -check EBV PCR this week  -all cx negative  -path pending   -on steroids for HLH  -off abx for now   -monitor temps, cbc - wbc better  -check cdiff if diarrhea  -?hsv on tongue - check HSV PCR, start valtrex 1 gm po bid, on diflucan   -fevers - from EBV vs midline  -check blood cx x 2    Inocente Crump  Attending Physician   Division of Infectious Disease  Office #[phone]  Available on [employer] Teams also  After 5pm/weekend or no response, call #[phone]

## 2022-06-16 NOTE — PROGRESS NOTE ADULT - ASSESSMENT
67 yo M with a PMH HTN, HLD, SLE on Cellcept (MMF), class V lupus nephritis, CKD stage 2, DM2, diabetic neuropathy, TIA (2019) on plavix, BPH, HUNG, with multiple hospitalizations for c diff, acute panc, septic shock 2/2 buttock abscess status post drainage. PT transferred for recurrent fevers s/p core biopsy. In MICU, paracentesis suspected lymphoma vs. HLH, pt intubated and sedated due to upper airway stridor and AMS. Successfully extubated on 6/8 and transferred to floors with heme/onc, ID, rheum, and surg evaluation. Pt s/p pRBC transfusion (6/13, 6,14 and 6/15), hgb stable >7, recurrent fevers monitoring off abx, bcx negative 6/11, unremarkable CTA to r/o hematoma. Pending O/T and PM&R needs finalized inpt trt plan. Cardiology meds, restarted coreg, d/c hydralazine and metoprolol, started IV lasix 40 mg BID (6/15). HLH workup, cw steroids, consider etoposide, prelim LN biopsy s/f EBV positive lymphoproliferative process, pending molecular studies 67 yo M with a PMH HTN, HLD, SLE on Cellcept (MMF), class V lupus nephritis, CKD stage 2, DM2, diabetic neuropathy, TIA (2019) on plavix, BPH, HUNG, with multiple hospitalizations for c diff, acute panc, septic shock 2/2 buttock abscess status post drainage. PT transferred for recurrent fevers s/p core biopsy. In MICU, paracentesis suspected lymphoma vs. HLH, pt intubated and sedated due to upper airway stridor and AMS. Successfully extubated on 6/8 and transferred to floors with heme/onc, ID, rheum, and surg evaluation. Pt s/p pRBC transfusion (6/13, 6,14 and 6/15), hgb stable >7, recurrent fevers (most recent 6/15 overnight) monitoring off abx, bcx pending from 6/16 (negative 6/11), unremarkable CTA to r/o hematoma. Pending O/T and PM&R needs finalized inpt trt plan. Cardiology meds, restarted coreg, d/c hydralazine and metoprolol, started IV lasix 40 mg BID (6/15), transitioned to IV 40 mg daily on (6/16). HLH workup, cw steroids, considering etoposide, prelim LN biopsy: EBV positive lymphoproliferative process, pending molecular studies. New concern for oral thrush on 6/16, on fluconazole and Valtrex pending HSV pcr from tongue lesion.

## 2022-06-17 NOTE — PROGRESS NOTE ADULT - ASSESSMENT
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   No response to broad spectrum antibiotics and only had a small gluteal skin abscess, clean s/p I&D  MTB is possible but less likely, no obvious lung lesions  Quant gold IND  HIV negative  CT mesenteric LAD, possible PE, enlarged spleen, HSM, ascites  Fevers with no chills  Peritoneal fluid has elevated neutrophils concerning for peritonitis--culture NGTD; on edilson for now  Malaria screenings negative  Suspected malignancy  Overall, Fevers, abnormal finding on imaging, leukocytosis  EBV viremia    -EBV viremia could driving his HLH   -check EBV PCR this week  -all cx negative  -path pending   -on steroids for HLH  -off abx for now   -monitor temps, cbc - wbc better  -check cdiff if diarrhea  -hsv on tongue - HSV PCR+ - start valtrex 1 gm po bid,   -fevers - from EBV vs HSV  -blood cx negative    Inocente Crump  Attending Physician   Division of Infectious Disease  Office #[phone]  Available on [employer] Teams also  After 5pm/weekend or no response, call #[phone]

## 2022-06-17 NOTE — PROGRESS NOTE ADULT - PROBLEM SELECTOR PLAN 2
BP control improved with improved volume control. Pt. currently on carvedilol and diuretics. Monitor BP     If any questions, please feel free to contact me     Thony Oh  Nephrology Fellow  [facility] Pager: [phone].

## 2022-06-17 NOTE — PROGRESS NOTE ADULT - ASSESSMENT
67 yo M with a PMH HTN, HLD, SLE on Cellcept (MMF), class V lupus nephritis, CKD stage 2, DM2, diabetic neuropathy, TIA (2019) on plavix, BPH, HUNG, hospitalization in [facility] 3/27-4/20 tx for pancreatitis and C diff?, recent hospitalization at [facility] 4/22-4/26 for acute pancreatitis and C diff colitis and another hospitalization 5/5 at [facility] for septic shock (source buttock abscess) treated with vanc, cefepime (5/5-5/10), meropenem (5/11-5/18) and micafungin. Zosyn added 5/26. Course complicated by recurrent fevers despite being on antibiotics, and leukocytosis up to 30k. Blood cx neg, UA unremarkable. MRI abdomen w/ contrast performed showing extensive abd lymphadenopathy (reactive to c diff vs lymphoma?). IR stated no window for bx. Course also complicated by a L brachial vein DVT and superficial DVT in arms. Patient noted to have had a positive PPD but has possibly gotten the BCG vaccine. Quant gold performed at [facility] still pending.     Admitted to [facility] MICU for further management. Vital signs on arrival: temp 38.6, /77, , RR 28, O2 sat 97% on 2L NC.  Labs: WBC 34.53, Hb 9.6, lipase 740, procal 2.02, tBili 1.4, alk phos 498, lactate 2.4. (30 May 2022 04:01)    Hematology/Oncology consulted d/t patient's history of anemia. [facility].    The patients wife is at bedside. the patient is awake, alert, oriented x 3. He is on dexamathesone for an ebv lymphoproliferative disorder. SCN molecular studies are pending. Patient meets some criteria for HLH but fits the criteria for a systemic inflammatory disorder.  patient is clinically improving, yesterday he was able to get out of bed    1. EBV lymphoproliferative disorder  -- treatment tailored by discontinuing immunosuppression , patient has been of MMF for many weeks.  -- c/w dexamethasone taper.  -- c/w antiviral therapy  -- obtain rheumatology consultation  -- c/w supportive care, monitor H/H, platelet  -- monitor kidney function and glucose  -- potential therapies to offset inflammatory response - including jakifi and rituxan has been discussed with the patient.   -- pt evaluation  -- f/u ID recommendations.

## 2022-06-17 NOTE — PROGRESS NOTE ADULT - PROBLEM SELECTOR PLAN 5
-monitoring off cellcept  -Has been seen by Dr. Swapnil Wolfe and also Dr. Shivam Malagon  -According to sisters at bedside 6/6, patient took Aranesp in the [facility] for lupus prior to him getting sick. Family are wondering if the medication can be contributory.   - Non-routine medications:   -Following complements, ESR, CRP, dsDNA  -rheumatology consulted, will f/u recs PEPE on CKD, hx lupus nephritis  - Cr 1.35 (6/15) improving   - p/w with worsening renal function FeNa prerenal  - 6/3 - 6/10 canales, 6/15 placed condom cath, but pt uses urinal intermittently   - 6/5 urine studies consistent with pre-renal FeNA 0.8 however IVC 2.1 on POCUS.  - urinalysis with 100mg/dL proteinuria with biopsy proven lupus nephritis   - appreciate nephrology recommendations      - Discontinued tamsulosin for now cannot be crushed and canales in place  - 6/6 discussed prior arinesp with family and risks of arinesp administration in critically ill explained including increased risk of thrombosis.  - 6/10 s/p 2 doses IV Lasix 20 for edema, pt. very fluid positive.  - 6/15 - 6/16 started (6/15) IV lasix 40 mg BID with strict I/O --> 6/16 transitioned to daily

## 2022-06-17 NOTE — PROGRESS NOTE ADULT - SUBJECTIVE AND OBJECTIVE BOX
Patient is a 66y old  Male who presents with a chief complaint of recurrent fevers, unknown source (17 Jun 2022 13:12)      MEDICATIONS  (STANDING):  carvedilol 25 milliGRAM(s) Oral every 12 hours  chlorhexidine 4% Liquid 1 Application(s) Topical <User Schedule>  citric acid/sodium citrate Solution 30 milliLiter(s) Oral every 6 hours  dexAMETHasone  IVPB 15 milliGRAM(s) IV Intermittent daily  dextrose 5%. 1000 milliLiter(s) (50 mL/Hr) IV Continuous <Continuous>  dextrose 5%. 1000 milliLiter(s) (100 mL/Hr) IV Continuous <Continuous>  dextrose 50% Injectable 25 Gram(s) IV Push once  dextrose 50% Injectable 12.5 Gram(s) IV Push once  dextrose 50% Injectable 25 Gram(s) IV Push once  FIRST- Mouthwash  BLM 15 milliLiter(s) Swish and Spit two times a day  fluconAZOLE IVPB 400 milliGRAM(s) IV Intermittent every 24 hours  folic acid 1 milliGRAM(s) Oral daily  furosemide   Injectable 40 milliGRAM(s) IV Push daily  gabapentin 300 milliGRAM(s) Oral daily  glucagon  Injectable 1 milliGRAM(s) IntraMuscular once  glucagon  Injectable 1 milliGRAM(s) IntraMuscular once  insulin lispro (ADMELOG) corrective regimen sliding scale   SubCutaneous three times a day before meals  insulin lispro (ADMELOG) corrective regimen sliding scale   SubCutaneous at bedtime  lidocaine   4% Patch 1 Patch Transdermal daily  multivitamin/minerals 1 Tablet(s) Oral daily  pantoprazole    Tablet 40 milliGRAM(s) Oral before breakfast  tamsulosin 0.4 milliGRAM(s) Oral at bedtime  trimethoprim  160 mG/sulfamethoxazole 800 mG 1 Tablet(s) Oral daily  valACYclovir 1000 milliGRAM(s) Oral two times a day    MEDICATIONS  (PRN):  acetaminophen     Tablet .. 650 milliGRAM(s) Oral every 6 hours PRN Temp greater or equal to 38C (100.4F), Mild Pain (1 - 3)  dextrose Oral Gel 15 Gram(s) Oral once PRN Blood Glucose LESS THAN 70 milliGRAM(s)/deciliter  loperamide 2 milliGRAM(s) Oral once PRN Diarrhea  sodium chloride 0.9% lock flush 10 milliLiter(s) IV Push every 1 hour PRN Pre/post blood products, medications, blood draw, and to maintain line patency      ROS  No fever, sweats, chills  No epistaxis, HA, sore throat  No CP, SOB, cough, sputum  No n/v/d, abd pain, melena, hematochezia  No edema  No rash  No anxiety  No back pain, joint pain  No bleeding, bruising  No dysuria, hematuria    Vital Signs Last 24 Hrs  T(C): 36.8 (17 Jun 2022 16:31), Max: 37.1 (17 Jun 2022 12:31)  T(F): 98.3 (17 Jun 2022 16:31), Max: 98.7 (17 Jun 2022 12:31)  HR: 76 (17 Jun 2022 16:31) (72 - 82)  BP: 174/87 (17 Jun 2022 16:31) (119/64 - 190/91)  BP(mean): --  RR: 18 (17 Jun 2022 16:31) (18 - 21)  SpO2: 96% (17 Jun 2022 16:31) (91% - 97%)    PE  NAD  Awake, alert  Anicteric, MMM  RRR  CTAB  Abd soft, NT, ND  No c/c/e  No rash grossly  FROM                          8.2    5.33  )-----------( 50       ( 17 Jun 2022 11:33 )             25.3       06-17    140  |  106  |  49<H>  ----------------------------<  141<H>  3.4<L>   |  22  |  1.48<H>    Ca    7.7<L>      17 Jun 2022 11:33  Phos  3.8     06-17  Mg     1.6     06-17    TPro  5.6<L>  /  Alb  2.8<L>  /  TBili  1.0  /  DBili  x   /  AST  39  /  ALT  22  /  AlkPhos  192<H>  06-17

## 2022-06-17 NOTE — PROGRESS NOTE ADULT - SUBJECTIVE AND OBJECTIVE BOX
PROGRESS NOTE:   Authored by Dr. Fatuma Olsen MD (PGY-1). Pager [facility] [phone] / [facility]     Patient is a 66y old  Male who presents with a chief complaint of recurrent fevers, unknown source (16 Jun 2022 22:49)      SUBJECTIVE / OVERNIGHT EVENTS:  No acute events overnight.     ADDITIONAL REVIEW OF SYSTEMS:  Patient denies fevers, chills, chest pain, shortness of breath, nausea, abdominal pain, diarrhea, constipation, dysuria, leg swelling, headache, light headedness.    MEDICATIONS  (STANDING):  carvedilol 25 milliGRAM(s) Oral every 12 hours  chlorhexidine 4% Liquid 1 Application(s) Topical <User Schedule>  citric acid/sodium citrate Solution 30 milliLiter(s) Oral every 6 hours  dexAMETHasone  IVPB 20 milliGRAM(s) IV Intermittent daily  dextrose 5%. 1000 milliLiter(s) (100 mL/Hr) IV Continuous <Continuous>  dextrose 5%. 1000 milliLiter(s) (50 mL/Hr) IV Continuous <Continuous>  dextrose 50% Injectable 25 Gram(s) IV Push once  dextrose 50% Injectable 12.5 Gram(s) IV Push once  dextrose 50% Injectable 25 Gram(s) IV Push once  FIRST- Mouthwash  BLM 15 milliLiter(s) Swish and Spit two times a day  fluconAZOLE IVPB 400 milliGRAM(s) IV Intermittent every 24 hours  folic acid 1 milliGRAM(s) Oral daily  furosemide   Injectable 40 milliGRAM(s) IV Push daily  gabapentin 300 milliGRAM(s) Oral daily  glucagon  Injectable 1 milliGRAM(s) IntraMuscular once  glucagon  Injectable 1 milliGRAM(s) IntraMuscular once  insulin lispro (ADMELOG) corrective regimen sliding scale   SubCutaneous three times a day before meals  insulin lispro (ADMELOG) corrective regimen sliding scale   SubCutaneous at bedtime  lidocaine   4% Patch 1 Patch Transdermal daily  multivitamin/minerals 1 Tablet(s) Oral daily  pantoprazole    Tablet 40 milliGRAM(s) Oral before breakfast  tamsulosin 0.4 milliGRAM(s) Oral at bedtime  trimethoprim  160 mG/sulfamethoxazole 800 mG 1 Tablet(s) Oral daily  valACYclovir 1000 milliGRAM(s) Oral two times a day    MEDICATIONS  (PRN):  acetaminophen     Tablet .. 650 milliGRAM(s) Oral every 6 hours PRN Temp greater or equal to 38C (100.4F), Mild Pain (1 - 3)  dextrose Oral Gel 15 Gram(s) Oral once PRN Blood Glucose LESS THAN 70 milliGRAM(s)/deciliter  sodium chloride 0.9% lock flush 10 milliLiter(s) IV Push every 1 hour PRN Pre/post blood products, medications, blood draw, and to maintain line patency      CAPILLARY BLOOD GLUCOSE      POCT Blood Glucose.: 130 mg/dL (16 Jun 2022 21:06)  POCT Blood Glucose.: 106 mg/dL (16 Jun 2022 16:56)  POCT Blood Glucose.: 115 mg/dL (16 Jun 2022 12:02)  POCT Blood Glucose.: 125 mg/dL (16 Jun 2022 08:00)    I&O's Summary    16 Jun 2022 07:01  -  17 Jun 2022 07:00  --------------------------------------------------------  IN: 0 mL / OUT: 1 mL / NET: -1 mL        PHYSICAL EXAM:  Vital Signs Last 24 Hrs  T(C): 36.4 (17 Jun 2022 04:00), Max: 38.2 (16 Jun 2022 17:02)  T(F): 97.6 (17 Jun 2022 04:00), Max: 100.7 (16 Jun 2022 17:02)  HR: 82 (17 Jun 2022 06:21) (73 - 91)  BP: 131/62 (17 Jun 2022 04:00) (126/56 - 178/85)  BP(mean): --  RR: 21 (17 Jun 2022 06:20) (18 - 21)  SpO2: 95% (17 Jun 2022 06:21) (92% - 98%)    CONSTITUTIONAL: NAD, well-developed  RESPIRATORY: Normal respiratory effort; lungs are clear to auscultation bilaterally  CARDIOVASCULAR: Regular rate and rhythm, normal S1 and S2, no murmur/rub/gallop; No lower extremity edema; Peripheral pulses are 2+ bilaterally  ABDOMEN: Nontender to palpation, normoactive bowel sounds, no rebound/guarding; No hepatosplenomegaly  MUSCLOSKELETAL: no clubbing or cyanosis of digits; no joint swelling or tenderness to palpation  PSYCH: A+O to person, place, and time; affect appropriate    LABS:                        9.1    7.43  )-----------( 54       ( 16 Jun 2022 11:25 )             27.8     06-16    142  |  107  |  48<H>  ----------------------------<  111<H>  3.9   |  24  |  1.36<H>    Ca    7.9<L>      16 Jun 2022 11:25  Phos  3.8     06-16  Mg     1.7     06-16    TPro  5.9<L>  /  Alb  3.0<L>  /  TBili  1.2  /  DBili  x   /  AST  38  /  ALT  20  /  AlkPhos  202<H>  06-16                Tele Reviewed:    RADIOLOGY & ADDITIONAL TESTS:  Results Reviewed:   Imaging Personally Reviewed:  Electrocardiogram Personally Reviewed:

## 2022-06-17 NOTE — PROGRESS NOTE ADULT - ATTENDING COMMENTS
Enedelia Simmons MD  Off: [phone]  contact me on teams    (After 5 pm or on weekends please page the on-call fellow/attending, can check [email or web address] for schedule. Login is aretha carlson, schedule under [facility] medicine, psych, derm)

## 2022-06-17 NOTE — PROGRESS NOTE ADULT - PROBLEM SELECTOR PLAN 3
PEPE on CKD, hx lupus nephritis  - Cr 1.35 (6/15) improving   - p/w with worsening renal function FeNa prerenal  - 6/3 - 6/10 canales, 6/15 placed condom cath, but pt uses urinal intermittently   - 6/5 urine studies consistent with pre-renal FeNA 0.8 however IVC 2.1 on POCUS.  - urinalysis with 100mg/dL proteinuria with biopsy proven lupus nephritis   - appreciate nephrology recommendations      - Discontinued tamsulosin for now cannot be crushed and canales in place  - 6/6 discussed prior arinesp with family and risks of arinesp administration in critically ill explained including increased risk of thrombosis.  - 6/10 s/p 2 doses IV Lasix 20 for edema, pt. very fluid positive.  - 6/15 - 6/16 started (6/15) IV lasix 40 mg BID with strict I/O --> 6/16 transitioned to daily -monitoring off cellcept  -Has been seen by Dr. Swapnil Wolfe and also Dr. Shivam Malagon  -According to sisters at bedside 6/6, patient took Aranesp in the [facility] for lupus prior to him getting sick. Family are wondering if the medication can be contributory.   - Non-routine medications:   -Following complements, ESR, CRP, dsDNA  -rheumatology consulted, will f/u recs

## 2022-06-17 NOTE — PROGRESS NOTE ADULT - PROBLEM SELECTOR PLAN 2
- 6/15 - 6/16 overnight with 5-6 episodes of light, brown/green colored loose stool with fever of 102.4  - no bowel regimen in place, monitor hydration status, reduced diuretics from lasix 40 BID to daily   - f/u GI PCR, consider CMV r/o if persists     Additional Hx:   #chronic inflammatory diarrhea  #possible Crohn's vs colitis vs infectious  -calprotectin elevated at [place] [id number]. no current plan by GI for colonoscopy at this time.   -C diff negative (5/30)  -less likely crohns disease - *6/15 - 6/16 overnight fever 102.4 with loose, diarrhea stool 5-6 episodes, no abx, pending GI PCR  - hospital course fevers: 6/10 102F; 6/11 105F, 6/12 101F 6/12, no fever on 6/15  - leukocytosis (on steroids as well):   - BCx: pending 6/16, 6/11 NGTD, 5/30 NGTD  - Abx: monitoring off abx as of 6/15  - sources: previous perianal abscess area? vs. lines [RUE midline vs. peripheral lines vs. 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  -will f/u pathology results from cervical LN core biopsy - EBV positive lymphoproliferative preliminary results   -bronch lavage cytology negative for malignancy

## 2022-06-17 NOTE — PROGRESS NOTE ADULT - ASSESSMENT
67 yo M with a PMH HTN, HLD, SLE on Cellcept (MMF), class V lupus nephritis, CKD stage 2, DM2, diabetic neuropathy, TIA (2019) on plavix, BPH, HUNG, with multiple hospitalizations for c diff, acute panc, septic shock 2/2 buttock abscess status post drainage. PT transferred for recurrent fevers s/p core biopsy. In MICU, paracentesis suspected lymphoma vs. HLH, pt intubated and sedated due to upper airway stridor and AMS. Successfully extubated on 6/8 and transferred to floors with heme/onc, ID, rheum, and surg evaluation. Pt s/p pRBC transfusion (6/13, 6,14 and 6/15), hgb stable >7, recurrent fevers (most recent 6/15 overnight) monitoring off abx, bcx pending from 6/16 (negative 6/11), unremarkable CTA to r/o hematoma. Pending O/T and PM&R needs finalized inpt trt plan. Cardiology meds, restarted coreg, d/c hydralazine and metoprolol, started IV lasix 40 mg BID (6/15), transitioned to IV 40 mg daily on (6/16). HLH workup, cw steroids, considering etoposide, prelim LN biopsy: EBV positive lymphoproliferative process, pending molecular studies. New concern for oral thrush on 6/16, on fluconazole and Valtrex pending HSV pcr from tongue lesion.

## 2022-06-17 NOTE — PROGRESS NOTE ADULT - SUBJECTIVE AND OBJECTIVE BOX
HANDY FREEMAN 66y MRN-[id number]    Patient is a 66y old  Male who presents with a chief complaint of recurrent fevers, unknown source (17 Jun 2022 07:42)      Follow Up/CC:  ID following for fever    Interval History/ROS: no fever today, HSV+    Allergies    No Known Allergies    Intolerances        ANTIMICROBIALS:  fluconAZOLE IVPB 400 every 24 hours  trimethoprim  160 mG/sulfamethoxazole 800 mG 1 daily  valACYclovir 1000 two times a day      MEDICATIONS  (STANDING):  carvedilol 25 milliGRAM(s) Oral every 12 hours  chlorhexidine 4% Liquid 1 Application(s) Topical <User Schedule>  citric acid/sodium citrate Solution 30 milliLiter(s) Oral every 6 hours  dexAMETHasone  IVPB 15 milliGRAM(s) IV Intermittent daily  dextrose 5%. 1000 milliLiter(s) (100 mL/Hr) IV Continuous <Continuous>  dextrose 5%. 1000 milliLiter(s) (50 mL/Hr) IV Continuous <Continuous>  dextrose 50% Injectable 25 Gram(s) IV Push once  dextrose 50% Injectable 12.5 Gram(s) IV Push once  dextrose 50% Injectable 25 Gram(s) IV Push once  FIRST- Mouthwash  BLM 15 milliLiter(s) Swish and Spit two times a day  fluconAZOLE IVPB 400 milliGRAM(s) IV Intermittent every 24 hours  folic acid 1 milliGRAM(s) Oral daily  furosemide   Injectable 40 milliGRAM(s) IV Push daily  gabapentin 300 milliGRAM(s) Oral daily  glucagon  Injectable 1 milliGRAM(s) IntraMuscular once  glucagon  Injectable 1 milliGRAM(s) IntraMuscular once  insulin lispro (ADMELOG) corrective regimen sliding scale   SubCutaneous three times a day before meals  insulin lispro (ADMELOG) corrective regimen sliding scale   SubCutaneous at bedtime  lidocaine   4% Patch 1 Patch Transdermal daily  multivitamin/minerals 1 Tablet(s) Oral daily  pantoprazole    Tablet 40 milliGRAM(s) Oral before breakfast  tamsulosin 0.4 milliGRAM(s) Oral at bedtime  trimethoprim  160 mG/sulfamethoxazole 800 mG 1 Tablet(s) Oral daily  valACYclovir 1000 milliGRAM(s) Oral two times a day    MEDICATIONS  (PRN):  acetaminophen     Tablet .. 650 milliGRAM(s) Oral every 6 hours PRN Temp greater or equal to 38C (100.4F), Mild Pain (1 - 3)  dextrose Oral Gel 15 Gram(s) Oral once PRN Blood Glucose LESS THAN 70 milliGRAM(s)/deciliter  sodium chloride 0.9% lock flush 10 milliLiter(s) IV Push every 1 hour PRN Pre/post blood products, medications, blood draw, and to maintain line patency        Vital Signs Last 24 Hrs  T(C): 36.6 (17 Jun 2022 08:00), Max: 38.2 (16 Jun 2022 17:02)  T(F): 97.8 (17 Jun 2022 08:00), Max: 100.7 (16 Jun 2022 17:02)  HR: 76 (17 Jun 2022 09:30) (73 - 91)  BP: 119/64 (17 Jun 2022 08:00) (119/64 - 178/85)  BP(mean): --  RR: 20 (17 Jun 2022 09:30) (18 - 21)  SpO2: 92% (17 Jun 2022 09:30) (92% - 98%)    CBC Full  -  ( 16 Jun 2022 11:25 )  WBC Count : 7.43 K/uL  RBC Count : 2.94 M/uL  Hemoglobin : 9.1 g/dL  Hematocrit : 27.8 %  Platelet Count - Automated : 54 K/uL  Mean Cell Volume : 94.6 fl  Mean Cell Hemoglobin : 31.0 pg  Mean Cell Hemoglobin Concentration : 32.7 gm/dL  Auto Neutrophil # : x  Auto Lymphocyte # : x  Auto Monocyte # : x  Auto Eosinophil # : x  Auto Basophil # : x  Auto Neutrophil % : x  Auto Lymphocyte % : x  Auto Monocyte % : x  Auto Eosinophil % : x  Auto Basophil % : x    06-16    142  |  107  |  48<H>  ----------------------------<  111<H>  3.9   |  24  |  1.36<H>    Ca    7.9<L>      16 Jun 2022 11:25  Phos  3.8     06-16  Mg     1.7     06-16    TPro  5.9<L>  /  Alb  3.0<L>  /  TBili  1.2  /  DBili  x   /  AST  38  /  ALT  20  /  AlkPhos  202<H>  06-16    LIVER FUNCTIONS - ( 16 Jun 2022 11:25 )  Alb: 3.0 g/dL / Pro: 5.9 g/dL / ALK PHOS: 202 U/L / ALT: 20 U/L / AST: 38 U/L / GGT: x               MICROBIOLOGY:  .Blood Blood  06-16-22   Testing in progress  --  --      .Blood Blood  06-11-22   No Growth Final  --  --      .Bronchial Bronchial Lavage  06-06-22   No growth at 1 week.  --    No polymorphonuclear cells seen per low power field  No squamous epithelial cells per low power field  No organisms seen per oil power field      .Blood Blood  06-05-22   NEGATIVE for Plasmodium antigens. Microscopy is performed for  confirmation.  This test does not detect the presence of Babesia species.  If Babesiosis is suspected, please order test for Babesia PCR: Babesia  microti PCR Bld  ************************************************************  No Blood Parasites observed by giemsa stain  One negative set of blood smears does not rule out  the possibility of a parasitic infection.  A minimum of 3  specimens should be collected, at least 12-24 hours apart,  over a 36 hour time period.  --  --      .Blood Blood  06-04-22   NEGATIVE for Plasmodium antigens. Microscopy is performed for  confirmation.  This test does not detect the presence of Babesia species.  If Babesiosis is suspected, please order test for Babesia PCR: Babesia  microti PCR Bld  ************************************************************  No Blood Parasites observed by giemsa stain  One negative set of blood smears does not rule out  the possibility of a parasitic infection.  A minimum of 3  specimens should be collected, at least 12-24 hours apart,  over a 36 hour time period.  --  --      .Sputum Sputum  06-04-22   Normal Respiratory Wen present  --    Rare Squamous epithelial cells per low power field  Rare polymorphonuclear leukocytes per low power field  No organisms seen      .Sputum Sputum  06-04-22   No growth at 1 week.  --  --      .Blood Blood-Venous  06-04-22   No Growth Final  --  --      .Blood Blood-Peripheral  06-03-22   No Growth Final  --  --      .Body Fluid Peritoneal Fluid  06-02-22   No growth  --    polymorphonuclear leukocytes seen  No organisms seen  by cytocentrifuge      .Blood Blood-Peripheral  06-01-22   No Growth Final  --  --      .Blood Blood-Venous  06-01-22   No Growth Final  --  --      .Stool Stool  06-01-22   No growth at 1 week.  --  --      .Stool Stool  06-01-22   No growth at 1 week.  --  --      .Sputum Sputum  06-01-22   No growth at 1 week.  --  --      .Stool Stool  05-31-22   No growth at 1 week.  --  --      .Sputum Sputum  05-31-22   No growth at 1 week.  --  --      .Sputum Sputum  05-30-22   Normal Respiratory Wen present  --    No polymorphonuclear leukocytes per low power field  No Squamous epithelial cells per low power field  No organisms seen per oil power field      .Stool sarthak betito  05-30-22   No enteric pathogens isolated.  (Stool culture examined for Salmonella,  Shigella, Campylobacter, Aeromonas, Plesiomonas,  Vibrio, E.coli O157 and Yersinia)  --  --      .Blood Blood  05-30-22   NEGATIVE for Plasmodium antigens. Microscopy is performed for  confirmation.  This test does not detect the presence of Babesia species.  If Babesiosis is suspected, please order test for Babesia PCR: Babesia  microti PCR d  ************************************************************  No Blood Parasites observed by giemsa stain  One negative set of blood smears does not rule out  the possibility of a parasitic infection.  A minimum of 3  specimens should be collected, at least 12-24 hours apart,  over a 36 hour time period.  --  --      .Stool Feces sarthak betito  05-30-22   GI PCR Results: NOT detected  *******Please Note:*******  GI panel PCR evaluates for:  Campylobacter, Plesiomonas shigelloides, Salmonella,  Vibrio, Yersinia enterocolitica, Enteroaggregative  Escherichia coli (EAEC), Enteropathogenic E.coli (EPEC),  Enterotoxigenic E. coli (ETEC) lt/st, Shiga-like  toxin-producing E. coli (STEC) stx1/stx2,  Shigella/ Enteroinvasive E. coli (EIEC), Cryptosporidium,  Cyclospora cayetanensis, Entamoeba histolytica,  Giardia lamblia, Adenovirus F 40/41, Astrovirus,  Norovirus GI/GII, Rotavirus A, Sapovirus  --  --      .Blood Blood  05-30-22   No Growth Final  --  --      .Blood Blood  05-30-22   No growth  --  --      .Blood Blood  05-30-22   No Growth Final  --  --      RADIOLOGY    < from: Xray Chest 1 View- PORTABLE-Routine (Xray Chest 1 View- PORTABLE-Routine .) (06.16.22 @ 08:48) >  No acute cardiopulmonary process.    < end of copied text >

## 2022-06-17 NOTE — PROGRESS NOTE ADULT - PROBLEM SELECTOR PLAN 1
- *6/15 - 6/16 overnight fever 102.4 with loose, diarrhea stool 5-6 episodes, no abx, pending GI PCR  - hospital course fevers: 6/10 102F; 6/11 105F, 6/12 101F 6/12, no fever on 6/15  - leukocytosis (on steroids as well):   - BCx: pending 6/16, 6/11 NGTD, 5/30 NGTD  - Abx: monitoring off abx as of 6/15  - sources: previous perianal abscess area? vs. lines [RUE midline vs. peripheral lines vs. 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  -will f/u pathology results from cervical LN core biopsy - EBV positive lymphoproliferative preliminary results   -bronch lavage cytology negative for malignancy Recommendations per heme/onc:   - treatment tailored by discontinuing immunosuppression , patient has been of MMF for many weeks.  - c/w dexamethasone taper.  - c/w antiviral therapy  - c/w supportive care, monitor H/H, platelet  - monitor kidney function and glucose  - potential therapies to offset inflammatory response - including jakifi and rituxan has been discussed with the patient.   - f/u PT evaluation  - f/u ID recommendations  - obtain rheumatology consultation

## 2022-06-17 NOTE — PROGRESS NOTE ADULT - ATTENDING COMMENTS
67 y/o M with h/o SLE, CKD, presenting with FUO with CT evidence of significant lymphadenopathy. Concern for HLH  vs other lymphoproliferative process (possibly EBV driven). Bmbx without hemophagocytosis. 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 c/b acute anemia and hematomas requiring transfusions. Course c/b odynophagia c/f candidal esophagitis vs HSV.     #Anemia - 8s, asymptomatic, ctm  #FUO: Now more concerning for EBV driven lymphoproliferative disorder, less concern HLH. On steroids, Heme pending LN bx markers prior to initiating further treatment  #Fluid Overload - improved with lasix, continue 40mg IV daily today  #PE - hep held iso acute anemia and thrombocytopenia , ctm, consider restarting pending further labs  #PEPE: multifactorial, cr improving (baseline 1.0), 1.4-1.8 during admission.  #Odynophagia - improving on fluconazole and valacyclovir, ctm.

## 2022-06-17 NOTE — PROGRESS NOTE ADULT - PROBLEM SELECTOR PLAN 4
- r/o HLH 2/2 EBV vs. leukemia/lymphoma  - has fevers, splenomegaly, cytopenia  - HLH labs: soluble IL-2 levels 85843, ferritin 511 --> +1000 (6/15), triglyceride 412 --> 430+, 6/6 fibrinogen 218 6/6  , pending final pathology from cervical lymph node biopsy (prelim EBV +lymphoproliferative), flow cytometry from peripheral blood without acute abnormalities in lymphoid, myeloid, or plasma cell lines  - IgG4 27  - dexamethasone 20 qd discussed with heme on 6/6 following bronchoscopy  - pending pathology 6/7  - Prelim per verbal with pathology EBV+ lymphoprofileration, with empiric treatment initiated for HLH and no specific treatment planned for EBV per discussion with ID.    #Anemia and Lymphadenopathy  --multifactorial: infectious vs malignancy vs autoimmune  --supraclavicular/cervical IR bedside biopsy  pathology is still pending- follow up on final pathology   ---BMB done on 5/20 at [facility]: negative for detectable monoclonal cell or B cell population  -- HLH in differential thus patient started on steroids dexamethasone 20mg Q day.    -- meets 5 criteria of HLH but important negative features: no presence of erythrophagocytosis- will however follow up on LN biopsy. - 6/15 - 6/16 overnight with 5-6 episodes of light, brown/green colored loose stool with fever of 102.4  - no bowel regimen in place, monitor hydration status, reduced diuretics from lasix 40 BID to daily   - f/u GI PCR, consider CMV r/o if persists     Additional Hx:   #chronic inflammatory diarrhea  #possible Crohn's vs colitis vs infectious  -calprotectin elevated at [place] [address]. no current plan by GI for colonoscopy at this time.   -C diff negative (5/30)  -less likely crohns disease

## 2022-06-17 NOTE — PROGRESS NOTE ADULT - PROBLEM SELECTOR PLAN 1
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 The patient is being treated with MMF 750mg as outpatient. Currently on hold. [facility] noted to have a baseline SCr of 1.1mg/dL. Currently SCr elevated but improving to 1.48mg/dL, which is midlly increased from yesterday. Optimize hemodynamics. Renal sonogram without evidence of hydro. Monitor labs and urine output. Recommend to transition to PO lasix 40mg daily. Avoid NSAIDs, ACEI/ARBS, RCA and nephrotoxins. Dose medications as per eGFR. 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 The patient is being treated with MMF 750mg as outpatient. Currently on hold. [facility] noted to have a baseline SCr of 1.1mg/dL. Currently SCr elevated but improving to 1.48mg/dL, which is mildly increased from yesterday. Optimize hemodynamics. Renal sonogram without evidence of hydro. Monitor labs and urine output. Recommend to transition to PO lasix 40mg daily. Avoid NSAIDs, ACEI/ARBS, RCA and nephrotoxins. Dose medications as per eGFR.

## 2022-06-17 NOTE — PROGRESS NOTE ADULT - SUBJECTIVE AND OBJECTIVE BOX
[facility] DIVISION OF KIDNEY DISEASES AND HYPERTENSION -- FOLLOW UP NOTE  --------------------------------------------------------------------------------  HPI: Patient is 66 year old male with PMH of HTN, HLD, class V membranous nephropathy in the setting of Lupus Nephritis (follows with Dr. Moore), DM, TIA, BPH, and HUNG who pwas transferred to the hospital due to concerns for septic shock and recurrent fevers. 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 Since then he has been following Dr. Moore as his primary nephrologist. The patient had treatment with steroids and cyclophosphamide. Most recently the patient is being treated with MMF 750mg BID. Upon arrival to the hospital the patient was noted to have a SCr of 1.16 which had since risen to 1.98mg/dL The nephrology team was consulted for PEPE. The patient was intubated on 6/5 for worsening work of breathing, successfully extubated on 6/8. Now being evaluated for HLH on decadron off MMF.     Pt. seen and examined this morning reports feeling better overall. Denied any shortness of breath, chest pain, nausea, or vomiting. No acute issues noted overnight.     PAST HISTORY  --------------------------------------------------------------------------------  No significant changes to PMH, PSH, FHx, SHx, unless otherwise noted    ALLERGIES & MEDICATIONS  --------------------------------------------------------------------------------  Allergies    No Known Allergies    Intolerances      Standing Inpatient Medications  carvedilol 25 milliGRAM(s) Oral every 12 hours  chlorhexidine 4% Liquid 1 Application(s) Topical <User Schedule>  citric acid/sodium citrate Solution 30 milliLiter(s) Oral every 6 hours  dexAMETHasone  IVPB 15 milliGRAM(s) IV Intermittent daily  dextrose 5%. 1000 milliLiter(s) IV Continuous <Continuous>  dextrose 5%. 1000 milliLiter(s) IV Continuous <Continuous>  dextrose 50% Injectable 25 Gram(s) IV Push once  dextrose 50% Injectable 12.5 Gram(s) IV Push once  dextrose 50% Injectable 25 Gram(s) IV Push once  FIRST- Mouthwash  BLM 15 milliLiter(s) Swish and Spit two times a day  fluconAZOLE IVPB 400 milliGRAM(s) IV Intermittent every 24 hours  folic acid 1 milliGRAM(s) Oral daily  furosemide   Injectable 40 milliGRAM(s) IV Push daily  gabapentin 300 milliGRAM(s) Oral daily  glucagon  Injectable 1 milliGRAM(s) IntraMuscular once  glucagon  Injectable 1 milliGRAM(s) IntraMuscular once  insulin lispro (ADMELOG) corrective regimen sliding scale   SubCutaneous three times a day before meals  insulin lispro (ADMELOG) corrective regimen sliding scale   SubCutaneous at bedtime  lidocaine   4% Patch 1 Patch Transdermal daily  multivitamin/minerals 1 Tablet(s) Oral daily  pantoprazole    Tablet 40 milliGRAM(s) Oral before breakfast  tamsulosin 0.4 milliGRAM(s) Oral at bedtime  trimethoprim  160 mG/sulfamethoxazole 800 mG 1 Tablet(s) Oral daily  valACYclovir 1000 milliGRAM(s) Oral two times a day    PRN Inpatient Medications  acetaminophen     Tablet .. 650 milliGRAM(s) Oral every 6 hours PRN  dextrose Oral Gel 15 Gram(s) Oral once PRN  sodium chloride 0.9% lock flush 10 milliLiter(s) IV Push every 1 hour PRN      REVIEW OF SYSTEMS  All other systems were reviewed and are negative, except as noted.    VITALS/PHYSICAL EXAM  --------------------------------------------------------------------------------  T(C): 37.1 (06-17-22 @ 12:31), Max: 38.2 (06-16-22 @ 17:02)  HR: 81 (06-17-22 @ 12:31) (73 - 91)  BP: 190/91 (06-17-22 @ 12:31) (119/64 - 190/91)  RR: 20 (06-17-22 @ 12:31) (18 - 21)  SpO2: 91% (06-17-22 @ 12:31) (91% - 98%)  Wt(kg): --        06-16-22 @ 07:01  -  06-17-22 @ 07:00  --------------------------------------------------------  IN: 0 mL / OUT: 1 mL / NET: -1 mL    06-17-22 @ 07:01  -  06-17-22 @ 13:12  --------------------------------------------------------  IN: 220 mL / OUT: 0 mL / NET: 220 mL        Physical Exam:  	Gen: ill appearing  	HEENT: MMM  	Pulm: CTA b/l  	CV: S1S2  	Abd: Soft, +BS   	Ext: trace LE edema b/l  	Neuro: awake and alert   	Skin: Warm and dry      LABS/STUDIES  --------------------------------------------------------------------------------              8.2    5.33  >-----------<  50       [06-17-22 @ 11:33]              25.3     140  |  106  |  49  ----------------------------<  141      [06-17-22 @ 11:33]  3.4   |  22  |  1.48        Ca     7.7     [06-17-22 @ 11:33]      Mg     1.6     [06-17-22 @ 11:33]      Phos  3.8     [06-17-22 @ 11:33]    TPro  5.6  /  Alb  2.8  /  TBili  1.0  /  DBili  x   /  AST  39  /  ALT  22  /  AlkPhos  192  [06-17-22 @ 11:33]          Creatinine Trend:  SCr 1.48 [06-17 @ 11:33]  SCr 1.36 [06-16 @ 11:25]  SCr 1.35 [06-15 @ 05:01]  SCr 1.46 [06-14 @ 07:07]  SCr 1.60 [06-13 @ 11:24]    Urinalysis - [06-10-22 @ 07:57]      Color Light Yellow / Appearance Turbid / SG 1.014 / pH 6.0      Gluc Negative / Ketone Negative  / Bili Negative / Urobili Negative       Blood Moderate / Protein 100 / Leuk Est Negative / Nitrite Negative      RBC 67 / WBC 4 / Hyaline 4 / Gran  / Sq Epi  / Non Sq Epi 3 / Bacteria Negative      Iron 43, TIBC 128, %sat 34      [06-01-22 @ 04:47]  Ferritin 1195      [06-15-22 @ 05:01]  TSH 3.52      [06-03-22 @ 14:46]  Lipid: chol --, , HDL --, LDL --      [06-15-22 @ 05:01]    HBsAg Nonreact      [05-30-22 @ 03:46]  HCV 0.31, Nonreact      [05-31-22 @ 00:32]  HIV Nonreact      [06-01-22 @ 13:46]  HIV Nonreact      [05-30-22 @ 04:13]    ED: titer 1:320, pattern Homogeneous      [06-04-22 @ 14:21]  dsDNA 17      [06-07-22 @ 16:17]  C3 Complement 116      [06-04-22 @ 14:21]  C4 Complement 36      [06-04-22 @ 14:21]  Rheumatoid Factor <10      [06-04-22 @ 14:21]  ANCA: cANCA Negative, pANCA Negative, atypical ANCA Indeterminate Method interference due to ED Fluorescence      [06-04-22 @ 16:53]  ASLO <20      [06-04-22 @ 16:53]  Free Light Chains: kappa 14.57, lambda 23.09, ratio = 0.63      [06-01 @ 04:47]  Immunofixation Serum:   One Weak IgG Kappa Band and One Weak IgG Lambda Band Identified    Reference Range: None Detected      [06-01-22 @ 04:47]  SPEP Interpretation: Two Weak Gamma-Migrating Paraproteins Identified      [06-01-22 @ 04:47]

## 2022-06-18 NOTE — PROGRESS NOTE ADULT - PROBLEM SELECTOR PLAN 7
#Superficial L brachial DVT  -heparin gtt on hold  - DVT duplex 6/13 neg for DVT -outside hospital reported to be consistent with AOCD  -CT ab pending, r/o bleed  - 6/13 AM s/p 1u pRBC hgb 6.7  - 6/14 AM s/p 1u pRBC hgb 6.7   - 6/15 AM s/p 1u pRBC hgb 6.7 --> 9.3 (overcorrection s/p 1u pRBC?)  - 6/16 - 6/17 hgb 9.1, 8.2  - transfuse if Hb<7, maintain active type and screen  - (no active bleed, will hold off on CTA, consider splenic accumulation of RBC from EBV  - DVT ppx: hold AC

## 2022-06-18 NOTE — PROGRESS NOTE ADULT - PROBLEM SELECTOR PLAN 5
PEPE on CKD, hx lupus nephritis  - Cr 1.35 (6/15) improving   - p/w with worsening renal function FeNa prerenal  - 6/3 - 6/10 canales, 6/15 placed condom cath, but pt uses urinal intermittently   - 6/5 urine studies consistent with pre-renal FeNA 0.8 however IVC 2.1 on POCUS.  - urinalysis with 100mg/dL proteinuria with biopsy proven lupus nephritis   - appreciate nephrology recommendations      - Discontinued tamsulosin for now cannot be crushed and canales in place  - 6/6 discussed prior arinesp with family and risks of arinesp administration in critically ill explained including increased risk of thrombosis.  - 6/10 s/p 2 doses IV Lasix 20 for edema, pt. very fluid positive.  - 6/15 - 6/16 started (6/15) IV lasix 40 mg BID with strict I/O --> 6/16 transitioned to daily - 6/16 - 6/18 no fevers, s/p immodium x1  - 6/15 - 6/16 overnight with 5-6 episodes of light, brown/green colored loose stool with fever of 102.4  - no bowel regimen in place, monitor hydration status, reduced diuretics from lasix 40 BID to daily   - f/u GI PCR, consider CMV r/o if persists     Additional Hx:   #chronic inflammatory diarrhea  #possible Crohn's vs colitis vs infectious  -calprotectin elevated at [place] [address]. no current plan by GI for colonoscopy at this time.   -C diff negative (5/30)  -less likely crohns disease

## 2022-06-18 NOTE — PROGRESS NOTE ADULT - ASSESSMENT
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 5/30  Bone marrow biopsy at [facility], per Heme Onc note--no formal dx  LN biopsy done, path pending  Diarrhea looks noninfectious - C diff EIA, GI PCR negative.   No response to broad spectrum antibiotics and only had a small gluteal skin abscess, clean s/p I&D  MTB is possible but less likely, no obvious lung lesions  Quant gold IND  HIV negative  CT mesenteric LAD, possible PE, enlarged spleen, HSM, ascites  Fevers with no chills  Peritoneal fluid has elevated neutrophils concerning for peritonitis--culture NGTD; on edilson for now  Malaria screenings negative  Suspected malignancy  Overall, Fevers, abnormal finding on imaging, leukocytosis  EBV viremia    -EBV viremia could driving his HLH   -f/u repeat EBV PCR   -all cx negative  -path pending   -on steroids for HLH  -off abx for now   -monitor temps, cbc - wbc better  -check cdiff if diarrhea  -hsv on tongue - HSV PCR+ - cont valtrex 1 gm po bid,   -fevers - from EBV vs HSV, better   -blood cx negative  -lower diflucan 100 mg po daily x 7 days     Inocente Crump  Attending Physician   Division of Infectious Disease  Office #[phone]  Available on Microsoft Teams also  After 5pm/weekend or no response, call #[phone]

## 2022-06-18 NOTE — PROGRESS NOTE ADULT - SUBJECTIVE AND OBJECTIVE BOX
PROGRESS NOTE:   Authored by Dr. Fatuma Olsen MD (PGY-1). Pager [facility] [phone] / [facility]     Patient is a 66y old  Male who presents with a chief complaint of recurrent fevers, unknown source (17 Jun 2022 20:34)      SUBJECTIVE / OVERNIGHT EVENTS:  No acute events overnight.     ADDITIONAL REVIEW OF SYSTEMS:  Patient denies fevers, chills, chest pain, shortness of breath, nausea, abdominal pain, diarrhea, constipation, dysuria, leg swelling, headache, light headedness.    MEDICATIONS  (STANDING):  carvedilol 25 milliGRAM(s) Oral every 12 hours  chlorhexidine 4% Liquid 1 Application(s) Topical <User Schedule>  citric acid/sodium citrate Solution 30 milliLiter(s) Oral every 6 hours  dexAMETHasone  IVPB 15 milliGRAM(s) IV Intermittent daily  dextrose 5%. 1000 milliLiter(s) (50 mL/Hr) IV Continuous <Continuous>  dextrose 5%. 1000 milliLiter(s) (100 mL/Hr) IV Continuous <Continuous>  dextrose 50% Injectable 25 Gram(s) IV Push once  dextrose 50% Injectable 12.5 Gram(s) IV Push once  dextrose 50% Injectable 25 Gram(s) IV Push once  fluconAZOLE IVPB 400 milliGRAM(s) IV Intermittent every 24 hours  folic acid 1 milliGRAM(s) Oral daily  furosemide   Injectable 40 milliGRAM(s) IV Push daily  gabapentin 300 milliGRAM(s) Oral daily  glucagon  Injectable 1 milliGRAM(s) IntraMuscular once  glucagon  Injectable 1 milliGRAM(s) IntraMuscular once  insulin lispro (ADMELOG) corrective regimen sliding scale   SubCutaneous three times a day before meals  insulin lispro (ADMELOG) corrective regimen sliding scale   SubCutaneous at bedtime  lidocaine   4% Patch 1 Patch Transdermal daily  multivitamin/minerals 1 Tablet(s) Oral daily  pantoprazole    Tablet 40 milliGRAM(s) Oral before breakfast  tamsulosin 0.4 milliGRAM(s) Oral at bedtime  trimethoprim  160 mG/sulfamethoxazole 800 mG 1 Tablet(s) Oral daily  valACYclovir 1000 milliGRAM(s) Oral two times a day    MEDICATIONS  (PRN):  acetaminophen     Tablet .. 650 milliGRAM(s) Oral every 6 hours PRN Temp greater or equal to 38C (100.4F), Mild Pain (1 - 3)  dextrose Oral Gel 15 Gram(s) Oral once PRN Blood Glucose LESS THAN 70 milliGRAM(s)/deciliter  sodium chloride 0.9% lock flush 10 milliLiter(s) IV Push every 1 hour PRN Pre/post blood products, medications, blood draw, and to maintain line patency      CAPILLARY BLOOD GLUCOSE      POCT Blood Glucose.: 159 mg/dL (17 Jun 2022 21:13)  POCT Blood Glucose.: 143 mg/dL (17 Jun 2022 17:03)  POCT Blood Glucose.: 122 mg/dL (17 Jun 2022 12:57)  POCT Blood Glucose.: 141 mg/dL (17 Jun 2022 08:36)    I&O's Summary    17 Jun 2022 07:01  -  18 Jun 2022 07:00  --------------------------------------------------------  IN: 220 mL / OUT: 0 mL / NET: 220 mL        PHYSICAL EXAM:  Vital Signs Last 24 Hrs  T(C): 36.6 (18 Jun 2022 05:05), Max: 37.1 (17 Jun 2022 12:31)  T(F): 97.8 (18 Jun 2022 05:05), Max: 98.7 (17 Jun 2022 12:31)  HR: 76 (18 Jun 2022 05:05) (72 - 81)  BP: 136/62 (18 Jun 2022 05:05) (119/64 - 190/91)  BP(mean): --  RR: 18 (18 Jun 2022 05:05) (18 - 20)  SpO2: 95% (18 Jun 2022 05:05) (91% - 97%)    CONSTITUTIONAL: NAD, well-developed  RESPIRATORY: Normal respiratory effort; lungs are clear to auscultation bilaterally  CARDIOVASCULAR: Regular rate and rhythm, normal S1 and S2, no murmur/rub/gallop; No lower extremity edema; Peripheral pulses are 2+ bilaterally  ABDOMEN: Nontender to palpation, normoactive bowel sounds, no rebound/guarding; No hepatosplenomegaly  MUSCLOSKELETAL: no clubbing or cyanosis of digits; no joint swelling or tenderness to palpation  PSYCH: A+O to person, place, and time; affect appropriate    LABS:                        8.2    5.33  )-----------( 50       ( 17 Jun 2022 11:33 )             25.3     06-17    140  |  106  |  49<H>  ----------------------------<  141<H>  3.4<L>   |  22  |  1.48<H>    Ca    7.7<L>      17 Jun 2022 11:33  Phos  3.8     06-17  Mg     1.6     06-17    TPro  5.6<L>  /  Alb  2.8<L>  /  TBili  1.0  /  DBili  x   /  AST  39  /  ALT  22  /  AlkPhos  192<H>  06-17              Culture - Blood (collected 16 Jun 2022 21:00)  Source: .Blood Blood-Peripheral  Preliminary Report (18 Jun 2022 02:01):    No growth to date.    Culture - Fungal, Blood (collected 16 Jun 2022 11:24)  Source: .Blood Blood  Preliminary Report (17 Jun 2022 08:30):    Testing in progress    Culture - Blood (collected 16 Jun 2022 11:13)  Source: .Blood Blood-Peripheral  Preliminary Report (17 Jun 2022 17:01):    No growth to date.        Tele Reviewed:    RADIOLOGY & ADDITIONAL TESTS:  Results Reviewed:   Imaging Personally Reviewed:  Electrocardiogram Personally Reviewed:     PROGRESS NOTE:   Authored by Dr. Fatuma Olsen MD (PGY-1). Pager [facility] [phone] / [facility]     Patient is a 66y old  Male who presents with a chief complaint of recurrent fevers, unknown source (17 Jun 2022 20:34)  - pt states he is doing better, no fevers or pain overnight     SUBJECTIVE / OVERNIGHT EVENTS:  No acute events overnight. No fevers, no diarrhea - improved     ADDITIONAL REVIEW OF SYSTEMS:  Patient denies fevers, chills, chest pain, shortness of breath, nausea, abdominal pain, diarrhea, constipation, dysuria, leg swelling, headache, light headedness.    MEDICATIONS  (STANDING):  carvedilol 25 milliGRAM(s) Oral every 12 hours  chlorhexidine 4% Liquid 1 Application(s) Topical <User Schedule>  citric acid/sodium citrate Solution 30 milliLiter(s) Oral every 6 hours  dexAMETHasone  IVPB 15 milliGRAM(s) IV Intermittent daily  dextrose 5%. 1000 milliLiter(s) (50 mL/Hr) IV Continuous <Continuous>  dextrose 5%. 1000 milliLiter(s) (100 mL/Hr) IV Continuous <Continuous>  dextrose 50% Injectable 25 Gram(s) IV Push once  dextrose 50% Injectable 12.5 Gram(s) IV Push once  dextrose 50% Injectable 25 Gram(s) IV Push once  fluconAZOLE IVPB 400 milliGRAM(s) IV Intermittent every 24 hours  folic acid 1 milliGRAM(s) Oral daily  furosemide   Injectable 40 milliGRAM(s) IV Push daily  gabapentin 300 milliGRAM(s) Oral daily  glucagon  Injectable 1 milliGRAM(s) IntraMuscular once  glucagon  Injectable 1 milliGRAM(s) IntraMuscular once  insulin lispro (ADMELOG) corrective regimen sliding scale   SubCutaneous three times a day before meals  insulin lispro (ADMELOG) corrective regimen sliding scale   SubCutaneous at bedtime  lidocaine   4% Patch 1 Patch Transdermal daily  multivitamin/minerals 1 Tablet(s) Oral daily  pantoprazole    Tablet 40 milliGRAM(s) Oral before breakfast  tamsulosin 0.4 milliGRAM(s) Oral at bedtime  trimethoprim  160 mG/sulfamethoxazole 800 mG 1 Tablet(s) Oral daily  valACYclovir 1000 milliGRAM(s) Oral two times a day    MEDICATIONS  (PRN):  acetaminophen     Tablet .. 650 milliGRAM(s) Oral every 6 hours PRN Temp greater or equal to 38C (100.4F), Mild Pain (1 - 3)  dextrose Oral Gel 15 Gram(s) Oral once PRN Blood Glucose LESS THAN 70 milliGRAM(s)/deciliter  sodium chloride 0.9% lock flush 10 milliLiter(s) IV Push every 1 hour PRN Pre/post blood products, medications, blood draw, and to maintain line patency      CAPILLARY BLOOD GLUCOSE      POCT Blood Glucose.: 159 mg/dL (17 Jun 2022 21:13)  POCT Blood Glucose.: 143 mg/dL (17 Jun 2022 17:03)  POCT Blood Glucose.: 122 mg/dL (17 Jun 2022 12:57)  POCT Blood Glucose.: 141 mg/dL (17 Jun 2022 08:36)    I&O's Summary    17 Jun 2022 07:01  -  18 Jun 2022 07:00  --------------------------------------------------------  IN: 220 mL / OUT: 0 mL / NET: 220 mL        PHYSICAL EXAM:  Vital Signs Last 24 Hrs  T(C): 36.6 (18 Jun 2022 05:05), Max: 37.1 (17 Jun 2022 12:31)  T(F): 97.8 (18 Jun 2022 05:05), Max: 98.7 (17 Jun 2022 12:31)  HR: 76 (18 Jun 2022 05:05) (72 - 81)  BP: 136/62 (18 Jun 2022 05:05) (119/64 - 190/91)  BP(mean): --  RR: 18 (18 Jun 2022 05:05) (18 - 20)  SpO2: 95% (18 Jun 2022 05:05) (91% - 97%)    CONSTITUTIONAL: NAD, frail, thin  RESPIRATORY: Normal respiratory effort; lungs are clear to auscultation bilaterally  CARDIOVASCULAR: Regular rate and rhythm, normal S1 and S2, 2+lower extremity edema (improving); Peripheral pulses are 2+ bilaterally  ABDOMEN: Nontender to palpation, normoactive bowel sounds, no rebound/guarding  MUSCLOSKELETAL: RUE midline, L hand with muscle wasting, no clubbing or cyanosis of digits; no joint swelling or tenderness to palpation  PSYCH: A+O to person, place, and time; affect appropriate    LABS:                        8.2    5.33  )-----------( 50       ( 17 Jun 2022 11:33 )             25.3     06-17    140  |  106  |  49<H>  ----------------------------<  141<H>  3.4<L>   |  22  |  1.48<H>    Ca    7.7<L>      17 Jun 2022 11:33  Phos  3.8     06-17  Mg     1.6     06-17    TPro  5.6<L>  /  Alb  2.8<L>  /  TBili  1.0  /  DBili  x   /  AST  39  /  ALT  22  /  AlkPhos  192<H>  06-17              Culture - Blood (collected 16 Jun 2022 21:00)  Source: .Blood Blood-Peripheral  Preliminary Report (18 Jun 2022 02:01):    No growth to date.    Culture - Fungal, Blood (collected 16 Jun 2022 11:24)  Source: .Blood Blood  Preliminary Report (17 Jun 2022 08:30):    Testing in progress    Culture - Blood (collected 16 Jun 2022 11:13)  Source: .Blood Blood-Peripheral  Preliminary Report (17 Jun 2022 17:01):    No growth to date.        Tele Reviewed:    RADIOLOGY & ADDITIONAL TESTS:  Results Reviewed:   Imaging Personally Reviewed:  Electrocardiogram Personally Reviewed:

## 2022-06-18 NOTE — PROGRESS NOTE ADULT - ASSESSMENT
65 yo M with a PMH HTN, HLD, SLE on Cellcept (MMF), class V lupus nephritis, CKD stage 2, DM2, diabetic neuropathy, TIA (2019) on plavix, BPH, HUNG, with multiple hospitalizations for c diff, acute panc, septic shock 2/2 buttock abscess status post drainage. PT transferred for recurrent fevers s/p core biopsy. In MICU, paracentesis suspected lymphoma vs. HLH, pt intubated and sedated due to upper airway stridor and AMS. Successfully extubated on 6/8 and transferred to floors with heme/onc, ID, rheum, and surg evaluation. Pt s/p pRBC transfusion (6/13, 6,14 and 6/15), hgb stable >7, recurrent fevers (most recent 6/15 overnight) monitoring off abx, bcx pending from 6/16 (negative 6/11), unremarkable CTA to r/o hematoma. Pending O/T and PM&R needs finalized inpt trt plan. Cardiology meds, restarted coreg, d/c hydralazine and metoprolol, started IV lasix 40 mg BID (6/15), transitioned to IV 40 mg daily on (6/16). Lymphoproliferative workup, tapering steroids, prelim LN biopsy: EBV positive lymphoproliferative process, pending molecular studies. 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 No fevers since 6/16. Pending Rheum consult.  65 yo M with a PMH HTN, HLD, SLE on Cellcept (MMF), class V lupus nephritis, CKD stage 2, DM2, diabetic neuropathy, TIA (2019) on plavix, BPH, HUNG, with multiple hospitalizations for c diff, acute panc, septic shock 2/2 buttock abscess status post drainage. PT transferred for recurrent fevers s/p core biopsy. In MICU, paracentesis suspected lymphoma vs. HLH, pt intubated and sedated due to upper airway stridor and AMS. Successfully extubated on 6/8 and transferred to floors with heme/onc, ID, rheum, and surg evaluation. Pt s/p pRBC transfusion (6/13, 6,14 and 6/15), hgb stable >7, recurrent fevers (most recent 6/15 overnight) monitoring off abx, bcx pending from 6/16 (negative 6/11), unremarkable CTA to r/o hematoma. Pending O/T and PM&R needs finalized inpt trt plan. Cardiology meds, restarted coreg, d/c hydralazine and metoprolol, started IV lasix 40 mg BID (6/15), transitioned to IV 40 mg daily on (6/16 -__). Lymphoproliferative workup, tapering steroids, prelim LN biopsy: EBV positive lymphoproliferative process, pending molecular studies. 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 No fevers since 6/16. Update Rheum consult (Dr. Villalta) 67 yo M with a PMH HTN, HLD, SLE on Cellcept (MMF), class V lupus nephritis, CKD stage 2, DM2, diabetic neuropathy, TIA (2019) on plavix, BPH, HUNG, with multiple hospitalizations for c diff, acute panc, septic shock 2/2 buttock abscess status post drainage. PT transferred for recurrent fevers s/p core biopsy. In MICU, paracentesis suspected lymphoma vs. HLH, pt intubated and sedated due to upper airway stridor and AMS. Successfully extubated on 6/8 and transferred to floors with heme/onc, ID, rheum, and surg evaluation. Pt s/p pRBC transfusion (6/13, 6,14 and 6/15), hgb stable >7, recurrent fevers (most recent 6/15 overnight) monitoring off abx, bcx pending from 6/16 (negative 6/11), unremarkable CTA to r/o hematoma. Pending O/T and PM&R needs finalized inpt trt plan. Cardiology meds, restarted coreg, d/c hydralazine and metoprolol, started IV lasix 40 mg BID (6/15), transitioned to IV 40 mg daily on (6/16 -__). Lymphoproliferative workup, tapering steroids, prelim LN biopsy: EBV positive lymphoproliferative process, pending molecular studies. 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 No fevers since 6/16. Lymph node FNAB: TCR beta and gamma + for clonal T cell population, BCR IgK + for monoclonal B cell population. Update Rheum consult (Dr. Villalta)

## 2022-06-18 NOTE — PROGRESS NOTE ADULT - PROBLEM SELECTOR PLAN 3
-monitoring off cellcept  -Has been seen by Dr. Swapnil Wolfe and also Dr. Shivam Malagon  -According to sisters at bedside 6/6, patient took Aranesp in the [facility] for lupus prior to him getting sick. Family are wondering if the medication can be contributory.   - Non-routine medications:   -Following complements, ESR, CRP, dsDNA  -rheumatology consulted, will f/u recs - p/w significant lower ext edema and congestive cough (wet cough)  - started on IV lasix BID --> now on IV lasix 40 mg daily with ACE wraps  - cough present, but improving, cw lasix

## 2022-06-18 NOTE — PROGRESS NOTE ADULT - PROBLEM SELECTOR PLAN 9
- holding home ACEi additionally due to PEPE on CKD  - hydral 10 TID 6/9 - 6/14 d/c i/s/o renal dx   - dc metoprolol   - cw coreg 6/15 -Lopressor 2.5 q6h discontinued 6/3 ON receiving crystalloid and colloid 6/3 ON to 6/4.   -6/6 metoprolol tartarate 12.5mg BID --> d/c 6/14, restarted coreg

## 2022-06-18 NOTE — PROGRESS NOTE ADULT - PROBLEM SELECTOR PLAN 8
-Lopressor 2.5 q6h discontinued 6/3 ON receiving crystalloid and colloid 6/3 ON to 6/4.   -6/6 metoprolol tartarate 12.5mg BID --> d/c 6/14, restarted coreg #Superficial L brachial DVT  -heparin gtt on hold  - DVT duplex 6/13 neg for DVT

## 2022-06-18 NOTE — PROGRESS NOTE ADULT - PROBLEM SELECTOR PLAN 4
- 6/16 - 6/18 no fevers, s/p immodium x1  - 6/15 - 6/16 overnight with 5-6 episodes of light, brown/green colored loose stool with fever of 102.4  - no bowel regimen in place, monitor hydration status, reduced diuretics from lasix 40 BID to daily   - f/u GI PCR, consider CMV r/o if persists     Additional Hx:   #chronic inflammatory diarrhea  #possible Crohn's vs colitis vs infectious  -calprotectin elevated at [place] [address]. no current plan by GI for colonoscopy at this time.   -C diff negative (5/30)  -less likely crohns disease -monitoring off cellcept  -Has been seen by Dr. Swapnil Wolfe and also Dr. Shivam Malagon  -According to sisters at bedside 6/6, patient took Aranesp in the [facility] for lupus prior to him getting sick. Family are wondering if the medication can be contributory.   - Non-routine medications:   -Following complements, ESR, CRP, dsDNA  -rheumatology consulted, will f/u recs

## 2022-06-18 NOTE — PROVIDER CONTACT NOTE (OTHER) - SITUATION
Day MD requesting to insert peripheral IV, IV RN stated that the midline is WDL. Pt due for IV medications

## 2022-06-18 NOTE — PROGRESS NOTE ADULT - PROBLEM SELECTOR PLAN 1
Recommendations per heme/onc:   - treatment tailored by discontinuing immunosuppression , patient has been of MMF for many weeks.  - c/w dexamethasone taper.  - c/w antiviral therapy  - c/w supportive care, monitor H/H, platelet  - monitor kidney function and glucose  - potential therapies to offset inflammatory response - including jakifi and rituxan has been discussed with the patient.   - f/u PT evaluation  - f/u ID recommendations  - obtain rheumatology consultation Recommendations per heme/onc:   - treatment tailored by discontinuing immunosuppression , patient has been of MMF for many weeks.  - c/w dexamethasone taper.  - c/w antiviral therapy  - c/w supportive care, monitor H/H, platelet  - monitor kidney function and glucose  - potential therapies to offset inflammatory response - including jakifi and rituxan has been discussed with the patient.   - f/u PT evaluation  - f/u ID recommendations  - update rheum Dr. Villalta (last seen 6/7th)

## 2022-06-18 NOTE — PROGRESS NOTE ADULT - PROBLEM SELECTOR PLAN 12
Diet: minced and moist  DVT: heparin gtt on hold  Dispo: pending PM&R (needs indept trt plan), OT  COVID pcr: Neg 6/18

## 2022-06-18 NOTE — PROGRESS NOTE ADULT - SUBJECTIVE AND OBJECTIVE BOX
HANDY FREEMAN 66y [id number]    Patient is a 66y old  Male who presents with a chief complaint of recurrent fevers, unknown source (18 Jun 2022 07:25)      Follow Up/CC:  ID following for fever    Interval History/ROS: no fever    Allergies    No Known Allergies    Intolerances        ANTIMICROBIALS:  fluconAZOLE IVPB 400 every 24 hours  trimethoprim  160 mG/sulfamethoxazole 800 mG 1 daily  valACYclovir 1000 two times a day      MEDICATIONS  (STANDING):  carvedilol 25 milliGRAM(s) Oral every 12 hours  chlorhexidine 4% Liquid 1 Application(s) Topical <User Schedule>  citric acid/sodium citrate Solution 30 milliLiter(s) Oral every 6 hours  dexAMETHasone  IVPB 15 milliGRAM(s) IV Intermittent daily  dextrose 5%. 1000 milliLiter(s) (50 mL/Hr) IV Continuous <Continuous>  dextrose 5%. 1000 milliLiter(s) (100 mL/Hr) IV Continuous <Continuous>  dextrose 50% Injectable 25 Gram(s) IV Push once  dextrose 50% Injectable 12.5 Gram(s) IV Push once  dextrose 50% Injectable 25 Gram(s) IV Push once  fluconAZOLE IVPB 400 milliGRAM(s) IV Intermittent every 24 hours  folic acid 1 milliGRAM(s) Oral daily  furosemide   Injectable 40 milliGRAM(s) IV Push daily  gabapentin 300 milliGRAM(s) Oral daily  glucagon  Injectable 1 milliGRAM(s) IntraMuscular once  glucagon  Injectable 1 milliGRAM(s) IntraMuscular once  insulin lispro (ADMELOG) corrective regimen sliding scale   SubCutaneous three times a day before meals  insulin lispro (ADMELOG) corrective regimen sliding scale   SubCutaneous at bedtime  lidocaine   4% Patch 1 Patch Transdermal daily  multivitamin/minerals 1 Tablet(s) Oral daily  pantoprazole    Tablet 40 milliGRAM(s) Oral before breakfast  tamsulosin 0.4 milliGRAM(s) Oral at bedtime  trimethoprim  160 mG/sulfamethoxazole 800 mG 1 Tablet(s) Oral daily  valACYclovir 1000 milliGRAM(s) Oral two times a day    MEDICATIONS  (PRN):  acetaminophen     Tablet .. 650 milliGRAM(s) Oral every 6 hours PRN Temp greater or equal to 38C (100.4F), Mild Pain (1 - 3)  dextrose Oral Gel 15 Gram(s) Oral once PRN Blood Glucose LESS THAN 70 milliGRAM(s)/deciliter  sodium chloride 0.9% lock flush 10 milliLiter(s) IV Push every 1 hour PRN Pre/post blood products, medications, blood draw, and to maintain line patency        Vital Signs Last 24 Hrs  T(C): 36.6 (18 Jun 2022 05:05), Max: 37.1 (17 Jun 2022 12:31)  T(F): 97.8 (18 Jun 2022 05:05), Max: 98.7 (17 Jun 2022 12:31)  HR: 78 (18 Jun 2022 06:35) (72 - 81)  BP: 151/75 (18 Jun 2022 06:35) (120/67 - 190/91)  BP(mean): --  RR: 18 (18 Jun 2022 05:05) (18 - 20)  SpO2: 95% (18 Jun 2022 05:05) (91% - 97%)    CBC Full  -  ( 17 Jun 2022 11:33 )  WBC Count : 5.33 K/uL  RBC Count : 2.70 M/uL  Hemoglobin : 8.2 g/dL  Hematocrit : 25.3 %  Platelet Count - Automated : 50 K/uL  Mean Cell Volume : 93.7 fl  Mean Cell Hemoglobin : 30.4 pg  Mean Cell Hemoglobin Concentration : 32.4 gm/dL  Auto Neutrophil # : 4.86 K/uL  Auto Lymphocyte # : 0.28 K/uL  Auto Monocyte # : 0.05 K/uL  Auto Eosinophil # : 0.00 K/uL  Auto Basophil # : 0.00 K/uL  Auto Neutrophil % : 89.5 %  Auto Lymphocyte % : 5.3 %  Auto Monocyte % : 0.9 %  Auto Eosinophil % : 0.0 %  Auto Basophil % : 0.0 %    06-17    140  |  106  |  49<H>  ----------------------------<  141<H>  3.4<L>   |  22  |  1.48<H>    Ca    7.7<L>      17 Jun 2022 11:33  Phos  3.8     06-17  Mg     1.6     06-17    TPro  5.6<L>  /  Alb  2.8<L>  /  TBili  1.0  /  DBili  x   /  AST  39  /  ALT  22  /  AlkPhos  192<H>  06-17    LIVER FUNCTIONS - ( 17 Jun 2022 11:33 )  Alb: 2.8 g/dL / Pro: 5.6 g/dL / ALK PHOS: 192 U/L / ALT: 22 U/L / AST: 39 U/L / GGT: x               MICROBIOLOGY:  .Blood Blood-Peripheral  06-16-22   No growth to date.  --  --      .Blood Blood  06-16-22   Testing in progress  --  --      .Blood Blood-Peripheral  06-16-22   No growth to date.  --  --      .Blood Blood  06-11-22   No Growth Final  --  --      .Bronchial Bronchial Lavage  06-06-22   No growth at 1 week.  --    No polymorphonuclear cells seen per low power field  No squamous epithelial cells per low power field  No organisms seen per oil power field      .Blood Blood  06-05-22   NEGATIVE for Plasmodium antigens. Microscopy is performed for  confirmation.  This test does not detect the presence of Babesia species.  If Babesiosis is suspected, please order test for Babesia PCR: Babesia  microti PCR Bld  ************************************************************  No Blood Parasites observed by giemsa stain  One negative set of blood smears does not rule out  the possibility of a parasitic infection.  A minimum of 3  specimens should be collected, at least 12-24 hours apart,  over a 36 hour time period.  --  --      .Blood Blood  06-04-22   NEGATIVE for Plasmodium antigens. Microscopy is performed for  confirmation.  This test does not detect the presence of Babesia species.  If Babesiosis is suspected, please order test for Babesia PCR: Babesia  microti PCR Bld  ************************************************************  No Blood Parasites observed by giemsa stain  One negative set of blood smears does not rule out  the possibility of a parasitic infection.  A minimum of 3  specimens should be collected, at least 12-24 hours apart,  over a 36 hour time period.  --  --      .Sputum Sputum  06-04-22   Normal Respiratory Wen present  --    Rare Squamous epithelial cells per low power field  Rare polymorphonuclear leukocytes per low power field  No organisms seen      .Sputum Sputum  06-04-22   No growth at 1 week.  --  --      .Blood Blood-Venous  06-04-22   No Growth Final  --  --      .Blood Blood-Peripheral  06-03-22   No Growth Final  --  --      .Body Fluid Peritoneal Fluid  06-02-22   No growth  --    polymorphonuclear leukocytes seen  No organisms seen  by cytocentrifuge      .Blood Blood-Peripheral  06-01-22   No Growth Final  --  --      .Blood Blood-Venous  06-01-22   No Growth Final  --  --      .Stool Stool  06-01-22   No growth at 1 week.  --  --      .Stool Stool  06-01-22   No growth at 1 week.  --  --      .Sputum Sputum  06-01-22   No growth at 1 week.  --  --      .Stool Stool  05-31-22   No growth at 1 week.  --  --      .Sputum Sputum  05-31-22   No growth at 1 week.  --  --      .Sputum Sputum  05-30-22   Normal Respiratory Wen present  --    No polymorphonuclear leukocytes per low power field  No Squamous epithelial cells per low power field  No organisms seen per oil power field      .Stool sarthak betito  05-30-22   No enteric pathogens isolated.  (Stool culture examined for Salmonella,  Shigella, Campylobacter, Aeromonas, Plesiomonas,  Vibrio, E.coli O157 and Yersinia)  --  --      .Blood Blood  05-30-22   NEGATIVE for Plasmodium antigens. Microscopy is performed for  confirmation.  This test does not detect the presence of Babesia species.  If Babesiosis is suspected, please order test for Babesia PCR: Babesia  microti PCR [facility]  ************************************************************  No Blood Parasites observed by giemsa stain  One negative set of blood smears does not rule out  the possibility of a parasitic infection.  A minimum of 3  specimens should be collected, at least 12-24 hours apart,  over a 36 hour time period.  --  --      .Stool Feces sarthak betito  05-30-22   GI PCR Results: NOT detected  *******Please Note:*******  GI panel PCR evaluates for:  Campylobacter, Plesiomonas shigelloides, Salmonella,  Vibrio, Yersinia enterocolitica, Enteroaggregative  Escherichia coli (EAEC), Enteropathogenic E.coli (EPEC),  Enterotoxigenic E. coli (ETEC) lt/st, Shiga-like  toxin-producing E. coli (STEC) stx1/stx2,  Shigella/ Enteroinvasive E. coli (EIEC), Cryptosporidium,  Cyclospora cayetanensis, Entamoeba histolytica,  Giardia lamblia, Adenovirus F 40/41, Astrovirus,  Norovirus GI/GII, Rotavirus A, Sapovirus  --  --      .Blood Blood  05-30-22   No Growth Final  --  --      .Blood Blood  05-30-22   No growth  --  --      .Blood Blood  05-30-22   No Growth Final  --  --              v            RADIOLOGY

## 2022-06-18 NOTE — PROGRESS NOTE ADULT - SUBJECTIVE AND OBJECTIVE BOX
No acute events overnight.  Poor appetite, but otherwise no complaints.  Feeling a little better.     MEDICATIONS  (STANDING):  carvedilol 25 milliGRAM(s) Oral every 12 hours  chlorhexidine 4% Liquid 1 Application(s) Topical <User Schedule>  citric acid/sodium citrate Solution 30 milliLiter(s) Oral every 6 hours  dexAMETHasone  IVPB 15 milliGRAM(s) IV Intermittent daily  dextrose 5%. 1000 milliLiter(s) (100 mL/Hr) IV Continuous <Continuous>  dextrose 5%. 1000 milliLiter(s) (50 mL/Hr) IV Continuous <Continuous>  dextrose 50% Injectable 25 Gram(s) IV Push once  dextrose 50% Injectable 12.5 Gram(s) IV Push once  dextrose 50% Injectable 25 Gram(s) IV Push once  fluconAZOLE   Tablet 100 milliGRAM(s) Oral daily  folic acid 1 milliGRAM(s) Oral daily  furosemide   Injectable 40 milliGRAM(s) IV Push daily  gabapentin 300 milliGRAM(s) Oral daily  glucagon  Injectable 1 milliGRAM(s) IntraMuscular once  glucagon  Injectable 1 milliGRAM(s) IntraMuscular once  insulin lispro (ADMELOG) corrective regimen sliding scale   SubCutaneous three times a day before meals  insulin lispro (ADMELOG) corrective regimen sliding scale   SubCutaneous at bedtime  lidocaine   4% Patch 1 Patch Transdermal daily  multivitamin/minerals 1 Tablet(s) Oral daily  pantoprazole    Tablet 40 milliGRAM(s) Oral before breakfast  tamsulosin 0.4 milliGRAM(s) Oral at bedtime  trimethoprim  160 mG/sulfamethoxazole 800 mG 1 Tablet(s) Oral daily  valACYclovir 1000 milliGRAM(s) Oral two times a day    MEDICATIONS  (PRN):  acetaminophen     Tablet .. 650 milliGRAM(s) Oral every 6 hours PRN Temp greater or equal to 38C (100.4F), Mild Pain (1 - 3)  dextrose Oral Gel 15 Gram(s) Oral once PRN Blood Glucose LESS THAN 70 milliGRAM(s)/deciliter  sodium chloride 0.9% lock flush 10 milliLiter(s) IV Push every 1 hour PRN Pre/post blood products, medications, blood draw, and to maintain line patency    Vital Signs Last 24 Hrs  T(C): 36.6 (18 Jun 2022 05:05), Max: 36.9 (17 Jun 2022 20:31)  T(F): 97.8 (18 Jun 2022 05:05), Max: 98.4 (17 Jun 2022 20:31)  HR: 78 (18 Jun 2022 08:11) (72 - 79)  BP: 151/75 (18 Jun 2022 06:35) (120/67 - 179/84)  BP(mean): --  RR: 18 (18 Jun 2022 08:11) (18 - 20)  SpO2: 95% (18 Jun 2022 05:05) (94% - 97%)    PHYSICAL EXAM:   NAD  Sitting comfortably  No dyspnea  Awake, alert                        8.7    7.84  )-----------( 55       ( 18 Jun 2022 10:09 )             28.3     06-17    140  |  106  |  49<H>  ----------------------------<  141<H>  3.4<L>   |  22  |  1.48<H>    Ca    7.7<L>      17 Jun 2022 11:33  Phos  3.8     06-17  Mg     1.6     06-17    TPro  5.6<L>  /  Alb  2.8<L>  /  TBili  1.0  /  DBili  x   /  AST  39  /  ALT  22  /  AlkPhos  192<H>  06-17

## 2022-06-18 NOTE — PROGRESS NOTE ADULT - PROBLEM SELECTOR PLAN 2
- *6/15 - 6/16 overnight fever 102.4 with loose, diarrhea stool 5-6 episodes, no abx, pending GI PCR  - hospital course fevers: 6/10 102F; 6/11 105F, 6/12 101F 6/12, no fever on 6/15  - leukocytosis (on steroids as well):   - BCx: pending 6/16, 6/11 NGTD, 5/30 NGTD  - Abx: monitoring off abx as of 6/15  - sources: previous perianal abscess area? vs. lines [RUE midline vs. peripheral lines vs. 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  -will f/u pathology results from cervical LN core biopsy - EBV positive lymphoproliferative preliminary results   -bronch lavage cytology negative for malignancy

## 2022-06-18 NOTE — PROGRESS NOTE ADULT - PROBLEM SELECTOR PLAN 11
Diet: minced and moist  DVT: heparin gtt on hold  Dispo: pending PM&R (needs indept trt plan), OT -stridor 6/4 requiring intubation refractory to epinephrine, methylprednisolone, xopenex, ipratropium. Previously with stridor earlier in admission although more severe 6/4 AM requiring intubation unclear etiology with new medication bicitra, recent instrumentation with core biopsy although had tolerated procedure without evidence of neck swelling/edema or stridor previous, with underlying HUNG hx.   -now resolved

## 2022-06-18 NOTE — PROGRESS NOTE ADULT - PROBLEM SELECTOR PLAN 10
-stridor 6/4 requiring intubation refractory to epinephrine, methylprednisolone, xopenex, ipratropium. Previously with stridor earlier in admission although more severe 6/4 AM requiring intubation unclear etiology with new medication bicitra, recent instrumentation with core biopsy although had tolerated procedure without evidence of neck swelling/edema or stridor previous, with underlying HUNG hx.   -now resolved - holding home ACEi additionally due to PEPE on CKD  - hydral 10 TID 6/9 - 6/14 d/c i/s/o renal dx   - dc metoprolol   - cw coreg 6/15

## 2022-06-18 NOTE — PROGRESS NOTE ADULT - ASSESSMENT
65 yo M with a PMH HTN, HLD, SLE on Cellcept (MMF), class V lupus nephritis, CKD stage 2, DM2, diabetic neuropathy, TIA (2019) on plavix, BPH, HUNG, hospitalization in [facility] 3/27-4/20 tx for pancreatitis and C diff?, recent hospitalization at [place] 4/22-4/26 for acute pancreatitis and C diff colitis and another hospitalization 5/5 at [facility] for septic shock (source buttock abscess) treated with vanc, cefepime (5/5-5/10), meropenem (5/11-5/18) and micafungin. Zosyn added 5/26. Course complicated by recurrent fevers despite being on antibiotics, and leukocytosis up to 30k. Blood cx neg, UA unremarkable. MRI abdomen w/ contrast performed showing extensive abd lymphadenopathy (reactive to c diff vs lymphoma?). IR stated no window for bx. Course also complicated by a L brachial vein DVT and superficial DVT in arms. Patient noted to have had a positive PPD but has possibly gotten the BCG vaccine. Quant gold performed at [facility] still pending.     Admitted to [facility] MICU for further management. Vital signs on arrival: temp 38.6, /77, , RR 28, O2 sat 97% on 2L NC.  Labs: WBC 34.53, Hb 9.6, lipase 740, procal 2.02, tBili 1.4, alk phos 498, lactate 2.4. (30 May 2022 04:01)    Hematology/Oncology consulted d/t patient's history of anemia. [facility]    The patients wife is at bedside. the patient is awake, alert, oriented x 3. He is on dexamathesone for an ebv lymphoproliferative disorder. SCN molecular studies are pending. Patient meets some criteria for HLH but fits the criteria for a systemic inflammatory disorder.  patient is clinically improving, yesterday he was able to get out of bed    1. EBV lymphoproliferative disorder  -- treatment tailored by discontinuing immunosuppression; patient has been of MMF for many weeks  -- c/w dexamethasone taper  -- c/w antiviral therapy  -- appreciate rheumatology evaluation  -- potential therapies to offset inflammatory response include Jakafi and Rituxan has been discussed with the patient; given steady clinical improvement, planning to consider outpatient at this time  -- c/w supportive care, close monitoring

## 2022-06-18 NOTE — PROGRESS NOTE ADULT - ATTENDING COMMENTS
above plans discussed with Dr. Olsen    # fever 2/2 EBV lymphoproliferative disorder  # anemia  # SLE/lupus nephritis    - overall, fever curve and leukocytosis now resolved; improvement in oral lesions  - pt still with productive cough, LE pitting edea; continue IV lasix  - appreciate heme recs: continue steroid taper, keep him off of immunosuppression and pt may benefit from Jakafi/Rituxan  - appreciate ID recs: continue on valtrex/diflucan  - PEPE now plateaued and stable; continue to monitor  - H/H stable, continue to monitor closely and supportive transfusion  - PT/OT consult    Alyssa Charlton MD  Division of Hospital Medicine  Contact via [employer] Teams  Office: [phone]

## 2022-06-19 NOTE — PROGRESS NOTE ADULT - ASSESSMENT
67 yo M with a PMH HTN, HLD, SLE on Cellcept (MMF), class V lupus nephritis, CKD stage 2, DM2, diabetic neuropathy, TIA (2019) on plavix, BPH, HUNG, hospitalization in [facility] 3/27-4/20 tx for pancreatitis and C diff?, recent hospitalization at [place] 4/22-4/26 for acute pancreatitis and C diff colitis and another hospitalization 5/5 at [place] for septic shock (source buttock abscess) treated with vanc, cefepime (5/5-5/10), meropenem (5/11-5/18) and micafungin. Zosyn added 5/26. Course complicated by recurrent fevers despite being on antibiotics, and leukocytosis up to 30k. Blood cx neg, UA unremarkable. MRI abdomen w/ contrast performed showing extensive abd lymphadenopathy (reactive to c diff vs lymphoma?). IR stated no window for bx. Course also complicated by a L brachial vein DVT and superficial DVT in arms. Patient noted to have had a positive PPD but has possibly gotten the BCG vaccine. Quant gold performed at [place] was still pending.     Admitted to [facility] MICU for further management. Vital signs on arrival: temp 38.6, /77, , RR 28, O2 sat 97% on 2L NC.  Labs: WBC 34.53, Hb 9.6, lipase 740, procal 2.02, tBili 1.4, alk phos 498, lactate 2.4. (30 May 2022 04:01)    Hematology/Oncology consulted d/t patient's history of anemia. [facility]    The patients wife is at bedside. the patient is awake, alert, oriented x 3. He is on dexamathesone for an ebv lymphoproliferative disorder. SCN molecular studies are pending. Patient meets some criteria for HLH but fits the criteria for a systemic inflammatory disorder.  patient is clinically improving, yesterday he was able to get out of bed    1. EBV lymphoproliferative disorder  -- treatment tailored by discontinuing immunosuppression; patient has been of MMF for many weeks  -- c/w dexamethasone taper  -- c/w antiviral therapy  -- potential therapies to offset inflammatory response include Jakafi and Rituxan  -- options discussed with patient & family; given steady clinical improvement, planning to consider outpatient at this time  -- appreciate rheumatology evaluation; will need to coordinate treatment for SLE & lymphoproliferative disorder together  -- c/w supportive care, close monitoring

## 2022-06-19 NOTE — PROGRESS NOTE ADULT - PROBLEM SELECTOR PLAN 2
- *6/15 - 6/16 overnight fever 102.4 with loose, diarrhea stool 5-6 episodes, no abx, pending GI PCR  - hospital course fevers: 6/10 102F; 6/11 105F, 6/12 101F 6/12, no fever on 6/15  - leukocytosis (on steroids as well):   - BCx: pending 6/16, 6/11 NGTD, 5/30 NGTD  - Abx: monitoring off abx as of 6/15  - sources: previous perianal abscess area? vs. lines [RUE midline vs. peripheral lines vs. canales cath], find another access on UE,   - CXR: limited image, compared to 6/11  -- f/u cultures, +HSV pcr    - diff dx: infectious, malignancy, IgG4 disease, HLH 2/2 EBV  - p/w elevated WBC, elevated IgA at [place], HIV nonreactive, EBV IgG+, CMV 50  - s/p core biopsy and paracentesis at [place] prior to transfer to [facility]  - quant gold indeterminate    - possibility of peritoneal TB appreciate ID reccs AFB sputum and stool thus far are negative with IR biopsy completed pending final results 6/4  -Rheum eval including C3 C4 ESR 44 CRP 77 dsDNA progression of underlying lupus v other rheumatologic process causing fever although less likely.  -will f/u pathology results from cervical LN core biopsy - EBV positive lymphoproliferative preliminary results   -bronch lavage cytology negative for malignancy

## 2022-06-19 NOTE — PROGRESS NOTE ADULT - PROBLEM SELECTOR PLAN 12
Diet: minced and moist  DVT: heparin gtt on hold  Dispo: pending PM&R (needs indept trt plan), OT  COVID pcr: Neg 6/18 Diet: regular  DVT: heparin gtt on hold  Dispo: pending PM&R (needs indept trt plan), OT  COVID pcr: Neg 6/18

## 2022-06-19 NOTE — PROGRESS NOTE ADULT - PROBLEM SELECTOR PLAN 4
-monitoring off cellcept  -Has been seen by Dr. Swapnil Wolfe and also Dr. Shivam Malagon  -According to sisters at bedside 6/6, patient took Aranesp in the [facility] for lupus prior to him getting sick. Family are wondering if the medication can be contributory.   - Non-routine medications:   -Following complements, ESR, CRP, dsDNA  -rheumatology consulted, will f/u recs

## 2022-06-19 NOTE — PROGRESS NOTE ADULT - PROBLEM SELECTOR PLAN 1
Recommendations per heme/onc:   - treatment tailored by discontinuing immunosuppression , patient has been of MMF for many weeks.  - c/w dexamethasone taper.  - c/w antiviral therapy  - c/w supportive care, monitor H/H, platelet  - monitor kidney function and glucose  - potential therapies to offset inflammatory response - including jakifi and rituxan has been discussed with the patient.   - f/u PT evaluation  - f/u ID recommendations  - update rheum Dr. Villalta (last seen 6/7th)

## 2022-06-19 NOTE — PROGRESS NOTE ADULT - PROBLEM SELECTOR PLAN 3
- p/w significant lower ext edema and congestive cough (wet cough)  - started on IV lasix BID --> now on IV lasix 40 mg daily with ACE wraps  - cough present, but improving, cw lasix

## 2022-06-19 NOTE — PROGRESS NOTE ADULT - PROBLEM SELECTOR PLAN 6
PEPE on CKD, hx lupus nephritis  - Cr 1.35 (6/15) improving   - p/w with worsening renal function FeNa prerenal  - 6/3 - 6/10 canales, 6/15 placed condom cath, but pt uses urinal intermittently   - 6/5 urine studies consistent with pre-renal FeNA 0.8 however IVC 2.1 on POCUS.  - urinalysis with 100mg/dL proteinuria with biopsy proven lupus nephritis   - appreciate nephrology recommendations      - Discontinued tamsulosin for now cannot be crushed and canales in place  - 6/6 discussed prior arinesp with family and risks of arinesp administration in critically ill explained including increased risk of thrombosis.  - 6/10 s/p 2 doses IV Lasix 20 for edema, pt. very fluid positive.  - 6/15 - 6/16 started (6/15) IV lasix 40 mg BID with strict I/O --> 6/16 transitioned to daily

## 2022-06-19 NOTE — PROGRESS NOTE ADULT - PROBLEM SELECTOR PLAN 5
- 6/16 - 6/18 no fevers, s/p immodium x1  - 6/15 - 6/16 overnight with 5-6 episodes of light, brown/green colored loose stool with fever of 102.4  - no bowel regimen in place, monitor hydration status, reduced diuretics from lasix 40 BID to daily   - f/u GI PCR, consider CMV r/o if persists     Additional Hx:   #chronic inflammatory diarrhea  #possible Crohn's vs colitis vs infectious  -calprotectin elevated at [place] [id number]. no current plan by GI for colonoscopy at this time.   -C diff negative (5/30)  -less likely crohns disease

## 2022-06-19 NOTE — PROGRESS NOTE ADULT - SUBJECTIVE AND OBJECTIVE BOX
PROGRESS NOTE:     Patient is a 66y old  Male who presents with a chief complaint of recurrent fevers, unknown source (18 Jun 2022 13:06)      SUBJECTIVE / OVERNIGHT EVENTS: Patient had indigestion overnight and was given Pepcid.         MEDICATIONS  (STANDING):  carvedilol 25 milliGRAM(s) Oral every 12 hours  chlorhexidine 4% Liquid 1 Application(s) Topical <User Schedule>  citric acid/sodium citrate Solution 30 milliLiter(s) Oral every 6 hours  dexAMETHasone  IVPB 15 milliGRAM(s) IV Intermittent daily  dextrose 5%. 1000 milliLiter(s) (100 mL/Hr) IV Continuous <Continuous>  dextrose 5%. 1000 milliLiter(s) (50 mL/Hr) IV Continuous <Continuous>  dextrose 50% Injectable 25 Gram(s) IV Push once  dextrose 50% Injectable 12.5 Gram(s) IV Push once  dextrose 50% Injectable 25 Gram(s) IV Push once  fluconAZOLE   Tablet 100 milliGRAM(s) Oral daily  folic acid 1 milliGRAM(s) Oral daily  furosemide   Injectable 40 milliGRAM(s) IV Push daily  gabapentin 300 milliGRAM(s) Oral daily  glucagon  Injectable 1 milliGRAM(s) IntraMuscular once  glucagon  Injectable 1 milliGRAM(s) IntraMuscular once  insulin lispro (ADMELOG) corrective regimen sliding scale   SubCutaneous three times a day before meals  insulin lispro (ADMELOG) corrective regimen sliding scale   SubCutaneous at bedtime  lidocaine   4% Patch 1 Patch Transdermal daily  multivitamin/minerals 1 Tablet(s) Oral daily  pantoprazole    Tablet 40 milliGRAM(s) Oral before breakfast  tamsulosin 0.4 milliGRAM(s) Oral at bedtime  trimethoprim  160 mG/sulfamethoxazole 800 mG 1 Tablet(s) Oral daily  valACYclovir 1000 milliGRAM(s) Oral two times a day    MEDICATIONS  (PRN):  acetaminophen     Tablet .. 650 milliGRAM(s) Oral every 6 hours PRN Temp greater or equal to 38C (100.4F), Mild Pain (1 - 3)  dextrose Oral Gel 15 Gram(s) Oral once PRN Blood Glucose LESS THAN 70 milliGRAM(s)/deciliter  famotidine    Tablet 20 milliGRAM(s) Oral two times a day PRN indigestion  sodium chloride 0.9% lock flush 10 milliLiter(s) IV Push every 1 hour PRN Pre/post blood products, medications, blood draw, and to maintain line patency      CAPILLARY BLOOD GLUCOSE      POCT Blood Glucose.: 118 mg/dL (18 Jun 2022 22:19)  POCT Blood Glucose.: 136 mg/dL (18 Jun 2022 16:56)  POCT Blood Glucose.: 115 mg/dL (18 Jun 2022 12:14)  POCT Blood Glucose.: 117 mg/dL (18 Jun 2022 08:20)    I&O's Summary    18 Jun 2022 07:01  -  19 Jun 2022 07:00  --------------------------------------------------------  IN: 854 mL / OUT: 0 mL / NET: 854 mL        PHYSICAL EXAM:  Vital Signs Last 24 Hrs  T(C): 37 (19 Jun 2022 04:59), Max: 37.3 (18 Jun 2022 20:00)  T(F): 98.6 (19 Jun 2022 04:59), Max: 99.2 (18 Jun 2022 20:00)  HR: 82 (19 Jun 2022 04:59) (78 - 88)  BP: 123/65 (19 Jun 2022 04:59) (107/62 - 123/65)  BP(mean): --  RR: 18 (19 Jun 2022 04:59) (18 - 18)  SpO2: 96% (19 Jun 2022 04:59) (95% - 96%)    CONSTITUTIONAL: NAD, well-developed  RESPIRATORY: Normal respiratory effort; lungs are clear to auscultation bilaterally  CARDIOVASCULAR: Regular rate and rhythm, normal S1 and S2, no murmur/rub/gallop; No lower extremity edema; Peripheral pulses are 2+ bilaterally  ABDOMEN: Nontender to palpation, normoactive bowel sounds, no rebound/guarding; No hepatosplenomegaly  MUSCLOSKELETAL: no clubbing or cyanosis of digits; no joint swelling or tenderness to palpation  PSYCH: A+O to person, place, and time; affect appropriate  NEURO: Non-focal, no tremors  SKIN: No rashes    LABS:                        8.7    7.84  )-----------( 55       ( 18 Jun 2022 10:09 )             28.3     06-18    138  |  104  |  43<H>  ----------------------------<  123<H>  4.1   |  15<L>  |  1.43<H>    Ca    7.6<L>      18 Jun 2022 17:37  Phos  3.4     06-18  Mg     1.7     06-18    TPro  5.6<L>  /  Alb  2.8<L>  /  TBili  0.9  /  DBili  x   /  AST  53<H>  /  ALT  28  /  AlkPhos  250<H>  06-18              Culture - Blood (collected 16 Jun 2022 21:00)  Source: .Blood Blood-Peripheral  Preliminary Report (18 Jun 2022 02:01):    No growth to date.    Culture - Fungal, Blood (collected 16 Jun 2022 11:24)  Source: .Blood Blood  Preliminary Report (17 Jun 2022 08:30):    Testing in progress    Culture - Blood (collected 16 Jun 2022 11:13)  Source: .Blood Blood-Peripheral  Preliminary Report (17 Jun 2022 17:01):    No growth to date.        RADIOLOGY & ADDITIONAL TESTS:  No new imaging or tests    COORDINATION OF CARE:  Care Discussed with Consultants/Other Providers [Y/N]:  Prior or Outpatient Records Reviewed [Y/N]:   PROGRESS NOTE:     Patient is a 66y old  Male who presents with a chief complaint of recurrent fevers, unknown source (18 Jun 2022 13:06)      SUBJECTIVE / OVERNIGHT EVENTS: Patient had indigestion overnight, but was able to fall asleep before given famotidine. Would like his diet changed to regular consistency. Otherwise, no acute concerns. Experiencing some discomfort in the stomach, no N/V, and diarrhea is improving. ROS otherwise negative.       MEDICATIONS  (STANDING):  carvedilol 25 milliGRAM(s) Oral every 12 hours  chlorhexidine 4% Liquid 1 Application(s) Topical <User Schedule>  citric acid/sodium citrate Solution 30 milliLiter(s) Oral every 6 hours  dexAMETHasone  IVPB 15 milliGRAM(s) IV Intermittent daily  dextrose 5%. 1000 milliLiter(s) (100 mL/Hr) IV Continuous <Continuous>  dextrose 5%. 1000 milliLiter(s) (50 mL/Hr) IV Continuous <Continuous>  dextrose 50% Injectable 25 Gram(s) IV Push once  dextrose 50% Injectable 12.5 Gram(s) IV Push once  dextrose 50% Injectable 25 Gram(s) IV Push once  fluconAZOLE   Tablet 100 milliGRAM(s) Oral daily  folic acid 1 milliGRAM(s) Oral daily  furosemide   Injectable 40 milliGRAM(s) IV Push daily  gabapentin 300 milliGRAM(s) Oral daily  glucagon  Injectable 1 milliGRAM(s) IntraMuscular once  glucagon  Injectable 1 milliGRAM(s) IntraMuscular once  insulin lispro (ADMELOG) corrective regimen sliding scale   SubCutaneous three times a day before meals  insulin lispro (ADMELOG) corrective regimen sliding scale   SubCutaneous at bedtime  lidocaine   4% Patch 1 Patch Transdermal daily  multivitamin/minerals 1 Tablet(s) Oral daily  pantoprazole    Tablet 40 milliGRAM(s) Oral before breakfast  tamsulosin 0.4 milliGRAM(s) Oral at bedtime  trimethoprim  160 mG/sulfamethoxazole 800 mG 1 Tablet(s) Oral daily  valACYclovir 1000 milliGRAM(s) Oral two times a day    MEDICATIONS  (PRN):  acetaminophen     Tablet .. 650 milliGRAM(s) Oral every 6 hours PRN Temp greater or equal to 38C (100.4F), Mild Pain (1 - 3)  dextrose Oral Gel 15 Gram(s) Oral once PRN Blood Glucose LESS THAN 70 milliGRAM(s)/deciliter  famotidine    Tablet 20 milliGRAM(s) Oral two times a day PRN indigestion  sodium chloride 0.9% lock flush 10 milliLiter(s) IV Push every 1 hour PRN Pre/post blood products, medications, blood draw, and to maintain line patency      CAPILLARY BLOOD GLUCOSE      POCT Blood Glucose.: 118 mg/dL (18 Jun 2022 22:19)  POCT Blood Glucose.: 136 mg/dL (18 Jun 2022 16:56)  POCT Blood Glucose.: 115 mg/dL (18 Jun 2022 12:14)  POCT Blood Glucose.: 117 mg/dL (18 Jun 2022 08:20)    I&O's Summary    18 Jun 2022 07:01  -  19 Jun 2022 07:00  --------------------------------------------------------  IN: 854 mL / OUT: 0 mL / NET: 854 mL        PHYSICAL EXAM:  Vital Signs Last 24 Hrs  T(C): 37 (19 Jun 2022 04:59), Max: 37.3 (18 Jun 2022 20:00)  T(F): 98.6 (19 Jun 2022 04:59), Max: 99.2 (18 Jun 2022 20:00)  HR: 82 (19 Jun 2022 04:59) (78 - 88)  BP: 123/65 (19 Jun 2022 04:59) (107/62 - 123/65)  BP(mean): --  RR: 18 (19 Jun 2022 04:59) (18 - 18)  SpO2: 96% (19 Jun 2022 04:59) (95% - 96%)    CONSTITUTIONAL: NAD  RESPIRATORY: Normal respiratory effort; lungs are clear to auscultation bilaterally  CARDIOVASCULAR: Regular rate and rhythm, normal S1 and S2, no murmur/rub/gallop; No lower extremity edema  ABDOMEN: Nontender to palpation, normoactive bowel sounds, no rebound/guarding  MUSCULOSKELETAL: no clubbing or cyanosis of digits; no joint swelling or tenderness to palpation  PSYCH: A+O to person, place, and time; affect appropriate  NEURO: Non-focal, no tremors  SKIN: No rashes    LABS:                        8.7    7.84  )-----------( 55       ( 18 Jun 2022 10:09 )             28.3     06-18    138  |  104  |  43<H>  ----------------------------<  123<H>  4.1   |  15<L>  |  1.43<H>    Ca    7.6<L>      18 Jun 2022 17:37  Phos  3.4     06-18  Mg     1.7     06-18    TPro  5.6<L>  /  Alb  2.8<L>  /  TBili  0.9  /  DBili  x   /  AST  53<H>  /  ALT  28  /  AlkPhos  250<H>  06-18      Culture - Blood (collected 16 Jun 2022 21:00)  Source: .Blood Blood-Peripheral  Preliminary Report (18 Jun 2022 02:01):    No growth to date.    Culture - Fungal, Blood (collected 16 Jun 2022 11:24)  Source: .Blood Blood  Preliminary Report (17 Jun 2022 08:30):    Testing in progress    Culture - Blood (collected 16 Jun 2022 11:13)  Source: .Blood Blood-Peripheral  Preliminary Report (17 Jun 2022 17:01):    No growth to date.        RADIOLOGY & ADDITIONAL TESTS:  No new imaging or tests

## 2022-06-19 NOTE — PROGRESS NOTE ADULT - ATTENDING COMMENTS
above plans discussed with Dr. Villegas    # fever 2/2 EBV lymphoproliferative disorder  # anemia  # SLE/lupus nephritis    - overall, fever curve and leukocytosis now resolved; improvement in oral lesions  - pt still with productive cough, LE pitting edema; continue IV lasix  - appreciate heme recs: continue steroid taper, keep him off of immunosuppression and pt may benefit from Jakafi/Rituxan  - appreciate ID recs: continue on valtrex/diflucan  - PEPE now plateaued and stable; continue to monitor  - H/H stable, continue to monitor closely and supportive transfusion  - PT/OT consult    Alyssa Charlton MD  Division of Hospital Medicine  Contact via [employer] Teams  Office: [phone]

## 2022-06-19 NOTE — PROGRESS NOTE ADULT - PROBLEM SELECTOR PLAN 7
-outside hospital reported to be consistent with AOCD  -CT ab pending, r/o bleed  - 6/13 AM s/p 1u pRBC hgb 6.7  - 6/14 AM s/p 1u pRBC hgb 6.7   - 6/15 AM s/p 1u pRBC hgb 6.7 --> 9.3 (overcorrection s/p 1u pRBC?)  - 6/16 - 6/17 hgb 9.1, 8.2  - transfuse if Hb<7, maintain active type and screen  - (no active bleed, will hold off on CTA, consider splenic accumulation of RBC from EBV  - DVT ppx: hold AC

## 2022-06-19 NOTE — PROGRESS NOTE ADULT - SUBJECTIVE AND OBJECTIVE BOX
Patient resting in chair, family at bedside.  No acute events overnight.     MEDICATIONS  (STANDING):  carvedilol 25 milliGRAM(s) Oral every 12 hours  chlorhexidine 4% Liquid 1 Application(s) Topical <User Schedule>  citric acid/sodium citrate Solution 30 milliLiter(s) Oral every 6 hours  dexAMETHasone  IVPB 15 milliGRAM(s) IV Intermittent daily  dextrose 5%. 1000 milliLiter(s) (100 mL/Hr) IV Continuous <Continuous>  dextrose 5%. 1000 milliLiter(s) (50 mL/Hr) IV Continuous <Continuous>  dextrose 50% Injectable 25 Gram(s) IV Push once  dextrose 50% Injectable 12.5 Gram(s) IV Push once  dextrose 50% Injectable 25 Gram(s) IV Push once  fluconAZOLE   Tablet 100 milliGRAM(s) Oral daily  folic acid 1 milliGRAM(s) Oral daily  furosemide   Injectable 40 milliGRAM(s) IV Push daily  gabapentin 300 milliGRAM(s) Oral daily  glucagon  Injectable 1 milliGRAM(s) IntraMuscular once  glucagon  Injectable 1 milliGRAM(s) IntraMuscular once  insulin lispro (ADMELOG) corrective regimen sliding scale   SubCutaneous three times a day before meals  insulin lispro (ADMELOG) corrective regimen sliding scale   SubCutaneous at bedtime  lidocaine   4% Patch 1 Patch Transdermal daily  multivitamin/minerals 1 Tablet(s) Oral daily  pantoprazole    Tablet 40 milliGRAM(s) Oral before breakfast  tamsulosin 0.4 milliGRAM(s) Oral at bedtime  trimethoprim  160 mG/sulfamethoxazole 800 mG 1 Tablet(s) Oral daily  valACYclovir 1000 milliGRAM(s) Oral two times a day    MEDICATIONS  (PRN):  acetaminophen     Tablet .. 650 milliGRAM(s) Oral every 6 hours PRN Temp greater or equal to 38C (100.4F), Mild Pain (1 - 3)  dextrose Oral Gel 15 Gram(s) Oral once PRN Blood Glucose LESS THAN 70 milliGRAM(s)/deciliter  famotidine    Tablet 20 milliGRAM(s) Oral two times a day PRN indigestion  sodium chloride 0.9% lock flush 10 milliLiter(s) IV Push every 1 hour PRN Pre/post blood products, medications, blood draw, and to maintain line patency    Vital Signs Last 24 Hrs  T(C): 37 (19 Jun 2022 04:59), Max: 37.3 (18 Jun 2022 20:00)  T(F): 98.6 (19 Jun 2022 04:59), Max: 99.2 (18 Jun 2022 20:00)  HR: 82 (19 Jun 2022 04:59) (80 - 88)  BP: 123/65 (19 Jun 2022 04:59) (107/62 - 123/65)  BP(mean): --  RR: 18 (19 Jun 2022 04:59) (18 - 18)  SpO2: 96% (19 Jun 2022 04:59) (95% - 96%)    PHYSICAL EXAM:   NAD, drowsy, but appears comfortabe  No dyspnea  Mild facial swelling, improved BLE swelling               8.6    7.25  )-----------( 51       ( 19 Jun 2022 07:11 )             26.8       06-18    138  |  104  |  43<H>  ----------------------------<  123<H>  4.1   |  15<L>  |  1.43<H>    Ca    7.6<L>      18 Jun 2022 17:37  Phos  2.9     06-19  Mg     1.7     06-19    TPro  5.6<L>  /  Alb  2.8<L>  /  TBili  0.9  /  DBili  x   /  AST  53<H>  /  ALT  28  /  AlkPhos  250<H>  06-18

## 2022-06-19 NOTE — PROGRESS NOTE ADULT - ASSESSMENT
67 yo M with a PMH HTN, HLD, SLE on Cellcept (MMF), class V lupus nephritis, CKD stage 2, DM2, diabetic neuropathy, TIA (2019) on plavix, BPH, HUNG, with multiple hospitalizations for c diff, acute panc, septic shock 2/2 buttock abscess status post drainage. PT transferred for recurrent fevers s/p core biopsy. In MICU, paracentesis suspected lymphoma vs. HLH, pt intubated and sedated due to upper airway stridor and AMS. Successfully extubated on 6/8 and transferred to floors with heme/onc, ID, rheum, and surg evaluation. Pt s/p pRBC transfusion (6/13, 6,14 and 6/15), hgb stable >7, recurrent fevers (most recent 6/15 overnight) monitoring off abx, bcx pending from 6/16 (negative 6/11), unremarkable CTA to r/o hematoma. Pending O/T and PM&R needs finalized inpt trt plan. Cardiology meds, restarted coreg, d/c hydralazine and metoprolol, started IV lasix 40 mg BID (6/15), transitioned to IV 40 mg daily on (6/16 -__). Lymphoproliferative workup, tapering steroids, prelim LN biopsy: EBV positive lymphoproliferative process, pending molecular studies. 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 No fevers since 6/16. Lymph node FNAB: TCR beta and gamma + for clonal T cell population, BCR IgK + for monoclonal B cell population. Update Rheum consult (Dr. Villalta)

## 2022-06-20 NOTE — PROGRESS NOTE ADULT - PROBLEM SELECTOR PLAN 2
- Now resolved, likely from underlying lymphoproliferative process. No fevers within last 24 hours   - BCx: pending 6/16, 6/11 NGTD, 5/30 NGTD  - continue to monitro off antibiotics  - currently on fluconazole of oral thrush, bactrim for PCP ppx and valtrex for HSV ppx

## 2022-06-20 NOTE — PROGRESS NOTE ADULT - SUBJECTIVE AND OBJECTIVE BOX
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 Moore), DM, TIA, BPH, and HUNG who pwas transferred to the hospital due to concerns for septic shock and recurrent fevers. 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 Since then he has been following Dr. Moore as his primary nephrologist. The patient had treatment with steroids and cyclophosphamide. Most recently the patient is being treated with MMF 750mg BID. Upon arrival to the hospital the patient was noted to have a SCr of 1.16 which had since risen to 1.98mg/dL The nephrology team was consulted for PEPE. The patient was intubated on 6/5 for worsening work of breathing, successfully extubated on 6/8.     Pt. seen and examined this morning reports feeling better overall. He states he has been urinating well. Denied any shortness of breath, chest pain, nausea, or vomiting. No acute issues noted overnight.       PAST HISTORY  --------------------------------------------------------------------------------  No significant changes to PMH, PSH, FHx, SHx, unless otherwise noted    ALLERGIES & MEDICATIONS  --------------------------------------------------------------------------------  Allergies    No Known Allergies    Intolerances      Standing Inpatient Medications  carvedilol 25 milliGRAM(s) Oral every 12 hours  chlorhexidine 4% Liquid 1 Application(s) Topical <User Schedule>  citric acid/sodium citrate Solution 30 milliLiter(s) Oral every 6 hours  dexAMETHasone  IVPB 15 milliGRAM(s) IV Intermittent daily  dextrose 5%. 1000 milliLiter(s) IV Continuous <Continuous>  dextrose 5%. 1000 milliLiter(s) IV Continuous <Continuous>  dextrose 50% Injectable 25 Gram(s) IV Push once  dextrose 50% Injectable 12.5 Gram(s) IV Push once  dextrose 50% Injectable 25 Gram(s) IV Push once  fluconAZOLE   Tablet 100 milliGRAM(s) Oral daily  folic acid 1 milliGRAM(s) Oral daily  furosemide   Injectable 40 milliGRAM(s) IV Push daily  gabapentin 300 milliGRAM(s) Oral daily  glucagon  Injectable 1 milliGRAM(s) IntraMuscular once  glucagon  Injectable 1 milliGRAM(s) IntraMuscular once  insulin lispro (ADMELOG) corrective regimen sliding scale   SubCutaneous three times a day before meals  insulin lispro (ADMELOG) corrective regimen sliding scale   SubCutaneous at bedtime  lidocaine   4% Patch 1 Patch Transdermal daily  multivitamin/minerals 1 Tablet(s) Oral daily  pantoprazole    Tablet 40 milliGRAM(s) Oral before breakfast  tamsulosin 0.4 milliGRAM(s) Oral at bedtime  trimethoprim  160 mG/sulfamethoxazole 800 mG 1 Tablet(s) Oral daily  valACYclovir 1000 milliGRAM(s) Oral two times a day    PRN Inpatient Medications  acetaminophen     Tablet .. 650 milliGRAM(s) Oral every 6 hours PRN  dextrose Oral Gel 15 Gram(s) Oral once PRN  famotidine    Tablet 20 milliGRAM(s) Oral two times a day PRN  sodium chloride 0.9% lock flush 10 milliLiter(s) IV Push every 1 hour PRN      REVIEW OF SYSTEMS    All other systems were reviewed and are negative, except as noted.    VITALS/PHYSICAL EXAM  --------------------------------------------------------------------------------  T(C): 36.8 (06-20-22 @ 05:04), Max: 37.7 (06-19-22 @ 20:17)  HR: 79 (06-20-22 @ 05:04) (79 - 86)  BP: 100/50 (06-20-22 @ 05:04) (100/50 - 105/60)  RR: 18 (06-20-22 @ 05:04) (18 - 18)  SpO2: 94% (06-20-22 @ 05:04) (94% - 95%)  Wt(kg): --        06-19-22 @ 07:01  -  06-20-22 @ 07:00  --------------------------------------------------------  IN: 300 mL / OUT: 1 mL / NET: 299 mL        Physical Exam:  	Gen: ill appearing  	HEENT: MMM  	Pulm: CTA b/l  	CV: S1S2  	Abd: Soft, +BS   	Ext: No LE edema b/l  	Neuro: awake and alert   	Skin: Warm and dry    LABS/STUDIES  --------------------------------------------------------------------------------              8.6    7.25  >-----------<  51       [06-19-22 @ 07:11]              26.8     138  |  104  |  43  ----------------------------<  123      [06-18-22 @ 17:37]  4.1   |  15  |  1.43        Ca     7.6     [06-18-22 @ 17:37]      Mg     1.7     [06-19-22 @ 07:07]      Phos  2.9     [06-19-22 @ 07:07]    TPro  5.6  /  Alb  2.8  /  TBili  0.9  /  DBili  x   /  AST  53  /  ALT  28  /  AlkPhos  250  [06-18-22 @ 17:37]        CK 25      [06-19-22 @ 07:07]    Creatinine Trend:  SCr 1.43 [06-18 @ 17:37]  SCr 1.48 [06-17 @ 11:33]  SCr 1.36 [06-16 @ 11:25]  SCr 1.35 [06-15 @ 05:01]  SCr 1.46 [06-14 @ 07:07]    Urinalysis - [06-10-22 @ 07:57]      Color Light Yellow / Appearance Turbid / SG 1.014 / pH 6.0      Gluc Negative / Ketone Negative  / Bili Negative / Urobili Negative       Blood Moderate / Protein 100 / Leuk Est Negative / Nitrite Negative      RBC 67 / WBC 4 / Hyaline 4 / Gran  / Sq Epi  / Non Sq Epi 3 / Bacteria Negative      Iron 43, TIBC 128, %sat 34      [06-01-22 @ 04:47]  Ferritin 1195      [06-15-22 @ 05:01]  TSH 3.52      [06-03-22 @ 14:46]  Lipid: chol --, , HDL --, LDL --      [06-15-22 @ 05:01]    HBsAg Nonreact      [05-30-22 @ 03:46]  HCV 0.31, Nonreact      [05-31-22 @ 00:32]  HIV Nonreact      [06-01-22 @ 13:46]  HIV Nonreact      [05-30-22 @ 04:13]    ED: titer 1:320, pattern Homogeneous      [06-04-22 @ 14:21]  dsDNA 17      [06-07-22 @ 16:17]  C3 Complement 116      [06-04-22 @ 14:21]  C4 Complement 36      [06-04-22 @ 14:21]  Rheumatoid Factor <10      [06-04-22 @ 14:21]  ANCA: cANCA Negative, pANCA Negative, atypical ANCA Indeterminate Method interference due to ED Fluorescence      [06-04-22 @ 16:53]  ASLO <20      [06-04-22 @ 16:53]  Free Light Chains: kappa 14.57, lambda 23.09, ratio = 0.63      [06-01 @ 04:47]  Immunofixation Serum:   One Weak IgG Kappa Band and One Weak IgG Lambda Band Identified    Reference Range: None Detected      [06-01-22 @ 04:47]  SPEP Interpretation: Two Weak Gamma-Migrating Paraproteins Identified      [06-01-22 @ 04:47]

## 2022-06-20 NOTE — PROGRESS NOTE ADULT - PROBLEM SELECTOR PLAN 11
- NOW RESOLVED   stridor 6/4 requiring intubation refractory to epinephrine, methylprednisolone, xopenex, ipratropium. Previously with stridor earlier in admission although more severe 6/4 AM requiring intubation unclear etiology with new medication bicitra, recent instrumentation with core biopsy although had tolerated procedure without evidence of neck swelling/edema or stridor previous, with underlying HUNG hx.

## 2022-06-20 NOTE — PROGRESS NOTE ADULT - PROBLEM SELECTOR PLAN 4
-monitoring off cellcept  -Has been seen by Dr. Swapnil Wolfe and also Dr. Shivam Malagon  -According to sisters at bedside 6/6, patient took Aranesp in the [facility] for lupus prior to him getting sick. Family are wondering if the medication can be contributory.   - Non-routine medications:   -Following complements, ESR, CRP, dsDNA  - will tocuhbase with rheumatology consulted per heme/onc request in orde rto coordinate treatment of EBV lymphoproliferative disorder and underlying lupus

## 2022-06-20 NOTE — PROGRESS NOTE ADULT - ASSESSMENT
Hematology/Oncology consulted d/t patient's history of anemia. [facility]    The patients wife is at bedside. the patient is awake, alert, oriented x 3. He is on dexamathesone for an ebv lymphoproliferative disorder. SCN molecular studies are pending. Patient meets some criteria for HLH but fits the criteria for a systemic inflammatory disorder.  patient is clinically improving, yesterday he was able to get out of bed    1. EBV lymphoproliferative disorder: SCN molecular studies are pending. Patient meets some criteria for HLH but fits the criteria for a systemic inflammatory disorder.    - He is on steroids with dexamethasone and currently on taper. We will plan to   -  SCN molecular studies are pending. Patient meets some criteria for HLH but fits the criteria for a systemic inflammatory disorder.  - We will plan to continue with dexamethasone at this time   - H/H and platelets stable  Hematology/Oncology consulted d/t patient's history of anemia. [facility]    The patients wife is at bedside. the patient is awake, alert, oriented x 3. He is on dexamathesone for an ebv lymphoproliferative disorder. SCN molecular studies are pending. Patient meets some criteria for HLH but fits the criteria for a systemic inflammatory disorder.  patient is clinically improving, yesterday he was able to get out of bed    1. EBV lymphoproliferative disorder on preliminaru patholo review: SCN molecular studies are pending. We will follow up on final pathology   - Patient meets some criteria for HLH but no hemophagocyte on BM or LN biopsy to date. He does fit the criteria for a systemic inflammatory disorder.    - He is on steroids with dexamethasone and currently on taper. We will plan to   -  [facility] molecular studies are pending. Patient meets some criteria for HLH but fits the criteria for a systemic inflammatory disorder.  - We will plan to continue with dexamethasone at this time   - H/H and platelets stable  Hematology/Oncology consulted d/t patient's history of anemia. [facility]    The patients wife is at bedside. the patient is awake, alert, oriented x 3. He is on dexamathesone for an ebv lymphoproliferative disorder. SCN molecular studies are pending. Patient meets some criteria for HLH but fits the criteria for a systemic inflammatory disorder.  patient is clinically improving, yesterday he was able to get out of bed    1. EBV lymphoproliferative disorder on preliminaru patholo review: SCN molecular studies are pending. We will follow up on final pathology   - Patient meets some criteria for HLH but no hemophagocyte on BM or LN biopsy to date. He does fit the criteria for a systemic inflammatory disorder.    - He is on steroids with dexamethasone and currently on taper. We will plan to keep on same dose for now given intermittent breakthrough fever  -  SCN molecular studies are pending. Patient meets some criteria for HLH but fits the criteria for a systemic inflammatory disorder.  - We will plan to continue with dexamethasone at this time pending further workup.  - H/H and platelets stable   - Will continue to follow.     We will continue to follow     Edith Melo D.O  Hematology Oncology   [facility]  [phone] ( Office)   Evenings and weekends please call MD on call or office

## 2022-06-20 NOTE — PROGRESS NOTE ADULT - PROBLEM SELECTOR PLAN 1
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 The patient is being treated with MMF 750mg as outpatient. Currently on hold. [facility] noted to have a baseline SCr of 1.1mg/dL. Currently SCr elevated but improving to 1.46mg/dL. Optimize hemodynamics. Renal sonogram without evidence of hydro. Monitor labs and urine output. Recommend to stop lasix at this time. Avoid NSAIDs, ACEI/ARBS, RCA and nephrotoxins. Dose medications as per eGFR.

## 2022-06-20 NOTE — PROGRESS NOTE ADULT - ASSESSMENT
65 yo M with a PMH HTN, HLD, SLE on Cellcept (MMF), class V lupus nephritis, CKD stage 2, DM2, diabetic neuropathy, TIA (2019) on plavix, BPH, HUNG, with multiple hospitalizations for c diff, acute panc, septic shock 2/2 buttock abscess status post drainage. PT transferred for recurrent fevers s/p core biopsy. In MICU, paracentesis suspected lymphoma vs. HLH, pt intubated and sedated due to upper airway stridor and AMS. Successfully extubated on 6/8 and transferred to floors with heme/onc, ID, rheum, and surg evaluation. Pt s/p pRBC transfusion (6/13, 6,14 and 6/15), hgb stable >7, recurrent fevers (most recent 6/15 overnight) monitoring off abx, bcx pending from 6/16 (negative 6/11), unremarkable CTA to r/o hematoma. Pending O/T and PM&R needs finalized inpt trt plan. Cardiology meds, restarted coreg, d/c hydralazine and metoprolol, started IV lasix 40 mg BID (6/15), transitioned to IV 40 mg daily on (6/16 -__). Lymphoproliferative workup, tapering steroids, prelim LN biopsy: EBV positive lymphoproliferative process, pending molecular studies. 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 No fevers since 6/16. Lymph node FNAB: TCR beta and gamma + for clonal T cell population, BCR IgK + for monoclonal B cell population. Update Rheum consult (Dr. Villalta)

## 2022-06-20 NOTE — PROGRESS NOTE ADULT - SUBJECTIVE AND OBJECTIVE BOX
Patient is a 66y old  Male who presents with a chief complaint of recurrent fevers, unknown source (20 Jun 2022 07:56)      SUBJECTIVE / OVERNIGHT EVENTS: No acute events overnight. No fevers noted in last 24 hours     MEDICATIONS  (STANDING):  carvedilol 25 milliGRAM(s) Oral every 12 hours  chlorhexidine 4% Liquid 1 Application(s) Topical <User Schedule>  citric acid/sodium citrate Solution 30 milliLiter(s) Oral every 6 hours  dexAMETHasone  IVPB 10 milliGRAM(s) IV Intermittent daily  dextrose 5%. 1000 milliLiter(s) (50 mL/Hr) IV Continuous <Continuous>  dextrose 5%. 1000 milliLiter(s) (100 mL/Hr) IV Continuous <Continuous>  dextrose 50% Injectable 25 Gram(s) IV Push once  dextrose 50% Injectable 12.5 Gram(s) IV Push once  dextrose 50% Injectable 25 Gram(s) IV Push once  fluconAZOLE   Tablet 100 milliGRAM(s) Oral daily  folic acid 1 milliGRAM(s) Oral daily  gabapentin 300 milliGRAM(s) Oral daily  glucagon  Injectable 1 milliGRAM(s) IntraMuscular once  glucagon  Injectable 1 milliGRAM(s) IntraMuscular once  insulin lispro (ADMELOG) corrective regimen sliding scale   SubCutaneous three times a day before meals  insulin lispro (ADMELOG) corrective regimen sliding scale   SubCutaneous at bedtime  lidocaine   4% Patch 1 Patch Transdermal daily  multivitamin/minerals 1 Tablet(s) Oral daily  pantoprazole    Tablet 40 milliGRAM(s) Oral before breakfast  tamsulosin 0.4 milliGRAM(s) Oral at bedtime  trimethoprim  160 mG/sulfamethoxazole 800 mG 1 Tablet(s) Oral daily  valACYclovir 1000 milliGRAM(s) Oral two times a day    MEDICATIONS  (PRN):  acetaminophen     Tablet .. 650 milliGRAM(s) Oral every 6 hours PRN Temp greater or equal to 38C (100.4F), Mild Pain (1 - 3)  dextrose Oral Gel 15 Gram(s) Oral once PRN Blood Glucose LESS THAN 70 milliGRAM(s)/deciliter  famotidine    Tablet 20 milliGRAM(s) Oral two times a day PRN indigestion  sodium chloride 0.9% lock flush 10 milliLiter(s) IV Push every 1 hour PRN Pre/post blood products, medications, blood draw, and to maintain line patency      Vital Signs Last 24 Hrs  T(C): 36.8 (20 Jun 2022 05:04), Max: 37.7 (19 Jun 2022 20:17)  T(F): 98.3 (20 Jun 2022 05:04), Max: 99.9 (19 Jun 2022 20:17)  HR: 79 (20 Jun 2022 05:04) (79 - 86)  BP: 100/50 (20 Jun 2022 05:04) (100/50 - 105/60)  BP(mean): --  RR: 18 (20 Jun 2022 05:04) (18 - 18)  SpO2: 94% (20 Jun 2022 05:04) (94% - 95%)  CAPILLARY BLOOD GLUCOSE      POCT Blood Glucose.: 99 mg/dL (19 Jun 2022 21:28)  POCT Blood Glucose.: 104 mg/dL (19 Jun 2022 17:32)  POCT Blood Glucose.: 142 mg/dL (19 Jun 2022 12:42)  POCT Blood Glucose.: 128 mg/dL (19 Jun 2022 09:17)    I&O's Summary    19 Jun 2022 07:01  -  20 Jun 2022 07:00  --------------------------------------------------------  IN: 300 mL / OUT: 1 mL / NET: 299 mL        PHYSICAL EXAM:  GENERAL: NAD, well-developed  HEAD:  Atraumatic, Normocephalic  EYES: EOMI, PERRLA, conjunctiva and sclera clear  NECK: Supple, No JVD  CHEST/LUNG: Clear to auscultation bilaterally; No wheeze  HEART: Regular rate and rhythm; No murmurs, rubs, or gallops  ABDOMEN: Soft, Nontender, Nondistended; Bowel sounds present  EXTREMITIES:  2+ Peripheral Pulses, No clubbing, cyanosis, or edema  PSYCH: AAOx3  NEUROLOGY: non-focal  SKIN: No rashes or lesions    LABS:                        8.6    7.67  )-----------( 58       ( 20 Jun 2022 07:23 )             28.3     06-18    138  |  104  |  43<H>  ----------------------------<  123<H>  4.1   |  15<L>  |  1.43<H>    Ca    7.6<L>      18 Jun 2022 17:37  Phos  2.8     06-20  Mg     1.6     06-20    TPro  5.6<L>  /  Alb  2.8<L>  /  TBili  0.9  /  DBili  x   /  AST  53<H>  /  ALT  28  /  AlkPhos  250<H>  06-18      CARDIAC MARKERS ( 20 Jun 2022 07:21 )  x     / x     / 23 U/L / x     / x      CARDIAC MARKERS ( 19 Jun 2022 07:07 )  x     / x     / 25 U/L / x     / x                  RADIOLOGY & ADDITIONAL TESTS:    Imaging Personally Reviewed:    Consultant(s) Notes Reviewed:      Care Discussed with Consultants/Other Providers:

## 2022-06-20 NOTE — PROGRESS NOTE ADULT - SUBJECTIVE AND OBJECTIVE BOX
Patient is a 66y old  Male who presents with a chief complaint of recurrent fevers, unknown source (20 Jun 2022 08:24)  Patient seen this morning. Afebrile overnight. Vitals  stable    MEDICATIONS  (STANDING):  carvedilol 25 milliGRAM(s) Oral every 12 hours  chlorhexidine 4% Liquid 1 Application(s) Topical <User Schedule>  citric acid/sodium citrate Solution 30 milliLiter(s) Oral every 6 hours  dexAMETHasone  IVPB 10 milliGRAM(s) IV Intermittent daily  dextrose 5%. 1000 milliLiter(s) (50 mL/Hr) IV Continuous <Continuous>  dextrose 5%. 1000 milliLiter(s) (100 mL/Hr) IV Continuous <Continuous>  dextrose 50% Injectable 25 Gram(s) IV Push once  dextrose 50% Injectable 12.5 Gram(s) IV Push once  dextrose 50% Injectable 25 Gram(s) IV Push once  fluconAZOLE   Tablet 100 milliGRAM(s) Oral daily  folic acid 1 milliGRAM(s) Oral daily  gabapentin 300 milliGRAM(s) Oral daily  glucagon  Injectable 1 milliGRAM(s) IntraMuscular once  glucagon  Injectable 1 milliGRAM(s) IntraMuscular once  insulin lispro (ADMELOG) corrective regimen sliding scale   SubCutaneous three times a day before meals  insulin lispro (ADMELOG) corrective regimen sliding scale   SubCutaneous at bedtime  lidocaine   4% Patch 1 Patch Transdermal daily  multivitamin/minerals 1 Tablet(s) Oral daily  pantoprazole    Tablet 40 milliGRAM(s) Oral before breakfast  tamsulosin 0.4 milliGRAM(s) Oral at bedtime  trimethoprim  160 mG/sulfamethoxazole 800 mG 1 Tablet(s) Oral daily  valACYclovir 1000 milliGRAM(s) Oral two times a day    MEDICATIONS  (PRN):  acetaminophen     Tablet .. 650 milliGRAM(s) Oral every 6 hours PRN Temp greater or equal to 38C (100.4F), Mild Pain (1 - 3)  dextrose Oral Gel 15 Gram(s) Oral once PRN Blood Glucose LESS THAN 70 milliGRAM(s)/deciliter  famotidine    Tablet 20 milliGRAM(s) Oral two times a day PRN indigestion  sodium chloride 0.9% lock flush 10 milliLiter(s) IV Push every 1 hour PRN Pre/post blood products, medications, blood draw, and to maintain line patency      ROS  No fever, sweats, chills  No epistaxis, HA, sore throat  No CP, SOB, cough, sputum  No n/v/d, abd pain, melena, hematochezia  No edema  No rash  No anxiety  No back pain, joint pain  No bleeding, bruising  No dysuria, hematuria    Vital Signs Last 24 Hrs  T(C): 36.8 (20 Jun 2022 05:04), Max: 37.7 (19 Jun 2022 20:17)  T(F): 98.3 (20 Jun 2022 05:04), Max: 99.9 (19 Jun 2022 20:17)  HR: 79 (20 Jun 2022 05:04) (79 - 86)  BP: 100/50 (20 Jun 2022 05:04) (100/50 - 105/60)  BP(mean): --  RR: 18 (20 Jun 2022 05:04) (18 - 18)  SpO2: 94% (20 Jun 2022 05:04) (94% - 95%)    PE  NAD  Awake, alert  Anicteric, MMM  RRR  CTAB  Abd soft, NT, ND  No c/c/e  No rash grossly  FROM                          8.6    7.67  )-----------( 58       ( 20 Jun 2022 07:23 )             28.3       06-18    138  |  104  |  43<H>  ----------------------------<  123<H>  4.1   |  15<L>  |  1.43<H>    Ca    7.6<L>      18 Jun 2022 17:37  Phos  2.8     06-20  Mg     1.6     06-20    TPro  5.6<L>  /  Alb  2.8<L>  /  TBili  0.9  /  DBili  x   /  AST  53<H>  /  ALT  28  /  AlkPhos  250<H>  06-18       Patient is a 66y old  Male who presents with a chief complaint of recurrent fevers, unknown source (20 Jun 2022 08:24)  Patient seen this morning. Afebrile this morning although he did have a fever yesterday. Vitals  stable. Family at bedside     MEDICATIONS  (STANDING):  carvedilol 25 milliGRAM(s) Oral every 12 hours  chlorhexidine 4% Liquid 1 Application(s) Topical <User Schedule>  citric acid/sodium citrate Solution 30 milliLiter(s) Oral every 6 hours  dexAMETHasone  IVPB 10 milliGRAM(s) IV Intermittent daily  dextrose 5%. 1000 milliLiter(s) (50 mL/Hr) IV Continuous <Continuous>  dextrose 5%. 1000 milliLiter(s) (100 mL/Hr) IV Continuous <Continuous>  dextrose 50% Injectable 25 Gram(s) IV Push once  dextrose 50% Injectable 12.5 Gram(s) IV Push once  dextrose 50% Injectable 25 Gram(s) IV Push once  fluconAZOLE   Tablet 100 milliGRAM(s) Oral daily  folic acid 1 milliGRAM(s) Oral daily  gabapentin 300 milliGRAM(s) Oral daily  glucagon  Injectable 1 milliGRAM(s) IntraMuscular once  glucagon  Injectable 1 milliGRAM(s) IntraMuscular once  insulin lispro (ADMELOG) corrective regimen sliding scale   SubCutaneous three times a day before meals  insulin lispro (ADMELOG) corrective regimen sliding scale   SubCutaneous at bedtime  lidocaine   4% Patch 1 Patch Transdermal daily  multivitamin/minerals 1 Tablet(s) Oral daily  pantoprazole    Tablet 40 milliGRAM(s) Oral before breakfast  tamsulosin 0.4 milliGRAM(s) Oral at bedtime  trimethoprim  160 mG/sulfamethoxazole 800 mG 1 Tablet(s) Oral daily  valACYclovir 1000 milliGRAM(s) Oral two times a day    MEDICATIONS  (PRN):  acetaminophen     Tablet .. 650 milliGRAM(s) Oral every 6 hours PRN Temp greater or equal to 38C (100.4F), Mild Pain (1 - 3)  dextrose Oral Gel 15 Gram(s) Oral once PRN Blood Glucose LESS THAN 70 milliGRAM(s)/deciliter  famotidine    Tablet 20 milliGRAM(s) Oral two times a day PRN indigestion  sodium chloride 0.9% lock flush 10 milliLiter(s) IV Push every 1 hour PRN Pre/post blood products, medications, blood draw, and to maintain line patency      ROS  No fever, sweats, chills  No epistaxis, HA, sore throat  No CP, SOB, cough, sputum  No n/v/d, abd pain, melena, hematochezia  No edema  No rash  No anxiety  No back pain, joint pain  No bleeding, bruising  No dysuria, hematuria    Vital Signs Last 24 Hrs  T(C): 36.8 (20 Jun 2022 05:04), Max: 37.7 (19 Jun 2022 20:17)  T(F): 98.3 (20 Jun 2022 05:04), Max: 99.9 (19 Jun 2022 20:17)  HR: 79 (20 Jun 2022 05:04) (79 - 86)  BP: 100/50 (20 Jun 2022 05:04) (100/50 - 105/60)  BP(mean): --  RR: 18 (20 Jun 2022 05:04) (18 - 18)  SpO2: 94% (20 Jun 2022 05:04) (94% - 95%)    PE  NAD  Awake, alert  Anicteric, MMM  RRR  CTAB  Abd soft, NT, ND  No c/c/e  No rash grossly  FROM                          8.6    7.67  )-----------( 58       ( 20 Jun 2022 07:23 )             28.3       06-18    138  |  104  |  43<H>  ----------------------------<  123<H>  4.1   |  15<L>  |  1.43<H>    Ca    7.6<L>      18 Jun 2022 17:37  Phos  2.8     06-20  Mg     1.6     06-20    TPro  5.6<L>  /  Alb  2.8<L>  /  TBili  0.9  /  DBili  x   /  AST  53<H>  /  ALT  28  /  AlkPhos  250<H>  06-18

## 2022-06-20 NOTE — PROGRESS NOTE ADULT - PROBLEM SELECTOR PLAN 3
- NOW Euvolemic, b/l LE edema has remarked improved  - p/w significant lower ext edema and congestive cough (wet cough),   - will d/c lasix at this time as patient no longer fluid overloaded,  - will continue to monitor to asses fluid status

## 2022-06-20 NOTE — PROGRESS NOTE ADULT - PROBLEM SELECTOR PLAN 5
- 6/16 - 6/18 no fevers, s/p immodium x1  - 6/15 - 6/16 overnight with 5-6 episodes of light, brown/green colored loose stool with fever of 102.4  - no bowel regimen in place, monitor hydration status, reduced diuretics from lasix 40 BID to daily   - f/u GI PCR, consider CMV r/o if persists     Additional Hx:   #chronic inflammatory diarrhea  -calprotectin elevated at [place] [id number]. no current plan by GI for colonoscopy at this time.   -C diff negative (5/30)

## 2022-06-20 NOTE — PROGRESS NOTE ADULT - PROBLEM SELECTOR PLAN 2
BP control improved with improved volume control. However SBp avg in the 100's since 6/18. Pt. currently on carvedilol and diuretics. Please stop the lasix at this time, pt. clinically not volume overloaded. Monitor BP     If any questions, please feel free to contact me     Thony Oh  Nephrology Fellow  [facility] Pager: [phone].

## 2022-06-20 NOTE — PROGRESS NOTE ADULT - PROBLEM SELECTOR PLAN 12
Diet: regular  DVT: heparin gtt on hold  Dispo: pending PM&R (needs indept trt plan), OT  COVID pcr: Neg 6/18

## 2022-06-20 NOTE — PROGRESS NOTE ADULT - PROBLEM SELECTOR PLAN 1
Recommendations per heme/onc:   - treatment tailored by discontinuing immunosuppression , patient has been of MMF for many weeks.  - c/w dexamethasone taper, decreased dexamathsone to 10mg qd from 15mg qd   - c/w antiviral therapy  - c/w supportive care, monitor H/H, platelet  - monitor kidney function and glucose  - potential therapies to offset inflammatory response - including jakifi and rituxan has been discussed with the patient.   - f/u PT evaluation  - f/u ID recommendations  - update rheum Dr. Villalta (last seen 6/7th)

## 2022-06-20 NOTE — PROGRESS NOTE ADULT - ATTENDING COMMENTS
66 M with a PMH HTN, HLD, SLE on Cellcept (MMF), class V lupus nephritis, CKD stage 2, DM2, diabetic neuropathy, TIA (2019) on plavix, BPH, HUNG, with multiple hospitalizations for c diff, acute panc, septic shock 2/2 buttock abscess status post drainage. PT transferred for recurrent fevers s/p core biopsy. In MICU, paracentesis suspected lymphoma vs. HLH, pt intubated and sedated due to upper airway stridor and AMS. Successfully extubated on 6/8.  he was transferred to floors with heme/onc, ID, rheum, and surg evaluation.      1. 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 Anemia  3. SLE/lupus nephritis    - febrile 100.9F, WBC 7.67, blood c/s neg, reviewed CT abd/pelvis  - Heme f/u plan noted- Patient meets some criteria for HLH but no hemophagocyte on BM or LN biopsy to date as per heme  has been on steroid per rec. SCN molecular studies are pending  - Rheum f/u requested.  - appreciate ID f/u- continue on valtrex/diflucan  - PT/OT consult 66 M with a PMH HTN, HLD, SLE on Cellcept (MMF), class V lupus nephritis, CKD stage 2, DM2, diabetic neuropathy, TIA (2019) on plavix, BPH, HUNG, with multiple hospitalizations for c diff, acute panc, septic shock 2/2 buttock abscess status post drainage. PT transferred for recurrent fevers s/p core biopsy. In MICU, paracentesis suspected lymphoma vs. HLH, pt intubated and sedated due to upper airway stridor and AMS. Successfully extubated on 6/8.  he was transferred to floors with heme/onc, ID, rheum, and surg evaluation.      1. 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 Anemia s/s above  3. SLE/lupus nephritis    - febrile 100.9F, WBC 7.67, blood c/s neg, reviewed CT abd/pelvis  - Heme f/u plan noted- Patient meets some criteria for HLH but no hemophagocyte on BM or LN biopsy to date as per heme  has been on steroid per rec. SCN molecular studies are pending  - Rheum f/u requested.  - appreciate ID f/u- continue on valtrex/diflucan  - PT/OT consult

## 2022-06-20 NOTE — PROGRESS NOTE ADULT - ATTENDING COMMENTS
Stable CKD  Cr 1.4  Can stop diuretics    Enedelia Simmons MD  Off: [phone]  contact me on teams    (After 5 pm or on weekends please page the on-call fellow/attending, can check [email or web address] for schedule. Login is aretha carlson, schedule under [facility] medicine, psych, derm)

## 2022-06-21 NOTE — PROGRESS NOTE ADULT - ASSESSMENT
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, then [facility], transferred to [facility] 5/30  Bone marrow biopsy at [facility], per Heme Onc note--no formal dx  LN biopsy done, path pending  Diarrhea looks noninfectious - C diff EIA, GI PCR negative.   No response to broad spectrum antibiotics and only had a small gluteal skin abscess, clean s/p I&D  MTB is possible but less likely, no obvious lung lesions  Quant gold IND  HIV negative  CT mesenteric LAD, possible PE, enlarged spleen, HSM, ascites  Fevers with no chills  Peritoneal fluid has elevated neutrophils concerning for peritonitis--culture NGTD; on edilson for now  Malaria screenings negative  Suspected malignancy  Overall, Fevers, abnormal finding on imaging, leukocytosis  EBV viremia/lymphoproliferative disorder/HLH    -EBV viremia could driving his HLH   -repeat EBV PCR still elevated - significance clinically is unclear, no role for antivirals    -all cx negative  -path pending   -on steroids for HLH  -off abx for now   -monitor temps, cbc - wbc better  -check cdiff if diarrhea  -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

## 2022-06-21 NOTE — PROVIDER CONTACT NOTE (CRITICAL VALUE NOTIFICATION) - BACKGROUND
Pt admitted for fevers, lymphoproliferative disorder. PMHx HTN, SLE, CKD stage 3, DM2, TIA on plavix.

## 2022-06-21 NOTE — PROGRESS NOTE ADULT - REASON FOR ADMISSION
recurrent fevers, unknown source

## 2022-06-21 NOTE — PROGRESS NOTE ADULT - ASSESSMENT
67 yo M with a PMH HTN, HLD, SLE on Cellcept (MMF), class V lupus nephritis, CKD stage 2, DM2, diabetic neuropathy, TIA (2019) on plavix, BPH, HUNG, with multiple hospitalizations for c diff, acute panc, septic shock 22 buttock abscess status post drainage transferred for recurrent fevers 2/2 HLH-like syndrome found to have EBV+ lymphoproliferative disorder. Rheumatology consulted for evaluation    #SLE: Hx of SLE dx in 2014 and LN (membranous nephropathy renal biopsy 2017), +ED 1:320, elevated dsDNA with negative APS, sjogren's, and KRISTY in the past. Patient was on MMF 1500mg/day, was previously on PO cyclophosphamide.   Has had complicated hospital course. Patient with significant leukocytosis, cytopenias (AOCD, thrombocytopenia), AMS, hepatosplenomegaly, LAD, elevated IL2R 34K is most concerning for HLH/MAS which can be seen with autoimmune disease but also malignancy (EBV+ Lymphoproliferative Disease) or infection (EBV PCR+), currently on steroid taper per Heme/onc. Has remained of CellCept throughout duration of hospitalization. Also has had multiple thrombotic complications (DVTs, PE, splenic infarcts)- APS labs checked and were negative. Had bland UA and negative dsDNA, complements with stable high p/c ratio 1.8, previously low concern for LN activity, however will reevaluate.    -Noted downtrending plts, lysis labs positive. repeat haptoglobin, LDH (ordered) and check peripheral smear for schistocytes  -recheck UA, urine p/c ratio, complements, dsDNA (ordered)  -consider repeat kidney biopsy if PEPE persists or active urinary sediment is present   -heme/onc recs appreciated. Awaiting multidisciplinary discussion regarding choice tx decision for SLE + EBV lymphoproliferative disease  -c/w steroids per Heme/onc  -hold CellCept for now    TIMOTHY Mahmood DO  Rheumatology Fellow PGY4  Pager [phone] 65 yo M with a PMH HTN, HLD, SLE on Cellcept (MMF), class V lupus nephritis, CKD stage 2, DM2, diabetic neuropathy, TIA (2019) on plavix, BPH, HUNG, with multiple hospitalizations for c diff, acute panc, septic shock 22 buttock abscess status post drainage transferred for recurrent fevers 2/2 HLH-like syndrome found to have EBV+ lymphoproliferative disorder. Rheumatology consulted for evaluation    #SLE: Hx of SLE dx in 2014 and LN (membranous nephropathy renal biopsy 2017), +ED 1:320, elevated dsDNA with negative APS, sjogren's, and KRISTY in the past. Patient was on MMF 1500mg/day, was previously on PO cyclophosphamide.   Has had complicated hospital course. Patient with significant leukocytosis, cytopenias (AOCD, thrombocytopenia), AMS, hepatosplenomegaly, LAD, elevated IL2R 34K is most concerning for HLH/MAS which can be seen with autoimmune disease but also malignancy (EBV+ Lymphoproliferative Disease) or infection (EBV PCR+), currently on steroid taper per Heme/onc. Has remained of CellCept throughout duration of hospitalization. Also has had multiple thrombotic complications (DVTs, PE, splenic infarcts)- APS labs checked and were negative. Had bland UA and negative dsDNA, complements with stable high p/c ratio 1.8, previously low concern for LN activity, however will reevaluate.    -Noted downtrending plts, lysis labs positive. repeat haptoglobin, LDH (ordered) and check peripheral smear for schistocytes  -recheck UA, urine p/c ratio, complements, dsDNA (ordered)  -consider repeat kidney biopsy if PEPE persists or active urinary sediment is present   -heme/onc recs appreciated. Awaiting multidisciplinary discussion regarding choice tx decision for SLE + EBV lymphoproliferative disease. 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 Mj Mahmood DO  Rheumatology Fellow PGY4  Pager [phone] 65 yo M with a PMH HTN, HLD, SLE on Cellcept (MMF), class V lupus nephritis, CKD stage 2, DM2, diabetic neuropathy, TIA (2019) on plavix, BPH, HUNG, with multiple hospitalizations for c diff, acute panc, septic shock 22 buttock abscess status post drainage transferred for recurrent fevers 2/2 HLH-like syndrome found to have EBV+ lymphoproliferative disorder. Rheumatology consulted for evaluation    #SLE: Hx of SLE dx in 2014 and LN (membranous nephropathy renal biopsy 2017), +ED 1:320, elevated dsDNA with negative APS, sjogren's, and KRISTY in the past. Patient was on MMF 1500mg/day, was previously on PO cyclophosphamide.   Has had complicated hospital course. Patient with significant leukocytosis, cytopenias (AOCD, thrombocytopenia), AMS, hepatosplenomegaly, LAD, elevated IL2R 34K is most concerning for HLH/MAS which can be seen with autoimmune disease but also malignancy (EBV+ Lymphoproliferative Disease) or infection (EBV PCR+), currently on steroid taper per Heme/onc. Has remained of CellCept throughout duration of hospitalization. Also has had multiple thrombotic complications (DVTs, PE, splenic infarcts)- APS labs checked and were negative. Had bland UA and negative dsDNA, normal complements with stable high p/c ratio 1.8, previously low concern for LN activity, however will reevaluate.      -Noted downtrending plts, lysis labs positive. repeat haptoglobin, LDH (ordered) and check peripheral smear for schistocytes  -recheck UA, urine p/c ratio, complements, dsDNA (ordered)  -consider kidney biopsy if PEPE persists or active urinary sediment is present   -heme/onc recs appreciated. Awaiting multidisciplinary discussion regarding choice tx decision for SLE + EBV lymphoproliferative disease. 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 Mj Mahmood DO  Rheumatology Fellow PGY4  Pager [phone]

## 2022-06-21 NOTE — PROGRESS NOTE ADULT - ASSESSMENT
This is a 66 year old man with a complicated past medical history including SLE, currently being treated for EBV lymphoproliferative disorder.    1. EBV lymphoproliferative disorder  - Presumed diagnosis after path review, SCN molecular studies are pending  - Patient meets some criteria for HLH but no hemophagocyte on BM or LN biopsy to date; he fits the criteria for a systemic inflammatory disorder  - He continues on dexamethasone for EBV lymphoproliferative disorder  - Plan to keep on same dose for now given intermittent breakthrough fever, but should begin taper as soon as he is stable  - CBC remains stable and he is clinically improving  - Appreciate ID & rheum evaluation  - We will need to coordinate his care with rheumatology outpatient  - Will continue to follow closely    Patient will follow up wit Dr. Umanzor of [facility] after hospital discharge.

## 2022-06-21 NOTE — PROGRESS NOTE ADULT - ATTENDING COMMENTS
Changes to fellow's note made, A/P as above   DW primary team, left message to hematology to discuss

## 2022-06-21 NOTE — PROGRESS NOTE ADULT - PROBLEM SELECTOR PLAN 4
-monitoring off cellcept  -Has been seen by Dr. Swapnil Wolfe and also Dr. Shivam Malagon  -According to sisters at bedside 6/6, patient took Aranesp in the [facility] for lupus prior to him getting sick. Family are wondering if the medication can be contributory.   - Non-routine medications:   -Following complements, ESR, CRP, dsDNA  - will tocuhbase with rheumatology consulted per heme/onc request in orde rto coordinate treatment of EBV lymphoproliferative disorder and underlying lupus

## 2022-06-21 NOTE — PROGRESS NOTE ADULT - SUBJECTIVE AND OBJECTIVE BOX
PROGRESS NOTE:   Authored by Dr. Fatuma Olsen MD (PGY-1). Pager [facility] [phone] / [facility]     Patient is a 66y old  Male who presents with a chief complaint of recurrent fevers, unknown source (20 Jun 2022 09:14)      SUBJECTIVE / OVERNIGHT EVENTS:  No acute events overnight.     ADDITIONAL REVIEW OF SYSTEMS:  Patient denies fevers, chills, chest pain, shortness of breath, nausea, abdominal pain, diarrhea, constipation, dysuria, leg swelling, headache, light headedness.    MEDICATIONS  (STANDING):  carvedilol 25 milliGRAM(s) Oral every 12 hours  chlorhexidine 4% Liquid 1 Application(s) Topical <User Schedule>  citric acid/sodium citrate Solution 30 milliLiter(s) Oral every 6 hours  dexAMETHasone  IVPB 10 milliGRAM(s) IV Intermittent daily  dextrose 5%. 1000 milliLiter(s) (100 mL/Hr) IV Continuous <Continuous>  dextrose 5%. 1000 milliLiter(s) (50 mL/Hr) IV Continuous <Continuous>  dextrose 50% Injectable 25 Gram(s) IV Push once  dextrose 50% Injectable 12.5 Gram(s) IV Push once  dextrose 50% Injectable 25 Gram(s) IV Push once  fluconAZOLE   Tablet 100 milliGRAM(s) Oral daily  folic acid 1 milliGRAM(s) Oral daily  gabapentin 300 milliGRAM(s) Oral daily  glucagon  Injectable 1 milliGRAM(s) IntraMuscular once  glucagon  Injectable 1 milliGRAM(s) IntraMuscular once  insulin lispro (ADMELOG) corrective regimen sliding scale   SubCutaneous three times a day before meals  insulin lispro (ADMELOG) corrective regimen sliding scale   SubCutaneous at bedtime  lidocaine   4% Patch 1 Patch Transdermal daily  multivitamin/minerals 1 Tablet(s) Oral daily  pantoprazole    Tablet 40 milliGRAM(s) Oral before breakfast  tamsulosin 0.4 milliGRAM(s) Oral at bedtime  trimethoprim  160 mG/sulfamethoxazole 800 mG 1 Tablet(s) Oral daily  valACYclovir 1000 milliGRAM(s) Oral two times a day    MEDICATIONS  (PRN):  acetaminophen     Tablet .. 650 milliGRAM(s) Oral every 6 hours PRN Temp greater or equal to 38C (100.4F), Mild Pain (1 - 3)  dextrose Oral Gel 15 Gram(s) Oral once PRN Blood Glucose LESS THAN 70 milliGRAM(s)/deciliter  famotidine    Tablet 20 milliGRAM(s) Oral two times a day PRN indigestion  sodium chloride 0.9% lock flush 10 milliLiter(s) IV Push every 1 hour PRN Pre/post blood products, medications, blood draw, and to maintain line patency      CAPILLARY BLOOD GLUCOSE      POCT Blood Glucose.: 110 mg/dL (20 Jun 2022 21:30)  POCT Blood Glucose.: 96 mg/dL (20 Jun 2022 16:53)  POCT Blood Glucose.: 108 mg/dL (20 Jun 2022 12:15)  POCT Blood Glucose.: 132 mg/dL (20 Jun 2022 08:54)    I&O's Summary    20 Jun 2022 07:01  -  21 Jun 2022 07:00  --------------------------------------------------------  IN: 80 mL / OUT: 0 mL / NET: 80 mL        PHYSICAL EXAM:  Vital Signs Last 24 Hrs  T(C): 36.7 (21 Jun 2022 04:46), Max: 38.3 (20 Jun 2022 13:16)  T(F): 98.1 (21 Jun 2022 04:46), Max: 100.9 (20 Jun 2022 13:16)  HR: 79 (21 Jun 2022 04:46) (70 - 80)  BP: 141/62 (21 Jun 2022 04:46) (101/51 - 141/62)  BP(mean): --  RR: 18 (21 Jun 2022 04:46) (18 - 19)  SpO2: 96% (21 Jun 2022 04:46) (94% - 98%)    CONSTITUTIONAL: NAD  RESPIRATORY: Normal respiratory effort; lungs are clear to auscultation bilaterally  CARDIOVASCULAR: Regular rate and rhythm, normal S1 and S2, no murmur/rub/gallop; No lower extremity edema; Peripheral pulses are 2+ bilaterally  ABDOMEN: Nontender to palpation, normoactive bowel sounds, no rebound/guarding; No hepatosplenomegaly  MUSCLOSKELETAL: no clubbing or cyanosis of digits; no joint swelling or tenderness to palpation  PSYCH: A+O to person, place, and time; affect appropriate    LABS:                        8.6    7.67  )-----------( 58       ( 20 Jun 2022 07:23 )             28.3       Phos  2.8     06-20  Mg     1.6     06-20        CARDIAC MARKERS ( 20 Jun 2022 07:21 )  x     / x     / 23 U/L / x     / x                Tele Reviewed:    RADIOLOGY & ADDITIONAL TESTS:  Results Reviewed:   Imaging Personally Reviewed:  Electrocardiogram Personally Reviewed:

## 2022-06-21 NOTE — PROGRESS NOTE ADULT - SUBJECTIVE AND OBJECTIVE BOX
No acute events overnight.  Wife at bedside.    MEDICATIONS  (STANDING):  carvedilol 25 milliGRAM(s) Oral every 12 hours  chlorhexidine 4% Liquid 1 Application(s) Topical <User Schedule>  citric acid/sodium citrate Solution 30 milliLiter(s) Oral every 6 hours  dexAMETHasone  IVPB 10 milliGRAM(s) IV Intermittent daily  dextrose 5%. 1000 milliLiter(s) (50 mL/Hr) IV Continuous <Continuous>  dextrose 5%. 1000 milliLiter(s) (100 mL/Hr) IV Continuous <Continuous>  dextrose 50% Injectable 25 Gram(s) IV Push once  dextrose 50% Injectable 12.5 Gram(s) IV Push once  dextrose 50% Injectable 25 Gram(s) IV Push once  fluconAZOLE   Tablet 100 milliGRAM(s) Oral daily  folic acid 1 milliGRAM(s) Oral daily  gabapentin 300 milliGRAM(s) Oral daily  glucagon  Injectable 1 milliGRAM(s) IntraMuscular once  glucagon  Injectable 1 milliGRAM(s) IntraMuscular once  insulin lispro (ADMELOG) corrective regimen sliding scale   SubCutaneous three times a day before meals  insulin lispro (ADMELOG) corrective regimen sliding scale   SubCutaneous at bedtime  lidocaine   4% Patch 1 Patch Transdermal daily  multivitamin/minerals 1 Tablet(s) Oral daily  pantoprazole    Tablet 40 milliGRAM(s) Oral before breakfast  tamsulosin 0.4 milliGRAM(s) Oral at bedtime  trimethoprim  160 mG/sulfamethoxazole 800 mG 1 Tablet(s) Oral daily  valACYclovir 1000 milliGRAM(s) Oral two times a day    MEDICATIONS  (PRN):  acetaminophen     Tablet .. 650 milliGRAM(s) Oral every 6 hours PRN Temp greater or equal to 38C (100.4F), Mild Pain (1 - 3)  dextrose Oral Gel 15 Gram(s) Oral once PRN Blood Glucose LESS THAN 70 milliGRAM(s)/deciliter  famotidine    Tablet 20 milliGRAM(s) Oral two times a day PRN indigestion  sodium chloride 0.9% lock flush 10 milliLiter(s) IV Push every 1 hour PRN Pre/post blood products, medications, blood draw, and to maintain line patency    Vital Signs Last 24 Hrs  T(C): 36.9 (21 Jun 2022 13:24), Max: 37.1 (21 Jun 2022 00:34)  T(F): 98.5 (21 Jun 2022 13:24), Max: 98.7 (21 Jun 2022 00:34)  HR: 91 (21 Jun 2022 13:24) (70 - 91)  BP: 103/57 (21 Jun 2022 13:24) (101/51 - 141/62)  BP(mean): --  RR: 18 (21 Jun 2022 13:24) (18 - 19)  SpO2: 95% (21 Jun 2022 13:24) (94% - 98%)    PHYSICAL EXAM:   NAD  Able to be awoken, responsive  No dyspnea                        9.1    9.65  )-----------( 51       ( 21 Jun 2022 14:17 )             27.8       Phos  2.8     06-20  Mg     1.6     06-20

## 2022-06-21 NOTE — PROGRESS NOTE ADULT - ENT GEN HX ROS MEA POS PC
tongue sores stable/dry mouth
tongue sores stable/dry mouth
tongue sores/dry mouth
dry mouth
tongue sores/dry mouth

## 2022-06-21 NOTE — PROGRESS NOTE ADULT - NUTRITIONAL ASSESSMENT
This patient has been assessed with a concern for Malnutrition and has been determined to have a diagnosis/diagnoses of Moderate protein-calorie malnutrition.    This patient is being managed with:   Diet Minced and Moist-  Entered: Jun 8 2022  5:49PM    
This patient has been assessed with a concern for Malnutrition and has been determined to have a diagnosis/diagnoses of Moderate protein-calorie malnutrition.    This patient is being managed with:   Diet Regular-  Supplement Feeding Modality:  Oral  Glucerna Shake Cans or Servings Per Day:  2       Frequency:  Daily  Entered: Jun 19 2022 10:29AM    
This patient has been assessed with a concern for Malnutrition and has been determined to have a diagnosis/diagnoses of Moderate protein-calorie malnutrition.    This patient is being managed with:   Diet Minced and Moist-  Supplement Feeding Modality:  Oral  Glucerna Shake Cans or Servings Per Day:  1       Frequency:  Two Times a day  Entered: Jun 17 2022  2:18AM    
This patient has been assessed with a concern for Malnutrition and has been determined to have a diagnosis/diagnoses of Moderate protein-calorie malnutrition.    This patient is being managed with:   Diet Minced and Moist-  Entered: Jun 8 2022  5:49PM    
This patient has been assessed with a concern for Malnutrition and has been determined to have a diagnosis/diagnoses of Moderate protein-calorie malnutrition.    This patient is being managed with:   Diet Regular-  Supplement Feeding Modality:  Oral  Glucerna Shake Cans or Servings Per Day:  2       Frequency:  Daily  Entered: Jun 19 2022 10:29AM    
This patient has been assessed with a concern for Malnutrition and has been determined to have a diagnosis/diagnoses of Moderate protein-calorie malnutrition.    This patient is being managed with:   Diet Minced and Moist-  Entered: Jun 8 2022  5:49PM    
This patient has been assessed with a concern for Malnutrition and has been determined to have a diagnosis/diagnoses of Moderate protein-calorie malnutrition.    This patient is being managed with:   Diet Minced and Moist-  Supplement Feeding Modality:  Oral  Glucerna Shake Cans or Servings Per Day:  1       Frequency:  Two Times a day  Entered: Jun 17 2022  2:18AM    
This patient has been assessed with a concern for Malnutrition and has been determined to have a diagnosis/diagnoses of Moderate protein-calorie malnutrition.    This patient is being managed with:   Diet Minced and Moist-  Entered: Jun 8 2022  5:49PM    
This patient has been assessed with a concern for Malnutrition and has been determined to have a diagnosis/diagnoses of Moderate protein-calorie malnutrition.    This patient is being managed with:   Diet Minced and Moist-  Supplement Feeding Modality:  Oral  Glucerna Shake Cans or Servings Per Day:  1       Frequency:  Two Times a day  Entered: Jun 17 2022  2:18AM    
This patient has been assessed with a concern for Malnutrition and has been determined to have a diagnosis/diagnoses of Moderate protein-calorie malnutrition.    This patient is being managed with:   Diet Minced and Moist-  Entered: Jun 8 2022  5:49PM    
This patient has been assessed with a concern for Malnutrition and has been determined to have a diagnosis/diagnoses of Moderate protein-calorie malnutrition.    This patient is being managed with:   Diet Minced and Moist-  Entered: Jun 8 2022  5:49PM

## 2022-06-21 NOTE — PROGRESS NOTE ADULT - PROVIDER SPECIALTY LIST ADULT
Electrophysiology
Heme/Onc
Infectious Disease
MICU
Nephrology
Nephrology
Heme/Onc
Infectious Disease
Infectious Disease
Intervent Radiology
MICU
Rheumatology
Rheumatology
Electrophysiology
Heme/Onc
Infectious Disease
Infectious Disease
MICU
Electrophysiology
Electrophysiology
Heme/Onc
Nephrology
Surgery
Trauma Surgery
Nephrology
Heme/Onc
Nephrology
Infectious Disease
Nephrology
Infectious Disease
Internal Medicine
Internal Medicine
Infectious Disease
Internal Medicine
Infectious Disease
Internal Medicine
Infectious Disease
Internal Medicine
Internal Medicine
Infectious Disease
Internal Medicine

## 2022-06-21 NOTE — PROVIDER CONTACT NOTE (CRITICAL VALUE NOTIFICATION) - ASSESSMENT
Pt AOX4, VSS except BP soft and dropped lower to 86/54 manually. Pt lethargic, unable to ambulate since admission. Blood glucose 75

## 2022-06-21 NOTE — PROGRESS NOTE ADULT - NEGATIVE GASTROINTESTINAL SYMPTOMS
no vomiting/no diarrhea
no vomiting/no diarrhea/no abdominal pain

## 2022-06-21 NOTE — PROGRESS NOTE ADULT - SUBJECTIVE AND OBJECTIVE BOX
INCOMPLETE NOTE    OVERNIGHT EVENTS:    SUBJECTIVE / INTERVAL HPI: Patient seen and examined at bedside.     VITAL SIGNS:  Vital Signs Last 24 Hrs  T(C): 36.7 (21 Jun 2022 04:46), Max: 38.3 (20 Jun 2022 13:16)  T(F): 98.1 (21 Jun 2022 04:46), Max: 100.9 (20 Jun 2022 13:16)  HR: 79 (21 Jun 2022 04:46) (70 - 80)  BP: 141/62 (21 Jun 2022 04:46) (101/51 - 141/62)  BP(mean): --  RR: 18 (21 Jun 2022 04:46) (18 - 19)  SpO2: 96% (21 Jun 2022 04:46) (94% - 98%)    PHYSICAL EXAM:    General: WDWN  HEENT: NC/AT; PERRL, anicteric sclera; MMM  Neck: supple  Cardiovascular: +S1/S2, RRR  Respiratory: CTA B/L; no W/R/R  Gastrointestinal: soft, NT/ND; +BSx4  Extremities: WWP; no edema, clubbing or cyanosis  Vascular: 2+ radial, DP/PT pulses B/L  Neurological: AAOx3; no focal deficits    MEDICATIONS:  MEDICATIONS  (STANDING):  carvedilol 25 milliGRAM(s) Oral every 12 hours  chlorhexidine 4% Liquid 1 Application(s) Topical <User Schedule>  citric acid/sodium citrate Solution 30 milliLiter(s) Oral every 6 hours  dexAMETHasone  IVPB 10 milliGRAM(s) IV Intermittent daily  dextrose 5%. 1000 milliLiter(s) (100 mL/Hr) IV Continuous <Continuous>  dextrose 5%. 1000 milliLiter(s) (50 mL/Hr) IV Continuous <Continuous>  dextrose 50% Injectable 25 Gram(s) IV Push once  dextrose 50% Injectable 12.5 Gram(s) IV Push once  dextrose 50% Injectable 25 Gram(s) IV Push once  fluconAZOLE   Tablet 100 milliGRAM(s) Oral daily  folic acid 1 milliGRAM(s) Oral daily  gabapentin 300 milliGRAM(s) Oral daily  glucagon  Injectable 1 milliGRAM(s) IntraMuscular once  glucagon  Injectable 1 milliGRAM(s) IntraMuscular once  insulin lispro (ADMELOG) corrective regimen sliding scale   SubCutaneous three times a day before meals  insulin lispro (ADMELOG) corrective regimen sliding scale   SubCutaneous at bedtime  lidocaine   4% Patch 1 Patch Transdermal daily  multivitamin/minerals 1 Tablet(s) Oral daily  pantoprazole    Tablet 40 milliGRAM(s) Oral before breakfast  tamsulosin 0.4 milliGRAM(s) Oral at bedtime  trimethoprim  160 mG/sulfamethoxazole 800 mG 1 Tablet(s) Oral daily  valACYclovir 1000 milliGRAM(s) Oral two times a day    MEDICATIONS  (PRN):  acetaminophen     Tablet .. 650 milliGRAM(s) Oral every 6 hours PRN Temp greater or equal to 38C (100.4F), Mild Pain (1 - 3)  dextrose Oral Gel 15 Gram(s) Oral once PRN Blood Glucose LESS THAN 70 milliGRAM(s)/deciliter  famotidine    Tablet 20 milliGRAM(s) Oral two times a day PRN indigestion  sodium chloride 0.9% lock flush 10 milliLiter(s) IV Push every 1 hour PRN Pre/post blood products, medications, blood draw, and to maintain line patency      ALLERGIES:  Allergies    No Known Allergies    Intolerances        LABS:                        8.6    7.67  )-----------( 58       ( 20 Jun 2022 07:23 )             28.3       Phos  2.8     06-20  Mg     1.6     06-20          CAPILLARY BLOOD GLUCOSE      POCT Blood Glucose.: 110 mg/dL (20 Jun 2022 21:30)      RADIOLOGY & ADDITIONAL TESTS: Reviewed. SUBJECTIVE / INTERVAL HPI: Patient seen and examined at bedside. Patient reports generalized weakness throughout admission. Denies fevers, chills, night sweats, eye pain, eye redness, vision changes, ulcers, rashes, chest pain, SOB, abdominal pain, n/v/d/c, changes in urinary freq, dysuria, hematuria, foamy urine, numbness, joint pain/stiffness/swelling.  Wife at bedside reports hallucinations, confusions worse in the evening since being in the hospital.    VITAL SIGNS:  Vital Signs Last 24 Hrs  T(C): 36.7 (21 Jun 2022 04:46), Max: 38.3 (20 Jun 2022 13:16)  T(F): 98.1 (21 Jun 2022 04:46), Max: 100.9 (20 Jun 2022 13:16)  HR: 79 (21 Jun 2022 04:46) (70 - 80)  BP: 141/62 (21 Jun 2022 04:46) (101/51 - 141/62)  BP(mean): --  RR: 18 (21 Jun 2022 04:46) (18 - 19)  SpO2: 96% (21 Jun 2022 04:46) (94% - 98%)    PHYSICAL EXAM:    General: WDWN  HEENT: NC/AT; PERRL, clear conjunctiva sclera; MMM. No ulcers   Neck: supple  Cardiovascular: +S1/S2, RRR  Respiratory: CTA B/L; no W/R/R  Gastrointestinal: soft, NT/ND; +BSx4  MSK: No synovitis  Skin: no rash  Neurological: Muscle strength 4-/5 throughout, 3/5 in the hip flexors b/l. Sensation equal and symmetric b/l    MEDICATIONS:  MEDICATIONS  (STANDING):  carvedilol 25 milliGRAM(s) Oral every 12 hours  chlorhexidine 4% Liquid 1 Application(s) Topical <User Schedule>  citric acid/sodium citrate Solution 30 milliLiter(s) Oral every 6 hours  dexAMETHasone  IVPB 10 milliGRAM(s) IV Intermittent daily  dextrose 5%. 1000 milliLiter(s) (100 mL/Hr) IV Continuous <Continuous>  dextrose 5%. 1000 milliLiter(s) (50 mL/Hr) IV Continuous <Continuous>  dextrose 50% Injectable 25 Gram(s) IV Push once  dextrose 50% Injectable 12.5 Gram(s) IV Push once  dextrose 50% Injectable 25 Gram(s) IV Push once  fluconAZOLE   Tablet 100 milliGRAM(s) Oral daily  folic acid 1 milliGRAM(s) Oral daily  gabapentin 300 milliGRAM(s) Oral daily  glucagon  Injectable 1 milliGRAM(s) IntraMuscular once  glucagon  Injectable 1 milliGRAM(s) IntraMuscular once  insulin lispro (ADMELOG) corrective regimen sliding scale   SubCutaneous three times a day before meals  insulin lispro (ADMELOG) corrective regimen sliding scale   SubCutaneous at bedtime  lidocaine   4% Patch 1 Patch Transdermal daily  multivitamin/minerals 1 Tablet(s) Oral daily  pantoprazole    Tablet 40 milliGRAM(s) Oral before breakfast  tamsulosin 0.4 milliGRAM(s) Oral at bedtime  trimethoprim  160 mG/sulfamethoxazole 800 mG 1 Tablet(s) Oral daily  valACYclovir 1000 milliGRAM(s) Oral two times a day    MEDICATIONS  (PRN):  acetaminophen     Tablet .. 650 milliGRAM(s) Oral every 6 hours PRN Temp greater or equal to 38C (100.4F), Mild Pain (1 - 3)  dextrose Oral Gel 15 Gram(s) Oral once PRN Blood Glucose LESS THAN 70 milliGRAM(s)/deciliter  famotidine    Tablet 20 milliGRAM(s) Oral two times a day PRN indigestion  sodium chloride 0.9% lock flush 10 milliLiter(s) IV Push every 1 hour PRN Pre/post blood products, medications, blood draw, and to maintain line patency      ALLERGIES:  Allergies    No Known Allergies    Intolerances        LABS:                        8.6    7.67  )-----------( 58       ( 20 Jun 2022 07:23 )             28.3       Phos  2.8     06-20  Mg     1.6     06-20          CAPILLARY BLOOD GLUCOSE      POCT Blood Glucose.: 110 mg/dL (20 Jun 2022 21:30)      RADIOLOGY & ADDITIONAL TESTS: Reviewed.

## 2022-06-21 NOTE — PROGRESS NOTE ADULT - ATTENDING COMMENTS
This is a 66 M with a PMH HTN, HLD, SLE on Cellcept (MMF), class V lupus nephritis, CKD stage 2, DM2, diabetic neuropathy, TIA (2019) on plavix, BPH, HUGN, with multiple hospitalizations for c diff, acute panc, septic shock 2/2 buttock abscess status post drainage. PT transferred for recurrent fevers s/p core biopsy. In MICU, paracentesis suspected lymphoma vs. HLH, pt intubated and sedated due to upper airway stridor and AMS. Successfully extubated on 6/8.  he was transferred to floors with heme/onc, ID, rheum, and surg evaluation.      1. 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 Anemia s/s above  3. Metabolic acidosis  3. SLE/lupus nephritis    - Afebrile this am, WBC wnl, blood c/s neg, reviewed CT abd/pelvis. FS has been low  - HCO3 12, Scr 1.6. Will repeat stat BMP. Will start IV D5 w HCO# drip if HCO3 remains low  - Heme f/u plan noted- Patient meets some criteria for HLH but no hemophagocyte on BM or LN biopsy to date as per heme  has been on steroid per rec. SCN molecular studies are pending  - Rheum f/u noted. Has been on Dexamethasone 10mg IV daily  - appreciate ID f/u- continue on valtrex/diflucan  - OOB to chair  - PT/OT in progress. This is a 66 M with a PMH HTN, HLD, SLE on Cellcept (MMF), class V lupus nephritis, CKD stage 2, DM2, diabetic neuropathy, TIA (2019) on plavix, BPH, HUNG, with multiple hospitalizations for c diff, acute panc, septic shock 2/2 buttock abscess status post drainage. PT transferred for recurrent fevers s/p core biopsy. In MICU, paracentesis suspected lymphoma vs. HLH, pt intubated and sedated due to upper airway stridor and AMS. Successfully extubated on 6/8.  he was transferred to floors with heme/onc, ID, rheum, and surg evaluation.      1. 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 Anemia s/s above  3. Metabolic acidosis  4. SLE/lupus nephritis    - Afebrile this am, WBC wnl, blood c/s neg, reviewed CT abd/pelvis. FS has been low  - HCO3 12, Scr 1.6. Will repeat stat BMP. Will start IV D5 w HCO# drip if HCO3 remains low  - Heme f/u plan noted- Patient meets some criteria for HLH but no hemophagocyte on BM or LN biopsy to date as per heme  has been on steroid per rec. SCN molecular studies are pending  - Rheum f/u noted. Has been on Dexamethasone 10mg IV daily  - appreciate ID f/u- continue on valtrex/diflucan  - OOB to chair  - PT/OT in progress.

## 2022-06-21 NOTE — PROGRESS NOTE ADULT - PROBLEM SELECTOR PLAN 5
- 6/16 - 6/18 no fevers, s/p immodium x1  - 6/15 - 6/16 overnight with 5-6 episodes of light, brown/green colored loose stool with fever of 102.4  - no bowel regimen in place, monitor hydration status, reduced diuretics from lasix 40 BID to daily   - f/u GI PCR, consider CMV r/o if persists     Additional Hx:   #chronic inflammatory diarrhea  -calprotectin elevated at [place] [id number]. no current plan by GI for colonoscopy at this time.   -C diff negative (5/30)

## 2022-06-21 NOTE — PROGRESS NOTE ADULT - NSPROGADDITIONALINFOA_GEN_ALL_CORE
Will sign off, recall ID if needed #[phone].
Please call the ID service [phone] with questions or concerns over the weekend.
I am away from 6/22 and will return 6/24. ID coverage available. Call ID office @ [phone] with questions.
D/w MICU resident
D/w resident (Dr. Olsen)    Please call the ID service [phone] with questions or concerns over the weekend.
D/w resident (Dr. Olsen)    Please call the ID service [phone] with questions or concerns over the weekend.
D/w resident (Dr. Olsne)

## 2022-06-21 NOTE — PROGRESS NOTE ADULT - SUBJECTIVE AND OBJECTIVE BOX
HANDY FREEMAN 66y [id number]    Patient is a 66y old  Male who presents with a chief complaint of recurrent fevers, unknown source (21 Jun 2022 08:20)      Follow Up/CC:  ID following for fever    Interval History/ROS: fever yesterday     Allergies    No Known Allergies    Intolerances        ANTIMICROBIALS:  fluconAZOLE   Tablet 100 daily  trimethoprim  160 mG/sulfamethoxazole 800 mG 1 daily  valACYclovir 1000 two times a day      MEDICATIONS  (STANDING):  carvedilol 25 milliGRAM(s) Oral every 12 hours  chlorhexidine 4% Liquid 1 Application(s) Topical <User Schedule>  citric acid/sodium citrate Solution 30 milliLiter(s) Oral every 6 hours  dexAMETHasone  IVPB 10 milliGRAM(s) IV Intermittent daily  dextrose 5%. 1000 milliLiter(s) (100 mL/Hr) IV Continuous <Continuous>  dextrose 5%. 1000 milliLiter(s) (50 mL/Hr) IV Continuous <Continuous>  dextrose 50% Injectable 25 Gram(s) IV Push once  dextrose 50% Injectable 12.5 Gram(s) IV Push once  dextrose 50% Injectable 25 Gram(s) IV Push once  fluconAZOLE   Tablet 100 milliGRAM(s) Oral daily  folic acid 1 milliGRAM(s) Oral daily  gabapentin 300 milliGRAM(s) Oral daily  glucagon  Injectable 1 milliGRAM(s) IntraMuscular once  glucagon  Injectable 1 milliGRAM(s) IntraMuscular once  insulin lispro (ADMELOG) corrective regimen sliding scale   SubCutaneous three times a day before meals  insulin lispro (ADMELOG) corrective regimen sliding scale   SubCutaneous at bedtime  lidocaine   4% Patch 1 Patch Transdermal daily  multivitamin/minerals 1 Tablet(s) Oral daily  pantoprazole    Tablet 40 milliGRAM(s) Oral before breakfast  tamsulosin 0.4 milliGRAM(s) Oral at bedtime  trimethoprim  160 mG/sulfamethoxazole 800 mG 1 Tablet(s) Oral daily  valACYclovir 1000 milliGRAM(s) Oral two times a day    MEDICATIONS  (PRN):  acetaminophen     Tablet .. 650 milliGRAM(s) Oral every 6 hours PRN Temp greater or equal to 38C (100.4F), Mild Pain (1 - 3)  dextrose Oral Gel 15 Gram(s) Oral once PRN Blood Glucose LESS THAN 70 milliGRAM(s)/deciliter  famotidine    Tablet 20 milliGRAM(s) Oral two times a day PRN indigestion  sodium chloride 0.9% lock flush 10 milliLiter(s) IV Push every 1 hour PRN Pre/post blood products, medications, blood draw, and to maintain line patency        Vital Signs Last 24 Hrs  T(C): 36.9 (21 Jun 2022 09:09), Max: 38.3 (20 Jun 2022 13:16)  T(F): 98.4 (21 Jun 2022 09:09), Max: 100.9 (20 Jun 2022 13:16)  HR: 83 (21 Jun 2022 10:05) (70 - 84)  BP: 104/54 (21 Jun 2022 09:09) (101/51 - 141/62)  BP(mean): --  RR: 18 (21 Jun 2022 09:09) (18 - 19)  SpO2: 97% (21 Jun 2022 09:09) (94% - 98%)    CBC Full  -  ( 20 Jun 2022 07:23 )  WBC Count : 7.67 K/uL  RBC Count : 2.78 M/uL  Hemoglobin : 8.6 g/dL  Hematocrit : 28.3 %  Platelet Count - Automated : 58 K/uL  Mean Cell Volume : 101.8 fl  Mean Cell Hemoglobin : 30.9 pg  Mean Cell Hemoglobin Concentration : 30.4 gm/dL  Auto Neutrophil # : 6.29 K/uL  Auto Lymphocyte # : 0.92 K/uL  Auto Monocyte # : 0.23 K/uL  Auto Eosinophil # : 0.00 K/uL  Auto Basophil # : 0.00 K/uL  Auto Neutrophil % : 79.0 %  Auto Lymphocyte % : 12.0 %  Auto Monocyte % : 3.0 %  Auto Eosinophil % : 0.0 %  Auto Basophil % : 0.0 %      Phos  2.8     06-20  Mg     1.6     06-20            MICROBIOLOGY:  .Blood Blood-Peripheral  06-16-22   No growth to date.  --  --      .Blood Blood  06-16-22   Testing in progress  --  --      .Blood Blood-Peripheral  06-16-22   No growth to date.  --  --      .Blood Blood  06-11-22   No Growth Final  --  --      .Bronchial Bronchial Lavage  06-06-22   No growth at 1 week.  --    No polymorphonuclear cells seen per low power field  No squamous epithelial cells per low power field  No organisms seen per oil power field      .Blood Blood  06-05-22   NEGATIVE for Plasmodium antigens. Microscopy is performed for  confirmation.  This test does not detect the presence of Babesia species.  If Babesiosis is suspected, please order test for Babesia PCR: Babesia  microti PCR Bld  ************************************************************  No Blood Parasites observed by giemsa stain  One negative set of blood smears does not rule out  the possibility of a parasitic infection.  A minimum of 3  specimens should be collected, at least 12-24 hours apart,  over a 36 hour time period.  --  --      .Blood Blood  06-04-22   NEGATIVE for Plasmodium antigens. Microscopy is performed for  confirmation.  This test does not detect the presence of Babesia species.  If Babesiosis is suspected, please order test for Babesia PCR: Babesia  microti PCR Bld  ************************************************************  No Blood Parasites observed by giemsa stain  One negative set of blood smears does not rule out  the possibility of a parasitic infection.  A minimum of 3  specimens should be collected, at least 12-24 hours apart,  over a 36 hour time period.  --  --      .Sputum Sputum  06-04-22   Normal Respiratory Wen present  --    Rare Squamous epithelial cells per low power field  Rare polymorphonuclear leukocytes per low power field  No organisms seen      .Sputum Sputum  06-04-22   No growth at 1 week.  --  --      .Blood Blood-Venous  06-04-22   No Growth Final  --  --      .Blood Blood-Peripheral  06-03-22   No Growth Final  --  --      .Body Fluid Peritoneal Fluid  06-02-22   No growth  --    polymorphonuclear leukocytes seen  No organisms seen  by cytocentrifuge      .Blood Blood-Peripheral  06-01-22   No Growth Final  --  --      .Blood Blood-Venous  06-01-22   No Growth Final  --  --      .Stool Stool  06-01-22   No growth at 1 week.  --  --      .Stool Stool  06-01-22   No growth at 1 week.  --  --      .Sputum Sputum  06-01-22   No growth at 1 week.  --  --      .Stool Stool  05-31-22   No growth at 1 week.  --  --      .Sputum Sputum  05-31-22   No growth at 1 week.  --  --      .Sputum Sputum  05-30-22   Normal Respiratory Wen present  --    No polymorphonuclear leukocytes per low power field  No Squamous epithelial cells per low power field  No organisms seen per oil power field      .Stool sarthak betito  05-30-22   No enteric pathogens isolated.  (Stool culture examined for Salmonella,  Shigella, Campylobacter, Aeromonas, Plesiomonas,  Vibrio, E.coli O157 and Yersinia)  --  --      .Blood Blood  05-30-22   NEGATIVE for Plasmodium antigens. Microscopy is performed for  confirmation.  This test does not detect the presence of Babesia species.  If Babesiosis is suspected, please order test for Babesia PCR: Babesia  microti PCR Bld  ************************************************************  No Blood Parasites observed by giemsa stain  One negative set of blood smears does not rule out  the possibility of a parasitic infection.  A minimum of 3  specimens should be collected, at least 12-24 hours apart,  over a 36 hour time period.  --  --      .Stool Feces sarthak betito  05-30-22   GI PCR Results: NOT detected  *******Please Note:*******  GI panel PCR evaluates for:  Campylobacter, Plesiomonas shigelloides, Salmonella,  Vibrio, Yersinia enterocolitica, Enteroaggregative  Escherichia coli (EAEC), Enteropathogenic E.coli (EPEC),  Enterotoxigenic E. coli (ETEC) lt/st, Shiga-like  toxin-producing E. coli (STEC) stx1/stx2,  Shigella/ Enteroinvasive E. coli (EIEC), Cryptosporidium,  Cyclospora cayetanensis, Entamoeba histolytica,  Giardia lamblia, Adenovirus F 40/41, Astrovirus,  Norovirus GI/GII, Rotavirus A, Sapovirus  --  --      .Blood Blood  05-30-22   No Growth Final  --  --      .Blood Blood  05-30-22   No growth  --  --      .Blood Blood  05-30-22   No Growth Final  --  --              v            RADIOLOGY

## 2022-06-21 NOTE — CHART NOTE - NSCHARTNOTEFT_GEN_A_CORE
Called by RN for hypoglycemic BSG levels 70-90s and hypotension.     65 yo male with complicated medical course w/hx of lupus nephritis and s/p multiple blood transfusion here for workup of lymphoproliferative disease (EBV) vs. lupus-associated flare. Rheum, Heme/Onc, Nephrology and ID involved in pt's care.     At 1900 on 6/21, Pt hemodynamically unstable with SBPs 80s, and MAPs at max of 65. Pt awake, alert, following commands, afebrile, no leukocytosis, but somnolent. No pain on exam, slightly tachypneic, but saturating greater than 95% on RA.     Pt with hypoglycemia (despite being on steroids), hypotension, lactic acidosis, low bicarb, and uric acid elevation. Concern for tumor lysis vs. sepsis of unknown origin vs. ischemia. Discussed pt's case with Team 1 senior and Dr. Cabrera attending. Plan included aggressive IV hydration, sodium bicarb gtt, vitals q4, meropenem/vanc x1 dose and close monitoring.     Consulted Dr. Lipscomb of nephrology and Dr. Ragsdale of Heme/onc regarding pt's update.    Pt low threshold for MICU, but stable at this time (2100). F/U early AM labs and rheum/heme workup. Consider further imaging if source remains unknown for the lactic acidosis. See below for details.     #Hypoglycemia  - FSG q6h, 70-80s  - encourage PO juice  - can consider 1/2 amp of D50 if persistently less than 100     #Hypotensive  - SBPs 90-80s  - MAP ~ 65  -- s/p 1L LR bolus (500LR x2)  -- vitals q4 keep MAP at or greater than 65       #Electrolyte abnormalities - lactic acidosis   - CMP Bicarb 12 --> 10   - VBG pH 727, lactate 12, bicarb 10   - Renal: BUN/Cr 52/1.81  -- aggressive IV fluid hydration  -- started 100 cc of sodium bicarb (150meq) gtt in D5, currently running  -- Dr. Lipscomb of Nephrology, discussed pt's case overnight   -- CTA from 6/13 with no source of infection     #Sepsis, unknown source, no fever  - No leukocytosis, no fever, Na and K wnl   - LFTs alk phos 395, , ALT 89 (possibly shock liver from hypoperfusion/sepsis)  - hypotensive, but no tachycardia   - Bcx in process from 6/20  - s/p Meropenem 1g (1 dose) and vancomycin 1 gm (1 dose) on 6/21  - CTM fever curve and CBC for leukocytosis       #Uric Acid elevation   - 11.9   -- pending G6PD lab add on 6/21 PM  -- consider rasburicase, heme/onc closely following    SM Called by RN for hypoglycemic BSG levels 70-90s and hypotension.     65 yo male with complicated medical course w/hx of lupus nephritis and s/p multiple blood transfusion here for workup of lymphoproliferative disease (EBV) vs. lupus-associated flare. Rheum, Heme/Onc, Nephrology and ID involved in pt's care.     At 1900 on 6/21, Pt hemodynamically unstable with SBPs 80s, and MAPs at max of 65. Pt awake, alert, following commands, afebrile, no leukocytosis, but somnolent. Dry mucous membranes.   No pain on exam, slightly tachypneic, but saturating greater than 95% on RA. Anterior lung sounds clear, no new retroperitoneal ecchymosis. Profound R-sided abdomen (consistent with hepatomegaly at baseline), 1+ pitting edema (improved from prior).     Pt with hypoglycemia (despite being on steroids), hypotension, lactic acidosis, low bicarb, and uric acid elevation. Concern for tumor lysis vs. sepsis of unknown origin vs. ischemia. Discussed pt's case with Team 1 senior and Dr. Cabrera attending. Plan included aggressive IV hydration, sodium bicarb gtt, vitals q4, meropenem/vanc x1 dose and close monitoring.     Consulted Dr. Lipscomb of nephrology and Dr. Ragsdale of Heme/onc regarding pt's update.    Pt low threshold for MICU, but stable at this time (2100). F/U early AM labs and rheum/heme workup. Consider further imaging if source remains unknown for the lactic acidosis. See below for details.     #Hypoglycemia  - FSG q6h, 70-80s  - encourage PO juice  - can consider 1/2 amp of D50 if persistently less than 100     #Hypotensive  - SBPs 90-80s  - MAP ~ 65  -- s/p 1L LR bolus (500LR x2)  -- vitals q4 keep MAP at or greater than 65       #Electrolyte abnormalities - lactic acidosis   - CMP Bicarb 12 --> 10   - VBG pH 727, lactate 12, bicarb 10   - Renal: BUN/Cr 52/1.81  - bladder scan 90 cc, strict I/Os   -- aggressive IV fluid hydration  -- started 100 cc of sodium bicarb (150meq) gtt in D5, currently running  -- Dr. Lipscomb of Nephrology, discussed pt's case overnight   -- CTA from 6/13 with no source of infection     #Sepsis, unknown source, no fever  - No leukocytosis, no fever, Na and K wnl   - LFTs alk phos 395, , ALT 89 (possibly shock liver from hypoperfusion/sepsis)  - hypotensive, but no tachycardia   - Bcx in process from 6/20  - s/p Meropenem 1g (1 dose) and vancomycin 1 gm (1 dose) on 6/21  - CTM fever curve and CBC for leukocytosis       #Uric Acid elevation   - 11.9   -- pending G6PD lab add on 6/21 PM  -- consider rasburicase, heme/onc closely following    SM

## 2022-06-21 NOTE — PROGRESS NOTE ADULT - ATTENDING SUPERVISION STATEMENT
Resident
Fellow
Resident
Resident
Fellow
Resident
Fellow
Resident

## 2022-06-21 NOTE — PROVIDER CONTACT NOTE (CRITICAL VALUE NOTIFICATION) - ASSESSMENT
Pt AOX4, VSS except BP soft and dropped lower to 86/54 manually. Pt lethargic, unable to ambulate since admission. Blood glucose continued to decrease throughout the day despite pt eating throughout the day.

## 2022-06-22 NOTE — PROVIDER CONTACT NOTE (CRITICAL VALUE NOTIFICATION) - ACTION/TREATMENT ORDERED:
Will order 1 unit PRBC
Provider notified
Provider notified
per Francois ( T1)  will order stat repeat PTT.
MD made aware. As per provider - follow nomogram.
Md to order 1 unit PRBC
Provider notified
provider nicolas guardado made aware  follow nomogram  pause heparin gtt for 60 min- then restart at 14cc/hr  continue to monitor
MD Olsen notified. Ordered stat VBGs, labs abnormal. MD paged and teamsed of notification ,no response yet. Will pass along lab values to oncoming RN Ruth, pt to be monitored. Family at bedside.
MD made aware. recommended repeat CBC
provider notified. per NOMOgram  stop infusion for 60min then restart with rate at 17ml/HR
ACP made aware

## 2022-06-22 NOTE — PROCEDURE NOTE - NSBRONCHPROCDETAILS_GEN_A_CORE_FT
Patient with considerable bilious secretions via ETT, unable to suction adequately with in-line suction.  Decision made to perform emergent bronch for airway inspection and clearance.    Scope passed via 7.0 ETT with thin bilious secretions throughout.  Suctioned to the level of segments, with each segment patent, and considerable pooling of fluid in each lobe despite aggressive suctioning.  Scope removed after suctioning throughout.    No complications.  Patient remains in refractory shock.
Bronchoscope inserted through ETT. Airways examined to the subsegments. Secretions aspirated. BAL performed in RML with aliquots of 30cc for total of 60cc. Bronchoscope subsequently withdrawn from ETT.

## 2022-06-22 NOTE — PROCEDURE NOTE - NSPROCNAME_GEN_A_CORE
Central Line Insertion
Bronchoscopy
Central Line Insertion
Interventional Radiology
Arterial Puncture/Cannulation
Bronchoscopy
Tracheal Intubation

## 2022-06-22 NOTE — PROVIDER CONTACT NOTE (OTHER) - REASON
Discontinuation of Airborne Isolation
Pt has rectal temperature of 103.0
pt refusing bicitra
Oral temp 100.5
temp 102.9
Day MD requesting to insert peripheral IV, IV RN stated that the midline is WDL. Pt due for IV medications
Hypotensive; Due for coreg
Left sided weakness
Pt refused citric acid
unable to obtain PTT for heparin drip- PTT delayed
Pt complaining of indigestion, requesting pepcid.
Pt with high fevers x2, tachycardiac and tachypneic
pt with increased temp after receiving antipyretics 1 hours ago
Patient febrile to 39.5 and Tachycardic to 129
Patient has a fever
Patient has thrush
Patient needs supplemental nutrition
Pt receiving heparin gtt, in need of Decadron with one access
Pt refused CPAP at night
Unable to get CBC
Updating isolation from previous note
Temp of 100.4, Pt receiving PRBC's for HgB 5.4
Pt complaining of chest pain & difficulty breathing

## 2022-06-22 NOTE — PROVIDER CONTACT NOTE (OTHER) - ASSESSMENT
CM review of pt chart for readmission risk,last admission 4/6-4/9/19 for abd pain,dx colitis. Pt returns today with same chief complaint. Luciana Myers PA-C evaluating pt, no alternatives to admission at this time.  LENA,RN/CM
Left arm weakness noted, blood sugar 134 mg/dL.
Patient A&Ox3, VSS
Patient A&Ox3, VSS - thrush under tongue
Patient A&Ox3, other VSS
Pt A&Ox4, VSS, no s/s of distress
Pt A&Ox4, VSS. Pt complaining of indigestion
Pt refused citric acid  AO4, VSS, no c/o pain or discomfort, responsive  wife at bedside
in no acute distress
Pt a&O x2 and lethargic at this moment. Blood transfusion completed.
VSS. Pt denies pain/cp/sob/distress.
Pt refused CPAP at night  AO4, VSS, no c/o pain or discomfort, responsive  wife at bedside  Pt states he does not use oxygen at night when CPAP is not in use
Pt A&O2. VS as charted  Bladder scan 206 ml status post void.
Pt A&Ox3/4, Pt not complaining of any distress
Pt A&Ox4, BP 92/49 HR 85.
Pt febrile with facial flush, denies any headache or chills.
Pt A&Ox3-4 at baseline but with fogginess during fevers. Pt tachycardic, tachypneic and not as oriented as usual (occurs when pt experiences fevers).
Pt A&Ox4, VSS.
Pt lethargic, complaining of difficulty breathing RR 30s. Complaining of chest pain
pt a&ox3-4, VSS  heparin drip running at 17cc/hr  pt NSR on tele
pt a&ox3-4, VSS  pt NSR on tele
Patient febrile to 39.5 and Tachycardic to 129, denies chest pain or any other symptoms.

## 2022-06-22 NOTE — PROVIDER CONTACT NOTE (CRITICAL VALUE NOTIFICATION) - NAME OF MD/NP/PA/DO NOTIFIED:
MD Burris
[address] [phone]
MD Pretty Burris, teamsed and paged
Gabriele Acuña via teams
MD Francois Somers
Sarah Zavala
T1 Francois Somers [phone]
MD Bynum
Melina Jennings
Sarah Zavala MD
T1 Pager
TEAM 1

## 2022-06-22 NOTE — AIRWAY REMOVAL NOTE  ADULT & PEDS - ARTIFICAL AIRWAY REMOVAL COMMENTS
Extubation ordered verified. Pt identified with MR # and . Pt extubated with RN Heidy present.
Written order for extubation verified. Patient was identified by full name ,date of birth . Present during the procedure was EVANGELISTA Grant

## 2022-06-22 NOTE — PROVIDER CONTACT NOTE (CRITICAL VALUE NOTIFICATION) - RECOMMENDATIONS
Team notified
notify provider
per . recommended to repeat lab. possible contamination.
Md made aware
Team notified
Team notified
provider nicolas guardado MD made aware

## 2022-06-22 NOTE — PROVIDER CONTACT NOTE (CRITICAL VALUE NOTIFICATION) - SITUATION
CO2 less than 10
Critical value: Hg-6.8  hct 20.4
PTT greater than 200
Lactate 13.1
H.7 / Hct:20.4
HG= 5.4  HMT = 17.7
pt PTT greater than 200  pt heparin gtt was running at 17cc/hr
.1
Hemoglobin 6.7
VBG bicarb 10, lactate 12
Patients critical lab is: PTT of 131.3
CO2 less than 10

## 2022-06-22 NOTE — PROCEDURE NOTE - PROCEDURE DATE TIME, MLM
04-Jun-2022 13:23
22-Jun-2022 04:15
22-Jun-2022
22-Jun-2022 04:00
02-Jun-2022 19:18
07-Jun-2022 01:00
06-Jun-2022 16:56

## 2022-06-22 NOTE — PROCEDURE NOTE - NSINFORMCONSENT_GEN_A_CORE
Benefits, risks, and possible complications of procedure explained to patient/caregiver who verbalized understanding and gave written consent.
This was an emergent procedure.
Benefits, risks, and possible complications of procedure explained to patient/caregiver who verbalized understanding and gave written consent.

## 2022-06-22 NOTE — RAPID RESPONSE TEAM SUMMARY - NSSITUATIONBACKGROUNDRRT_GEN_ALL_CORE
67 yo male with complicated medical course w/hx of lupus nephritis and s/p multiple blood transfusion here for workup of lymphoproliferative disease (EBV) vs. lupus-associated flare.  RRT called for unresponsiveness. On encounter pt obtunded and with BP in 50s/30s. Wife at bedside to confirm full code. Anesthesia stat was called overhead. Pt started on levo however pulse was lost and CODE BLUE was called. Given epi 1 mg, 2 amp bicarb, and 1 gram calcium chloride with ROSC achieved at 5 mins. Pulse became thready so given a second 1 mg epi. Pt intubated. Sats 100%, repeat BP 90s systolic. Brought to MICU. 
65 yo male with complicated medical course w/hx of lupus nephritis and s/p multiple blood transfusion here for workup of lymphoproliferative disease (EBV) vs. lupus-associated flare.   RRT called for hypotension to 40s systolic. On encounter pt lethargic but mentating. Repeat BP was 90s systolic, subsequent repeat was in 100s, afebrile orally. Had just completed a second 500 cc bolus (total of 1L in past 6 hours). Also on bicarb infusion for HAGMA of unclear origin. Appeared tachypneic to 20s, given his acidosis pt was placed on BIPAP 12/5. Full labs were drawn including TLS labs. Pt trying to pull off BIPAP, stated that the team was "abusing him." Wife at bedside.

## 2022-06-22 NOTE — PROCEDURE NOTE - NSINDICATIONS_GEN_A_CORE
critical patient
airway protection
critical illness/emergency venous access
critical illness/emergency venous access

## 2022-06-22 NOTE — PROVIDER CONTACT NOTE (OTHER) - DATE AND TIME:
01-Jun-2022 20:45
12-Jun-2022 15:25
14-Jun-2022 00:20
14-Jun-2022 21:46
02-Jun-2022
11-Jun-2022 01:30
11-Jun-2022 22:30
22-Jun-2022 22:45
16-Jun-2022 02:42
16-Jun-2022 06:42
16-Jun-2022 06:42
02-Jun-2022
10-Vernon-2022 23:00
18-Jun-2022 05:47
10-Vernon-2022 00:53
12-Jun-2022 16:42
12-Jun-2022 22:40
13-Jun-2022 17:14
19-Jun-2022 22:58
22-Jun-2022 19:35
12-Jun-2022 00:30
14-Jun-2022 05:52

## 2022-06-22 NOTE — DISCHARGE NOTE FOR THE EXPIRED PATIENT - HOSPITAL COURSE
65 yo M with a PMH HTN, HLD, SLE on Cellcept (MMF), class V lupus nephritis, CKD stage 2, DM2, diabetic neuropathy, TIA (2019) on plavix, BPH, HUNG, hospitalization in [facility] 3/27-4/20 tx for pancreatitis and C diff?, recent hospitalization at [place] 4/22-4/26 for acute pancreatitis and C diff colitis and another hospitalization 5/5 at [facility] for septic shock (source buttock abscess) treated with vanc, cefepime (5/5-5/10), meropenem (5/11-5/18) and micafungin. Zosyn added 5/26. Course complicated by recurrent fevers despite being on antibiotics, and leukocytosis up to 30k. Blood cx neg, UA unremarkable. MRI abdomen w/ contrast performed showing extensive abd lymphadenopathy (reactive to c diff vs lymphoma?). IR stated no window for bx. Course also complicated by a L brachial vein DVT and superficial DVT in arms. Patient noted to have had a positive PPD but has possibly gotten the BCG vaccine; quant indeterminate    Admitted to [facility] MICU 5/30/22. Initially presenting as ICU to ICU transfer from [place] did not require pressors or intubation or other ICU needs at the time of transfer. Recurrent and then constant fevers requiring tylenol, cooling blanket, and cooled fluids as high as temperature of 105. Extensive FUO evaluation conducted including blood cultures NGTD, sputum cx NGTD, and AFB stool and sputum NGTD x3 additionally without parasites including malaria x3 negative and all other infectious workup negative. Originally monitored off abx and antifungal then resumed on edilson for a period of time with findings concerning for peritonitis based upon neutrophilic predominance in ascitic fluid and total cell count 3518.  Rheumatological workup in part pending however with known lupus complicated by class V lupus nephritis less likely lupus flare. Known L brachial DVT and found to have R sided PE highly likely on CT additionally with peritoneal lymphadenopathy, cirrhosis with portal hypertension although minimal ascites, and splenomegaly with splenic infarct. Placed on heparin gtt. Core biopsy conducted by IR of cervical lymph nodes with preliminary pathology discussed verbally with pathology EBV+ lymphoprofileration although originally with concern for lymphoma pending further genetic testing with pathology. Working diagnosis of HLH r/o caused by EBV v lymphoma v lupus. Patient transferred to floors 6/9/2022 continued on dexamethasone for empiric HLH. Still had recurrent fevers. Patient today became notably acidotic and lethargic with increasing lactate due to unknown source. Code blue called Given epi 1 mg, 2 amp bicarb, and 1 gram calcium chloride with ROSC achieved at 5 mins. Pulse became thready so given a second 1 mg epi. Pt intubated. Sats 100%, repeat BP 90s systolic. Brought to MICU; Bronchoscopy done with significant lung fluid likely 2/2 aspiration event i/s/o severe lactic acidosis. Patient coded again, ROSC achieved after 14min but then unable to achieve appropriate BP and passed at 6/22/22 4:45AM

## 2022-06-22 NOTE — RAPID RESPONSE TEAM SUMMARY - NSADDTLFINDINGSRRT_GEN_ALL_CORE
Recs:  -still awaiting full labs but there is concern for TLS and possibly TTP based on prior labs. Would follow up Heme recs closely.  -consider stress dose steroids given persistent hypotension but will hold off at this time given he is fluid responsive after only 1L   -s Recs:  -fluid responsive after only 1L which is reassuring however unclear etiology of hypotension. could be septic however pt is already on broad spectrum abx, and numerous cultures including parasitic, fungal, and AFB have been negative.   -More likely a SIRS 2/2 to possible HLH 2/2 underlying EBV Positive Lymphoproliferative Disorder vs TLS. TLS labs showed elevated uric acid and LDH, also with elevated creatinine. Would initiate rasburicase at this point given decompensation, continue with hydration as well.   -remains on bicarb gtt for undetectable bicarb  -remains on decadron for HLH treatment   -there is concern for possible TTP, would check peripheral smear again.   -monitor resp status carefully given he is at high risk of decompensating given the degree of lactic acidosis. Continually address GOC with wife Nanci.  Recs:  -fluid responsive after only 1L which is reassuring however unclear etiology of hypotension. could be septic however pt is already on broad spectrum abx, and numerous cultures including parasitic, fungal, and AFB have been negative.   -More likely a SIRS 2/2 to possible HLH 2/2 underlying EBV Positive Lymphoproliferative Disorder vs TLS. TLS labs showed elevated uric acid and LDH, also with elevated creatinine. Would initiate rasburicase at this point given decompensation, continue with hydration as well.   -his decadron was tapered on 6/17 from 20 mg to 15 mg, again tapered on 6/20 from 15 mg to 10 mg. BPs as well as metabolic acidosis appeared to worsen after this last taper, was wnl when he was on the 20 mg. Will go back to 15 mg. Low threshold to increase his decadron dose even further and consider other medications to treat his HLH which terrell has mentioned on their previous notes.   -remains on bicarb gtt for undetectable bicarb  -there is concern for possible TTP, would check peripheral smear again. check coags and fibrinogen again.   -monitor resp status carefully given he is at high risk of decompensating given the degree of lactic acidosis. Continually address GOC with wife Nanci.

## 2022-06-22 NOTE — PROCEDURE NOTE - NSPROCDETAILS_GEN_ALL_CORE
guidewire recovered/lumen(s) aspirated and flushed/sterile dressing applied/sterile technique, catheter placed/ultrasound guidance with use of sterile gel and probe cove
location identified, draped/prepped, sterile technique used, needle inserted/introduced/positive blood return obtained via catheter/connected to a pressurized flush line/sutured in place/hemostasis with direct pressure, dressing applied/all materials/supplies accounted for at end of procedure
patient pre-oxygenated, tube inserted, placement confirmed
guidewire recovered/lumen(s) aspirated and flushed/sterile dressing applied/sterile technique, catheter placed/ultrasound guidance with use of sterile gel and probe cove

## 2022-06-22 NOTE — PROVIDER CONTACT NOTE (OTHER) - ACTION/TREATMENT ORDERED:
Aptt drawn with levels in 30s. Provider advised to continue heparin gtt; hold decadron
MD notified. Reschedule IV medications for later this morning
Per provider continue with PRBC's and administer Tylenol, continue to monitor.
Provider notified. Tylenol not given to pt d/t total of 2650mg given at this point. Cooling methods provided.
hypothermia blanket initiated, Provider made aware. reassess temp and 1 hour and follow up with provider.
provider aware  Will continue with current POC and update as needed.  Pt family will bring pt's cpap tmm
provider made aware  provider coming to pt bedside shortly to attempt to a-stick him to obtain PTT  will continue to monitor
500mg PO Tylenol ordered
Md states he will look into it
Per MD okay to hold of on finger stick now. Try again later to see if pt will attempt blood draw. Continue to monitor and inform MD of any change in status.
Will endorse to day team
tylenol and cold packs
will order 1 time dose of 650mg oral tylenol
MD notified. Hold Coreg due to low BP
MD notified. Pepcid to be ordered
Provider aware; EKG ordered. 2L nasal canula placed
Tylenol 650mg PO. suspecting hematologic cause, no workup needed at this moment.
provider aware  Will continue with current POC and update as needed.
provider nicolas guardado made aware  pt due for it again 0600- will retry again  continue to monitor
tylenol 1000 mg IVP administered as ordered, cooling measurement applied. Patient safety maintained.

## 2022-06-22 NOTE — PROVIDER CONTACT NOTE (OTHER) - RECOMMENDATIONS
Discontinuation of Airborne Isolation for r/o TB
Notify ACP Team 1 Pager
Provider to bedside? Tylenol? Metoprolol since evening dose was held by day nurse? Cultures?
notify provider
anti pyretic
Patient placed back on airborne isolation r/o TB until further review
Provider notified
notify provider
provider made aware
further exams ordered.
Discuss with provider on whether heparin can be paused.
Eliana
MD Made aware
Make MD aware
Make MD aware - tylenol and cold packs
IV tylenol? Cold packs, pt monitoring.
Notify provider
Notify provider
cooling blanket.
provider Melina Jennings MD made aware

## 2022-06-22 NOTE — PROVIDER CONTACT NOTE (CRITICAL VALUE NOTIFICATION) - TEST AND RESULT REPORTED:
CO2 less than 10
HG / HMT
PTT: 131.3
CBC  Hg-6.8, Hct-20.4
PTT
H.7 / Hct:20.4
Hemoglobin 6.7
Lactate 13.1
PTT greater than 200
PTT
VBG bicarb 10, lactate 12
Co2: 10

## 2022-06-22 NOTE — CHART NOTE - NSCHARTNOTESELECT_GEN_ALL_CORE
Acidosis/Event Note
Event Note
Death Note/Event Note
MAR accept note/Transfer Note
MICU Accept Note/Event Note
MICU transfer note/Event Note
MICU transfer note/Event Note
Nutrition Services
POCUS/Event Note

## 2022-06-22 NOTE — PROVIDER CONTACT NOTE (CHANGE IN STATUS NOTIFICATION) - ASSESSMENT
Pt lethargic. Complaining of chest pain, difficulty breathing. BP 46/25
Pt unresponsive, hypotensive

## 2022-06-22 NOTE — CHART NOTE - NSCHARTNOTEFT_GEN_A_CORE
Provider called to bedside for pulselessness on the monitor. Patient was not arousable to verbal or tactile stimuli. No palpable pulses from carotid, radial, or femoral. No spontaneous bilateral breath sounds. Unable to auscultate any heart sounds at the 4 standard points. No corneal reflex and fixed dilated pupils.     TOD: 6/22/22 at 4:45AM   Pronounced by: Hemant Sadler MD  Attending Notified: Dr. Kerns

## 2022-06-22 NOTE — PROVIDER CONTACT NOTE (OTHER) - NAME OF MD/NP/PA/DO NOTIFIED:
Alexsander Bynum MD
Chance Diaz MD
MD Steel
Alexsander Bynum
Devante Nichols MD
MICU/logistics
Sarah Zavala MD
nicolas sherwood
Alexsander Bynum MD
MD Bynum
MD Fatuma Olsen
MD NF team 1
Alexsander Bynum
Alexsander Bynum MD
MICU unit and logistics-[facility]
Resident Ismael Salazar
Gabriele Bain
Melina Jennings MD
T 1 Pager [phone]
JAYESH Sandy
t1 Alexsander diamond MD

## 2022-06-22 NOTE — CHART NOTE - NSCHARTNOTEFT_GEN_A_CORE
MICU Accept Note    HPI / INTERVAL HISTORY: 67 yo M with a PMH HTN, HLD, SLE on Cellcept (MMF), class V lupus nephritis, CKD stage 2, DM2, diabetic neuropathy, TIA (2019) on plavix, BPH, HUNG, hospitalization in [facility] 3/27-4/20 tx for pancreatitis and C diff?, recent hospitalization at [place] 4/22-4/26 for acute pancreatitis and C diff colitis and another hospitalization 5/5 at [place] for septic shock (source buttock abscess) treated with vanc, cefepime (5/5-5/10), meropenem (5/11-5/18) and micafungin. Zosyn added 5/26. Course complicated by recurrent fevers despite being on antibiotics, and leukocytosis up to 30k. Blood cx neg, UA unremarkable. MRI abdomen w/ contrast performed showing extensive abd lymphadenopathy (reactive to c diff vs lymphoma?). IR stated no window for bx. Course also complicated by a L brachial vein DVT and superficial DVT in arms. Patient noted to have had a positive PPD but has possibly gotten the BCG vaccine; quant indeterminate    Admitted to [facility] MICU 5/30/22. Initially presenting as ICU to ICU transfer from [place] did not require pressors or intubation or other ICU needs at the time of transfer. Recurrent and then constant fevers requiring tylenol, cooling blanket, and cooled fluids as high as temperature of 105. Extensive FUO evaluation conducted including blood cultures NGTD, sputum cx NGTD, and AFB stool and sputum NGTD x3 additionally without parasites including malaria x3 negative and all other infectious workup negative. Originally monitored off abx and antifungal then resumed on edilson for a period of time with findings concerning for peritonitis based upon neutrophilic predominance in ascitic fluid and total cell count 3518.  Rheumatological workup in part pending however with known lupus complicated by class V lupus nephritis less likely lupus flare. Known L brachial DVT and found to have R sided PE highly likely on CT additionally with peritoneal lymphadenopathy, cirrhosis with portal hypertension although minimal ascites, and splenomegaly with splenic infarct. Placed on heparin gtt. Core biopsy conducted by IR of cervical lymph nodes with preliminary pathology discussed verbally with pathology EBV+ lymphoprofileration although originally with concern for lymphoma pending further genetic testing with pathology. Working diagnosis of HLH r/o caused by EBV v lymphoma v lupus. Patient transferred to floors 6/9/2022 continued on dexamethasone for empiric HLH. Still had recurrent fevers.       PAST MEDICAL & SURGICAL HISTORY:  Obstructive Sleep Apnea      Hepatitis B Carrier      Pneumonia      Other systemic lupus erythematosus with endocarditis      Type 2 diabetes mellitus with other neurologic complication, without long-term current use of insulin      Sleep apnea, unspecified type      Hypertension, unspecified type      Nephritic syndrome      No significant past surgical history          FAMILY HISTORY:  FH: type 2 diabetes        Allergies    No Known Allergies    Intolerances        REVIEW OF SYSTEMS:  Constitutional: No fevers, chills, weight loss, weight gain  HEENT: No vision problems, eye pain, nasal congestion, rhinorrhea, sore throat, dysphagia  CV: No chest pain, orthopnea, palpitations  Resp: No cough, dyspnea, wheezing, hemoptysis  GI: No nausea, vomiting, diarrhea, constipation, abdominal pain  : [ ] dysuria [ ] nocturia [ ] hematuria [ ] increased urinary frequency  Musculoskeletal: [ ] back pain [ ] myalgias [ ] arthralgias [ ] fracture  Skin: [ ] rash [ ] itch  Neurological: [ ] headache [ ] dizziness [ ] syncope [ ] weakness [ ] numbness  Psychiatric: [ ] anxiety [ ] depression  Endocrine: [ ] diabetes [ ] thyroid problem  Hematologic/Lymphatic: [ ] anemia [ ] bleeding problem  Allergic/Immunologic: [ ] itchy eyes [ ] nasal discharge [ ] hives [ ] angioedema  [ ] All other systems negative  [X] Unable to assess ROS because AMS baseline dementia    OBJECTIVE:  ICU Vital Signs Last 24 Hrs  T(C): 37.4 (22 Jun 2022 00:35), Max: 37.4 (22 Jun 2022 00:35)  T(F): 99.4 (22 Jun 2022 00:35), Max: 99.4 (22 Jun 2022 00:35)  HR: 89 (22 Jun 2022 00:56) (74 - 93)  BP: 99/52 (22 Jun 2022 00:35) (46/25 - 141/62)  BP(mean): --  ABP: --  ABP(mean): --  RR: 20 (22 Jun 2022 00:35) (18 - 30)  SpO2: 99% (22 Jun 2022 00:56) (95% - 99%)    Mode: AC/ CMV (Assist Control/ Continuous Mandatory Ventilation), RR (machine): 26, TV (machine): 450, FiO2: 100, PEEP: 5, ITime: 1, MAP: 18, PIP: 40    06-20 @ 07:01 - 06-21 @ 07:00  --------------------------------------------------------  IN: 80 mL / OUT: 0 mL / NET: 80 mL    06-21 @ 07:01 - 06-22 @ 03:56  --------------------------------------------------------  IN: 660 mL / OUT: 0 mL / NET: 660 mL      CAPILLARY BLOOD GLUCOSE      POCT Blood Glucose.: 60 mg/dL (22 Jun 2022 03:52)      PHYSICAL EXAM:  General:   HEENT:   Neck:   Chest/Lungs:  Heart:  Abdomen:   Extremities:   Skin:   Neuro:   Psych:     LINES:     HOSPITAL MEDICATIONS:  MEDICATIONS  (STANDING):  chlorhexidine 4% Liquid 1 Application(s) Topical <User Schedule>  citric acid/sodium citrate Solution 30 milliLiter(s) Oral every 6 hours  dexAMETHasone  IVPB 15 milliGRAM(s) IV Intermittent daily  dextrose 5%. 1000 milliLiter(s) (50 mL/Hr) IV Continuous <Continuous>  dextrose 5%. 1000 milliLiter(s) (100 mL/Hr) IV Continuous <Continuous>  dextrose 50% Injectable 25 Gram(s) IV Push once  dextrose 50% Injectable 12.5 Gram(s) IV Push once  dextrose 50% Injectable 25 Gram(s) IV Push once  fluconAZOLE   Tablet 100 milliGRAM(s) Oral daily  folic acid 1 milliGRAM(s) Oral daily  gabapentin 300 milliGRAM(s) Oral daily  glucagon  Injectable 1 milliGRAM(s) IntraMuscular once  glucagon  Injectable 1 milliGRAM(s) IntraMuscular once  insulin lispro (ADMELOG) corrective regimen sliding scale   SubCutaneous three times a day before meals  insulin lispro (ADMELOG) corrective regimen sliding scale   SubCutaneous at bedtime  lidocaine   4% Patch 1 Patch Transdermal daily  meropenem  IVPB 1000 milliGRAM(s) IV Intermittent every 12 hours  multivitamin/minerals 1 Tablet(s) Oral daily  pantoprazole    Tablet 40 milliGRAM(s) Oral before breakfast  rasburicase IVPB 6 milliGRAM(s) IV Intermittent once  sodium bicarbonate  Infusion 0.166 mEq/kG/Hr (100 mL/Hr) IV Continuous <Continuous>  sodium chloride 0.9%. 1000 milliLiter(s) (75 mL/Hr) IV Continuous <Continuous>  trimethoprim  160 mG/sulfamethoxazole 800 mG 1 Tablet(s) Oral daily  valACYclovir 1000 milliGRAM(s) Oral two times a day    MEDICATIONS  (PRN):  acetaminophen     Tablet .. 650 milliGRAM(s) Oral every 6 hours PRN Temp greater or equal to 38C (100.4F), Mild Pain (1 - 3)  dextrose Oral Gel 15 Gram(s) Oral once PRN Blood Glucose LESS THAN 70 milliGRAM(s)/deciliter  famotidine    Tablet 20 milliGRAM(s) Oral two times a day PRN indigestion  sodium chloride 0.9% lock flush 10 milliLiter(s) IV Push every 1 hour PRN Pre/post blood products, medications, blood draw, and to maintain line patency      LABS:                        8.3    8.60  )-----------( 45       ( 21 Jun 2022 23:23 )             25.9     Hgb Trend: 8.3<--, 9.1<--, 8.6<--, 8.6<--, 8.7<--  06-21    134<L>  |  98  |  52<H>  ----------------------------<  92  4.8   |  <10<LL>  |  1.81<H>    Ca    7.4<L>      21 Jun 2022 23:23  Phos  5.4     06-21  Mg     1.7     06-21    TPro  4.9<L>  /  Alb  2.4<L>  /  TBili  2.7<H>  /  DBili  x   /  AST  229<H>  /  ALT  108<H>  /  AlkPhos  431<H>  06-21    Creatinine Trend: 1.81<--, 1.81<--, 1.62<--, 1.43<--, 1.48<--, 1.36<--      Arterial Blood Gas:  06-21 @ 23:16  7.29/<19/79/8/97.5/-16.5  ABG lactate: --    Venous Blood Gas:  06-21 @ 18:47  7.27/21/50/10/78.0  VBG Lactate: 12.0      MICROBIOLOGY:     RADIOLOGY & ADDITIONAL TESTS:      ASSESSMENT AND PLAN:  #Neuro    #Cardiovascular    #Respiratory    #GI/Nutrition    #/Renal    #Skin    #ID    #Endocrine    #Hematologic/DVT ppx    #Ethics MICU Accept Note    HPI / INTERVAL HISTORY: 67 yo M with a PMH HTN, HLD, SLE on Cellcept (MMF), class V lupus nephritis, CKD stage 2, DM2, diabetic neuropathy, TIA (2019) on plavix, BPH, HUNG, hospitalization in [facility] 3/27-4/20 tx for pancreatitis and C diff?, recent hospitalization at [place] 4/22-4/26 for acute pancreatitis and C diff colitis and another hospitalization 5/5 at [place] for septic shock (source buttock abscess) treated with vanc, cefepime (5/5-5/10), meropenem (5/11-5/18) and micafungin. Zosyn added 5/26. Course complicated by recurrent fevers despite being on antibiotics, and leukocytosis up to 30k. Blood cx neg, UA unremarkable. MRI abdomen w/ contrast performed showing extensive abd lymphadenopathy (reactive to c diff vs lymphoma?). IR stated no window for bx. Course also complicated by a L brachial vein DVT and superficial DVT in arms. Patient noted to have had a positive PPD but has possibly gotten the BCG vaccine; quant indeterminate    Admitted to [facility] MICU 5/30/22. Initially presenting as ICU to ICU transfer from [place] did not require pressors or intubation or other ICU needs at the time of transfer. Recurrent and then constant fevers requiring tylenol, cooling blanket, and cooled fluids as high as temperature of 105. Extensive FUO evaluation conducted including blood cultures NGTD, sputum cx NGTD, and AFB stool and sputum NGTD x3 additionally without parasites including malaria x3 negative and all other infectious workup negative. Originally monitored off abx and antifungal then resumed on edilson for a period of time with findings concerning for peritonitis based upon neutrophilic predominance in ascitic fluid and total cell count 3518.  Rheumatological workup in part pending however with known lupus complicated by class V lupus nephritis less likely lupus flare. Known L brachial DVT and found to have R sided PE highly likely on CT additionally with peritoneal lymphadenopathy, cirrhosis with portal hypertension although minimal ascites, and splenomegaly with splenic infarct. Placed on heparin gtt. Core biopsy conducted by IR of cervical lymph nodes with preliminary pathology discussed verbally with pathology EBV+ lymphoprofileration although originally with concern for lymphoma pending further genetic testing with pathology. Working diagnosis of HLH r/o caused by EBV v lymphoma v lupus. Patient transferred to floors 6/9/2022 continued on dexamethasone for empiric HLH. Still had recurrent fevers. Patient today became notably acidotic and lethargic with increasing lactate due to unknown source. Code blue called Given epi 1 mg, 2 amp bicarb, and 1 gram calcium chloride with ROSC achieved at 5 mins. Pulse became thready so given a second 1 mg epi. Pt intubated. Sats 100%, repeat BP 90s systolic. Brought to MICU; Bronchoscopy done with significant lung fluid likely 2/2 aspiration event i/s/o severe lactic acidosis. Patient coded again, ROSC achieved after 14min but then unable to achieve appropriate BP and passed at 6/22/22 4:45AM       PAST MEDICAL & SURGICAL HISTORY:  Obstructive Sleep Apnea      Hepatitis B Carrier      Pneumonia      Other systemic lupus erythematosus with endocarditis      Type 2 diabetes mellitus with other neurologic complication, without long-term current use of insulin      Sleep apnea, unspecified type      Hypertension, unspecified type      Nephritic syndrome      No significant past surgical history          FAMILY HISTORY:  FH: type 2 diabetes        Allergies    No Known Allergies    Intolerances        REVIEW OF SYSTEMS:  Constitutional: No fevers, chills, weight loss, weight gain  HEENT: No vision problems, eye pain, nasal congestion, rhinorrhea, sore throat, dysphagia  CV: No chest pain, orthopnea, palpitations  Resp: No cough, dyspnea, wheezing, hemoptysis  GI: No nausea, vomiting, diarrhea, constipation, abdominal pain  : [ ] dysuria [ ] nocturia [ ] hematuria [ ] increased urinary frequency  Musculoskeletal: [ ] back pain [ ] myalgias [ ] arthralgias [ ] fracture  Skin: [ ] rash [ ] itch  Neurological: [ ] headache [ ] dizziness [ ] syncope [ ] weakness [ ] numbness  Psychiatric: [ ] anxiety [ ] depression  Endocrine: [ ] diabetes [ ] thyroid problem  Hematologic/Lymphatic: [ ] anemia [ ] bleeding problem  Allergic/Immunologic: [ ] itchy eyes [ ] nasal discharge [ ] hives [ ] angioedema  [ ] All other systems negative  [X] Unable to assess ROS because AMS baseline dementia    OBJECTIVE:  ICU Vital Signs Last 24 Hrs  T(C): 37.4 (22 Jun 2022 00:35), Max: 37.4 (22 Jun 2022 00:35)  T(F): 99.4 (22 Jun 2022 00:35), Max: 99.4 (22 Jun 2022 00:35)  HR: 89 (22 Jun 2022 00:56) (74 - 93)  BP: 99/52 (22 Jun 2022 00:35) (46/25 - 141/62)  BP(mean): --  ABP: --  ABP(mean): --  RR: 20 (22 Jun 2022 00:35) (18 - 30)  SpO2: 99% (22 Jun 2022 00:56) (95% - 99%)    Mode: AC/ CMV (Assist Control/ Continuous Mandatory Ventilation), RR (machine): 26, TV (machine): 450, FiO2: 100, PEEP: 5, ITime: 1, MAP: 18, PIP: 40    06-20 @ 07:01 - 06-21 @ 07:00  --------------------------------------------------------  IN: 80 mL / OUT: 0 mL / NET: 80 mL    06-21 @ 07:01  -  06-22 @ 03:56  --------------------------------------------------------  IN: 660 mL / OUT: 0 mL / NET: 660 mL      CAPILLARY BLOOD GLUCOSE      POCT Blood Glucose.: 60 mg/dL (22 Jun 2022 03:52)      PHYSICAL EXAM:  General:   HEENT:   Neck:   Chest/Lungs:  Heart:  Abdomen:   Extremities:   Skin:   Neuro:   Psych:     LINES:     HOSPITAL MEDICATIONS:  MEDICATIONS  (STANDING):  chlorhexidine 4% Liquid 1 Application(s) Topical <User Schedule>  citric acid/sodium citrate Solution 30 milliLiter(s) Oral every 6 hours  dexAMETHasone  IVPB 15 milliGRAM(s) IV Intermittent daily  dextrose 5%. 1000 milliLiter(s) (50 mL/Hr) IV Continuous <Continuous>  dextrose 5%. 1000 milliLiter(s) (100 mL/Hr) IV Continuous <Continuous>  dextrose 50% Injectable 25 Gram(s) IV Push once  dextrose 50% Injectable 12.5 Gram(s) IV Push once  dextrose 50% Injectable 25 Gram(s) IV Push once  fluconAZOLE   Tablet 100 milliGRAM(s) Oral daily  folic acid 1 milliGRAM(s) Oral daily  gabapentin 300 milliGRAM(s) Oral daily  glucagon  Injectable 1 milliGRAM(s) IntraMuscular once  glucagon  Injectable 1 milliGRAM(s) IntraMuscular once  insulin lispro (ADMELOG) corrective regimen sliding scale   SubCutaneous three times a day before meals  insulin lispro (ADMELOG) corrective regimen sliding scale   SubCutaneous at bedtime  lidocaine   4% Patch 1 Patch Transdermal daily  meropenem  IVPB 1000 milliGRAM(s) IV Intermittent every 12 hours  multivitamin/minerals 1 Tablet(s) Oral daily  pantoprazole    Tablet 40 milliGRAM(s) Oral before breakfast  rasburicase IVPB 6 milliGRAM(s) IV Intermittent once  sodium bicarbonate  Infusion 0.166 mEq/kG/Hr (100 mL/Hr) IV Continuous <Continuous>  sodium chloride 0.9%. 1000 milliLiter(s) (75 mL/Hr) IV Continuous <Continuous>  trimethoprim  160 mG/sulfamethoxazole 800 mG 1 Tablet(s) Oral daily  valACYclovir 1000 milliGRAM(s) Oral two times a day    MEDICATIONS  (PRN):  acetaminophen     Tablet .. 650 milliGRAM(s) Oral every 6 hours PRN Temp greater or equal to 38C (100.4F), Mild Pain (1 - 3)  dextrose Oral Gel 15 Gram(s) Oral once PRN Blood Glucose LESS THAN 70 milliGRAM(s)/deciliter  famotidine    Tablet 20 milliGRAM(s) Oral two times a day PRN indigestion  sodium chloride 0.9% lock flush 10 milliLiter(s) IV Push every 1 hour PRN Pre/post blood products, medications, blood draw, and to maintain line patency      LABS:                        8.3    8.60  )-----------( 45       ( 21 Jun 2022 23:23 )             25.9     Hgb Trend: 8.3<--, 9.1<--, 8.6<--, 8.6<--, 8.7<--  06-21    134<L>  |  98  |  52<H>  ----------------------------<  92  4.8   |  <10<LL>  |  1.81<H>    Ca    7.4<L>      21 Jun 2022 23:23  Phos  5.4     06-21  Mg     1.7     06-21    TPro  4.9<L>  /  Alb  2.4<L>  /  TBili  2.7<H>  /  DBili  x   /  AST  229<H>  /  ALT  108<H>  /  AlkPhos  431<H>  06-21    Creatinine Trend: 1.81<--, 1.81<--, 1.62<--, 1.43<--, 1.48<--, 1.36<--      Arterial Blood Gas:  06-21 @ 23:16  7.29/<19/79/8/97.5/-16.5  ABG lactate: --    Venous Blood Gas:  06-21 @ 18:47  7.27/21/50/10/78.0  VBG Lactate: 12.0      MICROBIOLOGY:     RADIOLOGY & ADDITIONAL TESTS:      ASSESSMENT AND PLAN:  67 yo M with a PMH HTN, HLD, SLE on Cellcept (MMF), class V lupus nephritis, CKD stage 2, DM2, diabetic neuropathy, TIA (2019) on plavix, BPH, HUNG, hospitalization in [facility] with protracted course in the MICU for suspected EBV infection possibly triggering atypical HLH s/p code blue x2 first from likely aspiration event 2/2 lethargy from severe lactic acidosis. Patient pronounced at 6/22/22 at 4:45AM

## 2022-06-22 NOTE — PROVIDER CONTACT NOTE (CRITICAL VALUE NOTIFICATION) - PERSON GIVING RESULT:
Murine Mohinder
Smiley Verduzco
Franco Herbert
Briana Roldan [facility]
Huber Rose
Lab - Cora Bar
jarek strong
Helen Barron
Smiley Verduczo
Stat lab
Huber Rose
Miguel Aparicio

## 2022-06-24 LAB
G6PD RBC-CCNC: 23.1 U/G HGB — HIGH (ref 7–20.5)
G6PD RBC-CCNC: 24.7 U/G HGB — HIGH (ref 7–20.5)

## 2022-06-29 LAB
CULTURE RESULTS: SIGNIFICANT CHANGE UP
SPECIMEN SOURCE: SIGNIFICANT CHANGE UP

## 2022-07-02 LAB
CULTURE RESULTS: SIGNIFICANT CHANGE UP
SPECIMEN SOURCE: SIGNIFICANT CHANGE UP

## 2022-07-06 LAB
CULTURE RESULTS: SIGNIFICANT CHANGE UP
SPECIMEN SOURCE: SIGNIFICANT CHANGE UP

## 2022-07-16 LAB
CULTURE RESULTS: SIGNIFICANT CHANGE UP
SPECIMEN SOURCE: SIGNIFICANT CHANGE UP

## 2022-07-20 LAB
CULTURE RESULTS: SIGNIFICANT CHANGE UP
SPECIMEN SOURCE: SIGNIFICANT CHANGE UP

## 2022-08-31 NOTE — PROCEDURE NOTE - NSPRE-BRON/TUBRISKASSES_GEN_ALL_CORE
I evaluated the patient prior to bronchoscopy procedure for active pulmonary/laryngeal M. tuberculosis disease and the risk and actions taken:    Low risk with routine standard of care measures followed.
Face to face
I evaluated the patient prior to bronchoscopy procedure for active pulmonary/laryngeal M. tuberculosis disease and the risk and actions taken:    Low risk with routine standard of care measures followed.

## 2023-02-08 NOTE — ED ADULT NURSE NOTE - CAS DISCH CONDITION
[facility] ER/Stable
REVIEW OF SYSTEMS:    CONSTITUTIONAL: no fevers, chills, recent weight loss, night sweats   HEENT: no eye pain, nasal congestion, rhinorrhea, sore throat   CV: no chest pain, palpitations   PULM: no coughing, SOA, wheezes, pleuritic pain, hemoptysis    GI: no abd pain, n/v/d, constipation, hematemesis, bloody stools, dark/tarry stools   : no flank pain, dysuria, hematuria    MS: no extremity pain, neck pain, back pain   NEURO: no headache, photophobia, phonophobia, vision changes, focal weakness, numbness/tingling, syncope   PSYCH: no SI/HI

## 2023-03-01 NOTE — PROGRESS NOTE ADULT - PROBLEM SELECTOR PROBLEM 9
Paroxysmal atrial fibrillation Cantharidin Pregnancy And Lactation Text: The use of this medication during pregnancy or lactation is not recommended as there is insufficient data.

## 2023-04-15 NOTE — PROGRESS NOTE ADULT - NEGATIVE ENMT SYMPTOMS
no ear pain/no sinus symptoms
no ear pain/no nasal congestion
Patient informed

## 2023-09-07 NOTE — PROGRESS NOTE ADULT - PROBLEM SELECTOR PLAN 6
no -outside hospital reported to be consistent with AOCD  -CT ab pending, r/o bleed  - 6/13 AM s/p 1u pRBC hgb 6.7  - 6/14 AM s/p 1u pRBC hgb 6.7   - 6/15 AM s/p 1u pRBC hgb 6.7 --> 9.3 (overcorrection s/p 1u pRBC?)  - 6/16 - 6/17 hgb 9.1, 8.2  - transfuse if Hb<7, maintain active type and screen  - (no active bleed, will hold off on CTA, consider splenic accumulation of RBC from EBV  - DVT ppx: hold AC PEPE on CKD, hx lupus nephritis  - Cr 1.35 (6/15) improving   - p/w with worsening renal function FeNa prerenal  - 6/3 - 6/10 canales, 6/15 placed condom cath, but pt uses urinal intermittently   - 6/5 urine studies consistent with pre-renal FeNA 0.8 however IVC 2.1 on POCUS.  - urinalysis with 100mg/dL proteinuria with biopsy proven lupus nephritis   - appreciate nephrology recommendations      - Discontinued tamsulosin for now cannot be crushed and canales in place  - 6/6 discussed prior arinesp with family and risks of arinesp administration in critically ill explained including increased risk of thrombosis.  - 6/10 s/p 2 doses IV Lasix 20 for edema, pt. very fluid positive.  - 6/15 - 6/16 started (6/15) IV lasix 40 mg BID with strict I/O --> 6/16 transitioned to daily

## 2023-10-19 NOTE — ED ADULT NURSE NOTE - PRIMARY CARE PROVIDER
You can access the FollowMyHealth Patient Portal offered by [facility] by registering at the following website: [email or web address]. By joining [facility]’s FollowMyHealth portal, you will also be able to view your health information using other applications (apps) compatible with our system. Toan Guerrero

## 2023-10-23 NOTE — PHYSICAL EXAM
----- Message from Na Dubois sent at 10/23/2023  9:20 AM CDT -----  Type:  RX Refill Request    Who Called:  Pt    Refill or New Rx:  refill    RX Name and Strength:  ALPRAZolam (XANAX) 0.5 MG tablet    How is the patient currently taking it? (ex. 1XDay):  as directed    Is this a 30 day or 90 day RX:  90    Preferred Pharmacy with phone number:    [facility] [id number] - [place] - [address]  [address] [address]  Phone: [phone] Fax: [phone]    Local or Mail Order:  Local    Ordering Provider:  Dr Flower    Would the patient rather a call back or a response via [facility]?  Call back    Best Call Back Number:  [phone]    Additional Information:  Pt is out and is needing a refill.   Please call back to advise. Thanks!       [General Appearance - Alert] : alert [General Appearance - In No Acute Distress] : in no acute distress [General Appearance - Well Nourished] : well nourished [Sclera] : the sclera and conjunctiva were normal [Outer Ear] : the ears and nose were normal in appearance [Neck Appearance] : the appearance of the neck was normal [Neck Cervical Mass (___cm)] : no neck mass was observed [] : no respiratory distress [Apical Impulse] : the apical impulse was normal [Exaggerated Use Of Accessory Muscles For Inspiration] : no accessory muscle use [Heart Sounds] : normal S1 and S2 [Pitting Edema] : pitting edema present [___ +] : bilateral [unfilled]+ pitting edema to the ankles [Bowel Sounds] : normal bowel sounds [Abdomen Tenderness] : non-tender [Cervical Lymph Nodes Enlarged Posterior Bilaterally] : posterior cervical [Cervical Lymph Nodes Enlarged Anterior Bilaterally] : anterior cervical [Supraclavicular Lymph Nodes Enlarged Bilaterally] : supraclavicular [No CVA Tenderness] : no ~M costovertebral angle tenderness [No Spinal Tenderness] : no spinal tenderness [Abnormal Walk] : normal gait [Nail Clubbing] : no clubbing  or cyanosis of the fingernails [Skin Color & Pigmentation] : normal skin color and pigmentation [Musculoskeletal - Swelling] : no joint swelling seen [Skin Turgor] : normal skin turgor [Oriented To Time, Place, And Person] : oriented to person, place, and time

## 2023-11-30 NOTE — ED PROVIDER NOTE - NS HIV RISK FACTOR YES
Pt complaining of increased pain in left abdomen. Pt breathing faster and deeper, appears to be in pain. ARUN Adame notified. Morphine PCA increased to 2mg/hr basal rate. 1mg bolus dose given in addition. Pt expresses pain is improved after bolus dose given.   Declined

## 2024-06-27 NOTE — PROGRESS NOTE ADULT - PROBLEM SELECTOR PROBLEM 4
Dr. Briceno: pt was agreeable to switch to generic name of medication. Patient has 1 refill left on current Ampyra Rx and will be good until end of November when a new Rx for Dalfampridine will need to be requested.    Pt also wanted to make you aware that she is scheduled for an ultrasound today to be evaluated for a DVT.   Lupus

## 2025-04-29 NOTE — PROGRESS NOTE ADULT - PROBLEM SELECTOR PLAN 7
-outside hospital reported to be consistent with AOCD  -CT ab pending, r/o bleed  - 6/13 AM s/p 1u pRBC hgb 6.7  - 6/14 AM s/p 1u pRBC hgb 6.7   - 6/15 AM s/p 1u pRBC hgb 6.7 --> 9.3 (overcorrection s/p 1u pRBC?)  - 6/16 - 6/17 hgb 9.1, 8.2  - transfuse if Hb<7, maintain active type and screen  - (no active bleed, will hold off on CTA, consider splenic accumulation of RBC from EBV  - DVT ppx: hold AC 9

## 2025-06-21 NOTE — PROGRESS NOTE ADULT - PROBLEM SELECTOR PLAN 4
-monitoring off cellcept  -Has been seen by Dr. Swapnil Wolfe and also Dr. Shivam Malagon  -According to sisters at bedside 6/6, patient took Aranesp in the [facility] for lupus prior to him getting sick. Family are wondering if the medication can be contributory.   -Following complements, ESR, CRP, dsDNA  -rheumatology consulted, will f/u recs less than 2 seconds